# Patient Record
Sex: MALE | Race: WHITE | NOT HISPANIC OR LATINO | Employment: OTHER | URBAN - METROPOLITAN AREA
[De-identification: names, ages, dates, MRNs, and addresses within clinical notes are randomized per-mention and may not be internally consistent; named-entity substitution may affect disease eponyms.]

---

## 2017-01-11 ENCOUNTER — HOSPITAL ENCOUNTER (OUTPATIENT)
Dept: PHYSICAL THERAPY | Facility: HOSPITAL | Age: 72
Setting detail: RECURRING SERIES
Discharge: HOME | End: 2017-01-23
Attending: FAMILY MEDICINE | Admitting: FAMILY MEDICINE

## 2017-01-18 ENCOUNTER — HOSPITAL ENCOUNTER (OUTPATIENT)
Dept: MRI IMAGING | Facility: HOSPITAL | Age: 72
Discharge: HOME | End: 2017-01-18
Attending: FAMILY MEDICINE | Admitting: FAMILY MEDICINE

## 2017-04-19 ENCOUNTER — HOSPITAL ENCOUNTER (OUTPATIENT)
Dept: OTHER | Facility: HOSPITAL | Age: 72
Discharge: HOME | End: 2017-04-19
Attending: FAMILY MEDICINE | Admitting: FAMILY MEDICINE

## 2017-08-07 ENCOUNTER — HOSPITAL ENCOUNTER (OUTPATIENT)
Dept: NUCLEAR MEDICINE | Facility: HOSPITAL | Age: 72
Discharge: HOME | End: 2017-08-07
Attending: INTERNAL MEDICINE | Admitting: INTERNAL MEDICINE

## 2017-09-01 ENCOUNTER — HOSPITAL ENCOUNTER (OUTPATIENT)
Dept: FAMILY MEDICINE CLINIC | Facility: CLINIC | Age: 72
Setting detail: SPECIMEN
Discharge: HOME | End: 2017-09-01
Attending: FAMILY MEDICINE | Admitting: FAMILY MEDICINE

## 2017-09-01 LAB
PSA SERPL-MCNC: 2.75 NG/ML (ref 0–4)
TESTOST SERPL-MCNC: 3.07 NG/ML (ref 1.7–7.8)

## 2017-09-06 ENCOUNTER — HOSPITAL ENCOUNTER (OUTPATIENT)
Dept: ULTRASOUND IMAGING | Facility: HOSPITAL | Age: 72
Discharge: HOME | End: 2017-09-06

## 2017-09-27 ENCOUNTER — HOSPITAL ENCOUNTER (OUTPATIENT)
Dept: MRI IMAGING | Facility: HOSPITAL | Age: 72
Discharge: HOME | End: 2017-09-27
Attending: FAMILY MEDICINE | Admitting: FAMILY MEDICINE

## 2017-09-27 LAB — CREAT BLDA-MCNC: 1.2 MG/DL (ref 0.6–1.3)

## 2017-10-20 ENCOUNTER — HOSPITAL ENCOUNTER (OUTPATIENT)
Dept: CT IMAGING | Facility: HOSPITAL | Age: 72
Discharge: HOME | End: 2017-10-20

## 2017-11-08 ENCOUNTER — HOSPITAL ENCOUNTER (OUTPATIENT)
Dept: GENERAL RADIOLOGY | Facility: HOSPITAL | Age: 72
Discharge: HOME | End: 2017-11-08

## 2017-11-27 ENCOUNTER — HOSPITAL ENCOUNTER (OUTPATIENT)
Dept: LAB | Facility: HOSPITAL | Age: 72
Discharge: HOME | End: 2017-11-27
Attending: OPHTHALMOLOGY | Admitting: OPHTHALMOLOGY

## 2017-11-27 LAB
BASOPHILS # BLD AUTO: 0 10*3/UL (ref 0–0.2)
BASOPHILS NFR BLD AUTO: 1 % (ref 0–2)
BILIRUB UR QL STRIP: NEGATIVE MG/DL
BUN SERPL-MCNC: 32 MG/DL (ref 8–20)
CASTS URNS QL MICRO: ABNORMAL /[LPF]
COLOR UR: YELLOW
CONV BACTERIA IN URINE MICRO: NEGATIVE
CONV CLARITY OF URINE: CLEAR
CONV HYALINE CASTS IN URINE MICRO: 3 /[LPF] (ref 0–5)
CONV PROTEIN IN URINE BY AUTOMATED TEST STRIP: NEGATIVE MG/DL
CONV SMALL ROUND CELLS: ABNORMAL /[HPF]
CONV UROBILINOGEN IN URINE BY AUTOMATED TEST STRIP: 0.2 MG/DL
CREAT UR-MCNC: 1.3 MG/DL (ref 0.7–1.2)
CRP SERPL-MCNC: 0.12 MG/DL (ref 0–0.7)
CULTURE INDICATED?: ABNORMAL
DIFFERENTIAL METHOD BLD: (no result)
EOSINOPHIL # BLD AUTO: 0.1 10*3/UL (ref 0–0.3)
EOSINOPHIL # BLD AUTO: 2 % (ref 0–3)
ERYTHROCYTE [DISTWIDTH] IN BLOOD BY AUTOMATED COUNT: 15.1 % (ref 11.5–14.5)
ERYTHROCYTE [SEDIMENTATION RATE] IN BLOOD BY WESTERGREN METHOD: 17 MM/HR (ref 0–20)
GLUCOSE UR QL: NEGATIVE MG/DL
HCT VFR BLD AUTO: 36.2 % (ref 40–54)
HGB BLD-MCNC: 11.4 G/DL (ref 14–18)
HGB UR QL STRIP: NEGATIVE
KETONES UR QL STRIP: ABNORMAL MG/DL
LEUKOCYTE ESTERASE UR QL STRIP: NEGATIVE
LYMPHOCYTES # BLD AUTO: 2.2 10*3/UL (ref 0.8–4.8)
LYMPHOCYTES NFR BLD AUTO: 32 % (ref 18–42)
MCH RBC QN AUTO: 25.6 PG (ref 26–32)
MCHC RBC AUTO-ENTMCNC: 31.6 G/DL (ref 32–36)
MCV RBC AUTO: 80.8 FL (ref 80–94)
MONOCYTES # BLD AUTO: 0.7 10*3/UL (ref 0.1–1.3)
MONOCYTES NFR BLD AUTO: 10 % (ref 2–11)
NEUTROPHILS # BLD AUTO: 3.8 10*3/UL (ref 2.3–8.6)
NEUTROPHILS NFR BLD AUTO: 55 % (ref 50–75)
NITRITE UR QL STRIP: NEGATIVE
NRBC BLD AUTO-RTO: 0 /100{WBCS}
NRBC/RBC NFR BLD MANUAL: 0 10*3/UL
PH UR STRIP.AUTO: 5 [PH] (ref 4.5–8)
PLATELET # BLD AUTO: 245 10*3/UL (ref 150–450)
PMV BLD AUTO: 7.9 FL (ref 7.4–10.4)
RBC # BLD AUTO: 4.48 10*6/UL (ref 4.6–6)
RBC #/AREA URNS HPF: 1 /[HPF] (ref 0–3)
SP GR UR: 1.03 (ref 1–1.03)
SPERM URNS QL MICRO: ABNORMAL /[HPF]
SQUAMOUS SPT QL MICRO: 1 /[HPF] (ref 0–5)
UNIDENT CRYS URNS QL MICRO: ABNORMAL /[HPF]
WBC # BLD AUTO: 6.8 10*3/UL (ref 4.5–11.5)
WBC #/AREA URNS HPF: 2 /[HPF] (ref 0–5)
YEAST SPEC QL WET PREP: ABNORMAL /[HPF]

## 2017-11-29 LAB
ANA SER QL IA: NORMAL
CHROMATIN AB SERPL-ACNC: <20 [IU]/ML (ref 0–20)
HLA-B27 QL NAA+PROBE: NEGATIVE
INTERPRETATION: NORMAL

## 2017-11-30 LAB — LABORATORY COMMENT REPORT: NORMAL

## 2018-10-24 ENCOUNTER — HOSPITAL ENCOUNTER (OUTPATIENT)
Dept: FAMILY MEDICINE CLINIC | Facility: CLINIC | Age: 73
Setting detail: SPECIMEN
Discharge: HOME | End: 2018-10-24
Attending: FAMILY MEDICINE | Admitting: FAMILY MEDICINE

## 2018-10-24 LAB
25(OH)D3 SERPL-MCNC: 54 NG/ML (ref 30–100)
ALBUMIN SERPL-MCNC: 3.6 G/DL (ref 3.5–4.8)
ALBUMIN/GLOB SERPL: 1.2 {RATIO} (ref 1–1.7)
ALP SERPL-CCNC: 92 IU/L (ref 32–91)
ALT SERPL-CCNC: 25 IU/L (ref 17–63)
ANION GAP SERPL CALC-SCNC: 13.8 MMOL/L (ref 10–20)
AST SERPL-CCNC: 28 IU/L (ref 15–41)
BASOPHILS # BLD AUTO: 0 10*3/UL (ref 0–0.2)
BASOPHILS NFR BLD AUTO: 1 % (ref 0–2)
BILIRUB SERPL-MCNC: 0.7 MG/DL (ref 0.3–1.2)
BILIRUB UR QL STRIP: NEGATIVE MG/DL
BUN SERPL-MCNC: 40 MG/DL (ref 8–20)
BUN/CREAT SERPL: 33.3 (ref 6.2–20.3)
CALCIUM SERPL-MCNC: 9.2 MG/DL (ref 8.9–10.3)
CASTS URNS QL MICRO: NORMAL /[LPF]
CHLORIDE SERPL-SCNC: 105 MMOL/L (ref 101–111)
CHOLEST SERPL-MCNC: 137 MG/DL
CHOLEST/HDLC SERPL: 2.1 {RATIO}
COLOR UR: YELLOW
CONV BACTERIA IN URINE MICRO: NEGATIVE
CONV CLARITY OF URINE: CLEAR
CONV CO2: 25 MMOL/L (ref 22–32)
CONV HYALINE CASTS IN URINE MICRO: 0 /[LPF] (ref 0–5)
CONV LDL CHOLESTEROL DIRECT: 64 MG/DL (ref 0–100)
CONV PROTEIN IN URINE BY AUTOMATED TEST STRIP: NEGATIVE MG/DL
CONV SMALL ROUND CELLS: NORMAL /[HPF]
CONV TOTAL PROTEIN: 6.5 G/DL (ref 6.1–7.9)
CONV UROBILINOGEN IN URINE BY AUTOMATED TEST STRIP: 0.2 MG/DL
CREAT UR-MCNC: 1.2 MG/DL (ref 0.7–1.2)
CULTURE INDICATED?: NORMAL
DIFFERENTIAL METHOD BLD: (no result)
EOSINOPHIL # BLD AUTO: 0.1 10*3/UL (ref 0–0.3)
EOSINOPHIL # BLD AUTO: 2 % (ref 0–3)
ERYTHROCYTE [DISTWIDTH] IN BLOOD BY AUTOMATED COUNT: 13.9 % (ref 11.5–14.5)
ERYTHROCYTE [SEDIMENTATION RATE] IN BLOOD BY WESTERGREN METHOD: 21 MM/HR (ref 0–20)
GLOBULIN UR ELPH-MCNC: 2.9 G/DL (ref 2.5–3.8)
GLUCOSE SERPL-MCNC: 105 MG/DL (ref 65–99)
GLUCOSE UR QL: NEGATIVE MG/DL
HBA1C MFR BLD: 7 % (ref 0–5.6)
HCT VFR BLD AUTO: 40.8 % (ref 40–54)
HDLC SERPL-MCNC: 66 MG/DL
HGB BLD-MCNC: 13.5 G/DL (ref 14–18)
HGB UR QL STRIP: NEGATIVE
KETONES UR QL STRIP: NEGATIVE MG/DL
LDLC/HDLC SERPL: 1 {RATIO}
LEUKOCYTE ESTERASE UR QL STRIP: NEGATIVE
LIPID INTERPRETATION: NORMAL
LYMPHOCYTES # BLD AUTO: 2.4 10*3/UL (ref 0.8–4.8)
LYMPHOCYTES NFR BLD AUTO: 28 % (ref 18–42)
MCH RBC QN AUTO: 30.2 PG (ref 26–32)
MCHC RBC AUTO-ENTMCNC: 33.2 G/DL (ref 32–36)
MCV RBC AUTO: 90.9 FL (ref 80–94)
MONOCYTES # BLD AUTO: 0.8 10*3/UL (ref 0.1–1.3)
MONOCYTES NFR BLD AUTO: 9 % (ref 2–11)
NEUTROPHILS # BLD AUTO: 5.3 10*3/UL (ref 2.3–8.6)
NEUTROPHILS NFR BLD AUTO: 60 % (ref 50–75)
NITRITE UR QL STRIP: NEGATIVE
NRBC BLD AUTO-RTO: 0 /100{WBCS}
NRBC/RBC NFR BLD MANUAL: 0 10*3/UL
PH UR STRIP.AUTO: 5.5 [PH] (ref 4.5–8)
PLATELET # BLD AUTO: 215 10*3/UL (ref 150–450)
PMV BLD AUTO: 8.2 FL (ref 7.4–10.4)
POTASSIUM SERPL-SCNC: 4.8 MMOL/L (ref 3.6–5.1)
PSA SERPL-MCNC: 1.22 NG/ML (ref 0–4)
RBC # BLD AUTO: 4.49 10*6/UL (ref 4.6–6)
RBC #/AREA URNS HPF: 0 /[HPF] (ref 0–3)
SODIUM SERPL-SCNC: 139 MMOL/L (ref 136–144)
SP GR UR: 1.02 (ref 1–1.03)
SPERM URNS QL MICRO: NORMAL /[HPF]
SQUAMOUS SPT QL MICRO: 0 /[HPF] (ref 0–5)
T4 FREE SERPL-MCNC: 0.9 NG/DL (ref 0.58–1.64)
TRIGL SERPL-MCNC: 60 MG/DL
TSH SERPL-ACNC: 0.64 UIU/ML (ref 0.34–5.6)
UNIDENT CRYS URNS QL MICRO: NORMAL /[HPF]
VIT B12 SERPL-MCNC: 883 PG/ML (ref 180–914)
VLDLC SERPL CALC-MCNC: 6.8 MG/DL
WBC # BLD AUTO: 8.7 10*3/UL (ref 4.5–11.5)
WBC #/AREA URNS HPF: 1 /[HPF] (ref 0–5)
YEAST SPEC QL WET PREP: NORMAL /[HPF]

## 2019-06-28 RX ORDER — HYDROCODONE BITARTRATE AND ACETAMINOPHEN 10; 325 MG/1; MG/1
TABLET ORAL
Qty: 120 TABLET | Refills: 0 | Status: SHIPPED | OUTPATIENT
Start: 2019-06-28 | End: 2019-07-29 | Stop reason: SDUPTHER

## 2019-06-28 RX ORDER — HYDROCODONE BITARTRATE AND ACETAMINOPHEN 10; 325 MG/1; MG/1
TABLET ORAL
COMMUNITY
Start: 2019-04-29 | End: 2019-06-28 | Stop reason: SDUPTHER

## 2019-06-28 RX ORDER — DEXTROAMPHETAMINE SACCHARATE, AMPHETAMINE ASPARTATE MONOHYDRATE, DEXTROAMPHETAMINE SULFATE AND AMPHETAMINE SULFATE 5; 5; 5; 5 MG/1; MG/1; MG/1; MG/1
20 CAPSULE, EXTENDED RELEASE ORAL EVERY MORNING
Qty: 30 CAPSULE | Refills: 0 | Status: SHIPPED | OUTPATIENT
Start: 2019-06-28 | End: 2019-07-29 | Stop reason: SDUPTHER

## 2019-06-28 RX ORDER — DEXTROAMPHETAMINE SACCHARATE, AMPHETAMINE ASPARTATE MONOHYDRATE, DEXTROAMPHETAMINE SULFATE AND AMPHETAMINE SULFATE 5; 5; 5; 5 MG/1; MG/1; MG/1; MG/1
CAPSULE, EXTENDED RELEASE ORAL
COMMUNITY
Start: 2017-12-04 | End: 2019-06-28 | Stop reason: SDUPTHER

## 2019-07-04 RX ORDER — PANTOPRAZOLE SODIUM 40 MG/1
TABLET, DELAYED RELEASE ORAL
Qty: 90 TABLET | Refills: 3 | Status: SHIPPED | OUTPATIENT
Start: 2019-07-04 | End: 2019-09-27

## 2019-07-29 ENCOUNTER — TELEPHONE (OUTPATIENT)
Dept: FAMILY MEDICINE CLINIC | Facility: CLINIC | Age: 74
End: 2019-07-29

## 2019-07-29 NOTE — TELEPHONE ENCOUNTER
Needs rx's sent in for Hydrocodone 10/325mg one every 4 hours as needed #120 and Adderall XR 20mg once daily #30.

## 2019-07-30 RX ORDER — HYDROCODONE BITARTRATE AND ACETAMINOPHEN 10; 325 MG/1; MG/1
TABLET ORAL
Qty: 120 TABLET | Refills: 0 | Status: SHIPPED | OUTPATIENT
Start: 2019-07-30 | End: 2019-08-29 | Stop reason: SDUPTHER

## 2019-07-30 RX ORDER — DEXTROAMPHETAMINE SACCHARATE, AMPHETAMINE ASPARTATE MONOHYDRATE, DEXTROAMPHETAMINE SULFATE AND AMPHETAMINE SULFATE 5; 5; 5; 5 MG/1; MG/1; MG/1; MG/1
20 CAPSULE, EXTENDED RELEASE ORAL EVERY MORNING
Qty: 30 CAPSULE | Refills: 0 | Status: SHIPPED | OUTPATIENT
Start: 2019-07-30 | End: 2019-09-03 | Stop reason: SDUPTHER

## 2019-08-29 ENCOUNTER — TELEPHONE (OUTPATIENT)
Dept: FAMILY MEDICINE CLINIC | Facility: CLINIC | Age: 74
End: 2019-08-29

## 2019-08-29 RX ORDER — HYDROCODONE BITARTRATE AND ACETAMINOPHEN 10; 325 MG/1; MG/1
TABLET ORAL
Qty: 120 TABLET | Refills: 0 | Status: SHIPPED | OUTPATIENT
Start: 2019-08-29 | End: 2019-09-27 | Stop reason: SDUPTHER

## 2019-09-03 NOTE — TELEPHONE ENCOUNTER
Wife called requesting refill for patients    amphetamine-dextroamphetamine XR (ADDERALL XR) 20 MG

## 2019-09-04 RX ORDER — DEXTROAMPHETAMINE SACCHARATE, AMPHETAMINE ASPARTATE MONOHYDRATE, DEXTROAMPHETAMINE SULFATE AND AMPHETAMINE SULFATE 5; 5; 5; 5 MG/1; MG/1; MG/1; MG/1
20 CAPSULE, EXTENDED RELEASE ORAL EVERY MORNING
Qty: 30 CAPSULE | Refills: 0 | Status: SHIPPED | OUTPATIENT
Start: 2019-09-04 | End: 2019-09-27 | Stop reason: SDUPTHER

## 2019-09-09 RX ORDER — ATORVASTATIN CALCIUM 20 MG/1
TABLET, FILM COATED ORAL
Qty: 90 TABLET | Refills: 4 | Status: SHIPPED | OUTPATIENT
Start: 2019-09-09 | End: 2020-12-05

## 2019-09-27 ENCOUNTER — TELEPHONE (OUTPATIENT)
Dept: FAMILY MEDICINE CLINIC | Facility: CLINIC | Age: 74
End: 2019-09-27

## 2019-09-27 RX ORDER — INFLUENZA A VIRUS A/SINGAPORE/GP1908/2015 IVR-180A (H1N1) ANTIGEN (PROPIOLACTONE INACTIVATED), INFLUENZA A VIRUS A/SINGAPORE/INFIMH-16-0019/2016 IVR-186 (H3N2) ANTIGEN (PROPIOLACTONE INACTIVATED), INFLUENZA B VIRUS B/MARYLAND/15/2016 ANTIGEN (PROPIOLACTONE INACTIVATED), AND INFLUENZA B VIRUS B/PHUKET/3073/2013 BVR-1B ANTIGEN (PROPIOLACTONE INACTIVATED) 15; 15; 15; 15 UG/.5ML; UG/.5ML; UG/.5ML; UG/.5ML
INJECTION, SUSPENSION INTRAMUSCULAR
Refills: 0 | COMMUNITY
Start: 2019-09-24 | End: 2020-12-02

## 2019-09-27 RX ORDER — ESOMEPRAZOLE MAGNESIUM 40 MG/1
CAPSULE, DELAYED RELEASE ORAL
COMMUNITY
Start: 2016-01-12 | End: 2020-12-02

## 2019-09-27 RX ORDER — HYDROCODONE BITARTRATE AND ACETAMINOPHEN 10; 325 MG/1; MG/1
TABLET ORAL
Qty: 120 TABLET | Refills: 0 | Status: SHIPPED | OUTPATIENT
Start: 2019-09-27 | End: 2019-10-25 | Stop reason: SDUPTHER

## 2019-09-27 RX ORDER — CELECOXIB 200 MG/1
1 CAPSULE ORAL EVERY 24 HOURS
COMMUNITY
Start: 2019-02-18 | End: 2020-02-13

## 2019-09-27 RX ORDER — HYDROCODONE BITARTRATE AND ACETAMINOPHEN 10; 325 MG/1; MG/1
TABLET ORAL
Qty: 120 TABLET | Refills: 0 | Status: SHIPPED | OUTPATIENT
Start: 2019-09-27 | End: 2019-09-27 | Stop reason: SDUPTHER

## 2019-09-27 RX ORDER — DEXTROAMPHETAMINE SACCHARATE, AMPHETAMINE ASPARTATE MONOHYDRATE, DEXTROAMPHETAMINE SULFATE AND AMPHETAMINE SULFATE 5; 5; 5; 5 MG/1; MG/1; MG/1; MG/1
20 CAPSULE, EXTENDED RELEASE ORAL EVERY MORNING
Qty: 30 CAPSULE | Refills: 0 | Status: SHIPPED | OUTPATIENT
Start: 2019-09-27 | End: 2019-09-27 | Stop reason: SDUPTHER

## 2019-09-27 RX ORDER — GLIMEPIRIDE 2 MG/1
TABLET ORAL EVERY 24 HOURS
COMMUNITY
Start: 2017-08-14 | End: 2020-02-03

## 2019-09-27 RX ORDER — DEXTROAMPHETAMINE SACCHARATE, AMPHETAMINE ASPARTATE MONOHYDRATE, DEXTROAMPHETAMINE SULFATE AND AMPHETAMINE SULFATE 5; 5; 5; 5 MG/1; MG/1; MG/1; MG/1
20 CAPSULE, EXTENDED RELEASE ORAL EVERY MORNING
Qty: 30 CAPSULE | Refills: 0 | Status: SHIPPED | OUTPATIENT
Start: 2019-09-27 | End: 2019-10-25 | Stop reason: SDUPTHER

## 2019-09-27 RX ORDER — TRANDOLAPRIL AND VERAPAMIL HYDROCHLORIDE 2; 240 MG/1; MG/1
TABLET, FILM COATED, EXTENDED RELEASE ORAL EVERY 12 HOURS
COMMUNITY
Start: 2017-07-19 | End: 2020-01-06

## 2019-09-27 RX ORDER — HEPATITIS A VACCINE 1440 [IU]/ML
INJECTION, SUSPENSION INTRAMUSCULAR
Refills: 0 | COMMUNITY
Start: 2019-09-24 | End: 2020-12-02

## 2019-09-27 RX ORDER — DULOXETIN HYDROCHLORIDE 30 MG/1
3 CAPSULE, DELAYED RELEASE ORAL EVERY 24 HOURS
COMMUNITY
Start: 2017-08-20 | End: 2020-04-01 | Stop reason: SDUPTHER

## 2019-09-27 NOTE — TELEPHONE ENCOUNTER
Spouse called requesting refill for patients     HYDROcodone-acetaminophen (NORCO)  MG    amphetamine-dextroamphetamine XR (ADDERALL XR) 20 MG    Also, spouse states that she was told by pharmacy that patient and spouse should get the new shingles vaccine Shingrix. Spouse states that she read that if you have arthritis you should not get Shingrix. Spouse is asking if they should get old vaccine or is it ok to get the new shingrix vaccine.

## 2019-10-01 ENCOUNTER — TELEPHONE (OUTPATIENT)
Dept: FAMILY MEDICINE CLINIC | Facility: CLINIC | Age: 74
End: 2019-10-01

## 2019-10-25 ENCOUNTER — TELEPHONE (OUTPATIENT)
Dept: FAMILY MEDICINE CLINIC | Facility: CLINIC | Age: 74
End: 2019-10-25

## 2019-10-25 RX ORDER — HYDROCODONE BITARTRATE AND ACETAMINOPHEN 10; 325 MG/1; MG/1
TABLET ORAL
Qty: 120 TABLET | Refills: 0 | Status: SHIPPED | OUTPATIENT
Start: 2019-10-25 | End: 2019-11-25 | Stop reason: SDUPTHER

## 2019-10-25 RX ORDER — DEXTROAMPHETAMINE SACCHARATE, AMPHETAMINE ASPARTATE MONOHYDRATE, DEXTROAMPHETAMINE SULFATE AND AMPHETAMINE SULFATE 5; 5; 5; 5 MG/1; MG/1; MG/1; MG/1
20 CAPSULE, EXTENDED RELEASE ORAL EVERY MORNING
Qty: 30 CAPSULE | Refills: 0 | Status: SHIPPED | OUTPATIENT
Start: 2019-10-25 | End: 2019-11-25 | Stop reason: SDUPTHER

## 2019-10-25 NOTE — TELEPHONE ENCOUNTER
Wife called and requested patient's Hydrocodone and Adderall RX's to be sent to pharmacy. Thank you.

## 2019-11-25 ENCOUNTER — TELEPHONE (OUTPATIENT)
Dept: FAMILY MEDICINE CLINIC | Facility: CLINIC | Age: 74
End: 2019-11-25

## 2019-11-25 RX ORDER — DEXTROAMPHETAMINE SACCHARATE, AMPHETAMINE ASPARTATE MONOHYDRATE, DEXTROAMPHETAMINE SULFATE AND AMPHETAMINE SULFATE 5; 5; 5; 5 MG/1; MG/1; MG/1; MG/1
20 CAPSULE, EXTENDED RELEASE ORAL EVERY MORNING
Qty: 30 CAPSULE | Refills: 0 | Status: SHIPPED | OUTPATIENT
Start: 2019-11-25 | End: 2019-12-23 | Stop reason: SDUPTHER

## 2019-11-25 RX ORDER — HYDROCODONE BITARTRATE AND ACETAMINOPHEN 10; 325 MG/1; MG/1
TABLET ORAL
Qty: 120 TABLET | Refills: 0 | Status: SHIPPED | OUTPATIENT
Start: 2019-11-25 | End: 2019-12-26 | Stop reason: SDUPTHER

## 2019-11-25 NOTE — TELEPHONE ENCOUNTER
Needs 2 rx's sent in for Hydrocodone 10/325mg one every 4 hours as needed #120 and Adderall XR 20mg once daily #30.

## 2019-11-27 ENCOUNTER — OFFICE VISIT (OUTPATIENT)
Dept: FAMILY MEDICINE CLINIC | Facility: CLINIC | Age: 74
End: 2019-11-27

## 2019-11-27 VITALS
OXYGEN SATURATION: 92 % | TEMPERATURE: 98.4 F | BODY MASS INDEX: 22.71 KG/M2 | RESPIRATION RATE: 14 BRPM | DIASTOLIC BLOOD PRESSURE: 62 MMHG | WEIGHT: 162.2 LBS | HEART RATE: 83 BPM | SYSTOLIC BLOOD PRESSURE: 110 MMHG | HEIGHT: 71 IN

## 2019-11-27 DIAGNOSIS — E55.9 VITAMIN D DEFICIENCY, UNSPECIFIED: ICD-10-CM

## 2019-11-27 DIAGNOSIS — G89.29 CHRONIC BILATERAL LOW BACK PAIN WITH RIGHT-SIDED SCIATICA: ICD-10-CM

## 2019-11-27 DIAGNOSIS — H81.90 VERTIGINOUS SYNDROME: ICD-10-CM

## 2019-11-27 DIAGNOSIS — F98.8 ATTENTION DEFICIT DISORDER (ADD) WITHOUT HYPERACTIVITY: ICD-10-CM

## 2019-11-27 DIAGNOSIS — M54.41 CHRONIC BILATERAL LOW BACK PAIN WITH RIGHT-SIDED SCIATICA: ICD-10-CM

## 2019-11-27 DIAGNOSIS — F33.41 RECURRENT MAJOR DEPRESSIVE DISORDER, IN PARTIAL REMISSION (HCC): ICD-10-CM

## 2019-11-27 DIAGNOSIS — R35.0 BENIGN PROSTATIC HYPERPLASIA WITH URINARY FREQUENCY: ICD-10-CM

## 2019-11-27 DIAGNOSIS — K80.20 CALCULUS OF GALLBLADDER WITHOUT CHOLECYSTITIS WITHOUT OBSTRUCTION: ICD-10-CM

## 2019-11-27 DIAGNOSIS — I10 ESSENTIAL HYPERTENSION: ICD-10-CM

## 2019-11-27 DIAGNOSIS — Z00.00 MEDICARE ANNUAL WELLNESS VISIT, SUBSEQUENT: Primary | ICD-10-CM

## 2019-11-27 DIAGNOSIS — Z79.4 TYPE 2 DIABETES MELLITUS WITHOUT COMPLICATION, WITH LONG-TERM CURRENT USE OF INSULIN (HCC): ICD-10-CM

## 2019-11-27 DIAGNOSIS — D50.0 IRON DEFICIENCY ANEMIA DUE TO CHRONIC BLOOD LOSS: ICD-10-CM

## 2019-11-27 DIAGNOSIS — M15.9 PRIMARY OSTEOARTHRITIS INVOLVING MULTIPLE JOINTS: ICD-10-CM

## 2019-11-27 DIAGNOSIS — N40.1 BENIGN PROSTATIC HYPERPLASIA WITH URINARY FREQUENCY: ICD-10-CM

## 2019-11-27 DIAGNOSIS — E11.9 TYPE 2 DIABETES MELLITUS WITHOUT COMPLICATION, WITH LONG-TERM CURRENT USE OF INSULIN (HCC): ICD-10-CM

## 2019-11-27 DIAGNOSIS — E29.1 DEFICIENCY OF TESTOSTERONE BIOSYNTHESIS: ICD-10-CM

## 2019-11-27 DIAGNOSIS — Z12.5 ENCOUNTER FOR SCREENING FOR MALIGNANT NEOPLASM OF PROSTATE: ICD-10-CM

## 2019-11-27 DIAGNOSIS — K58.9 IRRITABLE BOWEL SYNDROME WITHOUT DIARRHEA: ICD-10-CM

## 2019-11-27 DIAGNOSIS — M54.12 CERVICAL RADICULOPATHY: ICD-10-CM

## 2019-11-27 PROBLEM — F32.A DEPRESSION: Status: ACTIVE | Noted: 2019-11-27

## 2019-11-27 PROBLEM — M19.90 OSTEOARTHRITIS: Status: ACTIVE | Noted: 2019-11-27

## 2019-11-27 LAB
25(OH)D3 SERPL-MCNC: 55.1 NG/ML (ref 30–100)
ALBUMIN SERPL-MCNC: 4.1 G/DL (ref 3.5–5.2)
ALBUMIN/GLOB SERPL: 1.5 G/DL
ALP SERPL-CCNC: 80 U/L (ref 39–117)
ALT SERPL W P-5'-P-CCNC: 15 U/L (ref 1–41)
ANION GAP SERPL CALCULATED.3IONS-SCNC: 10.6 MMOL/L (ref 5–15)
AST SERPL-CCNC: 21 U/L (ref 1–40)
BASOPHILS # BLD AUTO: 0.03 10*3/MM3 (ref 0–0.2)
BASOPHILS NFR BLD AUTO: 0.3 % (ref 0–1.5)
BILIRUB SERPL-MCNC: 0.2 MG/DL (ref 0.2–1.2)
BILIRUB UR QL STRIP: NEGATIVE
BUN BLD-MCNC: 29 MG/DL (ref 8–23)
BUN/CREAT SERPL: 22.8 (ref 7–25)
CALCIUM SPEC-SCNC: 9.4 MG/DL (ref 8.6–10.5)
CHLORIDE SERPL-SCNC: 100 MMOL/L (ref 98–107)
CHOLEST SERPL-MCNC: 128 MG/DL (ref 0–200)
CLARITY UR: CLEAR
CO2 SERPL-SCNC: 27.4 MMOL/L (ref 22–29)
COLOR UR: ABNORMAL
CREAT BLD-MCNC: 1.27 MG/DL (ref 0.76–1.27)
DEPRECATED RDW RBC AUTO: 42.1 FL (ref 37–54)
EOSINOPHIL # BLD AUTO: 0.19 10*3/MM3 (ref 0–0.4)
EOSINOPHIL NFR BLD AUTO: 2.1 % (ref 0.3–6.2)
ERYTHROCYTE [DISTWIDTH] IN BLOOD BY AUTOMATED COUNT: 13.1 % (ref 12.3–15.4)
GFR SERPL CREATININE-BSD FRML MDRD: 55 ML/MIN/1.73
GLOBULIN UR ELPH-MCNC: 2.8 GM/DL
GLUCOSE BLD-MCNC: 100 MG/DL (ref 65–99)
GLUCOSE UR STRIP-MCNC: NEGATIVE MG/DL
HBA1C MFR BLD: 6.7 % (ref 3.5–5.6)
HCT VFR BLD AUTO: 35.3 % (ref 37.5–51)
HDLC SERPL-MCNC: 68 MG/DL (ref 40–60)
HGB BLD-MCNC: 12 G/DL (ref 13–17.7)
HGB UR QL STRIP.AUTO: NEGATIVE
IMM GRANULOCYTES # BLD AUTO: 0.06 10*3/MM3 (ref 0–0.05)
IMM GRANULOCYTES NFR BLD AUTO: 0.7 % (ref 0–0.5)
KETONES UR QL STRIP: ABNORMAL
LDLC SERPL CALC-MCNC: 51 MG/DL (ref 0–100)
LDLC/HDLC SERPL: 0.75 {RATIO}
LEUKOCYTE ESTERASE UR QL STRIP.AUTO: NEGATIVE
LYMPHOCYTES # BLD AUTO: 2.41 10*3/MM3 (ref 0.7–3.1)
LYMPHOCYTES NFR BLD AUTO: 26.7 % (ref 19.6–45.3)
MCH RBC QN AUTO: 30.2 PG (ref 26.6–33)
MCHC RBC AUTO-ENTMCNC: 34 G/DL (ref 31.5–35.7)
MCV RBC AUTO: 88.7 FL (ref 79–97)
MONOCYTES # BLD AUTO: 0.82 10*3/MM3 (ref 0.1–0.9)
MONOCYTES NFR BLD AUTO: 9.1 % (ref 5–12)
NEUTROPHILS # BLD AUTO: 5.53 10*3/MM3 (ref 1.7–7)
NEUTROPHILS NFR BLD AUTO: 61.1 % (ref 42.7–76)
NITRITE UR QL STRIP: NEGATIVE
NRBC BLD AUTO-RTO: 0 /100 WBC (ref 0–0.2)
PH UR STRIP.AUTO: <=5 [PH] (ref 5–8)
PLATELET # BLD AUTO: 242 10*3/MM3 (ref 140–450)
PMV BLD AUTO: 10.5 FL (ref 6–12)
POTASSIUM BLD-SCNC: 4.9 MMOL/L (ref 3.5–5.2)
PROT SERPL-MCNC: 6.9 G/DL (ref 6–8.5)
PROT UR QL STRIP: ABNORMAL
PSA SERPL-MCNC: 1.47 NG/ML (ref 0–4)
RBC # BLD AUTO: 3.98 10*6/MM3 (ref 4.14–5.8)
SODIUM BLD-SCNC: 138 MMOL/L (ref 136–145)
SP GR UR STRIP: >=1.03 (ref 1–1.03)
T4 FREE SERPL-MCNC: 1.3 NG/DL (ref 0.93–1.7)
TRIGL SERPL-MCNC: 46 MG/DL (ref 0–150)
TSH SERPL DL<=0.05 MIU/L-ACNC: 2.99 UIU/ML (ref 0.27–4.2)
UROBILINOGEN UR QL STRIP: ABNORMAL
VIT B12 BLD-MCNC: 1035 PG/ML (ref 211–946)
VLDLC SERPL-MCNC: 9.2 MG/DL (ref 5–40)
WBC NRBC COR # BLD: 9.04 10*3/MM3 (ref 3.4–10.8)

## 2019-11-27 PROCEDURE — 99214 OFFICE O/P EST MOD 30 MIN: CPT | Performed by: FAMILY MEDICINE

## 2019-11-27 PROCEDURE — 81003 URINALYSIS AUTO W/O SCOPE: CPT | Performed by: FAMILY MEDICINE

## 2019-11-27 PROCEDURE — 82607 VITAMIN B-12: CPT | Performed by: FAMILY MEDICINE

## 2019-11-27 PROCEDURE — 83036 HEMOGLOBIN GLYCOSYLATED A1C: CPT | Performed by: FAMILY MEDICINE

## 2019-11-27 PROCEDURE — G0439 PPPS, SUBSEQ VISIT: HCPCS | Performed by: FAMILY MEDICINE

## 2019-11-27 PROCEDURE — 80053 COMPREHEN METABOLIC PANEL: CPT | Performed by: FAMILY MEDICINE

## 2019-11-27 PROCEDURE — 80061 LIPID PANEL: CPT | Performed by: FAMILY MEDICINE

## 2019-11-27 PROCEDURE — 85025 COMPLETE CBC W/AUTO DIFF WBC: CPT | Performed by: FAMILY MEDICINE

## 2019-11-27 PROCEDURE — G0103 PSA SCREENING: HCPCS | Performed by: FAMILY MEDICINE

## 2019-11-27 PROCEDURE — 84439 ASSAY OF FREE THYROXINE: CPT | Performed by: FAMILY MEDICINE

## 2019-11-27 PROCEDURE — 84443 ASSAY THYROID STIM HORMONE: CPT | Performed by: FAMILY MEDICINE

## 2019-11-27 PROCEDURE — 82306 VITAMIN D 25 HYDROXY: CPT | Performed by: FAMILY MEDICINE

## 2019-11-27 RX ORDER — PANTOPRAZOLE SODIUM 40 MG/1
TABLET, DELAYED RELEASE ORAL
COMMUNITY
Start: 2019-10-03 | End: 2020-04-06 | Stop reason: SDUPTHER

## 2019-11-27 RX ORDER — DICYCLOMINE HYDROCHLORIDE 10 MG/1
CAPSULE ORAL
COMMUNITY
Start: 2019-11-23 | End: 2020-02-24

## 2019-11-27 RX ORDER — SUCRALFATE 1 G/10ML
SUSPENSION ORAL
COMMUNITY
Start: 2019-09-21 | End: 2020-03-23 | Stop reason: SDUPTHER

## 2019-11-27 RX ORDER — TAMSULOSIN HYDROCHLORIDE 0.4 MG/1
0.4 CAPSULE ORAL DAILY
Status: SHIPPED | OUTPATIENT
Start: 2019-11-27 | End: 2019-12-27

## 2019-11-27 NOTE — PROGRESS NOTES
The ABCs of the Annual Wellness Visit  Subsequent Medicare Wellness Visit    Chief Complaint   Patient presents with   • Medicare Wellness-subsequent       Subjective   History of Present Illness:  Lux Bray is a 74 y.o. male who presents for a Subsequent Medicare Wellness Visit.   Patient will have the health risk assessments today.  Colon is up to date.  PSA will be done.  Immunizatios up to date.  Overall he has had a good year.        Patient is also here today for a physical exam and review of his current medications.  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  We will address these medical conditions today and make adjustments in medicines if needed as dictated by lab results.                HEALTH RISK ASSESSMENT    Recent Hospitalizations:  No hospitalization(s) within the last year.    Current Medical Providers:  Patient Care Team:  Montana Morrow MD as PCP - General (Family Medicine)  Rohit Marsh MD as PCP - Claims Attributed    Smoking Status:  Social History     Tobacco Use   Smoking Status Former Smoker   Smokeless Tobacco Never Used       Alcohol Consumption:  Social History     Substance and Sexual Activity   Alcohol Use Yes       Depression Screen:   PHQ-2/PHQ-9 Depression Screening 11/27/2019   Little interest or pleasure in doing things 0   Feeling down, depressed, or hopeless 0   Total Score 0       Fall Risk Screen:  STEADI Fall Risk Assessment was completed, and patient is at LOW risk for falls.Assessment completed on:11/27/2019    Health Habits and Functional and Cognitive Screening:  Functional & Cognitive Status 11/27/2019   Do you have difficulty preparing food and eating?  No   Do you have difficulty bathing yourself, getting dressed or grooming yourself? No   Do you have difficulty using the toilet? No   Do you have difficulty moving around from place to place? No   Do you have trouble with steps or getting out of a bed or a chair? No   Current Diet Low Carb Diet   Dental Exam Up to date   Eye Exam Up to date   Current Exercise Activities Include Cardiovasular Workout on Exercise Equipment   Do you need help using the phone?  No   Are you deaf or do you have serious difficulty hearing?  Yes   Do you need help with transportation? No   Do you need help shopping? No   Do you need help preparing meals?  No   Do you need help with housework?  No   Do you need help with laundry? No   Do you need help taking your medications? Yes   Do you need help managing money? No   Do you ever drive or ride in a car without wearing a seat belt? No   Have you felt unusual stress, anger or loneliness in the last month? No   Who do you live with? Spouse   If you need help, do you have trouble finding someone available to you? No   Have you been bothered in the last four weeks by sexual problems? No   Do you have difficulty concentrating, remembering or making decisions? No         Does the patient have evidence of cognitive impairment? Yes    Asprin use counseling:Taking ASA appropriately as indicated    Age-appropriate Screening Schedule:  Refer to the list below for future screening recommendations based on patient's age, sex and/or medical conditions. Orders for these recommended tests are listed in the plan section. The patient has been provided with a written plan.    Health Maintenance   Topic Date Due   • URINE MICROALBUMIN  1945   • TDAP/TD VACCINES (1 - Tdap) 09/17/1962   • ZOSTER VACCINE (2 of 2) 12/22/2019   • DIABETIC EYE EXAM  04/29/2020   • HEMOGLOBIN A1C  05/27/2020   • INFLUENZA VACCINE  Completed   • PNEUMOCOCCAL VACCINES (65+ LOW/MEDIUM RISK)  Completed          The following  portions of the patient's history were reviewed and updated as appropriate: allergies, current medications, past family history, past medical history, past social history, past surgical history and problem list.    Outpatient Medications Prior to Visit   Medication Sig Dispense Refill   • amphetamine-dextroamphetamine XR (ADDERALL XR) 20 MG 24 hr capsule Take 1 capsule by mouth Every Morning 30 capsule 0   • atorvastatin (LIPITOR) 20 MG tablet TAKE 1 TABLET DAILY 90 tablet 4   • CARAFATE 1 GM/10ML suspension      • celecoxib (CELEBREX) 200 MG capsule 1 capsule Daily.     • dicyclomine (BENTYL) 10 MG capsule      • DULoxetine (CYMBALTA) 30 MG capsule 3 capsules Daily.     • esomeprazole (nexIUM) 40 MG capsule NEXIUM 40 MG CPDR     • glimepiride (AMARYL) 2 MG tablet Daily.     • HYDROcodone-acetaminophen (NORCO)  MG per tablet Take 1 tablet every 4 hours prn MAX 4 tablets /day 120 tablet 0   • metFORMIN (GLUCOPHAGE) 500 MG tablet TAKE 2 TABLETS TWICE A  tablet 4   • pantoprazole (PROTONIX) 40 MG EC tablet      • trandolapril-verapamil (TARKA) 2-240 MG per CR tablet Every 12 (Twelve) Hours.     • AFLURIA QUADRIVALENT 0.5 ML suspension prefilled syringe injection ADM 0.5ML IM UTD  0   • Chlorcyclizine-Pseudoephed (STAHIST AD) 25-60 MG tablet STAHIST AD 25-60 MG TABS     • HAVRIX 1440 EL U/ML vaccine ADM 1ML IM UTD  0     No facility-administered medications prior to visit.        Patient Active Problem List   Diagnosis   • Allergic state   • Attention deficit disorder (ADD) without hyperactivity   • Cervical radiculopathy   • Cholelithiasis   • Deficiency of testosterone biosynthesis   • Depression   • Enlarged prostate with lower urinary tract symptoms (LUTS)   • Hypertension   • Irritable bowel syndrome without diarrhea   • Lumbago with sciatica, right side   • Osteoarthritis   • Type 2 diabetes mellitus without complications (CMS/HCC)   • Vertiginous syndrome   • Preventative health care   • Vitamin D  deficiency, unspecified    • Encounter for screening for malignant neoplasm of prostate    • Iron deficiency anemia due to chronic blood loss       Advanced Care Planning:  Patient has an advance directive - a copy has not been provided. Have asked the patient to send this to us to add to record    Review of Systems   Constitutional: Negative.  Negative for appetite change, diaphoresis, fatigue and fever.   HENT: Positive for postnasal drip and rhinorrhea. Negative for congestion, dental problem, ear pain and sneezing.    Eyes: Negative.  Negative for visual disturbance.   Respiratory: Positive for shortness of breath. Negative for cough and chest tightness.    Cardiovascular: Negative.  Negative for chest pain and palpitations.   Gastrointestinal: Positive for abdominal distention and constipation.   Endocrine: Negative for cold intolerance and heat intolerance.   Genitourinary: Negative.  Negative for difficulty urinating and flank pain.   Musculoskeletal: Positive for arthralgias, back pain, neck pain and neck stiffness. Negative for joint swelling.        Patient has a lot of pain on a daily basis in his back but he is learned to kind of live with it.  He also has pain in almost all of his joints but in particular his hands knees and shoulders are bothersome.   Skin: Negative for color change and rash.   Allergic/Immunologic: Negative.    Neurological: Negative.  Negative for dizziness and headaches.   Hematological: Negative.  Negative for adenopathy.   Psychiatric/Behavioral: Positive for dysphoric mood. Negative for agitation and behavioral problems. The patient is nervous/anxious.         He occasionally will have episodes of depression but anxiety is usually always present.  Now that he is retired this is certainly improved.   All other systems reviewed and are negative.      Compared to one year ago, the patient feels his physical health is better.  Compared to one year ago, the patient feels his mental  "health is the same.    Reviewed chart for potential of high risk medication in the elderly: no  Reviewed chart for potential of harmful drug interactions in the elderly:yes    Objective         Vitals:    11/27/19 0907   BP: 110/62   BP Location: Left arm   Patient Position: Sitting   Cuff Size: Adult   Pulse: 83   Resp: 14   Temp: 98.4 °F (36.9 °C)   TempSrc: Oral   SpO2: 92%   Weight: 73.6 kg (162 lb 3.2 oz)   Height: 180.3 cm (71\")       Body mass index is 22.62 kg/m².  Discussed the patient's BMI with him. The BMI is in the acceptable range.    Physical Exam   Constitutional: He is oriented to person, place, and time. He appears well-developed and well-nourished.   HENT:   Head: Normocephalic.   Right Ear: External ear normal.   Left Ear: External ear normal.   Nose: Nose normal.   Mouth/Throat: Oropharynx is clear and moist. No oropharyngeal exudate.   Eyes: Conjunctivae and EOM are normal. Pupils are equal, round, and reactive to light.   Neck: Normal range of motion. Neck supple.   Cardiovascular: Normal rate, regular rhythm, normal heart sounds and intact distal pulses.   Pulmonary/Chest: Effort normal and breath sounds normal.   Abdominal: Soft. Bowel sounds are normal.   Musculoskeletal: Normal range of motion.   Patient has obvious osteoarthritic changes of his hands and wrists elbows hips knees ankles and feet.  He moves very slowly.   Neurological: He is alert and oriented to person, place, and time.   Skin: Skin is warm and dry.   Psychiatric: He has a normal mood and affect. His behavior is normal. Judgment and thought content normal.   Vitals reviewed.      Lab Results   Component Value Date    TRIG 46 11/27/2019    HDL 68 (H) 11/27/2019    LDL 51 11/27/2019    VLDL 9.2 11/27/2019    HGBA1C 6.7 (H) 11/27/2019        Assessment/Plan   Medicare Risks and Personalized Health Plan  CMS Preventative Services Quick Reference  Chronic Pain   Colon Cancer Screening  Depression/Dysphoria  Hearing " Problem    The above risks/problems have been discussed with the patient.  Pertinent information has been shared with the patient in the After Visit Summary.  Follow up plans and orders are seen below in the Assessment/Plan Section.    Diagnoses and all orders for this visit:    1. Medicare annual wellness visit, subsequent (Primary)  Assessment & Plan:  He has done well.  He is up to date with his preventative care.  Immunizations are up to date.      Orders:  -     Vitamin D 25 Hydroxy  -     Vitamin B12  -     Urinalysis With Culture If Indicated - Urine, Random Void  -     TSH  -     T4, Free  -     PSA Screen  -     Comprehensive Metabolic Panel  -     CBC & Differential  -     Lipid Panel  -     Hemoglobin A1c  -     tamsulosin (FLOMAX) 24 hr capsule 0.4 mg  -     CBC Auto Differential    2. Essential hypertension  Assessment & Plan:  Hypertension is improving with treatment.  Continue current treatment regimen.  Dietary sodium restriction.  Weight loss.  Regular aerobic exercise.  Continue current medications.  Blood pressure will be reassessed at the next regular appointment.    Patient's blood pressure is excellent today.  He needs to continue the Tarka 2/241 every day    Orders:  -     Vitamin D 25 Hydroxy  -     Vitamin B12  -     Urinalysis With Culture If Indicated - Urine, Random Void  -     TSH  -     T4, Free  -     PSA Screen  -     Comprehensive Metabolic Panel  -     CBC & Differential  -     Lipid Panel  -     Hemoglobin A1c  -     tamsulosin (FLOMAX) 24 hr capsule 0.4 mg  -     CBC Auto Differential    3. Type 2 diabetes mellitus without complication, with long-term current use of insulin (CMS/[facility])  Assessment & Plan:  Diabetes is improving with treatment.   Continue current treatment regimen.  Reminded to bring in blood sugar diary at next visit.  Dietary recommendations for ADA diet.  Regular aerobic exercise.  Diabetes will be reassessed in 6 months.    We have rechecked his A1c and it is  6.7%.  I think we can increase his Amaryl to 4 mg daily and hopefully that will help him regain control and bring his A1c down below 6%.  He will have to watch for hypoglycemia at this level.    Orders:  -     Vitamin D 25 Hydroxy  -     Vitamin B12  -     Urinalysis With Culture If Indicated - Urine, Random Void  -     TSH  -     T4, Free  -     PSA Screen  -     Comprehensive Metabolic Panel  -     CBC & Differential  -     Lipid Panel  -     Hemoglobin A1c  -     tamsulosin (FLOMAX) 24 hr capsule 0.4 mg  -     CBC Auto Differential    4. Chronic bilateral low back pain with right-sided sciatica  Assessment & Plan:  Patient has the same problem as his neck also in his lumbar spine.  He has daily pain and stiffness.  Limited range of motion.  On the low cysts and facet arthropathy.  Occasionally has sciatica but very rarely does it go below the knee.    For both the neck and the lower back if the pain gets to be too much he does have the option of using Norco 10/325 1 every 6 hours as needed.  He uses Celebrex 200 mg daily for all of his arthritic pain including the spine.    Orders:  -     Vitamin D 25 Hydroxy  -     Vitamin B12  -     Urinalysis With Culture If Indicated - Urine, Random Void  -     TSH  -     T4, Free  -     PSA Screen  -     Comprehensive Metabolic Panel  -     CBC & Differential  -     Lipid Panel  -     Hemoglobin A1c  -     tamsulosin (FLOMAX) 24 hr capsule 0.4 mg  -     CBC Auto Differential    5. Primary osteoarthritis involving multiple joints  Assessment & Plan:  Patient has osteoarthritis of the axial skeleton but also peripheral joints including his shoulders elbows wrists and hands knees hips and ankles.  As mentioned above he uses Celebrex 200 daily and then if the pain gets too bad he has hydrocodone but fortunately does not have to use that very often.    Orders:  -     Vitamin D 25 Hydroxy  -     Vitamin B12  -     Urinalysis With Culture If Indicated - Urine, Random Void  -      TSH  -     T4, Free  -     PSA Screen  -     Comprehensive Metabolic Panel  -     CBC & Differential  -     Lipid Panel  -     Hemoglobin A1c  -     tamsulosin (FLOMAX) 24 hr capsule 0.4 mg  -     CBC Auto Differential    6. Benign prostatic hyperplasia with urinary frequency  Assessment & Plan:  Patient has symptoms of BPH with slow stream and nocturia.  I have suggested that he try tamsulosin 0.4 daily to see if it would diminish the amount of nocturia that he is experiencing.  Right now is getting up 4-6 times at night.  If we could even just cut that in half that would be helpful    Orders:  -     Vitamin D 25 Hydroxy  -     Vitamin B12  -     Urinalysis With Culture If Indicated - Urine, Random Void  -     TSH  -     T4, Free  -     PSA Screen  -     Comprehensive Metabolic Panel  -     CBC & Differential  -     Lipid Panel  -     Hemoglobin A1c  -     tamsulosin (FLOMAX) 24 hr capsule 0.4 mg  -     CBC Auto Differential    7. Vitamin D deficiency, unspecified   Assessment & Plan:  Recheck vitamin D level and replace if needed    Orders:  -     Vitamin D 25 Hydroxy    8. Encounter for screening for malignant neoplasm of prostate   Assessment & Plan:  Patient is due for a PSA today    Orders:  -     PSA Screen    9. Calculus of gallbladder without cholecystitis without obstruction  Assessment & Plan:  Patient has a history of gallstones that are asymptomatic.  Currently just following      10. Irritable bowel syndrome without diarrhea  Assessment & Plan:  Patient has symptoms compatible with irritable bowel syndrome particularly when he is under stress.  He tends to get more constipated than diarrhea.  When this happens we try to get him to take Benefiber and MiraLAX and that seems to work most of the time.    When he is having cramping pain he can use Bentyl 10 mg up to 4 times a day.  Only rarely has he needed to go up to 20 mg 4 times a day      11. Deficiency of testosterone biosynthesis  Assessment &  Plan:  Patient has had low testosterone for many years.  He seems to tolerate this fairly well and we have not really had to replace him for several years.      12. Cervical radiculopathy  Assessment & Plan:  Patient has rather severe cervical spondylosis and degenerative joint disease of the facets.  He has chronic neck pain sometimes with radiculopathy down the arm.  He has limited range of motion and stiffness.  Basically we treat this symptomatically and he does the best he can with it      13. Attention deficit disorder (ADD) without hyperactivity  Assessment & Plan:  Psychological condition is improving with lifestyle modifications.  Regular aerobic exercise.  Psychological condition  will be reassessed at the next regular appointment.    Now that the patient is retired he really does not need to be on Adderall so it has been stopped.  He still has some ADD but since he is not working its not near as bothersome.      14. Recurrent major depressive disorder, in partial remission (CMS/HCC)  Assessment & Plan:  Psychological condition is improving with treatment.  Regular aerobic exercise.  Medication changes per orders.  Psychological condition  will be reassessed at the next regular appointment.    Patient has Cymbalta to take for the depression.  It seems to help quite a bit.  Since he is retired now his depression is much less bothersome than it is been during his employment years.      15. Vertiginous syndrome  Assessment & Plan:  Patient complains of intermittent vertigo.  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   No Rx unless its gets much more bothersome.  Right now it happens so quickly that by the time he would take a medication it would be gone and the medication side effects would be worse than the underlying disease.  We will just watch this for some time now and see if it increases in frequency or intensity.      16. Iron deficiency anemia due to chronic blood  loss  Assessment & Plan:  Patient's hemoglobin is down to 12 this year.  It was 13.5 last year.  I need him to come back into the office for a recheck count, iron and total iron-binding capacity, and ferritin level.  His B12 is supratherapeutic.  I will need to do a fit test of the stool to see if he has blood in it and we will review his last colonoscopy.  We will repeat his CBC and a retake count in about 3 months.  If it is trending down again he will probably need an EGD along with repeat colonoscopy.          Follow Up:  Return in about 6 months (around 5/27/2020), or if symptoms worsen or fail to improve, for Recheck.     An After Visit Summary and PPPS were given to the patient.

## 2019-11-30 DIAGNOSIS — D50.0 IRON DEFICIENCY ANEMIA DUE TO CHRONIC BLOOD LOSS: Primary | ICD-10-CM

## 2019-11-30 PROBLEM — E55.9 VITAMIN D DEFICIENCY, UNSPECIFIED: Status: ACTIVE | Noted: 2019-11-30

## 2019-11-30 PROBLEM — Z12.5 ENCOUNTER FOR SCREENING FOR MALIGNANT NEOPLASM OF PROSTATE: Status: ACTIVE | Noted: 2019-11-30

## 2019-11-30 NOTE — ASSESSMENT & PLAN NOTE
Patient's hemoglobin is down to 12 this year.  It was 13.5 last year.  I need him to come back into the office for a recheck count, iron and total iron-binding capacity, and ferritin level.  His B12 is supratherapeutic.  I will need to do a fit test of the stool to see if he has blood in it and we will review his last colonoscopy.  We will repeat his CBC and a retake count in about 3 months.  If it is trending down again he will probably need an EGD along with repeat colonoscopy.

## 2019-11-30 NOTE — ASSESSMENT & PLAN NOTE
Patient has symptoms compatible with irritable bowel syndrome particularly when he is under stress.  He tends to get more constipated than diarrhea.  When this happens we try to get him to take Benefiber and MiraLAX and that seems to work most of the time.    When he is having cramping pain he can use Bentyl 10 mg up to 4 times a day.  Only rarely has he needed to go up to 20 mg 4 times a day

## 2019-11-30 NOTE — ASSESSMENT & PLAN NOTE
Patient has rather severe cervical spondylosis and degenerative joint disease of the facets.  He has chronic neck pain sometimes with radiculopathy down the arm.  He has limited range of motion and stiffness.  Basically we treat this symptomatically and he does the best he can with it

## 2019-11-30 NOTE — ASSESSMENT & PLAN NOTE
Patient has the same problem as his neck also in his lumbar spine.  He has daily pain and stiffness.  Limited range of motion.  On the low cysts and facet arthropathy.  Occasionally has sciatica but very rarely does it go below the knee.    For both the neck and the lower back if the pain gets to be too much he does have the option of using Norco 10/325 1 every 6 hours as needed.  He uses Celebrex 200 mg daily for all of his arthritic pain including the spine.

## 2019-11-30 NOTE — ASSESSMENT & PLAN NOTE
Patient has symptoms of BPH with slow stream and nocturia.  I have suggested that he try tamsulosin 0.4 daily to see if it would diminish the amount of nocturia that he is experiencing.  Right now is getting up 4-6 times at night.  If we could even just cut that in half that would be helpful   Reviewed the causes of heartburn  Discussed importance of diet and lifestyle modifications to control GERD symptoms  Avoid things which worsen heartburn (ex:  caffeine, tomato based products, spicy foods, tobacco, alcohol, obesity, tight fitting clothing )  Discussed importance of eating small, frequent meals instead of large meals  Elevate head of the bed and do not lay down 2-3 hours following a meal   He has more than 5 year history of GERD  Still symptomatic despite of optimal treatment  He does qualify for EGD to rule out Naranjo's esophagitis  I will refer him to Gastroenterology    He might as well qualify for colonoscopy as he does not recall exactly when his last one was and I do not see any results in the system

## 2019-11-30 NOTE — ASSESSMENT & PLAN NOTE
Patient has had low testosterone for many years.  He seems to tolerate this fairly well and we have not really had to replace him for several years.

## 2019-11-30 NOTE — ASSESSMENT & PLAN NOTE
Patient has osteoarthritis of the axial skeleton but also peripheral joints including his shoulders elbows wrists and hands knees hips and ankles.  As mentioned above he uses Celebrex 200 daily and then if the pain gets too bad he has hydrocodone but fortunately does not have to use that very often.

## 2019-11-30 NOTE — ASSESSMENT & PLAN NOTE
Diabetes is improving with treatment.   Continue current treatment regimen.  Reminded to bring in blood sugar diary at next visit.  Dietary recommendations for ADA diet.  Regular aerobic exercise.  Diabetes will be reassessed in 6 months.    We have rechecked his A1c and it is 6.7%.  I think we can increase his Amaryl to 4 mg daily and hopefully that will help him regain control and bring his A1c down below 6%.  He will have to watch for hypoglycemia at this level.

## 2019-11-30 NOTE — ASSESSMENT & PLAN NOTE
Patient complains of intermittent vertigo.  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   No Rx unless its gets much more bothersome.  Right now it happens so quickly that by the time he would take a medication it would be gone and the medication side effects would be worse than the underlying disease.  We will just watch this for some time now and see if it increases in frequency or intensity.

## 2019-11-30 NOTE — PATIENT INSTRUCTIONS
Medicare Wellness  Personal Prevention Plan of Service     Date of Office Visit:  2019  Encounter Provider:  Montana Morrow MD  Place of Service:  [facility]  Patient Name: Lux Bray  :  1945    As part of the Medicare Wellness portion of your visit today, we are providing you with this personalized preventive plan of services (PPPS). This plan is based upon recommendations of the [place] Preventive Services Task Force (USPSTF) and the Advisory Committee on Immunization Practices (ACIP).    This lists the preventive care services that should be considered, and provides dates of when you are due. Items listed as completed are up-to-date and do not require any further intervention.    Health Maintenance   Topic Date Due   • URINE MICROALBUMIN  1945   • TDAP/TD VACCINES (1 - Tdap) 1962   • MEDICARE ANNUAL WELLNESS  10/31/2019   • ZOSTER VACCINE (2 of 2) 2019   • DIABETIC EYE EXAM  2020   • HEMOGLOBIN A1C  2020   • INFLUENZA VACCINE  Completed   • PNEUMOCOCCAL VACCINES (65+ LOW/MEDIUM RISK)  Completed       Orders Placed This Encounter   Procedures   • Vitamin D 25 Hydroxy   • Vitamin B12   • Urinalysis With Culture If Indicated - Urine, Random Void   • TSH   • T4, Free   • PSA Screen   • Comprehensive Metabolic Panel   • Lipid Panel   • Hemoglobin A1c   • CBC Auto Differential   • CBC & Differential     Order Specific Question:   Manual Differential     Answer:   No       No Follow-up on file.

## 2019-11-30 NOTE — ASSESSMENT & PLAN NOTE
Psychological condition is improving with lifestyle modifications.  Regular aerobic exercise.  Psychological condition  will be reassessed at the next regular appointment.    Now that the patient is retired he really does not need to be on Adderall so it has been stopped.  He still has some ADD but since he is not working its not near as bothersome.   57

## 2019-11-30 NOTE — ASSESSMENT & PLAN NOTE
Hypertension is improving with treatment.  Continue current treatment regimen.  Dietary sodium restriction.  Weight loss.  Regular aerobic exercise.  Continue current medications.  Blood pressure will be reassessed at the next regular appointment.    Patient's blood pressure is excellent today.  He needs to continue the Tarka 2/241 every day

## 2019-11-30 NOTE — ASSESSMENT & PLAN NOTE
Psychological condition is improving with treatment.  Regular aerobic exercise.  Medication changes per orders.  Psychological condition  will be reassessed at the next regular appointment.    Patient has Cymbalta to take for the depression.  It seems to help quite a bit.  Since he is retired now his depression is much less bothersome than it is been during his employment years.

## 2019-12-02 ENCOUNTER — TELEPHONE (OUTPATIENT)
Dept: FAMILY MEDICINE CLINIC | Facility: CLINIC | Age: 74
End: 2019-12-02

## 2019-12-02 NOTE — TELEPHONE ENCOUNTER
Wife noted on patient portal that patient's B12 level was elevated. He takes B12 supplement and she is asking if he needs to stop it. Thank you.

## 2019-12-02 NOTE — TELEPHONE ENCOUNTER
Scheduled patient lab appointment 12/5/19 8:50 am for the iron labs.    Scheduled patient lab appointment 1/15/20 11:00 am for CBC.    Wife wanted you to know that patient had a colonoscopy done 2017 for Dr. Bernardo Viveros, Phone [phone], Fax: [phone]. She is wanting our office to get these results for your review.    Wife also asked do you want patient to stop his ASA 81mg.    Thank you.

## 2019-12-02 NOTE — TELEPHONE ENCOUNTER
----- Message from Montana Morrow MD sent at 11/30/2019  9:46 AM EST -----  Eloisa Alen needs to come back in for iron/TIBC, retic count, ferritin,    We need to give him a FIT tube to collect a sample of stool to test.    Tell him to repeat the CBC in 6-8 weeks.  If going down and the FIT is positive for blood he will need a repeat scope of the colon (last one 2015) and maybe a EGD.

## 2019-12-03 NOTE — TELEPHONE ENCOUNTER
Gave wife message that patient should stop ASA.    Wife is still needing to know if patient should stop the B12 supplement. Thank you.

## 2019-12-05 ENCOUNTER — TELEPHONE (OUTPATIENT)
Dept: FAMILY MEDICINE CLINIC | Facility: CLINIC | Age: 74
End: 2019-12-05

## 2019-12-05 LAB
FERRITIN SERPL-MCNC: 19.3 NG/ML (ref 30–400)
IRON 24H UR-MRATE: 55 MCG/DL (ref 59–158)
IRON SATN MFR SERPL: 16 % (ref 20–50)
RETICS # AUTO: 0.07 10*6/MM3 (ref 0.02–0.13)
RETICS/RBC NFR AUTO: 1.74 % (ref 0.7–1.9)
TIBC SERPL-MCNC: 355 MCG/DL (ref 298–536)
TRANSFERRIN SERPL-MCNC: 238 MG/DL (ref 200–360)

## 2019-12-05 PROCEDURE — 83540 ASSAY OF IRON: CPT | Performed by: FAMILY MEDICINE

## 2019-12-05 PROCEDURE — 85045 AUTOMATED RETICULOCYTE COUNT: CPT | Performed by: FAMILY MEDICINE

## 2019-12-05 PROCEDURE — 84466 ASSAY OF TRANSFERRIN: CPT | Performed by: FAMILY MEDICINE

## 2019-12-05 PROCEDURE — 82728 ASSAY OF FERRITIN: CPT | Performed by: FAMILY MEDICINE

## 2019-12-05 RX ORDER — TAMSULOSIN HYDROCHLORIDE 0.4 MG/1
1 CAPSULE ORAL DAILY
Qty: 30 CAPSULE | Refills: 2 | Status: SHIPPED | OUTPATIENT
Start: 2019-12-05 | End: 2020-04-21

## 2019-12-06 DIAGNOSIS — D50.0 IRON DEFICIENCY ANEMIA DUE TO CHRONIC BLOOD LOSS: Primary | ICD-10-CM

## 2019-12-10 ENCOUNTER — TELEPHONE (OUTPATIENT)
Dept: FAMILY MEDICINE CLINIC | Facility: CLINIC | Age: 74
End: 2019-12-10

## 2019-12-10 NOTE — TELEPHONE ENCOUNTER
Dr. Morrow does not have that in his notes or anything.  Could you please call her back and ask where he is located at. That is not who dr morrow said to refer him to.      Thanks, Shirlene

## 2019-12-10 NOTE — TELEPHONE ENCOUNTER
Spouse was called. Spouse states that patient has seen Dr. Viveros in the past. Spouse states that Dr. Viveros is with [facility].

## 2019-12-10 NOTE — TELEPHONE ENCOUNTER
Let them know that they can call [employer] at [phone] to schedule appt.      Thanks, Shirlene

## 2019-12-10 NOTE — TELEPHONE ENCOUNTER
Spouse was called. Spouse states that patient sees gastroenterologist in [place] Dr. Gallito Viveros. Spouse would like referral sent to him.

## 2019-12-12 ENCOUNTER — TELEPHONE (OUTPATIENT)
Dept: FAMILY MEDICINE CLINIC | Facility: CLINIC | Age: 74
End: 2019-12-12

## 2019-12-13 DIAGNOSIS — Z12.11 SCREENING FOR COLON CANCER: ICD-10-CM

## 2019-12-13 DIAGNOSIS — D50.0 IRON DEFICIENCY ANEMIA DUE TO CHRONIC BLOOD LOSS: Primary | ICD-10-CM

## 2019-12-13 LAB
DEVELOPER EXPIRATION DATE: ABNORMAL
DEVELOPER LOT NUMBER: ABNORMAL
EXPIRATION DATE: ABNORMAL
FECAL OCCULT BLOOD SCREEN, POC: POSITIVE
Lab: ABNORMAL
NEGATIVE CONTROL: NEGATIVE
POSITIVE CONTROL: POSITIVE

## 2019-12-13 PROCEDURE — 82270 OCCULT BLOOD FECES: CPT | Performed by: FAMILY MEDICINE

## 2019-12-14 NOTE — PROGRESS NOTES
Tell Alen that his stool test was positive for blood.  We need to go ahead and see the gastroenterologist about a EGD look at your stomach and then also possibly doing a colonoscopy again since it was about 4 years ago since the last one.  We will start setting things up and see what the gastroenterologist thinks.  If they have a gastroenterologist in [place] that they go to that would be fine also.  Whoever they want to see is fine.  Let me know if they want me to send in

## 2019-12-23 ENCOUNTER — TELEPHONE (OUTPATIENT)
Dept: FAMILY MEDICINE CLINIC | Facility: CLINIC | Age: 74
End: 2019-12-23

## 2019-12-23 ENCOUNTER — TRANSCRIBE ORDERS (OUTPATIENT)
Dept: ADMINISTRATIVE | Facility: HOSPITAL | Age: 74
End: 2019-12-23

## 2019-12-23 DIAGNOSIS — R13.10 DYSPHAGIA, UNSPECIFIED TYPE: Primary | ICD-10-CM

## 2019-12-23 DIAGNOSIS — F98.8 ATTENTION DEFICIT DISORDER (ADD) WITHOUT HYPERACTIVITY: Primary | ICD-10-CM

## 2019-12-23 RX ORDER — DEXTROAMPHETAMINE SACCHARATE, AMPHETAMINE ASPARTATE MONOHYDRATE, DEXTROAMPHETAMINE SULFATE AND AMPHETAMINE SULFATE 5; 5; 5; 5 MG/1; MG/1; MG/1; MG/1
20 CAPSULE, EXTENDED RELEASE ORAL EVERY MORNING
Qty: 30 CAPSULE | Refills: 0 | Status: SHIPPED | OUTPATIENT
Start: 2019-12-23 | End: 2020-01-23 | Stop reason: SDUPTHER

## 2019-12-26 ENCOUNTER — HOSPITAL ENCOUNTER (OUTPATIENT)
Dept: GENERAL RADIOLOGY | Facility: HOSPITAL | Age: 74
Discharge: HOME | End: 2019-12-26
Admitting: INTERNAL MEDICINE

## 2019-12-26 ENCOUNTER — TELEPHONE (OUTPATIENT)
Dept: FAMILY MEDICINE CLINIC | Facility: CLINIC | Age: 74
End: 2019-12-26

## 2019-12-26 DIAGNOSIS — M15.9 PRIMARY OSTEOARTHRITIS INVOLVING MULTIPLE JOINTS: Primary | ICD-10-CM

## 2019-12-26 DIAGNOSIS — R13.10 DYSPHAGIA, UNSPECIFIED TYPE: ICD-10-CM

## 2019-12-26 PROCEDURE — 74220 X-RAY XM ESOPHAGUS 1CNTRST: CPT

## 2019-12-26 RX ORDER — HYDROCODONE BITARTRATE AND ACETAMINOPHEN 10; 325 MG/1; MG/1
TABLET ORAL
Qty: 120 TABLET | Refills: 0 | Status: SHIPPED | OUTPATIENT
Start: 2019-12-26 | End: 2020-01-23 | Stop reason: SDUPTHER

## 2019-12-30 ENCOUNTER — TRANSCRIBE ORDERS (OUTPATIENT)
Dept: ADMINISTRATIVE | Facility: HOSPITAL | Age: 74
End: 2019-12-30

## 2019-12-30 DIAGNOSIS — R05.9 COUGH: ICD-10-CM

## 2019-12-30 DIAGNOSIS — R10.9 ABDOMINAL PAIN, UNSPECIFIED ABDOMINAL LOCATION: Primary | ICD-10-CM

## 2019-12-30 DIAGNOSIS — R10.13 EPIGASTRIC ABDOMINAL PAIN: ICD-10-CM

## 2020-01-03 ENCOUNTER — HOSPITAL ENCOUNTER (OUTPATIENT)
Dept: GENERAL RADIOLOGY | Facility: HOSPITAL | Age: 75
Discharge: HOME | End: 2020-01-03
Admitting: NURSE PRACTITIONER

## 2020-01-03 ENCOUNTER — TELEPHONE (OUTPATIENT)
Dept: FAMILY MEDICINE CLINIC | Facility: CLINIC | Age: 75
End: 2020-01-03

## 2020-01-03 DIAGNOSIS — R10.13 EPIGASTRIC ABDOMINAL PAIN: ICD-10-CM

## 2020-01-03 DIAGNOSIS — R10.9 ABDOMINAL PAIN, UNSPECIFIED ABDOMINAL LOCATION: ICD-10-CM

## 2020-01-03 DIAGNOSIS — R05.9 COUGH: ICD-10-CM

## 2020-01-03 PROCEDURE — 92611 MOTION FLUOROSCOPY/SWALLOW: CPT

## 2020-01-03 PROCEDURE — 74230 X-RAY XM SWLNG FUNCJ C+: CPT

## 2020-01-03 PROCEDURE — 63710000001 BARIUM SULFATE 96 % RECONSTITUTED SUSPENSION: Performed by: NURSE PRACTITIONER

## 2020-01-03 PROCEDURE — A9270 NON-COVERED ITEM OR SERVICE: HCPCS | Performed by: NURSE PRACTITIONER

## 2020-01-03 RX ADMIN — BARIUM SULFATE 183 ML: 960 POWDER, FOR SUSPENSION ORAL at 10:30

## 2020-01-03 NOTE — MBS/VFSS/FEES
Outpatient Speech Language Pathology   Adult Video-Swallow Initial Evaluation    Chava     Patient Name: Lux Bray  : 1945  MRN: [id number]  Today's Date: 1/3/2020         Visit Date: 2020   Patient Active Problem List   Diagnosis   • Allergic state   • Attention deficit disorder (ADD) without hyperactivity   • Cervical radiculopathy   • Cholelithiasis   • Deficiency of testosterone biosynthesis   • Depression   • Enlarged prostate with lower urinary tract symptoms (LUTS)   • Hypertension   • Irritable bowel syndrome without diarrhea   • Lumbago with sciatica, right side   • Osteoarthritis   • Type 2 diabetes mellitus without complications (CMS/HCC)   • Vertiginous syndrome   • Preventative health care   • Vitamin D deficiency, unspecified    • Encounter for screening for malignant neoplasm of prostate    • Iron deficiency anemia due to chronic blood loss        Past Medical History:   Diagnosis Date   • Depression    • Hypertension         Past Surgical History:   Procedure Laterality Date   • HAND SURGERY  1980-1990- [facility]. Abstracted from [facility].   • HEMORROIDECTOMY      [facility]. Abstracted from [facility].         Visit Dx:     ICD-10-CM ICD-9-CM   1. Abdominal pain, unspecified abdominal location R10.9 789.00   2. Epigastric abdominal pain R10.13 789.06   3. Cough R05 786.2           SLP Adult Swallow Evaluation     Row Name 20 0910       Rehab Evaluation    Document Type  evaluation  -CP    Patient Observations  alert;cooperative  -CP    Patient/Family Observations  Pt alert and responsive. He acted as his own informant. he was able to follow all commands and participate in appropriate conversation. Pt was seen for VFSS seated upright at 90 degrees in the lateral position  -CP    Patient Effort  good  -CP       General Information    Patient Profile Reviewed  yes  -CP    Pertinent History Of Current Problem  Pt presents today for VFSS due to showing aspiration during  a recent upper esophagram. He reports no history of CVA,, no throat or neck surgeries. He does have a history of reflux. He presents today for further evaluation of swallow, risk for aspiratiopn and least restrictive diet.    -CP    Current Method of Nutrition  regular textures;thin liquids  -CP    Prior Level of Function-Communication  WFL  -CP    Prior Level of Function-Swallowing  regular textures;thin liquids  -CP    Plans/Goals Discussed with  patient  -CP    Barriers to Rehab  none identified  -CP       Pain Assessment    Additional Documentation  Pain Scale: Numbers Pre/Post-Treatment (Group)  -CP       Pain Scale: Numbers Pre/Post-Treatment    Pain Scale: Numbers, Pretreatment  0/10 - no pain  -CP    Pain Scale: Numbers, Post-Treatment  0/10 - no pain  -CP       Oral Motor and Function    Dentition Assessment  natural, present and adequate  -CP    Volitional Swallow  WFL  -CP    Volitional Cough  WFL  -CP       Oral Musculature and Cranial Nerve Assessment    Oral Motor General Assessment  WFL  -CP       General Eating/Swallowing Observations    Respiratory Support Currently in Use  room air  -CP    Eating/Swallowing Skills  self-fed  -CP    Positioning During Eating  upright 90 degree;upright in chair  -CP       MBS/VFSS    Utensils Used  spoon;cup  -CP       MBS/VFSS Interpretation    Oral Prep Phase  WFL  -CP    Oral Transit Phase  impaired  -CP    Oral Residue  WFL  -CP    VFSS Summary  Pt seen for video swallow study. Pt given trials of thin barium x2 by cup, barium coated applesauce x2, barium coated peaches x2 and a cracker with barium paste x2, followed by thin barium by cup with a chin tuck to complete the study. Pt exhibited functional mastication and timely oral transit. A-P transit was functional. Pt had no pocketing or oral residual. pt had premature spillage of thin liquids into the pharynx intermittantly before the swallow, causing trace, transient penetration before the swallow. This  completely cleared during the swallow and there was no aspiration at any time. Use of a chin tuck during the swallow, eliminated the penetration.  Laryngeal elevation and anterior hyoid excursion were good. Epiglottic inversion and laryngeal vestibular closure were complete. Pt had no other penetration or aspiration on any consistency assessed. There was mild vallecular residue of the applesauce, remaining in the pharyngeal area after the swallow. Pt spontaneously utilized a double swallow that cleared all residue. The esophagus was scanned and it revealed timely emptying with no retention.  It is rec that pt continue a regular diet with thin liquids as tolerated. He may utilize a chin tuck if indicated or increased coughing is noted. Use of a double swallow after dry or sticky consistencies, may also benefit this patient.  Full results and recommendations from this study were explained to the patient, including viewing the VFSS on the BRIDGET. He verbalized understanding of all results and recommendations. A DVD copy of this study was provided for the patient to take home. Thank you for referring this patient and if you have any questions, feel free to contact this department at [phone].   -CP       Oral Transit Phase    Impaired Oral Transit Phase  premature spillage of liquids into pharynx  -CP    Premature Spillage of Liquids into Pharynx  thin liquids;mechanical soft;discoordination of lingual movement  -CP       Initiation of Pharyngeal Swallow    Pharyngeal Phase  impaired pharyngeal phase of swallowing  -CP    Response to Penetration  shallow;transient;no response  -CP    Pharyngeal Residue  valleculae;other (see comments) mild  -CP    Response to Residue  cleared residue with spontaneous subsequent swallow  -CP    Attempted Compensatory Maneuvers  chin tuck  -CP    Response to Attempted Compensatory Maneuvers  prevented penetration  -CP       SLP Communication to Radiology    Severity Level of Dysphagia   mild dysphagia;pharyngeal dysfunction  -CP    Consistencies Aspirated/Penetrated  penetrated;thin liquids;other (see comments) trace and transient  -CP       Clinical Impression    SLP Swallowing Diagnosis  functional oral phase;mild;pharyngeal dysfunction  -CP    Functional Impact  risk of aspiration/pneumonia  -CP       Recommendations    SLP Diet Recommendation  regular textures;thin liquids  -CP    Recommended Precautions and Strategies  upright posture during/after eating;small bites of food and sips of liquid;multiple swallows per bite of food;multiple swallows per sip of liquid;alternate between small bites of food and sips of liquid  -CP    SLP Rec. for Method of Medication Administration  meds whole;with thin liquids;as tolerated  -CP    Monitor for Signs of Aspiration  yes;cough;gurgly voice;throat clearing  -CP      User Key  (r) = Recorded By, (t) = Taken By, (c) = Cosigned By    Initials Name Provider Type    Keisha Velez, SLP Speech and Language Pathologist                                              Time Calculation:                     DIANE Cerda  1/3/2020

## 2020-01-06 RX ORDER — TRANDOLAPRIL AND VERAPAMIL HYDROCHLORIDE 2; 240 MG/1; MG/1
TABLET, FILM COATED, EXTENDED RELEASE ORAL
Qty: 180 TABLET | Refills: 4 | Status: SHIPPED | OUTPATIENT
Start: 2020-01-06 | End: 2021-01-15 | Stop reason: SDUPTHER

## 2020-01-14 ENCOUNTER — TELEPHONE (OUTPATIENT)
Dept: GASTROENTEROLOGY | Facility: CLINIC | Age: 75
End: 2020-01-14

## 2020-01-14 NOTE — TELEPHONE ENCOUNTER
----- Message from JONATHAN Walter sent at 1/10/2020 10:06 AM EST -----  Please call to schedule EGD and CLS for anemia and trouble swallowing. SW

## 2020-01-17 ENCOUNTER — LAB (OUTPATIENT)
Dept: FAMILY MEDICINE CLINIC | Facility: CLINIC | Age: 75
End: 2020-01-17

## 2020-01-17 DIAGNOSIS — D50.0 IRON DEFICIENCY ANEMIA DUE TO CHRONIC BLOOD LOSS: Primary | ICD-10-CM

## 2020-01-17 LAB
BASOPHILS # BLD AUTO: 0.03 10*3/MM3 (ref 0–0.2)
BASOPHILS NFR BLD AUTO: 0.4 % (ref 0–1.5)
DEPRECATED RDW RBC AUTO: 42.4 FL (ref 37–54)
EOSINOPHIL # BLD AUTO: 0.11 10*3/MM3 (ref 0–0.4)
EOSINOPHIL NFR BLD AUTO: 1.3 % (ref 0.3–6.2)
ERYTHROCYTE [DISTWIDTH] IN BLOOD BY AUTOMATED COUNT: 12.9 % (ref 12.3–15.4)
HCT VFR BLD AUTO: 36.8 % (ref 37.5–51)
HGB BLD-MCNC: 12.4 G/DL (ref 13–17.7)
IMM GRANULOCYTES # BLD AUTO: 0.01 10*3/MM3 (ref 0–0.05)
IMM GRANULOCYTES NFR BLD AUTO: 0.1 % (ref 0–0.5)
LYMPHOCYTES # BLD AUTO: 2.56 10*3/MM3 (ref 0.7–3.1)
LYMPHOCYTES NFR BLD AUTO: 31.1 % (ref 19.6–45.3)
MCH RBC QN AUTO: 30.5 PG (ref 26.6–33)
MCHC RBC AUTO-ENTMCNC: 33.7 G/DL (ref 31.5–35.7)
MCV RBC AUTO: 90.4 FL (ref 79–97)
MONOCYTES # BLD AUTO: 0.67 10*3/MM3 (ref 0.1–0.9)
MONOCYTES NFR BLD AUTO: 8.2 % (ref 5–12)
NEUTROPHILS # BLD AUTO: 4.84 10*3/MM3 (ref 1.7–7)
NEUTROPHILS NFR BLD AUTO: 58.9 % (ref 42.7–76)
NRBC BLD AUTO-RTO: 0 /100 WBC (ref 0–0.2)
PLATELET # BLD AUTO: 227 10*3/MM3 (ref 140–450)
PMV BLD AUTO: 10.6 FL (ref 6–12)
RBC # BLD AUTO: 4.07 10*6/MM3 (ref 4.14–5.8)
WBC NRBC COR # BLD: 8.22 10*3/MM3 (ref 3.4–10.8)

## 2020-01-17 PROCEDURE — 36415 COLL VENOUS BLD VENIPUNCTURE: CPT

## 2020-01-17 PROCEDURE — 85025 COMPLETE CBC W/AUTO DIFF WBC: CPT | Performed by: FAMILY MEDICINE

## 2020-01-20 ENCOUNTER — TELEPHONE (OUTPATIENT)
Dept: FAMILY MEDICINE CLINIC | Facility: CLINIC | Age: 75
End: 2020-01-20

## 2020-01-21 ENCOUNTER — TELEPHONE (OUTPATIENT)
Dept: GASTROENTEROLOGY | Facility: CLINIC | Age: 75
End: 2020-01-21

## 2020-01-21 DIAGNOSIS — D50.0 IRON DEFICIENCY ANEMIA DUE TO CHRONIC BLOOD LOSS: Primary | ICD-10-CM

## 2020-01-21 NOTE — TELEPHONE ENCOUNTER
Noted, will copy to chart. Chris  ----- Message from Bay Hassan sent at 1/21/2020 11:01 AM EST -----  Regarding: RE: SCHE EGD/COLON LABS TO [facility] 1/16  Spoke to the patient and she is aware that Carole has seen the labs but that we still need to get the ok from  to go ahead with scheduling the egd/colonoscopy at this time. She states that she understands and I assured her that we would call her back later this week.   ----- Message -----  From: Carole Phillips APRN  Sent: 1/20/2020  10:13 AM EST  To: Bay Hassan  Subject: RE: SCHE EGD/COLON LABS TO [facility] 1/16          His hgb is stable and slightly improved (was 12.0 now 12.4).     We do not have an answer yet on scheduling him for procedures.   Please add this information with his wife and let her know that we will discuss with Dr. Viveros this week and get back with her.      It is reassuring that his hemoglobin has improved and his recent blood work has been reviewed by this office.  He does need to follow-up with the ordering provider for blood work results as well.    Chris  ----- Message -----  From: Sarah Ferguson RegSched Rep  Sent: 1/16/2020   4:14 PM EST  To: JONATHAN Walter  Subject: FW: SCHE EGD/COLON LABS TO [facility] 1/16              ----- Message -----  From: Sarah Ferguson RegSched Rep  Sent: 1/14/2020  10:47 AM EST  To: Bay Hassan  Subject: SCHE EGD/COLON LABS TO [facility] 1/16              REMIND CAROLE TO LOOK AT PTS LABS

## 2020-01-23 ENCOUNTER — PREP FOR SURGERY (OUTPATIENT)
Dept: GASTROENTEROLOGY | Facility: CLINIC | Age: 75
End: 2020-01-23

## 2020-01-23 ENCOUNTER — TELEPHONE (OUTPATIENT)
Dept: FAMILY MEDICINE CLINIC | Facility: CLINIC | Age: 75
End: 2020-01-23

## 2020-01-23 DIAGNOSIS — D50.0 IRON DEFICIENCY ANEMIA DUE TO CHRONIC BLOOD LOSS: Primary | ICD-10-CM

## 2020-01-23 DIAGNOSIS — M15.9 PRIMARY OSTEOARTHRITIS INVOLVING MULTIPLE JOINTS: ICD-10-CM

## 2020-01-23 DIAGNOSIS — F98.8 ATTENTION DEFICIT DISORDER (ADD) WITHOUT HYPERACTIVITY: ICD-10-CM

## 2020-01-23 RX ORDER — DEXTROAMPHETAMINE SACCHARATE, AMPHETAMINE ASPARTATE MONOHYDRATE, DEXTROAMPHETAMINE SULFATE AND AMPHETAMINE SULFATE 5; 5; 5; 5 MG/1; MG/1; MG/1; MG/1
20 CAPSULE, EXTENDED RELEASE ORAL EVERY MORNING
Qty: 30 CAPSULE | Refills: 0 | Status: SHIPPED | OUTPATIENT
Start: 2020-01-23 | End: 2020-02-21 | Stop reason: SDUPTHER

## 2020-01-23 RX ORDER — HYDROCODONE BITARTRATE AND ACETAMINOPHEN 10; 325 MG/1; MG/1
TABLET ORAL
Qty: 120 TABLET | Refills: 0 | Status: SHIPPED | OUTPATIENT
Start: 2020-01-23 | End: 2020-02-21 | Stop reason: SDUPTHER

## 2020-01-30 ENCOUNTER — OUTSIDE FACILITY SERVICE (OUTPATIENT)
Dept: GASTROENTEROLOGY | Facility: CLINIC | Age: 75
End: 2020-01-30

## 2020-01-30 ENCOUNTER — TELEPHONE (OUTPATIENT)
Dept: FAMILY MEDICINE CLINIC | Facility: CLINIC | Age: 75
End: 2020-01-30

## 2020-01-30 ENCOUNTER — LAB REQUISITION (OUTPATIENT)
Dept: LAB | Facility: HOSPITAL | Age: 75
End: 2020-01-30

## 2020-01-30 DIAGNOSIS — Z00.00 ENCOUNTER FOR GENERAL ADULT MEDICAL EXAMINATION WITHOUT ABNORMAL FINDINGS: ICD-10-CM

## 2020-01-30 PROCEDURE — 43450 DILATE ESOPHAGUS 1/MULT PASS: CPT | Performed by: INTERNAL MEDICINE

## 2020-01-30 PROCEDURE — 45380 COLONOSCOPY AND BIOPSY: CPT | Performed by: INTERNAL MEDICINE

## 2020-01-30 PROCEDURE — 88305 TISSUE EXAM BY PATHOLOGIST: CPT | Performed by: INTERNAL MEDICINE

## 2020-01-30 PROCEDURE — 43239 EGD BIOPSY SINGLE/MULTIPLE: CPT | Performed by: INTERNAL MEDICINE

## 2020-01-31 LAB
CYTO UR: NORMAL
LAB AP CASE REPORT: NORMAL
LAB AP CLINICAL INFORMATION: NORMAL
PATH REPORT.FINAL DX SPEC: NORMAL
PATH REPORT.GROSS SPEC: NORMAL

## 2020-02-03 RX ORDER — GLIMEPIRIDE 2 MG/1
TABLET ORAL
Qty: 180 TABLET | Refills: 4 | Status: SHIPPED | OUTPATIENT
Start: 2020-02-03 | End: 2020-07-20 | Stop reason: SDUPTHER

## 2020-02-13 RX ORDER — CELECOXIB 200 MG/1
CAPSULE ORAL
Qty: 90 CAPSULE | Refills: 4 | Status: SHIPPED | OUTPATIENT
Start: 2020-02-13 | End: 2021-05-10

## 2020-02-21 ENCOUNTER — TELEPHONE (OUTPATIENT)
Dept: FAMILY MEDICINE CLINIC | Facility: CLINIC | Age: 75
End: 2020-02-21

## 2020-02-21 DIAGNOSIS — M15.9 PRIMARY OSTEOARTHRITIS INVOLVING MULTIPLE JOINTS: ICD-10-CM

## 2020-02-21 DIAGNOSIS — F98.8 ATTENTION DEFICIT DISORDER (ADD) WITHOUT HYPERACTIVITY: ICD-10-CM

## 2020-02-21 RX ORDER — HYDROCODONE BITARTRATE AND ACETAMINOPHEN 10; 325 MG/1; MG/1
TABLET ORAL
Qty: 120 TABLET | Refills: 0 | Status: SHIPPED | OUTPATIENT
Start: 2020-02-21 | End: 2020-03-20 | Stop reason: SDUPTHER

## 2020-02-21 RX ORDER — DEXTROAMPHETAMINE SACCHARATE, AMPHETAMINE ASPARTATE MONOHYDRATE, DEXTROAMPHETAMINE SULFATE AND AMPHETAMINE SULFATE 5; 5; 5; 5 MG/1; MG/1; MG/1; MG/1
20 CAPSULE, EXTENDED RELEASE ORAL EVERY MORNING
Qty: 30 CAPSULE | Refills: 0 | Status: SHIPPED | OUTPATIENT
Start: 2020-02-21 | End: 2020-03-23 | Stop reason: SDUPTHER

## 2020-02-24 RX ORDER — DICYCLOMINE HYDROCHLORIDE 10 MG/1
CAPSULE ORAL
Qty: 540 CAPSULE | Refills: 4 | Status: SHIPPED | OUTPATIENT
Start: 2020-02-24 | End: 2021-01-15 | Stop reason: SDUPTHER

## 2020-03-16 ENCOUNTER — TELEPHONE (OUTPATIENT)
Dept: FAMILY MEDICINE CLINIC | Facility: CLINIC | Age: 75
End: 2020-03-16

## 2020-03-16 NOTE — TELEPHONE ENCOUNTER
Patient is going to continue taking his iron supplements, but is not coming in this week to have his CBC checked.  Wanted you to know.

## 2020-03-16 NOTE — TELEPHONE ENCOUNTER
That is excellent.  Tell Alen is a smart man to stay away from here.  Just keep taking the iron.  His last hemoglobin was over 12 so he is doing great

## 2020-03-17 ENCOUNTER — PATIENT MESSAGE (OUTPATIENT)
Dept: FAMILY MEDICINE CLINIC | Facility: CLINIC | Age: 75
End: 2020-03-17

## 2020-03-18 ENCOUNTER — TELEPHONE (OUTPATIENT)
Dept: FAMILY MEDICINE CLINIC | Facility: CLINIC | Age: 75
End: 2020-03-18

## 2020-03-18 RX ORDER — FLUTICASONE PROPIONATE 50 MCG
2 SPRAY, SUSPENSION (ML) NASAL DAILY
Qty: 1 BOTTLE | Refills: 11 | Status: SHIPPED | OUTPATIENT
Start: 2020-03-18 | End: 2020-12-02

## 2020-03-18 NOTE — TELEPHONE ENCOUNTER
Ese, patients wife, called stating that the patients allergist is shut down because of the virus and isn't able to get his allergy shots for a while.    Patient's wife would like to know if Flonase can be prescribed to him since his allergy shots are delayed.     Currently also taking allegra, would the patient need to stop taking this while he is taking the flonase? Please advise.     [facility] confirmed.     Patient would like a call back at: [phone].

## 2020-03-18 NOTE — TELEPHONE ENCOUNTER
Flonase prescription sent to [facility] for him.  He needs to stay on the Allegra at the same time that he is taking his Flonase

## 2020-03-20 DIAGNOSIS — M15.9 PRIMARY OSTEOARTHRITIS INVOLVING MULTIPLE JOINTS: ICD-10-CM

## 2020-03-20 NOTE — TELEPHONE ENCOUNTER
PT REQUESTED REFILLS FOR   HYDROcodone-acetaminophen (NORCO)  MG per tablet  amphetamine-dextroamphetamine XR (ADDERALL XR) 20 MG 24 hr capsule      PT CALLBACK [phone]    PHARMACY CONFIRMED

## 2020-03-21 RX ORDER — HYDROCODONE BITARTRATE AND ACETAMINOPHEN 10; 325 MG/1; MG/1
TABLET ORAL
Qty: 120 TABLET | Refills: 0 | Status: SHIPPED | OUTPATIENT
Start: 2020-03-21 | End: 2020-04-21 | Stop reason: SDUPTHER

## 2020-03-23 DIAGNOSIS — F98.8 ATTENTION DEFICIT DISORDER (ADD) WITHOUT HYPERACTIVITY: ICD-10-CM

## 2020-03-23 RX ORDER — DEXTROAMPHETAMINE SACCHARATE, AMPHETAMINE ASPARTATE MONOHYDRATE, DEXTROAMPHETAMINE SULFATE AND AMPHETAMINE SULFATE 5; 5; 5; 5 MG/1; MG/1; MG/1; MG/1
20 CAPSULE, EXTENDED RELEASE ORAL EVERY MORNING
Qty: 30 CAPSULE | Refills: 0 | Status: SHIPPED | OUTPATIENT
Start: 2020-03-23 | End: 2020-04-21 | Stop reason: SDUPTHER

## 2020-03-23 RX ORDER — SUCRALFATE 1 G/10ML
SUSPENSION ORAL
Qty: 14 ML | Refills: 1 | Status: SHIPPED | OUTPATIENT
Start: 2020-03-23 | End: 2020-09-18

## 2020-03-23 NOTE — TELEPHONE ENCOUNTER
Refill rx     Carafate michael 1gm /10mg   Take 10 ml 3 times daily   Qty 14     Last ov 2/26/2019

## 2020-03-24 ENCOUNTER — TELEPHONE (OUTPATIENT)
Dept: FAMILY MEDICINE CLINIC | Facility: CLINIC | Age: 75
End: 2020-03-24

## 2020-03-24 NOTE — TELEPHONE ENCOUNTER
----- Message from Lux Andrews sent at 3/24/2020 12:27 AM EDT -----  Regarding: Prescription Question  Contact: [phone]  Hi Eloisa,  It's Noemy Andrews checking on Ryan's adderall prescription. We called it in on Friday but [facility] still hasn't got the Rx (they say). I SEE ON RYAN'S PORTAL THAT THE ADDERALL HAS BEEN APPROVED BY DR. Leon. DO YOU [facility] THANKS.  NOEMY ANDREWS

## 2020-04-01 ENCOUNTER — TELEPHONE (OUTPATIENT)
Dept: FAMILY MEDICINE CLINIC | Facility: CLINIC | Age: 75
End: 2020-04-01

## 2020-04-01 RX ORDER — DULOXETIN HYDROCHLORIDE 30 MG/1
90 CAPSULE, DELAYED RELEASE ORAL EVERY 24 HOURS
Qty: 270 CAPSULE | Refills: 2 | Status: SHIPPED | OUTPATIENT
Start: 2020-04-01 | End: 2021-01-15 | Stop reason: SDUPTHER

## 2020-04-01 RX ORDER — DULOXETIN HYDROCHLORIDE 30 MG/1
CAPSULE, DELAYED RELEASE ORAL
Qty: 270 CAPSULE | Refills: 3 | Status: SHIPPED | OUTPATIENT
Start: 2020-04-01 | End: 2020-12-02

## 2020-04-01 NOTE — TELEPHONE ENCOUNTER
PATIENT'S WIFE CALLED TO REQUEST A REFILL ON DULoxetine (CYMBALTA) 30 MG capsule    PLEASE SEND RX TO [id number] - [place], [place] - [address] - [phone]  - [phone] FX    PATIENT'S WIFE STATES THAT THE PHARMACY SENT OVER A FORM FOR  90 SUPPLY OF THAT MEDICINE AND SHE WOULD LIKE A CALL BACK TO SEE IF THE FORM WAS SENT OVER

## 2020-04-06 RX ORDER — PANTOPRAZOLE SODIUM 40 MG/1
40 TABLET, DELAYED RELEASE ORAL DAILY
Qty: 90 TABLET | Refills: 1 | Status: SHIPPED | OUTPATIENT
Start: 2020-04-06 | End: 2020-12-02

## 2020-04-06 NOTE — TELEPHONE ENCOUNTER
PTS WIFE CALLING FOR REFILL FOR     pantoprazole (PROTONIX) 40 MG EC tablet SENT TO [facility] IN CHART

## 2020-04-21 DIAGNOSIS — M15.9 PRIMARY OSTEOARTHRITIS INVOLVING MULTIPLE JOINTS: ICD-10-CM

## 2020-04-21 DIAGNOSIS — F98.8 ATTENTION DEFICIT DISORDER (ADD) WITHOUT HYPERACTIVITY: ICD-10-CM

## 2020-04-21 RX ORDER — DEXTROAMPHETAMINE SACCHARATE, AMPHETAMINE ASPARTATE MONOHYDRATE, DEXTROAMPHETAMINE SULFATE AND AMPHETAMINE SULFATE 5; 5; 5; 5 MG/1; MG/1; MG/1; MG/1
20 CAPSULE, EXTENDED RELEASE ORAL EVERY MORNING
Qty: 30 CAPSULE | Refills: 0 | Status: SHIPPED | OUTPATIENT
Start: 2020-04-21 | End: 2020-04-21 | Stop reason: SDUPTHER

## 2020-04-21 RX ORDER — HYDROCODONE BITARTRATE AND ACETAMINOPHEN 10; 325 MG/1; MG/1
TABLET ORAL
Qty: 120 TABLET | Refills: 0 | Status: SHIPPED | OUTPATIENT
Start: 2020-04-21 | End: 2020-05-20 | Stop reason: SDUPTHER

## 2020-04-21 RX ORDER — HYDROCODONE BITARTRATE AND ACETAMINOPHEN 10; 325 MG/1; MG/1
TABLET ORAL
Qty: 120 TABLET | Refills: 0 | Status: SHIPPED | OUTPATIENT
Start: 2020-04-21 | End: 2020-04-21 | Stop reason: SDUPTHER

## 2020-04-21 RX ORDER — DEXTROAMPHETAMINE SACCHARATE, AMPHETAMINE ASPARTATE MONOHYDRATE, DEXTROAMPHETAMINE SULFATE AND AMPHETAMINE SULFATE 5; 5; 5; 5 MG/1; MG/1; MG/1; MG/1
20 CAPSULE, EXTENDED RELEASE ORAL EVERY MORNING
Qty: 30 CAPSULE | Refills: 0 | Status: SHIPPED | OUTPATIENT
Start: 2020-04-21 | End: 2020-05-20 | Stop reason: SDUPTHER

## 2020-05-20 DIAGNOSIS — M15.9 PRIMARY OSTEOARTHRITIS INVOLVING MULTIPLE JOINTS: ICD-10-CM

## 2020-05-20 DIAGNOSIS — F98.8 ATTENTION DEFICIT DISORDER (ADD) WITHOUT HYPERACTIVITY: ICD-10-CM

## 2020-05-20 NOTE — TELEPHONE ENCOUNTER
PATIENT WIFE CALLED STATING THAT PATIENT IS REQUESTING A REFILL ON HIS amphetamine-dextroamphetamine XR (Adderall XR) 20 MG 24 hr capsule AND HYDROcodone-acetaminophen (NORCO)  MG per tablet. I CONFIRMED THE [facility] [address]

## 2020-05-21 RX ORDER — HYDROCODONE BITARTRATE AND ACETAMINOPHEN 10; 325 MG/1; MG/1
TABLET ORAL
Qty: 120 TABLET | Refills: 0 | Status: SHIPPED | OUTPATIENT
Start: 2020-05-21 | End: 2020-06-19 | Stop reason: SDUPTHER

## 2020-05-21 RX ORDER — DEXTROAMPHETAMINE SACCHARATE, AMPHETAMINE ASPARTATE MONOHYDRATE, DEXTROAMPHETAMINE SULFATE AND AMPHETAMINE SULFATE 5; 5; 5; 5 MG/1; MG/1; MG/1; MG/1
20 CAPSULE, EXTENDED RELEASE ORAL EVERY MORNING
Qty: 30 CAPSULE | Refills: 0 | Status: SHIPPED | OUTPATIENT
Start: 2020-05-21 | End: 2020-06-19 | Stop reason: SDUPTHER

## 2020-06-19 DIAGNOSIS — M15.9 PRIMARY OSTEOARTHRITIS INVOLVING MULTIPLE JOINTS: ICD-10-CM

## 2020-06-19 DIAGNOSIS — F98.8 ATTENTION DEFICIT DISORDER (ADD) WITHOUT HYPERACTIVITY: ICD-10-CM

## 2020-06-20 RX ORDER — HYDROCODONE BITARTRATE AND ACETAMINOPHEN 10; 325 MG/1; MG/1
TABLET ORAL
Qty: 120 TABLET | Refills: 0 | Status: SHIPPED | OUTPATIENT
Start: 2020-06-20 | End: 2020-07-20 | Stop reason: SDUPTHER

## 2020-06-20 RX ORDER — DEXTROAMPHETAMINE SACCHARATE, AMPHETAMINE ASPARTATE MONOHYDRATE, DEXTROAMPHETAMINE SULFATE AND AMPHETAMINE SULFATE 5; 5; 5; 5 MG/1; MG/1; MG/1; MG/1
20 CAPSULE, EXTENDED RELEASE ORAL EVERY MORNING
Qty: 30 CAPSULE | Refills: 0 | Status: SHIPPED | OUTPATIENT
Start: 2020-06-20 | End: 2020-07-20 | Stop reason: SDUPTHER

## 2020-07-20 DIAGNOSIS — M15.9 PRIMARY OSTEOARTHRITIS INVOLVING MULTIPLE JOINTS: ICD-10-CM

## 2020-07-20 DIAGNOSIS — F98.8 ATTENTION DEFICIT DISORDER (ADD) WITHOUT HYPERACTIVITY: ICD-10-CM

## 2020-07-22 ENCOUNTER — TELEPHONE (OUTPATIENT)
Dept: FAMILY MEDICINE CLINIC | Facility: CLINIC | Age: 75
End: 2020-07-22

## 2020-07-22 NOTE — TELEPHONE ENCOUNTER
I have already spoken with patient's wife.  I assured her that [facility] will send these in when he has time to do so.

## 2020-07-23 ENCOUNTER — TELEPHONE (OUTPATIENT)
Dept: FAMILY MEDICINE CLINIC | Facility: CLINIC | Age: 75
End: 2020-07-23

## 2020-07-23 RX ORDER — DEXTROAMPHETAMINE SACCHARATE, AMPHETAMINE ASPARTATE MONOHYDRATE, DEXTROAMPHETAMINE SULFATE AND AMPHETAMINE SULFATE 5; 5; 5; 5 MG/1; MG/1; MG/1; MG/1
20 CAPSULE, EXTENDED RELEASE ORAL EVERY MORNING
Qty: 30 CAPSULE | Refills: 0 | Status: SHIPPED | OUTPATIENT
Start: 2020-07-23 | End: 2020-08-20 | Stop reason: SDUPTHER

## 2020-07-23 RX ORDER — GLIMEPIRIDE 2 MG/1
2 TABLET ORAL
Qty: 180 TABLET | Refills: 4 | Status: SHIPPED | OUTPATIENT
Start: 2020-07-23 | End: 2021-01-15 | Stop reason: SDUPTHER

## 2020-07-23 RX ORDER — HYDROCODONE BITARTRATE AND ACETAMINOPHEN 10; 325 MG/1; MG/1
TABLET ORAL
Qty: 120 TABLET | Refills: 0 | Status: SHIPPED | OUTPATIENT
Start: 2020-07-23 | End: 2020-08-20 | Stop reason: SDUPTHER

## 2020-07-23 NOTE — TELEPHONE ENCOUNTER
PT'S WIFE, RANGEL, CALLED ASKING TO TALK TO ÁNGEL REGARDING PRESCRIPTION REFILLS THAT WERE SUPPOSED TO BE SENT TO [facility] FOR PT. SHE STATES THEY CHECKED WITH [facility] AND THEY DON'T HAVE THEM.    PLEASE ADVISE.    CALLBACK NUMBER: [phone]

## 2020-08-20 DIAGNOSIS — M15.9 PRIMARY OSTEOARTHRITIS INVOLVING MULTIPLE JOINTS: ICD-10-CM

## 2020-08-20 DIAGNOSIS — F98.8 ATTENTION DEFICIT DISORDER (ADD) WITHOUT HYPERACTIVITY: ICD-10-CM

## 2020-08-20 RX ORDER — HYDROCODONE BITARTRATE AND ACETAMINOPHEN 10; 325 MG/1; MG/1
TABLET ORAL
Qty: 120 TABLET | Refills: 0 | Status: SHIPPED | OUTPATIENT
Start: 2020-08-20 | End: 2020-09-18 | Stop reason: SDUPTHER

## 2020-08-20 RX ORDER — DEXTROAMPHETAMINE SACCHARATE, AMPHETAMINE ASPARTATE MONOHYDRATE, DEXTROAMPHETAMINE SULFATE AND AMPHETAMINE SULFATE 5; 5; 5; 5 MG/1; MG/1; MG/1; MG/1
20 CAPSULE, EXTENDED RELEASE ORAL EVERY MORNING
Qty: 30 CAPSULE | Refills: 0 | Status: SHIPPED | OUTPATIENT
Start: 2020-08-20 | End: 2020-09-18 | Stop reason: SDUPTHER

## 2020-08-20 NOTE — TELEPHONE ENCOUNTER
Caller: YESSICAENRIQUE ROBY    Relationship: Emergency Contact    Best call back number:[phone]    Medication needed:   Requested Prescriptions     Pending Prescriptions Disp Refills   • HYDROcodone-acetaminophen (NORCO)  MG per tablet 120 tablet 0     Sig: Take 1 tablet every 4 hours prn MAX 4 tablets /day   • amphetamine-dextroamphetamine XR (Adderall XR) 20 MG 24 hr capsule 30 capsule 0     Sig: Take 1 capsule by mouth Every Morning for 30 days       Does the patient have less than a 3 day supply:  [x] Yes  [] No    What is the patient's preferred pharmacy: [facility] #[id number] - [place], [place] - [address] - [phone] [facility] [phone] FX

## 2020-09-18 DIAGNOSIS — M15.9 PRIMARY OSTEOARTHRITIS INVOLVING MULTIPLE JOINTS: ICD-10-CM

## 2020-09-18 DIAGNOSIS — F98.8 ATTENTION DEFICIT DISORDER (ADD) WITHOUT HYPERACTIVITY: ICD-10-CM

## 2020-09-18 RX ORDER — SUCRALFATE 1 G/10ML
SUSPENSION ORAL
Qty: 2700 ML | Refills: 3 | Status: SHIPPED | OUTPATIENT
Start: 2020-09-18 | End: 2021-01-15 | Stop reason: SDUPTHER

## 2020-09-18 NOTE — TELEPHONE ENCOUNTER
Caller: ROBY ANDREWS    Relationship: Emergency Contact    Best call back number: [phone]      Medication needed:   Requested Prescriptions     Pending Prescriptions Disp Refills   • HYDROcodone-acetaminophen (NORCO)  MG per tablet 120 tablet 0     Sig: Take 1 tablet every 4 hours prn MAX 4 tablets /day   • amphetamine-dextroamphetamine XR (Adderall XR) 20 MG 24 hr capsule 30 capsule 0     Sig: Take 1 capsule by mouth Every Morning for 30 days         Does the patient have less than a 3 day supply:  [] Yes  [x] No    What is the patient's preferred pharmacy:      [facility] #[id number] - [place], [place] - [address] - [phone]  - [phone] FX  [phone]

## 2020-09-20 RX ORDER — DEXTROAMPHETAMINE SACCHARATE, AMPHETAMINE ASPARTATE MONOHYDRATE, DEXTROAMPHETAMINE SULFATE AND AMPHETAMINE SULFATE 5; 5; 5; 5 MG/1; MG/1; MG/1; MG/1
20 CAPSULE, EXTENDED RELEASE ORAL EVERY MORNING
Qty: 30 CAPSULE | Refills: 0 | Status: SHIPPED | OUTPATIENT
Start: 2020-09-20 | End: 2020-10-19 | Stop reason: SDUPTHER

## 2020-09-20 RX ORDER — HYDROCODONE BITARTRATE AND ACETAMINOPHEN 10; 325 MG/1; MG/1
TABLET ORAL
Qty: 120 TABLET | Refills: 0 | Status: SHIPPED | OUTPATIENT
Start: 2020-09-20 | End: 2020-10-19 | Stop reason: SDUPTHER

## 2020-10-19 DIAGNOSIS — F98.8 ATTENTION DEFICIT DISORDER (ADD) WITHOUT HYPERACTIVITY: ICD-10-CM

## 2020-10-19 DIAGNOSIS — M15.9 PRIMARY OSTEOARTHRITIS INVOLVING MULTIPLE JOINTS: ICD-10-CM

## 2020-10-19 RX ORDER — HYDROCODONE BITARTRATE AND ACETAMINOPHEN 10; 325 MG/1; MG/1
TABLET ORAL
Qty: 120 TABLET | Refills: 0 | Status: SHIPPED | OUTPATIENT
Start: 2020-10-19 | End: 2020-11-18 | Stop reason: SDUPTHER

## 2020-10-19 RX ORDER — DEXTROAMPHETAMINE SACCHARATE, AMPHETAMINE ASPARTATE MONOHYDRATE, DEXTROAMPHETAMINE SULFATE AND AMPHETAMINE SULFATE 5; 5; 5; 5 MG/1; MG/1; MG/1; MG/1
20 CAPSULE, EXTENDED RELEASE ORAL EVERY MORNING
Qty: 30 CAPSULE | Refills: 0 | Status: SHIPPED | OUTPATIENT
Start: 2020-10-19 | End: 2020-11-20 | Stop reason: SDUPTHER

## 2020-10-19 NOTE — TELEPHONE ENCOUNTER
Caller: ROBY ANDREWS    Relationship: Emergency Contact    Best call back number: [phone]      Medication needed:   Requested Prescriptions     Pending Prescriptions Disp Refills   • HYDROcodone-acetaminophen (NORCO)  MG per tablet 120 tablet 0     Sig: Take 1 tablet every 4 hours prn MAX 4 tablets /day   • amphetamine-dextroamphetamine XR (Adderall XR) 20 MG 24 hr capsule 30 capsule 0     Sig: Take 1 capsule by mouth Every Morning for 30 days       When do you need the refill by: 10/20/20    What details did the patient provide when requesting the medication: HAS ONE DOSE LEFT OF EACH    Does the patient have less than a 3 day supply:  [x] Yes  [] No    What is the patient's preferred pharmacy: [facility] #[id number] - [place], [place] - [address] - [phone]  - [phone] FX

## 2020-11-12 ENCOUNTER — TELEPHONE (OUTPATIENT)
Dept: FAMILY MEDICINE CLINIC | Facility: CLINIC | Age: 75
End: 2020-11-12

## 2020-11-12 DIAGNOSIS — M54.42 ACUTE RIGHT-SIDED LOW BACK PAIN WITH BILATERAL SCIATICA: Primary | ICD-10-CM

## 2020-11-12 DIAGNOSIS — M54.41 ACUTE RIGHT-SIDED LOW BACK PAIN WITH BILATERAL SCIATICA: Primary | ICD-10-CM

## 2020-11-12 NOTE — TELEPHONE ENCOUNTER
----- Message from Lux Bray sent at 11/9/2020 10:59 AM EST -----  Regarding: Referral Request  Contact: [phone]  Ramon Morrow.  I hope all is well with you and your family. I am having a recurrence of the sciatica   had a couple years ago and I would like to request a referral to a  [place] doctor (Recommended by Michael) that Noemy has had good luck with in terns of the steroid injections. Also I recently had an injection in my hand for severe pain  that my hand surgeon did.  and it helped a lot.   Here is the referral info for my Sciatica:  Dr. Mo Strong MD Board Certified Anesthesiologist  [address]  Please fax referral to: [phone]    Thanks,   Alen MCNULTY. I look forward to seeing you in February when Noemy and i have our physicals scheduled..

## 2020-11-13 NOTE — TELEPHONE ENCOUNTER
Shirlene set Stephie Herrera up to be seen by this anesthesiologist for a epidural block in [place]

## 2020-11-18 DIAGNOSIS — M15.9 PRIMARY OSTEOARTHRITIS INVOLVING MULTIPLE JOINTS: ICD-10-CM

## 2020-11-18 RX ORDER — HYDROCODONE BITARTRATE AND ACETAMINOPHEN 10; 325 MG/1; MG/1
TABLET ORAL
Qty: 120 TABLET | Refills: 0 | Status: SHIPPED | OUTPATIENT
Start: 2020-11-18 | End: 2020-12-17 | Stop reason: SDUPTHER

## 2020-11-20 ENCOUNTER — TELEPHONE (OUTPATIENT)
Dept: FAMILY MEDICINE CLINIC | Facility: CLINIC | Age: 75
End: 2020-11-20

## 2020-11-20 DIAGNOSIS — F98.8 ATTENTION DEFICIT DISORDER (ADD) WITHOUT HYPERACTIVITY: ICD-10-CM

## 2020-11-20 RX ORDER — DEXTROAMPHETAMINE SACCHARATE, AMPHETAMINE ASPARTATE MONOHYDRATE, DEXTROAMPHETAMINE SULFATE AND AMPHETAMINE SULFATE 5; 5; 5; 5 MG/1; MG/1; MG/1; MG/1
20 CAPSULE, EXTENDED RELEASE ORAL EVERY MORNING
Qty: 30 CAPSULE | Refills: 0 | Status: SHIPPED | OUTPATIENT
Start: 2020-11-20 | End: 2020-12-17 | Stop reason: SDUPTHER

## 2020-11-20 NOTE — TELEPHONE ENCOUNTER
The patients spouse called in stating the pharmacy did not receive an Rx for the (Adderall XR).    Please call back to advise    Best call back # [phone]

## 2020-11-30 ENCOUNTER — TELEPHONE (OUTPATIENT)
Dept: FAMILY MEDICINE CLINIC | Facility: CLINIC | Age: 75
End: 2020-11-30

## 2020-12-01 ENCOUNTER — TELEPHONE (OUTPATIENT)
Dept: FAMILY MEDICINE CLINIC | Facility: CLINIC | Age: 75
End: 2020-12-01

## 2020-12-01 DIAGNOSIS — N41.0 ACUTE PROSTATITIS: Primary | ICD-10-CM

## 2020-12-01 DIAGNOSIS — R97.20 ELEVATED PROSTATE SPECIFIC ANTIGEN (PSA): ICD-10-CM

## 2020-12-01 NOTE — TELEPHONE ENCOUNTER
----- Message from Lux Bray sent at 11/30/2020  4:43 PM EST -----  Regarding: Visit Follow-Up Question  Contact: [phone]  Help, Dr. Morrow!   We spoke a couple months ago, but Alen is still experiencing worsening burning pain in the lower abdomen, anus area and penis area, all at the same time. Our consultation with the Gastreontologist head  nurse (she is handling all the doctor's calls) resulted in advising him to continue with his ant-inflammatory meds.     Spoke to Michael who thinks it might be Prostatitis. Would you consider starting Alen with an RX antibiotic until we can get in with you for bloodwork or an appointment, at least in the short term? He is quite miserable!  Warm Regards,  Noemy Bray

## 2020-12-01 NOTE — TELEPHONE ENCOUNTER
Eloisa tell Alen this is not something I can do over the phone.  He is going to have to make an appointment to be seen for this.  Bring him in on Thursday or anytime we have an appointment available.  He needs to have a prostate exam and he needs to have a urine done.

## 2020-12-02 ENCOUNTER — OFFICE VISIT (OUTPATIENT)
Dept: FAMILY MEDICINE CLINIC | Facility: CLINIC | Age: 75
End: 2020-12-02

## 2020-12-02 ENCOUNTER — LAB (OUTPATIENT)
Dept: FAMILY MEDICINE CLINIC | Facility: CLINIC | Age: 75
End: 2020-12-02

## 2020-12-02 VITALS
TEMPERATURE: 97.1 F | BODY MASS INDEX: 21.42 KG/M2 | SYSTOLIC BLOOD PRESSURE: 120 MMHG | HEIGHT: 71 IN | RESPIRATION RATE: 16 BRPM | WEIGHT: 153 LBS | DIASTOLIC BLOOD PRESSURE: 60 MMHG | OXYGEN SATURATION: 94 % | HEART RATE: 82 BPM

## 2020-12-02 DIAGNOSIS — K58.9 IRRITABLE BOWEL SYNDROME WITHOUT DIARRHEA: Primary | ICD-10-CM

## 2020-12-02 DIAGNOSIS — F33.41 RECURRENT MAJOR DEPRESSIVE DISORDER, IN PARTIAL REMISSION (HCC): ICD-10-CM

## 2020-12-02 DIAGNOSIS — N40.1 BENIGN PROSTATIC HYPERPLASIA WITH URINARY FREQUENCY: ICD-10-CM

## 2020-12-02 DIAGNOSIS — R35.0 BENIGN PROSTATIC HYPERPLASIA WITH URINARY FREQUENCY: ICD-10-CM

## 2020-12-02 PROCEDURE — 85025 COMPLETE CBC W/AUTO DIFF WBC: CPT | Performed by: FAMILY MEDICINE

## 2020-12-02 PROCEDURE — 87086 URINE CULTURE/COLONY COUNT: CPT | Performed by: FAMILY MEDICINE

## 2020-12-02 PROCEDURE — 99214 OFFICE O/P EST MOD 30 MIN: CPT | Performed by: FAMILY MEDICINE

## 2020-12-02 PROCEDURE — 80053 COMPREHEN METABOLIC PANEL: CPT | Performed by: FAMILY MEDICINE

## 2020-12-02 PROCEDURE — 81001 URINALYSIS AUTO W/SCOPE: CPT | Performed by: FAMILY MEDICINE

## 2020-12-02 PROCEDURE — 84153 ASSAY OF PSA TOTAL: CPT | Performed by: FAMILY MEDICINE

## 2020-12-02 PROCEDURE — 85652 RBC SED RATE AUTOMATED: CPT | Performed by: FAMILY MEDICINE

## 2020-12-02 RX ORDER — PANTOPRAZOLE SODIUM 40 MG/1
40 TABLET, DELAYED RELEASE ORAL 2 TIMES DAILY
Qty: 180 TABLET | Refills: 2 | Status: SHIPPED | OUTPATIENT
Start: 2020-12-02 | End: 2021-01-15 | Stop reason: SDUPTHER

## 2020-12-02 NOTE — TELEPHONE ENCOUNTER
Eloisa have Alen come in late tomorrow afternoon -- I will see him as the last patient of the  day.  Have him come in before our  leaves though to get a urine sample sent off and to get some blood work.   I will check his prostate and start him on a antibiotic if that's the culprit

## 2020-12-03 ENCOUNTER — TRANSCRIBE ORDERS (OUTPATIENT)
Dept: ADMINISTRATIVE | Facility: HOSPITAL | Age: 75
End: 2020-12-03

## 2020-12-03 DIAGNOSIS — M54.16 LUMBAR RADICULAR SYNDROME: Primary | ICD-10-CM

## 2020-12-03 LAB
ALBUMIN SERPL-MCNC: 4.4 G/DL (ref 3.5–5.2)
ALBUMIN/GLOB SERPL: 1.7 G/DL
ALP SERPL-CCNC: 68 U/L (ref 39–117)
ALT SERPL W P-5'-P-CCNC: 22 U/L (ref 1–41)
ANION GAP SERPL CALCULATED.3IONS-SCNC: 9.4 MMOL/L (ref 5–15)
AST SERPL-CCNC: 24 U/L (ref 1–40)
BACTERIA UR QL AUTO: ABNORMAL /HPF
BASOPHILS # BLD AUTO: 0.04 10*3/MM3 (ref 0–0.2)
BASOPHILS NFR BLD AUTO: 0.4 % (ref 0–1.5)
BILIRUB SERPL-MCNC: <0.2 MG/DL (ref 0–1.2)
BILIRUB UR QL STRIP: NEGATIVE
BUN SERPL-MCNC: 42 MG/DL (ref 8–23)
BUN/CREAT SERPL: 32.8 (ref 7–25)
CALCIUM SPEC-SCNC: 9.7 MG/DL (ref 8.6–10.5)
CHLORIDE SERPL-SCNC: 100 MMOL/L (ref 98–107)
CLARITY UR: CLEAR
CO2 SERPL-SCNC: 26.6 MMOL/L (ref 22–29)
COLOR UR: ABNORMAL
CREAT SERPL-MCNC: 1.28 MG/DL (ref 0.76–1.27)
DEPRECATED RDW RBC AUTO: 41.6 FL (ref 37–54)
EOSINOPHIL # BLD AUTO: 0.05 10*3/MM3 (ref 0–0.4)
EOSINOPHIL NFR BLD AUTO: 0.5 % (ref 0.3–6.2)
ERYTHROCYTE [DISTWIDTH] IN BLOOD BY AUTOMATED COUNT: 12.4 % (ref 12.3–15.4)
ERYTHROCYTE [SEDIMENTATION RATE] IN BLOOD: 11 MM/HR (ref 0–20)
GFR SERPL CREATININE-BSD FRML MDRD: 54 ML/MIN/1.73
GLOBULIN UR ELPH-MCNC: 2.6 GM/DL
GLUCOSE SERPL-MCNC: 128 MG/DL (ref 65–99)
GLUCOSE UR STRIP-MCNC: NEGATIVE MG/DL
HCT VFR BLD AUTO: 36.5 % (ref 37.5–51)
HGB BLD-MCNC: 12.1 G/DL (ref 13–17.7)
HGB UR QL STRIP.AUTO: NEGATIVE
HYALINE CASTS UR QL AUTO: ABNORMAL /LPF
IMM GRANULOCYTES # BLD AUTO: 0.05 10*3/MM3 (ref 0–0.05)
IMM GRANULOCYTES NFR BLD AUTO: 0.5 % (ref 0–0.5)
KETONES UR QL STRIP: ABNORMAL
LEUKOCYTE ESTERASE UR QL STRIP.AUTO: ABNORMAL
LYMPHOCYTES # BLD AUTO: 2.69 10*3/MM3 (ref 0.7–3.1)
LYMPHOCYTES NFR BLD AUTO: 27.8 % (ref 19.6–45.3)
MCH RBC QN AUTO: 30.7 PG (ref 26.6–33)
MCHC RBC AUTO-ENTMCNC: 33.2 G/DL (ref 31.5–35.7)
MCV RBC AUTO: 92.6 FL (ref 79–97)
MONOCYTES # BLD AUTO: 0.76 10*3/MM3 (ref 0.1–0.9)
MONOCYTES NFR BLD AUTO: 7.9 % (ref 5–12)
NEUTROPHILS NFR BLD AUTO: 6.07 10*3/MM3 (ref 1.7–7)
NEUTROPHILS NFR BLD AUTO: 62.9 % (ref 42.7–76)
NITRITE UR QL STRIP: NEGATIVE
NRBC BLD AUTO-RTO: 0 /100 WBC (ref 0–0.2)
PH UR STRIP.AUTO: <=5 [PH] (ref 5–8)
PLATELET # BLD AUTO: 238 10*3/MM3 (ref 140–450)
PMV BLD AUTO: 10.2 FL (ref 6–12)
POTASSIUM SERPL-SCNC: 4.9 MMOL/L (ref 3.5–5.2)
PROT SERPL-MCNC: 7 G/DL (ref 6–8.5)
PROT UR QL STRIP: ABNORMAL
PSA SERPL-MCNC: 1.36 NG/ML (ref 0–4)
RBC # BLD AUTO: 3.94 10*6/MM3 (ref 4.14–5.8)
RBC # UR: ABNORMAL /HPF
REF LAB TEST METHOD: ABNORMAL
SODIUM SERPL-SCNC: 136 MMOL/L (ref 136–145)
SP GR UR STRIP: 1.03 (ref 1–1.03)
SQUAMOUS #/AREA URNS HPF: ABNORMAL /HPF
UROBILINOGEN UR QL STRIP: ABNORMAL
WBC # BLD AUTO: 9.66 10*3/MM3 (ref 3.4–10.8)
WBC UR QL AUTO: ABNORMAL /HPF

## 2020-12-04 ENCOUNTER — TELEPHONE (OUTPATIENT)
Dept: FAMILY MEDICINE CLINIC | Facility: CLINIC | Age: 75
End: 2020-12-04

## 2020-12-04 LAB — BACTERIA SPEC AEROBE CULT: NORMAL

## 2020-12-05 RX ORDER — ATORVASTATIN CALCIUM 20 MG/1
TABLET, FILM COATED ORAL
Qty: 90 TABLET | Refills: 3 | Status: SHIPPED | OUTPATIENT
Start: 2020-12-05 | End: 2021-01-15 | Stop reason: SDUPTHER

## 2020-12-06 RX ORDER — TRIAMCINOLONE ACETONIDE 1 MG/G
CREAM TOPICAL 3 TIMES DAILY
Qty: 45 G | Refills: 2 | Status: SHIPPED | OUTPATIENT
Start: 2020-12-06 | End: 2021-01-15 | Stop reason: SDUPTHER

## 2020-12-06 RX ORDER — ARIPIPRAZOLE 2 MG/1
2 TABLET ORAL DAILY
Qty: 30 TABLET | Refills: 0 | Status: SHIPPED | OUTPATIENT
Start: 2020-12-06 | End: 2020-12-21

## 2020-12-07 NOTE — PROGRESS NOTES
I spoke with Alen.  He is going to push fluids and hydrate himself.  We are going to wait 4 months and then recheck urine and renal function again.  In the meantime were going to be more aggressive in treating his depression.  He is going to stay on his Cymbalta but we are going to add in Abilify 2 mg a day.  His wife will call in 2 weeks and let me know if she sees any improvement.  I think most of his pain that we have been concerned about in his abdomen is probably IBS constipation predominant.

## 2020-12-10 ENCOUNTER — HOSPITAL ENCOUNTER (OUTPATIENT)
Dept: MRI IMAGING | Facility: HOSPITAL | Age: 75
Discharge: HOME | End: 2020-12-10
Admitting: PAIN MEDICINE

## 2020-12-10 DIAGNOSIS — M54.16 LUMBAR RADICULAR SYNDROME: ICD-10-CM

## 2020-12-10 PROCEDURE — 72148 MRI LUMBAR SPINE W/O DYE: CPT

## 2020-12-17 DIAGNOSIS — F98.8 ATTENTION DEFICIT DISORDER (ADD) WITHOUT HYPERACTIVITY: ICD-10-CM

## 2020-12-17 DIAGNOSIS — M15.9 PRIMARY OSTEOARTHRITIS INVOLVING MULTIPLE JOINTS: ICD-10-CM

## 2020-12-17 NOTE — TELEPHONE ENCOUNTER
Caller: YESSICAENRIQUE ROBY    Relationship: Emergency Contact    Best call back number: [phone]    Medication needed:   Requested Prescriptions     Pending Prescriptions Disp Refills   • amphetamine-dextroamphetamine XR (Adderall XR) 20 MG 24 hr capsule 30 capsule 0     Sig: Take 1 capsule by mouth Every Morning for 30 days   • HYDROcodone-acetaminophen (NORCO)  MG per tablet 120 tablet 0     Sig: Take 1 tablet every 4 hours prn MAX 4 tablets /day       When do you need the refill by: ASAP    What details did the patient provide when requesting the medication: PATIENT IS OUT OF MEDICATION    Does the patient have less than a 3 day supply:  [x] Yes  [] No    What is the patient's preferred pharmacy: [facility] #[id number] - [place], [place] - [address] - [phone]  - [phone] FX

## 2020-12-18 RX ORDER — HYDROCODONE BITARTRATE AND ACETAMINOPHEN 10; 325 MG/1; MG/1
TABLET ORAL
Qty: 120 TABLET | Refills: 0 | Status: SHIPPED | OUTPATIENT
Start: 2020-12-18 | End: 2021-01-15 | Stop reason: SDUPTHER

## 2020-12-18 RX ORDER — DEXTROAMPHETAMINE SACCHARATE, AMPHETAMINE ASPARTATE MONOHYDRATE, DEXTROAMPHETAMINE SULFATE AND AMPHETAMINE SULFATE 5; 5; 5; 5 MG/1; MG/1; MG/1; MG/1
20 CAPSULE, EXTENDED RELEASE ORAL EVERY MORNING
Qty: 30 CAPSULE | Refills: 0 | Status: SHIPPED | OUTPATIENT
Start: 2020-12-18 | End: 2021-01-15 | Stop reason: SDUPTHER

## 2020-12-21 RX ORDER — ARIPIPRAZOLE 2 MG/1
TABLET ORAL
Qty: 30 TABLET | Refills: 0 | Status: SHIPPED | OUTPATIENT
Start: 2020-12-21 | End: 2021-01-15 | Stop reason: SDUPTHER

## 2021-01-02 NOTE — PATIENT INSTRUCTIONS
Major Depressive Disorder, Adult  Major depressive disorder (MDD) is a mental health condition. MDD often makes you feel sad, hopeless, or helpless. MDD can also cause symptoms in your body. MDD can affect your:  · Work.  · School.  · Relationships.  · Other normal activities.  MDD can range from mild to very bad. It may occur once (single episode MDD). It can also occur many times (recurrent MDD).  The main symptoms of MDD often include:  · Feeling sad, depressed, or irritable most of the time.  · Loss of interest.  MDD symptoms also include:  · Sleeping too much or too little.  · Eating too much or too little.  · A change in your weight.  · Feeling tired (fatigue) or having low energy.  · Feeling worthless.  · Feeling guilty.  · Trouble making decisions.  · Trouble thinking clearly.  · Thoughts of suicide or harming others.  · Feeling weak.  · Feeling agitated.  · Keeping yourself from being around other people (isolation).  Follow these instructions at home:  Activity  · Do these things as told by your doctor:  ? Go back to your normal activities.  ? Exercise regularly.  ? Spend time outdoors.  Alcohol  · Talk with your doctor about how alcohol can affect your antidepressant medicines.  · Do not drink alcohol. Or, limit how much alcohol you drink.  ? This means no more than 1 drink a day for nonpregnant women and 2 drinks a day for men. One drink equals one of these:  § 12 oz of beer.  § 5 oz of wine.  § 1½ oz of hard liquor.  General instructions  · Take over-the-counter and prescription medicines only as told by your doctor.  · Eat a healthy diet.  · Get plenty of sleep.  · Find activities that you enjoy. Make time to do them.  · Think about joining a support group. Your doctor may be able to suggest a group for you.  · Keep all follow-up visits as told by your doctor. This is important.  Where to find more information:  · National [place] on Mental Illness:  ? www.damaris.org  · [place] National [place] of Mental  Health:  ? www.[facility].gov  · National Suicide Prevention Lifeline:  ? 1-287.945.9978. This is free, 24-hour help.  Contact a doctor if:  · Your symptoms get worse.  · You have new symptoms.  Get help right away if:  · You self-harm.  · You see, hear, taste, smell, or feel things that are not present (hallucinate).  If you ever feel like you may hurt yourself or others, or have thoughts about taking your own life, get help right away. You can go to your nearest emergency department or call:  · Your local emergency services (911 in the [place]  · A suicide crisis helpline, such as the National Suicide Prevention Lifeline:  ? 1-878.645.5930. This is open 24 hours a day.  This information is not intended to replace advice given to you by your health care provider. Make sure you discuss any questions you have with your health care provider.  Document Revised: 11/30/2018 Document Reviewed: 09/03/2017  [email or web address] Patient Education © 2020 [employer]  Benign Prostatic Hyperplasia    Benign prostatic hyperplasia (BPH) is an enlarged prostate gland that is caused by the normal aging process and not by cancer. The prostate is a walnut-sized gland that is involved in the production of semen. It is located in front of the rectum and below the bladder. The bladder stores urine and the urethra is the tube that carries the urine out of the body. The prostate may get bigger as a man gets older.  An enlarged prostate can press on the urethra. This can make it harder to pass urine. The build-up of urine in the bladder can cause infection. Back pressure and infection may progress to bladder damage and kidney (renal) failure.  What are the causes?  This condition is part of a normal aging process. However, not all men develop problems from this condition. If the prostate enlarges away from the urethra, urine flow will not be blocked. If it enlarges toward the urethra and compresses it, there will be problems passing urine.  What  increases the risk?  This condition is more likely to develop in men over the age of 50 years.  What are the signs or symptoms?  Symptoms of this condition include:  · Getting up often during the night to urinate.  · Needing to urinate frequently during the day.  · Difficulty starting urine flow.  · Decrease in size and strength of your urine stream.  · Leaking (dribbling) after urinating.  · Inability to pass urine. This needs immediate treatment.  · Inability to completely empty your bladder.  · Pain when you pass urine. This is more common if there is also an infection.  · Urinary tract infection (UTI).  How is this diagnosed?  This condition is diagnosed based on your medical history, a physical exam, and your symptoms. Tests will also be done, such as:  · A post-void bladder scan. This measures any amount of urine that may remain in your bladder after you finish urinating.  · A digital rectal exam. In a rectal exam, your health care provider checks your prostate by putting a lubricated, gloved finger into your rectum to feel the back of your prostate gland. This exam detects the size of your gland and any abnormal lumps or growths.  · An exam of your urine (urinalysis).  · A prostate specific antigen (PSA) screening. This is a blood test used to screen for prostate cancer.  · An ultrasound. This test uses sound waves to electronically produce a picture of your prostate gland.  Your health care provider may refer you to a specialist in kidney and prostate diseases (urologist).  How is this treated?  Once symptoms begin, your health care provider will monitor your condition (active surveillance or watchful waiting). Treatment for this condition will depend on the severity of your condition. Treatment may include:  · Observation and yearly exams. This may be the only treatment needed if your condition and symptoms are mild.  · Medicines to relieve your symptoms, including:  ? Medicines to shrink the  prostate.  ? Medicines to relax the muscle of the prostate.  · Surgery in severe cases. Surgery may include:  ? Prostatectomy. In this procedure, the prostate tissue is removed completely through an open incision or with a laparoscope or robotics.  ? Transurethral resection of the prostate (TURP). In this procedure, a tool is inserted through the opening at the tip of the penis (urethra). It is used to cut away tissue of the inner core of the prostate. The pieces are removed through the same opening of the penis. This removes the blockage.  ? Transurethral incision (TUIP). In this procedure, small cuts are made in the prostate. This lessens the prostate's pressure on the urethra.  ? Transurethral microwave thermotherapy (TUMT). This procedure uses microwaves to create heat. The heat destroys and removes a small amount of prostate tissue.  ? Transurethral needle ablation (TUNA). This procedure uses radio frequencies to destroy and remove a small amount of prostate tissue.  ? Interstitial laser coagulation (ILC). This procedure uses a laser to destroy and remove a small amount of prostate tissue.  ? Transurethral electrovaporization (TUVP). This procedure uses electrodes to destroy and remove a small amount of prostate tissue.  ? Prostatic urethral lift. This procedure inserts an implant to push the lobes of the prostate away from the urethra.  Follow these instructions at home:  · Take over-the-counter and prescription medicines only as told by your health care provider.  · Monitor your symptoms for any changes. Contact your health care provider with any changes.  · Avoid drinking large amounts of liquid before going to bed or out in public.  · Avoid or reduce how much caffeine or alcohol you drink.  · Give yourself time when you urinate.  · Keep all follow-up visits as told by your health care provider. This is important.  Contact a health care provider if:  · You have unexplained back pain.  · Your symptoms do not  get better with treatment.  · You develop side effects from the medicine you are taking.  · Your urine becomes very dark or has a bad smell.  · Your lower abdomen becomes distended and you have trouble passing your urine.  Get help right away if:  · You have a fever or chills.  · You suddenly cannot urinate.  · You feel lightheaded, or very dizzy, or you faint.  · There are large amounts of blood or clots in the urine.  · Your urinary problems become hard to manage.  · You develop moderate to severe low back or flank pain. The flank is the side of your body between the ribs and the hip.  These symptoms may represent a serious problem that is an emergency. Do not wait to see if the symptoms will go away. Get medical help right away. Call your local emergency services (911 in the [place] Do not drive yourself to the hospital.  Summary  · Benign prostatic hyperplasia (BPH) is an enlarged prostate that is caused by the normal aging process and not by cancer.  · An enlarged prostate can press on the urethra. This can make it hard to pass urine.  · This condition is part of a normal aging process and is more likely to develop in men over the age of 50 years.  · Get help right away if you suddenly cannot urinate.  This information is not intended to replace advice given to you by your health care provider. Make sure you discuss any questions you have with your health care provider.  Document Revised: 11/12/2019 Document Reviewed: 01/22/2018  [employer] Patient Education © 2020 [employer]  Irritable Bowel Syndrome, Adult    Irritable bowel syndrome (IBS) is a group of symptoms that affects the organs responsible for digestion (gastrointestinal or GI tract). IBS is not one specific disease.  To regulate how the GI tract works, the body sends signals back and forth between the intestines and the brain. If you have IBS, there may be a problem with these signals. As a result, the GI tract does not function normally. The  intestines may become more sensitive and overreact to certain things. This may be especially true when you eat certain foods or when you are under stress.  There are four types of IBS. These may be determined based on the consistency of your stool (feces):  · IBS with diarrhea.  · IBS with constipation.  · Mixed IBS.  · Unsubtyped IBS.  It is important to know which type of IBS you have. Certain treatments are more likely to be helpful for certain types of IBS.  What are the causes?  The exact cause of IBS is not known.  What increases the risk?  You may have a higher risk for IBS if you:  · Are female.  · Are younger than 40.  · Have a family history of IBS.  · Have a mental health condition, such as depression, anxiety, or post-traumatic stress disorder.  · Have had a bacterial infection of your GI tract.  What are the signs or symptoms?  Symptoms of IBS vary from person to person. The main symptom is abdominal pain or discomfort. Other symptoms usually include one or more of the following:  · Diarrhea, constipation, or both.  · Abdominal swelling or bloating.  · Feeling full after eating a small or regular-sized meal.  · Frequent gas.  · Mucus in the stool.  · A feeling of having more stool left after a bowel movement.  Symptoms tend to come and go. They may be triggered by stress, mental health conditions, or certain foods.  How is this diagnosed?  This condition may be diagnosed based on a physical exam, your medical history, and your symptoms. You may have tests, such as:  · Blood tests.  · Stool test.  · X-rays.  · CT scan.  · Colonoscopy. This is a procedure in which your GI tract is viewed with a long, thin, flexible tube.  How is this treated?  There is no cure for IBS, but treatment can help relieve symptoms. Treatment depends on the type of IBS you have, and may include:  · Changes to your diet, such as:  ? Avoiding foods that cause symptoms.  ? Drinking more water.  ? Following a low-FODMAP (fermentable  oligosaccharides, disaccharides, monosaccharides, and polyols) diet for up to 6 weeks, or as told by your health care provider. FODMAPs are sugars that are hard for some people to digest.  ? Eating more fiber.  ? Eating medium-sized meals at the same times every day.  · Medicines. These may include:  ? Fiber supplements, if you have constipation.  ? Medicine to control diarrhea (antidiarrheal medicines).  ? Medicine to help control muscle tightening (spasms) in your GI tract (antispasmodic medicines).  ? Medicines to help with mental health conditions, such as antidepressants or tranquilizers.  · Talk therapy or counseling.  · Working with a diet and nutrition specialist (dietitian) to help create a food plan that is right for you.  · Managing your stress.  Follow these instructions at home:  Eating and drinking  · Eat a healthy diet.  · Eat medium-sized meals at about the same time every day. Do not eat large meals.  · Gradually eat more fiber-rich foods. These include whole grains, fruits, and vegetables. This may be especially helpful if you have IBS with constipation.  · Eat a diet low in FODMAPs.  · Drink enough fluid to keep your urine pale yellow.  · Keep a journal of foods that seem to trigger symptoms.  · Avoid foods and drinks that:  ? Contain added sugar.  ? Make your symptoms worse. Dairy products, caffeinated drinks, and carbonated drinks can make symptoms worse for some people.  General instructions  · Take over-the-counter and prescription medicines and supplements only as told by your health care provider.  · Get enough exercise. Do at least 150 minutes of moderate-intensity exercise each week.  · Manage your stress. Getting enough sleep and exercise can help you manage stress.  · Keep all follow-up visits as told by your health care provider and therapist. This is important.  Alcohol Use  · Do not drink alcohol if:  ? Your health care provider tells you not to drink.  ? You are pregnant, may be  pregnant, or are planning to become pregnant.  · If you drink alcohol, limit how much you have:  ? 0-1 drink a day for women.  ? 0-2 drinks a day for men.  · Be aware of how much alcohol is in your drink. In the [place] one drink equals one typical bottle of beer (12 oz), one-half glass of wine (5 oz), or one shot of hard liquor (1½ oz).  Contact a health care provider if you have:  · Constant pain.  · Weight loss.  · Difficulty or pain when swallowing.  · Diarrhea that gets worse.  Get help right away if you have:  · Severe abdominal pain.  · Fever.  · Diarrhea with symptoms of dehydration, such as dizziness or dry mouth.  · Bright red blood in your stool.  · Stool that is black and tarry.  · Abdominal swelling.  · Vomiting that does not stop.  · Blood in your vomit.  Summary  · Irritable bowel syndrome (IBS) is not one specific disease. It is a group of symptoms that affects digestion.  · Your intestines may become more sensitive and overreact to certain things. This may be especially true when you eat certain foods or when you are under stress.  · There is no cure for IBS, but treatment can help relieve symptoms.  This information is not intended to replace advice given to you by your health care provider. Make sure you discuss any questions you have with your health care provider.  Document Revised: 12/11/2018 Document Reviewed: 12/11/2018  [employer] Patient Education © 2020 [employer]

## 2021-01-02 NOTE — ASSESSMENT & PLAN NOTE
Psychological condition is improving with treatment.  Regular aerobic exercise.  Medication changes per orders.  Psychological condition  will be reassessed in 3 months.    The patient had originally responded to the antidepressants that we had started him on.  Now suddenly his not so were going to add in Abilify and see if that helps him.

## 2021-01-02 NOTE — ASSESSMENT & PLAN NOTE
Patient has definite BPH.  He has nocturia suddenly he is going 4-5 times at night.  He needs to limit his fluid intake after 7 or 8 at night.  He needs to take Flomax twice a day now.  After the Covid vaccinations have slow the rate of Covid disease in the [place] we will get him to a urologist and consider more aggressive therapy on his prostate including a possible TURP or UroLift or steam therapy or some sort of removal of the prostate tissue from the back of the urethra.

## 2021-01-02 NOTE — ASSESSMENT & PLAN NOTE
Patient has crampy abdominal pain and bloating.  He gets relief with bowel movements.  Unfortunately his bowel movements are infrequent and when he has them they are often hard.  He has many days when he needs to have a bowel movement but just cannot get any function to occur.  Extra fluids and laxatives have been used in the past and occasionally an enema.  We are going to ask him to routinely take Benefiber or Metamucil.  He needs to get in the habit of taking 2 doses a day every day whether he thinks he needs it or not.  He is to add to this MiraLAX 17 g once or twice a day and then when he has a sensation that he needs to have a bowel movement but cannot he needs to take Dulcolax tablets to every 6-8 hours until effective.  He also needs to get a Fleet enema oil enema and to use those if needed for constipation.  He can also buy a bottle of mag citrate and if he has not had a bowel movement for 3 to 4 days he can take a bottle of mag citrate to help flush out the colon.  Once he has some function and he feels comfortable he needs to stay on the Benefiber or the MiraLAX or both every day

## 2021-01-15 DIAGNOSIS — M15.9 PRIMARY OSTEOARTHRITIS INVOLVING MULTIPLE JOINTS: ICD-10-CM

## 2021-01-15 DIAGNOSIS — F98.8 ATTENTION DEFICIT DISORDER (ADD) WITHOUT HYPERACTIVITY: ICD-10-CM

## 2021-01-18 ENCOUNTER — TELEPHONE (OUTPATIENT)
Dept: FAMILY MEDICINE CLINIC | Facility: CLINIC | Age: 76
End: 2021-01-18

## 2021-01-18 DIAGNOSIS — F98.8 ATTENTION DEFICIT DISORDER (ADD) WITHOUT HYPERACTIVITY: ICD-10-CM

## 2021-01-18 DIAGNOSIS — M15.9 PRIMARY OSTEOARTHRITIS INVOLVING MULTIPLE JOINTS: ICD-10-CM

## 2021-01-18 RX ORDER — DICYCLOMINE HYDROCHLORIDE 10 MG/1
10-20 CAPSULE ORAL EVERY 6 HOURS PRN
Qty: 540 CAPSULE | Refills: 2 | Status: SHIPPED | OUTPATIENT
Start: 2021-01-18 | End: 2021-07-05 | Stop reason: SDUPTHER

## 2021-01-18 RX ORDER — ATORVASTATIN CALCIUM 20 MG/1
20 TABLET, FILM COATED ORAL DAILY
Qty: 90 TABLET | Refills: 3 | Status: SHIPPED | OUTPATIENT
Start: 2021-01-18 | End: 2022-04-01

## 2021-01-18 RX ORDER — DEXTROAMPHETAMINE SACCHARATE, AMPHETAMINE ASPARTATE MONOHYDRATE, DEXTROAMPHETAMINE SULFATE AND AMPHETAMINE SULFATE 5; 5; 5; 5 MG/1; MG/1; MG/1; MG/1
20 CAPSULE, EXTENDED RELEASE ORAL EVERY MORNING
Qty: 30 CAPSULE | Refills: 0 | Status: CANCELLED | OUTPATIENT
Start: 2021-01-18 | End: 2021-02-17

## 2021-01-18 RX ORDER — TRANDOLAPRIL AND VERAPAMIL HYDROCHLORIDE 2; 240 MG/1; MG/1
1 TABLET, FILM COATED, EXTENDED RELEASE ORAL 2 TIMES DAILY
Qty: 180 TABLET | Refills: 4 | Status: SHIPPED | OUTPATIENT
Start: 2021-01-18 | End: 2021-02-18

## 2021-01-18 RX ORDER — SUCRALFATE 1 G/10ML
SUSPENSION ORAL
Qty: 2700 ML | Refills: 3 | Status: SHIPPED | OUTPATIENT
Start: 2021-01-18 | End: 2023-02-22

## 2021-01-18 RX ORDER — ARIPIPRAZOLE 2 MG/1
2 TABLET ORAL DAILY
Qty: 90 TABLET | Refills: 0 | Status: SHIPPED | OUTPATIENT
Start: 2021-01-18 | End: 2021-03-31

## 2021-01-18 RX ORDER — PANTOPRAZOLE SODIUM 40 MG/1
40 TABLET, DELAYED RELEASE ORAL 2 TIMES DAILY
Qty: 180 TABLET | Refills: 2 | Status: SHIPPED | OUTPATIENT
Start: 2021-01-18 | End: 2021-04-18

## 2021-01-18 RX ORDER — DEXTROAMPHETAMINE SACCHARATE, AMPHETAMINE ASPARTATE MONOHYDRATE, DEXTROAMPHETAMINE SULFATE AND AMPHETAMINE SULFATE 5; 5; 5; 5 MG/1; MG/1; MG/1; MG/1
20 CAPSULE, EXTENDED RELEASE ORAL EVERY MORNING
Qty: 30 CAPSULE | Refills: 0 | Status: SHIPPED | OUTPATIENT
Start: 2021-01-18 | End: 2021-01-18 | Stop reason: SDUPTHER

## 2021-01-18 RX ORDER — GLIMEPIRIDE 2 MG/1
2 TABLET ORAL
Qty: 180 TABLET | Refills: 4 | Status: SHIPPED | OUTPATIENT
Start: 2021-01-18 | End: 2021-02-01 | Stop reason: SDUPTHER

## 2021-01-18 RX ORDER — HYDROCODONE BITARTRATE AND ACETAMINOPHEN 10; 325 MG/1; MG/1
TABLET ORAL
Qty: 120 TABLET | Refills: 0 | Status: SHIPPED | OUTPATIENT
Start: 2021-01-18 | End: 2021-01-18 | Stop reason: SDUPTHER

## 2021-01-18 RX ORDER — TRIAMCINOLONE ACETONIDE 1 MG/G
CREAM TOPICAL 3 TIMES DAILY
Qty: 45 G | Refills: 2 | Status: SHIPPED | OUTPATIENT
Start: 2021-01-18 | End: 2021-01-28

## 2021-01-18 RX ORDER — DULOXETIN HYDROCHLORIDE 30 MG/1
90 CAPSULE, DELAYED RELEASE ORAL EVERY 24 HOURS
Qty: 270 CAPSULE | Refills: 3 | Status: SHIPPED | OUTPATIENT
Start: 2021-01-18 | End: 2022-04-01

## 2021-01-18 NOTE — TELEPHONE ENCOUNTER
Wife called back and said this must be sent in asap or this pharmacy will run out of medication.  I told her it was up to PMJ when he sends it in.

## 2021-01-18 NOTE — TELEPHONE ENCOUNTER
THE PATIENT NEEDS amphetamine-dextroamphetamine XR (Adderall XR) 20 MG 24 hr capsule SENT TO A DIFFERENT PHARMACY. THE OTHER PHARMACY DOESN'T HAVE IT. IT NEEDS TO BE SENT TO THE [facility] ON [address], [place].    PLEASE ADVISE.  CALLBACK NUMBER: [phone]

## 2021-01-20 RX ORDER — HYDROCODONE BITARTRATE AND ACETAMINOPHEN 10; 325 MG/1; MG/1
TABLET ORAL
Qty: 120 TABLET | Refills: 0 | Status: SHIPPED | OUTPATIENT
Start: 2021-01-20 | End: 2021-04-16 | Stop reason: SDUPTHER

## 2021-01-20 RX ORDER — DEXTROAMPHETAMINE SACCHARATE, AMPHETAMINE ASPARTATE MONOHYDRATE, DEXTROAMPHETAMINE SULFATE AND AMPHETAMINE SULFATE 5; 5; 5; 5 MG/1; MG/1; MG/1; MG/1
20 CAPSULE, EXTENDED RELEASE ORAL EVERY MORNING
Qty: 30 CAPSULE | Refills: 0 | Status: SHIPPED | OUTPATIENT
Start: 2021-01-20 | End: 2021-02-08 | Stop reason: SDUPTHER

## 2021-01-20 RX ORDER — DEXTROAMPHETAMINE SACCHARATE, AMPHETAMINE ASPARTATE MONOHYDRATE, DEXTROAMPHETAMINE SULFATE AND AMPHETAMINE SULFATE 5; 5; 5; 5 MG/1; MG/1; MG/1; MG/1
20 CAPSULE, EXTENDED RELEASE ORAL EVERY MORNING
Qty: 30 CAPSULE | Refills: 0 | Status: SHIPPED | OUTPATIENT
Start: 2021-01-20 | End: 2021-01-20

## 2021-01-20 RX ORDER — HYDROCODONE BITARTRATE AND ACETAMINOPHEN 10; 325 MG/1; MG/1
TABLET ORAL
Qty: 120 TABLET | Refills: 0 | Status: SHIPPED | OUTPATIENT
Start: 2021-01-20 | End: 2021-02-08 | Stop reason: SDUPTHER

## 2021-02-01 RX ORDER — GLIMEPIRIDE 2 MG/1
2 TABLET ORAL
Qty: 180 TABLET | Refills: 4 | Status: SHIPPED | OUTPATIENT
Start: 2021-02-01 | End: 2021-06-13

## 2021-02-08 DIAGNOSIS — M15.9 PRIMARY OSTEOARTHRITIS INVOLVING MULTIPLE JOINTS: ICD-10-CM

## 2021-02-08 DIAGNOSIS — F98.8 ATTENTION DEFICIT DISORDER (ADD) WITHOUT HYPERACTIVITY: ICD-10-CM

## 2021-02-08 RX ORDER — HYDROCODONE BITARTRATE AND ACETAMINOPHEN 10; 325 MG/1; MG/1
TABLET ORAL
Qty: 360 TABLET | Refills: 0 | Status: SHIPPED | OUTPATIENT
Start: 2021-02-08 | End: 2021-02-10 | Stop reason: SDUPTHER

## 2021-02-08 RX ORDER — DEXTROAMPHETAMINE SACCHARATE, AMPHETAMINE ASPARTATE MONOHYDRATE, DEXTROAMPHETAMINE SULFATE AND AMPHETAMINE SULFATE 5; 5; 5; 5 MG/1; MG/1; MG/1; MG/1
20 CAPSULE, EXTENDED RELEASE ORAL EVERY MORNING
Qty: 90 CAPSULE | Refills: 0 | Status: SHIPPED | OUTPATIENT
Start: 2021-02-08 | End: 2021-04-16 | Stop reason: SDUPTHER

## 2021-02-10 ENCOUNTER — OFFICE VISIT (OUTPATIENT)
Dept: FAMILY MEDICINE CLINIC | Facility: CLINIC | Age: 76
End: 2021-02-10

## 2021-02-10 ENCOUNTER — LAB (OUTPATIENT)
Dept: FAMILY MEDICINE CLINIC | Facility: CLINIC | Age: 76
End: 2021-02-10

## 2021-02-10 VITALS
SYSTOLIC BLOOD PRESSURE: 123 MMHG | DIASTOLIC BLOOD PRESSURE: 71 MMHG | OXYGEN SATURATION: 98 % | RESPIRATION RATE: 20 BRPM | HEART RATE: 87 BPM | HEIGHT: 71 IN | WEIGHT: 150.4 LBS | TEMPERATURE: 96.9 F | BODY MASS INDEX: 21.06 KG/M2

## 2021-02-10 DIAGNOSIS — M15.9 PRIMARY OSTEOARTHRITIS INVOLVING MULTIPLE JOINTS: ICD-10-CM

## 2021-02-10 DIAGNOSIS — Z79.4 TYPE 2 DIABETES MELLITUS WITHOUT COMPLICATION, WITH LONG-TERM CURRENT USE OF INSULIN (HCC): ICD-10-CM

## 2021-02-10 DIAGNOSIS — Z12.5 ENCOUNTER FOR SCREENING FOR MALIGNANT NEOPLASM OF PROSTATE: ICD-10-CM

## 2021-02-10 DIAGNOSIS — I10 ESSENTIAL HYPERTENSION: ICD-10-CM

## 2021-02-10 DIAGNOSIS — F98.8 ATTENTION DEFICIT DISORDER (ADD) WITHOUT HYPERACTIVITY: ICD-10-CM

## 2021-02-10 DIAGNOSIS — E55.9 VITAMIN D DEFICIENCY, UNSPECIFIED: ICD-10-CM

## 2021-02-10 DIAGNOSIS — N40.1 BENIGN PROSTATIC HYPERPLASIA WITH URINARY FREQUENCY: ICD-10-CM

## 2021-02-10 DIAGNOSIS — R35.0 BENIGN PROSTATIC HYPERPLASIA WITH URINARY FREQUENCY: ICD-10-CM

## 2021-02-10 DIAGNOSIS — E11.9 TYPE 2 DIABETES MELLITUS WITHOUT COMPLICATION, WITH LONG-TERM CURRENT USE OF INSULIN (HCC): ICD-10-CM

## 2021-02-10 DIAGNOSIS — Z00.00 MEDICARE ANNUAL WELLNESS VISIT, SUBSEQUENT: Primary | ICD-10-CM

## 2021-02-10 LAB
25(OH)D3 SERPL-MCNC: 62.2 NG/ML (ref 30–100)
ALBUMIN SERPL-MCNC: 4.4 G/DL (ref 3.5–5.2)
ALBUMIN/GLOB SERPL: 2 G/DL
ALP SERPL-CCNC: 75 U/L (ref 39–117)
ALT SERPL W P-5'-P-CCNC: 15 U/L (ref 1–41)
ANION GAP SERPL CALCULATED.3IONS-SCNC: 12.1 MMOL/L (ref 5–15)
AST SERPL-CCNC: 22 U/L (ref 1–40)
BASOPHILS # BLD AUTO: 0 10*3/MM3 (ref 0–0.2)
BASOPHILS NFR BLD AUTO: 0.3 % (ref 0–1.5)
BILIRUB SERPL-MCNC: 0.2 MG/DL (ref 0–1.2)
BILIRUB UR QL STRIP: NEGATIVE
BUN SERPL-MCNC: 34 MG/DL (ref 8–23)
BUN/CREAT SERPL: 30.6 (ref 7–25)
CALCIUM SPEC-SCNC: 10.5 MG/DL (ref 8.6–10.5)
CHLORIDE SERPL-SCNC: 100 MMOL/L (ref 98–107)
CHOLEST SERPL-MCNC: 146 MG/DL (ref 0–200)
CLARITY UR: CLEAR
CO2 SERPL-SCNC: 26.9 MMOL/L (ref 22–29)
COLOR UR: YELLOW
CREAT SERPL-MCNC: 1.11 MG/DL (ref 0.76–1.27)
DEPRECATED RDW RBC AUTO: 44.6 FL (ref 37–54)
EOSINOPHIL # BLD AUTO: 0.1 10*3/MM3 (ref 0–0.4)
EOSINOPHIL NFR BLD AUTO: 1.2 % (ref 0.3–6.2)
ERYTHROCYTE [DISTWIDTH] IN BLOOD BY AUTOMATED COUNT: 13.6 % (ref 12.3–15.4)
GFR SERPL CREATININE-BSD FRML MDRD: 64 ML/MIN/1.73
GLOBULIN UR ELPH-MCNC: 2.2 GM/DL
GLUCOSE SERPL-MCNC: 124 MG/DL (ref 65–99)
GLUCOSE UR STRIP-MCNC: NEGATIVE MG/DL
HBA1C MFR BLD: 6.6 % (ref 3.5–5.6)
HCT VFR BLD AUTO: 37 % (ref 37.5–51)
HDLC SERPL-MCNC: 77 MG/DL (ref 40–60)
HGB BLD-MCNC: 12.4 G/DL (ref 13–17.7)
HGB UR QL STRIP.AUTO: NEGATIVE
KETONES UR QL STRIP: ABNORMAL
LDLC SERPL CALC-MCNC: 56 MG/DL (ref 0–100)
LDLC/HDLC SERPL: 0.74 {RATIO}
LEUKOCYTE ESTERASE UR QL STRIP.AUTO: NEGATIVE
LYMPHOCYTES # BLD AUTO: 2.6 10*3/MM3 (ref 0.7–3.1)
LYMPHOCYTES NFR BLD AUTO: 34.7 % (ref 19.6–45.3)
MCH RBC QN AUTO: 31.2 PG (ref 26.6–33)
MCHC RBC AUTO-ENTMCNC: 33.5 G/DL (ref 31.5–35.7)
MCV RBC AUTO: 93.1 FL (ref 79–97)
MONOCYTES # BLD AUTO: 0.6 10*3/MM3 (ref 0.1–0.9)
MONOCYTES NFR BLD AUTO: 7.8 % (ref 5–12)
NEUTROPHILS NFR BLD AUTO: 4.3 10*3/MM3 (ref 1.7–7)
NEUTROPHILS NFR BLD AUTO: 56 % (ref 42.7–76)
NITRITE UR QL STRIP: NEGATIVE
NRBC BLD AUTO-RTO: 0 /100 WBC (ref 0–0.2)
PH UR STRIP.AUTO: <=5 [PH] (ref 5–8)
PLATELET # BLD AUTO: 236 10*3/MM3 (ref 140–450)
PMV BLD AUTO: 8.2 FL (ref 6–12)
POTASSIUM SERPL-SCNC: 4.7 MMOL/L (ref 3.5–5.2)
PROT SERPL-MCNC: 6.6 G/DL (ref 6–8.5)
PROT UR QL STRIP: NEGATIVE
PSA SERPL-MCNC: 1.07 NG/ML (ref 0–4)
RBC # BLD AUTO: 3.97 10*6/MM3 (ref 4.14–5.8)
SODIUM SERPL-SCNC: 139 MMOL/L (ref 136–145)
SP GR UR STRIP: 1.03 (ref 1–1.03)
T4 FREE SERPL-MCNC: 1.27 NG/DL (ref 0.93–1.7)
TRIGL SERPL-MCNC: 61 MG/DL (ref 0–150)
TSH SERPL DL<=0.05 MIU/L-ACNC: 2.4 UIU/ML (ref 0.27–4.2)
UROBILINOGEN UR QL STRIP: ABNORMAL
VIT B12 BLD-MCNC: 793 PG/ML (ref 211–946)
VLDLC SERPL-MCNC: 13 MG/DL (ref 5–40)
WBC # BLD AUTO: 7.6 10*3/MM3 (ref 3.4–10.8)

## 2021-02-10 PROCEDURE — 80061 LIPID PANEL: CPT | Performed by: FAMILY MEDICINE

## 2021-02-10 PROCEDURE — 80053 COMPREHEN METABOLIC PANEL: CPT | Performed by: FAMILY MEDICINE

## 2021-02-10 PROCEDURE — 84443 ASSAY THYROID STIM HORMONE: CPT | Performed by: FAMILY MEDICINE

## 2021-02-10 PROCEDURE — G0439 PPPS, SUBSEQ VISIT: HCPCS | Performed by: FAMILY MEDICINE

## 2021-02-10 PROCEDURE — G0103 PSA SCREENING: HCPCS | Performed by: FAMILY MEDICINE

## 2021-02-10 PROCEDURE — 82607 VITAMIN B-12: CPT | Performed by: FAMILY MEDICINE

## 2021-02-10 PROCEDURE — 85025 COMPLETE CBC W/AUTO DIFF WBC: CPT | Performed by: FAMILY MEDICINE

## 2021-02-10 PROCEDURE — 83036 HEMOGLOBIN GLYCOSYLATED A1C: CPT | Performed by: FAMILY MEDICINE

## 2021-02-10 PROCEDURE — 84439 ASSAY OF FREE THYROXINE: CPT | Performed by: FAMILY MEDICINE

## 2021-02-10 PROCEDURE — 36415 COLL VENOUS BLD VENIPUNCTURE: CPT

## 2021-02-10 PROCEDURE — 81003 URINALYSIS AUTO W/O SCOPE: CPT | Performed by: FAMILY MEDICINE

## 2021-02-10 PROCEDURE — 82306 VITAMIN D 25 HYDROXY: CPT | Performed by: FAMILY MEDICINE

## 2021-02-10 PROCEDURE — 99214 OFFICE O/P EST MOD 30 MIN: CPT | Performed by: FAMILY MEDICINE

## 2021-02-10 NOTE — ASSESSMENT & PLAN NOTE
Psychological condition is improving with treatment.  Continue current treatment regimen.  Regular aerobic exercise.  Referral to psychological counseling.  Referral to psychiatry.  Psychological condition  will be reassessed in 3 months.

## 2021-02-10 NOTE — ASSESSMENT & PLAN NOTE
Hypertension is improving with treatment.  Continue current treatment regimen.  Dietary sodium restriction.  Weight loss.  Regular aerobic exercise.  Continue current medications.  Blood pressure will be reassessed at the next regular appointment.    He is taking Tarka 2/241 every day for blood pressure and is readings are excellent

## 2021-02-10 NOTE — ASSESSMENT & PLAN NOTE
Diabetes is improving with treatment.   Continue current treatment regimen.  Reminded to bring in blood sugar diary at next visit.  Dietary recommendations for ADA diet.  Regular aerobic exercise.  Diabetes will be reassessed in 6 months.    Patient is taking glimepiride 1 mg in the morning and 2 mg at night.  And Metformin 500 twice daily

## 2021-02-10 NOTE — PATIENT INSTRUCTIONS
Medicare Wellness  Personal Prevention Plan of Service     Date of Office Visit:  02/10/2021  Encounter Provider:  Montana Morrow MD  Place of Service:  [facility]  Patient Name: Lux Bray  :  1945    As part of the Medicare Wellness portion of your visit today, we are providing you with this personalized preventive plan of services (PPPS). This plan is based upon recommendations of the [place] Preventive Services Task Force (USPSTF) and the Advisory Committee on Immunization Practices (ACIP).    This lists the preventive care services that should be considered, and provides dates of when you are due. Items listed as completed are up-to-date and do not require any further intervention.    Health Maintenance   Topic Date Due   • URINE MICROALBUMIN  1945   • TDAP/TD VACCINES (1 - Tdap) 1962   • HEPATITIS C SCREENING  2019   • HEMOGLOBIN A1C  2020   • DIABETIC EYE EXAM  2021   • ANNUAL WELLNESS VISIT  02/10/2022   • COLONOSCOPY  2030   • INFLUENZA VACCINE  Completed   • Pneumococcal Vaccine 65+  Completed   • ZOSTER VACCINE  Completed   • MENINGOCOCCAL VACCINE  Aged Out       No orders of the defined types were placed in this encounter.      No follow-ups on file.

## 2021-02-10 NOTE — ASSESSMENT & PLAN NOTE
Lux Bray is a 75 y.o. male who presents for a Subsequent Medicare Wellness Visit.  Upon arrival to the room Alen underwent the Medicare health risk assessment.  Neither the questions themselves or the answers that he had to give to those questions prompted any concern on his part or in the per the nursing staff.  As far as his preventative care examinations and his preventative care immunizations they are as listed below.  Screening tests recommended:    Colonoscopy --up to date  Diabetic eye exam--up to date  Diabetic foot exam--up to date  PSA--Recheck level.  Needs surgery      Immunization:  Influenza--up-to-date really good at this  Prevnar-up-to-date  Pneumovax-up-to-date  Tetanus-headache  Shingles vaccine-up-to-date  Hepatitis    -up to date

## 2021-02-10 NOTE — PROGRESS NOTES
The ABCs of the Annual Wellness Visit  Subsequent Medicare Wellness Visit    Chief Complaint   Patient presents with   • Medicare Wellness-subsequent     yearly medicare wellness   • Irritable Bowel Syndrome   • Hyperlipidemia   • Diabetes       Subjective   History of Present Illness:  Lux Bray is a 75 y.o. male who presents for a Subsequent Medicare Wellness Visit.  Upon arrival to the room Alen underwent the Medicare health risk assessment.  Neither the questions themselves or the answers that he had to give to those questions prompted any concern on his part or in the per the nursing staff.  As far as his preventative care examinations and his preventative care immunizations they are as listed below.  Screening tests recommended:    Colonoscopy --up to date  Diabetic eye exam--up to date  Diabetic foot exam--up to date  PSA--Recheck level.  Needs surgery      Immunization:  Influenza--up-to-date really good at this  Prevnar-up-to-date  Pneumovax-up-to-date  Tetanus-headache  Shingles vaccine-up-to-date  Hepatitis    -up to date                         Patient is also here today for a physical and to have laboratory testing done.  He is followed in the office for hypertension, type 2 diabetes, vitamin D deficiency, BPH with urinary tract obstruction symptoms,Hypertension, ADD, and osteoarthritis         HEALTH RISK ASSESSMENT    Recent Hospitalizations:  No hospitalization(s) within the last year.    Current Medical Providers:  Patient Care Team:  Montana Morrow MD as PCP - General (Family Medicine)  George Viveros MD as Consulting Physician (Gastroenterology)    Smoking Status:  Social History     Tobacco Use   Smoking Status Former Smoker   • Packs/day: 0.25   • Years: 5.00   • Pack years: 1.25   • Types: Cigarettes   • Quit date: 2/10/1971   • Years since quittin.0   Smokeless Tobacco Never Used       Alcohol Consumption:  Social History     Substance and Sexual Activity   Alcohol Use Yes     Comment: social       Depression Screen:   PHQ-2/PHQ-9 Depression Screening 2/10/2021   Little interest or pleasure in doing things 0   Feeling down, depressed, or hopeless 0   Trouble falling or staying asleep, or sleeping too much 0   Feeling tired or having little energy 0   Poor appetite or overeating 0   Feeling bad about yourself - or that you are a failure or have let yourself or your family down 0   Trouble concentrating on things, such as reading the newspaper or watching television 0   Moving or speaking so slowly that other people could have noticed. Or the opposite - being so fidgety or restless that you have been moving around a lot more than usual 0   Thoughts that you would be better off dead, or of hurting yourself in some way 0   Total Score 0       Fall Risk Screen:  GYPSY Fall Risk Assessment was completed, and patient is at LOW risk for falls.Assessment completed on:2/10/2021    Health Habits and Functional and Cognitive Screening:  Functional & Cognitive Status 2/10/2021   Do you have difficulty preparing food and eating? No   Do you have difficulty bathing yourself, getting dressed or grooming yourself? No   Do you have difficulty using the toilet? No   Do you have difficulty moving around from place to place? No   Do you have trouble with steps or getting out of a bed or a chair? No   Current Diet Well Balanced Diet   Dental Exam Up to date   Eye Exam Not up to date   Exercise (times per week) 5 times per week   Current Exercises Include Cardiovascular Workout   Current Exercise Activities Include -   Do you need help using the phone?  No   Are you deaf or do you have serious difficulty hearing?  No   Do you need help with transportation? No   Do you need help shopping? No   Do you need help preparing meals?  No   Do you need help with housework?  No   Do you need help with laundry? No   Do you need help taking your medications? No   Do you need help managing money? No   Do you ever drive  or ride in a car without wearing a seat belt? No   Have you felt unusual stress, anger or loneliness in the last month? No   Who do you live with? Spouse   If you need help, do you have trouble finding someone available to you? No   Have you been bothered in the last four weeks by sexual problems? No   Do you have difficulty concentrating, remembering or making decisions? No         Does the patient have evidence of cognitive impairment? No    Asprin use counseling:Does not need ASA (and currently is not on it)    Age-appropriate Screening Schedule:  Refer to the list below for future screening recommendations based on patient's age, sex and/or medical conditions. Orders for these recommended tests are listed in the plan section. The patient has been provided with a written plan.    Health Maintenance   Topic Date Due   • URINE MICROALBUMIN  1945   • TDAP/TD VACCINES (1 - Tdap) 09/17/1962   • HEMOGLOBIN A1C  05/27/2020   • DIABETIC EYE EXAM  03/01/2021   • COLONOSCOPY  01/30/2030   • INFLUENZA VACCINE  Completed   • ZOSTER VACCINE  Completed          The following portions of the patient's history were reviewed and updated as appropriate: allergies, current medications, past family history, past medical history, past social history, past surgical history and problem list.    Outpatient Medications Prior to Visit   Medication Sig Dispense Refill   • amphetamine-dextroamphetamine XR (Adderall XR) 20 MG 24 hr capsule Take 1 capsule by mouth Every Morning for 30 days 90 capsule 0   • ARIPiprazole (ABILIFY) 2 MG tablet Take 1 tablet by mouth Daily. 90 tablet 0   • atorvastatin (LIPITOR) 20 MG tablet Take 1 tablet by mouth Daily. 90 tablet 3   • Carafate 1 GM/10ML suspension TAKE 10 ML THREE TIMES A DAY AS NEEDED FOR HEARTBURN/ABDOMINAL PAIN 2700 mL 3   • celecoxib (CeleBREX) 200 MG capsule TAKE 1 CAPSULE DAILY 90 capsule 4   • dicyclomine (BENTYL) 10 MG capsule Take 1-2 capsules by mouth Every 6 (Six) Hours As  Needed (Cramping). for abdominal pain 540 capsule 2   • DULoxetine (Cymbalta) 30 MG capsule Take 3 capsules by mouth Daily. 270 capsule 3   • glimepiride (AMARYL) 2 MG tablet Take 1 tablet by mouth Every Morning Before Breakfast. 180 tablet 4   • HYDROcodone-acetaminophen (NORCO)  MG per tablet Take 1 tablet every 4 hours prn MAX 4 tablets /day 120 tablet 0   • metFORMIN (GLUCOPHAGE) 500 MG tablet Take 2 tablets by mouth 2 (Two) Times a Day. 360 tablet 3   • pantoprazole (Protonix) 40 MG EC tablet Take 1 tablet by mouth 2 (two) times a day for 90 days. 180 tablet 2   • trandolapril-verapamil (TARKA) 2-240 MG per CR tablet Take 1 tablet by mouth 2 (Two) Times a Day. 180 tablet 4   • HYDROcodone-acetaminophen (NORCO)  MG per tablet Take 1 tablet every 4 hours prn MAX 4 tablets /day 360 tablet 0     No facility-administered medications prior to visit.        Patient Active Problem List   Diagnosis   • Allergic state   • Attention deficit disorder (ADD) without hyperactivity   • Cervical radiculopathy   • Cholelithiasis   • Deficiency of testosterone biosynthesis   • Depression   • Enlarged prostate with lower urinary tract symptoms (LUTS)   • Hypertension   • Irritable bowel syndrome without diarrhea   • Lumbago with sciatica, right side   • Osteoarthritis   • Type 2 diabetes mellitus without complications (CMS/HCC)   • Vertiginous syndrome   • Preventative health care   • Vitamin D deficiency, unspecified    • Encounter for screening for malignant neoplasm of prostate    • Iron deficiency anemia due to chronic blood loss   • Medicare annual wellness visit, subsequent       Advanced Care Planning:  ACP discussion was held with the patient during this visit. Patient has an advance directive in EMR which is still valid.     Review of Systems   Constitutional: Negative.  Negative for diaphoresis, fatigue and fever.   HENT: Negative.  Negative for congestion, ear pain, hearing loss, postnasal drip, sinus  "pressure, sore throat, trouble swallowing and voice change.    Eyes: Negative.  Negative for pain, redness and visual disturbance.   Respiratory: Negative.  Negative for cough, chest tightness and shortness of breath.    Cardiovascular: Negative.  Negative for chest pain, palpitations and leg swelling.   Gastrointestinal: Negative.  Negative for abdominal pain, blood in stool, constipation and diarrhea.   Endocrine: Negative.  Negative for cold intolerance and heat intolerance.   Genitourinary: Negative.  Negative for difficulty urinating, enuresis, flank pain, frequency and urgency.   Musculoskeletal: Negative.  Negative for arthralgias, back pain, myalgias, neck pain and neck stiffness.   Skin: Negative.  Negative for color change and rash.   Allergic/Immunologic: Negative.  Negative for environmental allergies.   Neurological: Negative.  Negative for syncope, weakness and headaches.   Hematological: Negative.  Does not bruise/bleed easily.   Psychiatric/Behavioral: Negative.  Negative for behavioral problems, decreased concentration, dysphoric mood and suicidal ideas. The patient is not nervous/anxious.        Compared to one year ago, the patient feels his physical health is better.  Compared to one year ago, the patient feels his mental health is the same.    Reviewed chart for potential of high risk medication in the elderly: yes  Reviewed chart for potential of harmful drug interactions in the elderly:yes    Objective         Vitals:    02/10/21 1042   BP: 123/71   Pulse: 87   Resp: 20   Temp: 96.9 °F (36.1 °C)   TempSrc: Temporal   SpO2: 98%   Weight: 68.2 kg (150 lb 6.4 oz)   Height: 180.3 cm (71\")   PainSc: 0-No pain       Body mass index is 20.98 kg/m².  Discussed the patient's BMI with him. The BMI is in the acceptable range.    Physical Exam  Vitals signs reviewed.   Constitutional:       Appearance: Normal appearance. He is well-developed and normal weight.   HENT:      Head: Normocephalic.      Right " Ear: Tympanic membrane, ear canal and external ear normal.      Left Ear: Tympanic membrane, ear canal and external ear normal.      Nose: Nose normal.      Mouth/Throat:      Mouth: Mucous membranes are moist.      Pharynx: No oropharyngeal exudate.   Eyes:      Conjunctiva/sclera: Conjunctivae normal.      Pupils: Pupils are equal, round, and reactive to light.   Neck:      Musculoskeletal: Normal range of motion and neck supple.   Cardiovascular:      Rate and Rhythm: Normal rate and regular rhythm.      Heart sounds: Normal heart sounds.   Pulmonary:      Effort: Pulmonary effort is normal.      Breath sounds: Normal breath sounds.   Abdominal:      General: Bowel sounds are normal.      Palpations: Abdomen is soft.   Musculoskeletal: Normal range of motion.   Skin:     General: Skin is warm and dry.   Neurological:      General: No focal deficit present.      Mental Status: He is alert and oriented to person, place, and time. Mental status is at baseline.   Psychiatric:         Mood and Affect: Mood normal.         Behavior: Behavior normal.         Thought Content: Thought content normal.         Judgment: Judgment normal.               Assessment/Plan   Medicare Risks and Personalized Health Plan  CMS Preventative Services Quick Reference  Abdominal Aortic Aneurysm Screening  Advance Directive Discussion  Cardiovascular risk  Chronic Pain   Colon Cancer Screening  Depression/Dysphoria  Immunizations Discussed/Encouraged (specific immunizations; up to date and is  up to date for all )  Prostate Cancer Screening     The above risks/problems have been discussed with the patient.  Pertinent information has been shared with the patient in the After Visit Summary.  Follow up plans and orders are seen below in the Assessment/Plan Section.    Diagnoses and all orders for this visit:    1. Medicare annual wellness visit, subsequent (Primary)  Assessment & Plan:  Lux Bray is a 75 y.o. male who presents for a  Subsequent Medicare Wellness Visit.  Upon arrival to the room Alen underwent the Medicare health risk assessment.  Neither the questions themselves or the answers that he had to give to those questions prompted any concern on his part or in the per the nursing staff.  As far as his preventative care examinations and his preventative care immunizations they are as listed below.  Screening tests recommended:    Colonoscopy --up to date  Diabetic eye exam--up to date  Diabetic foot exam--up to date  PSA--Recheck level.  Needs surgery      Immunization:  Influenza--up-to-date really good at this  Prevnar-up-to-date  Pneumovax-up-to-date  Tetanus-headache  Shingles vaccine-up-to-date  Hepatitis    -up to date                       2. Essential hypertension  Assessment & Plan:  Hypertension is improving with treatment.  Continue current treatment regimen.  Dietary sodium restriction.  Weight loss.  Regular aerobic exercise.  Continue current medications.  Blood pressure will be reassessed at the next regular appointment.    He is taking Tarka 2/241 every day for blood pressure and is readings are excellent      3. Type 2 diabetes mellitus without complication, with long-term current use of insulin (CMS/[facility])  Assessment & Plan:  Diabetes is improving with treatment.   Continue current treatment regimen.  Reminded to bring in blood sugar diary at next visit.  Dietary recommendations for ADA diet.  Regular aerobic exercise.  Diabetes will be reassessed in 6 months.    Patient is taking glimepiride 1 mg in the morning and 2 mg at night.  And Metformin 500 twice daily      4. Vitamin D deficiency, unspecified   Assessment & Plan:  We will check his vitamin D level today.  He continues placement      5. Benign prostatic hyperplasia with urinary frequency  Assessment & Plan:  Patient has nocturia x6.      6. Attention deficit disorder (ADD) without hyperactivity  Assessment & Plan:  Psychological condition is improving with  treatment.  Continue current treatment regimen.  Regular aerobic exercise.  Referral to psychological counseling.  Referral to psychiatry.  Psychological condition  will be reassessed in 3 months.      7. Primary osteoarthritis involving multiple joints    Follow Up:  Return in about 6 months (around 8/10/2021).     An After Visit Summary and PPPS were given to the patient.

## 2021-02-18 RX ORDER — TRANDOLAPRIL AND VERAPAMIL HYDROCHLORIDE 2; 240 MG/1; MG/1
TABLET, FILM COATED, EXTENDED RELEASE ORAL
Qty: 180 TABLET | Refills: 3 | Status: SHIPPED | OUTPATIENT
Start: 2021-02-18 | End: 2021-02-24 | Stop reason: SDUPTHER

## 2021-02-24 RX ORDER — TRANDOLAPRIL AND VERAPAMIL HYDROCHLORIDE 2; 240 MG/1; MG/1
1 TABLET, FILM COATED, EXTENDED RELEASE ORAL 2 TIMES DAILY
Qty: 180 TABLET | Refills: 3 | Status: SHIPPED | OUTPATIENT
Start: 2021-02-24 | End: 2021-03-10 | Stop reason: SDUPTHER
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
## 2021-03-01 DIAGNOSIS — G89.29 CHRONIC BILATERAL LOW BACK PAIN WITH RIGHT-SIDED SCIATICA: ICD-10-CM

## 2021-03-01 DIAGNOSIS — M54.41 CHRONIC BILATERAL LOW BACK PAIN WITH RIGHT-SIDED SCIATICA: ICD-10-CM

## 2021-03-03 ENCOUNTER — HOSPITAL ENCOUNTER (OUTPATIENT)
Dept: GENERAL RADIOLOGY | Facility: HOSPITAL | Age: 76
Discharge: HOME | End: 2021-03-03
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
## 2021-03-03 DIAGNOSIS — G89.29 CHRONIC BILATERAL LOW BACK PAIN WITH RIGHT-SIDED SCIATICA: ICD-10-CM

## 2021-03-03 DIAGNOSIS — M54.41 CHRONIC BILATERAL LOW BACK PAIN WITH RIGHT-SIDED SCIATICA: ICD-10-CM

## 2021-03-03 PROCEDURE — 72114 X-RAY EXAM L-S SPINE BENDING: CPT

## 2021-03-03 PROCEDURE — 73502 X-RAY EXAM HIP UNI 2-3 VIEWS: CPT

## 2021-03-08 ENCOUNTER — PATIENT MESSAGE (OUTPATIENT)
Dept: FAMILY MEDICINE CLINIC | Facility: CLINIC | Age: 76
End: 2021-03-08

## 2021-03-09 ENCOUNTER — TELEPHONE (OUTPATIENT)
Dept: FAMILY MEDICINE CLINIC | Facility: CLINIC | Age: 76
End: 2021-03-09

## 2021-03-09 NOTE — TELEPHONE ENCOUNTER
Eloisa tell Alen that he has mild arthritis in his hips but rather severe arthritis in his low back.  If he is having a lot of pain in his back we can try some pain management injections.  He just has to let me know how aggressive he wants to be in treatment and we will set him up.

## 2021-03-09 NOTE — TELEPHONE ENCOUNTER
----- Message from Lux Bray sent at 3/8/2021  4:57 PM EST -----  Regarding: Test Results Question  Contact: [phone]  Dear Dr. Morrow,  Just wondering if you got the chance to review my hip and lumbar spine  x-rays?  Thanks,  Alen Bray

## 2021-03-11 NOTE — TELEPHONE ENCOUNTER
Pt wrote thru my chart on the prescription that Pharmacy has been out of [facility] for months now and is asking if you can switch his medication    Please look at prescription pending

## 2021-03-15 RX ORDER — TRANDOLAPRIL AND VERAPAMIL HYDROCHLORIDE 2; 240 MG/1; MG/1
1 TABLET, FILM COATED, EXTENDED RELEASE ORAL 2 TIMES DAILY
Qty: 180 TABLET | Refills: 3 | Status: SHIPPED | OUTPATIENT
Start: 2021-03-15 | End: 2021-04-08 | Stop reason: SDUPTHER

## 2021-03-20 RX ORDER — BLOOD SUGAR DIAGNOSTIC
4-20 STRIP MISCELLANEOUS EVERY 4 HOURS PRN
Qty: 30 EACH | Refills: 6 | Status: SHIPPED | OUTPATIENT
Start: 2021-03-20 | End: 2021-03-25

## 2021-03-20 RX ORDER — INSULIN LISPRO 100 [IU]/ML
4-20 INJECTION, SOLUTION INTRAVENOUS; SUBCUTANEOUS EVERY 4 HOURS PRN
Qty: 1 PEN | Refills: 1 | Status: SHIPPED | OUTPATIENT
Start: 2021-03-20 | End: 2021-03-22

## 2021-03-22 RX ORDER — INSULIN LISPRO 100 [IU]/ML
INJECTION, SOLUTION INTRAVENOUS; SUBCUTANEOUS
Qty: 3 ML | Refills: 3 | Status: SHIPPED | OUTPATIENT
Start: 2021-03-22 | End: 2021-06-11

## 2021-03-31 RX ORDER — ARIPIPRAZOLE 2 MG/1
TABLET ORAL
Qty: 90 TABLET | Refills: 3 | Status: SHIPPED | OUTPATIENT
Start: 2021-03-31 | End: 2022-03-28

## 2021-04-08 RX ORDER — TRANDOLAPRIL AND VERAPAMIL HYDROCHLORIDE 2; 240 MG/1; MG/1
1 TABLET, FILM COATED, EXTENDED RELEASE ORAL 2 TIMES DAILY
Qty: 180 TABLET | Refills: 3 | Status: SHIPPED | OUTPATIENT
Start: 2021-04-08 | End: 2021-07-04 | Stop reason: SDUPTHER

## 2021-04-16 DIAGNOSIS — F98.8 ATTENTION DEFICIT DISORDER (ADD) WITHOUT HYPERACTIVITY: ICD-10-CM

## 2021-04-16 DIAGNOSIS — M15.9 PRIMARY OSTEOARTHRITIS INVOLVING MULTIPLE JOINTS: ICD-10-CM

## 2021-04-19 RX ORDER — DEXTROAMPHETAMINE SACCHARATE, AMPHETAMINE ASPARTATE MONOHYDRATE, DEXTROAMPHETAMINE SULFATE AND AMPHETAMINE SULFATE 5; 5; 5; 5 MG/1; MG/1; MG/1; MG/1
20 CAPSULE, EXTENDED RELEASE ORAL EVERY MORNING
Qty: 90 CAPSULE | Refills: 0 | Status: SHIPPED | OUTPATIENT
Start: 2021-04-19 | End: 2021-05-06 | Stop reason: SDUPTHER

## 2021-04-19 RX ORDER — HYDROCODONE BITARTRATE AND ACETAMINOPHEN 10; 325 MG/1; MG/1
TABLET ORAL
Qty: 120 TABLET | Refills: 0 | Status: SHIPPED | OUTPATIENT
Start: 2021-04-19 | End: 2021-05-06 | Stop reason: SDUPTHER

## 2021-05-06 DIAGNOSIS — M15.9 PRIMARY OSTEOARTHRITIS INVOLVING MULTIPLE JOINTS: ICD-10-CM

## 2021-05-06 DIAGNOSIS — F98.8 ATTENTION DEFICIT DISORDER (ADD) WITHOUT HYPERACTIVITY: ICD-10-CM

## 2021-05-06 RX ORDER — DEXTROAMPHETAMINE SACCHARATE, AMPHETAMINE ASPARTATE MONOHYDRATE, DEXTROAMPHETAMINE SULFATE AND AMPHETAMINE SULFATE 5; 5; 5; 5 MG/1; MG/1; MG/1; MG/1
20 CAPSULE, EXTENDED RELEASE ORAL EVERY MORNING
Qty: 90 CAPSULE | Refills: 0 | Status: SHIPPED | OUTPATIENT
Start: 2021-05-06 | End: 2021-07-04 | Stop reason: SDUPTHER

## 2021-05-06 RX ORDER — HYDROCODONE BITARTRATE AND ACETAMINOPHEN 10; 325 MG/1; MG/1
TABLET ORAL
Qty: 120 TABLET | Refills: 0 | Status: SHIPPED | OUTPATIENT
Start: 2021-05-06 | End: 2021-06-23 | Stop reason: SDUPTHER

## 2021-05-06 NOTE — TELEPHONE ENCOUNTER
Caller: HerreraLux howard    Relationship: Self    Best call back number: [phone] ( MS. ANDREWS WOULD LIKE TO SPEAK TO SOMEONE IN CLINICAL STAFF REGARDING REQUEST)    Medication needed:   Requested Prescriptions     Pending Prescriptions Disp Refills   • HYDROcodone-acetaminophen (NORCO)  MG per tablet 120 tablet 0     Sig: Take 1 tablet every 4 hours prn MAX 4 tablets /day   • amphetamine-dextroamphetamine XR (Adderall XR) 20 MG 24 hr capsule 90 capsule 0     Sig: Take 1 capsule by mouth Every Morning for 30 days       When do you need the refill by: TODAY     What additional details did the patient provide when requesting the medication: MS. ANDREWS WOULD LIKE A 90 DAY SUPPLY, [facility] IS REQUESTING NEW PRESCRIPTION    Does the patient have less than a 3 day supply:  [] Yes  [x] No    What is the patient's preferred pharmacy: [facility] - [address] [phone] [facility] [phone] FX

## 2021-05-10 RX ORDER — CELECOXIB 200 MG/1
CAPSULE ORAL
Qty: 90 CAPSULE | Refills: 3 | Status: SHIPPED | OUTPATIENT
Start: 2021-05-10 | End: 2022-05-05

## 2021-06-11 ENCOUNTER — LAB (OUTPATIENT)
Dept: FAMILY MEDICINE CLINIC | Facility: CLINIC | Age: 76
End: 2021-06-11

## 2021-06-11 ENCOUNTER — OFFICE VISIT (OUTPATIENT)
Dept: FAMILY MEDICINE CLINIC | Facility: CLINIC | Age: 76
End: 2021-06-11

## 2021-06-11 VITALS
HEART RATE: 90 BPM | SYSTOLIC BLOOD PRESSURE: 122 MMHG | BODY MASS INDEX: 21.28 KG/M2 | OXYGEN SATURATION: 96 % | WEIGHT: 152 LBS | DIASTOLIC BLOOD PRESSURE: 70 MMHG | RESPIRATION RATE: 16 BRPM | HEIGHT: 71 IN

## 2021-06-11 DIAGNOSIS — K21.9 GASTROESOPHAGEAL REFLUX DISEASE WITHOUT ESOPHAGITIS: ICD-10-CM

## 2021-06-11 DIAGNOSIS — R30.0 DYSURIA: Primary | ICD-10-CM

## 2021-06-11 LAB
ALBUMIN SERPL-MCNC: 3.9 G/DL (ref 3.5–5.2)
ALBUMIN/GLOB SERPL: 1.6 G/DL
ALP SERPL-CCNC: 77 U/L (ref 39–117)
ALT SERPL W P-5'-P-CCNC: 20 U/L (ref 1–41)
ANION GAP SERPL CALCULATED.3IONS-SCNC: 7.2 MMOL/L (ref 5–15)
AST SERPL-CCNC: 19 U/L (ref 1–40)
BASOPHILS # BLD AUTO: 0.02 10*3/MM3 (ref 0–0.2)
BASOPHILS NFR BLD AUTO: 0.3 % (ref 0–1.5)
BILIRUB SERPL-MCNC: 0.2 MG/DL (ref 0–1.2)
BILIRUB UR QL STRIP: NEGATIVE
BUN SERPL-MCNC: 34 MG/DL (ref 8–23)
BUN/CREAT SERPL: 28.1 (ref 7–25)
CALCIUM SPEC-SCNC: 9.8 MG/DL (ref 8.6–10.5)
CHLORIDE SERPL-SCNC: 98 MMOL/L (ref 98–107)
CLARITY UR: CLEAR
CO2 SERPL-SCNC: 29.8 MMOL/L (ref 22–29)
COLOR UR: YELLOW
CREAT SERPL-MCNC: 1.21 MG/DL (ref 0.76–1.27)
DEPRECATED RDW RBC AUTO: 41.4 FL (ref 37–54)
EOSINOPHIL # BLD AUTO: 0.06 10*3/MM3 (ref 0–0.4)
EOSINOPHIL NFR BLD AUTO: 0.9 % (ref 0.3–6.2)
ERYTHROCYTE [DISTWIDTH] IN BLOOD BY AUTOMATED COUNT: 12.1 % (ref 12.3–15.4)
GFR SERPL CREATININE-BSD FRML MDRD: 58 ML/MIN/1.73
GLOBULIN UR ELPH-MCNC: 2.5 GM/DL
GLUCOSE SERPL-MCNC: 326 MG/DL (ref 65–99)
GLUCOSE UR STRIP-MCNC: ABNORMAL MG/DL
HCT VFR BLD AUTO: 37.5 % (ref 37.5–51)
HGB BLD-MCNC: 12.6 G/DL (ref 13–17.7)
HGB UR QL STRIP.AUTO: NEGATIVE
IMM GRANULOCYTES # BLD AUTO: 0.02 10*3/MM3 (ref 0–0.05)
IMM GRANULOCYTES NFR BLD AUTO: 0.3 % (ref 0–0.5)
KETONES UR QL STRIP: NEGATIVE
LEUKOCYTE ESTERASE UR QL STRIP.AUTO: NEGATIVE
LYMPHOCYTES # BLD AUTO: 2.52 10*3/MM3 (ref 0.7–3.1)
LYMPHOCYTES NFR BLD AUTO: 36.4 % (ref 19.6–45.3)
MCH RBC QN AUTO: 31.1 PG (ref 26.6–33)
MCHC RBC AUTO-ENTMCNC: 33.6 G/DL (ref 31.5–35.7)
MCV RBC AUTO: 92.6 FL (ref 79–97)
MONOCYTES # BLD AUTO: 0.71 10*3/MM3 (ref 0.1–0.9)
MONOCYTES NFR BLD AUTO: 10.3 % (ref 5–12)
NEUTROPHILS NFR BLD AUTO: 3.59 10*3/MM3 (ref 1.7–7)
NEUTROPHILS NFR BLD AUTO: 51.8 % (ref 42.7–76)
NITRITE UR QL STRIP: NEGATIVE
NRBC BLD AUTO-RTO: 0 /100 WBC (ref 0–0.2)
PH UR STRIP.AUTO: 5.5 [PH] (ref 5–8)
PLATELET # BLD AUTO: 218 10*3/MM3 (ref 140–450)
PMV BLD AUTO: 10.3 FL (ref 6–12)
POTASSIUM SERPL-SCNC: 5.1 MMOL/L (ref 3.5–5.2)
PROT SERPL-MCNC: 6.4 G/DL (ref 6–8.5)
PROT UR QL STRIP: NEGATIVE
RBC # BLD AUTO: 4.05 10*6/MM3 (ref 4.14–5.8)
SODIUM SERPL-SCNC: 135 MMOL/L (ref 136–145)
SP GR UR STRIP: >=1.03 (ref 1–1.03)
UROBILINOGEN UR QL STRIP: ABNORMAL
WBC # BLD AUTO: 6.92 10*3/MM3 (ref 3.4–10.8)

## 2021-06-11 PROCEDURE — 80053 COMPREHEN METABOLIC PANEL: CPT | Performed by: FAMILY MEDICINE

## 2021-06-11 PROCEDURE — 81003 URINALYSIS AUTO W/O SCOPE: CPT | Performed by: FAMILY MEDICINE

## 2021-06-11 PROCEDURE — 99213 OFFICE O/P EST LOW 20 MIN: CPT | Performed by: FAMILY MEDICINE

## 2021-06-11 PROCEDURE — 85025 COMPLETE CBC W/AUTO DIFF WBC: CPT | Performed by: FAMILY MEDICINE

## 2021-06-11 PROCEDURE — 36415 COLL VENOUS BLD VENIPUNCTURE: CPT

## 2021-06-11 RX ORDER — PANTOPRAZOLE SODIUM 40 MG/1
TABLET, DELAYED RELEASE ORAL
COMMUNITY
Start: 2021-04-18 | End: 2022-02-21 | Stop reason: SDUPTHER

## 2021-06-11 NOTE — PATIENT INSTRUCTIONS
Dysuria  Dysuria is pain or discomfort while urinating. The pain or discomfort may be felt in the part of your body that drains urine from the bladder (urethra) or in the surrounding tissue of the genitals. The pain may also be felt in the groin area, lower abdomen, or lower back. You may have to urinate frequently or have the sudden feeling that you have to urinate (urgency). Dysuria can affect both men and women, but it is more common in women.  Dysuria can be caused by many different things, including:  · Urinary tract infection.  · Kidney stones or bladder stones.  · Certain sexually transmitted infections (STIs), such as chlamydia.  · Dehydration.  · Inflammation of the tissues of the vagina.  · Use of certain medicines.  · Use of certain soaps or scented products that cause irritation.  Follow these instructions at home:  General instructions  · Watch your condition for any changes.  · Urinate often. Avoid holding urine for long periods of time.  · After a bowel movement or urination, women should cleanse from front to back, using each tissue only once.  · Urinate after sexual intercourse.  · Keep all follow-up visits as told by your health care provider. This is important.  · If you had any tests done to find the cause of dysuria, it is up to you to get your test results. Ask your health care provider, or the department that is doing the test, when your results will be ready.  Eating and drinking    · Drink enough fluid to keep your urine pale yellow.  · Avoid caffeine, tea, and alcohol. They can irritate the bladder and make dysuria worse. In men, alcohol may irritate the prostate.  Medicines  · Take over-the-counter and prescription medicines only as told by your health care provider.  · If you were prescribed an antibiotic medicine, take it as told by your health care provider. Do not stop taking the antibiotic even if you start to feel better.  Contact a health care provider if:  · You have a  fever.  · You develop pain in your back or sides.  · You have nausea or vomiting.  · You have blood in your urine.  · You are not urinating as often as you usually do.  Get help right away if:  · Your pain is severe and not relieved with medicines.  · You cannot eat or drink without vomiting.  · You are confused.  · You have a rapid heartbeat while at rest.  · You have shaking or chills.  · You feel extremely weak.  Summary  · Dysuria is pain or discomfort while urinating. Many different conditions can lead to dysuria.  · If you have dysuria, you may have to urinate frequently or have the sudden feeling that you have to urinate (urgency).  · Watch your condition for any changes. Keep all follow-up visits as told by your health care provider.  · Make sure that you urinate often and drink enough fluid to keep your urine pale yellow.  This information is not intended to replace advice given to you by your health care provider. Make sure you discuss any questions you have with your health care provider.  Document Revised: 11/30/2018 Document Reviewed: 10/04/2018  [employer] Patient Education © 2021 [employer]

## 2021-06-11 NOTE — PROGRESS NOTES
"Chief Complaint  Abdominal Pain    Subjective          Lux Bray presents to [facility]  Alen is a 75-year-old white male who is here with several concerns.  For several years he had intermittent abdominal discomfort that involves what sounds like reflux symptoms up in the back of his esophagus.  He had an EGD not too long ago that was basically normal although he did get dilated for some's narrowing apparently.  He has had the symptoms off and on for the past couple years but the last 2 months they have gotten worse.  He does take a PI and he does take Carafate when he take them routinely they do seem to help.  He has noted a definite correlation with eating late and then lay back after a meal that his symptoms are a lot worse.  He is also here today about some burning discomfort when he urinates and also sometimes when he passes stool to the rectum.  The burning feels like it is through the urethra but mainly at the tip and he is not seeing any blood.  Frequency is a problem urgency is not necessarily a problem his stream is felt to be reasonably normal and his most recent PSA was normal.  He has had good early normal bowel movements and his most recent colonoscopy was normal.  With abdominal pain.        Objective   Vital Signs:   /70 (BP Location: Left arm)   Pulse 90   Resp 16   Ht 180.3 cm (71\")   Wt 68.9 kg (152 lb)   SpO2 96%   BMI 21.20 kg/m²     Physical Exam  Constitutional:       Appearance: Normal appearance. He is well-developed and normal weight.   HENT:      Head: Normocephalic and atraumatic.      Right Ear: Tympanic membrane, ear canal and external ear normal.      Left Ear: Tympanic membrane, ear canal and external ear normal.      Nose: Nose normal.      Mouth/Throat:      Mouth: Mucous membranes are moist.      Pharynx: Oropharynx is clear. No oropharyngeal exudate.   Eyes:      Extraocular Movements: Extraocular movements intact.      " Conjunctiva/sclera: Conjunctivae normal.      Pupils: Pupils are equal, round, and reactive to light.   Cardiovascular:      Rate and Rhythm: Normal rate and regular rhythm.      Pulses: Normal pulses.      Heart sounds: Normal heart sounds.   Pulmonary:      Effort: Pulmonary effort is normal.      Breath sounds: Normal breath sounds.   Abdominal:      General: Bowel sounds are normal.      Palpations: Abdomen is soft.   Musculoskeletal:         General: Normal range of motion.      Cervical back: Normal range of motion and neck supple.   Skin:     General: Skin is warm and dry.   Neurological:      General: No focal deficit present.      Mental Status: He is alert and oriented to person, place, and time. Mental status is at baseline.   Psychiatric:         Mood and Affect: Mood normal.         Behavior: Behavior normal.         Thought Content: Thought content normal.         Judgment: Judgment normal.        Result Review :                 Assessment and Plan    Diagnoses and all orders for this visit:    1. Dysuria (Primary)  Assessment & Plan:  Patient is having dysuria with donation.  He also has a sense of some burning in the rectum at times.  His last urine was reasonably clear although he had ketones.  The last 5 urines of all showed specific gravity is above 1.025.  I think we need to drink more water and I think some of this burning is probably just concentrated urine and not in sign of any significant infection.  We will repeat his urine today along with kidney and liver function test to make were not missing anything.  And then time he is going to intensify his water intake.    Orders:  -     Urinalysis With Culture If Indicated - Urine, Clean Catch  -     Comprehensive Metabolic Panel  -     CBC & Differential    2. Gastroesophageal reflux disease without esophagitis  Assessment & Plan:  She has gastroesophageal reflux.  He is going to continue his PPI but he is good increase the dose to twice daily  and to continue his Carafate liquid 4-5 times a day.  I think this should help along with not eating late at night and certainly not laying down after meals.  If he does eat a light meal for what ever reason he is to probably set sleep sitting up.        Follow Up   Return in about 6 months (around 12/11/2021) for Recheck.  Patient was given instructions and counseling regarding his condition or for health maintenance advice. Please see specific information pulled into the AVS if appropriate.

## 2021-06-11 NOTE — ASSESSMENT & PLAN NOTE
She has gastroesophageal reflux.  He is going to continue his PPI but he is good increase the dose to twice daily and to continue his Carafate liquid 4-5 times a day.  I think this should help along with not eating late at night and certainly not laying down after meals.  If he does eat a light meal for what ever reason he is to probably set sleep sitting up.

## 2021-06-11 NOTE — ASSESSMENT & PLAN NOTE
Patient is having dysuria with donation.  He also has a sense of some burning in the rectum at times.  His last urine was reasonably clear although he had ketones.  The last 5 urines of all showed specific gravity is above 1.025.  I think we need to drink more water and I think some of this burning is probably just concentrated urine and not in sign of any significant infection.  We will repeat his urine today along with kidney and liver function test to make were not missing anything.  And then time he is going to intensify his water intake.

## 2021-06-13 RX ORDER — GLIMEPIRIDE 2 MG/1
6 TABLET ORAL
Qty: 90 TABLET | Refills: 4 | Status: SHIPPED | OUTPATIENT
Start: 2021-06-13 | End: 2021-06-14

## 2021-06-14 ENCOUNTER — PATIENT MESSAGE (OUTPATIENT)
Dept: FAMILY MEDICINE CLINIC | Facility: CLINIC | Age: 76
End: 2021-06-14

## 2021-06-14 RX ORDER — GLIMEPIRIDE 2 MG/1
TABLET ORAL
Qty: 270 TABLET | Refills: 2 | Status: SHIPPED | OUTPATIENT
Start: 2021-06-14 | End: 2021-07-09 | Stop reason: SDUPTHER

## 2021-06-14 NOTE — PROGRESS NOTES
Tell Alen that his blood sugar is quite high at 326.  He needs to increase his glimepiride from 2 mg every day to 6 mg every day.  Once his sugar starts coming down again we will back off of the medication.  Tell him to check his blood sugars first thing in the morning right before his evening meal and right before going to bed.  3 times a day I want him checking his blood sugars for the next 2 weeks.  Send me the readings after 10 days.

## 2021-06-18 ENCOUNTER — TELEPHONE (OUTPATIENT)
Dept: FAMILY MEDICINE CLINIC | Facility: CLINIC | Age: 76
End: 2021-06-18

## 2021-06-18 NOTE — TELEPHONE ENCOUNTER
----- Message from Lux Bray sent at 6/18/2021 10:52 AM EDT -----  Regarding: RE:lab results  Contact: [phone]  Eloisa,  Alen's BS  was testing fine   prior to his seeing Dr. MCCANN. , he didn't fast, and didn't take his meds until he got home. Also, the day before the reading, he had a late supper, and fell asleep without taking his meds. We believe this was the reason for the  high reading. So. we thought we'd try testing for a few days before increasing his Glimepiride dose.   Below are his readings for past 3 days, at the times Dr. MCCANN indicated they should be taken, with the regular daily 2mg dose of Glimepiride:                                                  6/13 68 102   64  6/14 114  82  109  6/15 82  67   113  Will you please ask the Dr if he will approve Alen completing & submitting the two weeks of readings without any increase in his Glimepiride dosage?

## 2021-06-19 NOTE — TELEPHONE ENCOUNTER
Yes tell Alen to hold his glimepiride to 2 mg a day but next week I want him to come by the office for hemoglobin A1c.   yes

## 2021-06-23 DIAGNOSIS — M15.9 PRIMARY OSTEOARTHRITIS INVOLVING MULTIPLE JOINTS: ICD-10-CM

## 2021-06-24 RX ORDER — HYDROCODONE BITARTRATE AND ACETAMINOPHEN 10; 325 MG/1; MG/1
TABLET ORAL
Qty: 360 TABLET | Refills: 0 | Status: SHIPPED | OUTPATIENT
Start: 2021-06-24 | End: 2021-08-25 | Stop reason: SDUPTHER

## 2021-06-25 ENCOUNTER — TELEPHONE (OUTPATIENT)
Dept: FAMILY MEDICINE CLINIC | Facility: CLINIC | Age: 76
End: 2021-06-25

## 2021-06-25 ENCOUNTER — LAB (OUTPATIENT)
Dept: FAMILY MEDICINE CLINIC | Facility: CLINIC | Age: 76
End: 2021-06-25

## 2021-06-25 DIAGNOSIS — E11.9 TYPE 2 DIABETES MELLITUS WITHOUT COMPLICATION, WITH LONG-TERM CURRENT USE OF INSULIN (HCC): Primary | ICD-10-CM

## 2021-06-25 DIAGNOSIS — Z79.4 TYPE 2 DIABETES MELLITUS WITHOUT COMPLICATION, WITH LONG-TERM CURRENT USE OF INSULIN (HCC): Primary | ICD-10-CM

## 2021-06-25 DIAGNOSIS — Z79.4 TYPE 2 DIABETES MELLITUS WITHOUT COMPLICATION, WITH LONG-TERM CURRENT USE OF INSULIN (HCC): ICD-10-CM

## 2021-06-25 DIAGNOSIS — E11.9 TYPE 2 DIABETES MELLITUS WITHOUT COMPLICATION, WITH LONG-TERM CURRENT USE OF INSULIN (HCC): ICD-10-CM

## 2021-06-25 PROCEDURE — 83036 HEMOGLOBIN GLYCOSYLATED A1C: CPT | Performed by: FAMILY MEDICINE

## 2021-06-25 PROCEDURE — 36415 COLL VENOUS BLD VENIPUNCTURE: CPT

## 2021-06-25 PROCEDURE — 82043 UR ALBUMIN QUANTITATIVE: CPT | Performed by: FAMILY MEDICINE

## 2021-06-25 NOTE — TELEPHONE ENCOUNTER
Patient is coming in today under MISC at 2:30pm for an A1C.  Wife said PMJ told him to.  Please put lab orders in.

## 2021-06-26 LAB — ALBUMIN UR-MCNC: <1.2 MG/DL

## 2021-06-28 ENCOUNTER — TELEPHONE (OUTPATIENT)
Dept: FAMILY MEDICINE CLINIC | Facility: CLINIC | Age: 76
End: 2021-06-28

## 2021-06-28 LAB — HBA1C MFR BLD: 7 % (ref 3.5–5.6)

## 2021-06-28 NOTE — TELEPHONE ENCOUNTER
Tell Alen his A1c is 7%.  That is reasonably good control.  I do not want to change medicines I just want him to be careful with his diet and I want him to keep his activity level the same and exercise the same.  I think he will do fine on current medication doses.  Lets recheck in 4 months.

## 2021-06-28 NOTE — TELEPHONE ENCOUNTER
Caller: ROBY ANDREWS    Relationship: Emergency Contact    Best call back number: [phone]    What test was performed: LABS    When was the test performed: 6/25      Additional notes: DID NOT SEE A1C ON RESULTS ON MYCHART, ONLY GLUCOSE.  RANGEL ALSO HAS A MESSAGE REGARDING SAME ISSUE.

## 2021-06-30 RX ORDER — DICYCLOMINE HYDROCHLORIDE 10 MG/1
CAPSULE ORAL
Qty: 540 CAPSULE | Refills: 4 | OUTPATIENT
Start: 2021-06-30

## 2021-07-04 DIAGNOSIS — F98.8 ATTENTION DEFICIT DISORDER (ADD) WITHOUT HYPERACTIVITY: ICD-10-CM

## 2021-07-05 RX ORDER — DICYCLOMINE HYDROCHLORIDE 10 MG/1
10-20 CAPSULE ORAL EVERY 6 HOURS PRN
Qty: 540 CAPSULE | Refills: 2 | Status: SHIPPED | OUTPATIENT
Start: 2021-07-05 | End: 2021-07-09 | Stop reason: SDUPTHER

## 2021-07-05 RX ORDER — TRANDOLAPRIL AND VERAPAMIL HYDROCHLORIDE 2; 240 MG/1; MG/1
1 TABLET, FILM COATED, EXTENDED RELEASE ORAL 2 TIMES DAILY
Qty: 180 TABLET | Refills: 3 | Status: SHIPPED | OUTPATIENT
Start: 2021-07-05 | End: 2021-12-05 | Stop reason: SDUPTHER

## 2021-07-05 RX ORDER — DEXTROAMPHETAMINE SACCHARATE, AMPHETAMINE ASPARTATE MONOHYDRATE, DEXTROAMPHETAMINE SULFATE AND AMPHETAMINE SULFATE 5; 5; 5; 5 MG/1; MG/1; MG/1; MG/1
20 CAPSULE, EXTENDED RELEASE ORAL EVERY MORNING
Qty: 90 CAPSULE | Refills: 0 | Status: SHIPPED | OUTPATIENT
Start: 2021-07-05 | End: 2021-11-02 | Stop reason: SDUPTHER

## 2021-07-10 RX ORDER — GLIMEPIRIDE 2 MG/1
TABLET ORAL
Qty: 270 TABLET | Refills: 3 | Status: SHIPPED | OUTPATIENT
Start: 2021-07-10 | End: 2022-08-29

## 2021-07-10 RX ORDER — DICYCLOMINE HYDROCHLORIDE 10 MG/1
10-20 CAPSULE ORAL EVERY 6 HOURS PRN
Qty: 540 CAPSULE | Refills: 2 | Status: SHIPPED | OUTPATIENT
Start: 2021-07-10 | End: 2022-04-01

## 2021-08-12 DIAGNOSIS — M47.816 FACET ARTHROPATHY, LUMBAR: Primary | ICD-10-CM

## 2021-08-25 DIAGNOSIS — M15.9 PRIMARY OSTEOARTHRITIS INVOLVING MULTIPLE JOINTS: ICD-10-CM

## 2021-08-28 RX ORDER — HYDROCODONE BITARTRATE AND ACETAMINOPHEN 10; 325 MG/1; MG/1
TABLET ORAL
Qty: 360 TABLET | Refills: 0 | Status: SHIPPED | OUTPATIENT
Start: 2021-08-28 | End: 2022-01-14 | Stop reason: SDUPTHER

## 2021-11-02 DIAGNOSIS — F98.8 ATTENTION DEFICIT DISORDER (ADD) WITHOUT HYPERACTIVITY: ICD-10-CM

## 2021-11-02 RX ORDER — DEXTROAMPHETAMINE SACCHARATE, AMPHETAMINE ASPARTATE MONOHYDRATE, DEXTROAMPHETAMINE SULFATE AND AMPHETAMINE SULFATE 5; 5; 5; 5 MG/1; MG/1; MG/1; MG/1
20 CAPSULE, EXTENDED RELEASE ORAL EVERY MORNING
Qty: 90 CAPSULE | Refills: 0 | Status: SHIPPED | OUTPATIENT
Start: 2021-11-02 | End: 2022-01-24 | Stop reason: SDUPTHER

## 2021-11-09 ENCOUNTER — TELEPHONE (OUTPATIENT)
Dept: FAMILY MEDICINE CLINIC | Facility: CLINIC | Age: 76
End: 2021-11-09

## 2021-11-09 NOTE — TELEPHONE ENCOUNTER
Caller: ROBY ANDREWS    Relationship: Emergency Contact    Best call back number:[phone]    What orders are you requesting (i.e. lab or imaging): TDAP VACCINATION    In what timeframe would the patient need to come in: 11/10/2021 @ 4PM    Where will you receive your lab/imaging services: [facility]

## 2021-11-09 NOTE — TELEPHONE ENCOUNTER
We have Tdap in stock    Medicare does not cover this so he will get a bill about $70 for the cavvine and then an administration fee.     Pharmacy is cheaper     Health department is free.

## 2021-12-06 RX ORDER — TRANDOLAPRIL AND VERAPAMIL HYDROCHLORIDE 2; 240 MG/1; MG/1
1 TABLET, FILM COATED, EXTENDED RELEASE ORAL 2 TIMES DAILY
Qty: 180 TABLET | Refills: 3 | Status: SHIPPED | OUTPATIENT
Start: 2021-12-06 | End: 2022-03-10 | Stop reason: SDUPTHER

## 2022-01-14 DIAGNOSIS — M15.9 PRIMARY OSTEOARTHRITIS INVOLVING MULTIPLE JOINTS: ICD-10-CM

## 2022-01-14 RX ORDER — HYDROCODONE BITARTRATE AND ACETAMINOPHEN 10; 325 MG/1; MG/1
TABLET ORAL
Qty: 360 TABLET | Refills: 0 | Status: SHIPPED | OUTPATIENT
Start: 2022-01-14 | End: 2022-04-01 | Stop reason: SDUPTHER

## 2022-01-24 DIAGNOSIS — F98.8 ATTENTION DEFICIT DISORDER (ADD) WITHOUT HYPERACTIVITY: ICD-10-CM

## 2022-01-26 RX ORDER — DEXTROAMPHETAMINE SACCHARATE, AMPHETAMINE ASPARTATE MONOHYDRATE, DEXTROAMPHETAMINE SULFATE AND AMPHETAMINE SULFATE 5; 5; 5; 5 MG/1; MG/1; MG/1; MG/1
20 CAPSULE, EXTENDED RELEASE ORAL EVERY MORNING
Qty: 90 CAPSULE | Refills: 0 | Status: SHIPPED | OUTPATIENT
Start: 2022-01-26 | End: 2022-04-15 | Stop reason: SDUPTHER

## 2022-02-21 RX ORDER — PANTOPRAZOLE SODIUM 40 MG/1
40 TABLET, DELAYED RELEASE ORAL DAILY
Qty: 90 TABLET | Refills: 1 | Status: SHIPPED | OUTPATIENT
Start: 2022-02-21 | End: 2022-08-29

## 2022-02-21 NOTE — TELEPHONE ENCOUNTER
Caller: ROBY ANDREWS    Relationship: Emergency Contact    Best call back number:[phone]     Requested Prescriptions:   Requested Prescriptions     Pending Prescriptions Disp Refills   • pantoprazole (PROTONIX) 40 MG EC tablet          Pharmacy where request should be sent: [facility] - [address] [phone] [facility] [phone] FX     Additional details provided by patient: 90 DAYS SUPPLY . 5 PILLS REMAINING     Does the patient have less than a 3 day supply:  [] Yes  [x] No    Sun Townsend   02/21/22 08:40 EST

## 2022-02-23 ENCOUNTER — TELEPHONE (OUTPATIENT)
Dept: FAMILY MEDICINE CLINIC | Facility: CLINIC | Age: 77
End: 2022-02-23

## 2022-02-23 RX ORDER — BROMPHENIRAMINE MALEATE, PSEUDOEPHEDRINE HYDROCHLORIDE, AND DEXTROMETHORPHAN HYDROBROMIDE 2; 30; 10 MG/5ML; MG/5ML; MG/5ML
5-10 SYRUP ORAL 4 TIMES DAILY PRN
Qty: 240 ML | Refills: 1 | Status: SHIPPED | OUTPATIENT
Start: 2022-02-23 | End: 2022-03-02

## 2022-02-23 RX ORDER — AZITHROMYCIN 500 MG/1
500 TABLET, FILM COATED ORAL DAILY
Qty: 5 TABLET | Refills: 1 | Status: SHIPPED | OUTPATIENT
Start: 2022-02-23 | End: 2022-02-28

## 2022-02-23 NOTE — TELEPHONE ENCOUNTER
Caller: ROBY ANDREWS    Relationship: Emergency Contact    Best call back number: [phone]    What medication are you requesting:COUGH SYRUP, AZITHROMYCIN     What are your current symptoms: COUGH, CHEST CONGESTION. WIFE RANGEL JUAN WAS IN OFFICE ON 2/21 FOR SAME SYMPTOMS    How long have you been experiencing symptoms: 1 DAY    Have you had these symptoms before:    [x] Yes  [] No    Have you been treated for these symptoms before:   [x] Yes  [] No    If a prescription is needed, what is your preferred pharmacy and phone number: [id number] [place] [facility] [address] - [phone]  - [phone] FX     Additional notes:

## 2022-03-03 ENCOUNTER — TELEPHONE (OUTPATIENT)
Dept: FAMILY MEDICINE CLINIC | Facility: CLINIC | Age: 77
End: 2022-03-03

## 2022-03-03 NOTE — TELEPHONE ENCOUNTER
Caller: ROBY ANDREWS    Relationship to patient: Emergency Contact    Best call back number: [phone]    Type of visit: MEDICARE WELLNESS    Requested date: ANY DAY OTHER THAN Tuesday OR Thursday . AUGUST WAS FIRST AVAILABLE. CAN WE WORK  AND WIFE TOGETHER? WIFE MRN:   [id number]    If rescheduling, when is the original appointment: 3/10/22

## 2022-03-03 NOTE — TELEPHONE ENCOUNTER
Left detailed message on patient's voice mail at 1:24pm that PMJ has nothing available between now and the fall for a Medicare Wellness Exam.  Next available, at this moment, is 9/2/2022 and if interested to call us back.  If having a problem, can be seen sooner for that problem.

## 2022-03-03 NOTE — TELEPHONE ENCOUNTER
From Eloisa:    They can be back to back but if that is how far out a wellness exam is then that's when it is they can be seen sooner if there is a problem

## 2022-03-10 ENCOUNTER — LAB (OUTPATIENT)
Dept: FAMILY MEDICINE CLINIC | Facility: CLINIC | Age: 77
End: 2022-03-10

## 2022-03-10 ENCOUNTER — OFFICE VISIT (OUTPATIENT)
Dept: FAMILY MEDICINE CLINIC | Facility: CLINIC | Age: 77
End: 2022-03-10

## 2022-03-10 VITALS
DIASTOLIC BLOOD PRESSURE: 64 MMHG | TEMPERATURE: 96.8 F | WEIGHT: 161 LBS | BODY MASS INDEX: 22.54 KG/M2 | OXYGEN SATURATION: 98 % | HEART RATE: 71 BPM | SYSTOLIC BLOOD PRESSURE: 108 MMHG | HEIGHT: 71 IN | RESPIRATION RATE: 16 BRPM

## 2022-03-10 DIAGNOSIS — L84 PRE-ULCERATIVE CORN OR CALLOUS: ICD-10-CM

## 2022-03-10 DIAGNOSIS — M15.9 PRIMARY OSTEOARTHRITIS INVOLVING MULTIPLE JOINTS: ICD-10-CM

## 2022-03-10 DIAGNOSIS — Z79.4 TYPE 2 DIABETES MELLITUS WITHOUT COMPLICATION, WITH LONG-TERM CURRENT USE OF INSULIN: ICD-10-CM

## 2022-03-10 DIAGNOSIS — Z00.00 MEDICARE ANNUAL WELLNESS VISIT, SUBSEQUENT: Primary | ICD-10-CM

## 2022-03-10 DIAGNOSIS — M79.674 TOE PAIN, BILATERAL: ICD-10-CM

## 2022-03-10 DIAGNOSIS — E11.9 TYPE 2 DIABETES MELLITUS WITHOUT COMPLICATION, WITH LONG-TERM CURRENT USE OF INSULIN: ICD-10-CM

## 2022-03-10 DIAGNOSIS — M79.675 TOE PAIN, BILATERAL: ICD-10-CM

## 2022-03-10 DIAGNOSIS — B35.1 TINEA UNGUIUM: ICD-10-CM

## 2022-03-10 DIAGNOSIS — Z11.59 NEED FOR HEPATITIS C SCREENING TEST: ICD-10-CM

## 2022-03-10 DIAGNOSIS — E55.9 VITAMIN D DEFICIENCY, UNSPECIFIED: ICD-10-CM

## 2022-03-10 DIAGNOSIS — Z13.29 SCREENING FOR HYPOTHYROIDISM: ICD-10-CM

## 2022-03-10 DIAGNOSIS — Z12.5 SCREENING FOR PROSTATE CANCER: ICD-10-CM

## 2022-03-10 DIAGNOSIS — I10 ESSENTIAL HYPERTENSION: ICD-10-CM

## 2022-03-10 DIAGNOSIS — E78.2 MIXED HYPERLIPIDEMIA: ICD-10-CM

## 2022-03-10 PROBLEM — H25.9 AGE-RELATED CATARACT OF BOTH EYES: Status: ACTIVE | Noted: 2019-04-29

## 2022-03-10 LAB
25(OH)D3 SERPL-MCNC: 62.8 NG/ML (ref 30–100)
ALBUMIN SERPL-MCNC: 4.3 G/DL (ref 3.5–5.2)
ALBUMIN UR-MCNC: 2.1 MG/DL
ALBUMIN/GLOB SERPL: 1.4 G/DL
ALP SERPL-CCNC: 102 U/L (ref 39–117)
ALT SERPL W P-5'-P-CCNC: 18 U/L (ref 1–41)
ANION GAP SERPL CALCULATED.3IONS-SCNC: 15.6 MMOL/L (ref 5–15)
AST SERPL-CCNC: 22 U/L (ref 1–40)
BACTERIA UR QL AUTO: ABNORMAL /HPF
BASOPHILS # BLD AUTO: 0.03 10*3/MM3 (ref 0–0.2)
BASOPHILS NFR BLD AUTO: 0.3 % (ref 0–1.5)
BILIRUB SERPL-MCNC: 0.3 MG/DL (ref 0–1.2)
BILIRUB UR QL STRIP: NEGATIVE
BUN SERPL-MCNC: 31 MG/DL (ref 8–23)
BUN/CREAT SERPL: 16.7 (ref 7–25)
CALCIUM SPEC-SCNC: 9.9 MG/DL (ref 8.6–10.5)
CHLORIDE SERPL-SCNC: 100 MMOL/L (ref 98–107)
CHOLEST SERPL-MCNC: 143 MG/DL (ref 0–200)
CLARITY UR: CLEAR
CO2 SERPL-SCNC: 26.4 MMOL/L (ref 22–29)
COLOR UR: ABNORMAL
CREAT SERPL-MCNC: 1.86 MG/DL (ref 0.76–1.27)
DEPRECATED RDW RBC AUTO: 40.7 FL (ref 37–54)
EGFRCR SERPLBLD CKD-EPI 2021: 36.6 ML/MIN/1.73
EOSINOPHIL # BLD AUTO: 0.17 10*3/MM3 (ref 0–0.4)
EOSINOPHIL NFR BLD AUTO: 1.7 % (ref 0.3–6.2)
ERYTHROCYTE [DISTWIDTH] IN BLOOD BY AUTOMATED COUNT: 12.2 % (ref 12.3–15.4)
GLOBULIN UR ELPH-MCNC: 3.1 GM/DL
GLUCOSE SERPL-MCNC: 93 MG/DL (ref 65–99)
GLUCOSE UR STRIP-MCNC: NEGATIVE MG/DL
HBA1C MFR BLD: 7.5 % (ref 3.5–5.6)
HCT VFR BLD AUTO: 38.3 % (ref 37.5–51)
HCV AB SER DONR QL: NORMAL
HDLC SERPL-MCNC: 56 MG/DL (ref 40–60)
HGB BLD-MCNC: 12.5 G/DL (ref 13–17.7)
HGB UR QL STRIP.AUTO: NEGATIVE
HYALINE CASTS UR QL AUTO: ABNORMAL /LPF
IMM GRANULOCYTES # BLD AUTO: 0.04 10*3/MM3 (ref 0–0.05)
IMM GRANULOCYTES NFR BLD AUTO: 0.4 % (ref 0–0.5)
KETONES UR QL STRIP: ABNORMAL
LDLC SERPL CALC-MCNC: 74 MG/DL (ref 0–100)
LDLC/HDLC SERPL: 1.32 {RATIO}
LEUKOCYTE ESTERASE UR QL STRIP.AUTO: ABNORMAL
LYMPHOCYTES # BLD AUTO: 3.99 10*3/MM3 (ref 0.7–3.1)
LYMPHOCYTES NFR BLD AUTO: 40.6 % (ref 19.6–45.3)
MCH RBC QN AUTO: 30.3 PG (ref 26.6–33)
MCHC RBC AUTO-ENTMCNC: 32.6 G/DL (ref 31.5–35.7)
MCV RBC AUTO: 92.7 FL (ref 79–97)
MONOCYTES # BLD AUTO: 0.9 10*3/MM3 (ref 0.1–0.9)
MONOCYTES NFR BLD AUTO: 9.2 % (ref 5–12)
NEUTROPHILS NFR BLD AUTO: 4.69 10*3/MM3 (ref 1.7–7)
NEUTROPHILS NFR BLD AUTO: 47.8 % (ref 42.7–76)
NITRITE UR QL STRIP: NEGATIVE
NRBC BLD AUTO-RTO: 0 /100 WBC (ref 0–0.2)
PH UR STRIP.AUTO: <=5 [PH] (ref 5–8)
PLATELET # BLD AUTO: 265 10*3/MM3 (ref 140–450)
PMV BLD AUTO: 10.4 FL (ref 6–12)
POTASSIUM SERPL-SCNC: 4.9 MMOL/L (ref 3.5–5.2)
PROT SERPL-MCNC: 7.4 G/DL (ref 6–8.5)
PROT UR QL STRIP: ABNORMAL
PSA SERPL-MCNC: 1.34 NG/ML (ref 0–4)
RBC # BLD AUTO: 4.13 10*6/MM3 (ref 4.14–5.8)
RBC # UR STRIP: ABNORMAL /HPF
REF LAB TEST METHOD: ABNORMAL
SODIUM SERPL-SCNC: 142 MMOL/L (ref 136–145)
SP GR UR STRIP: 1.03 (ref 1–1.03)
SQUAMOUS #/AREA URNS HPF: ABNORMAL /HPF
TRIGL SERPL-MCNC: 66 MG/DL (ref 0–150)
TSH SERPL DL<=0.05 MIU/L-ACNC: 2.58 UIU/ML (ref 0.27–4.2)
UROBILINOGEN UR QL STRIP: ABNORMAL
VLDLC SERPL-MCNC: 13 MG/DL (ref 5–40)
WBC # UR STRIP: ABNORMAL /HPF
WBC NRBC COR # BLD: 9.82 10*3/MM3 (ref 3.4–10.8)

## 2022-03-10 PROCEDURE — 1160F RVW MEDS BY RX/DR IN RCRD: CPT | Performed by: FAMILY MEDICINE

## 2022-03-10 PROCEDURE — 85025 COMPLETE CBC W/AUTO DIFF WBC: CPT | Performed by: FAMILY MEDICINE

## 2022-03-10 PROCEDURE — 83036 HEMOGLOBIN GLYCOSYLATED A1C: CPT | Performed by: FAMILY MEDICINE

## 2022-03-10 PROCEDURE — 84443 ASSAY THYROID STIM HORMONE: CPT | Performed by: FAMILY MEDICINE

## 2022-03-10 PROCEDURE — 81001 URINALYSIS AUTO W/SCOPE: CPT | Performed by: FAMILY MEDICINE

## 2022-03-10 PROCEDURE — 36415 COLL VENOUS BLD VENIPUNCTURE: CPT

## 2022-03-10 PROCEDURE — 1170F FXNL STATUS ASSESSED: CPT | Performed by: FAMILY MEDICINE

## 2022-03-10 PROCEDURE — 82043 UR ALBUMIN QUANTITATIVE: CPT | Performed by: FAMILY MEDICINE

## 2022-03-10 PROCEDURE — 80061 LIPID PANEL: CPT | Performed by: FAMILY MEDICINE

## 2022-03-10 PROCEDURE — G0103 PSA SCREENING: HCPCS | Performed by: FAMILY MEDICINE

## 2022-03-10 PROCEDURE — 99213 OFFICE O/P EST LOW 20 MIN: CPT | Performed by: FAMILY MEDICINE

## 2022-03-10 PROCEDURE — G0439 PPPS, SUBSEQ VISIT: HCPCS | Performed by: FAMILY MEDICINE

## 2022-03-10 PROCEDURE — 80053 COMPREHEN METABOLIC PANEL: CPT | Performed by: FAMILY MEDICINE

## 2022-03-10 PROCEDURE — 86803 HEPATITIS C AB TEST: CPT | Performed by: FAMILY MEDICINE

## 2022-03-10 PROCEDURE — 82306 VITAMIN D 25 HYDROXY: CPT | Performed by: FAMILY MEDICINE

## 2022-03-10 RX ORDER — TRANDOLAPRIL AND VERAPAMIL HYDROCHLORIDE 2; 240 MG/1; MG/1
1 TABLET, FILM COATED, EXTENDED RELEASE ORAL 2 TIMES DAILY
Qty: 180 TABLET | Refills: 3 | Status: SHIPPED | OUTPATIENT
Start: 2022-03-10 | End: 2022-06-03 | Stop reason: SDUPTHER

## 2022-03-10 NOTE — PROGRESS NOTES
The ABCs of the Annual Wellness Visit  Subsequent Medicare Wellness Visit    Chief Complaint   Patient presents with   • Medicare Wellness-subsequent      Subjective    History of Present Illness:  Lux Bray is a 76 y.o. male who presents for a Subsequent Medicare Wellness Visit.  Upon arrival to the room the patient underwent the Medicare health risk assessment.  Neither the questions themselves or the answers that were given prompted any major concern on the part of the patient or by the medical staff that gave the assessment.  As far as the preventative care examinations and the preventative care immunizations that this patient requires they are as listed below.     He is accompanied by an adult female.    The patient states that he has never had an abnormal colonoscopy. He notes that his last colonoscopy showed a little inflammation. He denies any family history of colon cancer. The patient states that he is diabetic. He notes that he gets his eyes examined every year. He states that he sees a podiatrist. He notes that he sees his dentist every 3 to 4 months. The patient states that he drinks an occasional drink. He denies smoking. He states that he does not think his memory is as good as it was. He notes that he is still doing his taxes. He states that he is not exercising as vigorously as he did because he notices that his body is reacting to it adversely if he lifts too much. He states that he is trying to do more exercise.    The patient states that he has sciatica problem. He notes that it is very aggravating. He states that he keeps stretching and trying to exercise. He states that he is back up to the gym regularly. He states that the pain is mainly in the right leg, although it does affect the left leg occasionally. He states that the pain generally stays above the knee. He states that he has tingling in his feet sometimes. He states that he uses a cold pack. He states that he had an MRI  approximately 1 year ago. He states that he takes Norco in the morning and it helps a lot. He states that he has tried facet blocks in the past, but it did not work for him.    The patient states that he has not had COVID-19.     Screening tests recommended:    Colonoscopy UTD  Diabetic eye exam  Diabetic foot exam  PSA  Ordered today  Dentist routinely      Immunization:  Influenza  UTD  Prevnar  UTD  Pneumovax  UTD  Tetanus Tdap for daughters pregnancy  Shingles vaccine UTD  Hepatitis UTD  Covid     UTD                 The following portions of the patient's history were reviewed and   updated as appropriate: allergies, current medications, past family history, past medical history, past social history, past surgical history and problem list.    Compared to one year ago, the patient feels his physical   health is the same.    Compared to one year ago, the patient feels his mental   health is the same.    Recent Hospitalizations:  He was not admitted to the hospital during the last year.       Current Medical Providers:  Patient Care Team:  Montana Morrow MD as PCP - General (Family Medicine)  George Viveros MD as Consulting Physician (Gastroenterology)    Outpatient Medications Prior to Visit   Medication Sig Dispense Refill   • Carafate 1 GM/10ML suspension TAKE 10 ML THREE TIMES A DAY AS NEEDED FOR HEARTBURN/ABDOMINAL PAIN 2700 mL 3   • celecoxib (CeleBREX) 200 MG capsule TAKE 1 CAPSULE DAILY 90 capsule 3   • glimepiride (AMARYL) 2 MG tablet Take 3 tablet every morning before breakfast 270 tablet 3   • pantoprazole (PROTONIX) 40 MG EC tablet Take 1 tablet by mouth Daily. 90 tablet 1   • amphetamine-dextroamphetamine XR (Adderall XR) 20 MG 24 hr capsule Take 1 capsule by mouth Every Morning for 30 days 90 capsule 0   • ARIPiprazole (ABILIFY) 2 MG tablet TAKE 1 TABLET DAILY 90 tablet 3   • atorvastatin (LIPITOR) 20 MG tablet Take 1 tablet by mouth Daily. 90 tablet 3   • dicyclomine (BENTYL) 10 MG capsule  Take 1-2 capsules by mouth Every 6 (Six) Hours As Needed (Cramping). for abdominal pain 540 capsule 2   • DULoxetine (Cymbalta) 30 MG capsule Take 3 capsules by mouth Daily. 270 capsule 3   • HYDROcodone-acetaminophen (NORCO)  MG per tablet Take 1 tablet every 4 hours prn MAX 4 tablets /day 360 tablet 0   • metFORMIN (GLUCOPHAGE) 500 MG tablet Take 2 tablets by mouth 2 (Two) Times a Day. 360 tablet 3   • trandolapril-verapamil (TARKA) 2-240 MG per CR tablet Take 1 tablet by mouth 2 (Two) Times a Day for 90 days. 180 tablet 3     No facility-administered medications prior to visit.       Opioid medication/s are on active medication list.  and I have evaluated his active treatment plan and pain score trends (see table).  There were no vitals filed for this visit.  I have reviewed the chart for potential of high risk medication and harmful drug interactions in the elderly.            Aspirin is not on active medication list.  Aspirin use is not indicated based on review of current medical condition/s. Risk of harm outweighs potential benefits.  .    Patient Active Problem List   Diagnosis   • Allergic state   • Attention deficit disorder (ADD) without hyperactivity   • Cervical radiculopathy   • Cholelithiasis   • Deficiency of testosterone biosynthesis   • Depression   • Enlarged prostate with lower urinary tract symptoms (LUTS)   • Hypertension   • Irritable bowel syndrome without diarrhea   • Lumbago with sciatica, right side   • Osteoarthritis   • Type 2 diabetes mellitus without complications (HCC)   • Vertiginous syndrome   • Preventative health care   • Vitamin D deficiency, unspecified    • Encounter for screening for malignant neoplasm of prostate    • Iron deficiency anemia due to chronic blood loss   • Medicare annual wellness visit, subsequent   • Colon polyp   • Gastroesophageal reflux disease without esophagitis   • Dysuria   • Hyperlipidemia   • HL (hearing loss)   • Cortical senile cataract   •  "Age-related cataract of both eyes   • Essential hypertension   • Screening for prostate cancer   • Screening for hypothyroidism   • Need for hepatitis C screening test   • Toe pain, bilateral   • Tinea unguium   • Pre-ulcerative corn or callous     Advance Care Planning  Advance Directive is on file.  ACP discussion was held with the patient during this visit. Patient has an advance directive in EMR which is still valid.     Review of Systems   Constitutional: Negative.  Negative for fatigue and fever.   HENT: Negative.  Negative for congestion, sinus pressure, sore throat, tinnitus and trouble swallowing.    Eyes: Negative.  Negative for redness and visual disturbance.   Respiratory: Negative for cough, chest tightness, shortness of breath and wheezing.    Cardiovascular: Negative.  Negative for chest pain and leg swelling.   Gastrointestinal: Negative.  Negative for abdominal distention, abdominal pain, diarrhea and nausea.   Endocrine: Negative.  Negative for cold intolerance and heat intolerance.   Genitourinary: Negative.  Negative for difficulty urinating, dysuria and urgency.   Musculoskeletal: Negative.  Negative for arthralgias, back pain, gait problem and joint swelling.   Skin: Negative.  Negative for rash.   Allergic/Immunologic: Negative.  Negative for environmental allergies and food allergies.   Neurological: Negative.  Negative for dizziness, weakness, light-headedness and confusion.   Hematological: Negative.  Negative for adenopathy.   Psychiatric/Behavioral: Negative.  Negative for agitation and dysphoric mood. The patient is not nervous/anxious.         Objective    Vitals:    03/10/22 0905   BP: 108/64   BP Location: Right arm   Pulse: 71   Resp: 16   Temp: 96.8 °F (36 °C)   TempSrc: Infrared   SpO2: 98%   Weight: 73 kg (161 lb)   Height: 180.3 cm (71\")     BMI Readings from Last 1 Encounters:   03/10/22 22.45 kg/m²   BMI is within normal parameters. No follow-up required.  Body mass index is " 22.45 kg/m².  BMI has not been calculated during today's encounter.     Does the patient have evidence of cognitive impairment? No    Physical Exam  Constitutional:       Appearance: Normal appearance. He is well-developed and normal weight.   HENT:      Head: Normocephalic and atraumatic.      Right Ear: Tympanic membrane, ear canal and external ear normal.      Left Ear: Tympanic membrane, ear canal and external ear normal.      Nose: Nose normal.      Mouth/Throat:      Mouth: Mucous membranes are moist.      Pharynx: Oropharynx is clear. No oropharyngeal exudate.   Eyes:      Extraocular Movements: Extraocular movements intact.      Conjunctiva/sclera: Conjunctivae normal.      Pupils: Pupils are equal, round, and reactive to light.   Cardiovascular:      Rate and Rhythm: Normal rate and regular rhythm.      Pulses: Normal pulses.      Heart sounds: Normal heart sounds.   Pulmonary:      Effort: Pulmonary effort is normal.      Breath sounds: Normal breath sounds.   Abdominal:      General: Bowel sounds are normal.      Palpations: Abdomen is soft.   Musculoskeletal:         General: Normal range of motion.      Cervical back: Normal range of motion and neck supple.      Comments: Osteoarthritic changes particularly in his hands and spine knees and hips.   Skin:     General: Skin is warm and dry.      Comments: Thin pale skin   Neurological:      General: No focal deficit present.      Mental Status: He is alert and oriented to person, place, and time. Mental status is at baseline.      Gait: Gait abnormal.      Comments: Abnormal gait mainly because of back and lower extremity arthritis   Psychiatric:         Mood and Affect: Mood normal.         Behavior: Behavior normal.         Thought Content: Thought content normal.         Judgment: Judgment normal.       Lab Results   Component Value Date    TRIG 66 03/10/2022    HDL 56 03/10/2022    LDL 74 03/10/2022    VLDL 13 03/10/2022    HGBA1C 7.5 (H) 03/10/2022             HEALTH RISK ASSESSMENT    Smoking Status:  Social History     Tobacco Use   Smoking Status Former Smoker   • Packs/day: 0.25   • Years: 5.00   • Pack years: 1.25   • Types: Cigarettes   • Quit date: 2/10/1971   • Years since quittin.2   Smokeless Tobacco Never Used     Alcohol Consumption:  Social History     Substance and Sexual Activity   Alcohol Use Yes   • Alcohol/week: 2.0 standard drinks   • Types: 1 Glasses of wine, 1 Cans of beer per week    Comment: social     Fall Risk Screen:    GYPSY Fall Risk Assessment was completed, and patient is at HIGH risk for falls. Assessment completed on:3/10/2022    Depression Screening:  PHQ-2/PHQ-9 Depression Screening 3/10/2022   Retired Total Score -   Little Interest or Pleasure in Doing Things 0-->not at all   Feeling Down, Depressed or Hopeless 1-->several days   PHQ-9: Brief Depression Severity Measure Score 1       Health Habits and Functional and Cognitive Screening:  Functional & Cognitive Status 3/10/2022   Do you have difficulty preparing food and eating? No   Do you have difficulty bathing yourself, getting dressed or grooming yourself? No   Do you have difficulty using the toilet? No   Do you have difficulty moving around from place to place? No   Do you have trouble with steps or getting out of a bed or a chair? No   Current Diet Well Balanced Diet   Dental Exam Up to date   Eye Exam Up to date   Exercise (times per week) 3 times per week   Current Exercises Include Swimming;Light Weights   Current Exercise Activities Include -   Do you need help using the phone?  -   Are you deaf or do you have serious difficulty hearing?  Yes   Do you need help with transportation? No   Do you need help shopping? No   Do you need help preparing meals?  No   Do you need help with housework?  No   Do you need help with laundry? No   Do you need help taking your medications? No   Do you need help managing money? No   Do you ever drive or ride in a car without  wearing a seat belt? No   Have you felt unusual stress, anger or loneliness in the last month? No   Who do you live with? Spouse   If you need help, do you have trouble finding someone available to you? No   Have you been bothered in the last four weeks by sexual problems? -   Do you have difficulty concentrating, remembering or making decisions? No       Age-appropriate Screening Schedule:  Refer to the list below for future screening recommendations based on patient's age, sex and/or medical conditions. Orders for these recommended tests are listed in the plan section. The patient has been provided with a written plan.    Health Maintenance   Topic Date Due   • TDAP/TD VACCINES (1 - Tdap) Never done   • DIABETIC EYE EXAM  03/01/2022   • INFLUENZA VACCINE  08/01/2022   • HEMOGLOBIN A1C  09/10/2022   • LIPID PANEL  03/10/2023   • URINE MICROALBUMIN  03/10/2023   • ZOSTER VACCINE  Completed              Assessment/Plan   CMS Preventative Services Quick Reference  Risk Factors Identified During Encounter  no high risk habits or concerns  The above risks/problems have been discussed with the patient.  Follow up actions/plans if indicated are seen below in the Assessment/Plan Section.  Pertinent information has been shared with the patient in the After Visit Summary.    Diagnoses and all orders for this visit:    1. Medicare annual wellness visit, subsequent (Primary)        -Upon arrival to the room the patient underwent the Medicare health risk assessment.  Neither the questions themselves or the answers that were given prompted any major concern on the part of the patient or by the medical staff that gave the assessment.  As far as the preventative care examinations and the preventative care immunizations that this patient requires they are as listed below.     Screening tests recommended:    Colonoscopy UTD  Diabetic eye exam  Diabetic foot exam  PSA  Ordered today  Dentist routinely      Immunization:  Influenza   UTD  Prevnar  UTD  Pneumovax  UTD  Tetanus Tdap for daughters pregnancy  Shingles vaccine UTD  Hepatitis UTD  Covid     UTD        2. Previous vitamin D deficiency        - We will recheck his vitamin D level today and replace if needed.    3. Type 2 diabetes        - We will check his A1c today and make adjustments in his medicines if needed.    4. Mixed hyperlipidemia        - We will check his lipid panel today and make adjustments in his medicines if needed.    5. Essential hypertension        - Blood pressure today was very acceptable.         - We will continue current medications and he will continue to monitor outside the office.    6. Screening for prostate cancer        - We will have a PSA done today and we will follow up on that only if needed.    7. Screening for hypothyroidism        - We will check TSH and T4 for screening purposes and if needs replacement, we will start that.    8. Hepatitis C screening        - This will be done today.    9.  Pre-ulcerative corn / callous on feet.  Bilat        -  Dr. Piero Chan to see soon.    10. OA feet.  Toe deformities         -  Dr. Piero Chan to see soon.    11. Toe pain ,bilateral.          -  Dr. Piero Chan to see soon.        Follow Up:   Return in about 6 months (around 9/10/2022), or if symptoms worsen or fail to improve, for Recheck.     An After Visit Summary and PPPS were made available to the patient.    Transcribed from ambient dictation for Montana Morrow MD by Luís Parada.  03/10/22   14:33 EST    Patient verbalized consent to the visit recording.  I have personally performed the services described in this document as transcribed by the above individual, and it is both accurate and complete.  Montana Morrow MD  4/20/2022  11:54 EDT

## 2022-03-14 ENCOUNTER — TELEPHONE (OUTPATIENT)
Dept: FAMILY MEDICINE CLINIC | Facility: CLINIC | Age: 77
End: 2022-03-14

## 2022-03-14 NOTE — TELEPHONE ENCOUNTER
You recently put patient on Jardiance for diabetes.  Does he take it in addition to or in place of Metformin?  Leave a message with the answer.

## 2022-03-14 NOTE — TELEPHONE ENCOUNTER
----- Message from Montana Morrow MD sent at 3/12/2022  1:32 PM EST -----  Eloisa tell Alen he needs to tighten up a little bit on his blood sugar and get it to come down some.  To help do that I have called in a medicine called Jardiance he takes once a day and he stays on his glimepiride.  He also needs to be careful with using any pain medicine other than Tylenol.  His kidneys are struggling a little bit to work so use Tylenol if he has aches and pains but not ibuprofen or Naprosyn.

## 2022-03-28 RX ORDER — ARIPIPRAZOLE 2 MG/1
TABLET ORAL
Qty: 90 TABLET | Refills: 3 | Status: SHIPPED | OUTPATIENT
Start: 2022-03-28 | End: 2022-08-29

## 2022-04-01 DIAGNOSIS — M15.9 PRIMARY OSTEOARTHRITIS INVOLVING MULTIPLE JOINTS: ICD-10-CM

## 2022-04-01 RX ORDER — ATORVASTATIN CALCIUM 20 MG/1
TABLET, FILM COATED ORAL
Qty: 90 TABLET | Refills: 3 | Status: SHIPPED | OUTPATIENT
Start: 2022-04-01

## 2022-04-01 RX ORDER — HYDROCODONE BITARTRATE AND ACETAMINOPHEN 10; 325 MG/1; MG/1
TABLET ORAL
Qty: 360 TABLET | Refills: 0 | Status: SHIPPED | OUTPATIENT
Start: 2022-04-01 | End: 2022-06-20 | Stop reason: SDUPTHER

## 2022-04-01 RX ORDER — DICYCLOMINE HYDROCHLORIDE 10 MG/1
CAPSULE ORAL
Qty: 540 CAPSULE | Refills: 4 | Status: SHIPPED | OUTPATIENT
Start: 2022-04-01

## 2022-04-01 RX ORDER — DULOXETIN HYDROCHLORIDE 30 MG/1
CAPSULE, DELAYED RELEASE ORAL
Qty: 270 CAPSULE | Refills: 3 | Status: SHIPPED | OUTPATIENT
Start: 2022-04-01

## 2022-04-15 DIAGNOSIS — F98.8 ATTENTION DEFICIT DISORDER (ADD) WITHOUT HYPERACTIVITY: ICD-10-CM

## 2022-04-19 RX ORDER — DEXTROAMPHETAMINE SACCHARATE, AMPHETAMINE ASPARTATE MONOHYDRATE, DEXTROAMPHETAMINE SULFATE AND AMPHETAMINE SULFATE 5; 5; 5; 5 MG/1; MG/1; MG/1; MG/1
20 CAPSULE, EXTENDED RELEASE ORAL EVERY MORNING
Qty: 90 CAPSULE | Refills: 0 | Status: SHIPPED | OUTPATIENT
Start: 2022-04-19 | End: 2022-06-20 | Stop reason: SDUPTHER

## 2022-04-20 PROBLEM — L84 PRE-ULCERATIVE CORN OR CALLOUS: Status: ACTIVE | Noted: 2022-04-20

## 2022-04-20 PROBLEM — M79.674 TOE PAIN, BILATERAL: Status: ACTIVE | Noted: 2022-04-20

## 2022-04-20 PROBLEM — M79.675 TOE PAIN, BILATERAL: Status: ACTIVE | Noted: 2022-04-20

## 2022-04-20 PROBLEM — B35.1 TINEA UNGUIUM: Status: ACTIVE | Noted: 2022-04-20

## 2022-05-05 RX ORDER — CELECOXIB 200 MG/1
CAPSULE ORAL
Qty: 90 CAPSULE | Refills: 1 | Status: SHIPPED | OUTPATIENT
Start: 2022-05-05 | End: 2022-08-29

## 2022-05-09 ENCOUNTER — TELEPHONE (OUTPATIENT)
Dept: FAMILY MEDICINE CLINIC | Facility: CLINIC | Age: 77
End: 2022-05-09

## 2022-05-09 NOTE — TELEPHONE ENCOUNTER
Caller: ROBY ANDREWS    Relationship: Emergency Contact    Best call back number: [phone]    What medications are you currently taking:   Current Outpatient Medications on File Prior to Visit   Medication Sig Dispense Refill   • amphetamine-dextroamphetamine XR (Adderall XR) 20 MG 24 hr capsule Take 1 capsule by mouth Every Morning for 30 days 90 capsule 0   • ARIPiprazole (ABILIFY) 2 MG tablet TAKE 1 TABLET DAILY 90 tablet 3   • atorvastatin (LIPITOR) 20 MG tablet TAKE 1 TABLET DAILY 90 tablet 3   • Carafate 1 GM/10ML suspension TAKE 10 ML THREE TIMES A DAY AS NEEDED FOR HEARTBURN/ABDOMINAL PAIN 2700 mL 3   • celecoxib (CeleBREX) 200 MG capsule TAKE 1 CAPSULE DAILY 90 capsule 1   • dicyclomine (BENTYL) 10 MG capsule TAKE 1 TO 2 CAPSULES EVERY 6 HOURS AS NEEDED FOR ABDOMINAL PAIN AND CRAMPING 540 capsule 4   • DULoxetine (CYMBALTA) 30 MG capsule TAKE 3 CAPSULES DAILY 270 capsule 3   • glimepiride (AMARYL) 2 MG tablet Take 3 tablet every morning before breakfast 270 tablet 3   • HYDROcodone-acetaminophen (NORCO)  MG per tablet Take 1 tablet every 4 hours prn MAX 4 tablets /day 360 tablet 0   • metFORMIN (GLUCOPHAGE) 500 MG tablet TAKE 2 TABLETS TWICE A  tablet 3   • pantoprazole (PROTONIX) 40 MG EC tablet Take 1 tablet by mouth Daily. 90 tablet 1   • trandolapril-verapamil (TARKA) 2-240 MG per CR tablet Take 1 tablet by mouth 2 (Two) Times a Day for 90 days. 180 tablet 3     No current facility-administered medications on file prior to visit.          When did you start taking these medications:    Which medication are you concerned about: JARDIANCE AND GLIMEPERIDE    Who prescribed you this medication: DR MCCRARY    What are your concerns: STATED THAT HE IS STILL TAKING THE GLIMEPERIDE WITH THE JARDIANCE AS WELL AS METFORMIN , IS HE SUPPOSED TO TAKE THESE MEDICATION TOGETHER? DOES THIS MEDICATION CAUSE EXTREME FATIGUE,( JARDIANCE) ?      How long have you had these concerns: A MONTH

## 2022-05-09 NOTE — TELEPHONE ENCOUNTER
Patients usually stay on all three but its dependant on what his sugars are reading.   He needs to get some fasting sugars and sugars before his evening meal and write these down for us.   We can see if he is getting too low and maybe that's why he is tired??

## 2022-05-12 ENCOUNTER — TELEPHONE (OUTPATIENT)
Dept: FAMILY MEDICINE CLINIC | Facility: CLINIC | Age: 77
End: 2022-05-12

## 2022-06-03 RX ORDER — TRANDOLAPRIL AND VERAPAMIL HYDROCHLORIDE 2; 240 MG/1; MG/1
1 TABLET, FILM COATED, EXTENDED RELEASE ORAL 2 TIMES DAILY
Qty: 180 TABLET | Refills: 3 | Status: SHIPPED | OUTPATIENT
Start: 2022-06-03 | End: 2022-08-08 | Stop reason: SDUPTHER

## 2022-06-20 DIAGNOSIS — F98.8 ATTENTION DEFICIT DISORDER (ADD) WITHOUT HYPERACTIVITY: ICD-10-CM

## 2022-06-20 DIAGNOSIS — M15.9 PRIMARY OSTEOARTHRITIS INVOLVING MULTIPLE JOINTS: ICD-10-CM

## 2022-06-21 RX ORDER — HYDROCODONE BITARTRATE AND ACETAMINOPHEN 10; 325 MG/1; MG/1
TABLET ORAL
Qty: 360 TABLET | Refills: 0 | Status: SHIPPED | OUTPATIENT
Start: 2022-06-21 | End: 2022-09-14 | Stop reason: SDUPTHER

## 2022-06-21 RX ORDER — DEXTROAMPHETAMINE SACCHARATE, AMPHETAMINE ASPARTATE MONOHYDRATE, DEXTROAMPHETAMINE SULFATE AND AMPHETAMINE SULFATE 5; 5; 5; 5 MG/1; MG/1; MG/1; MG/1
20 CAPSULE, EXTENDED RELEASE ORAL EVERY MORNING
Qty: 90 CAPSULE | Refills: 0 | Status: SHIPPED | OUTPATIENT
Start: 2022-06-21 | End: 2022-10-10 | Stop reason: SDUPTHER

## 2022-08-01 ENCOUNTER — TELEPHONE (OUTPATIENT)
Dept: FAMILY MEDICINE CLINIC | Facility: CLINIC | Age: 77
End: 2022-08-01

## 2022-08-01 ENCOUNTER — LAB (OUTPATIENT)
Dept: FAMILY MEDICINE CLINIC | Facility: CLINIC | Age: 77
End: 2022-08-01

## 2022-08-01 DIAGNOSIS — Z79.4 TYPE 2 DIABETES MELLITUS WITHOUT COMPLICATION, WITH LONG-TERM CURRENT USE OF INSULIN: Primary | ICD-10-CM

## 2022-08-01 DIAGNOSIS — Z79.4 TYPE 2 DIABETES MELLITUS WITHOUT COMPLICATION, WITH LONG-TERM CURRENT USE OF INSULIN: ICD-10-CM

## 2022-08-01 DIAGNOSIS — E11.9 TYPE 2 DIABETES MELLITUS WITHOUT COMPLICATION, WITH LONG-TERM CURRENT USE OF INSULIN: ICD-10-CM

## 2022-08-01 DIAGNOSIS — E11.9 TYPE 2 DIABETES MELLITUS WITHOUT COMPLICATION, WITH LONG-TERM CURRENT USE OF INSULIN: Primary | ICD-10-CM

## 2022-08-01 LAB — HBA1C MFR BLD: 7.2 % (ref 3.5–5.6)

## 2022-08-01 PROCEDURE — 36415 COLL VENOUS BLD VENIPUNCTURE: CPT

## 2022-08-01 PROCEDURE — 84443 ASSAY THYROID STIM HORMONE: CPT | Performed by: FAMILY MEDICINE

## 2022-08-01 PROCEDURE — 85025 COMPLETE CBC W/AUTO DIFF WBC: CPT | Performed by: FAMILY MEDICINE

## 2022-08-01 PROCEDURE — 83036 HEMOGLOBIN GLYCOSYLATED A1C: CPT | Performed by: FAMILY MEDICINE

## 2022-08-01 PROCEDURE — 81003 URINALYSIS AUTO W/O SCOPE: CPT | Performed by: FAMILY MEDICINE

## 2022-08-01 PROCEDURE — 80053 COMPREHEN METABOLIC PANEL: CPT | Performed by: FAMILY MEDICINE

## 2022-08-01 NOTE — TELEPHONE ENCOUNTER
Spoke with patient's wife and scheduled a [facility] lab appt for today at 3:10pm.

## 2022-08-01 NOTE — TELEPHONE ENCOUNTER
Caller: ROBY ANDREWS    Relationship: Emergency Contact    Best call back number: [phone]    What orders are you requesting (i.e. lab or imaging): PATIENT'S DAUGHTER SPOKE TO DR. MCCRARY YESTERDAY AND RECOMMENDED THE PATIENT COME IN FOR LAB WORK DUE TO HIM NOT FEELING WELL AND HAVING KIDNEY FAILURE     In what timeframe would the patient need to come in: ASAP    Where will you receive your lab/imaging services: OFFICE     Additional notes: REQUESTED CALLBACK TO SCHEDULE LAB APPOINTMENT

## 2022-08-02 LAB
ALBUMIN SERPL-MCNC: 4 G/DL (ref 3.5–5.2)
ALBUMIN/GLOB SERPL: 1.6 G/DL
ALP SERPL-CCNC: 71 U/L (ref 39–117)
ALT SERPL W P-5'-P-CCNC: 17 U/L (ref 1–41)
ANION GAP SERPL CALCULATED.3IONS-SCNC: 11.6 MMOL/L (ref 5–15)
AST SERPL-CCNC: 16 U/L (ref 1–40)
BASOPHILS # BLD AUTO: 0.03 10*3/MM3 (ref 0–0.2)
BASOPHILS NFR BLD AUTO: 0.3 % (ref 0–1.5)
BILIRUB SERPL-MCNC: <0.2 MG/DL (ref 0–1.2)
BILIRUB UR QL STRIP: NEGATIVE
BUN SERPL-MCNC: 37 MG/DL (ref 8–23)
BUN/CREAT SERPL: 23.7 (ref 7–25)
CALCIUM SPEC-SCNC: 9.2 MG/DL (ref 8.6–10.5)
CHLORIDE SERPL-SCNC: 99 MMOL/L (ref 98–107)
CLARITY UR: CLEAR
CO2 SERPL-SCNC: 27.4 MMOL/L (ref 22–29)
COLOR UR: YELLOW
CREAT SERPL-MCNC: 1.56 MG/DL (ref 0.76–1.27)
DEPRECATED RDW RBC AUTO: 42.9 FL (ref 37–54)
EGFRCR SERPLBLD CKD-EPI 2021: 45.2 ML/MIN/1.73
EOSINOPHIL # BLD AUTO: 0.15 10*3/MM3 (ref 0–0.4)
EOSINOPHIL NFR BLD AUTO: 1.5 % (ref 0.3–6.2)
ERYTHROCYTE [DISTWIDTH] IN BLOOD BY AUTOMATED COUNT: 12.4 % (ref 12.3–15.4)
GLOBULIN UR ELPH-MCNC: 2.5 GM/DL
GLUCOSE SERPL-MCNC: 144 MG/DL (ref 65–99)
GLUCOSE UR STRIP-MCNC: ABNORMAL MG/DL
HCT VFR BLD AUTO: 37 % (ref 37.5–51)
HGB BLD-MCNC: 12 G/DL (ref 13–17.7)
HGB UR QL STRIP.AUTO: NEGATIVE
IMM GRANULOCYTES # BLD AUTO: 0.05 10*3/MM3 (ref 0–0.05)
IMM GRANULOCYTES NFR BLD AUTO: 0.5 % (ref 0–0.5)
KETONES UR QL STRIP: ABNORMAL
LEUKOCYTE ESTERASE UR QL STRIP.AUTO: NEGATIVE
LYMPHOCYTES # BLD AUTO: 3.16 10*3/MM3 (ref 0.7–3.1)
LYMPHOCYTES NFR BLD AUTO: 32.6 % (ref 19.6–45.3)
MCH RBC QN AUTO: 30.5 PG (ref 26.6–33)
MCHC RBC AUTO-ENTMCNC: 32.4 G/DL (ref 31.5–35.7)
MCV RBC AUTO: 93.9 FL (ref 79–97)
MONOCYTES # BLD AUTO: 0.77 10*3/MM3 (ref 0.1–0.9)
MONOCYTES NFR BLD AUTO: 7.9 % (ref 5–12)
NEUTROPHILS NFR BLD AUTO: 5.54 10*3/MM3 (ref 1.7–7)
NEUTROPHILS NFR BLD AUTO: 57.2 % (ref 42.7–76)
NITRITE UR QL STRIP: NEGATIVE
NRBC BLD AUTO-RTO: 0.1 /100 WBC (ref 0–0.2)
PH UR STRIP.AUTO: 5.5 [PH] (ref 5–8)
PLATELET # BLD AUTO: 239 10*3/MM3 (ref 140–450)
PMV BLD AUTO: 10.2 FL (ref 6–12)
POTASSIUM SERPL-SCNC: 5 MMOL/L (ref 3.5–5.2)
PROT SERPL-MCNC: 6.5 G/DL (ref 6–8.5)
PROT UR QL STRIP: NEGATIVE
RBC # BLD AUTO: 3.94 10*6/MM3 (ref 4.14–5.8)
SODIUM SERPL-SCNC: 138 MMOL/L (ref 136–145)
SP GR UR STRIP: >=1.03 (ref 1–1.03)
TSH SERPL DL<=0.05 MIU/L-ACNC: 1.75 UIU/ML (ref 0.27–4.2)
UROBILINOGEN UR QL STRIP: ABNORMAL
WBC NRBC COR # BLD: 9.7 10*3/MM3 (ref 3.4–10.8)

## 2022-08-08 ENCOUNTER — TELEPHONE (OUTPATIENT)
Dept: FAMILY MEDICINE CLINIC | Facility: CLINIC | Age: 77
End: 2022-08-08

## 2022-08-08 RX ORDER — TRANDOLAPRIL AND VERAPAMIL HYDROCHLORIDE 2; 240 MG/1; MG/1
1 TABLET, FILM COATED, EXTENDED RELEASE ORAL 2 TIMES DAILY
Qty: 180 TABLET | Refills: 3 | Status: SHIPPED | OUTPATIENT
Start: 2022-08-08 | End: 2023-01-19 | Stop reason: SDUPTHER

## 2022-08-08 NOTE — TELEPHONE ENCOUNTER
Eloisa GARZA sent the following message on patient's recent lab results -     Alen should be about due to come in and see us.  We will go over labs then.    When can he be seen?

## 2022-08-29 ENCOUNTER — OFFICE VISIT (OUTPATIENT)
Dept: FAMILY MEDICINE CLINIC | Facility: CLINIC | Age: 77
End: 2022-08-29

## 2022-08-29 VITALS
SYSTOLIC BLOOD PRESSURE: 110 MMHG | HEIGHT: 71 IN | WEIGHT: 150 LBS | OXYGEN SATURATION: 97 % | HEART RATE: 89 BPM | TEMPERATURE: 97.1 F | RESPIRATION RATE: 16 BRPM | BODY MASS INDEX: 21 KG/M2 | DIASTOLIC BLOOD PRESSURE: 68 MMHG

## 2022-08-29 DIAGNOSIS — E11.9 TYPE 2 DIABETES MELLITUS WITHOUT COMPLICATION, WITH LONG-TERM CURRENT USE OF INSULIN: ICD-10-CM

## 2022-08-29 DIAGNOSIS — K58.9 IRRITABLE BOWEL SYNDROME WITHOUT DIARRHEA: ICD-10-CM

## 2022-08-29 DIAGNOSIS — Z79.4 TYPE 2 DIABETES MELLITUS WITHOUT COMPLICATION, WITH LONG-TERM CURRENT USE OF INSULIN: ICD-10-CM

## 2022-08-29 DIAGNOSIS — M54.12 CERVICAL RADICULOPATHY: ICD-10-CM

## 2022-08-29 DIAGNOSIS — R35.0 BENIGN PROSTATIC HYPERPLASIA WITH URINARY FREQUENCY: ICD-10-CM

## 2022-08-29 DIAGNOSIS — N40.1 BENIGN PROSTATIC HYPERPLASIA WITH URINARY FREQUENCY: ICD-10-CM

## 2022-08-29 DIAGNOSIS — M54.41 CHRONIC BILATERAL LOW BACK PAIN WITH RIGHT-SIDED SCIATICA: ICD-10-CM

## 2022-08-29 DIAGNOSIS — G89.29 CHRONIC BILATERAL LOW BACK PAIN WITH RIGHT-SIDED SCIATICA: ICD-10-CM

## 2022-08-29 DIAGNOSIS — I10 PRIMARY HYPERTENSION: Primary | ICD-10-CM

## 2022-08-29 DIAGNOSIS — E78.2 MIXED HYPERLIPIDEMIA: ICD-10-CM

## 2022-08-29 PROCEDURE — 99214 OFFICE O/P EST MOD 30 MIN: CPT | Performed by: FAMILY MEDICINE

## 2022-08-29 RX ORDER — GLIMEPIRIDE 2 MG/1
TABLET ORAL
Qty: 270 TABLET | Refills: 3 | Status: SHIPPED | OUTPATIENT
Start: 2022-08-29

## 2022-08-29 RX ORDER — PANTOPRAZOLE SODIUM 40 MG/1
TABLET, DELAYED RELEASE ORAL
Qty: 90 TABLET | Refills: 3 | Status: SHIPPED | OUTPATIENT
Start: 2022-08-29

## 2022-08-29 RX ORDER — ARIPIPRAZOLE 5 MG/1
5 TABLET ORAL DAILY
Qty: 90 TABLET | Refills: 3 | Status: SHIPPED | OUTPATIENT
Start: 2022-08-29 | End: 2022-11-03

## 2022-09-04 PROBLEM — H20.019 PRIMARY IRIDOCYCLITIS: Status: ACTIVE | Noted: 2018-01-24

## 2022-09-14 DIAGNOSIS — M15.9 PRIMARY OSTEOARTHRITIS INVOLVING MULTIPLE JOINTS: ICD-10-CM

## 2022-09-14 RX ORDER — HYDROCODONE BITARTRATE AND ACETAMINOPHEN 10; 325 MG/1; MG/1
TABLET ORAL
Qty: 360 TABLET | Refills: 0 | Status: SHIPPED | OUTPATIENT
Start: 2022-09-14 | End: 2022-12-08 | Stop reason: SDUPTHER

## 2022-09-26 ENCOUNTER — TELEPHONE (OUTPATIENT)
Dept: FAMILY MEDICINE CLINIC | Facility: CLINIC | Age: 77
End: 2022-09-26

## 2022-09-26 NOTE — TELEPHONE ENCOUNTER
Caller: ROBY ANDREWS    Relationship: Emergency Contact    Best call back number:[phone]       What is the best time to reach you: ANYTIME     Who are you requesting to speak with (clinical staff, provider,  specific staff member): CLINICAL STAFF       What was the call regarding: PATIENT IS WANTING TO KNOW IF IT TIME FOR HIM TO GET THE 4TH COVID BOOSTER OR DOES HE NEED TO WAIT AND GET THE VARIANT COVID BOOSTER? PATIENT WOULD ALSO LIKE TO KNOW IF IT TIME TO GET HIS FLU VACCINE AS WELL.       Do you require a callback: YES

## 2022-09-26 NOTE — TELEPHONE ENCOUNTER
Left detailed message on patient's voice mail at 1:57pm.    HUB TO SHARE    Yes get flu shot.  Wait on covid shot.  If they want the flu shot here, then call back to schedule for our flu shot clinic on evening of 10/5.

## 2022-10-03 DIAGNOSIS — R53.81 PHYSICAL DECONDITIONING: Primary | ICD-10-CM

## 2022-10-07 ENCOUNTER — TELEPHONE (OUTPATIENT)
Dept: FAMILY MEDICINE CLINIC | Facility: CLINIC | Age: 77
End: 2022-10-07

## 2022-10-07 NOTE — TELEPHONE ENCOUNTER
Caller: ROBY ANDREWS    Relationship to patient: Emergency Contact    Best call back number: [phone]    Chief complaint: CHECKUP FOR KIDNEY FUNCTION    Type of visit: OFFICE    Requested date: 11/3 AROUND 4 PM     Additional notes: PATIENT WOULD LIKE TO COME IN AT THE SAME TIME AS HIS WIFE ON 11/3. CURRENTLY NO OPENINGS, PLEASE ADVISE IF HE CAN BE SEEN AS WELL.

## 2022-10-10 DIAGNOSIS — F98.8 ATTENTION DEFICIT DISORDER (ADD) WITHOUT HYPERACTIVITY: ICD-10-CM

## 2022-10-11 RX ORDER — DEXTROAMPHETAMINE SACCHARATE, AMPHETAMINE ASPARTATE MONOHYDRATE, DEXTROAMPHETAMINE SULFATE AND AMPHETAMINE SULFATE 5; 5; 5; 5 MG/1; MG/1; MG/1; MG/1
20 CAPSULE, EXTENDED RELEASE ORAL EVERY MORNING
Qty: 90 CAPSULE | Refills: 0 | Status: SHIPPED | OUTPATIENT
Start: 2022-10-11 | End: 2023-01-09 | Stop reason: SDUPTHER

## 2022-10-27 ENCOUNTER — LAB (OUTPATIENT)
Dept: FAMILY MEDICINE CLINIC | Facility: CLINIC | Age: 77
End: 2022-10-27

## 2022-10-27 DIAGNOSIS — E11.9 TYPE 2 DIABETES MELLITUS WITHOUT COMPLICATION, WITH LONG-TERM CURRENT USE OF INSULIN: ICD-10-CM

## 2022-10-27 DIAGNOSIS — E11.9 TYPE 2 DIABETES MELLITUS WITHOUT COMPLICATION, WITH LONG-TERM CURRENT USE OF INSULIN: Primary | ICD-10-CM

## 2022-10-27 DIAGNOSIS — Z79.4 TYPE 2 DIABETES MELLITUS WITHOUT COMPLICATION, WITH LONG-TERM CURRENT USE OF INSULIN: ICD-10-CM

## 2022-10-27 DIAGNOSIS — Z79.4 TYPE 2 DIABETES MELLITUS WITHOUT COMPLICATION, WITH LONG-TERM CURRENT USE OF INSULIN: Primary | ICD-10-CM

## 2022-10-27 LAB
ALBUMIN SERPL-MCNC: 4 G/DL (ref 3.5–5.2)
ALBUMIN/GLOB SERPL: 1.4 G/DL
ALP SERPL-CCNC: 95 U/L (ref 39–117)
ALT SERPL W P-5'-P-CCNC: 32 U/L (ref 1–41)
ANION GAP SERPL CALCULATED.3IONS-SCNC: 9.4 MMOL/L (ref 5–15)
AST SERPL-CCNC: 25 U/L (ref 1–40)
BILIRUB SERPL-MCNC: 0.2 MG/DL (ref 0–1.2)
BUN SERPL-MCNC: 38 MG/DL (ref 8–23)
BUN/CREAT SERPL: 28.8 (ref 7–25)
CALCIUM SPEC-SCNC: 9.1 MG/DL (ref 8.6–10.5)
CHLORIDE SERPL-SCNC: 99 MMOL/L (ref 98–107)
CO2 SERPL-SCNC: 27.6 MMOL/L (ref 22–29)
CREAT SERPL-MCNC: 1.32 MG/DL (ref 0.76–1.27)
EGFRCR SERPLBLD CKD-EPI 2021: 54.9 ML/MIN/1.73
GLOBULIN UR ELPH-MCNC: 2.9 GM/DL
GLUCOSE SERPL-MCNC: 109 MG/DL (ref 65–99)
POTASSIUM SERPL-SCNC: 4.4 MMOL/L (ref 3.5–5.2)
PROT SERPL-MCNC: 6.9 G/DL (ref 6–8.5)
SODIUM SERPL-SCNC: 136 MMOL/L (ref 136–145)

## 2022-10-27 PROCEDURE — 80053 COMPREHEN METABOLIC PANEL: CPT | Performed by: FAMILY MEDICINE

## 2022-10-27 PROCEDURE — 36415 COLL VENOUS BLD VENIPUNCTURE: CPT

## 2022-11-03 ENCOUNTER — OFFICE VISIT (OUTPATIENT)
Dept: FAMILY MEDICINE CLINIC | Facility: CLINIC | Age: 77
End: 2022-11-03

## 2022-11-03 VITALS — DIASTOLIC BLOOD PRESSURE: 62 MMHG | HEART RATE: 85 BPM | OXYGEN SATURATION: 99 % | SYSTOLIC BLOOD PRESSURE: 106 MMHG

## 2022-11-03 DIAGNOSIS — E78.2 MIXED HYPERLIPIDEMIA: ICD-10-CM

## 2022-11-03 DIAGNOSIS — Z79.4 TYPE 2 DIABETES MELLITUS WITHOUT COMPLICATION, WITH LONG-TERM CURRENT USE OF INSULIN: Primary | ICD-10-CM

## 2022-11-03 DIAGNOSIS — E11.9 TYPE 2 DIABETES MELLITUS WITHOUT COMPLICATION, WITH LONG-TERM CURRENT USE OF INSULIN: Primary | ICD-10-CM

## 2022-11-03 DIAGNOSIS — R63.4 WEIGHT LOSS, NON-INTENTIONAL: ICD-10-CM

## 2022-11-03 DIAGNOSIS — M15.9 PRIMARY OSTEOARTHRITIS INVOLVING MULTIPLE JOINTS: ICD-10-CM

## 2022-11-03 DIAGNOSIS — I10 PRIMARY HYPERTENSION: ICD-10-CM

## 2022-11-03 PROCEDURE — 99214 OFFICE O/P EST MOD 30 MIN: CPT | Performed by: FAMILY MEDICINE

## 2022-11-03 RX ORDER — ARIPIPRAZOLE 10 MG/1
10 TABLET ORAL DAILY
Qty: 30 TABLET | Refills: 11 | Status: SHIPPED | OUTPATIENT
Start: 2022-11-03 | End: 2022-11-22 | Stop reason: SDUPTHER

## 2022-11-03 NOTE — PROGRESS NOTES
"Chief Complaint  Chronic Kidney Disease and Diabetes    Subjective      Lux Bray presents to [facility]  History of Present Illness  For follow up on CKD.  Chronic Kidney Disease    Diabetes    The patient presents today for follow-up. He is accompanied by his wife.     The patient reports that he places concern about his blood glucose levels. The patient states that his appetite is well. He adds that he eats pretty healthy. The patient states he does not exercise like he use to. He adds that he does not need his cane. The patient reports that he is still experiencing issues with his shoulder. He states he experiences pain in the morning hours and uses a cold compress to relieve the pain. He adds that he does not have the energy that he once had. He agrees he is taking Cymbalta and Abilify.  The patient states he has lost weight over the last 6 months. He notes that his last colonoscopy was approximately 2 years ago.     Objective   Vital Signs:  /62 (BP Location: Right arm, Cuff Size: Adult)   Pulse 85   SpO2 99%   Estimated body mass index is 20.93 kg/m² as calculated from the following:    Height as of 8/29/22: 180.3 cm (70.98\").    Weight as of 8/29/22: 68 kg (150 lb).    BMI is within normal parameters. No other follow-up for BMI required.      Physical Exam  Constitutional:       Appearance: Normal appearance. He is well-developed and normal weight.   HENT:      Head: Normocephalic and atraumatic.      Right Ear: Tympanic membrane, ear canal and external ear normal.      Left Ear: Tympanic membrane, ear canal and external ear normal.      Nose: Nose normal.      Mouth/Throat:      Mouth: Mucous membranes are moist.      Pharynx: Oropharynx is clear. No oropharyngeal exudate.   Eyes:      Extraocular Movements: Extraocular movements intact.      Conjunctiva/sclera: Conjunctivae normal.      Pupils: Pupils are equal, round, and reactive to light.   Cardiovascular: "      Rate and Rhythm: Normal rate and regular rhythm.      Pulses: Normal pulses.      Heart sounds: Normal heart sounds.   Pulmonary:      Effort: Pulmonary effort is normal.      Breath sounds: Normal breath sounds.   Abdominal:      General: Bowel sounds are normal.      Palpations: Abdomen is soft.   Musculoskeletal:         General: Normal range of motion.      Cervical back: Normal range of motion and neck supple.   Skin:     General: Skin is warm and dry.   Neurological:      General: No focal deficit present.      Mental Status: He is alert and oriented to person, place, and time. Mental status is at baseline.   Psychiatric:         Mood and Affect: Mood normal.         Behavior: Behavior normal.         Thought Content: Thought content normal.         Judgment: Judgment normal.          Assessment and Plan     #1--Type 2 diabetes mellitus without complications (HCC) - Primary   Alen came in today for a checkup and review of his ongoing medical concerns.  Alen has type 2 diabetes and keeps his sugars under reasonable control with glimepiride metformin and Jardiance.  Alen is thin so weight is not an issue for him.  About 3 months ago his A1c was 7.2%.  That would indicate reasonably good control of his sugars overall.  He is 77 and I do not want him to think he has to get perfectly normal numbers.  He has been losing weight which is bothersome somewhat him to eat well but just try to keep his carbs to a minimum.  I want him to be a little bit more active with walking.  I am afraid he is becoming a little too inactive both because of pain in his back and just mood changes.  We will recheck his numbers in a few months.    Relevant Orders   Hemoglobin A1c   Comprehensive Metabolic Panel              #2--Hypertension    Alen's blood pressures been excellent.  Even as an office readings are perfect.  I want him to stay on the same medicines and continue staying as active as he possibly can.  Relevant Orders     Hemoglobin A1c    Comprehensive Metabolic Panel    #3--Hyperlipidemia    Alen's lipids have been high in the past but over the past few years he has had excellent control.  He is on atorvastatin and follows a low-carb diet.  His weight is ideal.  His last total cholesterol was 143 with an HDL of 56 and an LDL of 74.  Triglycerides were 66.  He is doing a great job controlling these numbers.    Relevant Orders    Hemoglobin A1c    Comprehensive Metabolic Panel           #4--Weight loss, non-intentional  She has had a worrisome weight loss over the past year.  He is lost somewhere around 15 to 20 pounds unintentionally.  Were going to keep track of it and see how he does over the next 6 months but he may need to have a more extensive work-up.  He has had colonoscopies and EGDs done before.  His labs have never pointed to a another more ominous reason for weight loss.  We will watch carefully and encouraged him to try to increase his calorie intake.  Alen is been under a lot of stress with family and may be this is a stress-induced of weight loss.           #5--Osteoarthritis  Alen has chronic pain for which she takes pain medicine at times.  Most of it in his back but he also feels in his hips and knees.  X-rays have shown degenerative disc disease degenerative joint disease and he just gets stiff and painful most mornings.  I do not think he is dealing with inflammatory arthritis but just wear-and-tear osteoarthritis.  Staying active using heat and taking his medications seems to be the key to comfort.            I spent 31 minutes caring for Lux on this date of service. This time includes time spent by me in the following activities:preparing for the visit, reviewing tests, obtaining and/or reviewing a separately obtained history, performing a medically appropriate examination and/or evaluation , counseling and educating the patient/family/caregiver, ordering medications, tests, or procedures, referring and  communicating with other health care professionals , documenting information in the medical record, independently interpreting results and communicating that information with the patient/family/caregiver and care coordination       Follow Up Return in about 6 months (around 5/3/2023), or if symptoms worsen or fail to improve, for Recheck.  Patient was given instructions and counseling regarding his condition or for health maintenance advice. Please see specific information pulled into the AVS if appropriate.     .Transcribed from ambient dictation for Montana Morrow MD by Erma Jackson.  11/03/22   18:28 EDT    Patient or patient representative verbalized consent to the visit recording.  I have personally performed the services described in this document as transcribed by the above individual, and it is both accurate and complete.  Montana Morrow MD  11/6/2022  12:23 CHERYL Jackson

## 2022-11-07 ENCOUNTER — TELEPHONE (OUTPATIENT)
Dept: FAMILY MEDICINE CLINIC | Facility: CLINIC | Age: 77
End: 2022-11-07

## 2022-11-07 NOTE — PROGRESS NOTES
"Chief Complaint  Chronic Kidney Disease and Diabetes    Subjective      Lux Brya presents to [facility]  History of Present Illness  For follow up on CKD.  Chronic Kidney Disease    Diabetes    The patient presents today for follow-up. He is accompanied by his wife.     The patient reports that he places concern about his blood glucose levels. The patient states that his appetite is well. He adds that he eats pretty healthy. The patient states he does not exercise like he use to. He adds that he does not need his cane. The patient reports that he is still experiencing issues with his shoulder. He states he experiences pain in the morning hours and uses a cold compress to relieve the pain. He adds that he does not have the energy that he once had. He agrees he is taking Cymbalta and Abilify.  The patient states he has lost weight over the last 6 months. He notes that his last colonoscopy was approximately 2 years ago.     Objective   Vital Signs:  /62 (BP Location: Right arm, Cuff Size: Adult)   Pulse 85   SpO2 99%   Estimated body mass index is 20.93 kg/m² as calculated from the following:    Height as of 8/29/22: 180.3 cm (70.98\").    Weight as of 8/29/22: 68 kg (150 lb).    BMI is within normal parameters. No other follow-up for BMI required.      Physical Exam  Constitutional:       Appearance: Normal appearance. He is well-developed and normal weight.   HENT:      Head: Normocephalic and atraumatic.      Right Ear: Tympanic membrane, ear canal and external ear normal.      Left Ear: Tympanic membrane, ear canal and external ear normal.      Nose: Nose normal.      Mouth/Throat:      Mouth: Mucous membranes are moist.      Pharynx: Oropharynx is clear. No oropharyngeal exudate.   Eyes:      Extraocular Movements: Extraocular movements intact.      Conjunctiva/sclera: Conjunctivae normal.      Pupils: Pupils are equal, round, and reactive to light.   Cardiovascular: "      Rate and Rhythm: Normal rate and regular rhythm.      Pulses: Normal pulses.      Heart sounds: Normal heart sounds.   Pulmonary:      Effort: Pulmonary effort is normal.      Breath sounds: Normal breath sounds.   Abdominal:      General: Bowel sounds are normal.      Palpations: Abdomen is soft.   Musculoskeletal:         General: Normal range of motion.      Cervical back: Normal range of motion and neck supple.   Skin:     General: Skin is warm and dry.   Neurological:      General: No focal deficit present.      Mental Status: He is alert and oriented to person, place, and time. Mental status is at baseline.   Psychiatric:         Mood and Affect: Mood normal.         Behavior: Behavior normal.         Thought Content: Thought content normal.         Judgment: Judgment normal.          Assessment and Plan     #1--Type 2 diabetes mellitus without complications (HCC) - Primary   Alen came in today for a checkup and review of his ongoing medical concerns.  Alen has type 2 diabetes and keeps his sugars under reasonable control with glimepiride metformin and Jardiance.  Alen is thin so weight is not an issue for him.  About 3 months ago his A1c was 7.2%.  That would indicate reasonably good control of his sugars overall.  He is 77 and I do not want him to think he has to get perfectly normal numbers.  He has been losing weight which is bothersome somewhat him to eat well but just try to keep his carbs to a minimum.  I want him to be a little bit more active with walking.  I am afraid he is becoming a little too inactive both because of pain in his back and just mood changes.  We will recheck his numbers in a few months.    Relevant Orders   Hemoglobin A1c   Comprehensive Metabolic Panel              #2--Hypertension    Alen's blood pressures been excellent.  Even as an office readings are perfect.  I want him to stay on the same medicines and continue staying as active as he possibly can.  Relevant Orders     Hemoglobin A1c    Comprehensive Metabolic Panel    #3--Hyperlipidemia    Alen's lipids have been high in the past but over the past few years he has had excellent control.  He is on atorvastatin and follows a low-carb diet.  His weight is ideal.  His last total cholesterol was 143 with an HDL of 56 and an LDL of 74.  Triglycerides were 66.  He is doing a great job controlling these numbers.    Relevant Orders    Hemoglobin A1c    Comprehensive Metabolic Panel           #4--Weight loss, non-intentional  She has had a worrisome weight loss over the past year.  He is lost somewhere around 15 to 20 pounds unintentionally.  Were going to keep track of it and see how he does over the next 6 months but he may need to have a more extensive work-up.  He has had colonoscopies and EGDs done before.  His labs have never pointed to a another more ominous reason for weight loss.  We will watch carefully and encouraged him to try to increase his calorie intake.  Alen is been under a lot of stress with family and may be this is a stress-induced of weight loss.           #5--Osteoarthritis  Alen has chronic pain for which she takes pain medicine at times.  Most of it in his back but he also feels in his hips and knees.  X-rays have shown degenerative disc disease degenerative joint disease and he just gets stiff and painful most mornings.  I do not think he is dealing with inflammatory arthritis but just wear-and-tear osteoarthritis.  Staying active using heat and taking his medications seems to be the key to comfort.     1. Primary hypertension  - Alen's blood pressure today in the office was 106/62 mmHg. He is on Tarka 2/240 mg and takes one twice a day and that has controlled his blood pressure quite well. He will continue this medication and will continue monitoring.    2. Mixed hyperlipidemia  - The patient's lipids are controlled with Lipitor 20 mg and he does a low carbohydrate diet. He seems to do very well with this, and his  last lipid panel was done in 03/2022 and it was normal with total cholesterol of 143 mg/dL, triglycerides 66 mmol/L, HDL 56 mmol/L, and LDL 74 mmol/L. He will continue the atorvastatin.    3. Type 2 diabetes without major complications  - The patient's last A1c was 7.2 percent. He is going to do another one tomorrow morning and will see if it is not improving. Our goal is to get his A1c down to 6.5 percent. I think at that level, he would have reasonably good control.    4. Weight loss, non-intentional  - The patient has lost somewhere around 15 to 20 pounds of weight over the past 6 months. This was non-intended and he hopes he is leveling off now. If he does not, we are going to need to do a more thorough evaluation, although I have no really good reason right now for him to lose weight. His last colonoscopy was several years ago, but because of his age, they are not interested in doing another one and he did not have anything wrong at that time. He is not coughing. He is not showing signs of any particular organ dysfunction, so finding a reason for his weight loss would be a long and rather complicated evaluation. We will wait 4 months and see if the weight loss continues. If it levels off, we are not going to worry about it, but if he continues to lose weight, we will have to keep looking for a treatable reason.       I spent 31 minutes caring for Lux on this date of service. This time includes time spent by me in the following activities:preparing for the visit, reviewing tests, obtaining and/or reviewing a separately obtained history, performing a medically appropriate examination and/or evaluation , counseling and educating the patient/family/caregiver, ordering medications, tests, or procedures, referring and communicating with other health care professionals , documenting information in the medical record, independently interpreting results and communicating that information with the  patient/family/caregiver and care coordination       Follow Up Return in about 6 months (around 5/3/2023), or if symptoms worsen or fail to improve, for Recheck.  Patient was given instructions and counseling regarding his condition or for health maintenance advice. Please see specific information pulled into the AVS if appropriate.     .Transcribed from ambient dictation for Montana Morrow MD by Pavithra Caicedo.  11/03/22   18:28 EDT    Patient or patient representative verbalized consent to the visit recording.  I have personally performed the services described in this document as transcribed by the above individual, and it is both accurate and complete.  Montana Morrow MD  11/7/2022  12:23 CHERYL Jackson

## 2022-11-07 NOTE — TELEPHONE ENCOUNTER
Caller: ROBY ANDREWS    Relationship: Emergency Contact    Best call back number: [phone]     What was the call regarding: PATIENTS SPOUSE CALLING STATING THAT HIS PRESCRIPTION FOR empagliflozin (JARDIANCE) 10 MG tablet tablet  WAS CALLED INTO [facility] WHEN IT SHOULD HAVE BEEN CALLED INTO [facility] SHE STATED THEY ARE TRYING TO CHARGE $519 FOR THE MEDICATION SHE WOULD LIKE TO MAKE SURE THIS MEDICATION IS COVERED BY INSURANCE AS WELL.

## 2022-11-08 ENCOUNTER — LAB (OUTPATIENT)
Dept: LAB | Facility: HOSPITAL | Age: 77
End: 2022-11-08

## 2022-11-08 LAB
ALBUMIN SERPL-MCNC: 4 G/DL (ref 3.5–5.2)
ALBUMIN/GLOB SERPL: 1.3 G/DL
ALP SERPL-CCNC: 96 U/L (ref 39–117)
ALT SERPL W P-5'-P-CCNC: 30 U/L (ref 1–41)
ANION GAP SERPL CALCULATED.3IONS-SCNC: 7.9 MMOL/L (ref 5–15)
AST SERPL-CCNC: 23 U/L (ref 1–40)
BILIRUB SERPL-MCNC: 0.3 MG/DL (ref 0–1.2)
BUN SERPL-MCNC: 31 MG/DL (ref 8–23)
BUN/CREAT SERPL: 25.4 (ref 7–25)
CALCIUM SPEC-SCNC: 9.8 MG/DL (ref 8.6–10.5)
CHLORIDE SERPL-SCNC: 104 MMOL/L (ref 98–107)
CO2 SERPL-SCNC: 28.1 MMOL/L (ref 22–29)
CREAT SERPL-MCNC: 1.22 MG/DL (ref 0.76–1.27)
EGFRCR SERPLBLD CKD-EPI 2021: 60.3 ML/MIN/1.73
GLOBULIN UR ELPH-MCNC: 3.1 GM/DL
GLUCOSE SERPL-MCNC: 85 MG/DL (ref 65–99)
HBA1C MFR BLD: 6.8 % (ref 3.5–5.6)
POTASSIUM SERPL-SCNC: 5.1 MMOL/L (ref 3.5–5.2)
PROT SERPL-MCNC: 7.1 G/DL (ref 6–8.5)
SODIUM SERPL-SCNC: 140 MMOL/L (ref 136–145)

## 2022-11-08 PROCEDURE — 80053 COMPREHEN METABOLIC PANEL: CPT | Performed by: FAMILY MEDICINE

## 2022-11-08 PROCEDURE — 36415 COLL VENOUS BLD VENIPUNCTURE: CPT | Performed by: FAMILY MEDICINE

## 2022-11-08 PROCEDURE — 83036 HEMOGLOBIN GLYCOSYLATED A1C: CPT | Performed by: FAMILY MEDICINE

## 2022-11-09 NOTE — TELEPHONE ENCOUNTER
Caller: JULIANA ROBY    Relationship: Emergency Contact    Best call back number: [phone]    Requested Prescriptions:   Requested Prescriptions     Pending Prescriptions Disp Refills   • empagliflozin (JARDIANCE) 10 MG tablet tablet 90 tablet 1     Sig: Take 1 tablet by mouth Daily.     Signed Prescriptions Disp Refills   • empagliflozin (JARDIANCE) 10 MG tablet tablet 90 tablet 1     Sig: Take 1 tablet by mouth Daily.     Authorizing Provider: NOELLE MCCRARY     Ordering User: CORIN BARRETT        Pharmacy where request should be sent: [facility] [address] [phone] [facility] [phone]      Additional details provided by patient: PATIENT HAS AT LEAST A SEVEN DAY SUPPLY TO GET MORE IN    Does the patient have less than a 3 day supply:  [] Yes  [x] No    [place] Keen Rep   11/09/22 11:39 EST

## 2022-11-22 RX ORDER — ARIPIPRAZOLE 10 MG/1
10 TABLET ORAL DAILY
Qty: 90 TABLET | Refills: 1 | Status: SHIPPED | OUTPATIENT
Start: 2022-11-22 | End: 2022-11-23

## 2022-11-23 ENCOUNTER — TELEPHONE (OUTPATIENT)
Dept: FAMILY MEDICINE CLINIC | Facility: CLINIC | Age: 77
End: 2022-11-23

## 2022-11-23 RX ORDER — ARIPIPRAZOLE 5 MG/1
5 TABLET ORAL DAILY
Qty: 90 TABLET | Refills: 1 | Status: SHIPPED | OUTPATIENT
Start: 2022-11-23 | End: 2022-12-23

## 2022-11-23 NOTE — TELEPHONE ENCOUNTER
Patient's wife called to let you know that patient has been taking Abilify 2mg until yesterday.  As of yesterday 11/22/2022, patient started taking 5mg.  They do not want the 10mg that was sent in.

## 2022-12-08 DIAGNOSIS — M15.9 PRIMARY OSTEOARTHRITIS INVOLVING MULTIPLE JOINTS: ICD-10-CM

## 2022-12-08 RX ORDER — HYDROCODONE BITARTRATE AND ACETAMINOPHEN 10; 325 MG/1; MG/1
TABLET ORAL
Qty: 360 TABLET | Refills: 0 | Status: SHIPPED | OUTPATIENT
Start: 2022-12-08 | End: 2023-03-02 | Stop reason: SDUPTHER

## 2023-01-09 DIAGNOSIS — F98.8 ATTENTION DEFICIT DISORDER (ADD) WITHOUT HYPERACTIVITY: ICD-10-CM

## 2023-01-09 RX ORDER — DEXTROAMPHETAMINE SACCHARATE, AMPHETAMINE ASPARTATE MONOHYDRATE, DEXTROAMPHETAMINE SULFATE AND AMPHETAMINE SULFATE 5; 5; 5; 5 MG/1; MG/1; MG/1; MG/1
20 CAPSULE, EXTENDED RELEASE ORAL EVERY MORNING
Qty: 90 CAPSULE | Refills: 0 | Status: CANCELLED | OUTPATIENT
Start: 2023-01-09 | End: 2023-02-08

## 2023-01-10 RX ORDER — DEXTROAMPHETAMINE SACCHARATE, AMPHETAMINE ASPARTATE MONOHYDRATE, DEXTROAMPHETAMINE SULFATE AND AMPHETAMINE SULFATE 5; 5; 5; 5 MG/1; MG/1; MG/1; MG/1
20 CAPSULE, EXTENDED RELEASE ORAL EVERY MORNING
Qty: 90 CAPSULE | Refills: 0 | Status: SHIPPED | OUTPATIENT
Start: 2023-01-10 | End: 2023-03-31 | Stop reason: SDUPTHER

## 2023-01-19 RX ORDER — TRANDOLAPRIL AND VERAPAMIL HYDROCHLORIDE 2; 240 MG/1; MG/1
1 TABLET, FILM COATED, EXTENDED RELEASE ORAL 2 TIMES DAILY
Qty: 180 TABLET | Refills: 3 | Status: SHIPPED | OUTPATIENT
Start: 2023-01-19 | End: 2023-02-28 | Stop reason: SDUPTHER

## 2023-02-11 RX ORDER — SULFAMETHOXAZOLE AND TRIMETHOPRIM 800; 160 MG/1; MG/1
1 TABLET ORAL 2 TIMES DAILY
Qty: 14 TABLET | Refills: 1 | Status: SHIPPED | OUTPATIENT
Start: 2023-02-11 | End: 2023-02-18

## 2023-02-11 RX ORDER — PHENAZOPYRIDINE HYDROCHLORIDE 100 MG/1
100 TABLET, FILM COATED ORAL 3 TIMES DAILY PRN
Qty: 6 TABLET | Refills: 2 | Status: SHIPPED | OUTPATIENT
Start: 2023-02-11 | End: 2023-02-13

## 2023-02-22 ENCOUNTER — LAB (OUTPATIENT)
Dept: FAMILY MEDICINE CLINIC | Facility: CLINIC | Age: 78
End: 2023-02-22
Payer: MEDICARE

## 2023-02-22 ENCOUNTER — OFFICE VISIT (OUTPATIENT)
Dept: FAMILY MEDICINE CLINIC | Facility: CLINIC | Age: 78
End: 2023-02-22
Payer: MEDICARE

## 2023-02-22 VITALS
HEART RATE: 88 BPM | SYSTOLIC BLOOD PRESSURE: 103 MMHG | WEIGHT: 146.6 LBS | OXYGEN SATURATION: 100 % | BODY MASS INDEX: 20.52 KG/M2 | TEMPERATURE: 96.6 F | DIASTOLIC BLOOD PRESSURE: 64 MMHG | RESPIRATION RATE: 20 BRPM | HEIGHT: 71 IN

## 2023-02-22 DIAGNOSIS — Z12.5 ENCOUNTER FOR SCREENING FOR MALIGNANT NEOPLASM OF PROSTATE: ICD-10-CM

## 2023-02-22 DIAGNOSIS — Z79.4 TYPE 2 DIABETES MELLITUS WITHOUT COMPLICATION, WITH LONG-TERM CURRENT USE OF INSULIN: ICD-10-CM

## 2023-02-22 DIAGNOSIS — E11.9 TYPE 2 DIABETES MELLITUS WITHOUT COMPLICATION, WITH LONG-TERM CURRENT USE OF INSULIN: ICD-10-CM

## 2023-02-22 DIAGNOSIS — F98.8 ATTENTION DEFICIT DISORDER (ADD) WITHOUT HYPERACTIVITY: ICD-10-CM

## 2023-02-22 DIAGNOSIS — G25.3 FOCAL MYOCLONUS: Primary | ICD-10-CM

## 2023-02-22 DIAGNOSIS — I10 PRIMARY HYPERTENSION: ICD-10-CM

## 2023-02-22 DIAGNOSIS — E78.2 MIXED HYPERLIPIDEMIA: ICD-10-CM

## 2023-02-22 LAB
ALBUMIN SERPL-MCNC: 4.2 G/DL (ref 3.5–5.2)
ALBUMIN/GLOB SERPL: 1.6 G/DL
ALP SERPL-CCNC: 80 U/L (ref 39–117)
ALT SERPL W P-5'-P-CCNC: 15 U/L (ref 1–41)
ANION GAP SERPL CALCULATED.3IONS-SCNC: 8.2 MMOL/L (ref 5–15)
AST SERPL-CCNC: 18 U/L (ref 1–40)
BASOPHILS # BLD AUTO: 0.02 10*3/MM3 (ref 0–0.2)
BASOPHILS NFR BLD AUTO: 0.2 % (ref 0–1.5)
BILIRUB SERPL-MCNC: <0.2 MG/DL (ref 0–1.2)
BILIRUB UR QL STRIP: NEGATIVE
BUN SERPL-MCNC: 34 MG/DL (ref 8–23)
BUN/CREAT SERPL: 24.5 (ref 7–25)
CALCIUM SPEC-SCNC: 9.7 MG/DL (ref 8.6–10.5)
CHLORIDE SERPL-SCNC: 102 MMOL/L (ref 98–107)
CHOLEST SERPL-MCNC: 129 MG/DL (ref 0–200)
CLARITY UR: CLEAR
CO2 SERPL-SCNC: 28.8 MMOL/L (ref 22–29)
COLOR UR: YELLOW
CREAT SERPL-MCNC: 1.39 MG/DL (ref 0.76–1.27)
DEPRECATED RDW RBC AUTO: 40.1 FL (ref 37–54)
EGFRCR SERPLBLD CKD-EPI 2021: 51.6 ML/MIN/1.73
EOSINOPHIL # BLD AUTO: 0.15 10*3/MM3 (ref 0–0.4)
EOSINOPHIL NFR BLD AUTO: 1.3 % (ref 0.3–6.2)
ERYTHROCYTE [DISTWIDTH] IN BLOOD BY AUTOMATED COUNT: 12.2 % (ref 12.3–15.4)
GLOBULIN UR ELPH-MCNC: 2.6 GM/DL
GLUCOSE SERPL-MCNC: 99 MG/DL (ref 65–99)
GLUCOSE UR STRIP-MCNC: ABNORMAL MG/DL
HBA1C MFR BLD: 7.3 % (ref 3.5–5.6)
HCT VFR BLD AUTO: 38.8 % (ref 37.5–51)
HDLC SERPL-MCNC: 75 MG/DL (ref 40–60)
HGB BLD-MCNC: 12.8 G/DL (ref 13–17.7)
HGB UR QL STRIP.AUTO: NEGATIVE
IMM GRANULOCYTES # BLD AUTO: 0.03 10*3/MM3 (ref 0–0.05)
IMM GRANULOCYTES NFR BLD AUTO: 0.3 % (ref 0–0.5)
KETONES UR QL STRIP: NEGATIVE
LDLC SERPL CALC-MCNC: 36 MG/DL (ref 0–100)
LDLC/HDLC SERPL: 0.45 {RATIO}
LEUKOCYTE ESTERASE UR QL STRIP.AUTO: NEGATIVE
LYMPHOCYTES # BLD AUTO: 2.52 10*3/MM3 (ref 0.7–3.1)
LYMPHOCYTES NFR BLD AUTO: 22.3 % (ref 19.6–45.3)
MCH RBC QN AUTO: 29.8 PG (ref 26.6–33)
MCHC RBC AUTO-ENTMCNC: 33 G/DL (ref 31.5–35.7)
MCV RBC AUTO: 90.4 FL (ref 79–97)
MONOCYTES # BLD AUTO: 0.7 10*3/MM3 (ref 0.1–0.9)
MONOCYTES NFR BLD AUTO: 6.2 % (ref 5–12)
NEUTROPHILS NFR BLD AUTO: 69.7 % (ref 42.7–76)
NEUTROPHILS NFR BLD AUTO: 7.9 10*3/MM3 (ref 1.7–7)
NITRITE UR QL STRIP: NEGATIVE
NRBC BLD AUTO-RTO: 0 /100 WBC (ref 0–0.2)
PH UR STRIP.AUTO: 5.5 [PH] (ref 5–8)
PLATELET # BLD AUTO: 224 10*3/MM3 (ref 140–450)
PMV BLD AUTO: 10.1 FL (ref 6–12)
POTASSIUM SERPL-SCNC: 4.9 MMOL/L (ref 3.5–5.2)
PROT SERPL-MCNC: 6.8 G/DL (ref 6–8.5)
PROT UR QL STRIP: ABNORMAL
PSA SERPL-MCNC: 1.19 NG/ML (ref 0–4)
RBC # BLD AUTO: 4.29 10*6/MM3 (ref 4.14–5.8)
SODIUM SERPL-SCNC: 139 MMOL/L (ref 136–145)
SP GR UR STRIP: 1.03 (ref 1–1.03)
T4 FREE SERPL-MCNC: 1.19 NG/DL (ref 0.93–1.7)
TRIGL SERPL-MCNC: 102 MG/DL (ref 0–150)
TSH SERPL DL<=0.05 MIU/L-ACNC: 2.05 UIU/ML (ref 0.27–4.2)
UROBILINOGEN UR QL STRIP: ABNORMAL
VIT B12 BLD-MCNC: 401 PG/ML (ref 211–946)
VLDLC SERPL-MCNC: 18 MG/DL (ref 5–40)
WBC NRBC COR # BLD: 11.32 10*3/MM3 (ref 3.4–10.8)

## 2023-02-22 PROCEDURE — G0103 PSA SCREENING: HCPCS | Performed by: FAMILY MEDICINE

## 2023-02-22 PROCEDURE — 84439 ASSAY OF FREE THYROXINE: CPT | Performed by: FAMILY MEDICINE

## 2023-02-22 PROCEDURE — 82607 VITAMIN B-12: CPT | Performed by: FAMILY MEDICINE

## 2023-02-22 PROCEDURE — 80053 COMPREHEN METABOLIC PANEL: CPT | Performed by: FAMILY MEDICINE

## 2023-02-22 PROCEDURE — 85025 COMPLETE CBC W/AUTO DIFF WBC: CPT | Performed by: FAMILY MEDICINE

## 2023-02-22 PROCEDURE — 84443 ASSAY THYROID STIM HORMONE: CPT | Performed by: FAMILY MEDICINE

## 2023-02-22 PROCEDURE — 80061 LIPID PANEL: CPT | Performed by: FAMILY MEDICINE

## 2023-02-22 PROCEDURE — 81003 URINALYSIS AUTO W/O SCOPE: CPT | Performed by: FAMILY MEDICINE

## 2023-02-22 PROCEDURE — 99213 OFFICE O/P EST LOW 20 MIN: CPT | Performed by: FAMILY MEDICINE

## 2023-02-22 PROCEDURE — 36415 COLL VENOUS BLD VENIPUNCTURE: CPT | Performed by: FAMILY MEDICINE

## 2023-02-22 PROCEDURE — 83036 HEMOGLOBIN GLYCOSYLATED A1C: CPT | Performed by: FAMILY MEDICINE

## 2023-02-22 NOTE — PROGRESS NOTES
"Chief Complaint  Tremors (For about a year patient has been having trouble with tremors.Tremors affect the right side more than the left for about a month the tremors have gotten worse)    Subjective      Lux Bray presents to [facility]  History of Present Illness    The patient presents today for evaluation of tremors in his bilateral lower extremities.    The patient states that he has been experiencing worsening involuntary tremors in his bilateral lower extremities for approximately 1 year, worse in his right foot. He notes his symptoms are exacerbated when he tries to move, like getting into a car and lifting his right foot, the tremors occur quite frequently. The patient states that sometimes at night or early in the morning, his symptoms will occur and usually in a pattern series. He states his tremor will resolve after about 3 minutes and it usually helps if he tries to calm down. He notes that he has had tremors for several years, but the involuntary jerking movements in his right foot began occurring in the past year. The patient also notes an intermittent \"electric current\" sensation in his right foot that is independent of the tremor. He was concerned he may have a serious condition. He denies having any symptoms in his bilateral upper extremities.    Objective   Vital Signs:  /64   Pulse 88   Temp 96.6 °F (35.9 °C) (Infrared)   Resp 20   Ht 180.3 cm (70.98\")   Wt 66.5 kg (146 lb 9.6 oz)   SpO2 100%   BMI 20.46 kg/m²   Estimated body mass index is 20.46 kg/m² as calculated from the following:    Height as of this encounter: 180.3 cm (70.98\").    Weight as of this encounter: 66.5 kg (146 lb 9.6 oz).       BMI is within normal parameters. No other follow-up for BMI required.      Physical Exam  Constitutional:       Appearance: Normal appearance. He is well-developed and normal weight.   HENT:      Head: Normocephalic and atraumatic.      Right Ear: " Tympanic membrane, ear canal and external ear normal.      Left Ear: Tympanic membrane, ear canal and external ear normal.      Nose: Nose normal.      Mouth/Throat:      Mouth: Mucous membranes are moist.      Pharynx: Oropharynx is clear. No oropharyngeal exudate.   Eyes:      Extraocular Movements: Extraocular movements intact.      Conjunctiva/sclera: Conjunctivae normal.      Pupils: Pupils are equal, round, and reactive to light.   Cardiovascular:      Rate and Rhythm: Normal rate and regular rhythm.      Pulses: Normal pulses.      Heart sounds: Normal heart sounds.   Pulmonary:      Effort: Pulmonary effort is normal.      Breath sounds: Normal breath sounds.   Abdominal:      General: Bowel sounds are normal.      Palpations: Abdomen is soft.   Musculoskeletal:         General: Normal range of motion.      Cervical back: Normal range of motion and neck supple.   Skin:     General: Skin is warm and dry.   Neurological:      General: No focal deficit present.      Mental Status: He is alert and oriented to person, place, and time. Mental status is at baseline.      Comments: Myoclonic jerking at times.  Right leg.   Psychiatric:         Mood and Affect: Mood normal.         Behavior: Behavior normal.         Thought Content: Thought content normal.         Judgment: Judgment normal.          Assessment and Plan   1. Focal myoclonus  - Lux came in today because of some concern he has had with both tremors in his legs and now more recently some muscle jerking that occurs particularly when he is using his right leg, such as lifting it up to get it into the car when he sits or getting out of the car when he is moving from a seat to a stand. He has had the tremors for several years in both legs, which we assumed was just an exaggerated essential tremor. He does not have any in his upper extremities and it is not progressing like any type of parkinsonian concern. His upper extremities are basically quiet. His  lower extremities, he has some tremoring at times. The myoclonic jerks have just been going on this past year and they seem to be a little worse. They are associated with some sensation of electricity in the foot, tingling sensation in the foot at times and it can go up his leg even above his knee. Strength seems to be okay. He is able to walk. He is able to get from a sit to a stand without any difficulty. He walks reasonably well. When he does turn to rotate, he does take small steps as he turns around, small shuffling steps to get himself turned around just so that he can maintain balance. On examination, he has good reflexes, motor and sensory are normal. I think this is a benign focal myoclonus involving mainly his right leg. I am going to do some lab tests because he is due for some checks on his blood sugars and his thyroid anyway, but for right now, I think we are going to observe how he does over the next 6 months to a year. If his symptoms progress, the other leg becomes involved or if for some reason the upper extremities start showing either tremor or myoclonus, then he is going to need to talk to a neurologist. I think it is unlikely for this to occur, but those will be the symptoms we will be monitoring him for.         Follow Up Return in about 3 months (around 5/22/2023) for Medicare Wellness.  Patient was given instructions and counseling regarding his condition or for health maintenance advice. Please see specific information pulled into the AVS if appropriate.       Transcribed from ambient dictation for Montana Morrow MD by Ness Haywood.  02/22/23   09:28 EST    Patient or patient representative verbalized consent to the visit recording.  I have personally performed the services described in this document as transcribed by the above individual, and it is both accurate and complete.  Montana Morrow MD  2/27/2023  07:14 EST

## 2023-02-23 NOTE — PROGRESS NOTES
Karyn tell Alen that his labs look reasonably normal.  Tell him to keep up a low-carb diet and to stay as active as he can and I think he will do fine.

## 2023-02-28 RX ORDER — TRANDOLAPRIL AND VERAPAMIL HYDROCHLORIDE 2; 240 MG/1; MG/1
1 TABLET, FILM COATED, EXTENDED RELEASE ORAL 2 TIMES DAILY
Qty: 180 TABLET | Refills: 3 | Status: SHIPPED | OUTPATIENT
Start: 2023-02-28 | End: 2023-03-08 | Stop reason: SDUPTHER

## 2023-02-28 NOTE — TELEPHONE ENCOUNTER
Caller: ROBY ANDREWS    Relationship: Emergency Contact    Best call back number: [phone]    Requested Prescriptions:   Requested Prescriptions     Pending Prescriptions Disp Refills   • trandolapril-verapamil (TARKA) 2-240 MG per CR tablet 180 tablet 3     Sig: Take 1 tablet by mouth 2 (Two) Times a Day for 90 days.        Pharmacy where request should be sent: [facility] - [address] [phone] [facility] [phone]      Additional details provided by patient: PATIENT HAS 2 WEEKS LEFT    Does the patient have less than a 3 day supply:  [] Yes  [x] No    Would you like a call back once the refill request has been completed: [x] Yes [] No OK TO LEAVE VOICEMAIL    If the office needs to give you a call back, can they leave a voicemail: [x] Yes [] No    Bay Sharp Rep   02/28/23 11:12 EST

## 2023-03-02 DIAGNOSIS — M15.9 PRIMARY OSTEOARTHRITIS INVOLVING MULTIPLE JOINTS: ICD-10-CM

## 2023-03-02 RX ORDER — HYDROCODONE BITARTRATE AND ACETAMINOPHEN 10; 325 MG/1; MG/1
TABLET ORAL
Qty: 360 TABLET | Refills: 0 | Status: SHIPPED | OUTPATIENT
Start: 2023-03-02 | End: 2023-03-06 | Stop reason: SDUPTHER

## 2023-03-06 ENCOUNTER — TELEPHONE (OUTPATIENT)
Dept: FAMILY MEDICINE CLINIC | Facility: CLINIC | Age: 78
End: 2023-03-06
Payer: MEDICARE

## 2023-03-06 DIAGNOSIS — M15.9 PRIMARY OSTEOARTHRITIS INVOLVING MULTIPLE JOINTS: ICD-10-CM

## 2023-03-06 NOTE — TELEPHONE ENCOUNTER
Caller: YESSICANERIQUE ROBY    Relationship: Emergency Contact    Best call back number: [phone]    Requested Prescriptions:   Requested Prescriptions     Pending Prescriptions Disp Refills   • HYDROcodone-acetaminophen (NORCO)  MG per tablet 360 tablet 0     Sig: Take 1 tablet every 4 hours prn MAX 4 tablets /day        Pharmacy where request should be sent: [facility][id number] - [place], [place] - [address] [facility] - [phone]  - [phone] FX     Additional details provided by patient: HAS 1 WEEK OF MEDS LEFT. [facility] IS OUT OF STOCK     Does the patient have less than a 3 day supply:  [] Yes  [] No    Would you like a call back once the refill request has been completed: [] Yes [] No    If the office needs to give you a call back, can they leave a voicemail: [] Yes [] No    Bay Leblanc Rep   03/06/23 12:19 EST

## 2023-03-08 ENCOUNTER — TELEPHONE (OUTPATIENT)
Dept: FAMILY MEDICINE CLINIC | Facility: CLINIC | Age: 78
End: 2023-03-08
Payer: MEDICARE

## 2023-03-08 RX ORDER — TRANDOLAPRIL AND VERAPAMIL HYDROCHLORIDE 2; 240 MG/1; MG/1
1 TABLET, FILM COATED, EXTENDED RELEASE ORAL 2 TIMES DAILY
Qty: 180 TABLET | Refills: 3 | Status: SHIPPED | OUTPATIENT
Start: 2023-03-08 | End: 2023-03-17

## 2023-03-08 NOTE — TELEPHONE ENCOUNTER
Caller: ROBY ANDREWS    Relationship: Emergency Contact    Best call back number: [phone]    What medications are you currently taking:   Current Outpatient Medications on File Prior to Visit   Medication Sig Dispense Refill   • amphetamine-dextroamphetamine XR (Adderall XR) 20 MG 24 hr capsule Take 1 capsule by mouth Every Morning for 30 days 90 capsule 0   • atorvastatin (LIPITOR) 20 MG tablet TAKE 1 TABLET DAILY 90 tablet 3   • dicyclomine (BENTYL) 10 MG capsule TAKE 1 TO 2 CAPSULES EVERY 6 HOURS AS NEEDED FOR ABDOMINAL PAIN AND CRAMPING 540 capsule 4   • DULoxetine (CYMBALTA) 30 MG capsule TAKE 3 CAPSULES DAILY 270 capsule 3   • empagliflozin (JARDIANCE) 10 MG tablet tablet Take 1 tablet by mouth Daily. 90 tablet 3   • glimepiride (AMARYL) 2 MG tablet TAKE 3 TABLETS EVERY MORNING BEFORE BREAKFAST 270 tablet 3   • HYDROcodone-acetaminophen (NORCO)  MG per tablet Take 1 tablet every 4 hours prn MAX 4 tablets /day 360 tablet 0   • metFORMIN (GLUCOPHAGE) 500 MG tablet TAKE 2 TABLETS TWICE A  tablet 3   • pantoprazole (PROTONIX) 40 MG EC tablet TAKE 1 TABLET DAILY 90 tablet 3   • trandolapril-verapamil (TARKA) 2-240 MG per CR tablet Take 1 tablet by mouth 2 (Two) Times a Day for 90 days. 180 tablet 3     No current facility-administered medications on file prior to visit.        What are your concerns:PATIENT'S WIFE CALLED AND STATED THAT ;[facility] NEED A PRIOR AUTHORIZATION ON TRANDOLAPRIL-VERAPAMIL. THIS NEEDS TO BE A 90 DAY SUPPLY WITH THREE REFILLS.

## 2023-03-08 NOTE — TELEPHONE ENCOUNTER
Caller: ROBY ANDREWS    Relationship to patient: Emergency Contact    Best call back number: [phone]    Patient is needing: PATIENT'S WIFE IS CALLING TO CHECK ON THE STATUS OF THIS MEDICATION REFILL.

## 2023-03-10 RX ORDER — HYDROCODONE BITARTRATE AND ACETAMINOPHEN 10; 325 MG/1; MG/1
TABLET ORAL
Qty: 360 TABLET | Refills: 0 | Status: SHIPPED | OUTPATIENT
Start: 2023-03-10 | End: 2023-03-18

## 2023-03-15 NOTE — TELEPHONE ENCOUNTER
Caller: ROBY ANDREWS    Relationship: Emergency Contact    Best call back number:   ROBY ANDREWS () [phone] (Home)     What was the call regarding:     PATIENT'S MAIL ORDER PHARMACY CANNOT GET THE HYDROCODONE MEDICATION AND IS NEEDING OFFICE TO CALL IN REFILLS TO [facility] PLEASE     Do you require a callback: YES PLEASE       [facility] #[id number] - [place], [place] - [address] - [phone]  - [phone]   [phone]

## 2023-03-16 DIAGNOSIS — M15.9 PRIMARY OSTEOARTHRITIS INVOLVING MULTIPLE JOINTS: ICD-10-CM

## 2023-03-16 RX ORDER — HYDROCODONE BITARTRATE AND ACETAMINOPHEN 10; 325 MG/1; MG/1
TABLET ORAL
Qty: 360 TABLET | Refills: 0 | Status: CANCELLED | OUTPATIENT
Start: 2023-03-16

## 2023-03-16 RX ORDER — HYDROCODONE BITARTRATE AND ACETAMINOPHEN 10; 325 MG/1; MG/1
TABLET ORAL
Qty: 120 TABLET | Refills: 0 | Status: CANCELLED | OUTPATIENT
Start: 2023-03-16

## 2023-03-16 NOTE — TELEPHONE ENCOUNTER
Caller: Lux Bray    Relationship: Self    Best call back number: [phone]     Requested Prescriptions:   Requested Prescriptions     Pending Prescriptions Disp Refills   • HYDROcodone-acetaminophen (NORCO)  MG per tablet 360 tablet 0     Sig: Take 1 tablet every 4 hours prn MAX 4 tablets /day        Pharmacy where request should be sent: [facility][id number] - [place], [place] - [address] [facility] - [phone]  - [phone] FX    Additional details provided by patient: INSURANCE/ RETAIL PHARMACY WILL NOT FILL IN A 90 DAY SUPPLY, IT  HAS TO BE 30 DAYS    PATIENT HAS 1 WEEK LEFT RIGHT NOW     Does the patient have less than a 3 day supply:  [] Yes  [x] No    Would you like a call back once the refill request has been completed: [x] Yes [] No    If the office needs to give you a call back, can they leave a voicemail: [x] Yes [] No    Danay Castro, BRENDA   03/16/23 15:59 EDT

## 2023-03-17 RX ORDER — EMPAGLIFLOZIN 10 MG/1
TABLET, FILM COATED ORAL
Qty: 30 TABLET | Refills: 0 | Status: SHIPPED | OUTPATIENT
Start: 2023-03-17

## 2023-03-17 RX ORDER — TRANDOLAPRIL AND VERAPAMIL HYDROCHLORIDE 2; 240 MG/1; MG/1
TABLET, FILM COATED, EXTENDED RELEASE ORAL
Qty: 180 TABLET | Refills: 0 | Status: SHIPPED | OUTPATIENT
Start: 2023-03-17 | End: 2023-03-18

## 2023-03-17 NOTE — TELEPHONE ENCOUNTER
PATIENT IS REQUESTING A CALL WHEN THIS PRESCRIPTION FOR 30 DAYS IS SENT TO THE PHARMACY.    [phone]

## 2023-03-18 DIAGNOSIS — M15.9 PRIMARY OSTEOARTHRITIS INVOLVING MULTIPLE JOINTS: ICD-10-CM

## 2023-03-18 RX ORDER — HYDROCODONE BITARTRATE AND ACETAMINOPHEN 10; 325 MG/1; MG/1
TABLET ORAL
Qty: 120 TABLET | Refills: 0
Start: 2023-03-18 | End: 2023-03-23 | Stop reason: SDUPTHER

## 2023-03-18 RX ORDER — TRANDOLAPRIL AND VERAPAMIL HYDROCHLORIDE 2; 240 MG/1; MG/1
1 TABLET, FILM COATED, EXTENDED RELEASE ORAL 2 TIMES DAILY
Qty: 60 TABLET | Refills: 11 | Status: SHIPPED | OUTPATIENT
Start: 2023-03-18 | End: 2023-04-17

## 2023-03-23 DIAGNOSIS — M15.9 PRIMARY OSTEOARTHRITIS INVOLVING MULTIPLE JOINTS: ICD-10-CM

## 2023-03-23 RX ORDER — HYDROCODONE BITARTRATE AND ACETAMINOPHEN 10; 325 MG/1; MG/1
TABLET ORAL
Qty: 120 TABLET | Refills: 0
Start: 2023-03-23 | End: 2023-03-23 | Stop reason: SDUPTHER

## 2023-03-23 RX ORDER — HYDROCODONE BITARTRATE AND ACETAMINOPHEN 10; 325 MG/1; MG/1
TABLET ORAL
Qty: 120 TABLET | Refills: 0 | Status: SHIPPED | OUTPATIENT
Start: 2023-03-23

## 2023-03-24 ENCOUNTER — TELEPHONE (OUTPATIENT)
Dept: FAMILY MEDICINE CLINIC | Facility: CLINIC | Age: 78
End: 2023-03-24
Payer: MEDICARE

## 2023-03-24 NOTE — TELEPHONE ENCOUNTER
Caller: ROBY ANDREWS    Relationship: Emergency Contact    Best call back number: [phone]    What is the best time to reach you: ANYTIME    Who are you requesting to speak with (clinical staff, provider,  specific staff member): CLINICAL STAFF    Do you know the name of the person who called: PATIENT     What was the call regarding: PATIENT'S WIFE CALLED TO CHECK ON STATUS OF THE HYDROCODONE PRESCRIPTION, SHE SAID THAT [facility] IS STILL WAITING ON THE APPROVAL FROM DR. MCCRARY TO REFILL THIS     SHE SAID SHE UNDERSTOOD THAT DR. MCCRARY APPROVED IT ON 03/18 BUT [facility] SAID THEY NEVER RECEIVED IT AND SHE SAID YESTERDAY [facility] CALLED THE OFFICE AND WAS TOLD IT IS STILL WAITING FOR APPROVAL FROM DR. MCCRARY AND SHE IS NOT SURE WHY IF IT WAS APPROVED ON 03/18 IT IS STILL WAITING ON DR. MCCRARY     Do you require a callback: YES

## 2023-03-24 NOTE — TELEPHONE ENCOUNTER
I called pharmacy and it was bagged and ready    I called bria  She hadnt checked with them since yesterday at 2 pm    I let her know that it was resent at 3:20  After  Valeria had to talk to pharmacist     All of the multiple calls cause delays

## 2023-03-31 DIAGNOSIS — F98.8 ATTENTION DEFICIT DISORDER (ADD) WITHOUT HYPERACTIVITY: ICD-10-CM

## 2023-03-31 RX ORDER — DEXTROAMPHETAMINE SACCHARATE, AMPHETAMINE ASPARTATE MONOHYDRATE, DEXTROAMPHETAMINE SULFATE AND AMPHETAMINE SULFATE 5; 5; 5; 5 MG/1; MG/1; MG/1; MG/1
20 CAPSULE, EXTENDED RELEASE ORAL EVERY MORNING
Qty: 90 CAPSULE | Refills: 0 | Status: SHIPPED | OUTPATIENT
Start: 2023-03-31 | End: 2023-04-30

## 2023-03-31 RX ORDER — SUCRALFATE 1 G/10ML
SUSPENSION ORAL
Qty: 2700 ML | Refills: 3 | Status: SHIPPED | OUTPATIENT
Start: 2023-03-31

## 2023-04-14 DIAGNOSIS — M15.9 PRIMARY OSTEOARTHRITIS INVOLVING MULTIPLE JOINTS: ICD-10-CM

## 2023-04-15 RX ORDER — HYDROCODONE BITARTRATE AND ACETAMINOPHEN 10; 325 MG/1; MG/1
TABLET ORAL
Qty: 120 TABLET | Refills: 0 | Status: SHIPPED | OUTPATIENT
Start: 2023-04-15

## 2023-04-18 ENCOUNTER — TELEPHONE (OUTPATIENT)
Dept: FAMILY MEDICINE CLINIC | Facility: CLINIC | Age: 78
End: 2023-04-18
Payer: MEDICARE

## 2023-04-18 RX ORDER — SUCRALFATE ORAL 1 G/10ML
SUSPENSION ORAL
Qty: 2700 ML | Refills: 3 | Status: SHIPPED | OUTPATIENT
Start: 2023-04-18

## 2023-04-18 RX ORDER — ARIPIPRAZOLE 5 MG/1
TABLET ORAL
COMMUNITY
Start: 2023-02-03

## 2023-04-18 NOTE — TELEPHONE ENCOUNTER
Caller: ROBY ANDREWS    Relationship: Emergency Contact    Best call back number: [phone]    What medication are you requesting: SUCRALFATE     What are your current symptoms: N/A    How long have you been experiencing symptoms: N/A    Have you had these symptoms before:    [x] Yes  [] No    Have you been treated for these symptoms before:   [x] Yes  [] No    If a prescription is needed, what is your preferred pharmacy and phone number: [facility] #[id number] - [place], [place] - [address] [facility] [address] [facility] [address] - [phone]  - [phone]      Additional notes:  PATIENT'S WIFE CALLED SAID INSURANCE WONT COVER CARAFATE BUT THEY WILL COVER SUCRALFATE

## 2023-05-04 RX ORDER — ARIPIPRAZOLE 5 MG/1
TABLET ORAL
Qty: 90 TABLET | Refills: 3 | Status: SHIPPED | OUTPATIENT
Start: 2023-05-04

## 2023-05-08 DIAGNOSIS — M15.9 PRIMARY OSTEOARTHRITIS INVOLVING MULTIPLE JOINTS: ICD-10-CM

## 2023-05-08 RX ORDER — DICYCLOMINE HYDROCHLORIDE 10 MG/1
CAPSULE ORAL
Qty: 540 CAPSULE | Refills: 3 | Status: SHIPPED | OUTPATIENT
Start: 2023-05-08

## 2023-05-08 RX ORDER — DULOXETIN HYDROCHLORIDE 30 MG/1
CAPSULE, DELAYED RELEASE ORAL
Qty: 270 CAPSULE | Refills: 3 | Status: SHIPPED | OUTPATIENT
Start: 2023-05-08

## 2023-05-08 RX ORDER — ATORVASTATIN CALCIUM 20 MG/1
TABLET, FILM COATED ORAL
Qty: 90 TABLET | Refills: 3 | Status: SHIPPED | OUTPATIENT
Start: 2023-05-08

## 2023-05-09 RX ORDER — HYDROCODONE BITARTRATE AND ACETAMINOPHEN 10; 325 MG/1; MG/1
TABLET ORAL
Qty: 120 TABLET | Refills: 0 | Status: SHIPPED | OUTPATIENT
Start: 2023-05-09

## 2023-05-10 NOTE — TELEPHONE ENCOUNTER
Gave message to patient's wife at 3:45pm.  She said she would try to find something out.  
Needs to contact insurance to see what medication the insurance that is in the same class of medication. Instead of us guessing what will be covered.  
Pharmacy Name: [facility] - [place], [place] - [address] [phone] [facility] [phone]      What medication are you calling in regards to: empagliflozin (JARDIANCE) 10 MG tablet tablet ()    What question does the pharmacy have: MEDICATION IS $500.     RANGEL IS REQUESTING A CALL TO DISCUSS ALTERNATE MEDICATION OR HOW TO GET IT CHEAPER      
[FreeTextEntry1] : Reviewed and reconciled medications, allergies, PMHx, PSHx, SocHx, FMHx.\par \par Pt presents with s/p bilateral tubes placed on 09/27/22 - left ear was infected in the operating room. Pt presents today with mother for 3 week follow up to check tubes. Mother notes pt keeps grabbing at her left ear still. Denies discharge but notes it looks like there is wax coming out of both ears. \par \par Physical Exam -\par Left ear: tube in place and wide open\par Right ear: tube in place and wide open\par \par audio:\par Tymps: Right: LECV 3.4, Left: Type C TPP -117\par left ear improved, but still negative pressure\par \par Plan: \par Audio - results interpreted by Dr. Garcia and reviewed with the patient. Continue to keep both ears dry. Left ear doesn't need any drops. FU 2 months.

## 2023-05-26 ENCOUNTER — LAB (OUTPATIENT)
Dept: FAMILY MEDICINE CLINIC | Facility: CLINIC | Age: 78
End: 2023-05-26

## 2023-05-26 ENCOUNTER — OFFICE VISIT (OUTPATIENT)
Dept: FAMILY MEDICINE CLINIC | Facility: CLINIC | Age: 78
End: 2023-05-26
Payer: MEDICARE

## 2023-05-26 VITALS
TEMPERATURE: 98 F | DIASTOLIC BLOOD PRESSURE: 64 MMHG | BODY MASS INDEX: 21.14 KG/M2 | HEART RATE: 85 BPM | RESPIRATION RATE: 15 BRPM | WEIGHT: 151 LBS | SYSTOLIC BLOOD PRESSURE: 93 MMHG | OXYGEN SATURATION: 96 % | HEIGHT: 71 IN

## 2023-05-26 DIAGNOSIS — K21.9 GASTROESOPHAGEAL REFLUX DISEASE WITHOUT ESOPHAGITIS: ICD-10-CM

## 2023-05-26 DIAGNOSIS — R35.0 BENIGN PROSTATIC HYPERPLASIA WITH URINARY FREQUENCY: ICD-10-CM

## 2023-05-26 DIAGNOSIS — N40.1 BENIGN PROSTATIC HYPERPLASIA WITH URINARY FREQUENCY: ICD-10-CM

## 2023-05-26 DIAGNOSIS — M15.9 PRIMARY OSTEOARTHRITIS INVOLVING MULTIPLE JOINTS: ICD-10-CM

## 2023-05-26 DIAGNOSIS — Z00.00 MEDICARE ANNUAL WELLNESS VISIT, SUBSEQUENT: Primary | ICD-10-CM

## 2023-05-26 DIAGNOSIS — G63 POLYNEUROPATHY ASSOCIATED WITH UNDERLYING DISEASE: ICD-10-CM

## 2023-05-26 DIAGNOSIS — Z79.4 TYPE 2 DIABETES MELLITUS WITHOUT COMPLICATION, WITH LONG-TERM CURRENT USE OF INSULIN: ICD-10-CM

## 2023-05-26 DIAGNOSIS — E78.2 MIXED HYPERLIPIDEMIA: ICD-10-CM

## 2023-05-26 DIAGNOSIS — E55.9 VITAMIN D DEFICIENCY, UNSPECIFIED: ICD-10-CM

## 2023-05-26 DIAGNOSIS — I10 PRIMARY HYPERTENSION: ICD-10-CM

## 2023-05-26 DIAGNOSIS — E11.9 TYPE 2 DIABETES MELLITUS WITHOUT COMPLICATION, WITH LONG-TERM CURRENT USE OF INSULIN: ICD-10-CM

## 2023-05-26 PROCEDURE — 82607 VITAMIN B-12: CPT | Performed by: FAMILY MEDICINE

## 2023-05-26 PROCEDURE — 80053 COMPREHEN METABOLIC PANEL: CPT | Performed by: FAMILY MEDICINE

## 2023-05-26 PROCEDURE — 85025 COMPLETE CBC W/AUTO DIFF WBC: CPT | Performed by: FAMILY MEDICINE

## 2023-05-26 PROCEDURE — 81003 URINALYSIS AUTO W/O SCOPE: CPT | Performed by: FAMILY MEDICINE

## 2023-05-26 PROCEDURE — 82306 VITAMIN D 25 HYDROXY: CPT | Performed by: FAMILY MEDICINE

## 2023-05-26 PROCEDURE — 84439 ASSAY OF FREE THYROXINE: CPT | Performed by: FAMILY MEDICINE

## 2023-05-26 PROCEDURE — 80061 LIPID PANEL: CPT | Performed by: FAMILY MEDICINE

## 2023-05-26 PROCEDURE — 84443 ASSAY THYROID STIM HORMONE: CPT | Performed by: FAMILY MEDICINE

## 2023-05-26 PROCEDURE — 36415 COLL VENOUS BLD VENIPUNCTURE: CPT | Performed by: FAMILY MEDICINE

## 2023-05-26 PROCEDURE — 83036 HEMOGLOBIN GLYCOSYLATED A1C: CPT | Performed by: FAMILY MEDICINE

## 2023-05-26 RX ORDER — TRANDOLAPRIL AND VERAPAMIL HYDROCHLORIDE 2; 240 MG/1; MG/1
1 TABLET, FILM COATED, EXTENDED RELEASE ORAL DAILY
Qty: 90 TABLET | Refills: 11
Start: 2023-05-26 | End: 2023-08-24

## 2023-05-26 RX ORDER — PREGABALIN 25 MG/1
CAPSULE ORAL
Qty: 60 CAPSULE | Refills: 2 | Status: SHIPPED | OUTPATIENT
Start: 2023-05-26

## 2023-05-26 NOTE — PROGRESS NOTES
The ABCs of the Annual Wellness Visit  Subsequent Medicare Wellness Visit    Subjective    Lux Bray is a 77 y.o. male who presents for a Subsequent Medicare Wellness Visit.    The following portions of the patient's history were reviewed and   updated as appropriate: allergies, current medications, past family history, past medical history, past social history, past surgical history and problem list.    Compared to one year ago, the patient feels his physical   health is worse.    Compared to one year ago, the patient feels his mental   health is the same.    Recent Hospitalizations:  He was not admitted to the hospital during the last year.       Current Medical Providers:  Patient Care Team:  Montana Morrow MD as PCP - General (Family Medicine)  George Viveros MD as Consulting Physician (Gastroenterology)    Outpatient Medications Prior to Visit   Medication Sig Dispense Refill    ARIPiprazole (ABILIFY) 5 MG tablet TAKE 1 TABLET DAILY 90 tablet 3    atorvastatin (LIPITOR) 20 MG tablet TAKE 1 TABLET DAILY 90 tablet 3    dicyclomine (BENTYL) 10 MG capsule TAKE 1 TO 2 CAPSULES EVERY 6 HOURS AS NEEDED FOR ABDOMINAL PAIN AND CRAMPING 540 capsule 3    DULoxetine (CYMBALTA) 30 MG capsule TAKE 3 CAPSULES DAILY 270 capsule 3    glimepiride (AMARYL) 2 MG tablet TAKE 3 TABLETS EVERY MORNING BEFORE BREAKFAST 270 tablet 3    Jardiance 10 MG tablet tablet TAKE 1 TABLET BY MOUTH EVERY MORNING BEFORE BREAKFAST 30 tablet 0    metFORMIN (GLUCOPHAGE) 500 MG tablet TAKE 2 TABLETS TWICE A  tablet 3    pantoprazole (PROTONIX) 40 MG EC tablet TAKE 1 TABLET DAILY 90 tablet 3    sucralfate (Carafate) 1 GM/10ML suspension TAKE 10 ML THREE TIMES A DAY AS NEEDED FOR HEARTBURN/ABDOMINAL PAIN 2700 mL 3    HYDROcodone-acetaminophen (NORCO)  MG per tablet Take 1 tablet every 4 hours prn MAX 4 tablets /day 120 tablet 0    amphetamine-dextroamphetamine XR (Adderall XR) 20 MG 24 hr capsule Take 1 capsule by mouth Every  Morning for 30 days 90 capsule 0    trandolapril-verapamil (TARKA) 2-240 MG per CR tablet Take 1 tablet by mouth 2 (Two) Times a Day for 30 days. 60 tablet 11     No facility-administered medications prior to visit.       Opioid medication/s are on active medication list.  and I have evaluated his active treatment plan and pain score trends (see table).  There were no vitals filed for this visit.  I have reviewed the chart for potential of high risk medication and harmful drug interactions in the elderly.          Aspirin is not on active medication list.  Aspirin use is not indicated based on review of current medical condition/s. Risk of harm outweighs potential benefits.  .    Patient Active Problem List   Diagnosis    Allergic state    Attention deficit disorder (ADD) without hyperactivity    Cervical radiculopathy    Cholelithiasis    Deficiency of testosterone biosynthesis    Depression    Enlarged prostate with lower urinary tract symptoms (LUTS)    Hypertension    Irritable bowel syndrome without diarrhea    Lumbago with sciatica, right side    Osteoarthritis    Type 2 diabetes mellitus without complications    Vertiginous syndrome    Preventative health care    Vitamin D deficiency, unspecified     Encounter for screening for malignant neoplasm of prostate     Iron deficiency anemia due to chronic blood loss    Medicare annual wellness visit, subsequent    Colon polyp    Gastroesophageal reflux disease without esophagitis    Dysuria    Hyperlipidemia    HL (hearing loss)    Cortical senile cataract    Age-related cataract of both eyes    Screening for prostate cancer    Screening for hypothyroidism    Need for hepatitis C screening test    Toe pain, bilateral    Tinea unguium    Pre-ulcerative corn or callous    Primary iridocyclitis    Weight loss, non-intentional    Focal myoclonus    Polyneuropathy associated with underlying disease     Advance Care Planning   Advance Care Planning     Advance Directive  "is on file.  ACP discussion was held with the patient during this visit. Patient has an advance directive in EMR which is still valid.      Objective    Vitals:    23 1313   BP: 93/64   Pulse: 85   Resp: 15   Temp: 98 °F (36.7 °C)   SpO2: 96%   Weight: 68.5 kg (151 lb)   Height: 180.3 cm (70.98\")     Estimated body mass index is 21.07 kg/m² as calculated from the following:    Height as of this encounter: 180.3 cm (70.98\").    Weight as of this encounter: 68.5 kg (151 lb).    BMI is within normal parameters. No other follow-up for BMI required.      Does the patient have evidence of cognitive impairment? No    Lab Results   Component Value Date    TRIG 71 2023    HDL 66 (H) 2023    LDL 49 2023    VLDL 14 2023    HGBA1C 6.80 (H) 2023        HEALTH RISK ASSESSMENT    Smoking Status:  Social History     Tobacco Use   Smoking Status Former    Packs/day: 0.25    Years: 5.00    Pack years: 1.25    Types: Cigarettes    Quit date: 2/10/1971    Years since quittin.3   Smokeless Tobacco Never     Alcohol Consumption:  Social History     Substance and Sexual Activity   Alcohol Use Yes    Alcohol/week: 2.0 standard drinks    Types: 1 Glasses of wine, 1 Cans of beer per week    Comment: social     Fall Risk Screen:    GYPSY Fall Risk Assessment was completed, and patient is at HIGH risk for falls. Assessment completed on:2023    Depression Screenin/26/2023     1:00 PM   PHQ-2/PHQ-9 Depression Screening   Little Interest or Pleasure in Doing Things 0-->not at all   Feeling Down, Depressed or Hopeless 0-->not at all   PHQ-9: Brief Depression Severity Measure Score 0       Health Habits and Functional and Cognitive Screenin/26/2023     1:00 PM   Functional & Cognitive Status   Do you have difficulty preparing food and eating? No   Do you have difficulty bathing yourself, getting dressed or grooming yourself? No   Do you have difficulty using the toilet? No   Do you " have difficulty moving around from place to place? No   Do you have trouble with steps or getting out of a bed or a chair? No   Current Diet Well Balanced Diet   Dental Exam Up to date   Eye Exam Up to date   Exercise (times per week) 3 times per week   Current Exercises Include Walking;Cardiovascular Workout   Do you need help using the phone?  No   Are you deaf or do you have serious difficulty hearing?  Yes   Do you need help with transportation? No   Do you need help shopping? No   Do you need help preparing meals?  No   Do you need help with housework?  No   Do you need help with laundry? No   Do you need help taking your medications? No   Do you need help managing money? No   Do you ever drive or ride in a car without wearing a seat belt? No   Have you felt unusual stress, anger or loneliness in the last month? No   Who do you live with? Spouse   If you need help, do you have trouble finding someone available to you? No   Have you been bothered in the last four weeks by sexual problems? No   Do you have difficulty concentrating, remembering or making decisions? No       Age-appropriate Screening Schedule:  Refer to the list below for future screening recommendations based on patient's age, sex and/or medical conditions. Orders for these recommended tests are listed in the plan section. The patient has been provided with a written plan.    Health Maintenance   Topic Date Due    TDAP/TD VACCINES (1 - Tdap) Never done    COVID-19 Vaccine (5 - Pfizer series) 01/28/2023    ANNUAL WELLNESS VISIT  03/10/2023    URINE MICROALBUMIN  03/10/2023    INFLUENZA VACCINE  08/01/2023    DIABETIC EYE EXAM  10/31/2023    HEMOGLOBIN A1C  11/26/2023    LIPID PANEL  05/26/2024    COLORECTAL CANCER SCREENING  01/30/2030    HEPATITIS C SCREENING  Completed    Pneumococcal Vaccine 65+  Completed    ZOSTER VACCINE  Completed                  CMS Preventative Services Quick Reference  Risk Factors Identified During Encounter  Fall  Risk-High or Moderate: Discussed Fall Prevention in the home, Information on Fall Prevention Shared in After Visit Summary, and Sit to Stand Exercise Information Shared in After Visit Summary  Inactivity/Sedentary: Patient was advised to exercise at least 150 minutes a week per CDC recommendations.  The above risks/problems have been discussed with the patient.  Pertinent information has been shared with the patient in the After Visit Summary.  An After Visit Summary and PPPS were made available to the patient.    Follow Up:   Next Medicare Wellness visit to be scheduled in 1 year.       Additional E&M Note during same encounter follows:  Patient has multiple medical problems which are significant and separately identifiable that require additional work above and beyond the Medicare Wellness Visit.      Chief Complaint  Medicare Wellness-subsequent    Subjective        HPI  Lux Bray is also being seen today for an annual wellness visit. He is accompanied by an adult female.    The patient complains of continued neuropathy symptoms that have been occurring for several years but gradually worsening. He states it feels like there is an electrical, pulsing, current running through his bilateral lower extremities diffusely and this occurs almost every day. If he does an unexpected movement, like his leg was caught behind something and he scratched it, it seems to exacerbate his symptoms. He denies taking any medications for this and in the office today, his symptoms are not significant. The patient mentions that sometimes his symptoms keep him up at night. He denies taking Lyrica in the past.    The patient notes he decreased his blood pressure medications because his levels were going down to the low 90s mmHg systolic. He was taking Tarka 2 tablets daily but now cuts a tablet in half and takes 0.5 tablet in the morning and 0.5 tablet at night.     The patient's last colonoscopy was approximately 3 to 4 years ago  "and should not need another due to his age. He recently had cataract surgery and has routine eye exams. The patient is seen by Dr. Knight, podiatry, every 3 months and sees his dentist every 4 months. He does not see a dermatologist or any other specialists routinely. The patient states that he gets the influenza vaccine every year. He states that he has had at least 4 doses of the COVID-19 vaccine and it has been approximately 1 year since he got the last one. He states that he has had both of his pneumonia vaccines, tetanus vaccine 2 years ago, and has had his Shingrix vaccine. The patient occasionally gets out in his yard. His appetite is good. He states that his weight has gone up a little bit, but not much.           Objective   Vital Signs:  BP 93/64   Pulse 85   Temp 98 °F (36.7 °C)   Resp 15   Ht 180.3 cm (70.98\")   Wt 68.5 kg (151 lb)   SpO2 96%   BMI 21.07 kg/m²     Physical Exam  Constitutional:       Appearance: Normal appearance. He is well-developed and normal weight.   HENT:      Head: Normocephalic and atraumatic.      Right Ear: Tympanic membrane, ear canal and external ear normal.      Left Ear: Tympanic membrane, ear canal and external ear normal.      Nose: Nose normal.      Mouth/Throat:      Mouth: Mucous membranes are moist.      Pharynx: Oropharynx is clear. No oropharyngeal exudate.   Eyes:      Extraocular Movements: Extraocular movements intact.      Conjunctiva/sclera: Conjunctivae normal.      Pupils: Pupils are equal, round, and reactive to light.   Cardiovascular:      Rate and Rhythm: Normal rate and regular rhythm.      Pulses: Normal pulses.      Heart sounds: Normal heart sounds.   Pulmonary:      Effort: Pulmonary effort is normal.      Breath sounds: Normal breath sounds.   Abdominal:      General: Bowel sounds are normal.      Palpations: Abdomen is soft.   Musculoskeletal:         General: Normal range of motion.      Cervical back: Normal range of motion and neck " supple.   Skin:     General: Skin is warm and dry.   Neurological:      General: No focal deficit present.      Mental Status: He is alert and oriented to person, place, and time. Mental status is at baseline.   Psychiatric:         Mood and Affect: Mood normal.         Behavior: Behavior normal.         Thought Content: Thought content normal.         Judgment: Judgment normal.        Assessment and Plan     1. Medicare annual wellness visit, subsequent year  - Upon arrival to the room the patient underwent the Medicare health risk assessment.  Neither the questions themselves or the answers that were given prompted any major concern on the part of the patient or by the medical staff that gave the assessment.  As far as the preventative care examinations and the preventative care immunizations that this patient requires they are as listed below.   Screening tests recommended:    Colonoscopy- utd  Diabetic eye exam- utd  Diabetic foot exam- utd -  Dr Chan  PSA- utd  Dentist- 3x / year  Derm- not needed  Immunization:  Influenza- utd  Prevnar-utd  Pneumovax-utd  Tetanus- at pharm-utd  Shingles vaccine- utd  Hepatitis -utd  Covid    -utd                2. Primary hypertension  - The patient has a history of hypertension treated with Tarka and his blood pressure is excellent, actually low at times, and he has cut back to 1 a day on the Tarka and that is controlling pressure very well. No changes need to be made there. Blood pressure today in the office was 93/64 mmHg.    3. Mixed hyperlipidemia  - The patient has a history of high lipids. He is on atorvastatin 20 mg daily. We will be getting some blood work today to check the level of cholesterol and triglycerides that he has, but he is doing well, at least he has been in the past.    4. Type 2 diabetes without complications  - The patient's A1c will be checked today. He is taking metformin and glimepiride along with Jardiance and we will make adjustments if needed in  these medicines after we get the A1c back.    5. Gastroesophageal reflux  - The patient has a history of reflux. He takes Carafate suspension and pantoprazole, both of which seem to control his symptoms reasonably well. As long as his symptoms are controlled, we are not going to make any changes in his medications.    6. BPH  - The patient's urinary flow will be monitored, but no changes right now need to be made in medicines. We checked his PSA about 3 to 4 months ago and it was normal.    7. Primary osteoarthritis involving multiple joints  - Most of the patient's pain is in his lower back. He has pain at various times in all his joints, but the low back is certainly the most bothersome on a day-to-day basis. He has duloxetine and he has hydrocodone for the pain and this will be continued. We will continue with monitoring of this problem year to year.    8. Polyneuropathy  - Probably due to the diabetes. This is becoming increasingly annoying and starting to bother him at night. I think we are going to try Lyrica to begin with and he can titrate up slowly to 50 mg twice daily eventually over the next few weeks. We will decide, depending on his response to that treatment, whether to go higher or to maintain that level of dosing.    9. Vitamin D deficiency in the past  - We will recheck his vitamin D level this year. He has replaced it in the past and likely has a full replacement, but until we get the labs back, we will not know for sure.         Follow Up   Return in about 6 months (around 11/26/2023), or if symptoms worsen or fail to improve, for Recheck-in 6mos.       1 year- [facility] [place], Medicare Wellness.  Patient was given instructions and counseling regarding his condition or for health maintenance advice. Please see specific information pulled into the AVS if appropriate.       Transcribed from ambient dictation for Montana Morrow MD by Ness Haywood.  05/26/23   15:53 EDT    Patient or patient  representative verbalized consent to the visit recording.  I have personally performed the services described in this document as transcribed by the above individual, and it is both accurate and complete.  Montana Morrow MD  6/11/2023  11:10 EDT

## 2023-05-27 LAB
25(OH)D3 SERPL-MCNC: 57.3 NG/ML (ref 30–100)
ALBUMIN SERPL-MCNC: 4.4 G/DL (ref 3.5–5.2)
ALBUMIN/GLOB SERPL: 1.6 G/DL
ALP SERPL-CCNC: 87 U/L (ref 39–117)
ALT SERPL W P-5'-P-CCNC: 14 U/L (ref 1–41)
ANION GAP SERPL CALCULATED.3IONS-SCNC: 13.1 MMOL/L (ref 5–15)
AST SERPL-CCNC: 19 U/L (ref 1–40)
BASOPHILS # BLD AUTO: 0.04 10*3/MM3 (ref 0–0.2)
BASOPHILS NFR BLD AUTO: 0.4 % (ref 0–1.5)
BILIRUB SERPL-MCNC: <0.2 MG/DL (ref 0–1.2)
BILIRUB UR QL STRIP: NEGATIVE
BUN SERPL-MCNC: 27 MG/DL (ref 8–23)
BUN/CREAT SERPL: 21.8 (ref 7–25)
CALCIUM SPEC-SCNC: 10.2 MG/DL (ref 8.6–10.5)
CHLORIDE SERPL-SCNC: 101 MMOL/L (ref 98–107)
CHOLEST SERPL-MCNC: 129 MG/DL (ref 0–200)
CLARITY UR: CLEAR
CO2 SERPL-SCNC: 26.9 MMOL/L (ref 22–29)
COLOR UR: YELLOW
CREAT SERPL-MCNC: 1.24 MG/DL (ref 0.76–1.27)
DEPRECATED RDW RBC AUTO: 40.3 FL (ref 37–54)
EGFRCR SERPLBLD CKD-EPI 2021: 59.1 ML/MIN/1.73
EOSINOPHIL # BLD AUTO: 0.19 10*3/MM3 (ref 0–0.4)
EOSINOPHIL NFR BLD AUTO: 2 % (ref 0.3–6.2)
ERYTHROCYTE [DISTWIDTH] IN BLOOD BY AUTOMATED COUNT: 12 % (ref 12.3–15.4)
GLOBULIN UR ELPH-MCNC: 2.8 GM/DL
GLUCOSE SERPL-MCNC: 158 MG/DL (ref 65–99)
GLUCOSE UR STRIP-MCNC: ABNORMAL MG/DL
HBA1C MFR BLD: 6.8 % (ref 4.8–5.6)
HCT VFR BLD AUTO: 40.3 % (ref 37.5–51)
HDLC SERPL-MCNC: 66 MG/DL (ref 40–60)
HGB BLD-MCNC: 13.4 G/DL (ref 13–17.7)
HGB UR QL STRIP.AUTO: NEGATIVE
IMM GRANULOCYTES # BLD AUTO: 0.04 10*3/MM3 (ref 0–0.05)
IMM GRANULOCYTES NFR BLD AUTO: 0.4 % (ref 0–0.5)
KETONES UR QL STRIP: NEGATIVE
LDLC SERPL CALC-MCNC: 49 MG/DL (ref 0–100)
LDLC/HDLC SERPL: 0.74 {RATIO}
LEUKOCYTE ESTERASE UR QL STRIP.AUTO: NEGATIVE
LYMPHOCYTES # BLD AUTO: 3.24 10*3/MM3 (ref 0.7–3.1)
LYMPHOCYTES NFR BLD AUTO: 34.8 % (ref 19.6–45.3)
MCH RBC QN AUTO: 30.3 PG (ref 26.6–33)
MCHC RBC AUTO-ENTMCNC: 33.3 G/DL (ref 31.5–35.7)
MCV RBC AUTO: 91.2 FL (ref 79–97)
MONOCYTES # BLD AUTO: 0.92 10*3/MM3 (ref 0.1–0.9)
MONOCYTES NFR BLD AUTO: 9.9 % (ref 5–12)
NEUTROPHILS NFR BLD AUTO: 4.89 10*3/MM3 (ref 1.7–7)
NEUTROPHILS NFR BLD AUTO: 52.5 % (ref 42.7–76)
NITRITE UR QL STRIP: NEGATIVE
NRBC BLD AUTO-RTO: 0 /100 WBC (ref 0–0.2)
PH UR STRIP.AUTO: 5.5 [PH] (ref 5–8)
PLATELET # BLD AUTO: 218 10*3/MM3 (ref 140–450)
PMV BLD AUTO: 10.4 FL (ref 6–12)
POTASSIUM SERPL-SCNC: 5 MMOL/L (ref 3.5–5.2)
PROT SERPL-MCNC: 7.2 G/DL (ref 6–8.5)
PROT UR QL STRIP: NEGATIVE
RBC # BLD AUTO: 4.42 10*6/MM3 (ref 4.14–5.8)
SODIUM SERPL-SCNC: 141 MMOL/L (ref 136–145)
SP GR UR STRIP: >=1.03 (ref 1–1.03)
T4 FREE SERPL-MCNC: 1.09 NG/DL (ref 0.93–1.7)
TRIGL SERPL-MCNC: 71 MG/DL (ref 0–150)
TSH SERPL DL<=0.05 MIU/L-ACNC: 3.07 UIU/ML (ref 0.27–4.2)
UROBILINOGEN UR QL STRIP: ABNORMAL
VIT B12 BLD-MCNC: 252 PG/ML (ref 211–946)
VLDLC SERPL-MCNC: 14 MG/DL (ref 5–40)
WBC NRBC COR # BLD: 9.32 10*3/MM3 (ref 3.4–10.8)

## 2023-06-08 DIAGNOSIS — M15.9 PRIMARY OSTEOARTHRITIS INVOLVING MULTIPLE JOINTS: ICD-10-CM

## 2023-06-10 RX ORDER — HYDROCODONE BITARTRATE AND ACETAMINOPHEN 10; 325 MG/1; MG/1
TABLET ORAL
Qty: 120 TABLET | Refills: 0 | Status: SHIPPED | OUTPATIENT
Start: 2023-06-10

## 2023-06-30 PROBLEM — R20.2 TINGLING OF BOTH FEET: Status: ACTIVE | Noted: 2023-06-30

## 2023-07-13 ENCOUNTER — HOSPITAL ENCOUNTER (INPATIENT)
Facility: HOSPITAL | Age: 78
LOS: 3 days | Discharge: HOME | DRG: 682 | End: 2023-07-17
Attending: EMERGENCY MEDICINE
Payer: MEDICARE

## 2023-07-13 DIAGNOSIS — R53.1 WEAKNESS: Primary | ICD-10-CM

## 2023-07-13 DIAGNOSIS — R50.9 FEVER, UNSPECIFIED FEVER CAUSE: ICD-10-CM

## 2023-07-13 PROCEDURE — 99285 EMERGENCY DEPT VISIT HI MDM: CPT

## 2023-07-13 PROCEDURE — 93005 ELECTROCARDIOGRAM TRACING: CPT

## 2023-07-13 PROCEDURE — 93005 ELECTROCARDIOGRAM TRACING: CPT | Performed by: EMERGENCY MEDICINE

## 2023-07-14 ENCOUNTER — APPOINTMENT (OUTPATIENT)
Dept: GENERAL RADIOLOGY | Facility: HOSPITAL | Age: 78
DRG: 682 | End: 2023-07-14
Payer: MEDICARE

## 2023-07-14 ENCOUNTER — APPOINTMENT (OUTPATIENT)
Dept: CT IMAGING | Facility: HOSPITAL | Age: 78
DRG: 682 | End: 2023-07-14
Payer: MEDICARE

## 2023-07-14 PROBLEM — D72.829 LEUKOCYTOSIS: Status: ACTIVE | Noted: 2023-07-14

## 2023-07-14 PROBLEM — R53.1 GENERALIZED WEAKNESS: Status: ACTIVE | Noted: 2023-07-14

## 2023-07-14 PROBLEM — G93.41 ENCEPHALOPATHY, METABOLIC: Status: ACTIVE | Noted: 2023-07-14

## 2023-07-14 PROBLEM — A41.9 SEPSIS: Status: ACTIVE | Noted: 2023-07-14

## 2023-07-14 LAB
ALBUMIN SERPL-MCNC: 3.6 G/DL (ref 3.5–5.2)
ALBUMIN/GLOB SERPL: 0.9 G/DL
ALP SERPL-CCNC: 110 U/L (ref 39–117)
ALT SERPL W P-5'-P-CCNC: 18 U/L (ref 1–41)
ANION GAP SERPL CALCULATED.3IONS-SCNC: 10 MMOL/L (ref 5–15)
ANION GAP SERPL CALCULATED.3IONS-SCNC: 14 MMOL/L (ref 5–15)
ARTERIAL PATENCY WRIST A: POSITIVE
AST SERPL-CCNC: 19 U/L (ref 1–40)
ATMOSPHERIC PRESS: ABNORMAL MM[HG]
B PARAPERT DNA SPEC QL NAA+PROBE: NOT DETECTED
B PERT DNA SPEC QL NAA+PROBE: NOT DETECTED
BACTERIA UR QL AUTO: ABNORMAL /HPF
BASE EXCESS BLDA CALC-SCNC: 4.2 MMOL/L (ref 0–3)
BASOPHILS # BLD AUTO: 0 10*3/MM3 (ref 0–0.2)
BASOPHILS # BLD AUTO: 0 10*3/MM3 (ref 0–0.2)
BASOPHILS NFR BLD AUTO: 0.2 % (ref 0–1.5)
BASOPHILS NFR BLD AUTO: 0.3 % (ref 0–1.5)
BDY SITE: ABNORMAL
BILIRUB SERPL-MCNC: 0.4 MG/DL (ref 0–1.2)
BILIRUB UR QL STRIP: NEGATIVE
BUN SERPL-MCNC: 26 MG/DL (ref 8–23)
BUN SERPL-MCNC: 32 MG/DL (ref 8–23)
BUN/CREAT SERPL: 22 (ref 7–25)
BUN/CREAT SERPL: 22.9 (ref 7–25)
C PNEUM DNA NPH QL NAA+NON-PROBE: NOT DETECTED
CALCIUM SPEC-SCNC: 9 MG/DL (ref 8.6–10.5)
CALCIUM SPEC-SCNC: 9.6 MG/DL (ref 8.6–10.5)
CHLORIDE SERPL-SCNC: 103 MMOL/L (ref 98–107)
CHLORIDE SERPL-SCNC: 98 MMOL/L (ref 98–107)
CK SERPL-CCNC: 32 U/L (ref 20–200)
CLARITY UR: CLEAR
CO2 BLDA-SCNC: 31.9 MMOL/L (ref 22–29)
CO2 SERPL-SCNC: 26 MMOL/L (ref 22–29)
CO2 SERPL-SCNC: 27 MMOL/L (ref 22–29)
COLOR UR: ABNORMAL
CREAT SERPL-MCNC: 1.18 MG/DL (ref 0.76–1.27)
CREAT SERPL-MCNC: 1.4 MG/DL (ref 0.76–1.27)
D-LACTATE SERPL-SCNC: 1.3 MMOL/L (ref 0.3–2)
DEPRECATED RDW RBC AUTO: 40.3 FL (ref 37–54)
DEPRECATED RDW RBC AUTO: 42 FL (ref 37–54)
EGFRCR SERPLBLD CKD-EPI 2021: 51.1 ML/MIN/1.73
EGFRCR SERPLBLD CKD-EPI 2021: 62.8 ML/MIN/1.73
EOSINOPHIL # BLD AUTO: 0 10*3/MM3 (ref 0–0.4)
EOSINOPHIL # BLD AUTO: 0.1 10*3/MM3 (ref 0–0.4)
EOSINOPHIL NFR BLD AUTO: 0.4 % (ref 0.3–6.2)
EOSINOPHIL NFR BLD AUTO: 0.7 % (ref 0.3–6.2)
ERYTHROCYTE [DISTWIDTH] IN BLOOD BY AUTOMATED COUNT: 12.6 % (ref 12.3–15.4)
ERYTHROCYTE [DISTWIDTH] IN BLOOD BY AUTOMATED COUNT: 12.6 % (ref 12.3–15.4)
FLUAV SUBTYP SPEC NAA+PROBE: NOT DETECTED
FLUBV RNA ISLT QL NAA+PROBE: NOT DETECTED
GEN 5 2HR TROPONIN T REFLEX: 16 NG/L
GLOBULIN UR ELPH-MCNC: 4 GM/DL
GLUCOSE SERPL-MCNC: 125 MG/DL (ref 65–99)
GLUCOSE SERPL-MCNC: 172 MG/DL (ref 65–99)
GLUCOSE UR STRIP-MCNC: ABNORMAL MG/DL
HADV DNA SPEC NAA+PROBE: NOT DETECTED
HCO3 BLDA-SCNC: 30.3 MMOL/L (ref 21–28)
HCOV 229E RNA SPEC QL NAA+PROBE: NOT DETECTED
HCOV HKU1 RNA SPEC QL NAA+PROBE: NOT DETECTED
HCOV NL63 RNA SPEC QL NAA+PROBE: NOT DETECTED
HCOV OC43 RNA SPEC QL NAA+PROBE: NOT DETECTED
HCT VFR BLD AUTO: 36.4 % (ref 37.5–51)
HCT VFR BLD AUTO: 38.8 % (ref 37.5–51)
HEMODILUTION: NO
HGB BLD-MCNC: 11.8 G/DL (ref 13–17.7)
HGB BLD-MCNC: 13 G/DL (ref 13–17.7)
HGB UR QL STRIP.AUTO: ABNORMAL
HMPV RNA NPH QL NAA+NON-PROBE: NOT DETECTED
HOLD SPECIMEN: NORMAL
HOLD SPECIMEN: NORMAL
HPIV1 RNA ISLT QL NAA+PROBE: NOT DETECTED
HPIV2 RNA SPEC QL NAA+PROBE: NOT DETECTED
HPIV3 RNA NPH QL NAA+PROBE: NOT DETECTED
HPIV4 P GENE NPH QL NAA+PROBE: NOT DETECTED
HYALINE CASTS UR QL AUTO: ABNORMAL /LPF
INHALED O2 CONCENTRATION: 28 %
KETONES UR QL STRIP: ABNORMAL
LEUKOCYTE ESTERASE UR QL STRIP.AUTO: ABNORMAL
LYMPHOCYTES # BLD AUTO: 0.5 10*3/MM3 (ref 0.7–3.1)
LYMPHOCYTES # BLD AUTO: 0.7 10*3/MM3 (ref 0.7–3.1)
LYMPHOCYTES NFR BLD AUTO: 4.4 % (ref 19.6–45.3)
LYMPHOCYTES NFR BLD AUTO: 7.8 % (ref 19.6–45.3)
M PNEUMO IGG SER IA-ACNC: NOT DETECTED
MCH RBC QN AUTO: 29.2 PG (ref 26.6–33)
MCH RBC QN AUTO: 30.7 PG (ref 26.6–33)
MCHC RBC AUTO-ENTMCNC: 32.4 G/DL (ref 31.5–35.7)
MCHC RBC AUTO-ENTMCNC: 33.6 G/DL (ref 31.5–35.7)
MCV RBC AUTO: 90 FL (ref 79–97)
MCV RBC AUTO: 91.6 FL (ref 79–97)
MODALITY: ABNORMAL
MONOCYTES # BLD AUTO: 0.8 10*3/MM3 (ref 0.1–0.9)
MONOCYTES # BLD AUTO: 1 10*3/MM3 (ref 0.1–0.9)
MONOCYTES NFR BLD AUTO: 8.7 % (ref 5–12)
MONOCYTES NFR BLD AUTO: 8.9 % (ref 5–12)
NEUTROPHILS NFR BLD AUTO: 7.6 10*3/MM3 (ref 1.7–7)
NEUTROPHILS NFR BLD AUTO: 82.8 % (ref 42.7–76)
NEUTROPHILS NFR BLD AUTO: 85.8 % (ref 42.7–76)
NEUTROPHILS NFR BLD AUTO: 9.4 10*3/MM3 (ref 1.7–7)
NITRITE UR QL STRIP: NEGATIVE
NRBC BLD AUTO-RTO: 0 /100 WBC (ref 0–0.2)
NRBC BLD AUTO-RTO: 0.1 /100 WBC (ref 0–0.2)
PCO2 BLDA: 50.8 MM HG (ref 35–48)
PH BLDA: 7.38 PH UNITS (ref 7.35–7.45)
PH UR STRIP.AUTO: <=5 [PH] (ref 5–8)
PLATELET # BLD AUTO: 272 10*3/MM3 (ref 140–450)
PLATELET # BLD AUTO: 303 10*3/MM3 (ref 140–450)
PMV BLD AUTO: 7.1 FL (ref 6–12)
PMV BLD AUTO: 7.4 FL (ref 6–12)
PO2 BLDA: 78.9 MM HG (ref 83–108)
POTASSIUM SERPL-SCNC: 5 MMOL/L (ref 3.5–5.2)
POTASSIUM SERPL-SCNC: 5.1 MMOL/L (ref 3.5–5.2)
PROT SERPL-MCNC: 7.6 G/DL (ref 6–8.5)
PROT UR QL STRIP: ABNORMAL
QT INTERVAL: 360 MS
RBC # BLD AUTO: 4.04 10*6/MM3 (ref 4.14–5.8)
RBC # BLD AUTO: 4.24 10*6/MM3 (ref 4.14–5.8)
RBC # UR STRIP: ABNORMAL /HPF
REF LAB TEST METHOD: ABNORMAL
RHINOVIRUS RNA SPEC NAA+PROBE: NOT DETECTED
RSV RNA NPH QL NAA+NON-PROBE: NOT DETECTED
SAO2 % BLDCOA: 95.1 % (ref 94–98)
SARS-COV-2 RNA NPH QL NAA+NON-PROBE: NOT DETECTED
SODIUM SERPL-SCNC: 138 MMOL/L (ref 136–145)
SODIUM SERPL-SCNC: 140 MMOL/L (ref 136–145)
SP GR UR STRIP: 1.03 (ref 1–1.03)
SQUAMOUS #/AREA URNS HPF: ABNORMAL /HPF
TROPONIN T DELTA: -1 NG/L
TROPONIN T SERPL HS-MCNC: 17 NG/L
UROBILINOGEN UR QL STRIP: ABNORMAL
WBC # UR STRIP: ABNORMAL /HPF
WBC NRBC COR # BLD: 11 10*3/MM3 (ref 3.4–10.8)
WBC NRBC COR # BLD: 9.1 10*3/MM3 (ref 3.4–10.8)
WHOLE BLOOD HOLD COAG: NORMAL
WHOLE BLOOD HOLD SPECIMEN: NORMAL

## 2023-07-14 PROCEDURE — 0 CEFEPIME PER 500 MG: Performed by: PHYSICIAN ASSISTANT

## 2023-07-14 PROCEDURE — 36600 WITHDRAWAL OF ARTERIAL BLOOD: CPT

## 2023-07-14 PROCEDURE — 25010000002 HEPARIN (PORCINE) PER 1000 UNITS: Performed by: NURSE PRACTITIONER

## 2023-07-14 PROCEDURE — 83605 ASSAY OF LACTIC ACID: CPT

## 2023-07-14 PROCEDURE — 0202U NFCT DS 22 TRGT SARS-COV-2: CPT | Performed by: PHYSICIAN ASSISTANT

## 2023-07-14 PROCEDURE — 82550 ASSAY OF CK (CPK): CPT | Performed by: PHYSICIAN ASSISTANT

## 2023-07-14 PROCEDURE — 80053 COMPREHEN METABOLIC PANEL: CPT | Performed by: PHYSICIAN ASSISTANT

## 2023-07-14 PROCEDURE — 36415 COLL VENOUS BLD VENIPUNCTURE: CPT | Performed by: NURSE PRACTITIONER

## 2023-07-14 PROCEDURE — 82803 BLOOD GASES ANY COMBINATION: CPT

## 2023-07-14 PROCEDURE — 25010000002 VANCOMYCIN HCL 1.25 G RECONSTITUTED SOLUTION 1 EACH VIAL: Performed by: PHYSICIAN ASSISTANT

## 2023-07-14 PROCEDURE — 87040 BLOOD CULTURE FOR BACTERIA: CPT | Performed by: PHYSICIAN ASSISTANT

## 2023-07-14 PROCEDURE — 84484 ASSAY OF TROPONIN QUANT: CPT | Performed by: PHYSICIAN ASSISTANT

## 2023-07-14 PROCEDURE — 81001 URINALYSIS AUTO W/SCOPE: CPT | Performed by: PHYSICIAN ASSISTANT

## 2023-07-14 PROCEDURE — 71045 X-RAY EXAM CHEST 1 VIEW: CPT

## 2023-07-14 PROCEDURE — 85025 COMPLETE CBC W/AUTO DIFF WBC: CPT | Performed by: NURSE PRACTITIONER

## 2023-07-14 PROCEDURE — 85025 COMPLETE CBC W/AUTO DIFF WBC: CPT | Performed by: PHYSICIAN ASSISTANT

## 2023-07-14 PROCEDURE — 0 CEFEPIME PER 500 MG: Performed by: NURSE PRACTITIONER

## 2023-07-14 PROCEDURE — 70450 CT HEAD/BRAIN W/O DYE: CPT

## 2023-07-14 PROCEDURE — 87086 URINE CULTURE/COLONY COUNT: CPT | Performed by: PHYSICIAN ASSISTANT

## 2023-07-14 RX ORDER — POLYETHYLENE GLYCOL 3350 17 G/17G
17 POWDER, FOR SOLUTION ORAL DAILY PRN
Status: DISCONTINUED | OUTPATIENT
Start: 2023-07-14 | End: 2023-07-17 | Stop reason: HOSPADM

## 2023-07-14 RX ORDER — SODIUM CHLORIDE 9 MG/ML
100 INJECTION, SOLUTION INTRAVENOUS CONTINUOUS
Status: DISCONTINUED | OUTPATIENT
Start: 2023-07-14 | End: 2023-07-17 | Stop reason: HOSPADM

## 2023-07-14 RX ORDER — SODIUM CHLORIDE 9 MG/ML
40 INJECTION, SOLUTION INTRAVENOUS AS NEEDED
Status: DISCONTINUED | OUTPATIENT
Start: 2023-07-14 | End: 2023-07-17 | Stop reason: HOSPADM

## 2023-07-14 RX ORDER — ONDANSETRON 2 MG/ML
4 INJECTION INTRAMUSCULAR; INTRAVENOUS EVERY 6 HOURS PRN
Status: DISCONTINUED | OUTPATIENT
Start: 2023-07-14 | End: 2023-07-17 | Stop reason: HOSPADM

## 2023-07-14 RX ORDER — IBUPROFEN 600 MG/1
1 TABLET ORAL
Status: DISCONTINUED | OUTPATIENT
Start: 2023-07-14 | End: 2023-07-17 | Stop reason: HOSPADM

## 2023-07-14 RX ORDER — ACETAMINOPHEN 160 MG/5ML
650 SOLUTION ORAL EVERY 4 HOURS PRN
Status: DISCONTINUED | OUTPATIENT
Start: 2023-07-14 | End: 2023-07-17 | Stop reason: HOSPADM

## 2023-07-14 RX ORDER — SODIUM CHLORIDE 0.9 % (FLUSH) 0.9 %
10 SYRINGE (ML) INJECTION AS NEEDED
Status: DISCONTINUED | OUTPATIENT
Start: 2023-07-14 | End: 2023-07-17 | Stop reason: HOSPADM

## 2023-07-14 RX ORDER — HEPARIN SODIUM 5000 [USP'U]/ML
5000 INJECTION, SOLUTION INTRAVENOUS; SUBCUTANEOUS EVERY 8 HOURS SCHEDULED
Status: DISCONTINUED | OUTPATIENT
Start: 2023-07-14 | End: 2023-07-17 | Stop reason: HOSPADM

## 2023-07-14 RX ORDER — CHOLECALCIFEROL (VITAMIN D3) 125 MCG
5 CAPSULE ORAL NIGHTLY PRN
Status: DISCONTINUED | OUTPATIENT
Start: 2023-07-14 | End: 2023-07-17 | Stop reason: HOSPADM

## 2023-07-14 RX ORDER — ACETAMINOPHEN 500 MG
1000 TABLET ORAL ONCE
Status: COMPLETED | OUTPATIENT
Start: 2023-07-14 | End: 2023-07-14

## 2023-07-14 RX ORDER — BISACODYL 10 MG
10 SUPPOSITORY, RECTAL RECTAL DAILY PRN
Status: DISCONTINUED | OUTPATIENT
Start: 2023-07-14 | End: 2023-07-17 | Stop reason: HOSPADM

## 2023-07-14 RX ORDER — DEXTROSE MONOHYDRATE 25 G/50ML
25 INJECTION, SOLUTION INTRAVENOUS
Status: DISCONTINUED | OUTPATIENT
Start: 2023-07-14 | End: 2023-07-17 | Stop reason: HOSPADM

## 2023-07-14 RX ORDER — BISACODYL 5 MG/1
5 TABLET, DELAYED RELEASE ORAL DAILY PRN
Status: DISCONTINUED | OUTPATIENT
Start: 2023-07-14 | End: 2023-07-17 | Stop reason: HOSPADM

## 2023-07-14 RX ORDER — NICOTINE POLACRILEX 4 MG
15 LOZENGE BUCCAL
Status: DISCONTINUED | OUTPATIENT
Start: 2023-07-14 | End: 2023-07-17 | Stop reason: HOSPADM

## 2023-07-14 RX ORDER — INSULIN LISPRO 100 [IU]/ML
2-7 INJECTION, SOLUTION INTRAVENOUS; SUBCUTANEOUS
Status: DISCONTINUED | OUTPATIENT
Start: 2023-07-14 | End: 2023-07-17 | Stop reason: HOSPADM

## 2023-07-14 RX ORDER — AMOXICILLIN 250 MG
2 CAPSULE ORAL 2 TIMES DAILY
Status: DISCONTINUED | OUTPATIENT
Start: 2023-07-14 | End: 2023-07-17 | Stop reason: HOSPADM

## 2023-07-14 RX ORDER — SODIUM CHLORIDE 0.9 % (FLUSH) 0.9 %
10 SYRINGE (ML) INJECTION EVERY 12 HOURS SCHEDULED
Status: DISCONTINUED | OUTPATIENT
Start: 2023-07-14 | End: 2023-07-17 | Stop reason: HOSPADM

## 2023-07-14 RX ADMIN — CEFEPIME 2000 MG: 2 INJECTION, POWDER, FOR SOLUTION INTRAVENOUS at 21:08

## 2023-07-14 RX ADMIN — HEPARIN SODIUM 5000 UNITS: 5000 INJECTION INTRAVENOUS; SUBCUTANEOUS at 14:11

## 2023-07-14 RX ADMIN — SODIUM CHLORIDE 1000 ML: 9 INJECTION, SOLUTION INTRAVENOUS at 00:44

## 2023-07-14 RX ADMIN — Medication 5 MG: at 21:13

## 2023-07-14 RX ADMIN — VANCOMYCIN HYDROCHLORIDE 1250 MG: 1.25 INJECTION, POWDER, LYOPHILIZED, FOR SOLUTION INTRAVENOUS at 00:47

## 2023-07-14 RX ADMIN — SODIUM CHLORIDE 100 ML/HR: 9 INJECTION, SOLUTION INTRAVENOUS at 05:26

## 2023-07-14 RX ADMIN — CEFEPIME 2000 MG: 2 INJECTION, POWDER, FOR SOLUTION INTRAVENOUS at 11:21

## 2023-07-14 RX ADMIN — ACETAMINOPHEN 1000 MG: 500 TABLET, FILM COATED ORAL at 00:44

## 2023-07-14 RX ADMIN — HEPARIN SODIUM 5000 UNITS: 5000 INJECTION INTRAVENOUS; SUBCUTANEOUS at 21:08

## 2023-07-14 RX ADMIN — CEFEPIME 2000 MG: 2 INJECTION, POWDER, FOR SOLUTION INTRAVENOUS at 00:40

## 2023-07-14 RX ADMIN — Medication 10 ML: at 08:35

## 2023-07-14 RX ADMIN — ACETAMINOPHEN 650 MG: 650 SOLUTION ORAL at 21:13

## 2023-07-14 NOTE — PROGRESS NOTES
[facility]     Progress Note    Patient Name: Lux Bray  : 1945  MRN: [id number]  Primary Care Physician:  Montana Morrow MD  Date of admission: 2023  Service date and time: 23 16:52 EDT  Subjective   Subjective     Chief Complaint: generalized weakness    HPI:  Patient is still complaining of weakness ,with resting tremors      Objective   Objective     Vitals:   Temp:  [97.8 °F (36.6 °C)-100.8 °F (38.2 °C)] 98.4 °F (36.9 °C)  Heart Rate:  [] 80  Resp:  [12-16] 12  BP: ()/(53-78) 108/55  Flow (L/min):  [2] 2  Physical Exam    Constitutional: Awake, alert in no distress at time of the examination   Eyes: PERRLA, sclerae anicteric, no conjunctival injection   HENT: NCAT, mucous membranes moist   Neck: Supple, no thyromegaly, no lymphadenopathy, trachea midline   Respiratory: Clear to auscultation bilaterally, nonlabored respirations    Cardiovascular: RRR, no murmurs, rubs, or gallops, palpable pedal pulses bilaterally   Gastrointestinal: Positive bowel sounds, soft, nontender, nondistended   Musculoskeletal: No bilateral ankle edema, no clubbing or cyanosis to extremities   Psychiatric: Appropriate affect, cooperative   Neurologic: alert,awake,resting tremors   Skin: No rashes     Result Review    Result Review:  I have personally reviewed the results from the time of this admission to 2023 16:52 EDT and agree with these findings:  [x]  Laboratory list / accordion  [x]  Microbiology  [x]  Radiology  []  EKG/Telemetry   [x]  Cardiology/Vascular   []  Pathology  []  Old records  []  Other:        Assessment & Plan   Assessment / Plan       Active Hospital Problems:  Active Hospital Problems    Diagnosis     **Generalized weakness     Encephalopathy, metabolic     Leukocytosis     Sepsis     Type 2 diabetes mellitus      Plan:    1.  Generalized weakness  #Debility likely related to depression and abrupt stopping of psych meds  #Decreased appetite, encourage 3 meals a  day  #PT OT evaluation  2.  Type 2 diabetes  #We will start on sliding scale insulin, Accu-Chek with meals  #Last A1c was 6.8  3.  Encephalopathy  #Now resolved.    4.  Sepsis  #With elevated white blood cells and rapid heart rate and suspicion for infection patient met criteria for sepsis.  Patient was given vancomycin and cefepime in the ED.  However no source of infection has been found so far.  Will await for urine cultures and blood cultures.  We will maintain patient on cefepime.  5.  Acute Cystitis without hematuria  #Urinalysis not convincing for urinary tract infection.  Cultures pending.  Started on Cefepime  #We will obtain bladder scans, postvoid bladder scans.  #Patient did complain of burning on urination.  6.  Leukocytosis  #White blood cells elevated likely from an infection.  #Will monitor with daily labs.  Vitals every 4.  #We will maintain patient on IV fluid.  7.  Acute on chronic kidney injury  #Creatinine at 1.40.  Creatinine was 1.3 last February  #We will hydrate.  Bladder scan to rule out urinary retention       DVT prophylaxis:  Medical DVT prophylaxis orders are present.    CODE STATUS:   Level Of Support Discussed With: Patient  Code Status (Patient has no pulse and is not breathing): CPR (Attempt to Resuscitate)  Medical Interventions (Patient has pulse or is breathing): Full Support    Disposition:  I expect patient to be discharged  2 to 3 days.    Luciano Espinosa MD

## 2023-07-14 NOTE — CASE MANAGEMENT/SOCIAL WORK
Discharge Planning Assessment  [facility]     Patient Name: Lux Bray  MRN: [id number]  Today's Date: 7/14/2023    Admit Date: 7/13/2023    Plan: DC Plan: Anticipate Routine Home with Family. Watch for home oxygen needs.   Discharge Needs Assessment       Row Name 07/14/23 1233       Living Environment    People in Home spouse    Name(s) of People in Home Ese Bray    Current Living Arrangements home    Potentially Unsafe Housing Conditions none    Primary Care Provided by self    Provides Primary Care For no one, unable/limited ability to care for self    Family Caregiver if Needed spouse    Family Caregiver Names Ese Bray    Quality of Family Relationships helpful;involved;supportive    Able to Return to Prior Arrangements yes       Resource/Environmental Concerns    Resource/Environmental Concerns none    Transportation Concerns none       Food Insecurity    Within the past 12 months, you worried that your food would run out before you got the money to buy more. Never true    Within the past 12 months, the food you bought just didn't last and you didn't have money to get more. Never true       Transition Planning    Patient/Family Anticipates Transition to home with family    Patient/Family Anticipated Services at Transition none    Transportation Anticipated family or friend will provide       Discharge Needs Assessment    Readmission Within the Last 30 Days no previous admission in last 30 days    Equipment Currently Used at Home cane, straight;glucometer;bp cuff    Concerns to be Addressed discharge planning    Anticipated Changes Related to Illness none    Equipment Needed After Discharge other (see comments)  CM will monitor for additional needs    Provided Post Acute Provider List? N/A    Provided Post Acute Provider Quality & Resource List? N/A    Current Discharge Risk physical impairment                   Discharge Plan       Row Name 07/14/23 1235       Plan    Plan DC Plan: Anticipate  Routine Home with Family. Watch for home oxygen needs.    Provided Post Acute Provider List? N/A    Provided Post Acute Provider Quality & Resource List? N/A    Plan Comments CM spoke with patient at bedside to discuss admission assessment and discharge planning. Patient confirms PCP and pharmacy.Patient confirms he is agreeable to enrolling in meds to bed program. CM enrolled patient and updated pharmacy in [place]. Patient denies any difficulty affording medications at this time. Patient denies any additional needs for services or DME at this time. Patient states his spouse will drive him home when ready for DC.CM spoke with patient's nurse and also reviewed chart documentation to obtain clinical updates. Nursing anticipates discharge home possible tomorrow depending on culture results. CM will continue to follow for any further needs and adjust discharge plan accordingly. DC Barriers: O2@2L nc, IVF's ,IV abx, + urinalysis, pending blood and urine cultures.               Expected Discharge Date and Time       Expected Discharge Date Expected Discharge Time    Jul 15, 2023            Demographic Summary       Row Name 07/14/23 1232       General Information    Admission Type inpatient    Arrived From emergency department;home    Required Notices Provided Important Message from Medicare    Referral Source admission list    Reason for Consult discharge planning    Preferred Language English       Contact Information    Permission Granted to Share Info With                    Functional Status       Row Name 07/14/23 1233       Functional Status    Usual Activity Tolerance moderate    Current Activity Tolerance moderate       Physical Activity    On average, how many days per week do you engage in moderate to strenuous exercise (like a brisk walk)? 0 days    On average, how many minutes do you engage in exercise at this level? 0 min    Number of minutes of exercise per week 0       Functional Status, IADL     Medications assistive person    Meal Preparation assistive person    Housekeeping assistive person    Laundry assistive person    Shopping assistive person       Mental Status    General Appearance WDL WDL       Mental Status Summary    Recent Changes in Mental Status/Cognitive Functioning no changes       Employment/    Employment Status retired;, previous service    Current or Previous Occupation not applicable           Current or Previous  Service active duty, past     [employer]               Colette Gonzalez RN     Office Phone: [phone]  Office Cell:     [phone]

## 2023-07-14 NOTE — ED PROVIDER NOTES
Subjective   History of Present Illness  Patient is a 77-year-old male brought in by EMS and accompanied by his wife from home with complaints of generalized weakness over the past several days causing multiple falls.  Upon arrival to the ED patient was found to be febrile.  Patient's wife states he has not been febrile at home.  He has been around his grandchild that was recently sick.  Patient denies any head injury with his multiple falls or pain from these falls.  He also denies any urinary symptoms, nausea, vomiting, abdominal pain, chest pain, shortness of breath, cough, or congestion.  Patient states he recently stopped Abilify and Lyrica due to tremors.  He has had a history of this tremor is currently being followed by neurology.  Of note patient has had a gradual intermittent headache over the past 10 days does report history of migraines has not had 1 since he was in his 50s.  He denies any visual disturbances, one-sided numbness weakness, slurred speech, facial droop.    History provided by:  Patient    Review of Systems   Constitutional:  Positive for fatigue and fever. Negative for activity change, appetite change, chills, diaphoresis and unexpected weight change.   Respiratory: Negative.     Cardiovascular: Negative.    Gastrointestinal:  Negative for abdominal distention, abdominal pain, constipation, diarrhea, nausea and vomiting.   Genitourinary:  Negative for decreased urine volume, difficulty urinating, dysuria, flank pain, frequency, hematuria and urgency.   Musculoskeletal:  Negative for back pain, neck pain and neck stiffness.   Skin: Negative.    Neurological:  Positive for tremors, weakness and headaches. Negative for dizziness, seizures, syncope, facial asymmetry, speech difficulty, light-headedness and numbness.   Hematological: Negative.      Past Medical History:   Diagnosis Date    ADHD (attention deficit hyperactivity disorder)     Allergic     Arthritis     Colon polyp     Depression      Diabetes mellitus type 2    controlled by oral meds    HL (hearing loss)     Hyperlipidemia     Hypertension        No Known Allergies    Past Surgical History:   Procedure Laterality Date    COLONOSCOPY      EYE SURGERY      Cataracts removed: both eyes    HAND SURGERY  1980- Jainism. Abstracted from [employer].    HEMORROIDECTOMY      Jainism. Abstracted from [employer].    TONSILLECTOMY  0's    VASECTOMY         Family History   Problem Relation Age of Onset    Diabetes Mother         type2    Stroke Mother     Diabetes Father         type2       Social History     Socioeconomic History    Marital status:    Tobacco Use    Smoking status: Former     Packs/day: 0.25     Years: 5.00     Pack years: 1.25     Types: Cigarettes     Quit date: 2/10/1971     Years since quittin.4    Smokeless tobacco: Never   Substance and Sexual Activity    Alcohol use: Yes     Alcohol/week: 2.0 standard drinks     Types: 1 Glasses of wine, 1 Cans of beer per week     Comment: social    Drug use: No    Sexual activity: Yes     Partners: Female     Birth control/protection: None           Objective   Physical Exam  Vitals and nursing note reviewed.   Constitutional:       General: He is not in acute distress.     Appearance: Normal appearance. He is well-developed. He is not ill-appearing, toxic-appearing or diaphoretic.   HENT:      Head: Normocephalic and atraumatic.      Right Ear: Tympanic membrane, ear canal and external ear normal.      Left Ear: Tympanic membrane, ear canal and external ear normal.      Mouth/Throat:      Mouth: Mucous membranes are moist.      Pharynx: Oropharynx is clear.   Eyes:      General: No scleral icterus.     Extraocular Movements: Extraocular movements intact.      Pupils: Pupils are equal, round, and reactive to light.   Neck:      Comments: Negative Brudzinski/ Kernigs sign  Cardiovascular:      Rate and Rhythm: Normal rate and regular rhythm.      Pulses: Normal  "pulses.      Heart sounds: No murmur heard.    No friction rub. No gallop.   Pulmonary:      Effort: Pulmonary effort is normal. No tachypnea, accessory muscle usage or respiratory distress.      Breath sounds: Normal breath sounds. No stridor. No decreased breath sounds, wheezing, rhonchi or rales.   Chest:      Chest wall: No mass, deformity, tenderness or crepitus.   Abdominal:      General: Bowel sounds are normal.      Palpations: Abdomen is soft. There is no splenomegaly.      Tenderness: There is no abdominal tenderness. There is no right CVA tenderness, left CVA tenderness, guarding or rebound.   Musculoskeletal:      Cervical back: No rigidity.   Skin:     General: Skin is warm.      Capillary Refill: Capillary refill takes less than 2 seconds.      Findings: No rash.   Neurological:      General: No focal deficit present.      Mental Status: He is alert and oriented to person, place, and time.      GCS: GCS eye subscore is 4. GCS verbal subscore is 5. GCS motor subscore is 6.      Motor: Tremor present.      Comments: Normal and equal sensation strength throughout moving all extremities freely.   Psychiatric:         Mood and Affect: Mood normal.         Behavior: Behavior normal.       Procedures           ED Course    BP 99/67   Pulse 109   Temp 98.1 °F (36.7 °C) (Oral)   Resp 16   Ht 180.3 cm (71\")   Wt 68.5 kg (151 lb)   SpO2 95%   BMI 21.06 kg/m²   Medications   sodium chloride 0.9 % flush 10 mL (has no administration in time range)   acetaminophen (TYLENOL) tablet 1,000 mg (1,000 mg Oral Given 7/14/23 0044)   cefepime 2 gm IVPB in 100 ml NS (MBP) (0 mg Intravenous Stopped 7/14/23 0216)   Vancomycin HCl 1,250 mg in sodium chloride 0.9 % 250 mL IVPB (0 mg Intravenous Stopped 7/14/23 0216)   sodium chloride 0.9 % bolus 1,000 mL (0 mL Intravenous Stopped 7/14/23 0217)     Labs Reviewed   COMPREHENSIVE METABOLIC PANEL - Abnormal; Notable for the following components:       Result Value    Glucose " 172 (*)     BUN 32 (*)     Creatinine 1.40 (*)     eGFR 51.1 (*)     All other components within normal limits    Narrative:     GFR Normal >60  Chronic Kidney Disease <60  Kidney Failure <15    The GFR formula is only valid for adults with stable renal function between ages 18 and 70.   URINALYSIS W/ CULTURE IF INDICATED - Abnormal; Notable for the following components:    Color, UA Dark Yellow (*)     Specific Gravity, UA 1.033 (*)     Glucose, UA >=1000 mg/dL (3+) (*)     Ketones, UA 15 mg/dL (1+) (*)     Blood, UA Moderate (2+) (*)     Protein, UA 30 mg/dL (1+) (*)     Leuk Esterase, UA Trace (*)     All other components within normal limits    Narrative:     In absence of clinical symptoms, the presence of pyuria, bacteria, and/or nitrites on the urinalysis result does not correlate with infection.   TROPONIN - Abnormal; Notable for the following components:    HS Troponin T 17 (*)     All other components within normal limits    Narrative:     High Sensitive Troponin T Reference Range:  <10.0 ng/L- Negative Female for AMI  <15.0 ng/L- Negative Male for AMI  >=10 - Abnormal Female indicating possible myocardial injury.  >=15 - Abnormal Male indicating possible myocardial injury.   Clinicians would have to utilize clinical acumen, EKG, Troponin, and serial changes to determine if it is an Acute Myocardial Infarction or myocardial injury due to an underlying chronic condition.        CBC WITH AUTO DIFFERENTIAL - Abnormal; Notable for the following components:    WBC 11.00 (*)     Neutrophil % 85.8 (*)     Lymphocyte % 4.4 (*)     Neutrophils, Absolute 9.40 (*)     Lymphocytes, Absolute 0.50 (*)     Monocytes, Absolute 1.00 (*)     All other components within normal limits   HIGH SENSITIVITIY TROPONIN T 2HR - Abnormal; Notable for the following components:    HS Troponin T 16 (*)     All other components within normal limits    Narrative:     High Sensitive Troponin T Reference Range:  <10.0 ng/L- Negative Female  for AMI  <15.0 ng/L- Negative Male for AMI  >=10 - Abnormal Female indicating possible myocardial injury.  >=15 - Abnormal Male indicating possible myocardial injury.   Clinicians would have to utilize clinical acumen, EKG, Troponin, and serial changes to determine if it is an Acute Myocardial Infarction or myocardial injury due to an underlying chronic condition.        URINALYSIS, MICROSCOPIC ONLY - Abnormal; Notable for the following components:    RBC, UA 21-30 (*)     WBC, UA 3-5 (*)     All other components within normal limits   BLOOD GAS, ARTERIAL - Abnormal; Notable for the following components:    pCO2, Arterial 50.8 (*)     pO2, Arterial 78.9 (*)     HCO3, Arterial 30.3 (*)     Base Excess, Arterial 4.2 (*)     CO2 Content 31.9 (*)     All other components within normal limits   RESPIRATORY PANEL PCR W/ COVID-19 (SARS-COV-2) DODIE/JOVITA/THEO/PAD/COR/MAD/RAJAT IN-HOUSE, NP SWAB IN [facility], 3-4 HR TAT - Normal    Narrative:     In the setting of a positive respiratory panel with a viral infection PLUS a negative procalcitonin without other underlying concern for bacterial infection, consider observing off antibiotics or discontinuation of antibiotics and continue supportive care. If the respiratory panel is positive for atypical bacterial infection (Bordetella pertussis, Chlamydophila pneumoniae, or Mycoplasma pneumoniae), consider antibiotic de-escalation to target atypical bacterial infection.   CK - Normal   POC LACTATE - Normal   BLOOD CULTURE   BLOOD CULTURE   RAINBOW DRAW    Narrative:     The following orders were created for panel order [place] Draw.  Procedure                               Abnormality         Status                     ---------                               -----------         ------                     Green Top (Gel)[[id number]]                                  Final result               Lavender Top[[id number]]                                     Final result               Gold Top -  SST[[id number]]                                   Final result               Light Blue Top[[id number]]                                   Final result                 Please view results for these tests on the individual orders.   BLOOD GAS, ARTERIAL   POC LACTATE   GREEN TOP   LAVENDER TOP   GOLD TOP - SST   LIGHT BLUE TOP   CBC AND DIFFERENTIAL    Narrative:     The following orders were created for panel order CBC & Differential.  Procedure                               Abnormality         Status                     ---------                               -----------         ------                     CBC Auto Differential[[id number]]        Abnormal            Final result                 Please view results for these tests on the individual orders.     CT Head Without Contrast   Final Result         1. No acute intracranial abnormality.      2. Mild/moderate volume loss and chronic microvascular ischemic changes. Arteriosclerosis.                Electronically signed by:  German Lewis M.D.     7/14/2023 1:08 AM Mountain Time      XR Chest 1 View   ED Interpretation   Interpreted by myself and Dr. Silveira shows no acute infiltrate or pneumothorax                                               Medical Decision Making  Chart Review: Neurology progress note reviewed from 6/3/2023 follow-up for peripheral neuropathy. taken from note:  Of note he has some oral-lingual dyskinesias and some parkinsonism which maybe related to history of antipsychotic use, he tells me he thinks symptoms have improved some since stopping the Abilify. He has not been evaluated by movement disorder specialist. He is scheduled to have a brain and cervical spine MRI on July 25    Comorbidity: As per past medical history  Differentials: Sepsis, pneumonia, UTI, viral illness, meningitis, stroke, brain tumor, lecture abnormality, dehydration     ;this list is not all inclusive and does not constitute the entirety of considered causes  EKG:  Interpreted by myself and her Raoul shows junctional tachycardia at 125 low voltage in precordial leads prolonged QT interval when compared to previous EKG from 4/3/2015 sinus rhythm and first-degree AV block was noted  Labs: As above  Radiology: My interpretation chest x-ray shows no acute infiltrate or pneumothorax, CT head shows no acute brain bleed.  CT head correlated with radiologist interpretation as below  CT Head Without Contrast   Final Result            1. No acute intracranial abnormality.        2. Mild/moderate volume loss and chronic microvascular ischemic changes. Arteriosclerosis.                     Electronically signed by:  German Lewis M.D.      7/14/2023 1:08 AM Mountain Time     XR Chest 1 View   ED Interpretation    Interpreted by myself and Dr. Silveira shows no acute infiltrate or pneumothorax       Disposition/Treatment:  Appropriate PPE was worn during exam and throughout all encounters with the patient.  When the ED IV was placed and labs were obtained patient was placed on proper monitors he was febrile upon arrival to the ED sepsis protocol was initiated patient had a normal lactic and blood cultures are pending.  Patient was given cefepime and bank for unknown source of possible infection.    POC lactic 1.3.  Lab results otherwise showed Respiratory panel negative.  CBC showed a white count of 11 no signs of anemia.  Initial troponin 17 pending repeat troponin.  CK normal at 32.  Metabolic panel showed glucose 172 BUN 32 creatinine 1.4 sodium 130 potassium 5.1 on chart review patient's creatinine has fluctuated between all 1.56 and 1.22 over the past 11 months.  Urinalysis showed 35 WBCs no bacteria but did have trace leukocyte esterase.    Chest x-ray showed no acute pneumonia, CT head showed no acute intracranial abnormality as above.    Attending also saw the patient at bedside.  Meningitis was considered because of his symptoms including fever but thought less likely cause of  his symptoms he had no meningeal signs noted on exam.    Findings were discussed with the patient at bedside will be admitted for further work-up of his weakness, multiple falls, and fever.  Patient was and agree with plan.  Patient be admitted with hospitalist group spoke to Elsyshiva Anjana NP     Problems Addressed:  Fever, unspecified fever cause: acute illness or injury  Weakness: acute illness or injury    Amount and/or Complexity of Data Reviewed  External Data Reviewed: notes.  Labs: ordered. Decision-making details documented in ED Course.  Radiology: ordered and independent interpretation performed. Decision-making details documented in ED Course.  ECG/medicine tests: ordered.  Discussion of management or test interpretation with external provider(s):  As above     Risk  OTC drugs.  Prescription drug management.  Decision regarding hospitalization.        Final diagnoses:   Weakness   Fever, unspecified fever cause       ED Disposition  ED Disposition       ED Disposition   Decision to Admit    Condition   --    Comment   Level of Care: Med/Surg [1]   Admitting Physician: VLADISLAV ANEDRSON [[id number]]   Attending Physician: VLADISLAV ANDERSON [[id number]]                 No follow-up provider specified.       Medication List      No changes were made to your prescriptions during this visit.            Eliseo Rashid PA  07/14/23 0345

## 2023-07-14 NOTE — ED NOTES
PT arrived via [facility] c/o multiple falls and generalized weakness since 0900. Pt also alert and orientated but forgetful and not baseline for pt. Pt also has involuntary facial movement from Abilify.

## 2023-07-14 NOTE — H&P
[facility] Medicine Services  History & Physical    Patient Name: Lux Bray  : 1945  MRN: [id number]  Primary Care Physician:  Montana Morrow MD  Date of admission: 2023  Date and Time of Service: 2023 at 0430    Subjective      Chief Complaint: generalized weakness    History of Present Illness: Lux Bray is a 77 y.o. male who presented to [facility] on 2023 complaining of generalized weakness and confusion.  Patient has past medical history of diabetes type 2, Parkinson, hypertension, depression and hyperlipidemia.  Per wife, patient has been increasingly weak needing more help and has been sleeping more.  Patient did not have an elevated temperature at home but showed increased confusion.  ED work-up revealed leukocytosis, respiratory panel is negative, UA showed trace leuk esterase, chest x-ray is negative for any cardiopulmonary process, EKG showed tachycardia, CT head is negative for acute intracranial abnormality.  Creatinine is 1.40 and potassium are borderline at 5.1.  Patient was given cefepime and vancomycin in the emergency room.  At the time of assessment patient is back to himself, able to answer most questions correctly and acknowledged that he was confused earlier.  Patient denies chest pain and shortness of breath, chills and fever, headache and cough.  Care plan discussed with patient who is agreeable, all questions answered.      Review of Systems   Musculoskeletal:  Positive for muscle weakness.   Genitourinary:  Positive for urgency.   Neurological:  Positive for weakness.   Psychiatric/Behavioral:  Positive for depression.       Personal History     Past Medical History:   Diagnosis Date    ADHD (attention deficit hyperactivity disorder)     Allergic     Arthritis     Colon polyp     Depression     Diabetes mellitus type 2    controlled by oral meds    HL (hearing loss)     Hyperlipidemia     Hypertension        Past Surgical  History:   Procedure Laterality Date    COLONOSCOPY      EYE SURGERY  2023    Cataracts removed: both eyes    HAND SURGERY  1980 1980-1990- [employer]. Abstracted from [employer].    HEMORROIDECTOMY  1984    [employer]. Abstracted from [employer].    TONSILLECTOMY  1940's    VASECTOMY         Family History: family history includes Diabetes in his father and mother; Stroke in his mother. Otherwise pertinent FHx was reviewed and not pertinent to current issue.    Social History:  reports that he quit smoking about 52 years ago. His smoking use included cigarettes. He has a 1.25 pack-year smoking history. He has never used smokeless tobacco. He reports current alcohol use of about 2.0 standard drinks per week. He reports that he does not use drugs.    Home Medications:  Prior to Admission Medications       Prescriptions Last Dose Informant Patient Reported? Taking?    amphetamine-dextroamphetamine XR (Adderall XR) 20 MG 24 hr capsule   No No    Take 1 capsule by mouth Every Morning for 30 days    atorvastatin (LIPITOR) 20 MG tablet   No No    TAKE 1 TABLET DAILY    dicyclomine (BENTYL) 10 MG capsule   No No    TAKE 1 TO 2 CAPSULES EVERY 6 HOURS AS NEEDED FOR ABDOMINAL PAIN AND CRAMPING    DULoxetine (CYMBALTA) 30 MG capsule   No No    TAKE 3 CAPSULES DAILY    glimepiride (AMARYL) 2 MG tablet   No No    TAKE 3 TABLETS EVERY MORNING BEFORE BREAKFAST    HYDROcodone-acetaminophen (NORCO)  MG per tablet   No No    Take 1 tablet every 4 hours prn MAX 4 tablets /day    Ibuprofen 3 %, Gabapentin 10 %, Baclofen 2 %, lidocaine 4 %, Ketamine HCl 4 %   No No    Apply 1-2 g topically to the appropriate area as directed 3 (Three) to 4 (Four) times daily.    Jardiance 10 MG tablet tablet   No No    TAKE 1 TABLET BY MOUTH EVERY MORNING BEFORE BREAKFAST    metFORMIN (GLUCOPHAGE) 500 MG tablet   No No    TAKE 2 TABLETS TWICE A DAY    pantoprazole (PROTONIX) 40 MG EC tablet   No No    TAKE 1 TABLET DAILY    sucralfate (Carafate) 1  GM/10ML suspension   No No    TAKE 10 ML THREE TIMES A DAY AS NEEDED FOR HEARTBURN/ABDOMINAL PAIN    trandolapril-verapamil (TARKA) 2-240 MG per CR tablet   No No    Take 1 tablet by mouth Daily for 90 days.              Allergies:  No Known Allergies    Objective      Vitals:   Temp:  [98.1 °F (36.7 °C)-100.8 °F (38.2 °C)] 98.1 °F (36.7 °C)  Heart Rate:  [103-122] 103  Resp:  [16] 16  BP: ()/(58-78) 115/65  Flow (L/min):  [2] 2    Physical Exam  HENT:      Head: Normocephalic.   Cardiovascular:      Rate and Rhythm: Tachycardia present.   Pulmonary:      Effort: Pulmonary effort is normal.   Abdominal:      General: Abdomen is flat. Bowel sounds are normal.      Palpations: Abdomen is soft.   Musculoskeletal:      Cervical back: Normal range of motion.   Neurological:      Mental Status: He is alert. Mental status is at baseline.   Psychiatric:         Mood and Affect: Mood normal.        Result Review    Result Review:  I have personally reviewed the results from the time of this admission to 7/14/2023 04:39 EDT and agree with these findings:  [x]  Laboratory  [x]  Microbiology  [x]  Radiology  [x]  EKG/Telemetry   [x]  Cardiology/Vascular   []  Pathology  []  Old records  []  Other:  Most notable findings include:       Assessment & Plan        Active Hospital Problems:  Active Hospital Problems    Diagnosis     **Generalized weakness     Type 2 diabetes mellitus     Encephalopathy, metabolic     Leukocytosis      Plan:   1.  Generalized weakness  #Debility likely related to depression and abrupt stopping of psych meds  #Decreased appetite, encourage 3 meals a day  #PT OT to see patient  2.  Type 2 diabetes  #We will start on sliding scale insulin, Accu-Chek with meals  #Last A1c was 6.8  3.  Encephalopathy  #Now resolved.    4.  Sepsis  #With elevated white blood cells and rapid heart rate and suspicion for infection patient met criteria for sepsis.  Patient was given vancomycin and cefepime in the ED.   However no source of infection has been found so far.  Will await for urine cultures and blood cultures.  We will maintain patient on cefepime.  5.  UTI  #Urinalysis not convincing for urinary tract infection.  Cultures pending.  #We will obtain bladder scans, postvoid bladder scans.  #Patient did complain of burning on urination.  6.  Leukocytosis  #White blood cells elevated likely from an infection.  #Will monitor with daily labs.  Vitals every 4.  #We will maintain patient on IV fluid.  7.  Acute on chronic kidney injury  #Creatinine at 1.40.  Creatinine was 1.3 last February  #We will hydrate.  Bladder scan to rule out urinary retention    DVT prophylaxis:  Heparin subQ    CODE STATUS:    Level Of Support Discussed With: Patient  Code Status (Patient has no pulse and is not breathing): CPR (Attempt to Resuscitate)  Medical Interventions (Patient has pulse or is breathing): Full Support    Admission Status:  I believe this patient meets inpatient status.          Signature: Electronically signed by JONATHAN Bowers, 07/14/23, 04:39 EDT.  [facility] Hospitalist Team

## 2023-07-14 NOTE — ED NOTES
Nursing report ED to floor  Lux Bray  77 y.o.  male    HPI:   Chief Complaint   Patient presents with    Weakness - Generalized    Fall       Admitting doctor:   Javed DIAZ MD    Admitting diagnosis:   The primary encounter diagnosis was Weakness. A diagnosis of Fever, unspecified fever cause was also pertinent to this visit.    Code status:   Current Code Status       Date Active Code Status Order ID Comments User Context       7/14/2023 0421 CPR (Attempt to Resuscitate) [id number]  Buck Casarez, APRN ED        Question Answer    Code Status (Patient has no pulse and is not breathing) CPR (Attempt to Resuscitate)    Medical Interventions (Patient has pulse or is breathing) Full Support    Level Of Support Discussed With Patient                    Allergies:   Patient has no known allergies.    Isolation:  No active isolations     Fall Risk:  Fall Risk Assessment was completed, and patient is at moderate risk for falls.   Predictive Model Details         12 (Low) Factor Value    Calculated 7/14/2023 02:06 Age 77    Risk of Fall Model Musculoskeletal Assessment WDL     Active Peripheral IV Present     Imaging order in this encounter Present     Skin Assessment WDL     Magnesium not on file     Number of Distinct Medication Classes administered 4     Financial Class Medicare     Diastolic BP 64     Drug Use No     Tobacco Use Quit     Braxton Scale not on file     Albumin 3.6 g/dL     Chloride 98 mmol/L     Peripheral Vascular Assessment WDL     Creatinine 1.4 mg/dL     Clinically Relevant Sex Not Female     Gastrointestinal Assessment WDL     Cardiac Assessment WDL     Respiratory Rate 16     Potassium 5.1 mmol/L     Total Bilirubin 0.4 mg/dL     Days after Admission 0.096     Calcium 9.6 mg/dL     ALT 18 U/L         Weight:       07/13/23  1226   Weight: 68.5 kg (151 lb)       Intake and Output  No intake or output data in the 24 hours ending 07/14/23 1914    Diet:   Dietary Orders (From admission, onward)        Start     Ordered    07/14/23 0419  Diet: Diabetic Diets; Consistent Carbohydrate; Texture: Regular Texture (IDDSI 7); Fluid Consistency: Thin (IDDSI 0)  Diet Effective Now        References:    Diet Order [place]   Question Answer Comment   Diets: Diabetic Diets    Diabetic Diet: Consistent Carbohydrate    Texture: Regular Texture (IDDSI 7)    Fluid Consistency: Thin (IDDSI 0)        07/14/23 0421                     Most recent vitals:   Vitals:    07/14/23 0346 07/14/23 0401 07/14/23 0415 07/14/23 0428   BP: 110/60 95/62 101/62 115/65   Pulse: 106 106 106 103   Resp:       Temp:       TempSrc:       SpO2: 96% 95% 96% 96%   Weight:       Height:           Active LDAs/IV Access:   Lines, Drains & Airways       Active LDAs       Name Placement date Placement time Site Days    Peripheral IV 07/14/23 0007 Right Antecubital 07/14/23 0007  Antecubital  less than 1    Peripheral IV 07/14/23 0047 Anterior;Distal;Left;Upper Arm 07/14/23 0047  Arm  less than 1                    Skin Condition:   Skin Assessments (last day)       None             Labs (abnormal labs have a star):   Labs Reviewed   COMPREHENSIVE METABOLIC PANEL - Abnormal; Notable for the following components:       Result Value    Glucose 172 (*)     BUN 32 (*)     Creatinine 1.40 (*)     eGFR 51.1 (*)     All other components within normal limits    Narrative:     GFR Normal >60  Chronic Kidney Disease <60  Kidney Failure <15    The GFR formula is only valid for adults with stable renal function between ages 18 and 70.   URINALYSIS W/ CULTURE IF INDICATED - Abnormal; Notable for the following components:    Color, UA Dark Yellow (*)     Specific Gravity, UA 1.033 (*)     Glucose, UA >=1000 mg/dL (3+) (*)     Ketones, UA 15 mg/dL (1+) (*)     Blood, UA Moderate (2+) (*)     Protein, UA 30 mg/dL (1+) (*)     Leuk Esterase, UA Trace (*)     All other components within normal limits    Narrative:     In absence of clinical symptoms, the presence of  pyuria, bacteria, and/or nitrites on the urinalysis result does not correlate with infection.   TROPONIN - Abnormal; Notable for the following components:    HS Troponin T 17 (*)     All other components within normal limits    Narrative:     High Sensitive Troponin T Reference Range:  <10.0 ng/L- Negative Female for AMI  <15.0 ng/L- Negative Male for AMI  >=10 - Abnormal Female indicating possible myocardial injury.  >=15 - Abnormal Male indicating possible myocardial injury.   Clinicians would have to utilize clinical acumen, EKG, Troponin, and serial changes to determine if it is an Acute Myocardial Infarction or myocardial injury due to an underlying chronic condition.        CBC WITH AUTO DIFFERENTIAL - Abnormal; Notable for the following components:    WBC 11.00 (*)     Neutrophil % 85.8 (*)     Lymphocyte % 4.4 (*)     Neutrophils, Absolute 9.40 (*)     Lymphocytes, Absolute 0.50 (*)     Monocytes, Absolute 1.00 (*)     All other components within normal limits   HIGH SENSITIVITIY TROPONIN T 2HR - Abnormal; Notable for the following components:    HS Troponin T 16 (*)     All other components within normal limits    Narrative:     High Sensitive Troponin T Reference Range:  <10.0 ng/L- Negative Female for AMI  <15.0 ng/L- Negative Male for AMI  >=10 - Abnormal Female indicating possible myocardial injury.  >=15 - Abnormal Male indicating possible myocardial injury.   Clinicians would have to utilize clinical acumen, EKG, Troponin, and serial changes to determine if it is an Acute Myocardial Infarction or myocardial injury due to an underlying chronic condition.        URINALYSIS, MICROSCOPIC ONLY - Abnormal; Notable for the following components:    RBC, UA 21-30 (*)     WBC, UA 3-5 (*)     All other components within normal limits   BLOOD GAS, ARTERIAL - Abnormal; Notable for the following components:    pCO2, Arterial 50.8 (*)     pO2, Arterial 78.9 (*)     HCO3, Arterial 30.3 (*)     Base Excess, Arterial  4.2 (*)     CO2 Content 31.9 (*)     All other components within normal limits   RESPIRATORY PANEL PCR W/ COVID-19 (SARS-COV-2) DODIE/JOVITA/THEO/PAD/COR/MAD/RAJAT IN-HOUSE, NP SWAB IN [facility], 3-4 HR TAT - Normal    Narrative:     In the setting of a positive respiratory panel with a viral infection PLUS a negative procalcitonin without other underlying concern for bacterial infection, consider observing off antibiotics or discontinuation of antibiotics and continue supportive care. If the respiratory panel is positive for atypical bacterial infection (Bordetella pertussis, Chlamydophila pneumoniae, or Mycoplasma pneumoniae), consider antibiotic de-escalation to target atypical bacterial infection.   CK - Normal   POC LACTATE - Normal   BLOOD CULTURE   BLOOD CULTURE   URINE CULTURE   RAINBOW DRAW    Narrative:     The following orders were created for panel order [place] Draw.  Procedure                               Abnormality         Status                     ---------                               -----------         ------                     Green Top (Gel)[[id number]]                                  Final result               Lavender Top[[id number]]                                     Final result               Gold Top - SST[[id number]]                                   Final result               Light Blue Top[[id number]]                                   Final result                 Please view results for these tests on the individual orders.   BLOOD GAS, ARTERIAL   BASIC METABOLIC PANEL   CBC WITH AUTO DIFFERENTIAL   POC LACTATE   GREEN TOP   LAVENDER TOP   GOLD TOP - SST   LIGHT BLUE TOP   CBC AND DIFFERENTIAL    Narrative:     The following orders were created for panel order CBC & Differential.  Procedure                               Abnormality         Status                     ---------                               -----------         ------                     CBC Auto Differential[[id number]]         Abnormal            Final result                 Please view results for these tests on the individual orders.       LOC: Person, Place, Time, and Situation    Telemetry:  Med/Surg    Cardiac Monitoring Ordered: yes    EKG:   ECG 12 Lead Other; Generalized Weakness   Preliminary Result   HEART RATE= 125  bpm   RR Interval= 480  ms   MA Interval=   ms   P Horizontal Axis=   deg   P Front Axis=   deg   QRSD Interval= 96  ms   QT Interval= 360  ms   QRS Axis= 55  deg   T Wave Axis= 66  deg   - ABNORMAL ECG -   Junctional tachycardia   Low voltage, precordial leads   Prolonged QT interval   Electronically Signed By:    Date and Time of Study: 2023-07-13 23:59:10          Medications Given in the ED:   Medications   sodium chloride 0.9 % flush 10 mL (has no administration in time range)   sodium chloride 0.9 % flush 10 mL (has no administration in time range)   sodium chloride 0.9 % flush 10 mL (has no administration in time range)   sodium chloride 0.9 % infusion 40 mL (has no administration in time range)   acetaminophen (TYLENOL) 160 MG/5ML solution 650 mg (has no administration in time range)   sennosides-docusate (PERICOLACE) 8.6-50 MG per tablet 2 tablet (has no administration in time range)     And   polyethylene glycol (MIRALAX) packet 17 g (has no administration in time range)     And   bisacodyl (DULCOLAX) EC tablet 5 mg (has no administration in time range)     And   bisacodyl (DULCOLAX) suppository 10 mg (has no administration in time range)   ondansetron (ZOFRAN) injection 4 mg (has no administration in time range)   melatonin tablet 5 mg (has no administration in time range)   heparin (porcine) 5000 UNIT/ML injection 5,000 Units (has no administration in time range)   sodium chloride 0.9 % infusion (has no administration in time range)   Potassium Replacement - Follow Nurse / BPA Driven Protocol (has no administration in time range)   Magnesium Standard Dose Replacement - Follow Nurse / BPA Driven Protocol  (has no administration in time range)   Phosphorus Replacement - Follow Nurse / BPA Driven Protocol (has no administration in time range)   Calcium Replacement - Follow Nurse / BPA Driven Protocol (has no administration in time range)   acetaminophen (TYLENOL) tablet 1,000 mg (1,000 mg Oral Given 23 0044)   cefepime 2 gm IVPB in 100 ml NS (MBP) (0 mg Intravenous Stopped 23)   Vancomycin HCl 1,250 mg in sodium chloride 0.9 % 250 mL IVPB (0 mg Intravenous Stopped 23)   sodium chloride 0.9 % bolus 1,000 mL (0 mL Intravenous Stopped 23)       Imaging results:  CT Head Without Contrast    Result Date: 2023  1. No acute intracranial abnormality. 2. Mild/moderate volume loss and chronic microvascular ischemic changes. Arteriosclerosis.  Electronically signed by:  German Lewis M.D.  2023 1:08 AM Mountain Time     Social issues:   Social History     Socioeconomic History    Marital status:    Tobacco Use    Smoking status: Former     Packs/day: 0.25     Years: 5.00     Pack years: 1.25     Types: Cigarettes     Quit date: 2/10/1971     Years since quittin.4    Smokeless tobacco: Never   Substance and Sexual Activity    Alcohol use: Yes     Alcohol/week: 2.0 standard drinks     Types: 1 Glasses of wine, 1 Cans of beer per week     Comment: social    Drug use: No    Sexual activity: Yes     Partners: Female     Birth control/protection: None       NIH Stroke Scale:  Interval: (not recorded)  1a. Level of Consciousness: (not recorded)  1b. LOC Questions: (not recorded)  1c. LOC Commands: (not recorded)  2. Best Gaze: (not recorded)  3. Visual: (not recorded)  4. Facial Palsy: (not recorded)  5a. Motor Arm, Left: (not recorded)  5b. Motor Arm, Right: (not recorded)  6a. Motor Leg, Left: (not recorded)  6b. Motor Leg, Right: (not recorded)  7. Limb Ataxia: (not recorded)  8. Sensory: (not recorded)  9. Best Language: (not recorded)  10. Dysarthria: (not recorded)  11.  Extinction and Inattention (formerly Neglect): (not recorded)    Total (NIH Stroke Scale): (not recorded)     Additional notable assessment information:NA     Nursing report ED to floor:  JUAN RAMON Jordan RN   07/14/23 04:34 EDT

## 2023-07-15 LAB
ANION GAP SERPL CALCULATED.3IONS-SCNC: 12 MMOL/L (ref 5–15)
ANION GAP SERPL CALCULATED.3IONS-SCNC: 13 MMOL/L (ref 5–15)
BACTERIA SPEC AEROBE CULT: NO GROWTH
BASOPHILS # BLD AUTO: 0 10*3/MM3 (ref 0–0.2)
BASOPHILS # BLD AUTO: 0 10*3/MM3 (ref 0–0.2)
BASOPHILS NFR BLD AUTO: 0.4 % (ref 0–1.5)
BASOPHILS NFR BLD AUTO: 0.5 % (ref 0–1.5)
BUN SERPL-MCNC: 22 MG/DL (ref 8–23)
BUN SERPL-MCNC: 23 MG/DL (ref 8–23)
BUN/CREAT SERPL: 22 (ref 7–25)
BUN/CREAT SERPL: 23.2 (ref 7–25)
CALCIUM SPEC-SCNC: 9 MG/DL (ref 8.6–10.5)
CALCIUM SPEC-SCNC: 9.1 MG/DL (ref 8.6–10.5)
CHLORIDE SERPL-SCNC: 100 MMOL/L (ref 98–107)
CHLORIDE SERPL-SCNC: 101 MMOL/L (ref 98–107)
CO2 SERPL-SCNC: 25 MMOL/L (ref 22–29)
CO2 SERPL-SCNC: 25 MMOL/L (ref 22–29)
CREAT SERPL-MCNC: 0.99 MG/DL (ref 0.76–1.27)
CREAT SERPL-MCNC: 1 MG/DL (ref 0.76–1.27)
DEPRECATED RDW RBC AUTO: 39.8 FL (ref 37–54)
DEPRECATED RDW RBC AUTO: 40.7 FL (ref 37–54)
EGFRCR SERPLBLD CKD-EPI 2021: 76.6 ML/MIN/1.73
EGFRCR SERPLBLD CKD-EPI 2021: 77.5 ML/MIN/1.73
EOSINOPHIL # BLD AUTO: 0.1 10*3/MM3 (ref 0–0.4)
EOSINOPHIL # BLD AUTO: 0.1 10*3/MM3 (ref 0–0.4)
EOSINOPHIL NFR BLD AUTO: 0.8 % (ref 0.3–6.2)
EOSINOPHIL NFR BLD AUTO: 1 % (ref 0.3–6.2)
ERYTHROCYTE [DISTWIDTH] IN BLOOD BY AUTOMATED COUNT: 12.5 % (ref 12.3–15.4)
ERYTHROCYTE [DISTWIDTH] IN BLOOD BY AUTOMATED COUNT: 12.7 % (ref 12.3–15.4)
GLUCOSE BLDC GLUCOMTR-MCNC: 110 MG/DL (ref 70–105)
GLUCOSE BLDC GLUCOMTR-MCNC: 148 MG/DL (ref 70–105)
GLUCOSE BLDC GLUCOMTR-MCNC: 64 MG/DL (ref 70–105)
GLUCOSE BLDC GLUCOMTR-MCNC: 87 MG/DL (ref 70–105)
GLUCOSE SERPL-MCNC: 49 MG/DL (ref 65–99)
GLUCOSE SERPL-MCNC: 80 MG/DL (ref 65–99)
HCT VFR BLD AUTO: 36.1 % (ref 37.5–51)
HCT VFR BLD AUTO: 36.9 % (ref 37.5–51)
HGB BLD-MCNC: 11.9 G/DL (ref 13–17.7)
HGB BLD-MCNC: 11.9 G/DL (ref 13–17.7)
LYMPHOCYTES # BLD AUTO: 1.4 10*3/MM3 (ref 0.7–3.1)
LYMPHOCYTES # BLD AUTO: 1.7 10*3/MM3 (ref 0.7–3.1)
LYMPHOCYTES NFR BLD AUTO: 17.8 % (ref 19.6–45.3)
LYMPHOCYTES NFR BLD AUTO: 21.6 % (ref 19.6–45.3)
MCH RBC QN AUTO: 29.5 PG (ref 26.6–33)
MCH RBC QN AUTO: 30.2 PG (ref 26.6–33)
MCHC RBC AUTO-ENTMCNC: 32.2 G/DL (ref 31.5–35.7)
MCHC RBC AUTO-ENTMCNC: 32.9 G/DL (ref 31.5–35.7)
MCV RBC AUTO: 91.5 FL (ref 79–97)
MCV RBC AUTO: 91.7 FL (ref 79–97)
MONOCYTES # BLD AUTO: 0.9 10*3/MM3 (ref 0.1–0.9)
MONOCYTES # BLD AUTO: 1.1 10*3/MM3 (ref 0.1–0.9)
MONOCYTES NFR BLD AUTO: 11.6 % (ref 5–12)
MONOCYTES NFR BLD AUTO: 13.7 % (ref 5–12)
NEUTROPHILS NFR BLD AUTO: 5.2 10*3/MM3 (ref 1.7–7)
NEUTROPHILS NFR BLD AUTO: 5.4 10*3/MM3 (ref 1.7–7)
NEUTROPHILS NFR BLD AUTO: 65.5 % (ref 42.7–76)
NEUTROPHILS NFR BLD AUTO: 67.1 % (ref 42.7–76)
NRBC BLD AUTO-RTO: 0 /100 WBC (ref 0–0.2)
NRBC BLD AUTO-RTO: 0.2 /100 WBC (ref 0–0.2)
PLATELET # BLD AUTO: 282 10*3/MM3 (ref 140–450)
PLATELET # BLD AUTO: 285 10*3/MM3 (ref 140–450)
PMV BLD AUTO: 7.2 FL (ref 6–12)
PMV BLD AUTO: 7.6 FL (ref 6–12)
POTASSIUM SERPL-SCNC: 4.1 MMOL/L (ref 3.5–5.2)
POTASSIUM SERPL-SCNC: 4.3 MMOL/L (ref 3.5–5.2)
RBC # BLD AUTO: 3.94 10*6/MM3 (ref 4.14–5.8)
RBC # BLD AUTO: 4.04 10*6/MM3 (ref 4.14–5.8)
SODIUM SERPL-SCNC: 137 MMOL/L (ref 136–145)
SODIUM SERPL-SCNC: 139 MMOL/L (ref 136–145)
WBC NRBC COR # BLD: 8 10*3/MM3 (ref 3.4–10.8)
WBC NRBC COR # BLD: 8 10*3/MM3 (ref 3.4–10.8)

## 2023-07-15 PROCEDURE — 0 CEFEPIME PER 500 MG: Performed by: NURSE PRACTITIONER

## 2023-07-15 PROCEDURE — 85025 COMPLETE CBC W/AUTO DIFF WBC: CPT | Performed by: NURSE PRACTITIONER

## 2023-07-15 PROCEDURE — 80048 BASIC METABOLIC PNL TOTAL CA: CPT | Performed by: NURSE PRACTITIONER

## 2023-07-15 PROCEDURE — 85025 COMPLETE CBC W/AUTO DIFF WBC: CPT | Performed by: INTERNAL MEDICINE

## 2023-07-15 PROCEDURE — 36415 COLL VENOUS BLD VENIPUNCTURE: CPT | Performed by: NURSE PRACTITIONER

## 2023-07-15 PROCEDURE — 82948 REAGENT STRIP/BLOOD GLUCOSE: CPT

## 2023-07-15 PROCEDURE — 94660 CPAP INITIATION&MGMT: CPT

## 2023-07-15 PROCEDURE — 94799 UNLISTED PULMONARY SVC/PX: CPT

## 2023-07-15 PROCEDURE — 25010000002 HEPARIN (PORCINE) PER 1000 UNITS: Performed by: NURSE PRACTITIONER

## 2023-07-15 PROCEDURE — 80048 BASIC METABOLIC PNL TOTAL CA: CPT | Performed by: INTERNAL MEDICINE

## 2023-07-15 RX ORDER — DULOXETIN HYDROCHLORIDE 30 MG/1
90 CAPSULE, DELAYED RELEASE ORAL DAILY
Status: DISCONTINUED | OUTPATIENT
Start: 2023-07-15 | End: 2023-07-17 | Stop reason: HOSPADM

## 2023-07-15 RX ORDER — PANTOPRAZOLE SODIUM 40 MG/1
40 TABLET, DELAYED RELEASE ORAL DAILY
Status: DISCONTINUED | OUTPATIENT
Start: 2023-07-15 | End: 2023-07-17 | Stop reason: HOSPADM

## 2023-07-15 RX ORDER — ATORVASTATIN CALCIUM 20 MG/1
20 TABLET, FILM COATED ORAL DAILY
Status: DISCONTINUED | OUTPATIENT
Start: 2023-07-15 | End: 2023-07-17 | Stop reason: HOSPADM

## 2023-07-15 RX ORDER — VERAPAMIL HYDROCHLORIDE 240 MG/1
240 TABLET, FILM COATED, EXTENDED RELEASE ORAL
Status: DISCONTINUED | OUTPATIENT
Start: 2023-07-15 | End: 2023-07-17 | Stop reason: HOSPADM

## 2023-07-15 RX ORDER — LISINOPRIL 20 MG/1
20 TABLET ORAL
Status: DISCONTINUED | OUTPATIENT
Start: 2023-07-15 | End: 2023-07-17 | Stop reason: HOSPADM

## 2023-07-15 RX ORDER — SUCRALFATE 1 G/1
1 TABLET ORAL
Status: DISCONTINUED | OUTPATIENT
Start: 2023-07-15 | End: 2023-07-17 | Stop reason: HOSPADM

## 2023-07-15 RX ORDER — DICYCLOMINE HYDROCHLORIDE 10 MG/1
10 CAPSULE ORAL 4 TIMES DAILY
Status: DISCONTINUED | OUTPATIENT
Start: 2023-07-15 | End: 2023-07-17 | Stop reason: HOSPADM

## 2023-07-15 RX ADMIN — DICYCLOMINE HYDROCHLORIDE 10 MG: 10 CAPSULE ORAL at 17:19

## 2023-07-15 RX ADMIN — CEFEPIME 2000 MG: 2 INJECTION, POWDER, FOR SOLUTION INTRAVENOUS at 09:49

## 2023-07-15 RX ADMIN — ATORVASTATIN CALCIUM 20 MG: 20 TABLET, FILM COATED ORAL at 16:39

## 2023-07-15 RX ADMIN — HEPARIN SODIUM 5000 UNITS: 5000 INJECTION INTRAVENOUS; SUBCUTANEOUS at 05:45

## 2023-07-15 RX ADMIN — SUCRALFATE 1 G: 1 TABLET ORAL at 20:29

## 2023-07-15 RX ADMIN — HEPARIN SODIUM 5000 UNITS: 5000 INJECTION INTRAVENOUS; SUBCUTANEOUS at 13:12

## 2023-07-15 RX ADMIN — SUCRALFATE 1 G: 1 TABLET ORAL at 16:39

## 2023-07-15 RX ADMIN — SODIUM CHLORIDE 100 ML/HR: 9 INJECTION, SOLUTION INTRAVENOUS at 10:06

## 2023-07-15 RX ADMIN — HEPARIN SODIUM 5000 UNITS: 5000 INJECTION INTRAVENOUS; SUBCUTANEOUS at 21:03

## 2023-07-15 RX ADMIN — Medication 10 ML: at 08:35

## 2023-07-15 RX ADMIN — PANTOPRAZOLE SODIUM 40 MG: 40 TABLET, DELAYED RELEASE ORAL at 16:39

## 2023-07-15 RX ADMIN — Medication 10 ML: at 20:29

## 2023-07-15 RX ADMIN — DICYCLOMINE HYDROCHLORIDE 10 MG: 10 CAPSULE ORAL at 20:29

## 2023-07-15 RX ADMIN — DEXTROSE MONOHYDRATE 25 G: 25 INJECTION, SOLUTION INTRAVENOUS at 05:45

## 2023-07-15 RX ADMIN — DOCUSATE SODIUM 50 MG AND SENNOSIDES 8.6 MG 2 TABLET: 8.6; 5 TABLET, FILM COATED ORAL at 08:35

## 2023-07-15 RX ADMIN — VERAPAMIL HYDROCHLORIDE 240 MG: 240 TABLET, FILM COATED, EXTENDED RELEASE ORAL at 17:19

## 2023-07-15 RX ADMIN — LISINOPRIL 20 MG: 20 TABLET ORAL at 16:39

## 2023-07-15 RX ADMIN — DULOXETINE HYDROCHLORIDE 90 MG: 30 CAPSULE, DELAYED RELEASE ORAL at 16:39

## 2023-07-15 NOTE — PLAN OF CARE
Goal Outcome Evaluation:        Patient becomes apneic when sleeping. Dr. Espinosa was informed and instructed me to call RT to place patient on CPAP. Patient refused CPAP and patient was made aware of risks involved with refusal. Vital signs are currently stable and patient is currently resting in bed with no complaints. Will continue to monitor and follow plan of care.

## 2023-07-15 NOTE — PROGRESS NOTES
[facility]     Progress Note    Patient Name: Lux Bray  : 1945  MRN: [id number]  Primary Care Physician:  Montana Morrow MD  Date of admission: 2023  Service date and time: 07/15/23 14:51 EDT  Subjective   Subjective     Chief Complaint:  generalized weakness     HPI:  Patient Reports patient is lethargic this morning       Objective   Objective     Vitals:   Temp:  [97.3 °F (36.3 °C)-98.6 °F (37 °C)] 98.6 °F (37 °C)  Heart Rate:  [] 81  Resp:  [12-21] 21  BP: ()/(48-81) 135/75  Flow (L/min):  [2] 2  Physical Exam    Constitutional: lethargic   Eyes: PERRLA, sclerae anicteric, no conjunctival injection   HENT: NCAT, mucous membranes moist   Neck: Supple, no thyromegaly, no lymphadenopathy, trachea midline   Respiratory: Clear to auscultation bilaterally, nonlabored respirations    Cardiovascular: RRR, no murmurs, rubs, or gallops, palpable pedal pulses bilaterally   Gastrointestinal: Positive bowel sounds, soft, nontender, nondistended   Musculoskeletal: No bilateral ankle edema, no clubbing or cyanosis to extremities   Psychiatric: Appropriate affect, cooperative   Neurologic: Oriented x 3, strength symmetric in all extremities, Cranial Nerves grossly intact to confrontation, speech clear   Skin: No rashes     Result Review    Result Review:  I have personally reviewed the results from the time of this admission to 7/15/2023 14:51 EDT and agree with these findings:  []  Laboratory list / accordion  []  Microbiology  []  Radiology  []  EKG/Telemetry   []  Cardiology/Vascular   []  Pathology  []  Old records  []  Other:  Most notable findings include:       Assessment & Plan   Assessment / Plan       Active Hospital Problems:  Active Hospital Problems    Diagnosis     **Generalized weakness     Encephalopathy, metabolic     Leukocytosis     Sepsis     Type 2 diabetes mellitus      Plan:    1.  Generalized weakness  #Debility likely related to depression and abrupt stopping of  psych meds  #Decreased appetite, encourage 3 meals a day  #PT OT evaluation  2.  Type 2 diabetes  #We will start on sliding scale insulin, Accu-Chek with meals  #Last A1c was 6.8  3.  Encephalopathy  #Now resolved.    4.  Sepsis: present on admission  #With elevated white blood cells and rapid heart rate and suspicion for infection patient met criteria for sepsis.  Patient was given vancomycin and cefepime in the ED.  However no source of infection has been found so far.  Will await for urine cultures and blood cultures.  We will maintain patient on cefepime.  5.  Acute Cystitis without hematuria  #Urinalysis not convincing for urinary tract infection.  Cultures pending.  Started on Cefepime  #We will obtain bladder scans, postvoid bladder scans.  #Patient did complain of burning on urination.  6.  Leukocytosis: resolved  #White blood cells elevated likely from an infection.  #Will monitor with daily labs.  Vitals every 4.  #We will maintain patient on IV fluid.  7.  Acute on chronic kidney injury:resolved   #Creatinine at 0.99  Creatinine was 1.3 last February  #We will hydrate.  Bladder scan to rule out urinary retention    DVT prophylaxis:  Medical DVT prophylaxis orders are present.    CODE STATUS:   Level Of Support Discussed With: Patient  Code Status (Patient has no pulse and is not breathing): CPR (Attempt to Resuscitate)  Medical Interventions (Patient has pulse or is breathing): Full Support    Disposition:  I expect patient to be discharged in 2 to 3 days .    Luciano Espinosa MD

## 2023-07-15 NOTE — NURSING NOTE
Patient has been stable overnight. Morning glucose was 49 amp of D50 given recheck was 147. Will continue to monitor.

## 2023-07-16 LAB
ANION GAP SERPL CALCULATED.3IONS-SCNC: 11 MMOL/L (ref 5–15)
BASOPHILS # BLD AUTO: 0 10*3/MM3 (ref 0–0.2)
BASOPHILS NFR BLD AUTO: 0.3 % (ref 0–1.5)
BUN SERPL-MCNC: 13 MG/DL (ref 8–23)
BUN/CREAT SERPL: 15.7 (ref 7–25)
CALCIUM SPEC-SCNC: 9 MG/DL (ref 8.6–10.5)
CHLORIDE SERPL-SCNC: 101 MMOL/L (ref 98–107)
CO2 SERPL-SCNC: 26 MMOL/L (ref 22–29)
CREAT SERPL-MCNC: 0.83 MG/DL (ref 0.76–1.27)
DEPRECATED RDW RBC AUTO: 39.4 FL (ref 37–54)
EGFRCR SERPLBLD CKD-EPI 2021: 89 ML/MIN/1.73
EOSINOPHIL # BLD AUTO: 0.1 10*3/MM3 (ref 0–0.4)
EOSINOPHIL NFR BLD AUTO: 1.1 % (ref 0.3–6.2)
ERYTHROCYTE [DISTWIDTH] IN BLOOD BY AUTOMATED COUNT: 12.4 % (ref 12.3–15.4)
GLUCOSE BLDC GLUCOMTR-MCNC: 113 MG/DL (ref 70–105)
GLUCOSE SERPL-MCNC: 106 MG/DL (ref 65–99)
HCT VFR BLD AUTO: 36.8 % (ref 37.5–51)
HGB BLD-MCNC: 12.2 G/DL (ref 13–17.7)
LYMPHOCYTES # BLD AUTO: 1.8 10*3/MM3 (ref 0.7–3.1)
LYMPHOCYTES NFR BLD AUTO: 23.6 % (ref 19.6–45.3)
MCH RBC QN AUTO: 30.1 PG (ref 26.6–33)
MCHC RBC AUTO-ENTMCNC: 33 G/DL (ref 31.5–35.7)
MCV RBC AUTO: 91 FL (ref 79–97)
MONOCYTES # BLD AUTO: 0.9 10*3/MM3 (ref 0.1–0.9)
MONOCYTES NFR BLD AUTO: 12.5 % (ref 5–12)
NEUTROPHILS NFR BLD AUTO: 4.7 10*3/MM3 (ref 1.7–7)
NEUTROPHILS NFR BLD AUTO: 62.5 % (ref 42.7–76)
NRBC BLD AUTO-RTO: 0 /100 WBC (ref 0–0.2)
PLATELET # BLD AUTO: 297 10*3/MM3 (ref 140–450)
PMV BLD AUTO: 7.7 FL (ref 6–12)
POTASSIUM SERPL-SCNC: 4 MMOL/L (ref 3.5–5.2)
RBC # BLD AUTO: 4.04 10*6/MM3 (ref 4.14–5.8)
SODIUM SERPL-SCNC: 138 MMOL/L (ref 136–145)
WBC NRBC COR # BLD: 7.6 10*3/MM3 (ref 3.4–10.8)

## 2023-07-16 PROCEDURE — 94799 UNLISTED PULMONARY SVC/PX: CPT

## 2023-07-16 PROCEDURE — 97166 OT EVAL MOD COMPLEX 45 MIN: CPT

## 2023-07-16 PROCEDURE — 82948 REAGENT STRIP/BLOOD GLUCOSE: CPT

## 2023-07-16 PROCEDURE — 97162 PT EVAL MOD COMPLEX 30 MIN: CPT

## 2023-07-16 PROCEDURE — 25010000002 HEPARIN (PORCINE) PER 1000 UNITS: Performed by: NURSE PRACTITIONER

## 2023-07-16 PROCEDURE — 80048 BASIC METABOLIC PNL TOTAL CA: CPT | Performed by: INTERNAL MEDICINE

## 2023-07-16 PROCEDURE — 97535 SELF CARE MNGMENT TRAINING: CPT

## 2023-07-16 PROCEDURE — 85025 COMPLETE CBC W/AUTO DIFF WBC: CPT | Performed by: INTERNAL MEDICINE

## 2023-07-16 PROCEDURE — 94761 N-INVAS EAR/PLS OXIMETRY MLT: CPT

## 2023-07-16 RX ORDER — HYDROCODONE BITARTRATE AND ACETAMINOPHEN 10; 325 MG/1; MG/1
1 TABLET ORAL EVERY 6 HOURS PRN
Status: DISCONTINUED | OUTPATIENT
Start: 2023-07-16 | End: 2023-07-17 | Stop reason: HOSPADM

## 2023-07-16 RX ADMIN — ACETAMINOPHEN 650 MG: 650 SOLUTION ORAL at 20:36

## 2023-07-16 RX ADMIN — SUCRALFATE 1 G: 1 TABLET ORAL at 08:14

## 2023-07-16 RX ADMIN — DICYCLOMINE HYDROCHLORIDE 10 MG: 10 CAPSULE ORAL at 20:35

## 2023-07-16 RX ADMIN — ACETAMINOPHEN 650 MG: 650 SOLUTION ORAL at 12:04

## 2023-07-16 RX ADMIN — DOCUSATE SODIUM 50 MG AND SENNOSIDES 8.6 MG 2 TABLET: 8.6; 5 TABLET, FILM COATED ORAL at 08:14

## 2023-07-16 RX ADMIN — SUCRALFATE 1 G: 1 TABLET ORAL at 17:05

## 2023-07-16 RX ADMIN — DULOXETINE HYDROCHLORIDE 90 MG: 30 CAPSULE, DELAYED RELEASE ORAL at 08:14

## 2023-07-16 RX ADMIN — ATORVASTATIN CALCIUM 20 MG: 20 TABLET, FILM COATED ORAL at 08:14

## 2023-07-16 RX ADMIN — HEPARIN SODIUM 5000 UNITS: 5000 INJECTION INTRAVENOUS; SUBCUTANEOUS at 05:42

## 2023-07-16 RX ADMIN — SUCRALFATE 1 G: 1 TABLET ORAL at 20:35

## 2023-07-16 RX ADMIN — HEPARIN SODIUM 5000 UNITS: 5000 INJECTION INTRAVENOUS; SUBCUTANEOUS at 21:14

## 2023-07-16 RX ADMIN — EMPAGLIFLOZIN 10 MG: 10 TABLET, FILM COATED ORAL at 08:14

## 2023-07-16 RX ADMIN — HEPARIN SODIUM 5000 UNITS: 5000 INJECTION INTRAVENOUS; SUBCUTANEOUS at 14:06

## 2023-07-16 RX ADMIN — SODIUM CHLORIDE 100 ML/HR: 9 INJECTION, SOLUTION INTRAVENOUS at 00:23

## 2023-07-16 RX ADMIN — DICYCLOMINE HYDROCHLORIDE 10 MG: 10 CAPSULE ORAL at 11:24

## 2023-07-16 RX ADMIN — SUCRALFATE 1 G: 1 TABLET ORAL at 11:24

## 2023-07-16 RX ADMIN — VERAPAMIL HYDROCHLORIDE 240 MG: 240 TABLET, FILM COATED, EXTENDED RELEASE ORAL at 08:15

## 2023-07-16 RX ADMIN — HYDROCODONE BITARTRATE AND ACETAMINOPHEN 1 TABLET: 10; 325 TABLET ORAL at 14:07

## 2023-07-16 RX ADMIN — Medication 10 ML: at 20:36

## 2023-07-16 RX ADMIN — LISINOPRIL 20 MG: 20 TABLET ORAL at 08:15

## 2023-07-16 RX ADMIN — DICYCLOMINE HYDROCHLORIDE 10 MG: 10 CAPSULE ORAL at 08:14

## 2023-07-16 RX ADMIN — Medication 10 ML: at 08:15

## 2023-07-16 RX ADMIN — HYDROCODONE BITARTRATE AND ACETAMINOPHEN 1 TABLET: 10; 325 TABLET ORAL at 20:40

## 2023-07-16 RX ADMIN — PANTOPRAZOLE SODIUM 40 MG: 40 TABLET, DELAYED RELEASE ORAL at 08:15

## 2023-07-16 RX ADMIN — DICYCLOMINE HYDROCHLORIDE 10 MG: 10 CAPSULE ORAL at 17:05

## 2023-07-16 NOTE — PROGRESS NOTES
[facility]     Progress Note    Patient Name: Lux Bray  : 1945  MRN: [id number]  Primary Care Physician:  Montana Morrow MD  Date of admission: 2023  Service date and time: 23 16:09 EDT  Subjective   Subjective     Chief Complaint: generalized weakness      HPI:  Patient Reports patient was ambulating with physical therapist earlier, feels better in no distress      Objective   Objective     Vitals:   Temp:  [97.9 °F (36.6 °C)-99.2 °F (37.3 °C)] 98.4 °F (36.9 °C)  Heart Rate:  [66-96] 79  Resp:  [14-19] 14  BP: ()/(48-98) 114/58  Flow (L/min):  [2] 2  Physical Exam    Constitutional: Awake, alert in no distress    Eyes: PERRLA, sclerae anicteric, no conjunctival injection   HENT: NCAT, mucous membranes moist   Neck: Supple, no thyromegaly, no lymphadenopathy, trachea midline   Respiratory: Clear to auscultation bilaterally, nonlabored respirations    Cardiovascular: RRR, no murmurs, rubs, or gallops, palpable pedal pulses bilaterally   Gastrointestinal: Positive bowel sounds, soft, nontender, nondistended   Musculoskeletal: No bilateral ankle edema, no clubbing or cyanosis to extremities   Psychiatric: Appropriate affect, cooperative   Neurologic: Oriented x 3, strength symmetric in all extremities, Cranial Nerves grossly intact to confrontation, speech clear   Skin: No rashes     Result Review    Result Review:  I have personally reviewed the results from the time of this admission to 2023 16:09 EDT and agree with these findings:  []  Laboratory list / accordion  []  Microbiology  []  Radiology  []  EKG/Telemetry   []  Cardiology/Vascular   []  Pathology  []  Old records  []  Other:        Assessment & Plan   Assessment / Plan       Active Hospital Problems:  Active Hospital Problems    Diagnosis     **Generalized weakness     Encephalopathy, metabolic     Leukocytosis     Sepsis     Type 2 diabetes mellitus      Plan:     1.  Generalized weakness: improving    #Debility likely related to depression and abrupt stopping of psych meds  #Decreased appetite, encourage 3 meals a day,patient declines appetite stimulant   #PT OT evaluation  2.  Type 2 diabetes  #We will start on sliding scale insulin, Accu-Chek with meals  #Last A1c was 6.8  3.  Encephalopathy  #Now resolved.    4.  Sepsis: present on admission  #With elevated white blood cells and rapid heart rate and suspicion for infection patient met criteria for sepsis.  Patient was given vancomycin and cefepime in the ED.  However no source of infection has been found so far.  Will await for urine cultures and blood cultures.  We will maintain patient on cefepime, so far cultures are negative  5.  Acute Cystitis without hematuria  #Urinalysis not convincing for urinary tract infection.  Cultures pending.  Started on Cefepime  #We will obtain bladder scans, postvoid bladder scans.  #Patient did complain of burning on urination.  6.  Leukocytosis: resolved  #White blood cells elevated likely from an infection.  #Will monitor with daily labs.  Vitals every 4.  #We will maintain patient on IV fluid.  7.  Acute on chronic kidney injury:resolved   #Creatinine at 0.99  Creatinine was 1.3 last February  #We will hydrate.  Bladder scan to rule out urinary retention    Discussed with wife, restart norco as needed for pain     DVT prophylaxis:  Medical DVT prophylaxis orders are present.    CODE STATUS:   Level Of Support Discussed With: Patient  Code Status (Patient has no pulse and is not breathing): CPR (Attempt to Resuscitate)  Medical Interventions (Patient has pulse or is breathing): Full Support    Disposition:  I expect patient to be discharged in 1 day.    Luciano Espinosa MD

## 2023-07-16 NOTE — THERAPY EVALUATION
Patient Name: Lux Bray  : 1945    MRN: [id number]                              Today's Date: 2023       Admit Date: 2023    Visit Dx:     ICD-10-CM ICD-9-CM   1. Weakness  R53.1 780.79   2. Fever, unspecified fever cause  R50.9 780.60     Patient Active Problem List   Diagnosis    Allergic state    Attention deficit disorder (ADD) without hyperactivity    Cervical radiculopathy    Cholelithiasis    Deficiency of testosterone biosynthesis    Depression    Enlarged prostate with lower urinary tract symptoms (LUTS)    Hypertension    Irritable bowel syndrome without diarrhea    Lumbago with sciatica, right side    Osteoarthritis    Type 2 diabetes mellitus    Vertiginous syndrome    Preventative health care    Vitamin D deficiency, unspecified     Encounter for screening for malignant neoplasm of prostate     Iron deficiency anemia due to chronic blood loss    Medicare annual wellness visit, subsequent    Colon polyp    Gastroesophageal reflux disease without esophagitis    Dysuria    Hyperlipidemia    HL (hearing loss)    Cortical senile cataract    Age-related cataract of both eyes    Screening for prostate cancer    Screening for hypothyroidism    Need for hepatitis C screening test    Toe pain, bilateral    Tinea unguium    Pre-ulcerative corn or callous    Primary iridocyclitis    Weight loss, non-intentional    Focal myoclonus    Polyneuropathy associated with underlying disease    Tingling of both feet    Generalized weakness    Encephalopathy, metabolic    Leukocytosis    Sepsis     Past Medical History:   Diagnosis Date    ADHD (attention deficit hyperactivity disorder)     Allergic     Arthritis     Colon polyp     Depression     Diabetes mellitus type 2    controlled by oral meds    HL (hearing loss)     Hyperlipidemia     Hypertension      Past Surgical History:   Procedure Laterality Date    COLONOSCOPY      EYE SURGERY      Cataracts removed: both eyes    HAND SURGERY       [id number]- [facility]. Abstracted from [facility].    HEMORROIDECTOMY  1984    [facility]. Abstracted from [facility].    TONSILLECTOMY  1940's    VASECTOMY        General Information       [facility] Name 07/16/23 1754          Physical Therapy Time and Intention    Document Type evaluation  -EL     Mode of Treatment physical therapy  -[facility] Name 07/16/23 1754          General Information    Patient Profile Reviewed yes  -EL     Prior Level of Function independent:;all household mobility;ADL's  -EL     Existing Precautions/Restrictions fall  reports falls are due to tremor/neuropathy  -[facility] Name 07/16/23 1754          Living Environment    People in Home spouse  -[facility] Name 07/16/23 1754          Home Main Entrance    Number of Stairs, Main Entrance one  -[facility] Name 07/16/23 1754          Stairs Within Home, Primary    Number of Stairs, Within Home, Primary twelve;other (see comments)  to finished basement  -[facility] Name 07/16/23 1754          Cognition    Orientation Status (Cognition) oriented x 4  -EL       Row Name 07/16/23 1754          Safety Issues, Functional Mobility    Impairments Affecting Function (Mobility) balance;endurance/activity tolerance;pain;cognition  -EL     Cognitive Impairments, Mobility Safety/Performance awareness, need for assistance;insight into deficits/self-awareness;judgment  -               User Key  (r) = Recorded By, (t) = Taken By, (c) = Cosigned By      Initials Name Provider Type    EL Lonnie Michael, PT Physical Therapist                   Mobility       Row Name 07/16/23 1755          Bed Mobility    Bed Mobility supine-sit  -EL     Supine-Sit [place] (Bed Mobility) standby assist  -EL       Row Name 07/16/23 1755          Sit-Stand Transfer    Sit-Stand [place] (Transfers) contact guard  -     Assistive Device (Sit-Stand Transfers) walker, front-wheeled  -[facility] Name 07/16/23 1755          Gait/Stairs (Locomotion)    [place] Level  (Gait) contact guard  -EL     Assistive Device (Gait) walker, front-wheeled  -EL     Distance in Feet (Gait) 100  -EL     Deviations/Abnormal Patterns (Gait) gait speed decreased;base of support, narrow  -EL     Bilateral Gait Deviations forward flexed posture  -EL     Comment, (Gait/Stairs) Pt with one minor LOB able to self correct with RWx, attempted ambluation initially without RWx, but pt with poor dynamic balance.  -EL               User Key  (r) = Recorded By, (t) = Taken By, (c) = Cosigned By      Initials Name Provider Type    Lonnie Michaud PT Physical Therapist                   Obj/Interventions       Row Name 07/16/23 1801          Range of Motion Comprehensive    General Range of Motion lower extremity range of motion deficits identified  -EL       Row Name 07/16/23 1801          Strength Comprehensive (MMT)    General Manual Muscle Testing (MMT) Assessment lower extremity strength deficits identified  -EL     Comment, General Manual Muscle Testing (MMT) Assessment BLE 4-/5 gross  -EL       Row Name 07/16/23 1801          Balance    Balance Assessment sitting static balance;standing dynamic balance;standing static balance  -EL     Static Sitting Balance independent  -EL     Static Standing Balance contact guard  -EL     Dynamic Standing Balance contact guard  -EL     Position/Device Used, Standing Balance walker, front-wheeled  -EL       Row Name 07/16/23 1801          Sensory Assessment (Somatosensory)    Sensory Assessment (Somatosensory) sensation intact  -EL               User Key  (r) = Recorded By, (t) = Taken By, (c) = Cosigned By      Initials Name Provider Type    Lonnie Michaud PT Physical Therapist                   Goals/Plan       Row Name 07/16/23 1807          Bed Mobility Goal 1 (PT)    Activity/Assistive Device (Bed Mobility Goal 1, PT) bed mobility activities, all  -EL     [place] Level/Cues Needed (Bed Mobility Goal 1, PT) independent  -EL     Time Frame (Bed Mobility Goal 1,  PT) long term goal (LTG);2 weeks  -EL       Row Name 07/16/23 1807          Transfer Goal 1 (PT)    Activity/Assistive Device (Transfer Goal 1, PT) transfers, all;walker, rolling  -EL     [place] Level/Cues Needed (Transfer Goal 1, PT) modified independence  -EL     Time Frame (Transfer Goal 1, PT) long term goal (LTG);2 weeks  -EL       Row Name 07/16/23 1807          Gait Training Goal 1 (PT)    Activity/Assistive Device (Gait Training Goal 1, PT) gait (walking locomotion);walker, rolling  -EL     [place] Level (Gait Training Goal 1, PT) modified independence  -EL     Distance (Gait Training Goal 1, PT) 250  -EL     Time Frame (Gait Training Goal 1, PT) long term goal (LTG);2 weeks  -EL       Row Name 07/16/23 1807          Stairs Goal 1 (PT)    Activity/Assistive Device (Stairs Goal 1, PT) stairs, all skills  -EL     [place] Level/Cues Needed (Stairs Goal 1, PT) standby assist  -EL     Number of Stairs (Stairs Goal 1, PT) 12  -EL     Time Frame (Stairs Goal 1, PT) long term goal (LTG);2 weeks  -EL               User Key  (r) = Recorded By, (t) = Taken By, (c) = Cosigned By      Initials Name Provider Type    Lonnie Michaud, PT Physical Therapist                   Clinical Impression       Row Name 07/16/23 1802          Pain    Pretreatment Pain Rating 7/10  -EL     Posttreatment Pain Rating 7/10  -EL     Pain Location - back  -EL       Row Name 07/16/23 1802          Plan of Care Review    Plan of Care Reviewed With patient  -EL     Outcome Evaluation Pt is a 76 YO M admitted with AMS, generalized weakness, CT (-) for acute abnormality. Pt AOx4 this date, but appears to have mild confusion with situation and poor safety awareness. Pt reports living home with spouse, typically is independent with all ADLs, and ambulates without AD but has had multiple falls recently. Pt this date stood and ambluated with CGA using RWx, with one LOB, but required no physical assistance to recover. Pt will benefit  from continued RWx usage. Pt appears to be below independent baseline, however he is ambulating wel with AD and likely safe to return home with family assist and 24/7 supervision. Pt will require RWx at d/c.  -EL       Row Name 07/16/23 1802          Therapy Assessment/Plan (PT)    Rehab Potential (PT) good, to achieve stated therapy goals  -EL     Criteria for Skilled Interventions Met (PT) yes  -EL     Therapy Frequency (PT) 5 times/wk  -EL     Predicted Duration of Therapy Intervention (PT) until d/c  -EL       Row Name 07/16/23 1802          Vital Signs    O2 Delivery Pre Treatment room air  -EL     O2 Delivery Intra Treatment room air  -EL     O2 Delivery Post Treatment room air  -EL     Pre Patient Position Sitting  -EL     Intra Patient Position Standing  -EL     Post Patient Position Supine  -EL       Row Name 07/16/23 1802          Positioning and Restraints    Pre-Treatment Position sitting in chair/recliner  -EL     Post Treatment Position bed  -EL     In Bed notified nsg;supine;call light within reach;exit alarm on;encouraged to call for assist  -EL               User Key  (r) = Recorded By, (t) = Taken By, (c) = Cosigned By      Initials Name Provider Type    Lonnie Michaud, PT Physical Therapist                   Outcome Measures       Row Name 07/16/23 1807          How much help from another person do you currently need...    Turning from your back to your side while in flat bed without using bedrails? 4  -EL     Moving from lying on back to sitting on the side of a flat bed without bedrails? 3  -EL     Moving to and from a bed to a chair (including a wheelchair)? 3  -EL     Standing up from a chair using your arms (e.g., wheelchair, bedside chair)? 3  -EL     Climbing 3-5 steps with a railing? 3  -EL     To walk in hospital room? 3  -EL     AM-PAC 6 Clicks Score (PT) 19  -EL     Highest level of mobility 6 --> Walked 10 steps or more  -EL       Row Name 07/16/23 1807          Modified [place]ovany Scale     Pre-Stroke Modified [place] Scale 6 - Unable to determine (UTD) from the medical record documentation  -     Modified [place] Scale 4 - Moderately severe disability.  Unable to walk without assistance, and unable to attend to own bodily needs without assistance.  -       Row Name 07/16/23 1807 07/16/23 1214       Functional Assessment    Outcome Measure Options AM-PAC 6 Clicks Basic Mobility (PT);Modified [place]  -EL AM-PAC 6 Clicks Daily Activity (OT)  -MS              User Key  (r) = Recorded By, (t) = Taken By, (c) = Cosigned By      Initials Name Provider Type    Lonnie Michaud, PT Physical Therapist    MS Clinton Gem, OT Occupational Therapist                                 Physical Therapy Education       Title: PT OT SLP Therapies (Done)       Topic: Physical Therapy (Done)       Point: Mobility training (Done)       Learning Progress Summary             Patient Acceptance, E,TB, VU by  at 7/16/2023 1808                         Point: Precautions (Done)       Learning Progress Summary             Patient Acceptance, E,TB, VU by  at 7/16/2023 1808                                         User Key       Initials Effective Dates Name Provider Type Discipline     06/23/20 -  Lonnie Michael, PT Physical Therapist PT                  PT Recommendation and Plan     Plan of Care Reviewed With: patient  Outcome Evaluation: Pt is a 78 YO M admitted with AMS, generalized weakness, CT (-) for acute abnormality. Pt AOx4 this date, but appears to have mild confusion with situation and poor safety awareness. Pt reports living home with spouse, typically is independent with all ADLs, and ambulates without AD but has had multiple falls recently. Pt this date stood and ambluated with CGA using RWx, with one LOB, but required no physical assistance to recover. Pt will benefit from continued RWx usage. Pt appears to be below independent baseline, however he is ambulating wel with AD and likely safe to return home with  family assist and 24/7 supervision. Pt will require RWx at d/c.     Time Calculation:    PT Charges       Row Name 07/16/23 1808             Time Calculation    Start Time 0955  -EL      Stop Time 1030  -EL      Time Calculation (min) 35 min  -EL      PT Received On 07/16/23  -EL      PT - Next Appointment 07/17/23  -EL      PT Goal Re-Cert Due Date 07/30/23  -EL                User Key  (r) = Recorded By, (t) = Taken By, (c) = Cosigned By      Initials Name Provider Type    Lonnie Michaud PT Physical Therapist                  Therapy Charges for Today       Code Description Service Date Service Provider Modifiers Qty    64495443913 HC PT EVAL MOD COMPLEXITY 4 7/16/2023 Lonnie Michael, PT GP 1            PT G-Codes  Outcome Measure Options: AM-PAC 6 Clicks Basic Mobility (PT), Modified [place]  AM-PAC 6 Clicks Score (PT): 19  AM-PAC 6 Clicks Score (OT): 21  Modified [place] Scale: 4 - Moderately severe disability.  Unable to walk without assistance, and unable to attend to own bodily needs without assistance.  PT Discharge Summary  Anticipated Discharge Disposition (PT): home with 24/7 care    Lonnie Michael PT  7/16/2023

## 2023-07-16 NOTE — NURSING NOTE
Pt wife at bedside, extremely concerned about discharge time tomorrow. Has requested to be contacted as early as possible in the morning regarding possible discharge time. She has to request a ride from a friend/family and is very concerned about getting that scheduled.

## 2023-07-16 NOTE — PLAN OF CARE
Goal Outcome Evaluation:  Plan of Care Reviewed With: patient        Progress: no change  Outcome Evaluation: Pt is a 76 y/o M admitted to [facility] 7/14/23 with c/o generalized weakness. CT head (-) acute. PMHx significant for DMII, metabolic brain disorder, ADD, depression, OA, hearing loss, and polyneuropathy, involuntary muscle jerk. At baseline pt resides with spouse in [facility] with 1 PRIYANK and basement. Pt typically independent with ADLs, has RW and cane, however does not typically utilize AD for mobility. Pt reports x5 falls within last 6 mos. This date, pt A&Ox4, sitting up in chair upon arrival. Pt comes to stand with CGA, initially completes functional mobility without AD, however appears to be unsteady and requires assist for dynamic balance. Pt utilized RW and required CGA with funcitonal mobility with AD. Pt became incontinent with functional mobility, requiring max A for lacey hygiene in standing. Pt completes bed mobility with SBA and verbal cues. Pt demo decreased safety awareness and is at increased risk for falls. Pt likely to be safe to d/c home with 24/7 supervision and assist from family. OT will follow while pt admitted to return to PLOF to max potential.      Anticipated Discharge Disposition (OT): home with assist, home with 24/7 care

## 2023-07-16 NOTE — PLAN OF CARE
Pt orientedx4 on room air. Up to chair multiple times today and walked unit with PT.  Possible d/c home tomorrow.   Pt to downgrade to [facility].

## 2023-07-16 NOTE — PLAN OF CARE
Goal Outcome Evaluation:  Plan of Care Reviewed With: patient           Outcome Evaluation: Pt is a 78 YO M admitted with AMS, generalized weakness, CT (-) for acute abnormality. Pt AOx4 this date, but appears to have mild confusion with situation and poor safety awareness. Pt reports living home with spouse, typically is independent with all ADLs, and ambulates without AD but has had multiple falls recently. Pt this date stood and ambluated with CGA using RWx, with one LOB, but required no physical assistance to recover. Pt will benefit from continued RWx usage. Pt appears to be below independent baseline, however he is ambulating wel with AD and likely safe to return home with family assist and 24/7 supervision. Pt will require RWx at d/c.      Anticipated Discharge Disposition (PT): home with 24/7 care

## 2023-07-16 NOTE — THERAPY EVALUATION
Patient Name: Lux Bray  : 1945    MRN: [id number]                              Today's Date: 2023       Admit Date: 2023    Visit Dx:     ICD-10-CM ICD-9-CM   1. Weakness  R53.1 780.79   2. Fever, unspecified fever cause  R50.9 780.60     Patient Active Problem List   Diagnosis    Allergic state    Attention deficit disorder (ADD) without hyperactivity    Cervical radiculopathy    Cholelithiasis    Deficiency of testosterone biosynthesis    Depression    Enlarged prostate with lower urinary tract symptoms (LUTS)    Hypertension    Irritable bowel syndrome without diarrhea    Lumbago with sciatica, right side    Osteoarthritis    Type 2 diabetes mellitus    Vertiginous syndrome    Preventative health care    Vitamin D deficiency, unspecified     Encounter for screening for malignant neoplasm of prostate     Iron deficiency anemia due to chronic blood loss    Medicare annual wellness visit, subsequent    Colon polyp    Gastroesophageal reflux disease without esophagitis    Dysuria    Hyperlipidemia    HL (hearing loss)    Cortical senile cataract    Age-related cataract of both eyes    Screening for prostate cancer    Screening for hypothyroidism    Need for hepatitis C screening test    Toe pain, bilateral    Tinea unguium    Pre-ulcerative corn or callous    Primary iridocyclitis    Weight loss, non-intentional    Focal myoclonus    Polyneuropathy associated with underlying disease    Tingling of both feet    Generalized weakness    Encephalopathy, metabolic    Leukocytosis    Sepsis     Past Medical History:   Diagnosis Date    ADHD (attention deficit hyperactivity disorder)     Allergic     Arthritis     Colon polyp     Depression     Diabetes mellitus type 2    controlled by oral meds    HL (hearing loss)     Hyperlipidemia     Hypertension      Past Surgical History:   Procedure Laterality Date    COLONOSCOPY      EYE SURGERY      Cataracts removed: both eyes    HAND SURGERY       [id number]- [facility]. [facility].    [facility]  [facility]    TONSILLECTOMY  [facility]'s    VASECTOMY        General Information       Row Name 07/16/23 1205          OT Time and Intention    Document Type evaluation  -MS     Mode of Treatment occupational therapy  -MS       Row Name 07/16/23 1205          General Information    Patient Profile Reviewed yes  -MS     Prior Level of Function independent:;ADL's  -MS     Existing Precautions/Restrictions fall  -MS     Barriers to Rehab hearing deficit;medically complex  -MS       Row Name 07/16/23 1205          Occupational Profile    Reason for Services/Referral (Occupational Profile) Pt is a 76 y/o M admitted to [facility] 7/14/23 with c/o generalized weakness. CT head (-) acute. PMHx significant for DMII, metabolic brain disorder, ADD, depression, OA, hearing loss, and polyneuropathy, involuntary muscle jerk. At baseline pt resides with spouse in [facility] with 1 PRIYANK and basement. Pt typically independent with ADLs, has RW and cane, however does not typically utilize AD for mobility. Pt reports x5 falls within last 6 mos.  -MS     Successful Occupations (Occupational Profile) retired  -MS     Environmental Supports and Barriers (Occupational Profile) supportive family  -MS       Row Name 07/16/23 1205          Living Environment    People in Home spouse  -MS     Name(s) of People in Home Ese  -MS       Row Name 07/16/23 1205          Home Main Entrance    Number of Stairs, Main Entrance one  -MS       Row Name 07/16/23 1205          Stairs Within Home, Primary    Stairs, Within Home, Primary basement  -MS     Number of Stairs, Within Home, Primary twelve  -MS       Row Name 07/16/23 1205          Cognition    Orientation Status (Cognition) oriented x 4  -MS       Row Name 07/16/23 1205          Safety Issues, Functional Mobility    Safety Issues Affecting Function (Mobility) insight into  deficits/self-awareness;impulsivity;positioning of assistive device  -MS     Impairments Affecting Function (Mobility) balance;endurance/activity tolerance;pain  -MS     Comment, Safety Issues/Impairments (Mobility) involuntary tremors, pt reports causes his knees to buckle, often reason for falls  -MS               User Key  (r) = Recorded By, (t) = Taken By, (c) = Cosigned By      Initials Name Provider Type    MS Alonzo JEANINE Rabago Occupational Therapist                     Mobility/ADL's       Row Name 07/16/23 1207          Bed Mobility    Bed Mobility bed mobility (all) activities  -MS     All Activities, [place] (Bed Mobility) standby assist  -MS     Assistive Device (Bed Mobility) head of bed elevated  -MS       Row Name 07/16/23 1207          Transfers    Transfers sit-stand transfer  -MS       Row Name 07/16/23 1207          Sit-Stand Transfer    Sit-Stand [place] (Transfers) contact guard  -MS       Row Name 07/16/23 1207          Activities of Daily Living    BADL Assessment/Intervention toileting  -MS       Row Name 07/16/23 1207          Toileting Assessment/Training    [place] Level (Toileting) maximum assist (25% patient effort);perform perineal hygiene  -MS     Assistive Devices (Toileting) commode, bedside without drop arms  -MS     Position (Toileting) supported standing;supported sitting  -MS     Comment, (Toileting) limited functional reach in sitting, required assist with lacey hygiene in standing  -MS               User Key  (r) = Recorded By, (t) = Taken By, (c) = Cosigned By      Initials Name Provider Type    MS Alonzo GemJEANINE Occupational Therapist                   Obj/Interventions       Row Name 07/16/23 1208          Sensory Assessment (Somatosensory)    Sensory Assessment c/o neuropathy in camila feet  -MS       Row Name 07/16/23 1208          Vision Assessment/Intervention    Visual Impairment/Limitations WFL  -MS       Row Name 07/16/23 1208          Range of Motion  Comprehensive    Comment, General Range of Motion BUE WFL  -MS       Row Name 07/16/23 1208          Strength Comprehensive (MMT)    Comment, General Manual Muscle Testing (MMT) Assessment BUE functionally 4-/5  -MS       Row Name 07/16/23 1208          Balance    Balance Assessment sitting static balance;sitting dynamic balance;standing static balance;standing dynamic balance  -MS     Static Sitting Balance modified independence  -MS     Dynamic Sitting Balance modified independence  -MS     Position, Sitting Balance supported;sitting in chair  -MS     Static Standing Balance contact guard;standby assist  -MS     Dynamic Standing Balance contact guard  -MS     Position/Device Used, Standing Balance supported;walker, front-wheeled  -MS     Comment, Balance initially began without RW, pt utilized RW with functional mobility to increase balance, requiring less assistance  -MS               User Key  (r) = Recorded By, (t) = Taken By, (c) = Cosigned By      Initials Name Provider Type    Gem Friedman, OT Occupational Therapist                   Goals/Plan       Row Name 07/16/23 1213          Bed Mobility Goal 1 (OT)    Activity/Assistive Device (Bed Mobility Goal 1, OT) bed mobility activities, all  -MS     Hand Level/Cues Needed (Bed Mobility Goal 1, OT) modified independence  -MS     Time Frame (Bed Mobility Goal 1, OT) long term goal (LTG);2 weeks  -MS     Progress/Outcomes (Bed Mobility Goal 1, OT) new goal  -MS       Row Name 07/16/23 1213          Transfer Goal 1 (OT)    Activity/Assistive Device (Transfer Goal 1, OT) transfers, all  -MS     Hand Level/Cues Needed (Transfer Goal 1, OT) modified independence  -MS     Time Frame (Transfer Goal 1, OT) long term goal (LTG);2 weeks  -MS     Progress/Outcome (Transfer Goal 1, OT) new goal  -MS       Row Name 07/16/23 1213          Dressing Goal 1 (OT)    Activity/Device (Dressing Goal 1, OT) dressing skills, all  -MS     Hand/Cues Needed  (Dressing Goal 1, OT) modified independence  -MS     Time Frame (Dressing Goal 1, OT) long term goal (LTG);2 weeks  -MS     Progress/Outcome (Dressing Goal 1, OT) new goal  -MS       Row Name 07/16/23 1213          Toileting Goal 1 (OT)    Activity/Device (Toileting Goal 1, OT) toileting skills, all  -MS     [place] Level/Cues Needed (Toileting Goal 1, OT) modified independence  -MS     Time Frame (Toileting Goal 1, OT) long term goal (LTG);2 weeks  -MS     Progress/Outcome (Toileting Goal 1, OT) new goal  -MS       Row Name 07/16/23 1213          Grooming Goal 1 (OT)    Activity/Device (Grooming Goal 1, OT) grooming skills, all  -MS     [place] (Grooming Goal 1, OT) modified independence  in standing  -MS     Time Frame (Grooming Goal 1, OT) long term goal (LTG);2 weeks  -MS     Progress/Outcome (Grooming Goal 1, OT) new goal  -MS       Row Name 07/16/23 1213          Problem Specific Goal 1 (OT)    Problem Specific Goal 1 (OT) increase activity tolerance needed for ADL routine >5 minutes without LOB  -MS     Time Frame (Problem Specific Goal 1, OT) long term goal (LTG);2 weeks  -MS     Progress/Outcome (Problem Specific Goal 1, OT) new goal  -MS       Row Name 07/16/23 1213          Therapy Assessment/Plan (OT)    Planned Therapy Interventions (OT) adaptive equipment training;BADL retraining;activity tolerance training;functional balance retraining;IADL retraining;occupation/activity based interventions;patient/caregiver education/training;transfer/mobility retraining;strengthening exercise  -MS               User Key  (r) = Recorded By, (t) = Taken By, (c) = Cosigned By      Initials Name Provider Type    Gem Friedman, OT Occupational Therapist                   Clinical Impression       Row Name 07/16/23 1202          Pain Assessment    Pretreatment Pain Rating 7/10  -MS     Posttreatment Pain Rating 7/10  -MS     Pain Location lower  -MS     Pain Location - back  -MS     Pre/Posttreatment Pain  Comment chronic back pain  -MS     Pain Intervention(s) Repositioned;Emotional support  -MS       Row Name 07/16/23 1209          Plan of Care Review    Plan of Care Reviewed With patient  -MS     Progress no change  -MS     Outcome Evaluation Pt is a 78 y/o M admitted [facility] 7/14/23 with c/o generalized weakness. CT head (-) acute. PMHx significant for DMII, metabolic brain disorder, ADD, depression, OA, hearing loss, and polyneuropathy, involuntary muscle jerk. At baseline pt resides with spouse in [facility] with 1 [address] and basement. Pt typically independent with ADLs, has RW and cane, however does not typically utilize AD for mobility. Pt reports x5 falls within last 6 mos. This date, pt A&Ox4, sitting up in chair upon arrival. Pt comes to stand with CGA, initially completes functional mobility without AD, however appears to be unsteady and requires assist for dynamic balance. Pt utilized RW and required CGA with funcitonal mobility with AD. Pt became incontinent with functional mobility, requiring max A for lacey hygiene in standing. Pt completes bed mobility with SBA and verbal cues. Pt demo decreased safety awareness and is at increased risk for falls. Pt likely to be safe to d/c home with 24/7 supervision and assist from family. OT will follow while pt admitted to return to [facility] to max potential.  -MS       Row Name 07/16/23 1203          Therapy Assessment/Plan (OT)    Rehab Potential (OT) good, to achieve stated therapy goals  -MS     Criteria for Skilled Therapeutic Interventions Met (OT) yes;meets criteria;skilled treatment is necessary  -MS     Therapy Frequency (OT) 3 times/wk  -MS     Predicted Duration of Therapy Intervention (OT) until d/c  -MS       Row Name 07/16/23 1200          Therapy Plan Review/Discharge Plan (OT)    Anticipated Discharge Disposition (OT) home with assist;home with 24/7 care  -MS       Row Name 07/16/23 1208          Vital Signs    Pretreatment Heart Rate (beats/min) 83  -MS     Pre  SpO2 (%) 97  -MS     O2 Delivery Pre Treatment room air  -MS     O2 Delivery Intra Treatment room air  -MS     O2 Delivery Post Treatment room air  -MS     Pre Patient Position Sitting  -MS     Intra Patient Position Standing  -MS     Post Patient Position Supine  -MS     Recovery Time VSS  -MS       Row Name 07/16/23 1209          Positioning and Restraints    Pre-Treatment Position sitting in chair/recliner  -MS     Post Treatment Position bed  -MS     In Bed notified nsg;supine;call light within reach;encouraged to call for assist;exit alarm on  -MS               User Key  (r) = Recorded By, (t) = Taken By, (c) = Cosigned By      Initials Name Provider Type    Gem Friedman OT Occupational Therapist                   Outcome Measures       Row Name 07/16/23 1214          How much help from another is currently needed...    Putting on and taking off regular lower body clothing? 3  -MS     Bathing (including washing, rinsing, and drying) 3  -MS     Toileting (which includes using toilet bed pan or urinal) 3  -MS     Putting on and taking off regular upper body clothing 4  -MS     Taking care of personal grooming (such as brushing teeth) 4  -MS     Eating meals 4  -MS     AM-PAC 6 Clicks Score (OT) 21  -MS       Row Name 07/16/23 1214          Functional Assessment    Outcome Measure Options AM-PAC 6 Clicks Daily Activity (OT)  -MS               User Key  (r) = Recorded By, (t) = Taken By, (c) = Cosigned By      Initials Name Provider Type    Gem Friedman OT Occupational Therapist                    Occupational Therapy Education       Title: PT OT SLP Therapies (Done)       Topic: Occupational Therapy (Done)       Point: ADL training (Done)       Description:   Instruct learner(s) on proper safety adaptation and remediation techniques during self care or transfers.   Instruct in proper use of assistive devices.                  Learning Progress Summary             Patient Acceptance, E,TB, VU by MS at  7/16/2023 1215                         Point: Body mechanics (Done)       Description:   Instruct learner(s) on proper positioning and spine alignment during self-care, functional mobility activities and/or exercises.                  Learning Progress Summary             Patient Acceptance, E,TB, VU by MS at 7/16/2023 1215                                         User Key       Initials Effective Dates Name Provider Type Discipline    MS 07/13/22 -  Gem Alonzo, JEANINE Occupational Therapist OT                  OT Recommendation and Plan  Planned Therapy Interventions (OT): adaptive equipment training, BADL retraining, activity tolerance training, functional balance retraining, IADL retraining, occupation/activity based interventions, patient/caregiver education/training, transfer/mobility retraining, strengthening exercise  Therapy Frequency (OT): 3 times/wk  Plan of Care Review  Plan of Care Reviewed With: patient  Progress: no change  Outcome Evaluation: Pt is a 76 y/o M admitted to [facility] 7/14/23 with c/o generalized weakness. CT head (-) acute. PMHx significant for DMII, metabolic brain disorder, ADD, depression, OA, hearing loss, and polyneuropathy, involuntary muscle jerk. At baseline pt resides with spouse in [facility] with 1 PRIY[address] and basement. Pt typically independent with ADLs, has RW and cane, however does not typically utilize AD for mobility. Pt reports x5 falls within last 6 mos. This date, pt A&Ox4, sitting up in chair upon arrival. Pt comes to stand with CGA, initially completes functional mobility without AD, however appears to be unsteady and requires assist for dynamic balance. Pt utilized RW and required CGA with funcitonal mobility with AD. Pt became incontinent with functional mobility, requiring max A for lacey hygiene in standing. Pt completes bed mobility with SBA and verbal cues. Pt demo decreased safety awareness and is at increased risk for falls. Pt likely to be safe to d/c home with 24/7  supervision and assist from family. OT will follow while pt admitted to return to [facility] to max potential.     Time Calculation:              Gem Alonzo OT  7/16/2023

## 2023-07-17 VITALS
RESPIRATION RATE: 14 BRPM | HEART RATE: 87 BPM | SYSTOLIC BLOOD PRESSURE: 110 MMHG | TEMPERATURE: 97.5 F | DIASTOLIC BLOOD PRESSURE: 63 MMHG | OXYGEN SATURATION: 100 % | HEIGHT: 71 IN | WEIGHT: 145.28 LBS | BODY MASS INDEX: 20.34 KG/M2

## 2023-07-17 LAB
ANION GAP SERPL CALCULATED.3IONS-SCNC: 14 MMOL/L (ref 5–15)
BASOPHILS # BLD AUTO: 0 10*3/MM3 (ref 0–0.2)
BASOPHILS NFR BLD AUTO: 0.3 % (ref 0–1.5)
BUN SERPL-MCNC: 16 MG/DL (ref 8–23)
BUN/CREAT SERPL: 16.8 (ref 7–25)
CALCIUM SPEC-SCNC: 8.8 MG/DL (ref 8.6–10.5)
CHLORIDE SERPL-SCNC: 104 MMOL/L (ref 98–107)
CO2 SERPL-SCNC: 23 MMOL/L (ref 22–29)
CREAT SERPL-MCNC: 0.95 MG/DL (ref 0.76–1.27)
DEPRECATED RDW RBC AUTO: 38.9 FL (ref 37–54)
EGFRCR SERPLBLD CKD-EPI 2021: 81.4 ML/MIN/1.73
EOSINOPHIL # BLD AUTO: 0.1 10*3/MM3 (ref 0–0.4)
EOSINOPHIL NFR BLD AUTO: 1.1 % (ref 0.3–6.2)
ERYTHROCYTE [DISTWIDTH] IN BLOOD BY AUTOMATED COUNT: 12.4 % (ref 12.3–15.4)
GLUCOSE BLDC GLUCOMTR-MCNC: 103 MG/DL (ref 70–105)
GLUCOSE SERPL-MCNC: 115 MG/DL (ref 65–99)
HCT VFR BLD AUTO: 34.6 % (ref 37.5–51)
HGB BLD-MCNC: 11.4 G/DL (ref 13–17.7)
LYMPHOCYTES # BLD AUTO: 2.4 10*3/MM3 (ref 0.7–3.1)
LYMPHOCYTES NFR BLD AUTO: 24.4 % (ref 19.6–45.3)
MCH RBC QN AUTO: 29.9 PG (ref 26.6–33)
MCHC RBC AUTO-ENTMCNC: 33 G/DL (ref 31.5–35.7)
MCV RBC AUTO: 90.7 FL (ref 79–97)
MONOCYTES # BLD AUTO: 1 10*3/MM3 (ref 0.1–0.9)
MONOCYTES NFR BLD AUTO: 10.1 % (ref 5–12)
NEUTROPHILS NFR BLD AUTO: 6.2 10*3/MM3 (ref 1.7–7)
NEUTROPHILS NFR BLD AUTO: 64.1 % (ref 42.7–76)
NRBC BLD AUTO-RTO: 0 /100 WBC (ref 0–0.2)
PLATELET # BLD AUTO: 296 10*3/MM3 (ref 140–450)
PMV BLD AUTO: 7 FL (ref 6–12)
POTASSIUM SERPL-SCNC: 4.3 MMOL/L (ref 3.5–5.2)
RBC # BLD AUTO: 3.82 10*6/MM3 (ref 4.14–5.8)
SODIUM SERPL-SCNC: 141 MMOL/L (ref 136–145)
WBC NRBC COR # BLD: 9.6 10*3/MM3 (ref 3.4–10.8)

## 2023-07-17 PROCEDURE — 25010000002 HEPARIN (PORCINE) PER 1000 UNITS: Performed by: NURSE PRACTITIONER

## 2023-07-17 PROCEDURE — 80048 BASIC METABOLIC PNL TOTAL CA: CPT | Performed by: INTERNAL MEDICINE

## 2023-07-17 PROCEDURE — 82948 REAGENT STRIP/BLOOD GLUCOSE: CPT

## 2023-07-17 PROCEDURE — 63710000001 INSULIN LISPRO (HUMAN) PER 5 UNITS: Performed by: NURSE PRACTITIONER

## 2023-07-17 PROCEDURE — 85025 COMPLETE CBC W/AUTO DIFF WBC: CPT | Performed by: INTERNAL MEDICINE

## 2023-07-17 RX ADMIN — Medication 10 ML: at 08:02

## 2023-07-17 RX ADMIN — EMPAGLIFLOZIN 10 MG: 10 TABLET, FILM COATED ORAL at 08:02

## 2023-07-17 RX ADMIN — HEPARIN SODIUM 5000 UNITS: 5000 INJECTION INTRAVENOUS; SUBCUTANEOUS at 05:57

## 2023-07-17 RX ADMIN — DICYCLOMINE HYDROCHLORIDE 10 MG: 10 CAPSULE ORAL at 08:02

## 2023-07-17 RX ADMIN — INSULIN LISPRO 2 UNITS: 100 INJECTION, SOLUTION INTRAVENOUS; SUBCUTANEOUS at 08:01

## 2023-07-17 RX ADMIN — DULOXETINE HYDROCHLORIDE 90 MG: 30 CAPSULE, DELAYED RELEASE ORAL at 08:02

## 2023-07-17 RX ADMIN — VERAPAMIL HYDROCHLORIDE 240 MG: 240 TABLET, FILM COATED, EXTENDED RELEASE ORAL at 08:01

## 2023-07-17 RX ADMIN — SUCRALFATE 1 G: 1 TABLET ORAL at 08:02

## 2023-07-17 RX ADMIN — DICYCLOMINE HYDROCHLORIDE 10 MG: 10 CAPSULE ORAL at 11:43

## 2023-07-17 RX ADMIN — HYDROCODONE BITARTRATE AND ACETAMINOPHEN 1 TABLET: 10; 325 TABLET ORAL at 11:44

## 2023-07-17 RX ADMIN — SUCRALFATE 1 G: 1 TABLET ORAL at 11:43

## 2023-07-17 RX ADMIN — ATORVASTATIN CALCIUM 20 MG: 20 TABLET, FILM COATED ORAL at 08:01

## 2023-07-17 RX ADMIN — PANTOPRAZOLE SODIUM 40 MG: 40 TABLET, DELAYED RELEASE ORAL at 08:02

## 2023-07-17 RX ADMIN — LISINOPRIL 20 MG: 20 TABLET ORAL at 08:02

## 2023-07-17 NOTE — NURSING NOTE
Discharge teaching done, transport paged, wife at lobby waiting to take patient home, no concerns noted

## 2023-07-17 NOTE — CASE MANAGEMENT/SOCIAL WORK
Case Management Discharge Note      Final Note: Patient on room air at [place]. Patient family to provide transport. No barriers.      Transportation Services  Private: Car (with family)    Final Discharge Disposition Code: 01 - home or self-care

## 2023-07-17 NOTE — DISCHARGE SUMMARY
[facility] Medicine Services  DISCHARGE SUMMARY    Patient Name: Lux Bray  : 1945  MRN: [id number]    Date of Admission: 2023  Discharge Diagnosis: Generalized weakness:  Date of Discharge:  23  Primary Care Physician: Montana Morrow MD      Presenting Problem:   Weakness [R53.1]  Fever, unspecified fever cause [R50.9]  Sepsis [A41.9]    Active and Resolved Hospital Problems:  Active Hospital Problems    Diagnosis POA    **Generalized weakness [R53.1] Yes    Encephalopathy, metabolic [G93.41] Yes    Leukocytosis [D72.829] Yes    Sepsis [A41.9] Yes    Type 2 diabetes mellitus [E11.9] Yes      Resolved Hospital Problems   No resolved problems to display.      1.  Generalized weakness: improving   #Debility likely related to depression and abrupt stopping of psych meds  #Decreased appetite, encourage 3 meals a day,patient declines appetite stimulant   #PT OT evaluation    2.  Type 2 diabetes   sliding scale insulin,   Accu-Chek with meals  #Last A1c was 6.8    3.  Encephalopathy  #Now resolved.      4.  Sepsis: present on admission-?source  #With elevated white blood cells and rapid heart rate and suspicion for infection patient met criteria for sepsis.    Leukocytosis: resolved  dc IV fluid.  -RVP NEG  - given vancomycin and cefepime in the ED.  However no source of infection has been found so far.    -urine cultures and blood cultures. neg  - patient treated with cefepime,  - so far cultures are negative    5.  Acute Cystitis without hematuria  #Urinalysis not convincing for urinary tract infection.  Cultures pending.  Started on Cefepime  #We will obtain bladder scans, postvoid bladder scans.  #Patient did complain of burning on urination.    6.  Acute on chronic kidney injury:resolved   #Creatinine at 0.99  Creatinine was 1.3 last February  #Bladder scan to rule out urinary retention    Hospital Course     Hospital Course:  Lux Bray is a 77 y.o.  male  who presented to [facility] on 7/13/2023 complaining of generalized weakness and confusion.  Patient has past medical history of diabetes type 2, Parkinson, hypertension, depression and hyperlipidemia.  Per wife, patient has been increasingly weak needing more help and has been sleeping more.  Patient did not have an elevated temperature at home but showed increased confusion.  ED work-up revealed leukocytosis, respiratory panel is negative, UA showed trace leuk esterase, chest x-ray is negative for any cardiopulmonary process, EKG showed tachycardia, CT head is negative for acute intracranial abnormality.  Creatinine is 1.40 and potassium are borderline at 5.1.  Patient was given cefepime and vancomycin in the emergency room.  At the time of assessment patient is back to himself, able to answer most questions correctly and acknowledged that he was confused earlier.  Patient denies chest pain and shortness of breath, chills and fever, headache and cough.  Care plan discussed with patient who is agreeable, all questions answered.     7/16/23 Patient Reports patient was ambulating with physical therapist earlier, feels better in no distress   7/11/23 Doing better today, will dc home, condition on dc stable, received 3 days of antibiotics.    DISCHARGE Follow Up Recommendations for labs and diagnostics: follow with pcp in one week    Reasons For Change In Medications and Indications for New Medications:  Hold amaryl     Day of Discharge     Vital Signs:  Temp:  [98.1 °F (36.7 °C)-98.7 °F (37.1 °C)] 98.7 °F (37.1 °C)  Heart Rate:  [62-89] 87  Resp:  [14-15] 15  BP: (110-142)/(58-83) 142/83      Physical Exam HENT:      Head: Normocephalic.   Cardiovascular:      Rate and Rhythm: Tachycardia present.   Pulmonary:      Effort: Pulmonary effort is normal.   Abdominal:      General: Abdomen is flat. Bowel sounds are normal.      Palpations: Abdomen is soft.   Musculoskeletal:      Cervical back: Normal range  of motion.   Neurological:      Mental Status: He is alert. Mental status is at baseline.   Psychiatric:         Mood and Affect: Mood normal.          Pertinent  and/or Most Recent Results     LAB RESULTS:      Lab 07/17/23  0815 07/16/23  0509 07/15/23  0952 07/15/23  0503 07/14/23  0500 07/14/23  0024   WBC 9.60 7.60 8.00 8.00 9.10  --    HEMOGLOBIN 11.4* 12.2* 11.9* 11.9* 11.8*  --    HEMATOCRIT 34.6* 36.8* 36.9* 36.1* 36.4*  --    PLATELETS 296 297 285 282 272  --    NEUTROS ABS 6.20 4.70 5.40 5.20 7.60*  --    LYMPHS ABS 2.40 1.80 1.40 1.70 0.70  --    MONOS ABS 1.00* 0.90 1.10* 0.90 0.80  --    EOS ABS 0.10 0.10 0.10 0.10 0.00  --    MCV 90.7 91.0 91.5 91.7 90.0  --    LACTATE  --   --   --   --   --  1.3         Lab 07/17/23  0615 07/16/23  0509 07/15/23  0953 07/15/23  0503 07/14/23  0500   SODIUM 141 138 137 139 140   POTASSIUM 4.3 4.0 4.3 4.1 5.0   CHLORIDE 104 101 100 101 103   CO2 23.0 26.0 25.0 25.0 27.0   ANION GAP 14.0 11.0 12.0 13.0 10.0   BUN 16 13 22 23 26*   CREATININE 0.95 0.83 1.00 0.99 1.18   EGFR 81.4 89.0 76.6 77.5 62.8   GLUCOSE 115* 106* 80 49* 125*   CALCIUM 8.8 9.0 9.0 9.1 9.0         Lab 07/14/23  0004   TOTAL PROTEIN 7.6   ALBUMIN 3.6   GLOBULIN 4.0   ALT (SGPT) 18   AST (SGOT) 19   BILIRUBIN 0.4   ALK PHOS 110         Lab 07/14/23  0230 07/14/23  0004   HSTROP T 16* 17*                 Lab 07/14/23  0055   PH, ARTERIAL 7.384   PCO2, ARTERIAL 50.8*   PO2 ART 78.9*   O2 SATURATION ART 95.1   FIO2 28   HCO3 ART 30.3*   BASE EXCESS ART 4.2*     Brief Urine Lab Results  (Last result in the past 365 days)        Color   Clarity   Blood   Leuk Est   Nitrite   Protein   CREAT   Urine HCG        07/14/23 0035 Dark Yellow   Clear   Moderate (2+)   Trace   Negative   30 mg/dL (1+)                 Microbiology Results (last 10 days)       Procedure Component Value - Date/Time    Respiratory Panel PCR w/COVID-19(SARS-CoV-2) DODIE/JOVITA/THEO/PAD/COR/MAD/RAJAT In-House, NP Swab in UTM/VTM, 3-4 HR TAT -  Swab, Nasopharynx [[id number]]  (Normal) Collected: 07/14/23 0035    Lab Status: Final result Specimen: Swab from Nasopharynx Updated: 07/14/23 0137     ADENOVIRUS, PCR Not Detected     Coronavirus 229E Not Detected     Coronavirus HKU1 Not Detected     Coronavirus NL63 Not Detected     Coronavirus OC43 Not Detected     COVID19 Not Detected     Human Metapneumovirus Not Detected     Human Rhinovirus/Enterovirus Not Detected     Influenza A PCR Not Detected     Influenza B PCR Not Detected     Parainfluenza Virus 1 Not Detected     Parainfluenza Virus 2 Not Detected     Parainfluenza Virus 3 Not Detected     Parainfluenza Virus 4 Not Detected     RSV, PCR Not Detected     Bordetella pertussis pcr Not Detected     Bordetella parapertussis PCR Not Detected     Chlamydophila pneumoniae PCR Not Detected     Mycoplasma pneumo by PCR Not Detected    Narrative:      In the setting of a positive respiratory panel with a viral infection PLUS a negative procalcitonin without other underlying concern for bacterial infection, consider observing off antibiotics or discontinuation of antibiotics and continue supportive care. If the respiratory panel is positive for atypical bacterial infection (Bordetella pertussis, Chlamydophila pneumoniae, or Mycoplasma pneumoniae), consider antibiotic de-escalation to target atypical bacterial infection.    Blood Culture - Blood, Arm, Right [[id number]]  (Normal) Collected: 07/14/23 0035    Lab Status: Preliminary result Specimen: Blood from Arm, Right Updated: 07/17/23 0046     Blood Culture No growth at 3 days    Blood Culture - Blood, Arm, Left [[id number]]  (Normal) Collected: 07/14/23 0035    Lab Status: Preliminary result Specimen: Blood from Arm, Left Updated: 07/17/23 0046     Blood Culture No growth at 3 days    Urine Culture - Urine, Urine, Clean Catch [[id number]]  (Normal) Collected: 07/14/23 0035    Lab Status: Final result Specimen: Urine, Clean Catch Updated: 07/15/23 0343     Urine  Culture No growth            CT Head Without Contrast    Result Date: 7/14/2023  Impression: 1. No acute intracranial abnormality. 2. Mild/moderate volume loss and chronic microvascular ischemic changes. Arteriosclerosis.  Electronically signed by:  German Lewis M.D.  7/14/2023 1:08 AM Mountain Time    XR Chest 1 View    Result Date: 7/14/2023  Impression: Impression: Poor inspiration with mild atelectasis lung bases. Electronically Signed: Heidy Calvin MD  7/14/2023 7:12 AM EDT  Workstation ID: [id number]                 Labs Pending at Discharge:  Pending Labs       Order Current Status    Blood Culture - Blood, Arm, Left Preliminary result    Blood Culture - Blood, Arm, Right Preliminary result            Procedures Performed           Consults:   Consults       Date and Time Order Name Status Description    7/14/2023  3:26 AM Hospitalist (on-call MD unless specified)                Discharge Details        Discharge Medications        Continue These Medications        Instructions Start Date   amphetamine-dextroamphetamine XR 20 MG 24 hr capsule  Commonly known as: Adderall XR   20 mg, Oral, Every Morning      atorvastatin 20 MG tablet  Commonly known as: LIPITOR   TAKE 1 TABLET DAILY      dicyclomine 10 MG capsule  Commonly known as: BENTYL   TAKE 1 TO 2 CAPSULES EVERY 6 HOURS AS NEEDED FOR ABDOMINAL PAIN AND CRAMPING      DULoxetine 30 MG capsule  Commonly known as: CYMBALTA   TAKE 3 CAPSULES DAILY      HYDROcodone-acetaminophen  MG per tablet  Commonly known as: NORCO   Take 1 tablet every 4 hours prn MAX 4 tablets /day      Ibuprofen 3 %, Gabapentin 10 %, Baclofen 2 %, lidocaine 4 %, Ketamine HCl 4 %   1-2 g, Topical, 3 to 4 Times Daily      Jardiance 10 MG tablet tablet  Generic drug: empagliflozin   TAKE 1 TABLET BY MOUTH EVERY MORNING BEFORE BREAKFAST      metFORMIN 500 MG tablet  Commonly known as: GLUCOPHAGE   TAKE 2 TABLETS TWICE A DAY      pantoprazole 40 MG EC tablet  Commonly known as:  PROTONIX   TAKE 1 TABLET DAILY      sucralfate 1 GM/10ML suspension  Commonly known as: Carafate   TAKE 10 ML THREE TIMES A DAY AS NEEDED FOR HEARTBURN/ABDOMINAL PAIN      trandolapril-verapamil 2-240 MG per CR tablet  Commonly known as: TARKA   1 tablet, Oral, Daily             Stop These Medications      glimepiride 2 MG tablet  Commonly known as: AMARYL              No Known Allergies      Discharge Disposition:   Home or Self Care    Diet:  Hospital:  Diet Order   Procedures    Diet: Diabetic Diets; Consistent Carbohydrate; Texture: Regular Texture (IDDSI 7); Fluid Consistency: Thin (IDDSI 0)         Discharge Activity:   Activity Instructions       Gradually Increase Activity Until at Pre-Hospitalization Level                CODE STATUS:  Code Status and Medical Interventions:   Ordered at: 07/14/23 0421     Level Of Support Discussed With:    Patient     Code Status (Patient has no pulse and is not breathing):    CPR (Attempt to Resuscitate)     Medical Interventions (Patient has pulse or is breathing):    Full Support     I have utilized all available, immediate resources to obtain, update, or review the patient's current medications including all prescriptions, over-the-counter products, herbals, cannabis/cannabidiol products, and vitamin.mineral/dietary (nutritional) supplements.      Level Of Support Discussed With: Patient  Code Status (Patient has no pulse and is not breathing): CPR (Attempt to Resuscitate)  Medical Interventions (Patient has pulse or is breathing): Full Support    Next of kin of Power of :       Admission Status:  I believe this patient meets admit status.          Future Appointments   Date Time Provider Department Center   7/25/2023 12:00 PM [facility] 1  [facility] MRI [facility]   7/25/2023  1:00 PM [facility] MRI 1  [facility]   10/20/2023  8:30 AM [facility] MACHINE 1 [facility] TALYA None   5/31/2024  2:30 PM Montana Morrow MD [facility]       Additional Instructions for the Follow-ups  that You Need to Schedule       Discharge Follow-up with PCP   As directed       Currently Documented PCP:    Montana Morrow MD    PCP Phone Number:    [phone]     Follow Up Details: 1 week                 Time spent on Discharge including face to face service:  34 minutes    This patient has been examined wearing appropriate Personal Protective Equipment and discussed with  rn . 07/17/23      Signature: Electronically signed by Kendall Haynes MD, 07/17/23, 12:26 PM EDT.

## 2023-07-17 NOTE — PLAN OF CARE
Goal Outcome Evaluation:      Pt updated on plan of care and discharge orders, pt wife made aware of discharge orders

## 2023-07-18 NOTE — PROGRESS NOTES
"Enter Query Response Below      Query Response: Sepsis and acute cystitis were ruled out, SIRS was present.   Electronically signed by Kendall Haynes MD, 23, 2:57 PM EDT.              If applicable, please update the problem list.   Patient: Lux Bray        : 1945  Account: [id number]           Admit Date:         How to Respond to this query:       a. Click New Note     b. Answer query within the yellow box.                c. Update the Problem List, if applicable.      If you have any questions about this query contact me at: [email or web address]       ,     The 23 H&P through the  Discharge Summary notes \"Sepsis: present on admission-?source #With elevated white blood cells and rapid heart rate and suspicion for infection patient met criteria for sepsis.\"  The Discharge Summary states \"no source of infection has been found so far.   -urine cultures and blood cultures. neg - patient treated with cefepime,- so far cultures are negative\" and \"Urinalysis not convincing for urinary tract infection.\"   Treatment included a Normal Saline 1 Liter bolus , IV Vancomycin , and Cefepime -7/15.  The patient was not discharged on antibiotics.     23 Urine Culture Negative; Blood Culture no growth at 3 days.     After study, please clarify:    -Sepsis and acute cystitis were ruled out, SIRS was present.  -Sepsis and acute cystitis ruled in (add any supporting clinical indicators/treatment)_________.  -Sepsis due to _________ ruled in ((add any supporting clinical indicators/treatment)__________.  -Other __________________.  -Unable to further clarify.     By submitting this query, we are merely seeking further clarification of documentation to accurately reflect all conditions that you are monitoring, evaluating, treating or that extend the hospitalization or utilize additional resources of care. Please utilize your independent clinical judgment when addressing " the question(s) above.     This query and your response, once completed, will be entered into the legal medical record.    Sincerely,  Lora BA RN, [facility]  Clinical Documentation Improvement Program  [email or web address]

## 2023-07-19 LAB
BACTERIA SPEC AEROBE CULT: NORMAL
BACTERIA SPEC AEROBE CULT: NORMAL

## 2023-07-21 ENCOUNTER — OFFICE VISIT (OUTPATIENT)
Dept: FAMILY MEDICINE CLINIC | Facility: CLINIC | Age: 78
End: 2023-07-21
Payer: MEDICARE

## 2023-07-21 VITALS
HEART RATE: 110 BPM | BODY MASS INDEX: 20.38 KG/M2 | WEIGHT: 145.6 LBS | DIASTOLIC BLOOD PRESSURE: 70 MMHG | HEIGHT: 71 IN | SYSTOLIC BLOOD PRESSURE: 98 MMHG | RESPIRATION RATE: 16 BRPM | OXYGEN SATURATION: 93 %

## 2023-07-21 DIAGNOSIS — R26.9 NEUROLOGIC GAIT DYSFUNCTION: Primary | ICD-10-CM

## 2023-07-21 DIAGNOSIS — E11.65 TYPE 2 DIABETES MELLITUS WITH HYPERGLYCEMIA, WITHOUT LONG-TERM CURRENT USE OF INSULIN: ICD-10-CM

## 2023-07-21 RX ORDER — GLIMEPIRIDE 2 MG/1
TABLET ORAL
Qty: 270 TABLET | Refills: 3 | Status: SHIPPED | OUTPATIENT
Start: 2023-07-21 | End: 2023-07-25 | Stop reason: SDUPTHER

## 2023-07-21 NOTE — PROGRESS NOTES
Transitional Care Follow Up Visit  Subjective     Lux Bray is a 77 y.o. male who presents for a transitional care management visit.    The patient presents to the clinic for a hospital follow-up. He is accompanied by an adult female.    The patient was recently admitted to the hospital for a urinary tract infection, fever, and dehydration. The patient was falling and having headaches every day for at least 4 to 5 days before he went to the hospital. The patient is having a hard time walking and getting around well. She is always afraid he will fall because he was falling quite a bit. He has to try 3 times before he can get up. He is very weak now. He has lost a lot of muscle mass. He has been in physical therapy at the hospital. He was told to get off of glimepiride first. He does not want to do that because it would throw his blood sugar count way higher. They were giving him insulin in the hospital, but she is not sure why they took them to stop glimepiride. He started taking glimepiride 2 mg twice a day. His sugars are okay. His blood sugar was 190 when he got there, but they brought it down with insulin on that. He has been home for 2 to 3 days. He does not take Abilify anymore.      Within 48 business hours after discharge our office contacted him via telephone to coordinate his care and needs.      I reviewed and discussed the details of that call along with the discharge summary, hospital problems, inpatient lab results, inpatient diagnostic studies, and consultation reports with Lux.     Current outpatient and discharge medications have been reconciled for the patient.  Reviewed by: Mirian Mcneill MA          7/17/2023     6:53 PM   Date of TCM Phone Call   [facility]   Date of Admission 7/14/2023   Date of Discharge 7/17/2023   Discharge Disposition Home or Self Care     Risk for Readmission (LACE) Score: 8 (7/17/2023  6:01 AM)      History of Present Illness  Pt here for  hospital f/u. He was at [facility] for 3 nights. Complications from a UTI. Also states bad headaches for several weeks   Course During Hospital Stay:  3 nights at [facility]  Hospital Course:  Lux Bray is a 77 y.o. male  who presented to [facility] on 7/13/2023 complaining of generalized weakness and confusion.  Patient has past medical history of diabetes type 2, Parkinson, hypertension, depression and hyperlipidemia.  Per wife, patient has been increasingly weak needing more help and has been sleeping more.  Patient did not have an elevated temperature at home but showed increased confusion.  ED work-up revealed leukocytosis, respiratory panel is negative, UA showed trace leuk esterase, chest x-ray is negative for any cardiopulmonary process, EKG showed tachycardia, CT head is negative for acute intracranial abnormality.  Creatinine is 1.40 and potassium are borderline at 5.1.  Patient was given cefepime and vancomycin in the emergency room.  At the time of assessment patient is back to himself, able to answer most questions correctly and acknowledged that he was confused earlier.  Patient denies chest pain and shortness of breath, chills and fever, headache and cough.  Care plan discussed with patient who is agreeable, all questions answered.     7/16/23 Patient Reports patient was ambulating with physical therapist earlier, feels better in no distress   7/11/23 Doing better today, will dc home, condition on dc stable, received 3 days of antibiotics.       The following portions of the patient's history were reviewed and updated as appropriate: allergies, current medications, past family history, past medical history, past social history, past surgical history, and problem list.      Review of Systems    Objective   Physical Exam  Constitutional:       Appearance: Normal appearance. He is well-developed and normal weight.   HENT:      Head: Normocephalic and atraumatic.      Right Ear: Tympanic membrane, ear  canal and external ear normal.      Left Ear: Tympanic membrane, ear canal and external ear normal.      Nose: Nose normal.      Mouth/Throat:      Mouth: Mucous membranes are moist.      Pharynx: Oropharynx is clear. No oropharyngeal exudate.   Eyes:      Extraocular Movements: Extraocular movements intact.      Conjunctiva/sclera: Conjunctivae normal.      Pupils: Pupils are equal, round, and reactive to light.   Cardiovascular:      Rate and Rhythm: Normal rate and regular rhythm.      Pulses: Normal pulses.      Heart sounds: Normal heart sounds.   Pulmonary:      Effort: Pulmonary effort is normal.      Breath sounds: Normal breath sounds.   Abdominal:      General: Bowel sounds are normal.      Palpations: Abdomen is soft.   Musculoskeletal:         General: Deformity present. Normal range of motion.      Cervical back: Normal range of motion and neck supple.      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  Generalized stiffness in these joints but he does move them and he does not really have any fluid on any of the joints that I examined.   Skin:     General: Skin is warm and dry.   Neurological:      General: No focal deficit present.      Mental Status: He is alert and oriented to person, place, and time. Mental status is at baseline.      Coordination: Coordination abnormal.      Gait: Gait abnormal.      Comments: Alen is able to get up and walk.  He does not walk like a patient with far advanced Parkinson's.  He moves his arms and swings them well and his legs are picked up carried forward.  He does not have a shuffling gait yet.  He has a normal back-and-forth sway with his walk.  He just generally seems a little weak and a little off balance.  He had to grab for the wall a few times going down the hallway.  When he turned he turned with 2-3 steps in a fairly coordinated manner.  It was not a stiff Parkinson's turn.  When I see mainly needs  strengthening.   Psychiatric:         Mood and Affect: Mood normal.         Behavior: Behavior normal.         Thought Content: Thought content normal.         Judgment: Judgment normal.       Assessment & Plan     1. Neurologic gait dysfunction  - The patient is a 77-year-old white male who was just recently discharged from the hospital after being admitted for urinary tract infection with some confusion and weakness. He was in the hospital a couple of days and did improve and is now back home. One of the problems that we saw before his hospital stay and we are still seeing it is tendency with his gait to be off balance at times and to have a risk for falling. His general muscle weakness has occurred over the past year. He has lost some muscle mass and strength and because of that, I think his balance is off and he certainly is at risk for falling. He is using a cane, which helps, but I think he can be helped even more if we could strengthen his muscles and improve his balance. For that reason, I am going to send him to [facility] where they like to go because they have a relationship there with a therapist that they worked with before Irasema Smith. I am hoping Lux Bray can get there and get some therapy over the next few weeks and improve his strength and balance and gait. I will see him back in 8 weeks to make sure that is happening.    2. Type 2 diabetes without long term use of insulin in the hospital  - The patient's doctors in the hospital stopped his glimepiride and instead put him on insulin. The sugars came under control, but upon discharge, they did not give him insulin to take and apparently did not want him to take the glimepiride. He does not feel comfortable without the glimepiride being taken, so he started it back up upon arrival home. He is going to keep track of his blood sugars for me and then when I see him back in 8 weeks to check his muscle strength and balance,  we are going to check his A1c again and make sure that the medication is working for him.           Transcribed from ambient dictation for Montana Morrow MD by Kiki Hargrove.  07/21/23   10:46 EDT    Patient or patient representative verbalized consent to the visit recording.  I have personally performed the services described in this document as transcribed by the above individual, and it is both accurate and complete.  Montana Morrow MD  7/27/2023  19:25 EDT

## 2023-07-25 ENCOUNTER — HOSPITAL ENCOUNTER (OUTPATIENT)
Dept: MRI IMAGING | Facility: HOSPITAL | Age: 78
Discharge: HOME | End: 2023-07-25
Payer: MEDICARE

## 2023-07-25 ENCOUNTER — HOSPITAL ENCOUNTER (OUTPATIENT)
Dept: MRI IMAGING | Facility: HOSPITAL | Age: 78
Discharge: HOME | End: 2023-07-25
Admitting: FAMILY MEDICINE
Payer: MEDICARE

## 2023-07-25 DIAGNOSIS — M54.2 NECK PAIN: ICD-10-CM

## 2023-07-25 DIAGNOSIS — G21.9 SECONDARY PARKINSONISM, UNSPECIFIED SECONDARY PARKINSONISM TYPE: ICD-10-CM

## 2023-07-25 DIAGNOSIS — G95.9 MYELOPATHY: ICD-10-CM

## 2023-07-25 DIAGNOSIS — R41.82 ALTERED MENTAL STATUS, UNSPECIFIED ALTERED MENTAL STATUS TYPE: ICD-10-CM

## 2023-07-25 PROCEDURE — 70553 MRI BRAIN STEM W/O & W/DYE: CPT

## 2023-07-25 PROCEDURE — 72141 MRI NECK SPINE W/O DYE: CPT

## 2023-07-25 PROCEDURE — 25010000002 GADOTERIDOL PER 1 ML: Performed by: FAMILY MEDICINE

## 2023-07-25 PROCEDURE — A9579 GAD-BASE MR CONTRAST NOS,1ML: HCPCS | Performed by: FAMILY MEDICINE

## 2023-07-25 RX ORDER — GLIMEPIRIDE 2 MG/1
TABLET ORAL
Qty: 270 TABLET | Refills: 3 | Status: SHIPPED | OUTPATIENT
Start: 2023-07-25

## 2023-07-25 RX ORDER — TRANDOLAPRIL AND VERAPAMIL HYDROCHLORIDE 2; 240 MG/1; MG/1
1 TABLET, FILM COATED, EXTENDED RELEASE ORAL DAILY
Qty: 90 TABLET | Refills: 1
Start: 2023-07-25 | End: 2024-01-21

## 2023-07-25 RX ORDER — SUCRALFATE ORAL 1 G/10ML
SUSPENSION ORAL
Qty: 2700 ML | Refills: 3 | Status: SHIPPED | OUTPATIENT
Start: 2023-07-25

## 2023-07-25 RX ADMIN — GADOTERIDOL 14 ML: 279.3 INJECTION, SOLUTION INTRAVENOUS at 13:30

## 2023-07-26 DIAGNOSIS — M54.2 NECK PAIN: Primary | ICD-10-CM

## 2023-07-27 ENCOUNTER — READMISSION MANAGEMENT (OUTPATIENT)
Dept: CALL CENTER | Facility: HOSPITAL | Age: 78
End: 2023-07-27
Payer: MEDICARE

## 2023-07-27 ENCOUNTER — TELEPHONE (OUTPATIENT)
Dept: FAMILY MEDICINE CLINIC | Facility: CLINIC | Age: 78
End: 2023-07-27
Payer: MEDICARE

## 2023-07-27 NOTE — TELEPHONE ENCOUNTER
Dr. Morrow,  This is what scheduling sent me.  REFERRING MD LAST OVN 07/21/2023 LOCATED IN CHART.  OVN IS STILL UNSIGNED.     PLEASE ROUTE BACK FOR SCHEDULING ONCE OVN IS SIGNED.        Thanks, Shirlene

## 2023-07-27 NOTE — OUTREACH NOTE
Medical Week 2 Survey      Flowsheet Row Responses   [facility] patient discharged from? [facility]   Does the patient have one of the following disease processes/diagnoses(primary or secondary)? Other   Week 2 attempt successful? Yes   Call start time 1219   Discharge diagnosis Generalized weakness   Call end time 1220   Person spoke with today (if not patient) and relationship spouse   Meds reviewed with patient/caregiver? Yes   Is the patient taking all medications as directed (includes completed medication regime)? Yes   Does the patient have a primary care provider?  Yes   Does the patient have an appointment with their PCP within 7 days of discharge? Yes   Comments regarding PCP Cristhian, PCP   Has the patient kept scheduled appointments due by today? Yes   Comments Pt will start PT next week.   Psychosocial issues? No   Did the patient receive a copy of their discharge instructions? Yes   Nursing interventions Reviewed instructions with patient   What is the patient's perception of their health status since discharge? Improving   Is the patient/caregiver able to teach back the hierarchy of who to call/visit for symptoms/problems? PCP, Specialist, Home health nurse, Urgent Care, ED, 911 Yes   If the patient is a current smoker, are they able to teach back resources for cessation? Not a smoker   Week 2 Call Completed? Yes   Graduated Yes   Did the patient feel the follow up calls were helpful during their recovery period? Yes   Wrap up additional comments spouse reports patient is doing well.  She denies needs at this time.   Call end time 1220            RUDDY MALDONADO - Registered Nurse

## 2023-07-28 ENCOUNTER — TELEPHONE (OUTPATIENT)
Dept: FAMILY MEDICINE CLINIC | Facility: CLINIC | Age: 78
End: 2023-07-28
Payer: MEDICARE

## 2023-07-28 NOTE — TELEPHONE ENCOUNTER
"Sherry go ahead and call the neurosurgery office and tell them to call the Luci's back.  I spoke with him and I think they understand better about what is going on.  Tell the neurosurgery office that I am interested in the neurosurgeons opinion regarding the cervical MRI done recently.  It shows spinal stenosis and significant neuroforaminal stenosis at 2 levels.  I am most interested in the spinal stenosis and whether or not it could be affecting his leg strength and balance.  He is seeing a neurologist and we know he has a peripheral neuropathy which is making the whole matter worse and difficult to diagnose but I need to know his opinion as to how much the spinal cord \"squeeze\"  is contributing to his difficulty with ambulation, balance, strength and endurance.  "

## 2023-07-28 NOTE — TELEPHONE ENCOUNTER
Caller: ROBY ANDREWS    Relationship: Emergency Contact    Best call back number: [phone]     What is the best time to reach you: ANY    Who are you requesting to speak with (clinical staff, provider,  specific staff member): PCP    Do you know the name of the person who called:     What was the call regarding: PATIENT WOULD LIKE TO GO OVER LAB RESULTS WITH PCP.    Is it okay if the provider responds through MyChart:

## 2023-07-28 NOTE — TELEPHONE ENCOUNTER
,  I have sent the referral to Neurosurgery and when that office called to get patient scheduled they are so confused as to what is going on they are confusing the Neurologist and Neurosurgery.  Could you please contact patient and see if you get some type of clarification from them please and the Neurosurgery office did not get them to schedule appt.         Thanks, Shirlene

## 2023-08-02 ENCOUNTER — TELEPHONE (OUTPATIENT)
Dept: FAMILY MEDICINE CLINIC | Facility: CLINIC | Age: 78
End: 2023-08-02

## 2023-08-02 NOTE — TELEPHONE ENCOUNTER
Spoke with patient's wife.  States patient just started coughing so she thinks patient is getting what she has.  What do you recommend patient doing?  There are no available appts with anyone this week.

## 2023-08-02 NOTE — TELEPHONE ENCOUNTER
Hub staff attempted to follow warm transfer process and was unsuccessful     Caller: ROBY ANDREWS    Relationship to patient: Emergency Contact    Best call back number: [phone]     Patient is needing:     IS SICK AND NEEDING AN APPOINTMENT.  PLEASE ADVISE.

## 2023-08-03 NOTE — TELEPHONE ENCOUNTER
I spoke with Noemy Wednesday night.  I think it would be best for Alen to wait a day or 2 before starting any antibiotics.  This is probably been a virus and there is no sense wasting a antibiotic on him right now

## 2023-08-06 DIAGNOSIS — M15.9 PRIMARY OSTEOARTHRITIS INVOLVING MULTIPLE JOINTS: ICD-10-CM

## 2023-08-07 RX ORDER — HYDROCODONE BITARTRATE AND ACETAMINOPHEN 10; 325 MG/1; MG/1
TABLET ORAL
Qty: 120 TABLET | Refills: 0 | Status: SHIPPED | OUTPATIENT
Start: 2023-08-07

## 2023-08-09 RX ORDER — PANTOPRAZOLE SODIUM 40 MG/1
TABLET, DELAYED RELEASE ORAL
Qty: 90 TABLET | Refills: 3 | Status: SHIPPED | OUTPATIENT
Start: 2023-08-09

## 2023-09-05 ENCOUNTER — TELEPHONE (OUTPATIENT)
Dept: FAMILY MEDICINE CLINIC | Facility: CLINIC | Age: 78
End: 2023-09-05
Payer: MEDICARE

## 2023-09-05 DIAGNOSIS — G63 POLYNEUROPATHY ASSOCIATED WITH UNDERLYING DISEASE: Primary | ICD-10-CM

## 2023-09-05 RX ORDER — L-METHYLFOLATE-ALGAE-VIT B12-B6 CAP 3-90.314-2-35 MG 3-90.314-2-35 MG
1 CAP ORAL 2 TIMES DAILY
Qty: 180 CAPSULE | Refills: 2 | Status: SHIPPED | OUTPATIENT
Start: 2023-09-05 | End: 2023-09-11

## 2023-09-05 RX ORDER — L-METHYLFOLATE-ALGAE-VIT B12-B6 CAP 3-90.314-2-35 MG 3-90.314-2-35 MG
1 CAP ORAL 2 TIMES DAILY
Qty: 60 CAPSULE | Refills: 11 | Status: SHIPPED | OUTPATIENT
Start: 2023-09-05 | End: 2023-09-05 | Stop reason: SDUPTHER

## 2023-09-05 RX ORDER — PREGABALIN 25 MG/1
CAPSULE ORAL
Qty: 200 CAPSULE | Refills: 1 | Status: SHIPPED | OUTPATIENT
Start: 2023-09-05

## 2023-09-05 NOTE — TELEPHONE ENCOUNTER
The medication is metanx and she can ask pharmacy to order it in or buy it over [employer].  He needs to try Lyrica again and just start low like I have ordered it and slowly titrate up.   If he gets too shaky even on the low dose we can try gabapentin but it is less effective and likely to do the same thing.   Try it again.  Its the best for what he has.   Find the Metanx.

## 2023-09-05 NOTE — TELEPHONE ENCOUNTER
"  Caller: ROBY ANDREWS    Relationship: Emergency Contact    Best call back number:     ROBY ANDREWS () [phone] (Home)       What was the call regarding:     PATIENT WANTED A CALL BACK FROM THE OFFICE TO DISCUSS THE MOST RECENT [employer]. MESSAGE     SHE CANNOT FIND THE \"ME THANKS\" SUPPLEMENT THAT DR MCCRARY SPOKE ABOUT     PATIENT ALSO WANTED TO DISCUSS THE LYRICA THAT WAS PRESCRIBED RECENTLY     PATIENT HAD BEEN TAKEN OFF THAT MEDICATION DUE TO TREMORS A WHILE BACK AND WANTED TO TALK ABOUT ALTERNATIVES     Is it okay if the provider responds through [employer]:   CALL PLEASE   "

## 2023-09-05 NOTE — TELEPHONE ENCOUNTER
UNABLE TO WARM TRANSFER     Hub staff attempted to follow warm transfer process and was unsuccessful     Caller: ROBY ANDREWS    Relationship to patient: Emergency Contact    Best call back number: [phone]    PATIENTS WIFE IS CHECKING ON STATUS

## 2023-09-05 NOTE — TELEPHONE ENCOUNTER
Brie gave message to patient's wife Ese and I spoke with Ese to answer any other questions she had.

## 2023-09-05 NOTE — TELEPHONE ENCOUNTER
HUB TO SHARE    Tried calling wife at 2:03pm and got no answer and no voice mail.    The medication is metanx and she can ask pharmacy to order it in or buy it over [employer].  He needs to try Lyrica again and just start low like I have ordered it and slowly titrate up.   If he gets too shaky even on the low dose we can try gabapentin but it is less effective and likely to do the same thing.   Try it again.  Its the best for what he has.   Find the Metanx.

## 2023-09-05 NOTE — TELEPHONE ENCOUNTER
Dr. Morrow, I am not familiar with 'me thinks', do you have more info on this that I can give to Lux. On the Lyrica, that was discontinued recently due to tremors, is there a different medication that you can recommend?

## 2023-09-05 NOTE — TELEPHONE ENCOUNTER
Wife Ese called her pharmacy about Metanx and it requires an rx from the doctor.    Please send an rx for Metanx to [facility] for patient asap.

## 2023-09-09 DIAGNOSIS — G63 POLYNEUROPATHY ASSOCIATED WITH UNDERLYING DISEASE: ICD-10-CM

## 2023-09-11 RX ORDER — PREGABALIN 25 MG/1
CAPSULE ORAL
Qty: 60 CAPSULE | Refills: 2 | OUTPATIENT
Start: 2023-09-11

## 2023-09-11 RX ORDER — LEVOMEFOLATE/B6/B12/ALGAL OIL 3-35-2 MG
1 CAPSULE ORAL 2 TIMES DAILY
Qty: 180 CAPSULE | Refills: 3 | Status: SHIPPED | OUTPATIENT
Start: 2023-09-11 | End: 2023-12-10

## 2023-09-14 RX ORDER — TRANDOLAPRIL AND VERAPAMIL HYDROCHLORIDE 2; 240 MG/1; MG/1
1 TABLET, FILM COATED, EXTENDED RELEASE ORAL DAILY
Qty: 90 TABLET | Refills: 1
Start: 2023-09-14 | End: 2024-03-12

## 2023-09-15 DIAGNOSIS — M15.9 PRIMARY OSTEOARTHRITIS INVOLVING MULTIPLE JOINTS: ICD-10-CM

## 2023-09-18 RX ORDER — HYDROCODONE BITARTRATE AND ACETAMINOPHEN 10; 325 MG/1; MG/1
TABLET ORAL
Qty: 120 TABLET | Refills: 0 | Status: SHIPPED | OUTPATIENT
Start: 2023-09-18 | End: 2023-09-25 | Stop reason: SDUPTHER

## 2023-09-25 ENCOUNTER — TELEPHONE (OUTPATIENT)
Dept: FAMILY MEDICINE CLINIC | Facility: CLINIC | Age: 78
End: 2023-09-25
Payer: MEDICARE

## 2023-09-25 DIAGNOSIS — M15.9 PRIMARY OSTEOARTHRITIS INVOLVING MULTIPLE JOINTS: ICD-10-CM

## 2023-09-25 RX ORDER — HYDROCODONE BITARTRATE AND ACETAMINOPHEN 10; 325 MG/1; MG/1
TABLET ORAL
Qty: 120 TABLET | Refills: 0 | Status: SHIPPED | OUTPATIENT
Start: 2023-09-25 | End: 2023-09-27

## 2023-09-25 NOTE — TELEPHONE ENCOUNTER
Caller: YESSICAENRIQUE ROBY    Relationship: Emergency Contact    Best call back number:     [phone]       Requested Prescriptions:   Requested Prescriptions     Pending Prescriptions Disp Refills    HYDROcodone-acetaminophen (NORCO)  MG per tablet 120 tablet 0     Sig: Take 1 tablet every 4 hours prn MAX 4 tablets /day        Pharmacy where request should be sent: [id number] - [place], [place] - [address] AT [facility] [address] [facility] [phone] [facility] [phone]      Last office visit with prescribing clinician: 7/21/2023   Last telemedicine visit with prescribing clinician: Visit date not found   Next office visit with prescribing clinician: 5/31/2024     Additional details provided by patient: PHARMACY ONLY HAS THE 5 MG TABLETS IN STOCK    Does the patient have less than a 3 day supply:  [x] Yes  [] No    Would you like a call back once the refill request has been completed: [x] Yes [] No    If the office needs to give you a call back, can they leave a voicemail: [x] Yes [] No    Bay Vasquez Rep   09/25/23 09:42 EDT

## 2023-09-25 NOTE — TELEPHONE ENCOUNTER
Caller: ROBY ANDREWS    Relationship: Emergency Contact    Best call back number: [phone]     Requested Prescriptions:CORRECTION OF PAIN MEDICATION STRENGTH DUE TO SHORTAGE         Pharmacy where request should be sent:     [id number] - [place], [place] - [address] AT [facility] [address] - [phone]  - [phone]  [phone]     Last office visit with prescribing clinician: 7/21/2023   Last telemedicine visit with prescribing clinician: Visit date not found   Next office visit with prescribing clinician: 5/31/2024     Additional details provided by patient: PATIENT'S WIFE IS CALLING BACK TO STATE THE PRESCRIPTION WAS SENT IN INCORRECT .  SHE STATES THE PHARMACY IS COMPLETELY OUT OF 10 MG FOR THE MEDICATION BELOW.  SHE IS WANTING TO KNOW IF DR. MCCRARY WILL SEND IN A PRESCRIPTION FOR 5 MG, TO TAKE 2(5) MG TABLETS 4 TIMES A DAY, TOTAL QUANTITY  TABLETS FOR 30 DAYS.  SHE STATES PATIENT HAS BEEN OUT OF THE MEDICATION FOR A WEEK.      HYDROcodone-acetaminophen (NORCO)      Does the patient have less than a 3 day supply:  [x] Yes  [] No    Would you like a call back once the refill request has been completed: [x] Yes [] No    If the office needs to give you a call back, can they leave a voicemail: [x] Yes [] No    Bay Lombardo Rep   09/25/23 15:54 EDT     PLEASE ADVISE.

## 2023-09-26 ENCOUNTER — TELEPHONE (OUTPATIENT)
Dept: FAMILY MEDICINE CLINIC | Facility: CLINIC | Age: 78
End: 2023-09-26
Payer: MEDICARE

## 2023-09-26 NOTE — TELEPHONE ENCOUNTER
PATIENTS WIFE CALLED IN. THE MEDICATION HYDROCODONE ACETAMINOPHEN (NORCO)  MG TABLET IS ON BACK ORDER AT ALL PHARMACY'S.     PATIENT WANTS THE MEDICATION CHANGED TO HYDROCODONE ACETAMINOPHEN (NORCO) 5 MG BECAUSE THEY HAVE THAT AT THE PHARMACY.    PLEASE SWITCH THIS FOR THEM.     [id number] - [place], [place] - [address] [facility] - [phone] [facility] [phone] FX

## 2023-09-26 NOTE — TELEPHONE ENCOUNTER
Hub staff attempted to follow warm transfer process and was unsuccessful     Caller: ROBY ANDREWS    Relationship to patient: Emergency Contact    Best call back number: [phone]     Patient is needing: PATIENTS WIFE WAS CALLING TO SPEAK WITH PEREZ FROM THE FRONT OFFICE    PATIENTS WIFE WOULD LIKE FOR PEREZ TO CALL HER BACK WHEN SHE IS ABLE TO     NO OTHER INFORMATION WAS GIVEN

## 2023-09-26 NOTE — TELEPHONE ENCOUNTER
Gave message to patient's wife Noemy at 3:50pm.  Patient and wife both agree that patient has to have something for pain and the Hydrocodone 10 are on back order and no one has them.  Please send an rx in for Percocet to [facility] at .

## 2023-09-26 NOTE — TELEPHONE ENCOUNTER
Cannot due to the 5 mg Norco's because therapy twice Tylenol.  Could either switch him to Percocet for the time being or go and try to call other pharmacies to see if they have [place].

## 2023-09-27 DIAGNOSIS — M15.9 PRIMARY OSTEOARTHRITIS INVOLVING MULTIPLE JOINTS: Primary | ICD-10-CM

## 2023-09-27 RX ORDER — OXYCODONE AND ACETAMINOPHEN 10; 325 MG/1; MG/1
1 TABLET ORAL EVERY 4 HOURS PRN
Qty: 120 TABLET | Refills: 0 | Status: SHIPPED | OUTPATIENT
Start: 2023-09-27

## 2023-09-30 ENCOUNTER — HOSPITAL ENCOUNTER (EMERGENCY)
Facility: HOSPITAL | Age: 78
Discharge: HOME | End: 2023-10-01
Attending: EMERGENCY MEDICINE
Payer: MEDICARE

## 2023-09-30 DIAGNOSIS — Z86.59 MENTAL STATUS CHANGE RESOLVED: Primary | ICD-10-CM

## 2023-09-30 LAB
BASOPHILS # BLD AUTO: 0 10*3/MM3 (ref 0–0.2)
BASOPHILS NFR BLD AUTO: 0.4 % (ref 0–1.5)
DEPRECATED RDW RBC AUTO: 48.6 FL (ref 37–54)
EOSINOPHIL # BLD AUTO: 0.2 10*3/MM3 (ref 0–0.4)
EOSINOPHIL NFR BLD AUTO: 1.9 % (ref 0.3–6.2)
ERYTHROCYTE [DISTWIDTH] IN BLOOD BY AUTOMATED COUNT: 15.7 % (ref 12.3–15.4)
HCT VFR BLD AUTO: 31.4 % (ref 37.5–51)
HGB BLD-MCNC: 10 G/DL (ref 13–17.7)
LYMPHOCYTES # BLD AUTO: 3.5 10*3/MM3 (ref 0.7–3.1)
LYMPHOCYTES NFR BLD AUTO: 35 % (ref 19.6–45.3)
MCH RBC QN AUTO: 28.4 PG (ref 26.6–33)
MCHC RBC AUTO-ENTMCNC: 31.9 G/DL (ref 31.5–35.7)
MCV RBC AUTO: 89.1 FL (ref 79–97)
MONOCYTES # BLD AUTO: 1.2 10*3/MM3 (ref 0.1–0.9)
MONOCYTES NFR BLD AUTO: 12.2 % (ref 5–12)
NEUTROPHILS NFR BLD AUTO: 5 10*3/MM3 (ref 1.7–7)
NEUTROPHILS NFR BLD AUTO: 50.5 % (ref 42.7–76)
NRBC BLD AUTO-RTO: 0 /100 WBC (ref 0–0.2)
PLATELET # BLD AUTO: 292 10*3/MM3 (ref 140–450)
PMV BLD AUTO: 7.1 FL (ref 6–12)
RBC # BLD AUTO: 3.53 10*6/MM3 (ref 4.14–5.8)
WBC NRBC COR # BLD: 10 10*3/MM3 (ref 3.4–10.8)

## 2023-09-30 PROCEDURE — 80048 BASIC METABOLIC PNL TOTAL CA: CPT | Performed by: EMERGENCY MEDICINE

## 2023-09-30 PROCEDURE — 85025 COMPLETE CBC W/AUTO DIFF WBC: CPT | Performed by: EMERGENCY MEDICINE

## 2023-09-30 PROCEDURE — 93005 ELECTROCARDIOGRAM TRACING: CPT | Performed by: EMERGENCY MEDICINE

## 2023-09-30 PROCEDURE — 99283 EMERGENCY DEPT VISIT LOW MDM: CPT

## 2023-09-30 RX ORDER — SODIUM CHLORIDE 0.9 % (FLUSH) 0.9 %
10 SYRINGE (ML) INJECTION AS NEEDED
Status: DISCONTINUED | OUTPATIENT
Start: 2023-09-30 | End: 2023-10-01 | Stop reason: HOSPADM

## 2023-10-01 VITALS
RESPIRATION RATE: 22 BRPM | DIASTOLIC BLOOD PRESSURE: 58 MMHG | OXYGEN SATURATION: 94 % | WEIGHT: 150 LBS | HEART RATE: 68 BPM | BODY MASS INDEX: 20.92 KG/M2 | SYSTOLIC BLOOD PRESSURE: 113 MMHG | TEMPERATURE: 98.8 F

## 2023-10-01 LAB
ANION GAP SERPL CALCULATED.3IONS-SCNC: 9 MMOL/L (ref 5–15)
BUN SERPL-MCNC: 24 MG/DL (ref 8–23)
BUN/CREAT SERPL: 24.2 (ref 7–25)
CALCIUM SPEC-SCNC: 9.4 MG/DL (ref 8.6–10.5)
CHLORIDE SERPL-SCNC: 102 MMOL/L (ref 98–107)
CO2 SERPL-SCNC: 27 MMOL/L (ref 22–29)
CREAT SERPL-MCNC: 0.99 MG/DL (ref 0.76–1.27)
EGFRCR SERPLBLD CKD-EPI 2021: 77 ML/MIN/1.73
GLUCOSE SERPL-MCNC: 95 MG/DL (ref 65–99)
POTASSIUM SERPL-SCNC: 4.6 MMOL/L (ref 3.5–5.2)
QT INTERVAL: 392 MS
QTC INTERVAL: 419 MS
SODIUM SERPL-SCNC: 138 MMOL/L (ref 136–145)

## 2023-10-01 NOTE — ED PROVIDER NOTES
Subjective   History of Present Illness  Patient is an 78-year-old male who family states takes an oxycodone tablet 20 minutes before he had an episode where he seemed to be confused.  They state he normally takes hydrocodone but was switched to oxycodone due to pharmacies being out of the hydrocodone.  Upon EMS arrival patient was awake and alert.  There is no complaints at this time.  Patient states he feels at baseline.    Review of Systems    Past Medical History:   Diagnosis Date    ADHD (attention deficit hyperactivity disorder)     Allergic     Arthritis     Colon polyp     Depression     Diabetes mellitus type 2    controlled by oral meds    Fibromyalgia, primary     HL (hearing loss)     Hyperlipidemia     Hypertension        No Known Allergies    Past Surgical History:   Procedure Laterality Date    COLONOSCOPY      EYE SURGERY      Cataracts removed: both eyes    HAND SURGERY  1980-1990- [facility]. Abstracted from [facility].    HEMORROIDECTOMY      [facility]. Abstracted from [facility].    TONSILLECTOMY      VASECTOMY         Family History   Problem Relation Age of Onset    Diabetes Mother         type2    Stroke Mother     Diabetes Father         type2       Social History     Socioeconomic History    Marital status:    Tobacco Use    Smoking status: Former     Packs/day: 0.25     Years: 5.00     Pack years: 1.25     Types: Cigarettes     Start date: 2/10/1966     Quit date: 2/10/1971     Years since quittin.6    Smokeless tobacco: Never   Vaping Use    Vaping Use: Never used   Substance and Sexual Activity    Alcohol use: Not Currently     Alcohol/week: 2.0 standard drinks     Types: 1 Glasses of wine, 1 Cans of beer per week     Comment: social    Drug use: No    Sexual activity: Yes     Partners: Female     Birth control/protection: None           Objective   Physical Exam  Neurologic exam is nonfocal.  Patient is alert and oriented x3.  Neck is nontender.  Lungs clear.   Heart has regular rhythm without murmur.  Abdomen soft.  Extremities M unremarkable.  Procedures     My EKG interpretation shows normal sinus rhythm at a rate of 60 with no acute ST change      ED Course      Results for orders placed or performed during the hospital encounter of 09/30/23   Basic Metabolic Panel    Specimen: Blood   Result Value Ref Range    Glucose 95 65 - 99 mg/dL    BUN 24 (H) 8 - 23 mg/dL    Creatinine 0.99 0.76 - 1.27 mg/dL    Sodium 138 136 - 145 mmol/L    Potassium 4.6 3.5 - 5.2 mmol/L    Chloride 102 98 - 107 mmol/L    CO2 27.0 22.0 - 29.0 mmol/L    Calcium 9.4 8.6 - 10.5 mg/dL    BUN/Creatinine Ratio 24.2 7.0 - 25.0    Anion Gap 9.0 5.0 - 15.0 mmol/L    eGFR 77.0 >60.0 mL/min/1.73   CBC Auto Differential    Specimen: Blood   Result Value Ref Range    WBC 10.00 3.40 - 10.80 10*3/mm3    RBC 3.53 (L) 4.14 - 5.80 10*6/mm3    Hemoglobin 10.0 (L) 13.0 - 17.7 g/dL    Hematocrit 31.4 (L) 37.5 - 51.0 %    MCV 89.1 79.0 - 97.0 fL    MCH 28.4 26.6 - 33.0 pg    MCHC 31.9 31.5 - 35.7 g/dL    RDW 15.7 (H) 12.3 - 15.4 %    RDW-SD 48.6 37.0 - 54.0 fl    MPV 7.1 6.0 - 12.0 fL    Platelets 292 140 - 450 10*3/mm3    Neutrophil % 50.5 42.7 - 76.0 %    Lymphocyte % 35.0 19.6 - 45.3 %    Monocyte % 12.2 (H) 5.0 - 12.0 %    Eosinophil % 1.9 0.3 - 6.2 %    Basophil % 0.4 0.0 - 1.5 %    Neutrophils, Absolute 5.00 1.70 - 7.00 10*3/mm3    Lymphocytes, Absolute 3.50 (H) 0.70 - 3.10 10*3/mm3    Monocytes, Absolute 1.20 (H) 0.10 - 0.90 10*3/mm3    Eosinophils, Absolute 0.20 0.00 - 0.40 10*3/mm3    Basophils, Absolute 0.00 0.00 - 0.20 10*3/mm3    nRBC 0.0 0.0 - 0.2 /100 WBC   ECG 12 Lead Altered Mental Status   Result Value Ref Range    QT Interval 392 ms    QTC Interval 419 ms                                          Medical Decision Making  Patient has no evidence of infectious process with no leukocytosis.  There is no anemia.  Medebar panel at baseline without renal insufficiency or electrolyte abnormality.  There is  no ectopy on the EKG.  Patient was observed for period time and at baseline.  Will be discharged to follow with MD for further outpatient evaluation as needed.    Amount and/or Complexity of Data Reviewed  Labs: ordered. Decision-making details documented in ED Course.  ECG/medicine tests: ordered and independent interpretation performed.    Risk  Prescription drug management.        Final diagnoses:   Mental status change resolved       ED Disposition  ED Disposition       ED Disposition   Discharge    Condition   Stable    Comment   --               No follow-up provider specified.       Medication List      No changes were made to your prescriptions during this visit.            Ronny Lóepz MD  10/01/23 0028

## 2023-10-19 DIAGNOSIS — G63 POLYNEUROPATHY ASSOCIATED WITH UNDERLYING DISEASE: ICD-10-CM

## 2023-10-19 DIAGNOSIS — F98.8 ATTENTION DEFICIT DISORDER (ADD) WITHOUT HYPERACTIVITY: ICD-10-CM

## 2023-10-19 DIAGNOSIS — M15.9 PRIMARY OSTEOARTHRITIS INVOLVING MULTIPLE JOINTS: ICD-10-CM

## 2023-10-20 RX ORDER — PREGABALIN 25 MG/1
CAPSULE ORAL
Qty: 200 CAPSULE | Refills: 1 | Status: SHIPPED | OUTPATIENT
Start: 2023-10-20

## 2023-10-20 RX ORDER — OXYCODONE AND ACETAMINOPHEN 10; 325 MG/1; MG/1
1 TABLET ORAL EVERY 4 HOURS PRN
Qty: 120 TABLET | Refills: 0 | Status: SHIPPED | OUTPATIENT
Start: 2023-10-20

## 2023-10-20 RX ORDER — DEXTROAMPHETAMINE SACCHARATE, AMPHETAMINE ASPARTATE MONOHYDRATE, DEXTROAMPHETAMINE SULFATE AND AMPHETAMINE SULFATE 5; 5; 5; 5 MG/1; MG/1; MG/1; MG/1
20 CAPSULE, EXTENDED RELEASE ORAL EVERY MORNING
Qty: 90 CAPSULE | Refills: 0 | Status: SHIPPED | OUTPATIENT
Start: 2023-10-20 | End: 2023-11-19

## 2023-10-23 ENCOUNTER — TELEPHONE (OUTPATIENT)
Dept: NEUROLOGY | Facility: CLINIC | Age: 78
End: 2023-10-23
Payer: MEDICARE

## 2023-10-23 NOTE — TELEPHONE ENCOUNTER
PATIENT SPOUSE CALLING.  PATIENT DID NOT HAVE EMG A[facility] ON 10/20/23 AS SCHEDULED.    SPOUSE CALLING TO ADVISE PATIENT SYMPTOMS ARE WORSE.  SHE NEEDS TO KNOW WHAT TO DO NEXT.  RESCHEDULE EMG TEST OR HAVE PATIENT SEEN AS SHE FEELS SYMPTOMS ARE MUCH WORSE    PLEASE ADVISE ROBY    THANK YOU

## 2023-10-23 NOTE — TELEPHONE ENCOUNTER
It looks like patients EMG / NCV was rescheduled for 10/27/23 @ 9:00 am. I don't know if patient rescheduled this appointment or his wife.

## 2023-10-25 DIAGNOSIS — G63 POLYNEUROPATHY ASSOCIATED WITH UNDERLYING DISEASE: ICD-10-CM

## 2023-10-25 RX ORDER — PREGABALIN 25 MG/1
CAPSULE ORAL
Qty: 360 CAPSULE | Refills: 2 | Status: SHIPPED | OUTPATIENT
Start: 2023-10-25 | End: 2024-01-25

## 2023-10-25 NOTE — TELEPHONE ENCOUNTER
Caller: ROBY ANDREWS    Relationship: Emergency Contact    Best call back number: [phone]    Requested Prescriptions:   Requested Prescriptions     Pending Prescriptions Disp Refills    pregabalin (Lyrica) 25 MG capsule 200 capsule 1     Sig: Take one in AM and one at HS for 10 days then one in AM and two HS for 10 days then two AM and two HS for 10 days then 2 AM and 3 HS for 10 days then 3 AM and 3 PM.        Pharmacy where request should be sent: [id number] [place] [place] - [address] AT [address] [facility] [phone] [facility] [phone] FX     Last office visit with prescribing clinician: 7/21/2023   Last telemedicine visit with prescribing clinician: Visit date not found   Next office visit with prescribing clinician: 5/31/2024     Additional details provided by patient: PATIENTS SPOUSE STATES THE PHARMACY DID NOT RECEIVE THE INSTRUCTIONS FOR THE PRESCRIPTION AND THAT THE PATIENT IS ALSO TAKING 4 CAPSULES A DAY.     Does the patient have less than a 3 day supply:  [x] Yes  [] No    Would you like a call back once the refill request has been completed: [x] Yes [] No    If the office needs to give you a call back, can they leave a voicemail: [x] Yes [] No    BRENDA Goel   10/25/23 09:47 EDT

## 2023-10-27 ENCOUNTER — HOSPITAL ENCOUNTER (OUTPATIENT)
Dept: NEUROLOGY | Facility: HOSPITAL | Age: 78
Discharge: HOME | End: 2023-10-27
Payer: MEDICARE

## 2023-10-27 DIAGNOSIS — R20.2 TINGLING OF BOTH FEET: ICD-10-CM

## 2023-10-27 PROCEDURE — 95886 MUSC TEST DONE W/N TEST COMP: CPT

## 2023-10-27 PROCEDURE — 95908 NRV CNDJ TST 3-4 STUDIES: CPT

## 2023-10-30 ENCOUNTER — OFFICE VISIT (OUTPATIENT)
Dept: NEUROLOGY | Facility: CLINIC | Age: 78
End: 2023-10-30
Payer: MEDICARE

## 2023-10-30 VITALS
DIASTOLIC BLOOD PRESSURE: 70 MMHG | SYSTOLIC BLOOD PRESSURE: 120 MMHG | WEIGHT: 146 LBS | OXYGEN SATURATION: 95 % | BODY MASS INDEX: 20.44 KG/M2 | RESPIRATION RATE: 20 BRPM | HEART RATE: 97 BPM | HEIGHT: 71 IN

## 2023-10-30 DIAGNOSIS — G63 POLYNEUROPATHY ASSOCIATED WITH UNDERLYING DISEASE: Primary | ICD-10-CM

## 2023-10-30 PROCEDURE — 99213 OFFICE O/P EST LOW 20 MIN: CPT | Performed by: PSYCHIATRY & NEUROLOGY

## 2023-10-30 PROCEDURE — 1160F RVW MEDS BY RX/DR IN RCRD: CPT | Performed by: PSYCHIATRY & NEUROLOGY

## 2023-10-30 PROCEDURE — 3074F SYST BP LT 130 MM HG: CPT | Performed by: PSYCHIATRY & NEUROLOGY

## 2023-10-30 PROCEDURE — 3078F DIAST BP <80 MM HG: CPT | Performed by: PSYCHIATRY & NEUROLOGY

## 2023-10-30 PROCEDURE — 1159F MED LIST DOCD IN RCRD: CPT | Performed by: PSYCHIATRY & NEUROLOGY

## 2023-10-30 NOTE — PROGRESS NOTES
Chief Complaint  Peripheral Neuropathy    Subjective          Lux Bray presents to [facility] NEUROLOGY for   HISTORY OF PRESENT ILLNESS:    Lux Bray is a 78 year old right handed man who returns to neurology clinic for follow up evaluation and treatment of concerns for peripheral neuropathy.  He has had these symptoms for about 10 years or longer.  He has been diagnosed with diabetes in his 60's.  He has daily tingling and burning sensation in both feet and the sensation is worse when he goes to bed at night time.  He is currently on Duloxetine 90 mg daily.  Of note he has some oral-lingual dyskinesias and some parkinsonism which maybe related to history of antipsychotic use, he tells me he thinks symptoms have improved some since stopping the Abilify.  He has not been evaluated by movement disorder specialist.  He is scheduled to have a brain and cervical spine MRI on July 25 for further evaluation as well.  He is currently on combination of Lyrica and Duloxetine.  His HgbA1c most recently is 6.8% and has been as high as 7.5% on 3/2022.  He denies any recent alcohol use.  He denies any exposure to heavy metals or toxins.  He denies any known family history of neuropathy.  No known family history of parkinson's disease. He had an EMG/NCS which was consistent with peripheral neuropathy.  He is currently in physical therapy for trouble with balance and falls.  I also prescribed him the neuropathy cream for further assistance which they tell me they have not really used.    Past Medical History:   Diagnosis Date    ADHD (attention deficit hyperactivity disorder)     Allergic     Arthritis     Colon polyp     Depression     Diabetes mellitus type 2    controlled by oral meds    Fibromyalgia, primary     HL (hearing loss)     Hyperlipidemia     Hypertension         Family History   Problem Relation Age of Onset    Diabetes Mother         type2    Stroke Mother     Diabetes Father          "type2        Social History     Socioeconomic History    Marital status:    Tobacco Use    Smoking status: Former     Packs/day: 0.25     Years: 5.00     Additional pack years: 0.00     Total pack years: 1.25     Types: Cigarettes     Start date: 2/10/1966     Quit date: 2/10/1971     Years since quittin.7    Smokeless tobacco: Never   Vaping Use    Vaping Use: Never used   Substance and Sexual Activity    Alcohol use: Not Currently     Alcohol/week: 2.0 standard drinks of alcohol     Types: 1 Glasses of wine, 1 Cans of beer per week     Comment: social    Drug use: No    Sexual activity: Yes     Partners: Female     Birth control/protection: None        I have reviewed and confirmed the accuracy of the ROS as documented by the MA/LPN/RN Gonzalo Sandra MD   Review of Systems   Neurological:  Positive for weakness and numbness. Negative for dizziness, tremors, seizures, syncope, facial asymmetry, speech difficulty, light-headedness, headache, memory problem and confusion.   Psychiatric/Behavioral:  Negative for agitation, behavioral problems, decreased concentration, dysphoric mood, hallucinations, self-injury, sleep disturbance, suicidal ideas, negative for hyperactivity, depressed mood and stress. The patient is not nervous/anxious.         Objective   Vital Signs:   /70   Pulse 97   Resp 20   Ht 180.3 cm (71\")   Wt 66.2 kg (146 lb)   SpO2 95%   BMI 20.36 kg/m²       PHYSICAL EXAM:    General   Mental Status - Alert. General Appearance - Well developed, Well groomed, Oriented and Cooperative. Orientation - Oriented X3.       Head and Neck  Head - normocephalic, atraumatic with no lesions or palpable masses.  Neck    Global Assessment - supple.       Eye   Sclera/Conjunctiva - Bilateral - Normal.    ENMT  Mouth and Throat   Oral Cavity/Oropharynx: Oropharynx - the soft palate,uvula and tongue are normal in appearance.    Chest and Lung Exam   Chest - lung clear to auscultation " bilaterally.    Cardiovascular   Cardiovascular examination reveals  - normal heart sounds, regular rate and rhythm.    Neurologic   Mental Status: Speech - Normal. Cognitive function - appropriate fund of knowledge. No impairment of attention, Impairment of concentration, impairment of long term memory or impairment of short term memory.  Cranial Nerves:   II Optic: Visual acuity - Left - Normal. Right - Normal. Visual fields - Normal (to confrontation).  III Oculomotor: Pupillary constriction - Left - Normal. Right - Normal.  VII Facial: - Normal Bilaterally.   IX Glossopharyngeal / X Vagus - Normal.  XI Accessory: Trapezius - Bilateral - Normal. Sternocleidomastoid - Bilateral - Normal.  XII Hypoglossal - Bilateral - Normal.  Eye Movements: - Normal Bilaterally.  Sensory:   Light Touch: Intact - Globally.  Temperature: Reduced in bilateral lower extremities distally > proximally.   Vibration: Intact bilaterally.   Motor:   Bulk and Contour: - Normal.  Tone: - Normal.  Tremor: Not present. Oral Lingual dyskinesias.   Strength: 5/5 normal muscle strength - All Muscles.      General Assessment of Reflexes: - deep tendon reflexes are normal. Coordination - No Impairment of finger-to-nose or Impairment of rapid alternating movements. Gait - Broad based, slow and walks with cane.       Result Review :                 Assessment and Plan    Problem List Items Addressed This Visit          Neuro    Polyneuropathy associated with underlying disease - Primary    Current Assessment & Plan     78 year old right handed man with peripheral neuropathy.  He has had these symptoms for about 10 years or longer.  He has been diagnosed with diabetes in his 60's.  He has daily tingling and burning sensation in both feet and the sensation is worse when he goes to bed at night time.  He is currently on Duloxetine 90 mg daily.  Of note he has some oral-lingual dyskinesias and some parkinsonism which maybe related to history of  antipsychotic use, he tells me he thinks symptoms have improved some since stopping the Abilify.  He has not been evaluated by movement disorder specialist.  He is scheduled to have a brain and cervical spine MRI on July 25 for further evaluation as well.  He is currently on combination of Lyrica and Duloxetine.  His HgbA1c most recently is 6.8% and has been as high as 7.5% on 3/2022.  He denies any recent alcohol use.  He denies any exposure to heavy metals or toxins.  He denies any known family history of neuropathy.  No known family history of parkinson's disease. He had an EMG/NCS which was consistent with peripheral neuropathy.  He is currently in physical therapy for trouble with balance and falls.  I also prescribed him the neuropathy cream for further assistance which they tell me they have not really used.  I advised him to use the neuropathy cream for further assistance.               I spent 24 minutes caring for Lux on this date of service. This time includes time spent by me in the following activities:preparing for the visit, reviewing tests, obtaining and/or reviewing a separately obtained history, performing a medically appropriate examination and/or evaluation , counseling and educating the patient/family/caregiver, ordering medications, tests, or procedures, documenting information in the medical record, and care coordination    Follow Up   Return if symptoms worsen or fail to improve.  Patient was given instructions and counseling regarding his condition or for health maintenance advice. Please see specific information pulled into the AVS if appropriate.

## 2023-10-30 NOTE — ASSESSMENT & PLAN NOTE
78 year old right handed man with peripheral neuropathy.  He has had these symptoms for about 10 years or longer.  He has been diagnosed with diabetes in his 60's.  He has daily tingling and burning sensation in both feet and the sensation is worse when he goes to bed at night time.  He is currently on Duloxetine 90 mg daily.  Of note he has some oral-lingual dyskinesias and some parkinsonism which maybe related to history of antipsychotic use, he tells me he thinks symptoms have improved some since stopping the Abilify.  He has not been evaluated by movement disorder specialist.  He is scheduled to have a brain and cervical spine MRI on July 25 for further evaluation as well.  He is currently on combination of Lyrica and Duloxetine.  His HgbA1c most recently is 6.8% and has been as high as 7.5% on 3/2022.  He denies any recent alcohol use.  He denies any exposure to heavy metals or toxins.  He denies any known family history of neuropathy.  No known family history of parkinson's disease. He had an EMG/NCS which was consistent with peripheral neuropathy.  He is currently in physical therapy for trouble with balance and falls.  I also prescribed him the neuropathy cream for further assistance which they tell me they have not really used.  I advised him to use the neuropathy cream for further assistance.

## 2023-11-01 DIAGNOSIS — G63 POLYNEUROPATHY ASSOCIATED WITH UNDERLYING DISEASE: ICD-10-CM

## 2023-11-01 DIAGNOSIS — R20.2 TINGLING OF BOTH FEET: ICD-10-CM

## 2023-11-06 RX ORDER — EMPAGLIFLOZIN 10 MG/1
10 TABLET, FILM COATED ORAL DAILY
Qty: 90 TABLET | Refills: 3 | Status: SHIPPED | OUTPATIENT
Start: 2023-11-06

## 2023-11-09 ENCOUNTER — PREP FOR SURGERY (OUTPATIENT)
Dept: SURGERY | Facility: SURGERY CENTER | Age: 78
End: 2023-11-09
Payer: MEDICARE

## 2023-11-09 ENCOUNTER — TELEPHONE (OUTPATIENT)
Dept: GASTROENTEROLOGY | Facility: CLINIC | Age: 78
End: 2023-11-09

## 2023-11-09 ENCOUNTER — OFFICE VISIT (OUTPATIENT)
Dept: GASTROENTEROLOGY | Facility: CLINIC | Age: 78
End: 2023-11-09
Payer: MEDICARE

## 2023-11-09 VITALS
DIASTOLIC BLOOD PRESSURE: 60 MMHG | WEIGHT: 143 LBS | HEIGHT: 71 IN | HEART RATE: 111 BPM | TEMPERATURE: 96.6 F | SYSTOLIC BLOOD PRESSURE: 100 MMHG | BODY MASS INDEX: 20.02 KG/M2 | OXYGEN SATURATION: 96 %

## 2023-11-09 DIAGNOSIS — R10.13 EPIGASTRIC PRESSURE: ICD-10-CM

## 2023-11-09 DIAGNOSIS — R14.2 BELCHING: Primary | ICD-10-CM

## 2023-11-09 DIAGNOSIS — R14.2 BELCHING: ICD-10-CM

## 2023-11-09 DIAGNOSIS — K21.9 GASTROESOPHAGEAL REFLUX DISEASE, UNSPECIFIED WHETHER ESOPHAGITIS PRESENT: ICD-10-CM

## 2023-11-09 DIAGNOSIS — R68.81 EARLY SATIETY: ICD-10-CM

## 2023-11-09 DIAGNOSIS — R10.30 LOWER ABDOMINAL PAIN: Primary | ICD-10-CM

## 2023-11-09 PROCEDURE — 3074F SYST BP LT 130 MM HG: CPT | Performed by: NURSE PRACTITIONER

## 2023-11-09 PROCEDURE — 3078F DIAST BP <80 MM HG: CPT | Performed by: NURSE PRACTITIONER

## 2023-11-09 PROCEDURE — 1160F RVW MEDS BY RX/DR IN RCRD: CPT | Performed by: NURSE PRACTITIONER

## 2023-11-09 PROCEDURE — 1159F MED LIST DOCD IN RCRD: CPT | Performed by: NURSE PRACTITIONER

## 2023-11-09 PROCEDURE — 99204 OFFICE O/P NEW MOD 45 MIN: CPT | Performed by: NURSE PRACTITIONER

## 2023-11-09 RX ORDER — SODIUM CHLORIDE 0.9 % (FLUSH) 0.9 %
10 SYRINGE (ML) INJECTION AS NEEDED
OUTPATIENT
Start: 2023-11-09

## 2023-11-09 RX ORDER — SODIUM CHLORIDE 0.9 % (FLUSH) 0.9 %
3 SYRINGE (ML) INJECTION EVERY 12 HOURS SCHEDULED
OUTPATIENT
Start: 2023-11-09

## 2023-11-09 RX ORDER — SODIUM CHLORIDE, SODIUM LACTATE, POTASSIUM CHLORIDE, CALCIUM CHLORIDE 600; 310; 30; 20 MG/100ML; MG/100ML; MG/100ML; MG/100ML
30 INJECTION, SOLUTION INTRAVENOUS CONTINUOUS PRN
OUTPATIENT
Start: 2023-11-09

## 2023-11-09 NOTE — PROGRESS NOTES
"Chief Complaint   Patient presents with    Abdominal Pain           History of Present Illness  Patient is an 78-year-old male who presents today for Evaluation.  He is a previous patient of ours, last seen in 2020.  He has a history of GERD, gastritis, and IBS.    Patient reports he has been experiencing ongoing GI symptoms over the last several years that have worsened over the last few months.  He reports abdominal pain present to his lower abdomen.  Reports this is often worse after he eats that there are no particular dietary triggers.  He has also been experiencing pressure to his epigastric area.  Reports frequent belching, heartburn, and regurgitation.    Reports he was diagnosed with peripheral neuropathy since last evaluated, this was felt to be secondary to diabetes.    He reports early satiety.    Reports he has had slow weight loss over the last few years.    Reports bowels are moving regularly.  Denies any blood in the stool or melena.    He continues on pantoprazole for GERD and has been taking Carafate as needed for symptoms and dicyclomine as needed for symptoms.     Result Review :       CBC & Differential (09/30/2023 23:44)    Tissue Pathology Exam (01/30/2020 10:42)    SCANNED - COLONOSCOPY (01/30/2020)    ENDOSCOPY, INT (01/30/2020)    Vital Signs:   /60   Pulse 111   Temp 96.6 °F (35.9 °C)   Ht 180.3 cm (70.98\")   Wt 64.9 kg (143 lb)   SpO2 96%   BMI 19.95 kg/m²     Body mass index is 19.95 kg/m².     Physical Exam  Vitals reviewed.   Constitutional:       General: He is not in acute distress.     Appearance: He is well-developed.   HENT:      Head: Normocephalic and atraumatic.   Pulmonary:      Effort: Pulmonary effort is normal. No respiratory distress.   Abdominal:      General: Abdomen is flat. Bowel sounds are normal. There is no distension.      Palpations: Abdomen is soft.      Tenderness: There is abdominal tenderness in the right lower quadrant, suprapubic area and left " lower quadrant.   Skin:     General: Skin is dry.      Coloration: Skin is not pale.   Neurological:      Mental Status: He is alert and oriented to person, place, and time.   Psychiatric:         Thought Content: Thought content normal.           Assessment and Plan    Diagnoses and all orders for this visit:    1. Lower abdominal pain (Primary)  -     CT Abdomen Pelvis With Contrast; Future    2. Belching  -     CT Abdomen Pelvis With Contrast; Future    3. Gastroesophageal reflux disease, unspecified whether esophagitis present  -     CT Abdomen Pelvis With Contrast; Future    4. Early satiety  -     CT Abdomen Pelvis With Contrast; Future    5. Epigastric pressure  -     CT Abdomen Pelvis With Contrast; Future         Discussion  Patient presents today for evaluation with concerns about worsening abdominal pain, early satiety, and dyspeptic symptoms.  Recommended updated EGD to evaluate for any evidence of esophagitis, peptic ulcer disease, or gastritis that may be contributing to symptoms.  We will schedule CT scan to evaluate for any evidence of diverticulitis or cholelithiasis that could be contributing to symptoms.  If EGD and CT negative and symptoms persist, may consider updated gastric emptying study to evaluate for gastroparesis.          Follow Up   Return for Follow up to review results after testing complete.    Patient Instructions   Schedule EGD for further evaluation of symptoms.     Schedule CT scan for further evaluation of symptoms.

## 2023-11-09 NOTE — PATIENT INSTRUCTIONS
Schedule EGD for further evaluation of symptoms.     Schedule CT scan for further evaluation of symptoms.

## 2023-11-09 NOTE — TELEPHONE ENCOUNTER
Pt's wife called due to him have upper GI symptoms. Pt is reporting belching and abdominal pain, with reflux symptoms that are effecting his sleep. Pt has been scheduled for office follow-up this morning with provider. Pt was last seen in 2020 for EGD. EL

## 2023-11-15 ENCOUNTER — ANESTHESIA (OUTPATIENT)
Dept: SURGERY | Facility: SURGERY CENTER | Age: 78
End: 2023-11-15
Payer: MEDICARE

## 2023-11-15 ENCOUNTER — HOSPITAL ENCOUNTER (OUTPATIENT)
Facility: SURGERY CENTER | Age: 78
Setting detail: HOSPITAL OUTPATIENT SURGERY
Discharge: HOME | End: 2023-11-15
Attending: INTERNAL MEDICINE | Admitting: INTERNAL MEDICINE
Payer: MEDICARE

## 2023-11-15 ENCOUNTER — ANESTHESIA EVENT (OUTPATIENT)
Dept: SURGERY | Facility: SURGERY CENTER | Age: 78
End: 2023-11-15
Payer: MEDICARE

## 2023-11-15 VITALS
HEIGHT: 71 IN | OXYGEN SATURATION: 94 % | RESPIRATION RATE: 16 BRPM | SYSTOLIC BLOOD PRESSURE: 116 MMHG | HEART RATE: 77 BPM | DIASTOLIC BLOOD PRESSURE: 64 MMHG | WEIGHT: 142 LBS | TEMPERATURE: 98.6 F | BODY MASS INDEX: 19.88 KG/M2

## 2023-11-15 DIAGNOSIS — R68.81 EARLY SATIETY: ICD-10-CM

## 2023-11-15 DIAGNOSIS — K21.9 GASTROESOPHAGEAL REFLUX DISEASE, UNSPECIFIED WHETHER ESOPHAGITIS PRESENT: ICD-10-CM

## 2023-11-15 DIAGNOSIS — R14.2 BELCHING: ICD-10-CM

## 2023-11-15 DIAGNOSIS — R10.13 EPIGASTRIC PRESSURE: ICD-10-CM

## 2023-11-15 LAB — GLUCOSE BLDC GLUCOMTR-MCNC: 91 MG/DL (ref 70–130)

## 2023-11-15 PROCEDURE — 88104 CYTOPATH FL NONGYN SMEARS: CPT | Performed by: INTERNAL MEDICINE

## 2023-11-15 PROCEDURE — 25010000002 LIDOCAINE 1 % SOLUTION: Performed by: ANESTHESIOLOGY

## 2023-11-15 PROCEDURE — 25010000002 PROPOFOL 1000 MG/100ML EMULSION: Performed by: ANESTHESIOLOGY

## 2023-11-15 PROCEDURE — 88305 TISSUE EXAM BY PATHOLOGIST: CPT | Performed by: INTERNAL MEDICINE

## 2023-11-15 PROCEDURE — 25010000002 PROPOFOL 10 MG/ML EMULSION: Performed by: ANESTHESIOLOGY

## 2023-11-15 PROCEDURE — 25810000003 LACTATED RINGERS PER 1000 ML: Performed by: NURSE PRACTITIONER

## 2023-11-15 PROCEDURE — 43239 EGD BIOPSY SINGLE/MULTIPLE: CPT | Performed by: INTERNAL MEDICINE

## 2023-11-15 RX ORDER — ONDANSETRON 2 MG/ML
4 INJECTION INTRAMUSCULAR; INTRAVENOUS ONCE AS NEEDED
Status: DISCONTINUED | OUTPATIENT
Start: 2023-11-15 | End: 2023-11-15 | Stop reason: HOSPADM

## 2023-11-15 RX ORDER — SODIUM CHLORIDE 0.9 % (FLUSH) 0.9 %
3 SYRINGE (ML) INJECTION EVERY 12 HOURS SCHEDULED
Status: DISCONTINUED | OUTPATIENT
Start: 2023-11-15 | End: 2023-11-15 | Stop reason: HOSPADM

## 2023-11-15 RX ORDER — PROPOFOL 10 MG/ML
VIAL (ML) INTRAVENOUS AS NEEDED
Status: DISCONTINUED | OUTPATIENT
Start: 2023-11-15 | End: 2023-11-15 | Stop reason: SURG

## 2023-11-15 RX ORDER — SODIUM CHLORIDE 0.9 % (FLUSH) 0.9 %
10 SYRINGE (ML) INJECTION AS NEEDED
Status: DISCONTINUED | OUTPATIENT
Start: 2023-11-15 | End: 2023-11-15 | Stop reason: HOSPADM

## 2023-11-15 RX ORDER — PROPOFOL 10 MG/ML
INJECTION, EMULSION INTRAVENOUS CONTINUOUS PRN
Status: DISCONTINUED | OUTPATIENT
Start: 2023-11-15 | End: 2023-11-15 | Stop reason: SURG

## 2023-11-15 RX ORDER — LIDOCAINE HYDROCHLORIDE 10 MG/ML
INJECTION, SOLUTION INFILTRATION; PERINEURAL AS NEEDED
Status: DISCONTINUED | OUTPATIENT
Start: 2023-11-15 | End: 2023-11-15 | Stop reason: SURG

## 2023-11-15 RX ORDER — SODIUM CHLORIDE, SODIUM LACTATE, POTASSIUM CHLORIDE, CALCIUM CHLORIDE 600; 310; 30; 20 MG/100ML; MG/100ML; MG/100ML; MG/100ML
30 INJECTION, SOLUTION INTRAVENOUS CONTINUOUS PRN
Status: DISCONTINUED | OUTPATIENT
Start: 2023-11-15 | End: 2023-11-15 | Stop reason: HOSPADM

## 2023-11-15 RX ADMIN — PROPOFOL 20 MG: 10 INJECTION, EMULSION INTRAVENOUS at 09:38

## 2023-11-15 RX ADMIN — SODIUM CHLORIDE, POTASSIUM CHLORIDE, SODIUM LACTATE AND CALCIUM CHLORIDE 30 ML/HR: 600; 310; 30; 20 INJECTION, SOLUTION INTRAVENOUS at 09:05

## 2023-11-15 RX ADMIN — PROPOFOL 120 MCG/KG/MIN: 10 INJECTION, EMULSION INTRAVENOUS at 09:34

## 2023-11-15 RX ADMIN — PROPOFOL 50 MG: 10 INJECTION, EMULSION INTRAVENOUS at 09:34

## 2023-11-15 RX ADMIN — LIDOCAINE HYDROCHLORIDE 50 MG: 10 INJECTION, SOLUTION INFILTRATION; PERINEURAL at 09:34

## 2023-11-15 NOTE — H&P
No chief complaint on file.      HPI  Gerd  Epigastric pain  Early satiety         Problem List:    Patient Active Problem List   Diagnosis    Allergic state    Attention deficit disorder (ADD) without hyperactivity    Cervical radiculopathy    Cholelithiasis    Deficiency of testosterone biosynthesis    Depression    Enlarged prostate with lower urinary tract symptoms (LUTS)    Hypertension    Irritable bowel syndrome without diarrhea    Lumbago with sciatica, right side    Osteoarthritis    Type 2 diabetes mellitus    Vertiginous syndrome    Preventative health care    Vitamin D deficiency, unspecified     Encounter for screening for malignant neoplasm of prostate     Iron deficiency anemia due to chronic blood loss    Medicare annual wellness visit, subsequent    Colon polyp    Gastroesophageal reflux disease without esophagitis    Dysuria    Hyperlipidemia    HL (hearing loss)    Cortical senile cataract    Age-related cataract of both eyes    Screening for prostate cancer    Screening for hypothyroidism    Need for hepatitis C screening test    Toe pain, bilateral    Tinea unguium    Pre-ulcerative corn or callous    Primary iridocyclitis    Weight loss, non-intentional    Focal myoclonus    Polyneuropathy associated with underlying disease    Tingling of both feet    Generalized weakness    Encephalopathy, metabolic    Leukocytosis    Sepsis    Belching    Gastroesophageal reflux disease    Early satiety    Epigastric pressure       Medical History:    Past Medical History:   Diagnosis Date    ADHD (attention deficit hyperactivity disorder)     Allergic     Arthritis     Colon polyp     Depression     Diabetes mellitus type 2    controlled by oral meds    Fibromyalgia, primary     HL (hearing loss)     Hyperlipidemia     Hypertension         Social History:    Social History     Socioeconomic History    Marital status:    Tobacco Use    Smoking status: Former     Packs/day: 0.25     Years: 5.00      Additional pack years: 0.00     Total pack years: 1.25     Types: Cigarettes     Start date: 2/10/1966     Quit date: 2/10/1971     Years since quittin.7    Smokeless tobacco: Never   Vaping Use    Vaping Use: Never used   Substance and Sexual Activity    Alcohol use: Not Currently     Alcohol/week: 2.0 standard drinks of alcohol     Types: 1 Glasses of wine, 1 Cans of beer per week    Drug use: No    Sexual activity: Yes     Partners: Female     Birth control/protection: None       Family History:   Family History   Problem Relation Age of Onset    Diabetes Mother         type2    Stroke Mother     Diabetes Father         type2    Colon cancer Neg Hx     Colon polyps Neg Hx     Crohn's disease Neg Hx     Irritable bowel syndrome Neg Hx     Ulcerative colitis Neg Hx        Surgical History:   Past Surgical History:   Procedure Laterality Date    COLONOSCOPY      EYE SURGERY      Cataracts removed: both eyes    HAND SURGERY  1980- [facility]. Abstracted from [facility]    HEMORROIDECTOMY      [facility]. [facility]    TONSILLECTOMY  's    VASECTOMY         No current facility-administered medications for this encounter.    Current Outpatient Medications:     amphetamine-dextroamphetamine XR (Adderall XR) 20 MG 24 hr capsule, Take 1 capsule by mouth Every Morning for 30 days, Disp: 90 capsule, Rfl: 0    atorvastatin (LIPITOR) 20 MG tablet, TAKE 1 TABLET DAILY, Disp: 90 tablet, Rfl: 3    dicyclomine (BENTYL) 10 MG capsule, TAKE 1 TO 2 CAPSULES EVERY 6 HOURS AS NEEDED FOR ABDOMINAL PAIN AND CRAMPING, Disp: 540 capsule, Rfl: 3    DULoxetine (CYMBALTA) 30 MG capsule, TAKE 3 CAPSULES DAILY, Disp: 270 capsule, Rfl: 3    glimepiride (AMARYL) 2 MG tablet, TAKE 3 TABLETS EVERY MORNING BEFORE BREAKFAST, Disp: 270 tablet, Rfl: 3    Ibuprofen 3 %, Gabapentin 10 %, Baclofen 2 %, lidocaine 4 %, Ketamine HCl 4 %, Apply 1-2 g topically to the appropriate area as directed 3 (Three) to 4 (Four)  times daily., Disp: 90 g, Rfl: 2    Jardiance 10 MG tablet tablet, TAKE 1 TABLET DAILY, Disp: 90 tablet, Rfl: 3    L-Methylfolate-Algae-B12-B6 (Foltanx RF) 3-90.314-2-35 MG capsule, Take 1 capsule by mouth 2 (Two) Times a Day for 90 days., Disp: 180 capsule, Rfl: 3    metFORMIN (GLUCOPHAGE) 500 MG tablet, TAKE 2 TABLETS TWICE A DAY, Disp: 360 tablet, Rfl: 3    oxyCODONE-acetaminophen (Percocet)  MG per tablet, Take 1 tablet by mouth Every 4 (Four) Hours As Needed for Moderate Pain. Max 4 tablets a day, Disp: 120 tablet, Rfl: 0    pantoprazole (PROTONIX) 40 MG EC tablet, TAKE 1 TABLET DAILY, Disp: 90 tablet, Rfl: 3    pregabalin (Lyrica) 25 MG capsule, Take two in AM and two PM, Disp: 360 capsule, Rfl: 2    sucralfate (Carafate) 1 GM/10ML suspension, TAKE 10 ML THREE TIMES A DAY AS NEEDED FOR HEARTBURN/ABDOMINAL PAIN, Disp: 2700 mL, Rfl: 3    trandolapril-verapamil (TARKA) 2-240 MG per CR tablet, Take 1 tablet by mouth Daily for 180 days., Disp: 90 tablet, Rfl: 1    Allergies: No Known Allergies     The following portions of the patient's history were reviewed by me and updated as appropriate: review of systems, allergies, current medications, past family history, past medical history, past social history, past surgical history and problem list.    There were no vitals filed for this visit.    PHYSICAL EXAM:    CONSTITUTIONAL:  today's vital signs reviewed by me  GASTROINTESTINAL: abdomen is soft nontender nondistended with normal active bowel sounds, no masses are appreciated    Assessment/ Plan  Gerd  Epigastric pain  Early satiety    egd    Risks and benefits as well as alternatives to endoscopic evaluation were explained to the patient and they voiced understanding and wish to proceed.  These risks include but are not limited to the risk of bleeding, perforation, adverse reaction to sedation, and missed lesions.  The patient was given the opportunity to ask questions prior to the endoscopic  procedure.

## 2023-11-15 NOTE — ANESTHESIA POSTPROCEDURE EVALUATION
"Patient: Lux Bray    Procedure Summary       Date: 11/15/23 Room / Location: [facility]  / [facility]    Anesthesia Start: 0927 Anesthesia Stop: 0947    Procedure: ESOPHAGOGASTRODUODENOSCOPY Diagnosis:       Belching      Gastroesophageal reflux disease, unspecified whether esophagitis present      Early satiety      Epigastric pressure      (Belching [R14.2])      (Gastroesophageal reflux disease, unspecified whether esophagitis present [K21.9])      (Early satiety [R68.81])      (Epigastric pressure [R10.13])    Surgeons: George Viveros MD Provider: Shauna Modi MD    Anesthesia Type: MAC ASA Status: 3            Anesthesia Type: MAC    Vitals  Vitals Value Taken Time   /64 11/15/23 0958   Temp 37 °C (98.6 °F) 11/15/23 0946   Pulse 77 11/15/23 0958   Resp 16 11/15/23 0958   SpO2 94 % 11/15/23 0958           Post Anesthesia Care and Evaluation    Patient location during evaluation: PHASE II  Patient participation: complete - patient participated  Level of consciousness: awake  Pain management: adequate    Airway patency: patent  Anesthetic complications: No anesthetic complications    Cardiovascular status: acceptable  Respiratory status: acceptable  Hydration status: acceptable    Comments: /64 (BP Location: Left arm, Patient Position: Lying)   Pulse 77   Temp 37 °C (98.6 °F) (Infrared)   Resp 16   Ht 180.3 cm (71\")   Wt 64.4 kg (142 lb)   SpO2 94%   BMI 19.80 kg/m²     "

## 2023-11-15 NOTE — ANESTHESIA PREPROCEDURE EVALUATION
Anesthesia Evaluation     Patient summary reviewed and Nursing notes reviewed                Airway   Mallampati: III  Possible difficult intubation  Dental      Pulmonary    (-) not a smoker  Cardiovascular     ECG reviewed    (+) hypertension, hyperlipidemia      Neuro/Psych  (+) numbness, psychiatric history Depression  GI/Hepatic/Renal/Endo    (+) GERD, diabetes mellitus type 2    Musculoskeletal     (+) myalgias  Abdominal    Substance History      OB/GYN          Other   arthritis,     ROS/Med Hx Other: Mobility issues                Anesthesia Plan    ASA 3     MAC       Anesthetic plan, risks, benefits, and alternatives have been provided, discussed and informed consent has been obtained with: patient.    CODE STATUS:

## 2023-11-16 LAB
CYTO UR: NORMAL
LAB AP CASE REPORT: NORMAL
LAB AP CASE REPORT: NORMAL
LAB AP CLINICAL INFORMATION: NORMAL
LAB AP CLINICAL INFORMATION: NORMAL
PATH REPORT.FINAL DX SPEC: NORMAL
PATH REPORT.FINAL DX SPEC: NORMAL
PATH REPORT.GROSS SPEC: NORMAL
PATH REPORT.GROSS SPEC: NORMAL

## 2023-11-20 DIAGNOSIS — M15.9 PRIMARY OSTEOARTHRITIS INVOLVING MULTIPLE JOINTS: ICD-10-CM

## 2023-11-20 RX ORDER — OXYCODONE AND ACETAMINOPHEN 10; 325 MG/1; MG/1
1 TABLET ORAL EVERY 4 HOURS PRN
Qty: 120 TABLET | Refills: 0 | Status: SHIPPED | OUTPATIENT
Start: 2023-11-20

## 2023-11-21 ENCOUNTER — TELEPHONE (OUTPATIENT)
Dept: GASTROENTEROLOGY | Facility: CLINIC | Age: 78
End: 2023-11-21
Payer: MEDICARE

## 2023-11-21 NOTE — TELEPHONE ENCOUNTER
Patient's wife, Ese (HIPAA approved), called back to discuss the patient's use of FDgard, Nexium, and dicyclomine. Questions answered and support provided. lb

## 2023-12-11 ENCOUNTER — HOSPITAL ENCOUNTER (OUTPATIENT)
Dept: CT IMAGING | Facility: HOSPITAL | Age: 78
Discharge: HOME | End: 2023-12-11
Admitting: NURSE PRACTITIONER
Payer: MEDICARE

## 2023-12-11 DIAGNOSIS — R14.2 BELCHING: ICD-10-CM

## 2023-12-11 DIAGNOSIS — R10.30 LOWER ABDOMINAL PAIN: ICD-10-CM

## 2023-12-11 DIAGNOSIS — R68.81 EARLY SATIETY: ICD-10-CM

## 2023-12-11 DIAGNOSIS — K21.9 GASTROESOPHAGEAL REFLUX DISEASE, UNSPECIFIED WHETHER ESOPHAGITIS PRESENT: ICD-10-CM

## 2023-12-11 DIAGNOSIS — R10.13 EPIGASTRIC PRESSURE: ICD-10-CM

## 2023-12-11 LAB
CREAT BLDA-MCNC: 1.3 MG/DL (ref 0.6–1.3)
EGFRCR SERPLBLD CKD-EPI 2021: 55.5 ML/MIN/1.73

## 2023-12-11 PROCEDURE — 82565 ASSAY OF CREATININE: CPT

## 2023-12-11 PROCEDURE — 25510000001 IOPAMIDOL PER 1 ML: Performed by: NURSE PRACTITIONER

## 2023-12-11 PROCEDURE — 74177 CT ABD & PELVIS W/CONTRAST: CPT

## 2023-12-11 RX ADMIN — IOPAMIDOL 100 ML: 755 INJECTION, SOLUTION INTRAVENOUS at 14:33

## 2023-12-12 DIAGNOSIS — K86.2 PANCREATIC CYST: Primary | ICD-10-CM

## 2023-12-19 ENCOUNTER — TELEPHONE (OUTPATIENT)
Dept: GASTROENTEROLOGY | Facility: CLINIC | Age: 78
End: 2023-12-19
Payer: MEDICARE

## 2023-12-19 DIAGNOSIS — M15.9 PRIMARY OSTEOARTHRITIS INVOLVING MULTIPLE JOINTS: ICD-10-CM

## 2023-12-19 NOTE — TELEPHONE ENCOUNTER
I called pt, his wife Ese answered, I explained JONATHAN's instructions, they agreed. Ese informed us that pt has not started taking Miralax yet, they would like to know what time of the day pt needs to take it.

## 2023-12-19 NOTE — TELEPHONE ENCOUNTER
Pt's emergency contact left a VM stating that Irasema Johnny told her to start pt on Miralax once a day/8 oz H2O. They would like to know for how long pt needs to stay taking it.

## 2023-12-20 RX ORDER — OXYCODONE AND ACETAMINOPHEN 10; 325 MG/1; MG/1
1 TABLET ORAL EVERY 4 HOURS PRN
Qty: 120 TABLET | Refills: 0 | Status: SHIPPED | OUTPATIENT
Start: 2023-12-20

## 2023-12-20 NOTE — TELEPHONE ENCOUNTER
Informed patient's wife of CC's message.  
Patient's wife wants to know how to know for sure lesions in patient's pancreas are cysts?  
They are able to identify cysts based off the appearance on CT scan.  The MRI will provide further clarification and confirm.  
not applicable

## 2023-12-21 ENCOUNTER — TELEPHONE (OUTPATIENT)
Dept: GASTROENTEROLOGY | Facility: CLINIC | Age: 78
End: 2023-12-21

## 2023-12-21 NOTE — TELEPHONE ENCOUNTER
Dr. Viveros spoke with patient regarding CT results.  Will proceed with MRI as planned for further evaluation.  Patient was again encouraged to proceed to ER if needed for suicidal ideation.

## 2023-12-21 NOTE — TELEPHONE ENCOUNTER
Caller: JULIANAROBY    Relationship: Emergency Contact    Best call back number: [phone]    What is the best time to reach you: ANYTIME    Who are you requesting to speak with (clinical staff, provider,  specific staff member): CLINICAL STAFF /  PERSONALLY       What was the call regarding: PT HAD IMAGING RESULT THAT SHOWED A PANCREATIC MASS AND FAMILY IS EXTREMELY CONCERNED ON DELIVERING THIS NEWS TO PT. PT HAS MENTAL HEALTH ISSUES AND FAMILY IS CONCERNED ON HOW TO DELIVER THE NEWS SAFELY TO PT WHOM MAY BECOME SUCIDAL. FAMILY IS WANTING A CALL FROM  DISCUSSING EVERYTHING BEFORE TALKING TO PT AND COME UP WITH A GOOD PLAN TO TELL HIM WHETHER ITS ON THE PHONE OR IN THE OFFICE. PLEASE CALL AND ADVISE

## 2023-12-21 NOTE — TELEPHONE ENCOUNTER
RN called and first spoke with patient's wife, Ese regarding the telephone message sent to office. Pt's spouse reported to RN that patient was depressed and suicidal and was unsure of how to discuss his recent CT results with him and was afraid that it would send the patient into deeper depression. Pt's spouse then told RN she would call back. RN then contacted patient's daughter, Sayra (HIPAA approved) and discussed conversation and concerns. RN encouraged patient's family to not leave patient alone during this time and to seek immediate care at [facility] ER should patient exhibit any worsening symptoms or share any suicide ideations. RN explained that they have medical professionals that can help the patient and family through this. If the patient's family did not feel comfortable transporting the patient themselves, they can call EMS and explain the situation and EMS will help transport patient. Pt's daughter verbalized understanding and is agreeable. EL

## 2023-12-21 NOTE — TELEPHONE ENCOUNTER
Please see if they can come in for an appointment to discuss and please reassure her that what they saw on the CT scan appears to be a cyst.  There was no findings that suggested pancreatic cancer.  We will schedule the MRI to confirm this but again no overly worrisome findings on CT.

## 2024-01-01 ENCOUNTER — HOSPITAL ENCOUNTER (INPATIENT)
Facility: HOSPITAL | Age: 79
LOS: 1 days | DRG: 951 | End: 2024-08-18
Attending: INTERNAL MEDICINE | Admitting: INTERNAL MEDICINE
Payer: OTHER GOVERNMENT

## 2024-01-01 ENCOUNTER — TELEPHONE (OUTPATIENT)
Dept: NEUROLOGY | Facility: CLINIC | Age: 79
End: 2024-01-01

## 2024-01-01 ENCOUNTER — TELEPHONE (OUTPATIENT)
Dept: FAMILY MEDICINE CLINIC | Facility: CLINIC | Age: 79
End: 2024-01-01

## 2024-01-01 ENCOUNTER — HOSPITAL ENCOUNTER (INPATIENT)
Facility: HOSPITAL | Age: 79
LOS: 1 days | Discharge: HOSPICE | DRG: 193 | End: 2024-08-18
Attending: EMERGENCY MEDICINE | Admitting: INTERNAL MEDICINE
Payer: MEDICARE

## 2024-01-01 ENCOUNTER — APPOINTMENT (OUTPATIENT)
Dept: GENERAL RADIOLOGY | Facility: HOSPITAL | Age: 79
DRG: 193 | End: 2024-01-01
Payer: MEDICARE

## 2024-01-01 ENCOUNTER — APPOINTMENT (OUTPATIENT)
Dept: CT IMAGING | Facility: HOSPITAL | Age: 79
DRG: 193 | End: 2024-01-01
Payer: MEDICARE

## 2024-01-01 VITALS
OXYGEN SATURATION: 96 % | SYSTOLIC BLOOD PRESSURE: 121 MMHG | TEMPERATURE: 99.2 F | HEART RATE: 104 BPM | DIASTOLIC BLOOD PRESSURE: 56 MMHG | BODY MASS INDEX: 20.29 KG/M2 | RESPIRATION RATE: 15 BRPM | WEIGHT: 145.5 LBS

## 2024-01-01 DIAGNOSIS — J18.9 PNEUMONIA OF LEFT LOWER LOBE DUE TO INFECTIOUS ORGANISM: Primary | ICD-10-CM

## 2024-01-01 DIAGNOSIS — G63 POLYNEUROPATHY ASSOCIATED WITH UNDERLYING DISEASE: ICD-10-CM

## 2024-01-01 DIAGNOSIS — R20.2 TINGLING OF BOTH FEET: Primary | ICD-10-CM

## 2024-01-01 DIAGNOSIS — R09.02 HYPOXIA: ICD-10-CM

## 2024-01-01 LAB
ALBUMIN SERPL-MCNC: 3.4 G/DL (ref 3.5–5.2)
ALBUMIN SERPL-MCNC: 3.8 G/DL (ref 3.5–5.2)
ALBUMIN/GLOB SERPL: 1 G/DL
ALBUMIN/GLOB SERPL: 1 G/DL
ALP SERPL-CCNC: 168 U/L (ref 39–117)
ALP SERPL-CCNC: 177 U/L (ref 39–117)
ALT SERPL W P-5'-P-CCNC: 10 U/L (ref 1–41)
ALT SERPL W P-5'-P-CCNC: 9 U/L (ref 1–41)
ANION GAP SERPL CALCULATED.3IONS-SCNC: 16.9 MMOL/L (ref 5–15)
ANION GAP SERPL CALCULATED.3IONS-SCNC: 20.9 MMOL/L (ref 5–15)
ANISOCYTOSIS BLD QL: ABNORMAL
APTT PPP: 64.6 SECONDS (ref 61–76.5)
AST SERPL-CCNC: 27 U/L (ref 1–40)
AST SERPL-CCNC: 27 U/L (ref 1–40)
ATMOSPHERIC PRESS: ABNORMAL MM[HG]
B PARAPERT DNA SPEC QL NAA+PROBE: NOT DETECTED
B PERT DNA SPEC QL NAA+PROBE: NOT DETECTED
BASE EXCESS BLDV CALC-SCNC: 1 MMOL/L (ref -2–2)
BASOPHILS # BLD AUTO: 0.05 10*3/MM3 (ref 0–0.2)
BASOPHILS NFR BLD AUTO: 0.3 % (ref 0–1.5)
BDY SITE: ABNORMAL
BILIRUB SERPL-MCNC: 0.2 MG/DL (ref 0–1.2)
BILIRUB SERPL-MCNC: 0.3 MG/DL (ref 0–1.2)
BUN SERPL-MCNC: 33 MG/DL (ref 8–23)
BUN SERPL-MCNC: 37 MG/DL (ref 8–23)
BUN/CREAT SERPL: 25.2 (ref 7–25)
BUN/CREAT SERPL: 26.2 (ref 7–25)
C PNEUM DNA NPH QL NAA+NON-PROBE: NOT DETECTED
CALCIUM SPEC-SCNC: 9.3 MG/DL (ref 8.6–10.5)
CALCIUM SPEC-SCNC: 9.8 MG/DL (ref 8.6–10.5)
CHLORIDE SERPL-SCNC: 100 MMOL/L (ref 98–107)
CHLORIDE SERPL-SCNC: 103 MMOL/L (ref 98–107)
CO2 BLDA-SCNC: 29.4 MMOL/L (ref 22–29)
CO2 SERPL-SCNC: 22.1 MMOL/L (ref 22–29)
CO2 SERPL-SCNC: 25.1 MMOL/L (ref 22–29)
CREAT SERPL-MCNC: 1.31 MG/DL (ref 0.76–1.27)
CREAT SERPL-MCNC: 1.41 MG/DL (ref 0.76–1.27)
CRP SERPL-MCNC: 18.6 MG/DL (ref 0–0.5)
D DIMER PPP FEU-MCNC: 21.06 MG/L (FEU) (ref 0–0.8)
DEPRECATED RDW RBC AUTO: 55.8 FL (ref 37–54)
DEPRECATED RDW RBC AUTO: 56.6 FL (ref 37–54)
EGFRCR SERPLBLD CKD-EPI 2021: 50.4 ML/MIN/1.73
EGFRCR SERPLBLD CKD-EPI 2021: 55 ML/MIN/1.73
EOSINOPHIL # BLD AUTO: 0.06 10*3/MM3 (ref 0–0.4)
EOSINOPHIL NFR BLD AUTO: 0.4 % (ref 0.3–6.2)
ERYTHROCYTE [DISTWIDTH] IN BLOOD BY AUTOMATED COUNT: 15.9 % (ref 12.3–15.4)
ERYTHROCYTE [DISTWIDTH] IN BLOOD BY AUTOMATED COUNT: 16 % (ref 12.3–15.4)
ERYTHROCYTE [SEDIMENTATION RATE] IN BLOOD: 96 MM/HR (ref 0–20)
FLUAV SUBTYP SPEC NAA+PROBE: NOT DETECTED
FLUBV RNA ISLT QL NAA+PROBE: NOT DETECTED
GLOBULIN UR ELPH-MCNC: 3.3 GM/DL
GLOBULIN UR ELPH-MCNC: 3.7 GM/DL
GLUCOSE BLDC GLUCOMTR-MCNC: 164 MG/DL (ref 70–105)
GLUCOSE BLDC GLUCOMTR-MCNC: 172 MG/DL (ref 70–105)
GLUCOSE BLDC GLUCOMTR-MCNC: 182 MG/DL (ref 70–105)
GLUCOSE BLDC GLUCOMTR-MCNC: 190 MG/DL (ref 70–105)
GLUCOSE SERPL-MCNC: 148 MG/DL (ref 65–99)
GLUCOSE SERPL-MCNC: 154 MG/DL (ref 65–99)
HADV DNA SPEC NAA+PROBE: NOT DETECTED
HBA1C MFR BLD: 6.54 % (ref 4.8–5.6)
HCO3 BLDV-SCNC: 27.8 MMOL/L (ref 22–26)
HCOV 229E RNA SPEC QL NAA+PROBE: NOT DETECTED
HCOV HKU1 RNA SPEC QL NAA+PROBE: NOT DETECTED
HCOV NL63 RNA SPEC QL NAA+PROBE: NOT DETECTED
HCOV OC43 RNA SPEC QL NAA+PROBE: NOT DETECTED
HCT VFR BLD AUTO: 39.6 % (ref 37.5–51)
HCT VFR BLD AUTO: 41 % (ref 37.5–51)
HGB BLD-MCNC: 12.1 G/DL (ref 13–17.7)
HGB BLD-MCNC: 12.6 G/DL (ref 13–17.7)
HMPV RNA NPH QL NAA+NON-PROBE: NOT DETECTED
HOLD SPECIMEN: NORMAL
HPIV1 RNA ISLT QL NAA+PROBE: NOT DETECTED
HPIV2 RNA SPEC QL NAA+PROBE: NOT DETECTED
HPIV3 RNA NPH QL NAA+PROBE: NOT DETECTED
HPIV4 P GENE NPH QL NAA+PROBE: NOT DETECTED
IMM GRANULOCYTES # BLD AUTO: 0.09 10*3/MM3 (ref 0–0.05)
IMM GRANULOCYTES NFR BLD AUTO: 0.5 % (ref 0–0.5)
INHALED O2 CONCENTRATION: 100 %
INR PPP: 4.01 (ref 0.93–1.1)
INR PPP: 4.72 (ref 2–3)
LARGE PLATELETS: ABNORMAL
LYMPHOCYTES # BLD AUTO: 2 10*3/MM3 (ref 0.7–3.1)
LYMPHOCYTES # BLD MANUAL: 1.79 10*3/MM3 (ref 0.7–3.1)
LYMPHOCYTES NFR BLD AUTO: 11.7 % (ref 19.6–45.3)
LYMPHOCYTES NFR BLD MANUAL: 7 % (ref 5–12)
M PNEUMO IGG SER IA-ACNC: NOT DETECTED
MAGNESIUM SERPL-MCNC: 2.1 MG/DL (ref 1.6–2.4)
MCH RBC QN AUTO: 29 PG (ref 26.6–33)
MCH RBC QN AUTO: 29.4 PG (ref 26.6–33)
MCHC RBC AUTO-ENTMCNC: 30.6 G/DL (ref 31.5–35.7)
MCHC RBC AUTO-ENTMCNC: 30.7 G/DL (ref 31.5–35.7)
MCV RBC AUTO: 94.5 FL (ref 79–97)
MCV RBC AUTO: 96.1 FL (ref 79–97)
MODALITY: ABNORMAL
MONOCYTES # BLD AUTO: 1.2 10*3/MM3 (ref 0.1–0.9)
MONOCYTES # BLD: 1.39 10*3/MM3 (ref 0.1–0.9)
MONOCYTES NFR BLD AUTO: 7 % (ref 5–12)
MRSA DNA SPEC QL NAA+PROBE: NORMAL
NEUTROPHILS # BLD AUTO: 16.67 10*3/MM3 (ref 1.7–7)
NEUTROPHILS NFR BLD AUTO: 13.72 10*3/MM3 (ref 1.7–7)
NEUTROPHILS NFR BLD AUTO: 80.1 % (ref 42.7–76)
NEUTROPHILS NFR BLD MANUAL: 71 % (ref 42.7–76)
NEUTS BAND NFR BLD MANUAL: 13 % (ref 0–5)
NRBC BLD AUTO-RTO: 0 /100 WBC (ref 0–0.2)
NT-PROBNP SERPL-MCNC: 332 PG/ML (ref 0–1800)
NT-PROBNP SERPL-MCNC: 558 PG/ML (ref 0–1800)
PCO2 BLDV: 52 MM HG (ref 42–51)
PH BLDV: 7.34 PH UNITS (ref 7.32–7.43)
PLATELET # BLD AUTO: 283 10*3/MM3 (ref 140–450)
PLATELET # BLD AUTO: 304 10*3/MM3 (ref 140–450)
PMV BLD AUTO: 10.1 FL (ref 6–12)
PMV BLD AUTO: 9.5 FL (ref 6–12)
PO2 BLDV: 69.9 MM HG (ref 40–42)
POTASSIUM SERPL-SCNC: 4.5 MMOL/L (ref 3.5–5.2)
POTASSIUM SERPL-SCNC: 5.1 MMOL/L (ref 3.5–5.2)
PROCALCITONIN SERPL-MCNC: 0.27 NG/ML (ref 0–0.25)
PROT SERPL-MCNC: 6.7 G/DL (ref 6–8.5)
PROT SERPL-MCNC: 7.5 G/DL (ref 6–8.5)
PROTHROMBIN TIME: 39.4 SECONDS (ref 9.6–11.7)
PROTHROMBIN TIME: 45.9 SECONDS (ref 19.4–28.5)
RBC # BLD AUTO: 4.12 10*6/MM3 (ref 4.14–5.8)
RBC # BLD AUTO: 4.34 10*6/MM3 (ref 4.14–5.8)
RHINOVIRUS RNA SPEC NAA+PROBE: NOT DETECTED
RSV RNA NPH QL NAA+NON-PROBE: NOT DETECTED
SAO2 % BLDCOV: 92.3 % (ref 45–75)
SARS-COV-2 RNA NPH QL NAA+NON-PROBE: NOT DETECTED
SCAN SLIDE: NORMAL
SODIUM SERPL-SCNC: 142 MMOL/L (ref 136–145)
SODIUM SERPL-SCNC: 146 MMOL/L (ref 136–145)
TROPONIN T SERPL HS-MCNC: 39 NG/L
VANCOMYCIN SERPL-MCNC: 14.7 MCG/ML (ref 5–40)
VARIANT LYMPHS NFR BLD MANUAL: 3 % (ref 0–5)
VARIANT LYMPHS NFR BLD MANUAL: 6 % (ref 19.6–45.3)
WBC MORPH BLD: NORMAL
WBC NRBC COR # BLD AUTO: 17.12 10*3/MM3 (ref 3.4–10.8)
WBC NRBC COR # BLD AUTO: 19.85 10*3/MM3 (ref 3.4–10.8)

## 2024-01-01 PROCEDURE — 25810000003 SODIUM CHLORIDE 0.9 % SOLUTION: Performed by: INTERNAL MEDICINE

## 2024-01-01 PROCEDURE — 84484 ASSAY OF TROPONIN QUANT: CPT | Performed by: EMERGENCY MEDICINE

## 2024-01-01 PROCEDURE — 25010000002 GLYCOPYRROLATE 0.2 MG/ML SOLUTION

## 2024-01-01 PROCEDURE — 94799 UNLISTED PULMONARY SVC/PX: CPT

## 2024-01-01 PROCEDURE — 83036 HEMOGLOBIN GLYCOSYLATED A1C: CPT | Performed by: INTERNAL MEDICINE

## 2024-01-01 PROCEDURE — 82948 REAGENT STRIP/BLOOD GLUCOSE: CPT | Performed by: INTERNAL MEDICINE

## 2024-01-01 PROCEDURE — 80202 ASSAY OF VANCOMYCIN: CPT | Performed by: INTERNAL MEDICINE

## 2024-01-01 PROCEDURE — 80053 COMPREHEN METABOLIC PANEL: CPT | Performed by: INTERNAL MEDICINE

## 2024-01-01 PROCEDURE — 83735 ASSAY OF MAGNESIUM: CPT | Performed by: EMERGENCY MEDICINE

## 2024-01-01 PROCEDURE — 94761 N-INVAS EAR/PLS OXIMETRY MLT: CPT

## 2024-01-01 PROCEDURE — 93010 ELECTROCARDIOGRAM REPORT: CPT | Performed by: INTERNAL MEDICINE

## 2024-01-01 PROCEDURE — 25810000003 SODIUM CHLORIDE 0.9 % SOLUTION 250 ML FLEX CONT: Performed by: EMERGENCY MEDICINE

## 2024-01-01 PROCEDURE — 80053 COMPREHEN METABOLIC PANEL: CPT | Performed by: EMERGENCY MEDICINE

## 2024-01-01 PROCEDURE — 25010000002 LORAZEPAM PER 2 MG: Performed by: INTERNAL MEDICINE

## 2024-01-01 PROCEDURE — 85007 BL SMEAR W/DIFF WBC COUNT: CPT | Performed by: INTERNAL MEDICINE

## 2024-01-01 PROCEDURE — 25010000002 AMPICILLIN-SULBACTAM PER 1.5 G: Performed by: INTERNAL MEDICINE

## 2024-01-01 PROCEDURE — 85025 COMPLETE CBC W/AUTO DIFF WBC: CPT | Performed by: EMERGENCY MEDICINE

## 2024-01-01 PROCEDURE — 82803 BLOOD GASES ANY COMBINATION: CPT

## 2024-01-01 PROCEDURE — 25010000002 MORPHINE PER 10 MG: Performed by: INTERNAL MEDICINE

## 2024-01-01 PROCEDURE — 25010000002 HYDROMORPHONE 1 MG/ML SOLUTION: Performed by: EMERGENCY MEDICINE

## 2024-01-01 PROCEDURE — 86140 C-REACTIVE PROTEIN: CPT | Performed by: INTERNAL MEDICINE

## 2024-01-01 PROCEDURE — 71250 CT THORAX DX C-: CPT

## 2024-01-01 PROCEDURE — 25010000002 AZITHROMYCIN PER 500 MG: Performed by: EMERGENCY MEDICINE

## 2024-01-01 PROCEDURE — 36415 COLL VENOUS BLD VENIPUNCTURE: CPT

## 2024-01-01 PROCEDURE — 85730 THROMBOPLASTIN TIME PARTIAL: CPT | Performed by: EMERGENCY MEDICINE

## 2024-01-01 PROCEDURE — 85379 FIBRIN DEGRADATION QUANT: CPT | Performed by: INTERNAL MEDICINE

## 2024-01-01 PROCEDURE — 85610 PROTHROMBIN TIME: CPT | Performed by: INTERNAL MEDICINE

## 2024-01-01 PROCEDURE — 25010000002 CEFTRIAXONE PER 250 MG: Performed by: EMERGENCY MEDICINE

## 2024-01-01 PROCEDURE — 25010000002 VANCOMYCIN HCL 1.25 G RECONSTITUTED SOLUTION 1 EACH VIAL: Performed by: EMERGENCY MEDICINE

## 2024-01-01 PROCEDURE — 71045 X-RAY EXAM CHEST 1 VIEW: CPT

## 2024-01-01 PROCEDURE — 83880 ASSAY OF NATRIURETIC PEPTIDE: CPT | Performed by: INTERNAL MEDICINE

## 2024-01-01 PROCEDURE — 85025 COMPLETE CBC W/AUTO DIFF WBC: CPT | Performed by: INTERNAL MEDICINE

## 2024-01-01 PROCEDURE — 82948 REAGENT STRIP/BLOOD GLUCOSE: CPT

## 2024-01-01 PROCEDURE — 94640 AIRWAY INHALATION TREATMENT: CPT

## 2024-01-01 PROCEDURE — 87641 MR-STAPH DNA AMP PROBE: CPT | Performed by: INTERNAL MEDICINE

## 2024-01-01 PROCEDURE — 84145 PROCALCITONIN (PCT): CPT | Performed by: EMERGENCY MEDICINE

## 2024-01-01 PROCEDURE — 85610 PROTHROMBIN TIME: CPT | Performed by: EMERGENCY MEDICINE

## 2024-01-01 PROCEDURE — 93005 ELECTROCARDIOGRAM TRACING: CPT | Performed by: EMERGENCY MEDICINE

## 2024-01-01 PROCEDURE — 63710000001 INSULIN LISPRO (HUMAN) PER 5 UNITS: Performed by: INTERNAL MEDICINE

## 2024-01-01 PROCEDURE — 87481 CANDIDA DNA AMP PROBE: CPT | Performed by: INTERNAL MEDICINE

## 2024-01-01 PROCEDURE — 85652 RBC SED RATE AUTOMATED: CPT | Performed by: INTERNAL MEDICINE

## 2024-01-01 PROCEDURE — 87040 BLOOD CULTURE FOR BACTERIA: CPT | Performed by: EMERGENCY MEDICINE

## 2024-01-01 PROCEDURE — 0202U NFCT DS 22 TRGT SARS-COV-2: CPT | Performed by: EMERGENCY MEDICINE

## 2024-01-01 PROCEDURE — 99285 EMERGENCY DEPT VISIT HI MDM: CPT

## 2024-01-01 PROCEDURE — 25810000003 LACTATED RINGERS SOLUTION: Performed by: EMERGENCY MEDICINE

## 2024-01-01 RX ORDER — ALBUTEROL SULFATE 0.83 MG/ML
2.5 SOLUTION RESPIRATORY (INHALATION) ONCE
Status: COMPLETED | OUTPATIENT
Start: 2024-01-01 | End: 2024-01-01

## 2024-01-01 RX ORDER — LORAZEPAM 2 MG/ML
0.5 INJECTION INTRAMUSCULAR
Status: DISCONTINUED | OUTPATIENT
Start: 2024-01-01 | End: 2024-08-19 | Stop reason: HOSPADM

## 2024-01-01 RX ORDER — ACETAMINOPHEN 160 MG/5ML
650 SOLUTION ORAL EVERY 4 HOURS PRN
Status: CANCELLED | OUTPATIENT
Start: 2024-01-01

## 2024-01-01 RX ORDER — LACTULOSE 10 G/15ML
20 SOLUTION ORAL DAILY PRN
COMMUNITY

## 2024-01-01 RX ORDER — LORAZEPAM 2 MG/ML
1 CONCENTRATE ORAL
Status: DISCONTINUED | OUTPATIENT
Start: 2024-01-01 | End: 2024-08-19 | Stop reason: HOSPADM

## 2024-01-01 RX ORDER — LORAZEPAM 0.5 MG/1
0.5 TABLET ORAL
Status: CANCELLED | OUTPATIENT
Start: 2024-01-01 | End: 2024-08-23

## 2024-01-01 RX ORDER — PRIMIDONE 50 MG/1
150 TABLET ORAL 2 TIMES DAILY
Status: DISCONTINUED | OUTPATIENT
Start: 2024-01-01 | End: 2024-01-01 | Stop reason: HOSPADM

## 2024-01-01 RX ORDER — MORPHINE SULFATE 100 MG/5ML
5 SOLUTION ORAL EVERY 4 HOURS
Status: CANCELLED | OUTPATIENT
Start: 2024-01-01 | End: 2024-08-22

## 2024-01-01 RX ORDER — ONDANSETRON 2 MG/ML
4 INJECTION INTRAMUSCULAR; INTRAVENOUS EVERY 6 HOURS PRN
Status: DISCONTINUED | OUTPATIENT
Start: 2024-01-01 | End: 2024-01-01

## 2024-01-01 RX ORDER — POLYETHYLENE GLYCOL 3350 17 G/17G
17 POWDER, FOR SOLUTION ORAL DAILY
COMMUNITY

## 2024-01-01 RX ORDER — LORAZEPAM 2 MG/ML
0.5 INJECTION INTRAMUSCULAR
Status: CANCELLED | OUTPATIENT
Start: 2024-01-01 | End: 2024-08-23

## 2024-01-01 RX ORDER — ACETAMINOPHEN 650 MG/1
650 SUPPOSITORY RECTAL EVERY 4 HOURS PRN
Status: DISCONTINUED | OUTPATIENT
Start: 2024-01-01 | End: 2024-08-19 | Stop reason: HOSPADM

## 2024-01-01 RX ORDER — MORPHINE SULFATE 2 MG/ML
2 INJECTION, SOLUTION INTRAMUSCULAR; INTRAVENOUS
Status: DISCONTINUED | OUTPATIENT
Start: 2024-01-01 | End: 2024-01-01 | Stop reason: HOSPADM

## 2024-01-01 RX ORDER — LORAZEPAM 2 MG/ML
2 CONCENTRATE ORAL
Status: CANCELLED | OUTPATIENT
Start: 2024-01-01 | End: 2024-08-23

## 2024-01-01 RX ORDER — SODIUM CHLORIDE 9 MG/ML
75 INJECTION, SOLUTION INTRAVENOUS CONTINUOUS
Status: DISCONTINUED | OUTPATIENT
Start: 2024-01-01 | End: 2024-01-01 | Stop reason: HOSPADM

## 2024-01-01 RX ORDER — LORAZEPAM 2 MG/ML
1 INJECTION INTRAMUSCULAR
Status: CANCELLED | OUTPATIENT
Start: 2024-01-01 | End: 2024-08-23

## 2024-01-01 RX ORDER — AMIODARONE HYDROCHLORIDE 200 MG/1
200 TABLET ORAL
Status: DISCONTINUED | OUTPATIENT
Start: 2024-01-01 | End: 2024-01-01

## 2024-01-01 RX ORDER — DIPHENOXYLATE HCL/ATROPINE 2.5-.025MG
1 TABLET ORAL
Status: CANCELLED | OUTPATIENT
Start: 2024-01-01

## 2024-01-01 RX ORDER — ONDANSETRON 4 MG/1
4 TABLET, ORALLY DISINTEGRATING ORAL EVERY 6 HOURS PRN
Status: DISCONTINUED | OUTPATIENT
Start: 2024-01-01 | End: 2024-01-01 | Stop reason: HOSPADM

## 2024-01-01 RX ORDER — IBUPROFEN 600 MG/1
1 TABLET ORAL
Status: DISCONTINUED | OUTPATIENT
Start: 2024-01-01 | End: 2024-01-01 | Stop reason: HOSPADM

## 2024-01-01 RX ORDER — LORAZEPAM 2 MG/ML
1 INJECTION INTRAMUSCULAR
Status: DISCONTINUED | OUTPATIENT
Start: 2024-01-01 | End: 2024-08-19 | Stop reason: HOSPADM

## 2024-01-01 RX ORDER — ACETAMINOPHEN 325 MG/1
650 TABLET ORAL EVERY 4 HOURS PRN
Status: DISCONTINUED | OUTPATIENT
Start: 2024-01-01 | End: 2024-01-01 | Stop reason: HOSPADM

## 2024-01-01 RX ORDER — LORAZEPAM 2 MG/ML
0.5 CONCENTRATE ORAL
Status: DISCONTINUED | OUTPATIENT
Start: 2024-01-01 | End: 2024-08-19 | Stop reason: HOSPADM

## 2024-01-01 RX ORDER — ACETAMINOPHEN 160 MG/5ML
650 SOLUTION ORAL EVERY 4 HOURS PRN
Status: DISCONTINUED | OUTPATIENT
Start: 2024-01-01 | End: 2024-01-01 | Stop reason: HOSPADM

## 2024-01-01 RX ORDER — MORPHINE SULFATE 100 MG/5ML
5 SOLUTION ORAL EVERY 4 HOURS
Status: DISCONTINUED | OUTPATIENT
Start: 2024-01-01 | End: 2024-08-19 | Stop reason: HOSPADM

## 2024-01-01 RX ORDER — AMOXICILLIN 250 MG
2 CAPSULE ORAL 2 TIMES DAILY
COMMUNITY

## 2024-01-01 RX ORDER — LORAZEPAM 2 MG/ML
1 CONCENTRATE ORAL
Status: CANCELLED | OUTPATIENT
Start: 2024-01-01 | End: 2024-08-23

## 2024-01-01 RX ORDER — AMOXICILLIN 250 MG
2 CAPSULE ORAL 2 TIMES DAILY PRN
Status: DISCONTINUED | OUTPATIENT
Start: 2024-01-01 | End: 2024-01-01 | Stop reason: HOSPADM

## 2024-01-01 RX ORDER — LANSOPRAZOLE 30 MG/1
30 TABLET, ORALLY DISINTEGRATING, DELAYED RELEASE ORAL
Status: DISCONTINUED | OUTPATIENT
Start: 2024-01-01 | End: 2024-01-01 | Stop reason: HOSPADM

## 2024-01-01 RX ORDER — LORAZEPAM 2 MG/ML
2 CONCENTRATE ORAL
Status: DISCONTINUED | OUTPATIENT
Start: 2024-01-01 | End: 2024-08-19 | Stop reason: HOSPADM

## 2024-01-01 RX ORDER — MORPHINE SULFATE 100 MG/5ML
5 SOLUTION ORAL
Status: DISCONTINUED | OUTPATIENT
Start: 2024-01-01 | End: 2024-08-19 | Stop reason: HOSPADM

## 2024-01-01 RX ORDER — LORAZEPAM 1 MG/1
2 TABLET ORAL
Status: DISCONTINUED | OUTPATIENT
Start: 2024-01-01 | End: 2024-08-19 | Stop reason: HOSPADM

## 2024-01-01 RX ORDER — HYDROCODONE BITARTRATE AND ACETAMINOPHEN 10; 325 MG/1; MG/1
1 TABLET ORAL EVERY 8 HOURS PRN
Status: DISCONTINUED | OUTPATIENT
Start: 2024-01-01 | End: 2024-01-01 | Stop reason: HOSPADM

## 2024-01-01 RX ORDER — SODIUM CHLORIDE 0.9 % (FLUSH) 0.9 %
10 SYRINGE (ML) INJECTION AS NEEDED
Status: DISCONTINUED | OUTPATIENT
Start: 2024-01-01 | End: 2024-01-01 | Stop reason: HOSPADM

## 2024-01-01 RX ORDER — BISACODYL 5 MG/1
5 TABLET, DELAYED RELEASE ORAL DAILY PRN
Status: DISCONTINUED | OUTPATIENT
Start: 2024-01-01 | End: 2024-01-01 | Stop reason: HOSPADM

## 2024-01-01 RX ORDER — IPRATROPIUM BROMIDE AND ALBUTEROL SULFATE 2.5; .5 MG/3ML; MG/3ML
3 SOLUTION RESPIRATORY (INHALATION)
Status: DISCONTINUED | OUTPATIENT
Start: 2024-01-01 | End: 2024-01-01 | Stop reason: HOSPADM

## 2024-01-01 RX ORDER — LORAZEPAM 2 MG/ML
2 INJECTION INTRAMUSCULAR
Status: CANCELLED | OUTPATIENT
Start: 2024-01-01 | End: 2024-08-23

## 2024-01-01 RX ORDER — PRIMIDONE 50 MG/1
150 TABLET ORAL 2 TIMES DAILY
Qty: 540 TABLET | Refills: 1 | Status: SHIPPED | OUTPATIENT
Start: 2024-01-01

## 2024-01-01 RX ORDER — DEXTROSE MONOHYDRATE 25 G/50ML
25 INJECTION, SOLUTION INTRAVENOUS
Status: DISCONTINUED | OUTPATIENT
Start: 2024-01-01 | End: 2024-01-01 | Stop reason: HOSPADM

## 2024-01-01 RX ORDER — LORAZEPAM 2 MG/ML
0.5 CONCENTRATE ORAL EVERY 4 HOURS PRN
Status: DISCONTINUED | OUTPATIENT
Start: 2024-01-01 | End: 2024-08-19 | Stop reason: HOSPADM

## 2024-01-01 RX ORDER — ACETAMINOPHEN 650 MG/1
650 SUPPOSITORY RECTAL EVERY 4 HOURS PRN
Status: CANCELLED | OUTPATIENT
Start: 2024-01-01

## 2024-01-01 RX ORDER — MORPHINE SULFATE 2 MG/ML
1 INJECTION, SOLUTION INTRAMUSCULAR; INTRAVENOUS EVERY 4 HOURS PRN
Status: DISCONTINUED | OUTPATIENT
Start: 2024-01-01 | End: 2024-01-01 | Stop reason: DRUGHIGH

## 2024-01-01 RX ORDER — ACETAMINOPHEN 650 MG/1
650 SUPPOSITORY RECTAL EVERY 4 HOURS PRN
Status: DISCONTINUED | OUTPATIENT
Start: 2024-01-01 | End: 2024-01-01 | Stop reason: HOSPADM

## 2024-01-01 RX ORDER — LORAZEPAM 2 MG/ML
0.5 CONCENTRATE ORAL EVERY 4 HOURS PRN
Status: DISCONTINUED | OUTPATIENT
Start: 2024-01-01 | End: 2024-01-01 | Stop reason: HOSPADM

## 2024-01-01 RX ORDER — LORAZEPAM 2 MG/ML
2 INJECTION INTRAMUSCULAR
Status: DISCONTINUED | OUTPATIENT
Start: 2024-01-01 | End: 2024-08-19 | Stop reason: HOSPADM

## 2024-01-01 RX ORDER — METOPROLOL TARTRATE 25 MG/1
25 TABLET, FILM COATED ORAL EVERY 12 HOURS SCHEDULED
Status: DISCONTINUED | OUTPATIENT
Start: 2024-01-01 | End: 2024-01-01

## 2024-01-01 RX ORDER — ONDANSETRON 4 MG/1
4 TABLET, FILM COATED ORAL EVERY 4 HOURS PRN
COMMUNITY

## 2024-01-01 RX ORDER — BISACODYL 10 MG
10 SUPPOSITORY, RECTAL RECTAL DAILY PRN
Status: DISCONTINUED | OUTPATIENT
Start: 2024-01-01 | End: 2024-01-01 | Stop reason: HOSPADM

## 2024-01-01 RX ORDER — LORAZEPAM 1 MG/1
1 TABLET ORAL
Status: CANCELLED | OUTPATIENT
Start: 2024-01-01 | End: 2024-08-23

## 2024-01-01 RX ORDER — LORAZEPAM 1 MG/1
2 TABLET ORAL
Status: CANCELLED | OUTPATIENT
Start: 2024-01-01 | End: 2024-08-23

## 2024-01-01 RX ORDER — PAROXETINE 40 MG/1
40 TABLET, FILM COATED ORAL EVERY MORNING
COMMUNITY

## 2024-01-01 RX ORDER — BUSPIRONE HYDROCHLORIDE 10 MG/1
10 TABLET ORAL 2 TIMES DAILY
COMMUNITY

## 2024-01-01 RX ORDER — ACETAMINOPHEN 160 MG/5ML
650 SOLUTION ORAL EVERY 4 HOURS PRN
Status: DISCONTINUED | OUTPATIENT
Start: 2024-01-01 | End: 2024-08-19 | Stop reason: HOSPADM

## 2024-01-01 RX ORDER — MORPHINE SULFATE 15 MG/1
15 TABLET, FILM COATED, EXTENDED RELEASE ORAL 2 TIMES DAILY
COMMUNITY

## 2024-01-01 RX ORDER — SODIUM CHLORIDE 0.9 % (FLUSH) 0.9 %
10 SYRINGE (ML) INJECTION EVERY 12 HOURS SCHEDULED
Status: DISCONTINUED | OUTPATIENT
Start: 2024-01-01 | End: 2024-01-01 | Stop reason: HOSPADM

## 2024-01-01 RX ORDER — DIPHENOXYLATE HCL/ATROPINE 2.5-.025MG
1 TABLET ORAL
Status: DISCONTINUED | OUTPATIENT
Start: 2024-01-01 | End: 2024-08-19 | Stop reason: HOSPADM

## 2024-01-01 RX ORDER — ONDANSETRON 2 MG/ML
4 INJECTION INTRAMUSCULAR; INTRAVENOUS EVERY 6 HOURS PRN
Status: DISCONTINUED | OUTPATIENT
Start: 2024-01-01 | End: 2024-01-01 | Stop reason: HOSPADM

## 2024-01-01 RX ORDER — LORAZEPAM 2 MG/ML
0.5 CONCENTRATE ORAL
Status: CANCELLED | OUTPATIENT
Start: 2024-01-01 | End: 2024-08-23

## 2024-01-01 RX ORDER — GLYCOPYRROLATE 0.2 MG/ML
0.4 INJECTION INTRAMUSCULAR; INTRAVENOUS
Status: DISCONTINUED | OUTPATIENT
Start: 2024-01-01 | End: 2024-08-19 | Stop reason: HOSPADM

## 2024-01-01 RX ORDER — INSULIN LISPRO 100 [IU]/ML
2-9 INJECTION, SOLUTION INTRAVENOUS; SUBCUTANEOUS
Status: DISCONTINUED | OUTPATIENT
Start: 2024-01-01 | End: 2024-01-01 | Stop reason: HOSPADM

## 2024-01-01 RX ORDER — PREGABALIN 75 MG/1
75 CAPSULE ORAL 2 TIMES DAILY
Status: DISCONTINUED | OUTPATIENT
Start: 2024-01-01 | End: 2024-01-01 | Stop reason: HOSPADM

## 2024-01-01 RX ORDER — AMIODARONE HYDROCHLORIDE 200 MG/1
200 TABLET ORAL NIGHTLY
COMMUNITY

## 2024-01-01 RX ORDER — POLYETHYLENE GLYCOL 3350 17 G/17G
17 POWDER, FOR SOLUTION ORAL DAILY PRN
Status: DISCONTINUED | OUTPATIENT
Start: 2024-01-01 | End: 2024-01-01 | Stop reason: HOSPADM

## 2024-01-01 RX ORDER — MORPHINE SULFATE 100 MG/5ML
5 SOLUTION ORAL EVERY 4 HOURS
Status: DISCONTINUED | OUTPATIENT
Start: 2024-01-01 | End: 2024-01-01 | Stop reason: HOSPADM

## 2024-01-01 RX ORDER — ACETAMINOPHEN 325 MG/1
650 TABLET ORAL EVERY 4 HOURS PRN
Status: DISCONTINUED | OUTPATIENT
Start: 2024-01-01 | End: 2024-08-19 | Stop reason: HOSPADM

## 2024-01-01 RX ORDER — MORPHINE SULFATE 2 MG/ML
2 INJECTION, SOLUTION INTRAMUSCULAR; INTRAVENOUS
Status: CANCELLED | OUTPATIENT
Start: 2024-01-01

## 2024-01-01 RX ORDER — CARBIDOPA AND LEVODOPA 25; 100 MG/1; MG/1
1 TABLET ORAL 3 TIMES DAILY
COMMUNITY

## 2024-01-01 RX ORDER — MORPHINE SULFATE 100 MG/5ML
5 SOLUTION ORAL
Status: CANCELLED | OUTPATIENT
Start: 2024-01-01 | End: 2024-08-22

## 2024-01-01 RX ORDER — NALOXONE HCL 0.4 MG/ML
0.4 VIAL (ML) INJECTION
Status: DISCONTINUED | OUTPATIENT
Start: 2024-01-01 | End: 2024-01-01 | Stop reason: HOSPADM

## 2024-01-01 RX ORDER — MORPHINE SULFATE 100 MG/5ML
5 SOLUTION ORAL
Status: DISCONTINUED | OUTPATIENT
Start: 2024-01-01 | End: 2024-01-01 | Stop reason: HOSPADM

## 2024-01-01 RX ORDER — MORPHINE SULFATE 2 MG/ML
2 INJECTION, SOLUTION INTRAMUSCULAR; INTRAVENOUS
Status: DISCONTINUED | OUTPATIENT
Start: 2024-01-01 | End: 2024-08-19 | Stop reason: HOSPADM

## 2024-01-01 RX ORDER — LORAZEPAM 2 MG/ML
0.5 CONCENTRATE ORAL EVERY 4 HOURS PRN
Status: CANCELLED | OUTPATIENT
Start: 2024-01-01 | End: 2024-08-22

## 2024-01-01 RX ORDER — HYDROCODONE BITARTRATE AND ACETAMINOPHEN 10; 325 MG/1; MG/1
1 TABLET ORAL EVERY 8 HOURS
COMMUNITY

## 2024-01-01 RX ORDER — LORAZEPAM 1 MG/1
1 TABLET ORAL
Status: DISCONTINUED | OUTPATIENT
Start: 2024-01-01 | End: 2024-08-19 | Stop reason: HOSPADM

## 2024-01-01 RX ORDER — LORAZEPAM 2 MG/ML
2 INJECTION INTRAMUSCULAR
Status: DISCONTINUED | OUTPATIENT
Start: 2024-01-01 | End: 2024-01-01 | Stop reason: HOSPADM

## 2024-01-01 RX ORDER — NICOTINE POLACRILEX 4 MG
15 LOZENGE BUCCAL
Status: DISCONTINUED | OUTPATIENT
Start: 2024-01-01 | End: 2024-01-01 | Stop reason: HOSPADM

## 2024-01-01 RX ORDER — SODIUM CHLORIDE 9 MG/ML
40 INJECTION, SOLUTION INTRAVENOUS AS NEEDED
Status: DISCONTINUED | OUTPATIENT
Start: 2024-01-01 | End: 2024-01-01 | Stop reason: HOSPADM

## 2024-01-01 RX ORDER — MORPHINE SULFATE 2 MG/ML
2 INJECTION, SOLUTION INTRAMUSCULAR; INTRAVENOUS
Status: DISCONTINUED | OUTPATIENT
Start: 2024-01-01 | End: 2024-01-01

## 2024-01-01 RX ORDER — ACETAMINOPHEN 325 MG/1
650 TABLET ORAL EVERY 4 HOURS PRN
Status: CANCELLED | OUTPATIENT
Start: 2024-01-01

## 2024-01-01 RX ORDER — GLIMEPIRIDE 2 MG/1
3 TABLET ORAL
COMMUNITY

## 2024-01-01 RX ORDER — LORAZEPAM 0.5 MG/1
0.5 TABLET ORAL
Status: DISCONTINUED | OUTPATIENT
Start: 2024-01-01 | End: 2024-08-19 | Stop reason: HOSPADM

## 2024-01-01 RX ORDER — IPRATROPIUM BROMIDE AND ALBUTEROL SULFATE 2.5; .5 MG/3ML; MG/3ML
3 SOLUTION RESPIRATORY (INHALATION) EVERY 4 HOURS PRN
Status: DISCONTINUED | OUTPATIENT
Start: 2024-01-01 | End: 2024-01-01 | Stop reason: HOSPADM

## 2024-01-01 RX ADMIN — MORPHINE SULFATE 2 MG: 2 INJECTION, SOLUTION INTRAMUSCULAR; INTRAVENOUS at 18:26

## 2024-01-01 RX ADMIN — MORPHINE SULFATE 5 MG: 10 SOLUTION ORAL at 00:45

## 2024-01-01 RX ADMIN — INSULIN LISPRO 2 UNITS: 100 INJECTION, SOLUTION INTRAVENOUS; SUBCUTANEOUS at 16:45

## 2024-01-01 RX ADMIN — AZITHROMYCIN DIHYDRATE 500 MG: 500 INJECTION, POWDER, LYOPHILIZED, FOR SOLUTION INTRAVENOUS at 14:29

## 2024-01-01 RX ADMIN — SODIUM CHLORIDE 75 ML/HR: 9 INJECTION, SOLUTION INTRAVENOUS at 17:13

## 2024-01-01 RX ADMIN — ALBUTEROL SULFATE 2.5 MG: 2.5 SOLUTION RESPIRATORY (INHALATION) at 12:22

## 2024-01-01 RX ADMIN — SODIUM CHLORIDE 75 ML/HR: 9 INJECTION, SOLUTION INTRAVENOUS at 08:18

## 2024-01-01 RX ADMIN — LORAZEPAM 2 MG: 2 INJECTION INTRAMUSCULAR; INTRAVENOUS at 16:32

## 2024-01-01 RX ADMIN — SODIUM CHLORIDE, POTASSIUM CHLORIDE, SODIUM LACTATE AND CALCIUM CHLORIDE 1000 ML: 600; 310; 30; 20 INJECTION, SOLUTION INTRAVENOUS at 12:37

## 2024-01-01 RX ADMIN — AMPICILLIN SODIUM AND SULBACTAM SODIUM 3 G: 2; 1 INJECTION, POWDER, FOR SOLUTION INTRAMUSCULAR; INTRAVENOUS at 03:57

## 2024-01-01 RX ADMIN — MORPHINE SULFATE 5 MG: 10 SOLUTION ORAL at 03:57

## 2024-01-01 RX ADMIN — CEFTRIAXONE 2000 MG: 2 INJECTION, POWDER, FOR SOLUTION INTRAMUSCULAR; INTRAVENOUS at 14:06

## 2024-01-01 RX ADMIN — HYDROMORPHONE HYDROCHLORIDE 0.5 MG: 1 INJECTION, SOLUTION INTRAMUSCULAR; INTRAVENOUS; SUBCUTANEOUS at 14:37

## 2024-01-01 RX ADMIN — MORPHINE SULFATE 2 MG: 2 INJECTION, SOLUTION INTRAMUSCULAR; INTRAVENOUS at 16:32

## 2024-01-01 RX ADMIN — MORPHINE SULFATE 5 MG: 10 SOLUTION ORAL at 20:14

## 2024-01-01 RX ADMIN — GLYCOPYRROLATE 0.4 MG: 0.2 INJECTION INTRAMUSCULAR; INTRAVENOUS at 19:28

## 2024-01-01 RX ADMIN — MORPHINE SULFATE 1 MG: 2 INJECTION, SOLUTION INTRAMUSCULAR; INTRAVENOUS at 17:07

## 2024-01-01 RX ADMIN — MORPHINE SULFATE 2 MG: 2 INJECTION, SOLUTION INTRAMUSCULAR; INTRAVENOUS at 10:35

## 2024-01-01 RX ADMIN — MORPHINE SULFATE 5 MG: 10 SOLUTION ORAL at 06:50

## 2024-01-01 RX ADMIN — Medication 10 ML: at 09:12

## 2024-01-01 RX ADMIN — SODIUM CHLORIDE 1250 MG: 9 INJECTION, SOLUTION INTRAVENOUS at 14:34

## 2024-01-01 RX ADMIN — AMPICILLIN SODIUM AND SULBACTAM SODIUM 3 G: 2; 1 INJECTION, POWDER, FOR SOLUTION INTRAMUSCULAR; INTRAVENOUS at 09:11

## 2024-01-01 RX ADMIN — LORAZEPAM 2 MG: 2 INJECTION INTRAMUSCULAR; INTRAVENOUS at 18:26

## 2024-01-01 RX ADMIN — AMPICILLIN SODIUM AND SULBACTAM SODIUM 3 G: 2; 1 INJECTION, POWDER, FOR SOLUTION INTRAMUSCULAR; INTRAVENOUS at 22:18

## 2024-01-01 RX ADMIN — MORPHINE SULFATE 2 MG: 2 INJECTION, SOLUTION INTRAMUSCULAR; INTRAVENOUS at 12:37

## 2024-01-01 RX ADMIN — LORAZEPAM 2 MG: 2 INJECTION INTRAMUSCULAR; INTRAVENOUS at 12:37

## 2024-01-05 ENCOUNTER — TELEPHONE (OUTPATIENT)
Dept: FAMILY MEDICINE CLINIC | Facility: CLINIC | Age: 79
End: 2024-01-05

## 2024-01-05 DIAGNOSIS — F41.9 ANXIETY: Primary | ICD-10-CM

## 2024-01-05 RX ORDER — DIAZEPAM 10 MG/1
TABLET ORAL
Qty: 2 TABLET | Refills: 2 | Status: SHIPPED | OUTPATIENT
Start: 2024-01-05

## 2024-01-05 NOTE — TELEPHONE ENCOUNTER
Caller: ROBY ANDREWS    Relationship: Emergency Contact    Best call back number: [phone]     What medication are you requesting: DIAZEPAM 10 MG    What are your current symptoms: HAVING MRI ON 1/9/24    How long have you been experiencing symptoms:     Have you had these symptoms before:    [x] Yes  [] No    Have you been treated for these symptoms before:   [x] Yes  [] No    If a prescription is needed, what is your preferred pharmacy and phone number: [id number] - [place], [place] - [address] AT [facility] OF [address] - [phone]  - [phone] FX     Additional notes:

## 2024-01-08 ENCOUNTER — TELEPHONE (OUTPATIENT)
Dept: GASTROENTEROLOGY | Facility: CLINIC | Age: 79
End: 2024-01-08

## 2024-01-08 NOTE — TELEPHONE ENCOUNTER
RN called patient's spouse, Ese to find out further information. Pt's spouse reports they are unsure what the cause is. Pt is scheduled for MRI tomorrow and patient's spouse just wanted to make provider aware of the change in patient's status. EL

## 2024-01-08 NOTE — TELEPHONE ENCOUNTER
Hub staff attempted to follow warm transfer process and was unsuccessful     Caller: ROBY ANDREWS    Relationship to patient: Emergency Contact    Best call back number: [phone]    Patient is needing: ROBY CALLED IN AND WANTED A MESSAGE SENT OVER TO DR CARRASCO TO MAKE HIM AWARE JOSI UPPER BODY IS NOW EXPERIENCING THE TRIMMERS AS WELL. SHE JUST WANTED TO INFORM HIM SO HE WOULD KNOW. SHE DIDN'T REQUEST A CALL BACK

## 2024-01-09 ENCOUNTER — TELEPHONE (OUTPATIENT)
Dept: GASTROENTEROLOGY | Facility: CLINIC | Age: 79
End: 2024-01-09
Payer: MEDICARE

## 2024-01-09 NOTE — TELEPHONE ENCOUNTER
Hub staff attempted to follow warm transfer process and was unsuccessful     Caller: ROBY ANDREWS    Relationship to patient: Emergency Contact    Best call back number: [phone]    Patient is needing: RANGEL CALLED IN REQUESTING TO SPEAK WITH [facility]N CLEMENTS OR SOMEONE ON THE CLINICAL STAFF. PT WAS NOT ABLE TO COMPLETE THE IMAGING BECAUSE HE WAS SICK.       SHE STATED IT IS IMPORTANT TO GET A CALL BACK AS SOON AS CAN DAUGHTER WILL BE LEAVING AND WANT TO DISCUSS.  PLEASE CALL TO DISCUSS FURTHER.

## 2024-01-09 NOTE — TELEPHONE ENCOUNTER
Patient's contact Sayra Lui [phone] left a Voice Message on behalf of pt. She would like to have a call back from Shelly to explain/discuss necessity and reasons of MRI.

## 2024-01-10 ENCOUNTER — TELEPHONE (OUTPATIENT)
Dept: GASTROENTEROLOGY | Facility: CLINIC | Age: 79
End: 2024-01-10

## 2024-01-10 NOTE — TELEPHONE ENCOUNTER
Hub staff attempted to follow warm transfer process and was unsuccessful     Caller: ROBY ANDREWS    Relationship to patient: Emergency Contact    Best call back number: [phone]    Patient is needing: REQUESTING A CALL BACK. WIFE LEFT MESSAGE FOR Kriyari YESTERDAY. SHE ALSO FAXED AN ARTICLE FROM A MEDICAL JOURNAL FROM [facility] TO REVIEW AND WANTS TO MAKE SURE THAT WAS RECEIVED. I AM NOT SEEING ANYTHING IN THE CHART FOR THIS PAPERWORK; SHE STATES THAT SHE SPOKE WITH SOMEONE  EARLIER WHO TOLD HER THAT THE FIRST PART OF THE FAX WAS RECEIVED, BUT NO THE ENTIRE ARTICLE. PLEASE CALL BACK TO ADVISE.

## 2024-01-10 NOTE — TELEPHONE ENCOUNTER
RN received phone call from patient's spouse, Ese. Ese would like to discuss the necessity of the MRI? Spouse read article regarding age recommendations for imaging and would like to discuss. Pt's spouse also has concerns for non-GI related health issues and would like to discuss those as well. She is requesting to speak with provider. EL

## 2024-01-11 NOTE — TELEPHONE ENCOUNTER
Wife called after hours on clar:  Lux Silverman 9 17 43, and I needed to talk to someone in Jackeline Serrato's office about the imaging that had been scheduled yesterday. Alen wasn't able to make it. Thank you.    Wife has questions about the MRI being done due to patient's age.. there is an article regarding this.  Thank you

## 2024-01-12 ENCOUNTER — TELEPHONE (OUTPATIENT)
Dept: GASTROENTEROLOGY | Facility: CLINIC | Age: 79
End: 2024-01-12
Payer: MEDICARE

## 2024-01-12 NOTE — TELEPHONE ENCOUNTER
Caller: ROBY ANDREWS    Relationship: Emergency Contact    Best call back number: [phone]    What is the best time to reach you: ANYTIME    Who are you requesting to speak with (clinical staff, provider,  specific staff member): CLINICAL      What was the call regarding: PATIENT'S WIFE IS WANTING TO DISCUSS SOME MEDICAL IMAGING CONCERNS WITH [facility] CLEMENTS TODAY. HER SON-IN-LAW IS AN ABDOMINAL RADIOLOIGIST AND ALSO THE MEDICAL READ THAT SHE FAXED OVER ABOUT THE CYST IS WHAT SHE IS WANTING TO DISCUSS AND GET SOME QUESTIONS ANSWERED. SHE HAS BEEN TRYING TO GET A HOLD OF HER FOR THREE DAYS AND HASN'T HEARD ANYTHING. SHE IS ALSO WANTING TO LET CATHECTOR KNOW THAT HAD TO CANCEL HER 'S IMAGING APPT DUE TO THEIR DAUGHTER BEING SICK AND SHE WASN'T ABLE TO TAKE THEM TO THIS APPT. SHE IS WANTING TO DO WHAT IS BEST FOR HER  AND HAS SOME CONCERNS. UNABLE TO WT.

## 2024-01-12 NOTE — TELEPHONE ENCOUNTER
Called and spoke with patient's wife.  She has reviewed the management of incidental pancreatic cyst white paper recommendations and discussed patient MRI findings with her son-in-law who is a radiologist and is concerned that the MRI to follow-up on pancreatic atrophy/cyst seen on CT scan is not necessary.  Discussed with her today if they would prefer to defer MRI due to his age and overall health status, that is OK.  She request that I reach out to the radiologist to read the original CT scan to see if they feel the MRI is necessary at this time which I will do and contact her with further recommendations.

## 2024-01-12 NOTE — TELEPHONE ENCOUNTER
Returned call to patient's daughter Sayra Lui.  She reports over the last 1 to 2 months since he was seen in our office her father has had a significant decline in his health, is having significant difficulty ambulating.  They are questioning the risk/benefits of proceeding with the MRI and wonder if it needs to be performed.  Discussed plan to review images with radiologist to discuss their level of concern and determine if any imaging is needed.  Will await callback from radiology.  Discussed overall if health has declined since last office visit, would likely be okay to forego any additional imaging.  Also recommended ongoing follow-up with primary care provider and neurologist regarding decline in health and difficulty ambulating.

## 2024-01-15 NOTE — TELEPHONE ENCOUNTER
Patient  spouse calling to speak with Shelly Dillard about imaging concerns.  She also called 01/12

## 2024-01-16 DIAGNOSIS — M15.9 PRIMARY OSTEOARTHRITIS INVOLVING MULTIPLE JOINTS: ICD-10-CM

## 2024-01-16 DIAGNOSIS — G63 POLYNEUROPATHY ASSOCIATED WITH UNDERLYING DISEASE: ICD-10-CM

## 2024-01-16 DIAGNOSIS — F98.8 ATTENTION DEFICIT DISORDER (ADD) WITHOUT HYPERACTIVITY: ICD-10-CM

## 2024-01-16 NOTE — TELEPHONE ENCOUNTER
Hub staff attempted to follow warm transfer process and was unsuccessful     Caller: ROBY ANDREWS    Relationship to patient: Emergency Contact    Best call back number: [phone]     Patient is needing: PT'S WIFE IS CALLING TO SPEAK WITH LINA. SHE HAD CALLED ON FRIDAY AND IS WAITING TO HEAR BACK FROM SOMEONE AND HAS NOT RECEIVED A CALL BACK YET. PLEASE CALL PT'S WIFE BACK ASAP

## 2024-01-17 RX ORDER — DICYCLOMINE HYDROCHLORIDE 10 MG/1
10 CAPSULE ORAL
Qty: 540 CAPSULE | Refills: 1 | Status: SHIPPED | OUTPATIENT
Start: 2024-01-17

## 2024-01-17 RX ORDER — PANTOPRAZOLE SODIUM 40 MG/1
40 TABLET, DELAYED RELEASE ORAL DAILY
Qty: 90 TABLET | Refills: 1 | Status: SHIPPED | OUTPATIENT
Start: 2024-01-17

## 2024-01-17 RX ORDER — GLIMEPIRIDE 2 MG/1
TABLET ORAL
Qty: 270 TABLET | Refills: 1 | Status: SHIPPED | OUTPATIENT
Start: 2024-01-17

## 2024-01-17 RX ORDER — ATORVASTATIN CALCIUM 20 MG/1
20 TABLET, FILM COATED ORAL DAILY
Qty: 90 TABLET | Refills: 3 | Status: SHIPPED | OUTPATIENT
Start: 2024-01-17

## 2024-01-17 RX ORDER — DULOXETIN HYDROCHLORIDE 30 MG/1
90 CAPSULE, DELAYED RELEASE ORAL DAILY
Qty: 270 CAPSULE | Refills: 3 | Status: SHIPPED | OUTPATIENT
Start: 2024-01-17

## 2024-01-17 RX ORDER — TRANDOLAPRIL AND VERAPAMIL HYDROCHLORIDE 2; 240 MG/1; MG/1
1 TABLET, FILM COATED, EXTENDED RELEASE ORAL DAILY
Qty: 90 TABLET | Refills: 1 | Status: SHIPPED | OUTPATIENT
Start: 2024-01-17 | End: 2024-07-15

## 2024-01-17 NOTE — TELEPHONE ENCOUNTER
Hub staff attempted to follow warm transfer process and was unsuccessful     Caller: ROBY ANDREWS    Relationship to patient: Emergency Contact    Best call back number: [phone] (Home)    Patient is needing: PT SP IS CHECKING ON STATUS OF CALL BACK FROM [facility] CASTILLO OR NURSE GHADA. SHE HASN'T HEARD ANYTHING, AND WAS SUPPOSED TO HEAR FROM [facility] ON FRIDAY. PLEASE CALL PT BACK ASAP.

## 2024-01-17 NOTE — TELEPHONE ENCOUNTER
"Called patient's wife ROBY back.  As I was trying to answer her questions, she started yelling at me, saying that no one is calling her back, she has a new list of questions. I asked her \"not to yell at me, I' am trying my best to answer all your  questions\". As I was giving her answers that did not seem to be good enough for  her. I told her I was going to end the conversation and someone else will call her back because she was yelling at me and I could not answer her ans she was not going to listen to anything I was saying.   "

## 2024-01-18 ENCOUNTER — TELEPHONE (OUTPATIENT)
Dept: FAMILY MEDICINE CLINIC | Facility: CLINIC | Age: 79
End: 2024-01-18
Payer: MEDICARE

## 2024-01-18 DIAGNOSIS — G21.9 SECONDARY PARKINSONISM, UNSPECIFIED SECONDARY PARKINSONISM TYPE: ICD-10-CM

## 2024-01-18 DIAGNOSIS — R26.9 NEUROLOGIC GAIT DYSFUNCTION: Primary | ICD-10-CM

## 2024-01-18 RX ORDER — OXYCODONE AND ACETAMINOPHEN 10; 325 MG/1; MG/1
1 TABLET ORAL EVERY 4 HOURS PRN
Qty: 120 TABLET | Refills: 0 | Status: SHIPPED | OUTPATIENT
Start: 2024-01-18

## 2024-01-18 RX ORDER — PREGABALIN 25 MG/1
CAPSULE ORAL
Qty: 360 CAPSULE | Refills: 0 | Status: SHIPPED | OUTPATIENT
Start: 2024-01-18 | End: 2024-04-17

## 2024-01-18 RX ORDER — DEXTROAMPHETAMINE SACCHARATE, AMPHETAMINE ASPARTATE MONOHYDRATE, DEXTROAMPHETAMINE SULFATE AND AMPHETAMINE SULFATE 5; 5; 5; 5 MG/1; MG/1; MG/1; MG/1
20 CAPSULE, EXTENDED RELEASE ORAL EVERY MORNING
Qty: 90 CAPSULE | Refills: 0 | Status: SHIPPED | OUTPATIENT
Start: 2024-01-18 | End: 2024-04-17

## 2024-01-18 NOTE — TELEPHONE ENCOUNTER
Wife said patient is falling several times a day and he is having bad tremors.  Needs referred to a neurologist that specializes in movement disorders.  Wants referred to Dr. Tolu Tiwari with [facility] phone [phone], fax for referral [phone].  Wife wants to be notified after referral has been faxed.  Wife said patient cannot come into our office in the condition he is in.

## 2024-01-18 NOTE — TELEPHONE ENCOUNTER
Please call the wife and let her know that the order for a neurology referral has been sent.  The doctor in [place] should get it this afternoon so they should be able to call his office tomorrow and get an appointment hopefully reasonably soon.

## 2024-01-19 ENCOUNTER — TELEPHONE (OUTPATIENT)
Dept: FAMILY MEDICINE CLINIC | Facility: CLINIC | Age: 79
End: 2024-01-19
Payer: MEDICARE

## 2024-01-19 RX ORDER — PRIMIDONE 50 MG/1
TABLET ORAL
Qty: 194 TABLET | Refills: 0 | Status: SHIPPED | OUTPATIENT
Start: 2024-01-19 | End: 2024-01-19 | Stop reason: SDUPTHER

## 2024-01-19 RX ORDER — PRIMIDONE 50 MG/1
TABLET ORAL
Qty: 194 TABLET | Refills: 0 | Status: SHIPPED | OUTPATIENT
Start: 2024-01-19 | End: 2024-01-21

## 2024-01-19 NOTE — TELEPHONE ENCOUNTER
I know it was sent to [facility] on [address], but they DO NOT use [facility] on [address].  Wife wants it sent to [facility] at  today because that is who they use.

## 2024-01-19 NOTE — TELEPHONE ENCOUNTER
Caller: ROBY ANDREWS    Relationship: Emergency Contact    Best call back number: [phone]    Requested Prescriptions:   Requested Prescriptions     Pending Prescriptions Disp Refills    primidone (MYSOLINE) 50 MG tablet 194 tablet 0     Sig: Take 1 tablet by mouth Every Night for 2 days, THEN 1 tablet 2 (Two) Times a Day for 2 days, THEN 2 tablets 2 (Two) Times a Day for 2 days, THEN 3 tablets 2 (Two) Times a Day for 30 days.        Pharmacy where request should be sent: [id number] - [place], [place] - [address] AT [facility] [address] [facility] [address] - [phone]  - [phone] FX     Last office visit with prescribing clinician: 7/21/2023   Last telemedicine visit with prescribing clinician: Visit date not found   Next office visit with prescribing clinician: 5/31/2024     Additional details provided by patient: THIS WAS CALLED INTO [facility] IN [place] [facility] AND [address].   PATIENT WANTS TO USE [facility] FROM HERE ON OUT FOR LOCAL PICK UPS       Does the patient have less than a 3 day supply:  [x] Yes  [] No    Would you like a call back once the refill request has been completed: [x] Yes [] No    If the office needs to give you a call back, can they leave a voicemail: [x] Yes [] No    Danay Castro, PCT   01/19/24 10:01 EST

## 2024-01-19 NOTE — TELEPHONE ENCOUNTER
Caller: ROBY ANDREWS    Relationship: Emergency Contact    Best call back number:[phone] (Home)     Requested Prescriptions:   Requested Prescriptions     Pending Prescriptions Disp Refills    primidone (MYSOLINE) 50 MG tablet 194 tablet 0     Sig: Take 1 tablet by mouth Every Night for 2 days, THEN 1 tablet 2 (Two) Times a Day for 2 days, THEN 2 tablets 2 (Two) Times a Day for 2 days, THEN 3 tablets 2 (Two) Times a Day for 30 days.        Pharmacy where request should be sent: [facility] #[id number] - [place], [place] - [address] AT [facility] [address] [facility] [address] - [phone]  - [phone] FX     Last office visit with prescribing clinician: 7/21/2023   Last telemedicine visit with prescribing clinician: Visit date not found   Next office visit with prescribing clinician: 5/31/2024     Additional details provided by patient: PATIENT'S WIFE ROBY CALLED TO ASK THAT PATIENT'S PRESCRIPTION BE SENT OVER TO THE PHARMACY LISTED ABOVE.     Does the patient have less than a 3 day supply:  [] Yes  [] No    Would you like a call back once the refill request has been completed: [] Yes [] No    If the office needs to give you a call back, can they leave a voicemail: [] Yes [] No    Bay Butler Rep   01/19/24 10:12 EST         THANKS

## 2024-01-19 NOTE — TELEPHONE ENCOUNTER
Spoke with patient's wife Noemy and scheduled an appt with PMJ for patient on 1/31/2024 at 1pm, first opening.

## 2024-01-19 NOTE — TELEPHONE ENCOUNTER
E-Prescribing Status: Receipt confirmed by pharmacy (1/19/2024 10:08 AM EST)       THIS HAS ALREADY BEEN SENT TODAY    [facility]  SEE ABOVE

## 2024-01-19 NOTE — TELEPHONE ENCOUNTER
----- Message from Noemy Turner MA sent at 1/19/2024  8:39 AM EST -----  Can do 2/1  ----- Message -----  From: Montana Morrow MD  Sent: 1/19/2024   8:09 AM EST  To: RAMONITA Steen try to find Alen a slot for a sick visit in the next couple of weeks.  I think they live reasonably close to us.  That they could probably come in on a cancellation.  I do not need them to come out in the bad weather but when the weather breaks see if we can find a place for him.  If not then somebody else may be can work him in within the next 2 weeks.

## 2024-01-21 RX ORDER — PRIMIDONE 50 MG/1
TABLET ORAL
Qty: 485 TABLET | Refills: 1 | Status: SHIPPED | OUTPATIENT
Start: 2024-01-21

## 2024-01-22 ENCOUNTER — TELEPHONE (OUTPATIENT)
Dept: FAMILY MEDICINE CLINIC | Facility: CLINIC | Age: 79
End: 2024-01-22
Payer: MEDICARE

## 2024-01-22 NOTE — TELEPHONE ENCOUNTER
Called and spoke to pharmacy.     We are aware and this is a compliant, long term patient and we are monitoring him on regular basis.

## 2024-01-22 NOTE — TELEPHONE ENCOUNTER
Pharmacy Name: [facility] - [facility] [address] [phone] [facility] [phone]      Reference Number (if applicable): [id number]    Pharmacy representative name: ADRIEN    Pharmacy representative phone number: [phone]     What medication are you calling in regards to:     amphetamine-dextroamphetamine XR (Adderall XR) 20 MG 24 hr capsule     What question does the pharmacy have: PATIENT ALSO HAS PRESCRIPTION FOR AN OPIOID MEDICATION, AND NEED TO DISCUSS CONCERNS ABOUT INTERACTIONS. PLEASE CALL TO FOLLOW UP    Who is the provider that prescribed the medication: DR NOELLE MCCRARY

## 2024-01-31 ENCOUNTER — OFFICE VISIT (OUTPATIENT)
Dept: FAMILY MEDICINE CLINIC | Facility: CLINIC | Age: 79
End: 2024-01-31
Payer: MEDICARE

## 2024-01-31 VITALS
WEIGHT: 160.4 LBS | RESPIRATION RATE: 18 BRPM | OXYGEN SATURATION: 96 % | HEART RATE: 86 BPM | HEIGHT: 71 IN | BODY MASS INDEX: 22.46 KG/M2 | SYSTOLIC BLOOD PRESSURE: 114 MMHG | DIASTOLIC BLOOD PRESSURE: 68 MMHG

## 2024-01-31 DIAGNOSIS — E11.65 TYPE 2 DIABETES MELLITUS WITH HYPERGLYCEMIA, WITHOUT LONG-TERM CURRENT USE OF INSULIN: Primary | ICD-10-CM

## 2024-01-31 PROCEDURE — 1160F RVW MEDS BY RX/DR IN RCRD: CPT | Performed by: FAMILY MEDICINE

## 2024-01-31 PROCEDURE — 99213 OFFICE O/P EST LOW 20 MIN: CPT | Performed by: FAMILY MEDICINE

## 2024-01-31 PROCEDURE — 3078F DIAST BP <80 MM HG: CPT | Performed by: FAMILY MEDICINE

## 2024-01-31 PROCEDURE — 3074F SYST BP LT 130 MM HG: CPT | Performed by: FAMILY MEDICINE

## 2024-01-31 PROCEDURE — 1159F MED LIST DOCD IN RCRD: CPT | Performed by: FAMILY MEDICINE

## 2024-01-31 RX ORDER — QUETIAPINE FUMARATE 25 MG/1
25 TABLET, FILM COATED ORAL NIGHTLY
Qty: 60 TABLET | Refills: 2 | Status: SHIPPED | OUTPATIENT
Start: 2024-01-31 | End: 2024-01-31 | Stop reason: SDUPTHER

## 2024-01-31 RX ORDER — QUETIAPINE FUMARATE 25 MG/1
25 TABLET, FILM COATED ORAL NIGHTLY
Qty: 60 TABLET | Refills: 2 | Status: SHIPPED | OUTPATIENT
Start: 2024-01-31 | End: 2024-03-01

## 2024-01-31 NOTE — PROGRESS NOTES
Chief Complaint   Patient presents with    Neurologic Problem      Neurologic Problem  Associated symptoms include fatigue and weakness.     Subjective   The patient presents today for a follow-up. An adult female accompanies him.    The adult female states that the patient was having severe tremors. The patient states that the tremors only happened when he fell on the floor 4 to 5 weeks ago. His legs buckled and he fell on the floor with an inability to move. Every time he tried to move; his whole body would start shaking violently. The adult female adds that the tremors were worse in his trunk. His upper extremity did not flex. With the assistance of his neighbors, he was placed in a chair and after a couple of hours, the tremors settled down. He was able to get back to bed using his cane. Primidone has been found to be effective in improving patient's symptoms. Rest also improves tremors. Activity precipitates tremors and the severity depends upon intensity of activity. The patient is currently taking 3 tablets twice daily. The violent tremors in his trunk have stopped. He has severe weakness worse in the lower extremity. Standing is difficult and reports being afraid of falling. He must take baby steps to limit the risk of falling.     The patient reports sleep disturbance as he has to get up every 30 to 45 minutes to either urinate or have a bowel movement. He never gets any consistent sleep leading to fatigue.     He is depressed as he believes he is no longer of any worth to the family and feels like a burden. The adult female adds that the patient has limited his activity. He is unable to exercise and going downstairs is precarious. He has lost interest in everything including reading. He does watch YouTube channels. Watching current world crises aggravates his depression. His concentration is impaired. 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  He is taking 3 tablets of Cymbalta daily  and skipping its dose aggravates his symptoms of depression. He had a series of psychiatrists, but his current condition makes travel difficult. He used a medication in the past which was effective but had to discontinue due to side effects.    He has constant indigestion.     He has discontinued using analgesic cream.    Up to date with flu vaccine.      No Known Allergies    Current Outpatient Medications:     amphetamine-dextroamphetamine XR (Adderall XR) 20 MG 24 hr capsule, Take 1 capsule by mouth Every Morning for 90 days, Disp: 90 capsule, Rfl: 0    atorvastatin (LIPITOR) 20 MG tablet, Take 1 tablet by mouth Daily., Disp: 90 tablet, Rfl: 3    diazePAM (VALIUM) 10 MG tablet, Take 1 about 2 hours prior to procedure.  Take second 1 on arrival to the hospital if needed., Disp: 2 tablet, Rfl: 2    dicyclomine (BENTYL) 10 MG capsule, Take 1 capsule by mouth 4 (Four) Times a Day Before Meals & at Bedtime., Disp: 540 capsule, Rfl: 1    DULoxetine (CYMBALTA) 30 MG capsule, Take 3 capsules by mouth Daily., Disp: 270 capsule, Rfl: 3    empagliflozin (Jardiance) 10 MG tablet tablet, Take 1 tablet by mouth Daily., Disp: 90 tablet, Rfl: 3    glimepiride (AMARYL) 2 MG tablet, TAKE 3 TABLETS EVERY MORNING BEFORE BREAKFAST, Disp: 270 tablet, Rfl: 1    metFORMIN (GLUCOPHAGE) 500 MG tablet, Take 2 tablets by mouth 2 (Two) Times a Day., Disp: 360 tablet, Rfl: 3    oxyCODONE-acetaminophen (Percocet)  MG per tablet, Take 1 tablet by mouth Every 4 (Four) Hours As Needed for Moderate Pain. Max 4 tablets a day, Disp: 120 tablet, Rfl: 0    pantoprazole (PROTONIX) 40 MG EC tablet, Take 1 tablet by mouth Daily., Disp: 90 tablet, Rfl: 1    pregabalin (Lyrica) 25 MG capsule, Take two in AM and two PM, Disp: 360 capsule, Rfl: 0    primidone (MYSOLINE) 50 MG tablet, TAKE 1 TABLET BY MOUTH NIGHTLY FOR 2 DAYS THEN 1 TWICE DAILY X2 DAYS THEN 2 TWICE DAILY X 2 DAYS THEN 3 TWICE DAILY X 30 DAYS, Disp: 485 tablet, Rfl: 1    QUEtiapine  (SEROquel) 25 MG tablet, Take 1 tablet by mouth Every Night for 30 days., Disp: 60 tablet, Rfl: 2    sucralfate (Carafate) 1 GM/10ML suspension, TAKE 10 ML THREE TIMES A DAY AS NEEDED FOR HEARTBURN/ABDOMINAL PAIN, Disp: 2700 mL, Rfl: 3    trandolapril-verapamil (TARKA) 2-240 MG per CR tablet, Take 1 tablet by mouth Daily for 180 days., Disp: 90 tablet, Rfl: 1  Past Medical History:   Diagnosis Date    ADHD (attention deficit hyperactivity disorder)     Allergic     Arthritis     Colon polyp     Depression     Diabetes mellitus type 2    Fibromyalgia, primary     HL (hearing loss)     Hyperlipidemia     Hypertension      Past Surgical History:   Procedure Laterality Date    COLONOSCOPY      ENDOSCOPY N/A 11/15/2023    Procedure: ESOPHAGOGASTRODUODENOSCOPY;  Surgeon: George Viveros MD;  Location: [facility] MAIN OR;  Service: Gastroenterology;  Laterality: N/A;  popssible candida, irrgular z-line, gastritis    EYE SURGERY      Cataracts removed: both eyes    HAND SURGERY  1980- [facility]. Abstracted from [facility].    HEMOR[facility]ECTOMY      [facility]    TONSILLECTOMY      VASECTOMY       Social History     Socioeconomic History    Marital status:    Tobacco Use    Smoking status: Former     Packs/day: 0.25     Years: 5.00     Additional pack years: 0.00     Total pack years: 1.25     Types: Cigarettes     Start date: 2/10/1966     Quit date: 2/10/1971     Years since quittin.0    Smokeless tobacco: Never   Vaping Use    Vaping Use: Never used   Substance and Sexual Activity    Alcohol use: Not Currently     Alcohol/week: 2.0 standard drinks of alcohol     Types: 1 Glasses of wine, 1 Cans of beer per week    Drug use: No    Sexual activity: Yes     Partners: Female     Birth control/protection: None     Family History   Problem Relation Age of Onset    Diabetes Mother         type2    Stroke Mother     Diabetes Father         type2    Colon cancer Neg Hx      "Colon polyps Neg Hx     Crohn's disease Neg Hx     Irritable bowel syndrome Neg Hx     Ulcerative colitis Neg Hx        Family history, surgical history, past medical history, Allergies and med's reviewed with patient today and updated in [facility] EMR.     ROS:  Review of Systems   Constitutional:  Positive for fatigue.   Gastrointestinal:         Indigestion.   Neurological:  Positive for tremors and weakness.   Psychiatric/Behavioral:  Positive for decreased concentration and sleep disturbance.         Depression.       OBJECTIVE:  Vitals:    01/31/24 1325   BP: 114/68   Pulse: 86   Resp: 18   SpO2: 96%   Weight: 72.8 kg (160 lb 6.4 oz)   Height: 180 cm (70.87\")     Body mass index is 22.46 kg/m².  Physical Exam  Constitutional:       Appearance: Normal appearance. He is well-developed and normal weight.   HENT:      Head: Normocephalic and atraumatic.      Right Ear: Tympanic membrane, ear canal and external ear normal.      Left Ear: Tympanic membrane, ear canal and external ear normal.      Nose: Nose normal.      Mouth/Throat:      Mouth: Mucous membranes are moist.      Pharynx: Oropharynx is clear. No oropharyngeal exudate.   Eyes:      Extraocular Movements: Extraocular movements intact.      Conjunctiva/sclera: Conjunctivae normal.      Pupils: Pupils are equal, round, and reactive to light.   Cardiovascular:      Rate and Rhythm: Normal rate and regular rhythm.      Pulses: Normal pulses.      Heart sounds: Normal heart sounds.   Pulmonary:      Effort: Pulmonary effort is normal.      Breath sounds: Normal breath sounds.   Abdominal:      General: Bowel sounds are normal.      Palpations: Abdomen is soft.   Musculoskeletal:         General: Normal range of motion.      Cervical back: Normal range of motion and neck supple.   Skin:     General: Skin is warm and dry.   Neurological:      General: No focal deficit present.      Mental Status: He is alert and oriented to person, place, and time. Mental status is " at baseline.   Psychiatric:         Mood and Affect: Mood normal.         Behavior: Behavior normal.         Thought Content: Thought content normal.         Judgment: Judgment normal.         ASSESSMENT/ PLAN:        Orders Placed Today:        1.Depression.  -An adequate treatment of his depression currently.  Will continue him on his current medications but add in Seroquel and see if we can improve his quality of life.    - Start taking Seroquel 25 mg at bedtime.  - After 10 days we will increase the dose to twice daily.  - After 10 days we will increase the dose to 1 tablet in the morning and 2 at bedtime.  - Further management plan pending patient's response to Seroquel.  - Continue taking Cymbalta.    2. Tremors.  -I do not think he has tremors or seizures.  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  - Continue taking primidone.      New Medications Ordered This Visit   Medications    QUEtiapine (SEROquel) 25 MG tablet     Sig: Take 1 tablet by mouth Every Night for 30 days.     Dispense:  60 tablet     Refill:  2     Follow up in 6 months.      Management Plan:     An After Visit Summary was printed and given to the patient at discharge.    Follow-up: Return in about 6 months (around 7/31/2024), or if symptoms worsen or fail to improve, for Recheck- followup visit to be sure meds working.    Transcribed from ambient dictation for Montana Morrow MD by Giulia Coyle.  01/31/24   14:45 EST    Patient or patient representative verbalized consent to the visit recording.  I have personally performed the services described in this document as transcribed by the above individual, and it is both accurate and complete.  Montana Morrow MD  2/3/2024  21:44 EST

## 2024-02-05 ENCOUNTER — TELEPHONE (OUTPATIENT)
Dept: FAMILY MEDICINE CLINIC | Facility: CLINIC | Age: 79
End: 2024-02-05
Payer: MEDICARE

## 2024-02-05 RX ORDER — QUETIAPINE FUMARATE 25 MG/1
25 TABLET, FILM COATED ORAL NIGHTLY
Qty: 60 TABLET | Refills: 2 | Status: CANCELLED | OUTPATIENT
Start: 2024-02-05 | End: 2024-03-06

## 2024-02-05 NOTE — TELEPHONE ENCOUNTER
Caller: YESSICAENRIQUEROBY    Relationship: Emergency Contact    Best call back number: [phone] (Home)     Requested Prescriptions:   Requested Prescriptions     Pending Prescriptions Disp Refills    QUEtiapine (SEROquel) 25 MG tablet 60 tablet 2     Sig: Take 1 tablet by mouth Every Night for 30 days.        Pharmacy where request should be sent: [id number] - [place], [place] - [address] AT [facility] [address] [place] [address] - [phone]  - [phone] FX     Last office visit with prescribing clinician: 1/31/2024   Last telemedicine visit with prescribing clinician: Visit date not found   Next office visit with prescribing clinician: 5/31/2024     Additional details provided by patient: PATIENT'S WIFE IS ASKING FOR A 30 DAY SUPPLY TO BE SENT TO LOCAL [facility] BECAUSE THEY NEVER RECEIVED ANY MEDICATION FROM [facility] AND THEY WERE TOLD THAT NONE OF THIS MEDICATION WILL COME FROM THEM UNTIL MARCH AND PATIENT WOULD LIKE TO TRY MEDICATION BEFORE THEN    PLEASE ADVISE PAT[facility]'S [facility]    Does the patient have less than a 3 day supply:  [x] Yes  [] No    Would you like a call back once the refill request has been completed: [x] Yes [] No    If the office needs to give you a call back, can they leave a voicemail: [x] Yes [] No    [employer] Rep   02/05/24 09:02 EST

## 2024-02-05 NOTE — TELEPHONE ENCOUNTER
Caller: ROBY ANDREWS    Relationship: Emergency Contact    Best call back number: [phone]     What was the call regarding: ROBY STATES TO CANCEL THE PREVIOUS MESSAGE. THEY JUST RECEIVED THE MEDICATION IN THE MAIL    PLEASE ADVISE

## 2024-02-06 ENCOUNTER — TELEPHONE (OUTPATIENT)
Dept: FAMILY MEDICINE CLINIC | Facility: CLINIC | Age: 79
End: 2024-02-06
Payer: MEDICARE

## 2024-02-06 DIAGNOSIS — R29.6 FALLING: Primary | ICD-10-CM

## 2024-02-06 PROBLEM — M62.84 SARCOPENIA: Status: ACTIVE | Noted: 2024-02-06

## 2024-02-06 RX ORDER — PRIMIDONE 50 MG/1
TABLET ORAL
Qty: 485 TABLET | Refills: 1 | Status: SHIPPED | OUTPATIENT
Start: 2024-02-06

## 2024-02-06 NOTE — TELEPHONE ENCOUNTER
I know Sherry.  They sent it to me earlier in the day and that is why I asked about if this was an ongoing problem.  The obviously did not even look in the chart to see that I just saw her last week.  I think Noemy said we were going to use another service and see if we can get them out there.

## 2024-02-06 NOTE — TELEPHONE ENCOUNTER
Dr. Morrow,  This is what Home Health sent me back on this patient,       Thank you for the Home Health referral however the diagnosis on the referral is not a PDGM diagnosis. We need to know what is causing the falls.  Also the patients needs to have a Face to Face visit either in person or a Video visit.  The visit notes need to reflect the cause. We will defer this referral at this time. Please advise         Thanks, Shirlene

## 2024-02-13 DIAGNOSIS — M15.9 PRIMARY OSTEOARTHRITIS INVOLVING MULTIPLE JOINTS: ICD-10-CM

## 2024-02-14 ENCOUNTER — TELEPHONE (OUTPATIENT)
Dept: FAMILY MEDICINE CLINIC | Facility: CLINIC | Age: 79
End: 2024-02-14
Payer: MEDICARE

## 2024-02-15 RX ORDER — OXYCODONE AND ACETAMINOPHEN 10; 325 MG/1; MG/1
1 TABLET ORAL EVERY 4 HOURS PRN
Qty: 120 TABLET | Refills: 0 | Status: SHIPPED | OUTPATIENT
Start: 2024-02-15

## 2024-03-01 ENCOUNTER — OFFICE VISIT (OUTPATIENT)
Dept: FAMILY MEDICINE CLINIC | Facility: CLINIC | Age: 79
End: 2024-03-01
Payer: MEDICARE

## 2024-03-01 VITALS
SYSTOLIC BLOOD PRESSURE: 128 MMHG | HEIGHT: 71 IN | BODY MASS INDEX: 22.37 KG/M2 | HEART RATE: 75 BPM | RESPIRATION RATE: 18 BRPM | DIASTOLIC BLOOD PRESSURE: 56 MMHG | OXYGEN SATURATION: 96 %

## 2024-03-01 DIAGNOSIS — M62.84 SARCOPENIA: ICD-10-CM

## 2024-03-01 DIAGNOSIS — I95.2 HYPOTENSION DUE TO DRUGS: Primary | ICD-10-CM

## 2024-03-01 DIAGNOSIS — R53.1 GENERALIZED WEAKNESS: ICD-10-CM

## 2024-03-01 RX ORDER — L-METHYLFOLATE-ALGAE-VIT B12-B6 CAP 3-90.314-2-35 MG 3-90.314-2-35 MG
1 CAP ORAL EVERY 12 HOURS SCHEDULED
COMMUNITY
Start: 2024-02-01

## 2024-03-01 NOTE — PROGRESS NOTES
Chief Complaint  Hospital Follow Up Visit    Subjective        Lux Bray presents to [facility]  History of Present Illness    The patient is an 78-year-old male who presents to the clinic for an evaluation of multiple medical concerns. He is accompanied by an adult male.      On Monday 02/26/2024, the patient was at home with his wife earlier this week when he suddenly just put his head down on the table and was not speaking clearly to her for several seconds. She became concerned by the way he was acting and called an ambulance immediately. By the time the ambulance arrived, his head was still on the table, but he was able to answer their questions. He gave them the correct address to where he was living. He gave them his social security number and I gave them a reasonably accurate recall of what had happened. They took him to [facility] and he was watched for a night and a day and then discharged to home.     He does not remember anything for a couple of minutes. His oxygen was 84 percent when the ambulance came and his blood glucose was 124 mmol/L. He was taken to the hospital because they were worried of a stroke. His CT scan and MRI results were normal. His ambulating has been seriously impaired and he can not get up. He is a little bit worse because they had him on the [facility] for 3 days in the emergency room. He can get up from a chair, but he begins to shake right away. He has to be very careful in taking each step as he is always afraid of falling. They use a walker, but it is not convenient to use in a car. He uses the walker most of the time. He can walk a lot better with the walker. Physical therapy is working on his legs and arms. He exercises 2 times a day. He tries to ambulate with the walker back and forth every day. His blood pressure was 120/60 mmHg when he was down with his face down. His blood glucose became low by the time he got there. He took his  "evening pills right before this happened. They told him to discontinue the Seroquel. He takes a lot of pills that are really sedating him. Primidone was added for his tremors. His tremors were very bad without the medications. He experiences occasional trunk tremors, but it is mainly in his legs up to his hips. He just started taking 2 pregabalin, but he was doing great with 1. He never had a brain injury. He is taking dicyclomine for his gut, which does help. He does not have any serious problems with that anymore. He has acid reflux occasionally. He is also taking FDgard, which the gastroenterologist recommended, which helps. He is taking oxycodone for pain, which really helps. He is only taking half of a pill 2 times a day because his blood pressure was decreasing too much. His blood pressure has been well. He is taking Nexium. His ankles swell occasionally.     He has received his COVID-19 booster and RSV vaccine.    Objective   Vital Signs:  /56   Pulse 75   Resp 18   Ht 180.3 cm (71\")   SpO2 96%   BMI 22.37 kg/m²   Estimated body mass index is 22.37 kg/m² as calculated from the following:    Height as of this encounter: 180.3 cm (71\").    Weight as of 1/31/24: 72.8 kg (160 lb 6.4 oz).       BMI is within normal parameters. No other follow-up for BMI required.      Physical Exam  Constitutional:       Appearance: He is well-developed and normal weight. He is ill-appearing.   HENT:      Head: Normocephalic and atraumatic.      Right Ear: Tympanic membrane, ear canal and external ear normal.      Left Ear: Tympanic membrane, ear canal and external ear normal.      Nose: Nose normal.      Mouth/Throat:      Mouth: Mucous membranes are moist.      Pharynx: Oropharynx is clear. No oropharyngeal exudate.   Eyes:      Extraocular Movements: Extraocular movements intact.      Conjunctiva/sclera: Conjunctivae normal.      Pupils: Pupils are equal, round, and reactive to light.   Cardiovascular:      Rate and " Rhythm: Normal rate and regular rhythm.      Pulses: Normal pulses.      Heart sounds: Normal heart sounds.   Pulmonary:      Effort: Pulmonary effort is normal.      Breath sounds: Normal breath sounds.   Abdominal:      General: Bowel sounds are normal.      Palpations: Abdomen is soft.   Musculoskeletal:         General: Normal range of motion.      Cervical back: Normal range of motion and neck supple.      Comments: Legs are weak and some muscle wasting.  Has trouble standing from a sitting position but once he is up his legs are shaky unless he can hold onto a walker.  First couple steps are wobbly but once he gets going he can walk with a walker for 20 to 30 feet without too much trouble.  Any longer and he gets very weak and has to sit.   Skin:     General: Skin is warm and dry.   Neurological:      General: No focal deficit present.      Mental Status: He is alert and oriented to person, place, and time. Mental status is at baseline.      Motor: Weakness present.      Gait: Gait abnormal.   Psychiatric:         Mood and Affect: Mood normal.         Behavior: Behavior normal.         Thought Content: Thought content normal.         Judgment: Judgment normal.          Clear.  Trace edema.  Regular rhythm and rate.    Result Review :       Assessment and Plan     1. Hypotension, likely due to drugs.  - On Monday 02/26/2024, the patient was at home with his wife earlier this week when he suddenly just put his head down on the table and was not speaking clearly to her for several seconds. She became concerned by the way he was acting and called an ambulance immediately. By the time the ambulance arrived, his head was still on the table, but he was able to answer their questions. He gave them the correct address to where he was living. He gave them his social security number and I gave them a reasonably accurate recall of what had happened. They took him to [facility] and he was watched for a night and a  day and then discharged to home. They stopped his Seroquel that had been started and after reviewing his medicines today I'm going cut back on his pregabalin and any medications that they feel is sedating. He looks good today. His chest is clear. Cardiovascular is regular, rhythm and rate. He is generally weak. His muscle have wasted somewhat in his arms and legs, but he is better than when I saw him the last time. He is able to stand up out of a chair, but his legs get very, very shaky. He really needs a walker when he's up and moving    2. Sarcopenia.  - The patient's muscles have wasted in his legs and arms, but they are starting to come back a little bit now that he is walking and physical therapy sees them once a week, but they do exercises twice a day at the home. They are sitting and standing. I talked to him about carrying weights in both hands and just standing there letting gravity pull them down to strengthen his . He is going to continue working twice a day on the exercises that he has at home to do and hopefully we will see some improvement in his strength, ambulation, and balance.    3. Generalized weakness  -Patient very weak particularly in his legs.  He has physical therapy coming to the house once a week but he has been given home exercises that are very very important he does at least twice a day.  He seems understand them and his wife is making sure he is doing them.  He will be very important for him to do these twice a day in between his physical therapy sessions so that he eventually will recurring regain strength and endurance and balance      Follow-up  He will follow up in 2 to 4 months.         Follow Up     Return in about 2 months (around 5/1/2024), or if symptoms worsen or fail to improve, for Recheck.  Patient was given instructions and counseling regarding his condition or for health maintenance advice. Please see specific information pulled into the AVS if appropriate.          Transcribed from ambient dictation for Montana Morrow MD by Chana Ch.  03/01/24   17:59 EST    Patient or patient representative verbalized consent to the visit recording.  I have personally performed the services described in this document as transcribed by the above individual, and it is both accurate and complete.  Montana Morrow MD  3/4/2024  05:33 EST

## 2024-03-04 ENCOUNTER — TELEPHONE (OUTPATIENT)
Dept: FAMILY MEDICINE CLINIC | Facility: CLINIC | Age: 79
End: 2024-03-04

## 2024-03-04 NOTE — TELEPHONE ENCOUNTER
Caller: [facility]  Best call back number: [phone]    What orders are you requesting (i.e. lab or imaging):  JOSI WITH DISCHARGED FROM [facility] AND IS NOW HOME. [facility] WILL RESUME NURSING ONE TIME A WEEK FOR 6 WEEKS. FYI ONLY

## 2024-03-07 ENCOUNTER — TELEPHONE (OUTPATIENT)
Dept: FAMILY MEDICINE CLINIC | Facility: CLINIC | Age: 79
End: 2024-03-07
Payer: MEDICARE

## 2024-03-07 NOTE — TELEPHONE ENCOUNTER
RELAY    Left detailed message on [facility] voice mail at 1:50pm.  We give verbal orders for [facility] to see patient one time a week for six weeks.  Call with any questions.

## 2024-03-07 NOTE — TELEPHONE ENCOUNTER
Caller:  [facility]       Relationship:     Best call back number: [phone]     What orders are you requesting (i.e. lab or imaging): 1 TIME A WEEK FOR SIX WEEKS

## 2024-03-13 ENCOUNTER — TELEPHONE (OUTPATIENT)
Dept: FAMILY MEDICINE CLINIC | Facility: CLINIC | Age: 79
End: 2024-03-13
Payer: MEDICARE

## 2024-03-13 NOTE — TELEPHONE ENCOUNTER
Caller: LILIYA FONTENOT    Relationship: Home Health    Best call back number: [phone]     What is the best time to reach you: ANY    Who are you requesting to speak with (clinical staff, provider,  specific staff member): CLINICAL    Do you know the name of the person who called: RAQUEL    What was the call regarding: PATIENT IS BEING DISCHARGED FROM NURSING CARE PER PATIENT'S REQUEST BUT WILL BE CONTINUING PHYSICAL THERAPY.    Is it okay if the provider responds through MyChart:

## 2024-03-14 NOTE — TELEPHONE ENCOUNTER
It would be entirely up to the patient and his wife as far as allowing nursing to work with him.  It seems like it would be helpful but maybe they do not see it that way.  I am sure they will let us know if things go south.

## 2024-03-17 DIAGNOSIS — M15.9 PRIMARY OSTEOARTHRITIS INVOLVING MULTIPLE JOINTS: ICD-10-CM

## 2024-03-17 DIAGNOSIS — F98.8 ATTENTION DEFICIT DISORDER (ADD) WITHOUT HYPERACTIVITY: ICD-10-CM

## 2024-03-19 RX ORDER — DEXTROAMPHETAMINE SACCHARATE, AMPHETAMINE ASPARTATE MONOHYDRATE, DEXTROAMPHETAMINE SULFATE AND AMPHETAMINE SULFATE 5; 5; 5; 5 MG/1; MG/1; MG/1; MG/1
20 CAPSULE, EXTENDED RELEASE ORAL EVERY MORNING
Qty: 90 CAPSULE | Refills: 0 | Status: SHIPPED | OUTPATIENT
Start: 2024-03-19 | End: 2024-06-17

## 2024-03-19 RX ORDER — OXYCODONE AND ACETAMINOPHEN 10; 325 MG/1; MG/1
1 TABLET ORAL EVERY 4 HOURS PRN
Qty: 120 TABLET | Refills: 0 | Status: SHIPPED | OUTPATIENT
Start: 2024-03-19

## 2024-03-27 ENCOUNTER — TELEPHONE (OUTPATIENT)
Dept: FAMILY MEDICINE CLINIC | Facility: CLINIC | Age: 79
End: 2024-03-27
Payer: MEDICARE

## 2024-03-27 NOTE — TELEPHONE ENCOUNTER
RELAY    Left detailed message on the voice mail of [facility] with [facility] at 1:24pm    Please let [facility] know med list has been faxed.

## 2024-03-27 NOTE — TELEPHONE ENCOUNTER
Patient is going to be doing in-patient therapy at [facility].  Please fax his med list to the attention of [facility] at fax [phone].

## 2024-04-05 ENCOUNTER — APPOINTMENT (OUTPATIENT)
Dept: CT IMAGING | Facility: HOSPITAL | Age: 79
End: 2024-04-05
Payer: MEDICARE

## 2024-04-05 ENCOUNTER — APPOINTMENT (OUTPATIENT)
Dept: GENERAL RADIOLOGY | Facility: HOSPITAL | Age: 79
End: 2024-04-05
Payer: MEDICARE

## 2024-04-05 ENCOUNTER — HOSPITAL ENCOUNTER (INPATIENT)
Facility: HOSPITAL | Age: 79
LOS: 12 days | Discharge: NURSING, REHAB OR LONG-TERM CARE | End: 2024-04-17
Attending: EMERGENCY MEDICINE | Admitting: INTERNAL MEDICINE
Payer: MEDICARE

## 2024-04-05 DIAGNOSIS — R53.1 GENERALIZED WEAKNESS: ICD-10-CM

## 2024-04-05 DIAGNOSIS — A41.9 SEVERE SEPSIS: ICD-10-CM

## 2024-04-05 DIAGNOSIS — M19.90 ARTHRITIS: ICD-10-CM

## 2024-04-05 DIAGNOSIS — J18.9 PNEUMONIA DUE TO INFECTIOUS ORGANISM, UNSPECIFIED LATERALITY, UNSPECIFIED PART OF LUNG: Primary | ICD-10-CM

## 2024-04-05 DIAGNOSIS — R65.20 SEVERE SEPSIS: ICD-10-CM

## 2024-04-05 DIAGNOSIS — I48.92 ATRIAL FLUTTER, UNSPECIFIED TYPE: ICD-10-CM

## 2024-04-05 PROBLEM — R65.21 SEPTIC SHOCK: Status: ACTIVE | Noted: 2024-04-05

## 2024-04-05 PROBLEM — H91.90 HEARING LOSS: Status: ACTIVE | Noted: 2024-04-05

## 2024-04-05 PROBLEM — R10.9 ABDOMINAL PAIN: Status: ACTIVE | Noted: 2024-04-05

## 2024-04-05 PROBLEM — H26.9 CATARACTS, BILATERAL: Status: ACTIVE | Noted: 2019-04-29

## 2024-04-05 PROBLEM — E78.00 HIGH CHOLESTEROL: Status: ACTIVE | Noted: 2024-04-05

## 2024-04-05 LAB
ALBUMIN SERPL-MCNC: 3.4 G/DL (ref 3.5–5.2)
ALBUMIN/GLOB SERPL: 1.1 G/DL
ALP SERPL-CCNC: 116 U/L (ref 39–117)
ALT SERPL W P-5'-P-CCNC: 20 U/L (ref 1–41)
AMPHET+METHAMPHET UR QL: NEGATIVE
ANION GAP SERPL CALCULATED.3IONS-SCNC: 10 MMOL/L (ref 5–15)
AST SERPL-CCNC: 26 U/L (ref 1–40)
B PARAPERT DNA SPEC QL NAA+PROBE: NOT DETECTED
B PERT DNA SPEC QL NAA+PROBE: NOT DETECTED
BACTERIA UR QL AUTO: ABNORMAL /HPF
BARBITURATES UR QL SCN: POSITIVE
BASOPHILS # BLD AUTO: 0.02 10*3/MM3 (ref 0–0.2)
BASOPHILS NFR BLD AUTO: 0.1 % (ref 0–1.5)
BENZODIAZ UR QL SCN: NEGATIVE
BILIRUB SERPL-MCNC: 0.2 MG/DL (ref 0–1.2)
BILIRUB UR QL STRIP: NEGATIVE
BUN SERPL-MCNC: 34 MG/DL (ref 8–23)
BUN/CREAT SERPL: 22.7 (ref 7–25)
C PNEUM DNA NPH QL NAA+NON-PROBE: NOT DETECTED
CALCIUM SPEC-SCNC: 9.2 MG/DL (ref 8.6–10.5)
CANNABINOIDS SERPL QL: NEGATIVE
CHLORIDE SERPL-SCNC: 99 MMOL/L (ref 98–107)
CLARITY UR: ABNORMAL
CO2 SERPL-SCNC: 28 MMOL/L (ref 22–29)
COCAINE UR QL: NEGATIVE
COLOR UR: YELLOW
CREAT SERPL-MCNC: 1.5 MG/DL (ref 0.76–1.27)
D-LACTATE SERPL-SCNC: 1 MMOL/L (ref 0.3–2)
DEPRECATED RDW RBC AUTO: 52.6 FL (ref 37–54)
EGFRCR SERPLBLD CKD-EPI 2021: 46.8 ML/MIN/1.73
EOSINOPHIL # BLD AUTO: 0.02 10*3/MM3 (ref 0–0.4)
EOSINOPHIL NFR BLD AUTO: 0.1 % (ref 0.3–6.2)
ERYTHROCYTE [DISTWIDTH] IN BLOOD BY AUTOMATED COUNT: 15.1 % (ref 12.3–15.4)
FLUAV SUBTYP SPEC NAA+PROBE: NOT DETECTED
FLUBV RNA ISLT QL NAA+PROBE: NOT DETECTED
GEN 5 2HR TROPONIN T REFLEX: 40 NG/L
GLOBULIN UR ELPH-MCNC: 3.1 GM/DL
GLUCOSE BLDC GLUCOMTR-MCNC: 124 MG/DL (ref 70–105)
GLUCOSE BLDC GLUCOMTR-MCNC: 136 MG/DL (ref 70–105)
GLUCOSE SERPL-MCNC: 180 MG/DL (ref 65–99)
GLUCOSE UR STRIP-MCNC: ABNORMAL MG/DL
GRAN CASTS URNS QL MICRO: ABNORMAL /LPF
HADV DNA SPEC NAA+PROBE: NOT DETECTED
HBA1C MFR BLD: 6.7 % (ref 4.8–5.6)
HCOV 229E RNA SPEC QL NAA+PROBE: NOT DETECTED
HCOV HKU1 RNA SPEC QL NAA+PROBE: NOT DETECTED
HCOV NL63 RNA SPEC QL NAA+PROBE: NOT DETECTED
HCOV OC43 RNA SPEC QL NAA+PROBE: NOT DETECTED
HCT VFR BLD AUTO: 37.5 % (ref 37.5–51)
HGB BLD-MCNC: 11.9 G/DL (ref 13–17.7)
HGB UR QL STRIP.AUTO: NEGATIVE
HMPV RNA NPH QL NAA+NON-PROBE: NOT DETECTED
HOLD SPECIMEN: NORMAL
HPIV1 RNA ISLT QL NAA+PROBE: NOT DETECTED
HPIV2 RNA SPEC QL NAA+PROBE: NOT DETECTED
HPIV3 RNA NPH QL NAA+PROBE: NOT DETECTED
HPIV4 P GENE NPH QL NAA+PROBE: NOT DETECTED
HYALINE CASTS UR QL AUTO: ABNORMAL /LPF
IMM GRANULOCYTES # BLD AUTO: 0.07 10*3/MM3 (ref 0–0.05)
IMM GRANULOCYTES NFR BLD AUTO: 0.5 % (ref 0–0.5)
KETONES UR QL STRIP: NEGATIVE
LEUKOCYTE ESTERASE UR QL STRIP.AUTO: NEGATIVE
LYMPHOCYTES # BLD AUTO: 1.89 10*3/MM3 (ref 0.7–3.1)
LYMPHOCYTES NFR BLD AUTO: 13.8 % (ref 19.6–45.3)
M PNEUMO IGG SER IA-ACNC: NOT DETECTED
MCH RBC QN AUTO: 29.7 PG (ref 26.6–33)
MCHC RBC AUTO-ENTMCNC: 31.7 G/DL (ref 31.5–35.7)
MCV RBC AUTO: 93.5 FL (ref 79–97)
METHADONE UR QL SCN: NEGATIVE
MONOCYTES # BLD AUTO: 1.92 10*3/MM3 (ref 0.1–0.9)
MONOCYTES NFR BLD AUTO: 14 % (ref 5–12)
MRSA DNA SPEC QL NAA+PROBE: NORMAL
NEUTROPHILS NFR BLD AUTO: 71.5 % (ref 42.7–76)
NEUTROPHILS NFR BLD AUTO: 9.76 10*3/MM3 (ref 1.7–7)
NITRITE UR QL STRIP: NEGATIVE
NRBC BLD AUTO-RTO: 0 /100 WBC (ref 0–0.2)
NT-PROBNP SERPL-MCNC: 4297 PG/ML (ref 0–1800)
OPIATES UR QL: NEGATIVE
OXYCODONE UR QL SCN: POSITIVE
PH UR STRIP.AUTO: <=5 [PH] (ref 5–8)
PLATELET # BLD AUTO: 164 10*3/MM3 (ref 140–450)
PMV BLD AUTO: 10.4 FL (ref 6–12)
POTASSIUM SERPL-SCNC: 4.7 MMOL/L (ref 3.5–5.2)
PROCALCITONIN SERPL-MCNC: 0.36 NG/ML (ref 0–0.25)
PROT SERPL-MCNC: 6.5 G/DL (ref 6–8.5)
PROT UR QL STRIP: ABNORMAL
QTC INTERVAL: NORMAL MS
RBC # BLD AUTO: 4.01 10*6/MM3 (ref 4.14–5.8)
RBC # UR STRIP: ABNORMAL /HPF
REF LAB TEST METHOD: ABNORMAL
RHINOVIRUS RNA SPEC NAA+PROBE: NOT DETECTED
RSV RNA NPH QL NAA+NON-PROBE: NOT DETECTED
SARS-COV-2 RNA NPH QL NAA+NON-PROBE: NOT DETECTED
SODIUM SERPL-SCNC: 137 MMOL/L (ref 136–145)
SP GR UR STRIP: 1.02 (ref 1–1.03)
SQUAMOUS #/AREA URNS HPF: ABNORMAL /HPF
T4 FREE SERPL-MCNC: 1.18 NG/DL (ref 0.93–1.7)
TROPONIN T DELTA: -8 NG/L
TROPONIN T SERPL HS-MCNC: 48 NG/L
TSH SERPL DL<=0.05 MIU/L-ACNC: 1.18 UIU/ML (ref 0.27–4.2)
UROBILINOGEN UR QL STRIP: ABNORMAL
WBC # UR STRIP: ABNORMAL /HPF
WBC NRBC COR # BLD AUTO: 13.68 10*3/MM3 (ref 3.4–10.8)
WHOLE BLOOD HOLD COAG: NORMAL
WHOLE BLOOD HOLD SPECIMEN: NORMAL

## 2024-04-05 PROCEDURE — 83880 ASSAY OF NATRIURETIC PEPTIDE: CPT | Performed by: EMERGENCY MEDICINE

## 2024-04-05 PROCEDURE — 83036 HEMOGLOBIN GLYCOSYLATED A1C: CPT

## 2024-04-05 PROCEDURE — 25010000002 AZITHROMYCIN PER 500 MG

## 2024-04-05 PROCEDURE — 93005 ELECTROCARDIOGRAM TRACING: CPT | Performed by: EMERGENCY MEDICINE

## 2024-04-05 PROCEDURE — 82948 REAGENT STRIP/BLOOD GLUCOSE: CPT

## 2024-04-05 PROCEDURE — 0202U NFCT DS 22 TRGT SARS-COV-2: CPT

## 2024-04-05 PROCEDURE — 80307 DRUG TEST PRSMV CHEM ANLYZR: CPT

## 2024-04-05 PROCEDURE — 25810000003 LACTATED RINGERS PER 1000 ML: Performed by: INTERNAL MEDICINE

## 2024-04-05 PROCEDURE — 25010000002 PIPERACILLIN SOD-TAZOBACTAM PER 1 G: Performed by: INTERNAL MEDICINE

## 2024-04-05 PROCEDURE — 84145 PROCALCITONIN (PCT): CPT

## 2024-04-05 PROCEDURE — 25810000003 SODIUM CHLORIDE 0.9 % SOLUTION: Performed by: EMERGENCY MEDICINE

## 2024-04-05 PROCEDURE — 99285 EMERGENCY DEPT VISIT HI MDM: CPT

## 2024-04-05 PROCEDURE — 87641 MR-STAPH DNA AMP PROBE: CPT

## 2024-04-05 PROCEDURE — 70450 CT HEAD/BRAIN W/O DYE: CPT

## 2024-04-05 PROCEDURE — 36415 COLL VENOUS BLD VENIPUNCTURE: CPT | Performed by: EMERGENCY MEDICINE

## 2024-04-05 PROCEDURE — 71045 X-RAY EXAM CHEST 1 VIEW: CPT

## 2024-04-05 PROCEDURE — 84484 ASSAY OF TROPONIN QUANT: CPT | Performed by: EMERGENCY MEDICINE

## 2024-04-05 PROCEDURE — 25010000002 VANCOMYCIN HCL IN NACL 1.5-0.9 GM/500ML-% SOLUTION: Performed by: EMERGENCY MEDICINE

## 2024-04-05 PROCEDURE — 85025 COMPLETE CBC W/AUTO DIFF WBC: CPT | Performed by: EMERGENCY MEDICINE

## 2024-04-05 PROCEDURE — 83605 ASSAY OF LACTIC ACID: CPT

## 2024-04-05 PROCEDURE — 84439 ASSAY OF FREE THYROXINE: CPT

## 2024-04-05 PROCEDURE — 87040 BLOOD CULTURE FOR BACTERIA: CPT | Performed by: EMERGENCY MEDICINE

## 2024-04-05 PROCEDURE — 84443 ASSAY THYROID STIM HORMONE: CPT

## 2024-04-05 PROCEDURE — 81001 URINALYSIS AUTO W/SCOPE: CPT | Performed by: EMERGENCY MEDICINE

## 2024-04-05 PROCEDURE — 80053 COMPREHEN METABOLIC PANEL: CPT | Performed by: EMERGENCY MEDICINE

## 2024-04-05 PROCEDURE — 25810000003 SODIUM CHLORIDE 0.9 % SOLUTION 250 ML FLEX CONT

## 2024-04-05 PROCEDURE — 99291 CRITICAL CARE FIRST HOUR: CPT

## 2024-04-05 RX ORDER — BISACODYL 5 MG/1
5 TABLET, DELAYED RELEASE ORAL DAILY PRN
Status: DISCONTINUED | OUTPATIENT
Start: 2024-04-05 | End: 2024-04-06

## 2024-04-05 RX ORDER — SODIUM CHLORIDE, SODIUM LACTATE, POTASSIUM CHLORIDE, CALCIUM CHLORIDE 600; 310; 30; 20 MG/100ML; MG/100ML; MG/100ML; MG/100ML
75 INJECTION, SOLUTION INTRAVENOUS CONTINUOUS
Status: DISCONTINUED | OUTPATIENT
Start: 2024-04-05 | End: 2024-04-06

## 2024-04-05 RX ORDER — VANCOMYCIN/0.9 % SOD CHLORIDE 1.5G/250ML
20 PLASTIC BAG, INJECTION (ML) INTRAVENOUS ONCE
Status: COMPLETED | OUTPATIENT
Start: 2024-04-05 | End: 2024-04-05

## 2024-04-05 RX ORDER — NICOTINE POLACRILEX 4 MG
15 LOZENGE BUCCAL
Status: DISCONTINUED | OUTPATIENT
Start: 2024-04-05 | End: 2024-04-17 | Stop reason: HOSPADM

## 2024-04-05 RX ORDER — DEXTROSE, SODIUM CHLORIDE, SODIUM LACTATE, POTASSIUM CHLORIDE, AND CALCIUM CHLORIDE 5; .6; .31; .03; .02 G/100ML; G/100ML; G/100ML; G/100ML; G/100ML
75 INJECTION, SOLUTION INTRAVENOUS CONTINUOUS
Status: DISCONTINUED | OUTPATIENT
Start: 2024-04-05 | End: 2024-04-05

## 2024-04-05 RX ORDER — DEXTROSE MONOHYDRATE 25 G/50ML
25 INJECTION, SOLUTION INTRAVENOUS
Status: DISCONTINUED | OUTPATIENT
Start: 2024-04-05 | End: 2024-04-17 | Stop reason: HOSPADM

## 2024-04-05 RX ORDER — SODIUM CHLORIDE 0.9 % (FLUSH) 0.9 %
10 SYRINGE (ML) INJECTION AS NEEDED
Status: DISCONTINUED | OUTPATIENT
Start: 2024-04-05 | End: 2024-04-17 | Stop reason: HOSPADM

## 2024-04-05 RX ORDER — SODIUM CHLORIDE 9 MG/ML
125 INJECTION, SOLUTION INTRAVENOUS CONTINUOUS
Status: DISCONTINUED | OUTPATIENT
Start: 2024-04-05 | End: 2024-04-05

## 2024-04-05 RX ORDER — AMOXICILLIN 250 MG
2 CAPSULE ORAL 2 TIMES DAILY PRN
Status: DISCONTINUED | OUTPATIENT
Start: 2024-04-05 | End: 2024-04-06

## 2024-04-05 RX ORDER — NOREPINEPHRINE BITARTRATE 0.03 MG/ML
.02-.3 INJECTION, SOLUTION INTRAVENOUS
Status: DISCONTINUED | OUTPATIENT
Start: 2024-04-05 | End: 2024-04-06

## 2024-04-05 RX ORDER — BISACODYL 10 MG
10 SUPPOSITORY, RECTAL RECTAL DAILY PRN
Status: DISCONTINUED | OUTPATIENT
Start: 2024-04-05 | End: 2024-04-06

## 2024-04-05 RX ORDER — SODIUM CHLORIDE 9 MG/ML
40 INJECTION, SOLUTION INTRAVENOUS AS NEEDED
Status: DISCONTINUED | OUTPATIENT
Start: 2024-04-05 | End: 2024-04-17 | Stop reason: HOSPADM

## 2024-04-05 RX ORDER — ACETAMINOPHEN 650 MG/1
650 SUPPOSITORY RECTAL EVERY 4 HOURS PRN
Status: DISCONTINUED | OUTPATIENT
Start: 2024-04-05 | End: 2024-04-17 | Stop reason: HOSPADM

## 2024-04-05 RX ORDER — ONDANSETRON 2 MG/ML
4 INJECTION INTRAMUSCULAR; INTRAVENOUS EVERY 6 HOURS PRN
Status: DISCONTINUED | OUTPATIENT
Start: 2024-04-05 | End: 2024-04-17 | Stop reason: HOSPADM

## 2024-04-05 RX ORDER — ACETAMINOPHEN 325 MG/1
650 TABLET ORAL EVERY 4 HOURS PRN
Status: DISCONTINUED | OUTPATIENT
Start: 2024-04-05 | End: 2024-04-17 | Stop reason: HOSPADM

## 2024-04-05 RX ORDER — POLYETHYLENE GLYCOL 3350 17 G/17G
17 POWDER, FOR SOLUTION ORAL DAILY PRN
Status: DISCONTINUED | OUTPATIENT
Start: 2024-04-05 | End: 2024-04-06

## 2024-04-05 RX ORDER — INSULIN LISPRO 100 [IU]/ML
2-9 INJECTION, SOLUTION INTRAVENOUS; SUBCUTANEOUS
Status: DISCONTINUED | OUTPATIENT
Start: 2024-04-05 | End: 2024-04-17 | Stop reason: HOSPADM

## 2024-04-05 RX ORDER — IBUPROFEN 600 MG/1
1 TABLET ORAL
Status: DISCONTINUED | OUTPATIENT
Start: 2024-04-05 | End: 2024-04-17 | Stop reason: HOSPADM

## 2024-04-05 RX ORDER — NITROGLYCERIN 0.4 MG/1
0.4 TABLET SUBLINGUAL
Status: DISCONTINUED | OUTPATIENT
Start: 2024-04-05 | End: 2024-04-17 | Stop reason: HOSPADM

## 2024-04-05 RX ORDER — SODIUM CHLORIDE 0.9 % (FLUSH) 0.9 %
10 SYRINGE (ML) INJECTION EVERY 12 HOURS SCHEDULED
Status: DISCONTINUED | OUTPATIENT
Start: 2024-04-05 | End: 2024-04-17 | Stop reason: HOSPADM

## 2024-04-05 RX ORDER — ONDANSETRON 4 MG/1
4 TABLET, ORALLY DISINTEGRATING ORAL EVERY 6 HOURS PRN
Status: DISCONTINUED | OUTPATIENT
Start: 2024-04-05 | End: 2024-04-17 | Stop reason: HOSPADM

## 2024-04-05 RX ADMIN — NOREPINEPHRINE BITARTRATE 0.02 MCG/KG/MIN: 0.03 INJECTION, SOLUTION INTRAVENOUS at 13:37

## 2024-04-05 RX ADMIN — AZITHROMYCIN MONOHYDRATE 500 MG: 500 INJECTION, POWDER, LYOPHILIZED, FOR SOLUTION INTRAVENOUS at 15:35

## 2024-04-05 RX ADMIN — PIPERACILLIN AND TAZOBACTAM 3.38 G: 3; .375 INJECTION, POWDER, FOR SOLUTION INTRAVENOUS at 16:50

## 2024-04-05 RX ADMIN — Medication 1500 MG: at 12:51

## 2024-04-05 RX ADMIN — SODIUM CHLORIDE 2178 ML: 9 INJECTION, SOLUTION INTRAVENOUS at 12:18

## 2024-04-05 RX ADMIN — Medication 10 ML: at 21:18

## 2024-04-05 RX ADMIN — PIPERACILLIN AND TAZOBACTAM 3.38 G: 3; .375 INJECTION, POWDER, FOR SOLUTION INTRAVENOUS at 21:18

## 2024-04-05 RX ADMIN — SODIUM CHLORIDE 125 ML/HR: 9 INJECTION, SOLUTION INTRAVENOUS at 13:39

## 2024-04-05 RX ADMIN — SODIUM CHLORIDE, POTASSIUM CHLORIDE, SODIUM LACTATE AND CALCIUM CHLORIDE 75 ML/HR: 600; 310; 30; 20 INJECTION, SOLUTION INTRAVENOUS at 15:06

## 2024-04-05 NOTE — LETTER
EMS Transport Request  For use at [facility], [place], [place], [place], and [place] only   Patient Name: Lxu Bray : 1945   Weight:72.5 kg (159 lb 13.3 oz) Pick-up Location: 2128 ([facility]) BLS/ALS: BLS/ALS: BLS   Insurance: MEDICARE Auth End Date: N/A   Pre-Cert #: N/A D/C Summary complete: Yes   Destination: Other [place]   Contact Precautions: None   Equipment (O2, Fluids, etc.): O2, settings 3L   Arrive By Date/Time: 24 1230 Stretcher/WC: Stretcher   CM Requesting: Doris Ortiz RN     Office Phone: [phone]  Office Cell: [phone]   Ext: [phone]   Notes/Medical Necessity: Max assist x2, per therapy, 3L O2, cognitive impairments and unable to self-administer O2, difficulty initiating movements, hip/low back stiffness, and bradykinesia with bed mobility.      ______________________________________________________________________    *Only 2 patient bags OR 1 carry-on size bag are permitted.  Wheelchairs and walkers CANNOT transported with the patient. Acknowledge: Yes

## 2024-04-05 NOTE — Clinical Note
Level of Care: Critical Care [6]   Admitting Physician: ADA MCCOY [[id number]]   Attending Physician: ADA MCCOY [[id number]]

## 2024-04-05 NOTE — ED PROVIDER NOTES
Subjective   History of Present Illness  Chief complaint: Patient is an 78-year-old brought from facility.  He has been sick for a few days.  Has been on Rocephin at the facility.  He has a history of Parkinson's disease and is there for rehab.  He has been more lethargic today and less active.  He baseline is a and ANO x 2.  He had a temp of 101.4 today.  Patient is a poor historian.  He does note that he has been coughing.    Context:    Duration:    Timing:    Severity:    Associated Symptoms:        PCP:  LMP:      Review of Systems   Constitutional:  Positive for fever.   Respiratory:  Positive for cough and shortness of breath.    Cardiovascular:  Negative for chest pain.   Gastrointestinal:  Negative for abdominal pain.   Musculoskeletal: Negative.        Past Medical History:   Diagnosis Date    ADHD (attention deficit hyperactivity disorder)     Allergic     Arthritis     Colon polyp     Depression     Diabetes mellitus type 2    controlled by oral meds    Fibromyalgia, primary     HL (hearing loss)     Hyperlipidemia     Hypertension        No Known Allergies    Past Surgical History:   Procedure Laterality Date    COLONOSCOPY      ENDOSCOPY N/A 11/15/2023    Procedure: ESOPHAGOGASTRODUODENOSCOPY;  Surgeon: George Viveros MD;  Location: [facility];  Service: Gastroenterology;  Laterality: N/A;  popssible candida, irrgular z-line, gastritis    EYE SURGERY  2023    Cataracts removed: both eyes    HAND SURGERY  1980 1980-1990- [facility]. Abstracted from [facility].    HEMORROIDECTOMY  1984    [facility]    TONSILLECTOMY  1940's    VASECTOMY         Family History   Problem Relation Age of Onset    Diabetes Mother         type2    Stroke Mother     Diabetes Father         type2    Colon cancer Neg Hx     Colon polyps Neg Hx     Crohn's disease Neg Hx     Irritable bowel syndrome Neg Hx     Ulcerative colitis Neg Hx        Social History     Socioeconomic History    Marital  status:    Tobacco Use    Smoking status: Former     Current packs/day: 0.00     Average packs/day: 0.3 packs/day for 5.0 years (1.3 ttl pk-yrs)     Types: Cigarettes     Start date: 2/10/1966     Quit date: 2/10/1971     Years since quittin.1    Smokeless tobacco: Never   Vaping Use    Vaping status: Never Used   Substance and Sexual Activity    Alcohol use: Not Currently     Alcohol/week: 2.0 standard drinks of alcohol     Types: 1 Glasses of wine, 1 Cans of beer per week    Drug use: No    Sexual activity: Yes     Partners: Female     Birth control/protection: None           Objective   Physical Exam  Vitals and nursing note reviewed.   Constitutional:       Appearance: He is ill-appearing.   HENT:      Head: Normocephalic and atraumatic.   Eyes:      Extraocular Movements: Extraocular movements intact.      Pupils: Pupils are equal, round, and reactive to light.   Cardiovascular:      Rate and Rhythm: Normal rate.   Pulmonary:      Effort: Pulmonary effort is normal.      Comments: Diminished breath sounds bilaterally.  Abdominal:      Tenderness: There is no abdominal tenderness.   Musculoskeletal:         General: No swelling or tenderness.      Cervical back: Neck supple.   Skin:     General: Skin is warm and dry.   Neurological:      General: No focal deficit present.      Mental Status: He is alert.   Psychiatric:         Mood and Affect: Mood normal.         Procedures           ED Course                                 Results for orders placed or performed during the hospital encounter of 24   Comprehensive Metabolic Panel    Specimen: Blood   Result Value Ref Range    Glucose 180 (H) 65 - 99 mg/dL    BUN 34 (H) 8 - 23 mg/dL    Creatinine 1.50 (H) 0.76 - 1.27 mg/dL    Sodium 137 136 - 145 mmol/L    Potassium 4.7 3.5 - 5.2 mmol/L    Chloride 99 98 - 107 mmol/L    CO2 28.0 22.0 - 29.0 mmol/L    Calcium 9.2 8.6 - 10.5 mg/dL    Total Protein 6.5 6.0 - 8.5 g/dL    Albumin 3.4 (L) 3.5 - 5.2  g/dL    ALT (SGPT) 20 1 - 41 U/L    AST (SGOT) 26 1 - 40 U/L    Alkaline Phosphatase 116 39 - 117 U/L    Total Bilirubin 0.2 0.0 - 1.2 mg/dL    Globulin 3.1 gm/dL    A/G Ratio 1.1 g/dL    BUN/Creatinine Ratio 22.7 7.0 - 25.0    Anion Gap 10.0 5.0 - 15.0 mmol/L    eGFR 46.8 (L) >60.0 mL/min/1.73   CBC Auto Differential    Specimen: Blood   Result Value Ref Range    WBC 13.68 (H) 3.40 - 10.80 10*3/mm3    RBC 4.01 (L) 4.14 - 5.80 10*6/mm3    Hemoglobin 11.9 (L) 13.0 - 17.7 g/dL    Hematocrit 37.5 37.5 - 51.0 %    MCV 93.5 79.0 - 97.0 fL    MCH 29.7 26.6 - 33.0 pg    MCHC 31.7 31.5 - 35.7 g/dL    RDW 15.1 12.3 - 15.4 %    RDW-SD 52.6 37.0 - 54.0 fl    MPV 10.4 6.0 - 12.0 fL    Platelets 164 140 - 450 10*3/mm3    Neutrophil % 71.5 42.7 - 76.0 %    Lymphocyte % 13.8 (L) 19.6 - 45.3 %    Monocyte % 14.0 (H) 5.0 - 12.0 %    Eosinophil % 0.1 (L) 0.3 - 6.2 %    Basophil % 0.1 0.0 - 1.5 %    Immature Grans % 0.5 0.0 - 0.5 %    Neutrophils, Absolute 9.76 (H) 1.70 - 7.00 10*3/mm3    Lymphocytes, Absolute 1.89 0.70 - 3.10 10*3/mm3    Monocytes, Absolute 1.92 (H) 0.10 - 0.90 10*3/mm3    Eosinophils, Absolute 0.02 0.00 - 0.40 10*3/mm3    Basophils, Absolute 0.02 0.00 - 0.20 10*3/mm3    Immature Grans, Absolute 0.07 (H) 0.00 - 0.05 10*3/mm3    nRBC 0.0 0.0 - 0.2 /100 WBC   Urinalysis With Microscopic If Indicated (No Culture) - Urine, Clean Catch    Specimen: Urine, Clean Catch   Result Value Ref Range    Color, UA Yellow Yellow, Straw    Appearance, UA Cloudy (A) Clear    pH, UA <=5.0 5.0 - 8.0    Specific Gravity, UA 1.021 1.005 - 1.030    Glucose,  mg/dL (2+) (A) Negative    Ketones, UA Negative Negative    Bilirubin, UA Negative Negative    Blood, UA Negative Negative    Protein, UA 30 mg/dL (1+) (A) Negative    Leuk Esterase, UA Negative Negative    Nitrite, UA Negative Negative    Urobilinogen, UA 0.2 E.U./dL 0.2 - 1.0 E.U./dL   High Sensitivity Troponin T    Specimen: Blood   Result Value Ref Range    HS Troponin T 48  (H) <22 ng/L   BNP    Specimen: Blood   Result Value Ref Range    proBNP 4,297.0 (H) 0.0 - 1,800.0 pg/mL   Urinalysis, Microscopic Only - Urine, Clean Catch    Specimen: Urine, Clean Catch   Result Value Ref Range    RBC, UA 0-2 None Seen, 0-2 /HPF    WBC, UA 6-10 (A) None Seen, 0-2 /HPF    Bacteria, UA Trace (A) None Seen /HPF    Squamous Epithelial Cells, UA 0-2 None Seen, 0-2 /HPF    Hyaline Casts, UA 0-2 None Seen /LPF    Granular Casts, UA 3-6 None Seen /LPF    Methodology Manual Light Microscopy    POC Lactate    Specimen: Blood   Result Value Ref Range    Lactate 1.0 0.3 - 2.0 mmol/L   ECG 12 Lead Altered Mental Status   Result Value Ref Range    QTC Interval  ms   Lavender Top   Result Value Ref Range    Extra Tube hold for add-on    Gold Top - SST   Result Value Ref Range    Extra Tube Hold for add-ons.    Light Blue Top   Result Value Ref Range    Extra Tube Hold for add-ons.        XR Chest 1 View    Result Date: 4/5/2024  Impression: Bibasilar airspace disease, left greater than right, suspicious for pneumonia. Electronically Signed: Tacos Nichols MD  4/5/2024 12:42 PM EDT  Workstation ID: [id number]     SEPTIC SHOCK FOCUSED EXAM ATTESTATION    I attest that I have reassessed tissue perfusion after the fluid bolus given.    Jos Stroud DO  04/05/24  13:44 EDT           Medical Decision Making  Patient was seen and evaluated for the above problem    Differential diagnosis includes but is not limited to sepsis, pneumonia, CHF, meningitis     CHF, meningitis  Patient initially presented and had EKG obtained interpreted by myself as below.  New onset atrial flutter.  But rate did improve with IV fluid.  He was hypotensive and showing signs of sepsis.  He was chest x-ray shows bibasilar infiltrates.  He was provided the sepsis bolus.  He received vancomycin and cefepime.  He is from a facility.  Family states that he has had aggressive and neurologic decline over the last few months.  CT brain has been  added as well.  Spoke with Wilbert in the intensive care unit as the patient is now requiring Levophed.  She admits to their service.  He remained hypotensive after the full sepsis bolus.  I discussed findings with family.  They verbalized understanding.    Critical care time 57 minutes.    Problems Addressed:  Atrial flutter, unspecified type: complicated acute illness or injury  Pneumonia due to infectious organism, unspecified laterality, unspecified part of lung: complicated acute illness or injury  Severe sepsis: complicated acute illness or injury    Amount and/or Complexity of Data Reviewed  Labs: ordered. Decision-making details documented in ED Course.     Details: Labs reviewed by myself  Radiology: ordered and independent interpretation performed.     Details: Chest x-ray reviewed by myself as above  ECG/medicine tests: ordered and independent interpretation performed.     Details: EKG interpreted by myself atrial flutter with prolonged QT interval 521 ms rate of 121.    Risk  Prescription drug management.  Drug therapy requiring intensive monitoring for toxicity.  Decision regarding hospitalization.    Critical Care  Total time providing critical care: 57 minutes        Final diagnoses:   None   Pneumonia  Sepsis  Atrial flutter    ED Disposition  ED Disposition       None            No follow-up provider specified.       Medication List      No changes were made to your prescriptions during this visit.            Jos Stroud, DO  04/05/24 1345       Jos Stroud, DO  04/05/24 1346

## 2024-04-05 NOTE — PLAN OF CARE
Goal Outcome Evaluation:    Patient arrived from ED to ICU around 15:30. He is on a levo gtt. Wife and daughter were at bedside. He was alert and oriented which is different than how he presented in the ER. He's been sleeping for the past hour. He woke up disoriented. Orientation status is intermittent at this time. Lego gtt is at a low dose of 0.02. Will continue to monitor.

## 2024-04-05 NOTE — ED NOTES
PT placed in trendelenburg position and pressure bags applied to fluids hanging due to low blood pressure. Provider notified. Family requesting neurology consult Dr. Stroud made aware.

## 2024-04-05 NOTE — LETTER
EMS Transport Request  For use at [facility], [place], [place], [place], and [place] only   Patient Name: Lux Bray : 1945   Weight:77.7 kg (171 lb 4.8 oz) Pick-up Location: 2128 BLS/ALS: BLS/ALS: BLS   Insurance: MEDICARE Auth End Date:    Pre-Cert #: D/C Summary complete:    Destination: Other [place]   Contact Precautions: None   Equipment (O2, Fluids, etc.): O2, settings 2L   Arrive By Date/Time: 24 Stretcher/WC: Stretcher   CM Requesting: Renita Bloom RN Ext: unit [phone] cm [phone]   Notes/Medical Necessity:  confusion, emmanuel lift transfer, completely dependent for ADLs, 2L O2.           ______________________________________________________________________    *Only 2 patient bags OR 1 carry-on size bag are permitted.  Wheelchairs and walkers CANNOT transported with the patient. Acknowledge: Yes

## 2024-04-05 NOTE — H&P
Critical Care History and Physical     Lux Bray : 1945 MRN:[id number] LOS:0 ROOM:      Reason for admission: Septic shock     Assessment / Plan     Septic Shock: ( WBC>12 or <4 or >10% bands, HR>90, RR >20 or PCO2 <32, and MAP<65 or SBP<90 )  Likely due to aspiration pneumonia vs HCAP .   Blood cultures pending. UA negative for infection.   Lactic negative. Procal 0.36.   Obtain sputum if cough productive.  Respiratory panel negative.     Started on Zosyn. MRSA negative.    Persistent hypotension in spite of adequate fluid resuscitation.   Patient received total of 2,178 ml fluid bolus in ER  C/w additional fluids as needed. Avoid fluid overload.   Maintenance IVF at 75 ml/hr  Titrate vasopressors for a target MAP of 65.   Currently on low dose levophed    Diabetes Mellitus II -- Hgb A1c 24 6.70  Home medications --> glimepiride, metformin, Jardiance  Hold oral medications while in ICU  Insulin sliding scale ordered    Hypertension -- Not controlled with medication at home  Hyperlipidemia -- Patient on Lipitor at home. Last lipid profile 23 negative. Restart lipitor when not NPO.  Depression -- Patient on Cymbalta at home. Restart when patient not NPO.      Code Status (Patient has no pulse and is not breathing): CPR (Attempt to Resuscitate)  Medical Interventions (Patient has pulse or is breathing): Full Support       Nutrition:   NPO Diet NPO Type: Strict NPO     DVT prophylaxis:  Mechanical DVT prophylaxis orders are present.         History of Present illness     Lux Bray is a 78 y.o. male with PMH of HTN, DM II, Depression,  presented to the hospital after reportedly being sick for a few days at rehab, and was admitted with a principal diagnosis of Septic shock.     Per family the patient has had a neurologic decline over the last few months. CT head was negative for acute abnormality. Ammonia level was normal. Per chart review, the patient's PCP has decreased some of the  "sedating medications the patient has or was taking. Patient is currently drowsy but will wake up and answer questions appropriately. He is reporting that he is very tired.     In ER the patient was found to be hypotensive. He was given the full sepsis bolus and started on levophed. Chest x-ray showed bibasilar infiltrates. Patient's family reports that they have had concerns he has been aspirating. Patient will be transferred to ICU for close monitoring and vasopressor support.      ACP: CPR, Full Intervention. Patients wife is his decision maker in the even he is unable.     Patient was seen and examined on 04/05/24 at 13:54 EDT .    Subjective / Review of systems     Review of Systems     Patient drowsy but easily aroused. Knows what year it is. States that he hurts all over, and is nauseous. He also states that he is \"So, so, so, tired.\" Denies feeling short of breath, or having chest pain.     Past Medical/Surgical/Social/Family History & Allergies     Past Medical History:   Diagnosis Date    ADHD (attention deficit hyperactivity disorder)     Allergic     Arthritis     Colon polyp     Depression     Diabetes mellitus type 2    controlled by oral meds    Fibromyalgia, primary     HL (hearing loss)     Hyperlipidemia     Hypertension       Past Surgical History:   Procedure Laterality Date    COLONOSCOPY      ENDOSCOPY N/A 11/15/2023    Procedure: ESOPHAGOGASTRODUODENOSCOPY;  Surgeon: George Viveros MD;  Location: [facility] MAIN OR;  Service: Gastroenterology;  Laterality: N/A;  popssible candida, irrgular z-line, gastritis    EYE SURGERY  2023    Cataracts removed: both eyes    HAND SURGERY  1980 1980-1990- [facility]. Abstracted from [facility].    HEMORROIDECTOMY  1984    [facility]. Abstracted from [facility].    TONSILLECTOMY  1940's    VASECTOMY        Social History     Socioeconomic History    Marital status:    Tobacco Use    Smoking status: Former     Current packs/day: 0.00     Average packs/day: " 0.3 packs/day for 5.0 years (1.3 ttl pk-yrs)     Types: Cigarettes     Start date: 2/10/1966     Quit date: 2/10/1971     Years since quittin.1    Smokeless tobacco: Never   Vaping Use    Vaping status: Never Used   Substance and Sexual Activity    Alcohol use: Not Currently     Alcohol/week: 2.0 standard drinks of alcohol     Types: 1 Glasses of wine, 1 Cans of beer per week    Drug use: No    Sexual activity: Yes     Partners: Female     Birth control/protection: None      Family History   Problem Relation Age of Onset    Diabetes Mother         type2    Stroke Mother     Diabetes Father         type2    Colon cancer Neg Hx     Colon polyps Neg Hx     Crohn's disease Neg Hx     Irritable bowel syndrome Neg Hx     Ulcerative colitis Neg Hx       No Known Allergies   Social Determinants of Health     Tobacco Use: Medium Risk (2024)    Received from [facility]    Patient History     Smoking Tobacco Use: Former     Smokeless Tobacco Use: Never     Passive Exposure: Not on file   Alcohol Use: Not At Risk (2023)    AUDIT-C     Frequency of Alcohol Consumption: Never     Average Number of Drinks: Patient does not drink     Frequency of Binge Drinking: Never   Financial Resource Strain: Not on file   Food Insecurity: No Food Insecurity (2023)    Hunger Vital Sign     Worried About Running Out of Food in the Last Year: Never true     Ran Out of Food in the Last Year: Never true   Transportation Needs: Not on file   Physical Activity: Inactive (2023)    Exercise Vital Sign     Days of Exercise per Week: 0 days     Minutes of Exercise per Session: 0 min   Stress: Not on file   Social Connections: Unknown (10/10/2023)    Family and Community Support     Help with Day-to-Day Activities: Not on file     Lonely or Isolated: Not on file   Interpersonal Safety: Unknown (2024)    Abuse Screen     Unsafe at Home or Work/School: unable to answer (comment required)     Feels Threatened by Someone?:  unable to answer (comment required)     Does Anyone Keep You from Contacting Others or Doint Things Outside the Home?: unable to answer (comment required)     Physical Sign of Abuse Present: no   Depression: At risk (1/31/2024)    PHQ-2     PHQ-2 Score: 16   Housing Stability: Not At Risk (7/14/2023)    Housing Stability     Current Living Arrangements: home     Potentially Unsafe Housing Conditions: none   Utilities: Not on file   Health Literacy: Unknown (7/14/2023)    Education     Help with school or training?: Not on file     Preferred Language: English   Employment: Unknown (10/10/2023)    Employment     Do you want help finding or keeping work or a job?: Not on file   Disabilities: Not At Risk (7/14/2023)    Disabilities     Concentrating, Remembering, or Making Decisions Difficulty: no     Doing Errands Independently Difficulty: no        Home Medications     Prior to Admission medications    Medication Sig Start Date End Date Taking? Authorizing Provider   amphetamine-dextroamphetamine XR (Adderall XR) 20 MG 24 hr capsule Take 1 capsule by mouth Every Morning for 90 days 3/19/24 6/17/24  Montana oMrrow MD   atorvastatin (LIPITOR) 20 MG tablet Take 1 tablet by mouth Daily. 1/17/24   Montana Morrow MD   diazePAM (VALIUM) 10 MG tablet Take 1 about 2 hours prior to procedure.  Take second 1 on arrival to the hospital if needed.  Patient not taking: Reported on 3/1/2024 1/5/24   Montana Morrow MD   dicyclomine (BENTYL) 10 MG capsule Take 1 capsule by mouth 4 (Four) Times a Day Before Meals & at Bedtime. 1/17/24   Montana Morrow MD   DULoxetine (CYMBALTA) 30 MG capsule Take 3 capsules by mouth Daily. 1/17/24   Montana Morrow MD   empagliflozin (Jardiance) 10 MG tablet tablet Take 1 tablet by mouth Daily. 1/17/24   Montana Morrow MD   glimepiride (AMARYL) 2 MG tablet TAKE 3 TABLETS EVERY MORNING BEFORE BREAKFAST 1/17/24   Montana Morrow MD   l-methylfolate-algae-B6-B12 (METANX)  3-90.314-2-35 MG capsule capsule Take 1 capsule by mouth Every 12 (Twelve) Hours. 2/1/24   ProviderGerman MD   metFORMIN (GLUCOPHAGE) 500 MG tablet Take 2 tablets by mouth 2 (Two) Times a Day. 1/17/24   Montana Morrow MD   oxyCODONE-acetaminophen (Percocet)  MG per tablet Take 1 tablet by mouth Every 4 (Four) Hours As Needed for Moderate Pain. Max 4 tablets a day 3/19/24   Montana Morrow MD   pantoprazole (PROTONIX) 40 MG EC tablet Take 1 tablet by mouth Daily. 1/17/24   Montana Morrow MD   pregabalin (Lyrica) 25 MG capsule Take two in AM and two PM 1/18/24 4/17/24  Montana Morrow MD   primidone (MYSOLINE) 50 MG tablet 3 tablets twice daily 2/6/24   Montana Morrow MD   QUEtiapine (SEROquel) 25 MG tablet Take 1 tablet by mouth Every Night for 30 days.  Patient not taking: Reported on 3/1/2024 1/31/24 3/1/24  Montana Morrow MD   sucralfate (Carafate) 1 GM/10ML suspension TAKE 10 ML THREE TIMES A DAY AS NEEDED FOR HEARTBURN/ABDOMINAL PAIN  Patient not taking: Reported on 3/1/2024 7/25/23   Montana Morrow MD   trandolapril-verapamil (TARKA) 2-240 MG per CR tablet Take 1 tablet by mouth Daily for 180 days. 1/17/24 7/15/24  Montana Morrow MD        Objective / Physical Exam     Vital signs:  Temp: 100.1 °F (37.8 °C)  BP: 90/44  Heart Rate: 90  Resp: 22  SpO2: 97 %  Weight: 72.6 kg (160 lb)    Admission Weight: Weight: 72.6 kg (160 lb)    Physical Exam  Vitals and nursing note reviewed.   Constitutional:       General: He is not in acute distress.     Appearance: He is ill-appearing (chronic).   HENT:      Head: Normocephalic.      Ears:      Comments: Hard of hearing.      Mouth/Throat:      Mouth: Mucous membranes are moist.      Pharynx: Oropharynx is clear.   Eyes:      Extraocular Movements: Extraocular movements intact.      Conjunctiva/sclera: Conjunctivae normal.      Pupils: Pupils are equal, round, and reactive to light.   Cardiovascular:      Rate and Rhythm:  Normal rate and regular rhythm.      Pulses: Normal pulses.      Heart sounds: Normal heart sounds.   Pulmonary:      Effort: Pulmonary effort is normal.      Breath sounds: Normal breath sounds.   Abdominal:      General: Bowel sounds are normal.      Palpations: Abdomen is soft.   Musculoskeletal:      Right lower leg: No edema.      Left lower leg: No edema.   Skin:     General: Skin is warm and dry.      Findings: No rash.   Neurological:      General: No focal deficit present.      Mental Status: He is easily aroused. He is lethargic and disoriented.      Comments: Oriented to self and year. Did not know where he was at.    Psychiatric:         Mood and Affect: Mood normal.         Behavior: Behavior normal.          Labs     Results from last 7 days   Lab Units 04/05/24  1201 04/05/24  0529 04/04/24  1336 04/03/24  0515 04/02/24  0915   WBC 10*3/mm3 13.68* 12.51* 11.25* 8.62 10.48   HEMATOCRIT % 37.5 38.2 40.4 37.7 40.0   PLATELETS 10*3/mm3 164 183 198 188 191      Results from last 7 days   Lab Units 04/05/24  1201 04/05/24  0529 04/04/24  1336 04/03/24  0515 04/02/24  0915   SODIUM mmol/L 137 138 137 140 141   POTASSIUM mmol/L 4.7 4.9 4.5 4.8 5.0   CHLORIDE mmol/L 99 101 99 101 101   CO2 mmol/L 28.0 29.0 27.0 28.0 32.0*   BUN mg/dL 34* 33* 33* 46* 40*   CREATININE mg/dL 1.50* 1.45* 1.42* 1.50* 1.47*        Imaging     CT Head Without Contrast    Result Date: 4/5/2024  Impression: Chronic findings. No acute intracranial process. Findings suggestive of mild right mastoiditis. Electronically Signed: Heidy Calvin MD  4/5/2024 3:01 PM EDT  Workstation ID: [id number]    XR Chest 1 View    Result Date: 4/5/2024  Impression: Bibasilar airspace disease, left greater than right, suspicious for pneumonia. Electronically Signed: Tacos Nichols MD  4/5/2024 12:42 PM EDT  Workstation ID: [id number]       Current Medications     Scheduled Meds:  cefepime, 2,000 mg, Intravenous, Once  sodium chloride, 10 mL, Intravenous,  Q12H  vancomycin, 20 mg/kg, Intravenous, Once         Continuous Infusions:  norepinephrine, 0.02-0.3 mcg/kg/min, Last Rate: 0.02 mcg/kg/min (04/05/24 1337)  sodium chloride, 125 mL/hr, Last Rate: 125 mL/hr (04/05/24 1339)           JONATHAN Ramirez   Critical Care  04/05/24   13:54 EDT

## 2024-04-05 NOTE — LETTER
EMS Transport Request  For use at [facility], [place], [place], [place], and [place] only   Patient Name: Lux Bray : 1945   Weight:78.7 kg (173 lb 8 oz) Pick-up Location: 2128 BLS/ALS: BLS/ALS: BLS   Insurance: MEDICARE Auth End Date: n/a   Pre-Cert #: D/C Summary complete:    Destination: Other [facility] [place], [place]   Contact Precautions: None   Equipment (O2, Fluids, etc.): O2, settings 2L O2 NC   Arrive By Date/Time: 24 ready any time Stretcher/WC: Stretcher   CM Requesting: Xiomy Wilhelm Ext: [phone]   Notes/Medical Necessity: 173lbs, confusion, emmanuel lift for transfers, 2L o2.      ______________________________________________________________________    *Only 2 patient bags OR 1 carry-on size bag are permitted.  Wheelchairs and walkers CANNOT transported with the patient. Acknowledge: Yes     27-Nov-2019

## 2024-04-05 NOTE — PROGRESS NOTES
"Pharmacy Antimicrobial Dosing Service    Subjective:  Lux Bray is a 78 y.o.male admitted with lethargy. Patient [facility] from the SNF, s/p being sick for a few days. Marciano has been receiving ceftriaxone at the facility. Pharmacy has been consulted to dose Vancomycin for possible septic sock.        Assessment/Plan    1. Day #1 Vancomycin: Pulse dosing d/t renal dysfxn. Scr is 1.5 on presentation, patient is in septic shock. Will dose by levels until renal function stabilizes. Patient received vancomycin 1500 mg (~20 mg/kg ABW) in the ED. Obtain random level on 4/5 with am labs and plan to re-dose when random is expected to be <20 mcg/mL.     2. Day #1 Ceftriaxone: 2gm IV q24h.    3. Day #1 Azithromycin 500 mg IV q24h.     Will continue to monitor drug levels, renal function, culture and sensitivities, and patient clinical status.       Objective:  Relevant clinical data and objective history reviewed:  180.3 cm (71\")   72.6 kg (160 lb)   Ideal body weight: 75.3 kg (166 lb 0.1 oz)  Body mass index is 22.32 kg/m².        Results from last 7 days   Lab Units 04/05/24  1201 04/05/24  0529 04/04/24  1336   CREATININE mg/dL 1.50* 1.45* 1.42*     Estimated Creatinine Clearance: 40.3 mL/min (A) (by C-G formula based on SCr of 1.5 mg/dL (H)).  No intake/output data recorded.    Results from last 7 days   Lab Units 04/05/24  1201 04/05/24  0529 04/04/24  1336   WBC 10*3/mm3 13.68* 12.51* 11.25*     Temperature    04/05/24 1152   Temp: 100.1 °F (37.8 °C)     Baseline culture/source/susceptibility:  Microbiology Results (last 10 days)       ** No results found for the last 240 hours. **            Margarita Moody, PharmD  04/05/24 14:28 EDT    "

## 2024-04-06 ENCOUNTER — APPOINTMENT (OUTPATIENT)
Dept: CT IMAGING | Facility: HOSPITAL | Age: 79
End: 2024-04-06
Payer: MEDICARE

## 2024-04-06 LAB
ANION GAP SERPL CALCULATED.3IONS-SCNC: 13 MMOL/L (ref 5–15)
BASOPHILS # BLD AUTO: 0.04 10*3/MM3 (ref 0–0.2)
BASOPHILS NFR BLD AUTO: 0.2 % (ref 0–1.5)
BUN SERPL-MCNC: 26 MG/DL (ref 8–23)
BUN/CREAT SERPL: 22 (ref 7–25)
CALCIUM SPEC-SCNC: 9.3 MG/DL (ref 8.6–10.5)
CHLORIDE SERPL-SCNC: 104 MMOL/L (ref 98–107)
CO2 SERPL-SCNC: 24 MMOL/L (ref 22–29)
CREAT SERPL-MCNC: 1.18 MG/DL (ref 0.76–1.27)
D-LACTATE SERPL-SCNC: 1.1 MMOL/L (ref 0.5–2)
DEPRECATED RDW RBC AUTO: 53.7 FL (ref 37–54)
EGFRCR SERPLBLD CKD-EPI 2021: 62.4 ML/MIN/1.73
EOSINOPHIL # BLD AUTO: 0 10*3/MM3 (ref 0–0.4)
EOSINOPHIL NFR BLD AUTO: 0 % (ref 0.3–6.2)
ERYTHROCYTE [DISTWIDTH] IN BLOOD BY AUTOMATED COUNT: 15.1 % (ref 12.3–15.4)
GLUCOSE BLDC GLUCOMTR-MCNC: 112 MG/DL (ref 70–105)
GLUCOSE BLDC GLUCOMTR-MCNC: 113 MG/DL (ref 70–105)
GLUCOSE BLDC GLUCOMTR-MCNC: 161 MG/DL (ref 70–105)
GLUCOSE BLDC GLUCOMTR-MCNC: 75 MG/DL (ref 70–105)
GLUCOSE BLDC GLUCOMTR-MCNC: 83 MG/DL (ref 70–105)
GLUCOSE SERPL-MCNC: 81 MG/DL (ref 65–99)
HCT VFR BLD AUTO: 43.7 % (ref 37.5–51)
HGB BLD-MCNC: 13.2 G/DL (ref 13–17.7)
IMM GRANULOCYTES # BLD AUTO: 0.12 10*3/MM3 (ref 0–0.05)
IMM GRANULOCYTES NFR BLD AUTO: 0.7 % (ref 0–0.5)
LYMPHOCYTES # BLD AUTO: 1.74 10*3/MM3 (ref 0.7–3.1)
LYMPHOCYTES NFR BLD AUTO: 10.6 % (ref 19.6–45.3)
MAGNESIUM SERPL-MCNC: 1.8 MG/DL (ref 1.6–2.4)
MCH RBC QN AUTO: 28.9 PG (ref 26.6–33)
MCHC RBC AUTO-ENTMCNC: 30.2 G/DL (ref 31.5–35.7)
MCV RBC AUTO: 95.6 FL (ref 79–97)
MONOCYTES # BLD AUTO: 2.33 10*3/MM3 (ref 0.1–0.9)
MONOCYTES NFR BLD AUTO: 14.2 % (ref 5–12)
MRSA DNA SPEC QL NAA+PROBE: NORMAL
NEUTROPHILS NFR BLD AUTO: 12.22 10*3/MM3 (ref 1.7–7)
NEUTROPHILS NFR BLD AUTO: 74.3 % (ref 42.7–76)
NRBC BLD AUTO-RTO: 0 /100 WBC (ref 0–0.2)
PHOSPHATE SERPL-MCNC: 2.8 MG/DL (ref 2.5–4.5)
PLATELET # BLD AUTO: 176 10*3/MM3 (ref 140–450)
PMV BLD AUTO: 10.2 FL (ref 6–12)
POTASSIUM SERPL-SCNC: 4.5 MMOL/L (ref 3.5–5.2)
PROCALCITONIN SERPL-MCNC: 0.64 NG/ML (ref 0–0.25)
RBC # BLD AUTO: 4.57 10*6/MM3 (ref 4.14–5.8)
SODIUM SERPL-SCNC: 141 MMOL/L (ref 136–145)
TROPONIN T SERPL HS-MCNC: 44 NG/L
WBC NRBC COR # BLD AUTO: 16.45 10*3/MM3 (ref 3.4–10.8)

## 2024-04-06 PROCEDURE — 82948 REAGENT STRIP/BLOOD GLUCOSE: CPT

## 2024-04-06 PROCEDURE — 25810000003 DEXTROSE 5% IN LACTATED RINGERS PER 1000 ML: Performed by: INTERNAL MEDICINE

## 2024-04-06 PROCEDURE — 93005 ELECTROCARDIOGRAM TRACING: CPT | Performed by: FAMILY MEDICINE

## 2024-04-06 PROCEDURE — 71250 CT THORAX DX C-: CPT

## 2024-04-06 PROCEDURE — 93005 ELECTROCARDIOGRAM TRACING: CPT | Performed by: INTERNAL MEDICINE

## 2024-04-06 PROCEDURE — 93010 ELECTROCARDIOGRAM REPORT: CPT | Performed by: INTERNAL MEDICINE

## 2024-04-06 PROCEDURE — 25010000002 MORPHINE PER 10 MG: Performed by: INTERNAL MEDICINE

## 2024-04-06 PROCEDURE — 80048 BASIC METABOLIC PNL TOTAL CA: CPT

## 2024-04-06 PROCEDURE — 84145 PROCALCITONIN (PCT): CPT | Performed by: FAMILY MEDICINE

## 2024-04-06 PROCEDURE — 25010000002 ENOXAPARIN PER 10 MG: Performed by: INTERNAL MEDICINE

## 2024-04-06 PROCEDURE — 87641 MR-STAPH DNA AMP PROBE: CPT | Performed by: FAMILY MEDICINE

## 2024-04-06 PROCEDURE — 74150 CT ABDOMEN W/O CONTRAST: CPT

## 2024-04-06 PROCEDURE — 92610 EVALUATE SWALLOWING FUNCTION: CPT

## 2024-04-06 PROCEDURE — 25010000002 DIGOXIN PER 500 MCG: Performed by: INTERNAL MEDICINE

## 2024-04-06 PROCEDURE — 25010000002 MORPHINE PER 10 MG: Performed by: FAMILY MEDICINE

## 2024-04-06 PROCEDURE — 84484 ASSAY OF TROPONIN QUANT: CPT | Performed by: FAMILY MEDICINE

## 2024-04-06 PROCEDURE — 83605 ASSAY OF LACTIC ACID: CPT | Performed by: FAMILY MEDICINE

## 2024-04-06 PROCEDURE — 25010000002 PIPERACILLIN SOD-TAZOBACTAM PER 1 G: Performed by: INTERNAL MEDICINE

## 2024-04-06 PROCEDURE — 85025 COMPLETE CBC W/AUTO DIFF WBC: CPT

## 2024-04-06 PROCEDURE — 25010000002 VANCOMYCIN HCL IN NACL 1.5-0.9 GM/500ML-% SOLUTION: Performed by: FAMILY MEDICINE

## 2024-04-06 PROCEDURE — 83735 ASSAY OF MAGNESIUM: CPT | Performed by: NURSE PRACTITIONER

## 2024-04-06 PROCEDURE — 84100 ASSAY OF PHOSPHORUS: CPT | Performed by: NURSE PRACTITIONER

## 2024-04-06 RX ORDER — DULOXETIN HYDROCHLORIDE 30 MG/1
90 CAPSULE, DELAYED RELEASE ORAL DAILY
Status: DISCONTINUED | OUTPATIENT
Start: 2024-04-06 | End: 2024-04-17 | Stop reason: HOSPADM

## 2024-04-06 RX ORDER — LISINOPRIL 5 MG/1
10 TABLET ORAL
Status: DISCONTINUED | OUTPATIENT
Start: 2024-04-06 | End: 2024-04-06

## 2024-04-06 RX ORDER — DIPHENHYDRAMINE HCL 25 MG
12.5 CAPSULE ORAL 3 TIMES DAILY PRN
COMMUNITY
End: 2024-04-17 | Stop reason: HOSPADM

## 2024-04-06 RX ORDER — OXYCODONE HYDROCHLORIDE 5 MG/1
10 TABLET ORAL EVERY 6 HOURS PRN
Status: DISCONTINUED | OUTPATIENT
Start: 2024-04-06 | End: 2024-04-07

## 2024-04-06 RX ORDER — METOPROLOL TARTRATE 1 MG/ML
5 INJECTION, SOLUTION INTRAVENOUS EVERY 6 HOURS PRN
Status: DISCONTINUED | OUTPATIENT
Start: 2024-04-06 | End: 2024-04-07

## 2024-04-06 RX ORDER — PREGABALIN 25 MG/1
50 CAPSULE ORAL EVERY 12 HOURS SCHEDULED
Status: DISCONTINUED | OUTPATIENT
Start: 2024-04-06 | End: 2024-04-06

## 2024-04-06 RX ORDER — SUCRALFATE 1 G/1
1 TABLET ORAL 4 TIMES DAILY
COMMUNITY

## 2024-04-06 RX ORDER — IPRATROPIUM BROMIDE AND ALBUTEROL SULFATE 2.5; .5 MG/3ML; MG/3ML
3 SOLUTION RESPIRATORY (INHALATION) EVERY 4 HOURS PRN
COMMUNITY

## 2024-04-06 RX ORDER — ATORVASTATIN CALCIUM 20 MG/1
20 TABLET, FILM COATED ORAL DAILY
Status: DISCONTINUED | OUTPATIENT
Start: 2024-04-06 | End: 2024-04-13

## 2024-04-06 RX ORDER — BISACODYL 5 MG/1
5 TABLET, DELAYED RELEASE ORAL DAILY
COMMUNITY

## 2024-04-06 RX ORDER — TAMSULOSIN HYDROCHLORIDE 0.4 MG/1
1 CAPSULE ORAL DAILY
COMMUNITY

## 2024-04-06 RX ORDER — PRIMIDONE 50 MG/1
150 TABLET ORAL EVERY 12 HOURS SCHEDULED
Status: DISCONTINUED | OUTPATIENT
Start: 2024-04-06 | End: 2024-04-13

## 2024-04-06 RX ORDER — DEXTROSE, SODIUM CHLORIDE, SODIUM LACTATE, POTASSIUM CHLORIDE, AND CALCIUM CHLORIDE 5; .6; .31; .03; .02 G/100ML; G/100ML; G/100ML; G/100ML; G/100ML
75 INJECTION, SOLUTION INTRAVENOUS CONTINUOUS
Status: DISCONTINUED | OUTPATIENT
Start: 2024-04-06 | End: 2024-04-07

## 2024-04-06 RX ORDER — DIGOXIN 0.25 MG/ML
500 INJECTION INTRAMUSCULAR; INTRAVENOUS ONCE
Status: COMPLETED | OUTPATIENT
Start: 2024-04-06 | End: 2024-04-06

## 2024-04-06 RX ORDER — MORPHINE SULFATE 2 MG/ML
0.5 INJECTION, SOLUTION INTRAMUSCULAR; INTRAVENOUS EVERY 4 HOURS PRN
Status: DISCONTINUED | OUTPATIENT
Start: 2024-04-06 | End: 2024-04-06

## 2024-04-06 RX ORDER — VERAPAMIL HYDROCHLORIDE 120 MG/1
240 CAPSULE, EXTENDED RELEASE ORAL NIGHTLY
COMMUNITY
End: 2024-04-17 | Stop reason: HOSPADM

## 2024-04-06 RX ORDER — PREGABALIN 75 MG/1
75 CAPSULE ORAL 2 TIMES DAILY
COMMUNITY

## 2024-04-06 RX ORDER — ACETAMINOPHEN 325 MG/1
650 TABLET ORAL EVERY 6 HOURS PRN
COMMUNITY

## 2024-04-06 RX ORDER — VANCOMYCIN/0.9 % SOD CHLORIDE 1.5G/250ML
1500 PLASTIC BAG, INJECTION (ML) INTRAVENOUS ONCE
Status: COMPLETED | OUTPATIENT
Start: 2024-04-06 | End: 2024-04-07

## 2024-04-06 RX ORDER — PREGABALIN 75 MG/1
75 CAPSULE ORAL EVERY 12 HOURS SCHEDULED
Status: DISCONTINUED | OUTPATIENT
Start: 2024-04-06 | End: 2024-04-07 | Stop reason: SDUPTHER

## 2024-04-06 RX ORDER — DOCUSATE SODIUM 100 MG/1
100 CAPSULE, LIQUID FILLED ORAL 2 TIMES DAILY
COMMUNITY

## 2024-04-06 RX ORDER — ONDANSETRON 4 MG/1
4 TABLET, FILM COATED ORAL EVERY 8 HOURS PRN
COMMUNITY

## 2024-04-06 RX ORDER — VERAPAMIL HYDROCHLORIDE 240 MG/1
240 TABLET, FILM COATED, EXTENDED RELEASE ORAL
Status: DISCONTINUED | OUTPATIENT
Start: 2024-04-06 | End: 2024-04-07

## 2024-04-06 RX ORDER — OXYCODONE HYDROCHLORIDE 5 MG/1
10 TABLET ORAL EVERY MORNING
Status: ON HOLD | COMMUNITY
End: 2024-04-12

## 2024-04-06 RX ORDER — PANTOPRAZOLE SODIUM 40 MG/1
40 TABLET, DELAYED RELEASE ORAL DAILY
Status: DISCONTINUED | OUTPATIENT
Start: 2024-04-06 | End: 2024-04-17 | Stop reason: HOSPADM

## 2024-04-06 RX ORDER — BISACODYL 10 MG
10 SUPPOSITORY, RECTAL RECTAL DAILY PRN
COMMUNITY

## 2024-04-06 RX ORDER — ENOXAPARIN SODIUM 100 MG/ML
40 INJECTION SUBCUTANEOUS DAILY
Status: DISCONTINUED | OUTPATIENT
Start: 2024-04-06 | End: 2024-04-07

## 2024-04-06 RX ADMIN — ENOXAPARIN SODIUM 40 MG: 100 INJECTION SUBCUTANEOUS at 14:46

## 2024-04-06 RX ADMIN — SODIUM CHLORIDE, SODIUM LACTATE, POTASSIUM CHLORIDE, CALCIUM CHLORIDE AND DEXTROSE MONOHYDRATE 75 ML/HR: 5; 600; 310; 30; 20 INJECTION, SOLUTION INTRAVENOUS at 23:07

## 2024-04-06 RX ADMIN — Medication 10 ML: at 07:24

## 2024-04-06 RX ADMIN — SODIUM CHLORIDE, SODIUM LACTATE, POTASSIUM CHLORIDE, CALCIUM CHLORIDE AND DEXTROSE MONOHYDRATE 75 ML/HR: 5; 600; 310; 30; 20 INJECTION, SOLUTION INTRAVENOUS at 07:47

## 2024-04-06 RX ADMIN — ACETAMINOPHEN 650 MG: 650 SUPPOSITORY RECTAL at 00:09

## 2024-04-06 RX ADMIN — DIGOXIN 500 MCG: 0.25 INJECTION INTRAMUSCULAR; INTRAVENOUS at 10:02

## 2024-04-06 RX ADMIN — MORPHINE SULFATE 4 MG: 4 INJECTION, SOLUTION INTRAMUSCULAR; INTRAVENOUS at 22:02

## 2024-04-06 RX ADMIN — METOPROLOL TARTRATE 5 MG: 1 INJECTION, SOLUTION INTRAVENOUS at 19:10

## 2024-04-06 RX ADMIN — MORPHINE SULFATE 0.5 MG: 2 INJECTION, SOLUTION INTRAMUSCULAR; INTRAVENOUS at 10:02

## 2024-04-06 RX ADMIN — PIPERACILLIN AND TAZOBACTAM 3.38 G: 3; .375 INJECTION, POWDER, FOR SOLUTION INTRAVENOUS at 21:35

## 2024-04-06 RX ADMIN — Medication 1500 MG: at 23:07

## 2024-04-06 RX ADMIN — MORPHINE SULFATE 0.5 MG: 2 INJECTION, SOLUTION INTRAMUSCULAR; INTRAVENOUS at 19:09

## 2024-04-06 RX ADMIN — PIPERACILLIN AND TAZOBACTAM 3.38 G: 3; .375 INJECTION, POWDER, FOR SOLUTION INTRAVENOUS at 05:34

## 2024-04-06 RX ADMIN — PIPERACILLIN AND TAZOBACTAM 3.38 G: 3; .375 INJECTION, POWDER, FOR SOLUTION INTRAVENOUS at 14:45

## 2024-04-06 RX ADMIN — ACETAMINOPHEN 650 MG: 650 SUPPOSITORY RECTAL at 05:56

## 2024-04-06 RX ADMIN — ACETAMINOPHEN 650 MG: 650 SUPPOSITORY RECTAL at 19:09

## 2024-04-06 NOTE — CASE MANAGEMENT/SOCIAL WORK
Continued Stay Note  MARIO Larsen     Patient Name: Lux Bray  MRN: [id number]  Today's Date: 4/6/2024    Admit Date: 4/5/2024        Discharge Plan       Row Name 04/06/24 1328       Plan    Plan Comments DC barrier: NPO, IV fluids, IV abx.  Will need video swallow study                   Discharge Codes    No documentation.                 Expected Discharge Date and Time       Expected Discharge Date Expected Discharge Time    Apr 9, 2024               Maria D Trejo RN

## 2024-04-06 NOTE — THERAPY EVALUATION
Acute Care - Speech Language Pathology   Swallow Initial Evaluation  Chava     Patient Name: Lux Bray  : 1945  MRN: [id number]  Today's Date: 2024               Admit Date: 2024    Visit Dx:     ICD-10-CM ICD-9-CM   1. Pneumonia due to infectious organism, unspecified laterality, unspecified part of lung  J18.9 486   2. Severe sepsis  A41.9 038.9    R65.20 995.92   3. Atrial flutter, unspecified type  I48.92 427.32     Patient Active Problem List   Diagnosis    Predisposition to allergic reaction    Attention deficit disorder (ADD) without hyperactivity    Cervical radiculopathy    Cholelithiasis    Deficiency of testosterone biosynthesis    Depression    Enlarged prostate with lower urinary tract symptoms (LUTS)    HTN (hypertension)    Irritable bowel syndrome without diarrhea    Lumbago with sciatica, right side    Arthritis    Diabetes    Vertiginous syndrome    Preventative health care    Vitamin D deficiency, unspecified     Encounter for screening for malignant neoplasm of prostate     Iron deficiency anemia due to chronic blood loss    Medicare annual wellness visit, subsequent    Colon polyp    Gastroesophageal reflux disease without esophagitis    Dysuria    High cholesterol    Hearing loss    Cortical senile cataract    Cataracts, bilateral    Screening for prostate cancer    Screening for hypothyroidism    Need for hepatitis C screening test    Toe pain, bilateral    Tinea unguium    Pre-ulcerative corn or callous    Primary iridocyclitis    Weight loss, non-intentional    Focal myoclonus    Polyneuropathy associated with underlying disease    Tingling of both feet    Generalized weakness    Encephalopathy, metabolic    Leukocytosis    Sepsis    Belching    Acid reflux    Early satiety    Epigastric pressure    Sarcopenia    Hypotension due to drugs    Abdominal pain    Septic shock     Past Medical History:   Diagnosis Date    ADHD (attention deficit hyperactivity disorder)      Allergic     Arthritis     Colon polyp     Depression     Diabetes mellitus type 2    controlled by oral meds    Fibromyalgia, primary     HL (hearing loss)     Hyperlipidemia     Hypertension      Past Surgical History:   Procedure Laterality Date    COLONOSCOPY      ENDOSCOPY N/A 11/15/2023    Procedure: ESOPHAGOGASTRODUODENOSCOPY;  Surgeon: George Viveros MD;  [place] [facility] [place]  Service: Gastroenterology;  Laterality: N/A;  popssible candida, irrgular z-line, gastritis    EYE SURGERY  2023    Cataracts removed: both eyes    HAND SURGERY  1980 1980-1990- [facility]. Abstracted from [facility].    HEMORROIDECTOMY  1984    [facility]. [facility] from [facility].    TONSILLECTOMY  1940's    VASECTOMY         SLP Recommendation and Plan     SLP Diet Recommendation: NPO, other (see comments) (Anthony water protocol (ice chips and water) per adequate oral care) (04/06/24 0900)     SLP Rec. for Method of Medication Administration: meds via alternate route (04/06/24 0900)        Recommended Diagnostics: [place] ([facility] [place] reassess via clinical swallow evaluation (04/06/24 0900)     Anticipated Discharge Disposition (SLP): skilled nursing facility (04/06/24 0900)           Oral Care Recommendations: Oral Care BID/PRN, Before ice/water (04/06/24 0900)        SWALLOW EVALUATION (Last 72 Hours)       SLP Adult Swallow Evaluation       Row Name 04/06/24 0900       Rehab Evaluation    Document Type evaluation  -KL    Subjective Information pain  -KL    Patient Observations lethargic  -KL    Patient Effort adequate  -KL       General Information    Patient Profile Reviewed yes  -KL    Current Method of Nutrition NPO  -KL    Prior Level of Function-Swallowing no diet consistency restrictions  -KL       Oral Motor Structure and Function    Secretion Management WNL/WFL  -KL    Volitional Swallow WFL  -KL       Oral Musculature and Cranial Nerve Assessment    Oral Motor, Comment Oral motor assessment completed. Pt demonstrated  "ability to lingually protrude, lateralize, and elevate. Strength and function of all oral structures judged to be grossly functional as needed for speech and swallowing.  -       Clinical Swallow Eval    Clinical Swallow Evaluation Summary Lux Bray is a 78 y.o. male with PMH of HTN, DM II, Depression,  presented to the hospital after reportedly being sick for a few days at rehab, and was admitted with a principal diagnosis of Septic shock.  Documentation reviewed stated :\"Per family the patient has had a neurologic decline over the last few months\". CT head was negative for acute abnormality. Chest x-ray showed bibasilar infiltrates. SLP dept consulted for a swallow evaluation following pt failing the nrsg water screen. Pt was drowsy with eyes closed across evaluation. However, he partipated and verbally responded w/ one-word answers to questions and followed commands w/ intermittent repetitions. No family present. Pt required repositioning assist to achieve upright at close to 90 degree positioning in bed prior to PO intake. Pt is mostly edentulous and reported typically wearing upper and lower dentures at home for all meals. He did not have dentures present at time of evaluation. Pt w/ adequate pull, timely oral transit, and no overt s/s of aspiration on on 2/2 trials of thins by cup and 8/8 trials of thins via straw. Pt was noted to be impulsive w/ sip size across trials. Pt w/ functional pull, slight oral holding, but no overt s/s of aspiration across x2 trials of puree. Pt w/ slightly prolonged mastication and no overt s/s of aspiration across trials of fig hendrix bar. Pt required cued use of liquid wash to clear MIN oral cavity reside.     Due to admit dx, CXR results, and current mentation - recommending pt remain NPO until clinical swallow re-eval and/or VFSS can be completed to objectively determine pt's safest and least restrictive diet recommendation. Of note, pt had a VFSS here at [facility] as an OP " in 1/2020 w/ the recommendation of regular/thins. However, given chart review and current presentation, pt appears to have significant change in status since most recent documented VFSS.  Discussed above recommendations w/ RN, who verbalized understanding.      ST will continue to follow to complete re-evaluation and/or VFSS w/ further recommendations as indicated and appropriate.     The rationale to recommend water when a PO diet cannot appropriately/functionally sustain nutrition (or if thickened liquids are prescribed due to aspiration of thin liquids) is because water is low risk for aspiration pna when compared to aspiration of food or other liquids.       Benefits of a water protocol include but are not limited to:     Oral gratification  Engagement of oropharyngeal swallow musculature  Decrease likelihood of dehydration       Guidelines for proper implementation include:     Thorough oral care prior to consuming water  Upright at 90 degree hip flexion  Small sips at slow rate  Monitor for any changes in respiratory status and discontinue if distress noted     The Raj Free Water Protocol is a research based protocol established in 1984. -       Recommendations    SLP Diet Recommendation NPO;other (see comments)  Raj water protocol (ice chips and water) per adequate oral care  -    Recommended Diagnostics VFSS (MBS);reassess via clinical swallow evaluation  -    Oral Care Recommendations Oral Care BID/PRN;Before ice/water  -    SLP Rec. for Method of Medication Administration meds via alternate route  -    Anticipated Discharge Disposition (SLP) skilled nursing facility  -       Swallow Goals (SLP)    Swallow LTGs Swallow Long Term Goal (free text)  -    Swallow STGs diet tolerance goal selection (SLP)  -    Diet Tolerance Goal Selection (SLP) Swallow Short Term Goal 1  -KL       (LTG) Swallow    (LTG) Swallow Pt will return to baseline level of function, tolerating safest and least  restrictive diet recommendation, w/ no s/s of penetration/aspiration.  -KL    [place] (Swallow Long Term Goal) with moderate cues (50-74% accuracy)  -KL    Progress/Outcomes (Swallow Long Term Goal) new goal  -KL       (STG) Swallow 1    (STG) Swallow 1 Pt will participate in ongoing swallow evaluation, including VFSS if clinically indicated, in order to effectively determine pt's safest and least restrictive diet recommendation.  -KL    [place] (Swallow Short Term Goal 1) with moderate cues (50-74% accuracy)  -KL    Progress/Outcomes (Swallow Short Term Goal 1) new goal  -KL              User Key  (r) = Recorded By, (t) = Taken By, (c) = Cosigned By      Initials Name Effective Dates    Zayda Romero 06/16/21 -         EDUCATION  The patient has been educated in the following areas:   Oral Care/Hydration NPO rationale.        SLP GOALS       Row Name 04/06/24 0900       (LTG) Swallow    (LTG) Swallow Pt will return to baseline level of function, tolerating safest and least restrictive diet recommendation, w/ no s/s of penetration/aspiration.  -KL    [place] (Swallow Long Term Goal) with moderate cues (50-74% accuracy)  -KL    Progress/Outcomes (Swallow Long Term Goal) new goal  -KL       (STG) Swallow 1    (STG) Swallow 1 Pt will participate in ongoing swallow evaluation, including VFSS if clinically indicated, in order to effectively determine pt's safest and least restrictive diet recommendation.  -KL    [place] (Swallow Short Term Goal 1) with moderate cues (50-74% accuracy)  -KL    Progress/Outcomes (Swallow Short Term Goal 1) new goal  -              User Key  (r) = Recorded By, (t) = Taken By, (c) = Cosigned By      Initials Name Provider Type    Zayda Romero Speech and Language Pathologist             Time Calculation:       Therapy Charges for Today       Code Description Service Date Service Provider Modifiers Qty    [id number] HC ST EVAL ORAL PHARYNG SWALLOW 6 4/6/2024  Zayda Potter GN 1         Zayda Potter  4/6/2024

## 2024-04-06 NOTE — PLAN OF CARE
SLP dept consulted for a swallow evaluation following pt failing the nrsg water screen. Pt was drowsy with eyes closed across evaluation. However, he partipated and verbally responded w/ one-word answers to questions and followed commands w/ intermittent repetitions. No family present. Pt required repositioning assist to achieve upright at close to 90 degree positioning in bed prior to PO intake. Pt is mostly edentulous and reported typically wearing upper and lower dentures at home for all meals. He did not have dentures present at time of evaluation. Pt w/ adequate pull, timely oral transit, and no overt s/s of aspiration on on 2/2 trials of thins by cup and 8/8 trials of thins via straw. Pt was noted to be impulsive w/ sip size across trials. Pt w/ functional pull, slight oral holding, but no overt s/s of aspiration across x2 trials of puree. Pt w/ slightly prolonged mastication and no overt s/s of aspiration across trials of fig hendrix bar. Pt required cued use of liquid wash to clear MIN oral cavity reside.      Due to admit dx, CXR results, and current mentation - recommending pt remain NPO until clinical swallow re-eval and/or VFSS can be completed to objectively determine pt's safest and least restrictive diet recommendation. Of note, pt had a VFSS here at [facility] as an OP in 1/2020 w/ the recommendation of regular/thins. However, given chart review and current presentation, pt appears to have significant change in status since most recent documented VFSS.  Discussed above recommendations w/ RN, who verbalized understanding.       ST will continue to follow to complete re-evaluation and/or VFSS w/ further recommendations as indicated and appropriate.      The rationale to recommend water when a PO diet cannot appropriately/functionally sustain nutrition (or if thickened liquids are prescribed due to aspiration of thin liquids) is because water is low risk for aspiration pna when compared to aspiration of food  or other liquids.       Benefits of a water protocol include but are not limited to:     Oral gratification  Engagement of oropharyngeal swallow musculature  Decrease likelihood of dehydration       Guidelines for proper implementation include:     Thorough oral care prior to consuming water  Upright at 90 degree hip flexion  Small sips at slow rate  Monitor for any changes in respiratory status and discontinue if distress noted     The Raj Free Water Protocol is a research based protocol established in 1984. -

## 2024-04-06 NOTE — PROGRESS NOTES
[facility] MEDICINE SERVICE  DAILY PROGRESS NOTE    NAME: Lux Bray  : 1945  MRN: [id number]      LOS: 1 day     PROVIDER OF SERVICE: JONATHAN Cr    Chief Complaint: Septic shock    Subjective:     Interval History:  History taken from: patient Patient unable to give history due to patient confusion.  Patient Complaints: Patient unable to verbalize   Patient Denies:  Patient unable to verbalize     Review of Systems:   Review of Systems   Unable to perform ROS: Mental status change       Objective:     Vital Signs  Temp:  [97.7 °F (36.5 °C)-100.9 °F (38.3 °C)] 99.2 °F (37.3 °C)  Heart Rate:  [] 116  Resp:  [14-27] 14  BP: ()/(49-97) 151/83  Flow (L/min):  [2] 2   Body mass index is 23.06 kg/m².    Physical Exam  Physical Exam  Constitutional:       Appearance: Normal appearance.   HENT:      Head: Normocephalic and atraumatic.      Nose: Nose normal.      Mouth/Throat:      Mouth: Mucous membranes are moist.   Eyes:      Extraocular Movements: Extraocular movements intact.      Conjunctiva/sclera: Conjunctivae normal.      Pupils: Pupils are equal, round, and reactive to light.   Cardiovascular:      Rate and Rhythm: Regular rhythm. Tachycardia present.      Pulses: Normal pulses.      Heart sounds: Normal heart sounds.   Pulmonary:      Effort: Pulmonary effort is normal.      Breath sounds: Normal breath sounds.   Musculoskeletal:      Cervical back: Normal range of motion.   Skin:     General: Skin is warm and dry.   Neurological:      General: No focal deficit present.      Mental Status: He is alert. He is disoriented.         Scheduled Meds   [Held by provider] atorvastatin, 20 mg, Oral, Daily  [Held by provider] DULoxetine, 90 mg, Oral, Daily  [Held by provider] empagliflozin, 10 mg, Oral, Daily  enoxaparin, 40 mg, Subcutaneous, Daily  insulin lispro, 2-9 Units, Subcutaneous, 4x Daily AC & at Bedtime  [Held by provider] lisinopril, 10 mg, Oral, Q24H   And  [Held by  provider] verapamil SR, 240 mg, Oral, Q24H  [Held by provider] pantoprazole, 40 mg, Oral, Daily  piperacillin-tazobactam, 3.375 g, Intravenous, Q8H  [Held by provider] pregabalin, 50 mg, Oral, Q12H  [Held by provider] primidone, 150 mg, Oral, Q12H  sodium chloride, 10 mL, Intravenous, Q12H       PRN Meds     acetaminophen **OR** acetaminophen    dextrose    dextrose    glucagon (human recombinant)    Morphine    nitroglycerin    ondansetron ODT **OR** ondansetron    oxyCODONE    sodium chloride    sodium chloride    sodium chloride   Infusions  dextrose 5 % and lactated Ringer's, 75 mL/hr, Last Rate: 75 mL/hr (04/06/24 0747)          Diagnostic Data    Results from last 7 days   Lab Units 04/06/24  0448 04/05/24  1201   WBC 10*3/mm3 16.45* 13.68*   HEMOGLOBIN g/dL 13.2 11.9*   HEMATOCRIT % 43.7 37.5   PLATELETS 10*3/mm3 176 164   GLUCOSE mg/dL 81 180*   CREATININE mg/dL 1.18 1.50*   BUN mg/dL 26* 34*   SODIUM mmol/L 141 137   POTASSIUM mmol/L 4.5 4.7   AST (SGOT) U/L  --  26   ALT (SGPT) U/L  --  20   ALK PHOS U/L  --  116   BILIRUBIN mg/dL  --  0.2   ANION GAP mmol/L 13.0 10.0       CT Head Without Contrast    Result Date: 4/5/2024  Impression: Chronic findings. No acute intracranial process. Findings suggestive of mild right mastoiditis. Electronically Signed: Heidy Calvin MD  4/5/2024 3:01 PM EDT  Workstation ID: [id number]    XR Chest 1 View    Result Date: 4/5/2024  Impression: Bibasilar airspace disease, left greater than right, suspicious for pneumonia. Electronically Signed: Tacos Nichols MD  4/5/2024 12:42 PM EDT  Workstation ID: [id number]       I reviewed the patient's new clinical results.    Assessment/Plan:     Active and Resolved Problems  Active Hospital Problems    Diagnosis  POA    **Septic shock [A41.9, R65.21]  Yes      Resolved Hospital Problems   No resolved problems to display.     Patient downgraded from ICU to PCU level of care.     Septic Shock due to Pneumonia   -Respiratory panel negative    -MRSA swab negative  -NS at 75mL/hr   -Levophed discontinued   -Continue Zosyn   -SLP consult to rule out aspiration pna     Diabetes Mellitus   -Hgb A1c 4/5/24 6.7  -Continue to hold home medications   -Accuchecks ACHS   -SSI ordered     Hypertension   -Not controlled with medication at home  -Monitor BP per protocol     Hyperlipidemia --   -Patient on Lipitor at home  -Last lipid profile 5/26/23 negative  -Restart lipitor when not NPO    Depression  -Patient on Cymbalta at home  -Restart when patient not NPO    Altered Mental Status   -No family at bedside at this time  -Reports of neurologic decline over last few months   -Patient unable to make needs known     Dysphasia   -Failed nursing water screen  -SLP eval today, patient to stay NPO due to mentation   -Repeat tomorrow if patient able to follow commands     DVT prophylaxis:  Medical and mechanical DVT prophylaxis orders are present.         Code status is   Code Status and Medical Interventions:   Ordered at: 04/05/24 1340     Code Status (Patient has no pulse and is not breathing):    CPR (Attempt to Resuscitate)     Medical Interventions (Patient has pulse or is breathing):    Full Support       Plan for disposition:Pending clinical course.     Time: 30 minutes    Signature: Electronically signed by JONATHAN Cr, 04/06/24, 14:01 EDT.  [facility] Hospitalist Team

## 2024-04-06 NOTE — PLAN OF CARE
Patient admitted to the ICU for septic shock and only needed vasopressors very briefly.  Has been off vasopressors since last night.  Developed A-fib with RVR, will give a dose of digoxin.  If blood pressure permits, resume home verapamil.  Will likely need full dose anticoagulation, defer to primary team.  Transfer out of the ICU to hospitalist service.  Critical care signing off.    Electronically signed by Nick Carmichael MD, 04/06/24, 2:57 PM EDT.

## 2024-04-06 NOTE — PLAN OF CARE
Goal Outcome Evaluation:      Pt resting on and off throughout the night. Levo off since 1900. Tmax 100.7. Medicated x2 for fever, see MAR. Around 0500, pt went from sinus tach 110-120s into rapid AFIB 120s-150s. Pt sustaining HR <130s, BP WNL. JONATHAN Frias notified; check MG and P, see results. No new orders at this time. VSS otherwise.

## 2024-04-07 ENCOUNTER — APPOINTMENT (OUTPATIENT)
Dept: CARDIOLOGY | Facility: HOSPITAL | Age: 79
End: 2024-04-07
Payer: MEDICARE

## 2024-04-07 ENCOUNTER — APPOINTMENT (OUTPATIENT)
Dept: GENERAL RADIOLOGY | Facility: HOSPITAL | Age: 79
End: 2024-04-07
Payer: MEDICARE

## 2024-04-07 LAB
ANION GAP SERPL CALCULATED.3IONS-SCNC: 15 MMOL/L (ref 5–15)
BASOPHILS # BLD AUTO: 0.06 10*3/MM3 (ref 0–0.2)
BASOPHILS NFR BLD AUTO: 0.3 % (ref 0–1.5)
BH CV ECHO MEAS - ACS: 2 CM
BH CV ECHO MEAS - AO MAX PG: 6.2 MMHG
BH CV ECHO MEAS - AO MEAN PG: 3 MMHG
BH CV ECHO MEAS - AO ROOT DIAM: 3.2 CM
BH CV ECHO MEAS - AO V2 MAX: 124 CM/SEC
BH CV ECHO MEAS - AO V2 VTI: 19.9 CM
BH CV ECHO MEAS - AVA(I,D): 2.26 CM2
BH CV ECHO MEAS - EDV(CUBED): 85.2 ML
BH CV ECHO MEAS - EDV(MOD-SP2): 59.4 ML
BH CV ECHO MEAS - EDV(MOD-SP4): 77.1 ML
BH CV ECHO MEAS - EF(MOD-BP): 69.8 %
BH CV ECHO MEAS - EF(MOD-SP2): 70.7 %
BH CV ECHO MEAS - EF(MOD-SP4): 69.4 %
BH CV ECHO MEAS - ESV(CUBED): 29.8 ML
BH CV ECHO MEAS - ESV(MOD-SP2): 17.4 ML
BH CV ECHO MEAS - ESV(MOD-SP4): 23.6 ML
BH CV ECHO MEAS - FS: 29.5 %
BH CV ECHO MEAS - IVS/LVPW: 0.91 CM
BH CV ECHO MEAS - IVSD: 1 CM
BH CV ECHO MEAS - LA DIMENSION: 3.3 CM
BH CV ECHO MEAS - LAT PEAK E' VEL: 24.8 CM/SEC
BH CV ECHO MEAS - LV DIASTOLIC VOL/BSA (35-75): 42.3 CM2
BH CV ECHO MEAS - LV MASS(C)D: 158.2 GRAMS
BH CV ECHO MEAS - LV MAX PG: 3.9 MMHG
BH CV ECHO MEAS - LV MEAN PG: 2 MMHG
BH CV ECHO MEAS - LV SYSTOLIC VOL/BSA (12-30): 12.9 CM2
BH CV ECHO MEAS - LV V1 MAX: 99.1 CM/SEC
BH CV ECHO MEAS - LV V1 VTI: 14.3 CM
BH CV ECHO MEAS - LVIDD: 4.4 CM
BH CV ECHO MEAS - LVIDS: 3.1 CM
BH CV ECHO MEAS - LVOT AREA: 3.1 CM2
BH CV ECHO MEAS - LVOT DIAM: 2 CM
BH CV ECHO MEAS - LVPWD: 1.1 CM
BH CV ECHO MEAS - MED PEAK E' VEL: 21.8 CM/SEC
BH CV ECHO MEAS - MV A MAX VEL: 43 CM/SEC
BH CV ECHO MEAS - MV DEC SLOPE: 1216 CM/SEC2
BH CV ECHO MEAS - MV DEC TIME: 0.17 SEC
BH CV ECHO MEAS - MV E MAX VEL: 84.9 CM/SEC
BH CV ECHO MEAS - MV E/A: 1.97
BH CV ECHO MEAS - MV MAX PG: 10.5 MMHG
BH CV ECHO MEAS - MV MEAN PG: 4 MMHG
BH CV ECHO MEAS - MV P1/2T: 41.4 MSEC
BH CV ECHO MEAS - MV V2 VTI: 27.3 CM
BH CV ECHO MEAS - MVA(P1/2T): 5.3 CM2
BH CV ECHO MEAS - MVA(VTI): 1.65 CM2
BH CV ECHO MEAS - PA ACC TIME: 0.14 SEC
BH CV ECHO MEAS - PA V2 MAX: 115.5 CM/SEC
BH CV ECHO MEAS - PULM A REVS DUR: 0.11 SEC
BH CV ECHO MEAS - PULM A REVS VEL: 33.9 CM/SEC
BH CV ECHO MEAS - PULM DIAS VEL: 46 CM/SEC
BH CV ECHO MEAS - PULM S/D: 1.22
BH CV ECHO MEAS - PULM SYS VEL: 56.2 CM/SEC
BH CV ECHO MEAS - RAP SYSTOLE: 3 MMHG
BH CV ECHO MEAS - RV MAX PG: 4.7 MMHG
BH CV ECHO MEAS - RV V1 MAX: 108 CM/SEC
BH CV ECHO MEAS - RV V1 VTI: 15.8 CM
BH CV ECHO MEAS - RVDD: 2.7 CM
BH CV ECHO MEAS - RVSP: 33 MMHG
BH CV ECHO MEAS - SV(LVOT): 44.9 ML
BH CV ECHO MEAS - SV(MOD-SP2): 42 ML
BH CV ECHO MEAS - SV(MOD-SP4): 53.5 ML
BH CV ECHO MEAS - SVI(MOD-SP2): 23 ML/M2
BH CV ECHO MEAS - SVI(MOD-SP4): 29.3 ML/M2
BH CV ECHO MEAS - TAPSE (>1.6): 1.78 CM
BH CV ECHO MEAS - TR MAX PG: 29.8 MMHG
BH CV ECHO MEAS - TR MAX VEL: 273 CM/SEC
BH CV ECHO MEASUREMENTS AVERAGE E/E' RATIO: 3.64
BH CV XLRA - RV BASE: 3.3 CM
BH CV XLRA - RV LENGTH: 5.6 CM
BH CV XLRA - RV MID: 2.3 CM
BH CV XLRA - TDI S': 17.9 CM/SEC
BUN SERPL-MCNC: 23 MG/DL (ref 8–23)
BUN/CREAT SERPL: 20.7 (ref 7–25)
CALCIUM SPEC-SCNC: 9.1 MG/DL (ref 8.6–10.5)
CHLORIDE SERPL-SCNC: 100 MMOL/L (ref 98–107)
CO2 SERPL-SCNC: 25 MMOL/L (ref 22–29)
CREAT SERPL-MCNC: 1.11 MG/DL (ref 0.76–1.27)
DEPRECATED RDW RBC AUTO: 49.4 FL (ref 37–54)
EGFRCR SERPLBLD CKD-EPI 2021: 67.1 ML/MIN/1.73
EOSINOPHIL # BLD AUTO: 0.01 10*3/MM3 (ref 0–0.4)
EOSINOPHIL NFR BLD AUTO: 0 % (ref 0.3–6.2)
ERYTHROCYTE [DISTWIDTH] IN BLOOD BY AUTOMATED COUNT: 14.6 % (ref 12.3–15.4)
GEN 5 2HR TROPONIN T REFLEX: 41 NG/L
GLUCOSE BLDC GLUCOMTR-MCNC: 150 MG/DL (ref 70–105)
GLUCOSE BLDC GLUCOMTR-MCNC: 163 MG/DL (ref 70–105)
GLUCOSE BLDC GLUCOMTR-MCNC: 260 MG/DL (ref 70–105)
GLUCOSE BLDC GLUCOMTR-MCNC: 332 MG/DL (ref 70–105)
GLUCOSE SERPL-MCNC: 172 MG/DL (ref 65–99)
HCT VFR BLD AUTO: 40.9 % (ref 37.5–51)
HGB BLD-MCNC: 13 G/DL (ref 13–17.7)
IMM GRANULOCYTES # BLD AUTO: 0.16 10*3/MM3 (ref 0–0.05)
IMM GRANULOCYTES NFR BLD AUTO: 0.7 % (ref 0–0.5)
LYMPHOCYTES # BLD AUTO: 2 10*3/MM3 (ref 0.7–3.1)
LYMPHOCYTES NFR BLD AUTO: 9 % (ref 19.6–45.3)
MCH RBC QN AUTO: 29.1 PG (ref 26.6–33)
MCHC RBC AUTO-ENTMCNC: 31.8 G/DL (ref 31.5–35.7)
MCV RBC AUTO: 91.5 FL (ref 79–97)
MONOCYTES # BLD AUTO: 2.66 10*3/MM3 (ref 0.1–0.9)
MONOCYTES NFR BLD AUTO: 11.9 % (ref 5–12)
NEUTROPHILS NFR BLD AUTO: 17.44 10*3/MM3 (ref 1.7–7)
NEUTROPHILS NFR BLD AUTO: 78.1 % (ref 42.7–76)
NRBC BLD AUTO-RTO: 0 /100 WBC (ref 0–0.2)
PLATELET # BLD AUTO: 248 10*3/MM3 (ref 140–450)
PMV BLD AUTO: 10.8 FL (ref 6–12)
POTASSIUM SERPL-SCNC: 3.5 MMOL/L (ref 3.5–5.2)
QT INTERVAL: 321 MS
QT INTERVAL: 337 MS
QTC INTERVAL: 480 MS
QTC INTERVAL: 499 MS
RBC # BLD AUTO: 4.47 10*6/MM3 (ref 4.14–5.8)
SINUS: 3.1 CM
SODIUM SERPL-SCNC: 140 MMOL/L (ref 136–145)
STJ: 2.7 CM
TROPONIN T DELTA: -3 NG/L
WBC NRBC COR # BLD AUTO: 22.33 10*3/MM3 (ref 3.4–10.8)

## 2024-04-07 PROCEDURE — 80048 BASIC METABOLIC PNL TOTAL CA: CPT

## 2024-04-07 PROCEDURE — 25010000002 MORPHINE PER 10 MG: Performed by: FAMILY MEDICINE

## 2024-04-07 PROCEDURE — 85025 COMPLETE CBC W/AUTO DIFF WBC: CPT

## 2024-04-07 PROCEDURE — 82948 REAGENT STRIP/BLOOD GLUCOSE: CPT

## 2024-04-07 PROCEDURE — 74230 X-RAY XM SWLNG FUNCJ C+: CPT

## 2024-04-07 PROCEDURE — 25010000002 ENOXAPARIN PER 10 MG: Performed by: NURSE PRACTITIONER

## 2024-04-07 PROCEDURE — 92526 ORAL FUNCTION THERAPY: CPT

## 2024-04-07 PROCEDURE — 25010000002 PIPERACILLIN SOD-TAZOBACTAM PER 1 G: Performed by: INTERNAL MEDICINE

## 2024-04-07 PROCEDURE — 93306 TTE W/DOPPLER COMPLETE: CPT

## 2024-04-07 PROCEDURE — 93306 TTE W/DOPPLER COMPLETE: CPT | Performed by: INTERNAL MEDICINE

## 2024-04-07 PROCEDURE — 92611 MOTION FLUOROSCOPY/SWALLOW: CPT

## 2024-04-07 PROCEDURE — 84484 ASSAY OF TROPONIN QUANT: CPT | Performed by: FAMILY MEDICINE

## 2024-04-07 PROCEDURE — 25810000003 LACTATED RINGERS PER 1000 ML

## 2024-04-07 PROCEDURE — 99222 1ST HOSP IP/OBS MODERATE 55: CPT | Performed by: INTERNAL MEDICINE

## 2024-04-07 PROCEDURE — 63710000001 INSULIN LISPRO (HUMAN) PER 5 UNITS

## 2024-04-07 RX ORDER — TAMSULOSIN HYDROCHLORIDE 0.4 MG/1
0.4 CAPSULE ORAL DAILY
Status: DISCONTINUED | OUTPATIENT
Start: 2024-04-07 | End: 2024-04-17 | Stop reason: HOSPADM

## 2024-04-07 RX ORDER — DOCUSATE SODIUM 100 MG/1
100 CAPSULE, LIQUID FILLED ORAL 2 TIMES DAILY
Status: DISCONTINUED | OUTPATIENT
Start: 2024-04-07 | End: 2024-04-17 | Stop reason: HOSPADM

## 2024-04-07 RX ORDER — VERAPAMIL HYDROCHLORIDE 120 MG/1
240 CAPSULE, EXTENDED RELEASE ORAL NIGHTLY
Status: DISCONTINUED | OUTPATIENT
Start: 2024-04-07 | End: 2024-04-17 | Stop reason: HOSPADM

## 2024-04-07 RX ORDER — OXYCODONE HYDROCHLORIDE 5 MG/1
5 TABLET ORAL EVERY 4 HOURS PRN
Status: DISPENSED | OUTPATIENT
Start: 2024-04-07 | End: 2024-04-12

## 2024-04-07 RX ORDER — BISACODYL 5 MG/1
5 TABLET, DELAYED RELEASE ORAL DAILY
Status: DISCONTINUED | OUTPATIENT
Start: 2024-04-07 | End: 2024-04-17 | Stop reason: HOSPADM

## 2024-04-07 RX ORDER — SODIUM CHLORIDE, SODIUM LACTATE, POTASSIUM CHLORIDE, CALCIUM CHLORIDE 600; 310; 30; 20 MG/100ML; MG/100ML; MG/100ML; MG/100ML
75 INJECTION, SOLUTION INTRAVENOUS CONTINUOUS
Status: DISCONTINUED | OUTPATIENT
Start: 2024-04-07 | End: 2024-04-10

## 2024-04-07 RX ORDER — ACETAMINOPHEN 325 MG/1
650 TABLET ORAL EVERY 6 HOURS PRN
Status: DISCONTINUED | OUTPATIENT
Start: 2024-04-07 | End: 2024-04-07 | Stop reason: SDUPTHER

## 2024-04-07 RX ORDER — AMIODARONE HYDROCHLORIDE 200 MG/1
200 TABLET ORAL
Status: DISCONTINUED | OUTPATIENT
Start: 2024-04-07 | End: 2024-04-17 | Stop reason: HOSPADM

## 2024-04-07 RX ORDER — DICYCLOMINE HYDROCHLORIDE 10 MG/1
10 CAPSULE ORAL
Status: DISCONTINUED | OUTPATIENT
Start: 2024-04-07 | End: 2024-04-17 | Stop reason: HOSPADM

## 2024-04-07 RX ORDER — BISACODYL 10 MG
10 SUPPOSITORY, RECTAL RECTAL DAILY PRN
Status: DISCONTINUED | OUTPATIENT
Start: 2024-04-07 | End: 2024-04-17 | Stop reason: HOSPADM

## 2024-04-07 RX ORDER — IPRATROPIUM BROMIDE AND ALBUTEROL SULFATE 2.5; .5 MG/3ML; MG/3ML
3 SOLUTION RESPIRATORY (INHALATION) EVERY 4 HOURS PRN
Status: DISCONTINUED | OUTPATIENT
Start: 2024-04-07 | End: 2024-04-17 | Stop reason: HOSPADM

## 2024-04-07 RX ORDER — PREGABALIN 75 MG/1
75 CAPSULE ORAL 2 TIMES DAILY
Status: DISCONTINUED | OUTPATIENT
Start: 2024-04-07 | End: 2024-04-17 | Stop reason: HOSPADM

## 2024-04-07 RX ORDER — ENOXAPARIN SODIUM 100 MG/ML
1 INJECTION SUBCUTANEOUS EVERY 12 HOURS
Status: DISCONTINUED | OUTPATIENT
Start: 2024-04-07 | End: 2024-04-09

## 2024-04-07 RX ORDER — SUCRALFATE 1 G/1
1 TABLET ORAL
Status: DISCONTINUED | OUTPATIENT
Start: 2024-04-07 | End: 2024-04-17 | Stop reason: HOSPADM

## 2024-04-07 RX ORDER — OXYCODONE HYDROCHLORIDE 5 MG/1
10 TABLET ORAL DAILY
Status: DISCONTINUED | OUTPATIENT
Start: 2024-04-08 | End: 2024-04-17 | Stop reason: HOSPADM

## 2024-04-07 RX ADMIN — SODIUM CHLORIDE, POTASSIUM CHLORIDE, SODIUM LACTATE AND CALCIUM CHLORIDE 75 ML/HR: 600; 310; 30; 20 INJECTION, SOLUTION INTRAVENOUS at 22:34

## 2024-04-07 RX ADMIN — ENOXAPARIN SODIUM 60 MG: 100 INJECTION SUBCUTANEOUS at 22:34

## 2024-04-07 RX ADMIN — BARIUM SULFATE 50 ML: 400 SUSPENSION ORAL at 14:35

## 2024-04-07 RX ADMIN — SUCRALFATE 1 G: 1 TABLET ORAL at 17:39

## 2024-04-07 RX ADMIN — ENOXAPARIN SODIUM 60 MG: 100 INJECTION SUBCUTANEOUS at 12:01

## 2024-04-07 RX ADMIN — DULOXETINE HYDROCHLORIDE 90 MG: 30 CAPSULE, DELAYED RELEASE ORAL at 21:10

## 2024-04-07 RX ADMIN — MORPHINE SULFATE 4 MG: 4 INJECTION, SOLUTION INTRAMUSCULAR; INTRAVENOUS at 12:01

## 2024-04-07 RX ADMIN — PREGABALIN 75 MG: 75 CAPSULE ORAL at 21:05

## 2024-04-07 RX ADMIN — Medication 10 ML: at 09:25

## 2024-04-07 RX ADMIN — INSULIN LISPRO 6 UNITS: 100 INJECTION, SOLUTION INTRAVENOUS; SUBCUTANEOUS at 21:04

## 2024-04-07 RX ADMIN — MORPHINE SULFATE 4 MG: 4 INJECTION, SOLUTION INTRAMUSCULAR; INTRAVENOUS at 05:15

## 2024-04-07 RX ADMIN — OXYCODONE 5 MG: 5 TABLET ORAL at 18:46

## 2024-04-07 RX ADMIN — PIPERACILLIN AND TAZOBACTAM 3.38 G: 3; .375 INJECTION, POWDER, FOR SOLUTION INTRAVENOUS at 15:16

## 2024-04-07 RX ADMIN — INSULIN LISPRO 2 UNITS: 100 INJECTION, SOLUTION INTRAVENOUS; SUBCUTANEOUS at 12:01

## 2024-04-07 RX ADMIN — MORPHINE SULFATE 4 MG: 4 INJECTION, SOLUTION INTRAMUSCULAR; INTRAVENOUS at 21:05

## 2024-04-07 RX ADMIN — PIPERACILLIN AND TAZOBACTAM 3.38 G: 3; .375 INJECTION, POWDER, FOR SOLUTION INTRAVENOUS at 21:04

## 2024-04-07 RX ADMIN — VERAPAMIL HYDROCHLORIDE 240 MG: 120 CAPSULE, DELAYED RELEASE PELLETS ORAL at 21:05

## 2024-04-07 RX ADMIN — INSULIN LISPRO 7 UNITS: 100 INJECTION, SOLUTION INTRAVENOUS; SUBCUTANEOUS at 17:39

## 2024-04-07 RX ADMIN — DICYCLOMINE HYDROCHLORIDE 10 MG: 10 CAPSULE ORAL at 21:05

## 2024-04-07 RX ADMIN — OXYCODONE 5 MG: 5 TABLET ORAL at 15:14

## 2024-04-07 RX ADMIN — DICYCLOMINE HYDROCHLORIDE 10 MG: 10 CAPSULE ORAL at 17:39

## 2024-04-07 RX ADMIN — TAMSULOSIN HYDROCHLORIDE 0.4 MG: 0.4 CAPSULE ORAL at 15:13

## 2024-04-07 RX ADMIN — AMIODARONE HYDROCHLORIDE 200 MG: 200 TABLET ORAL at 17:39

## 2024-04-07 RX ADMIN — Medication 10 ML: at 21:06

## 2024-04-07 RX ADMIN — BARIUM SULFATE 1 TEASPOON(S): 0.6 CREAM ORAL at 14:59

## 2024-04-07 RX ADMIN — PIPERACILLIN AND TAZOBACTAM 3.38 G: 3; .375 INJECTION, POWDER, FOR SOLUTION INTRAVENOUS at 05:15

## 2024-04-07 RX ADMIN — BISACODYL 5 MG: 5 TABLET, COATED ORAL at 15:14

## 2024-04-07 RX ADMIN — METOPROLOL TARTRATE 25 MG: 25 TABLET, FILM COATED ORAL at 15:14

## 2024-04-07 RX ADMIN — METOPROLOL TARTRATE 25 MG: 25 TABLET, FILM COATED ORAL at 21:05

## 2024-04-07 NOTE — PLAN OF CARE
"Goal Outcome Evaluation:  Plan of Care Reviewed With: patient, spouse, daughter     Progress: improving  Outcome Evaluation: Patient alert, oriented to self. Intermittently oriented to \"hospital\". Vitals stable; HR increases with activity, pain. Sustained in 110s-120s while c/o pain, but does return to 80s-90s with rest/comfort. Bed rest; turn q2h and PRN. Good UOP noted with external catheter. Falls precautions in place. Passed video swallow. Tolerating water from spoon, NTL, mech soft diet. Wife called RN expressing concern that Morphine was not controlling pain for very long and patient was withdrawaling from Cymbalta. Patient was given PRN Oxycodone after passing video swallow study, along with other home PO meds, and tolerated well. Will continue to monitor pain level and mental orientation.      Problem: Adult Inpatient Plan of Care  Goal: Plan of Care Review  Outcome: Ongoing, Progressing  Flowsheets (Taken 4/7/2024 1520)  Progress: improving  Plan of Care Reviewed With:   patient   spouse   daughter  Outcome Evaluation:   Patient alert, oriented to self. Intermittently oriented to \"hospital\". Vitals stable; HR increases with activity, pain. Sustained in 110s-120s while c/o pain, but does return to 80s-90s with rest/comfort. Bed rest; turn q2h and PRN. Good UOP noted with external catheter. Falls precautions in place. Passed video swallow. Tolerating water from spoon, NTL, mech soft diet. Wife called RN expressing concern that Morphine was not controlling pain for very long and patient was withdrawaling from Cymbalta. Patient was given PRN Oxycodone after passing video swallow study, along with other home PO meds, and tolerated well. Will continue to monitor pain level and mental orientation.  Goal: Patient-Specific Goal (Individualized)  Outcome: Ongoing, Progressing  Goal: Absence of Hospital-Acquired Illness or Injury  Outcome: Ongoing, Progressing  Intervention: Identify and Manage Fall " Risk  Flowsheets  Taken 4/7/2024 1446  Safety Promotion/Fall Prevention: safety round/check completed  Taken 4/7/2024 1050  Safety Promotion/Fall Prevention: safety round/check completed  Taken 4/7/2024 0858  Safety Promotion/Fall Prevention:   safety round/check completed   room organization consistent   nonskid shoes/slippers when out of bed   fall prevention program maintained   assistive device/personal items within reach   activity supervised   clutter free environment maintained  Intervention: Prevent Skin Injury  Flowsheets  Taken 4/7/2024 1050 by Rosy Philip RN  Body Position: turned  Taken 4/7/2024 0858 by Rosy Philip RN  Body Position: turned  Taken 4/6/2024 2000 by Rosey Aparicio RN  Skin Protection: tubing/devices free from skin contact  Intervention: Prevent and Manage VTE (Venous Thromboembolism) Risk  Flowsheets  Taken 4/7/2024 1446 by Rosy Philip RN  Activity Management: bedrest  Taken 4/7/2024 1050 by Rosy Philip RN  Activity Management: bedrest  Taken 4/7/2024 0858 by Rosy Philip RN  Activity Management: bedrest  Taken 4/6/2024 0735 by Amisha Cortez, RN  VTE Prevention/Management:   bilateral   sequential compression devices on  Intervention: Prevent Infection  Flowsheets  Taken 4/7/2024 1446  Infection Prevention: personal protective equipment utilized  Taken 4/7/2024 1050  Infection Prevention: hand hygiene promoted  Taken 4/7/2024 0858  Infection Prevention:   hand hygiene promoted   personal protective equipment utilized  Goal: Optimal Comfort and Wellbeing  Outcome: Ongoing, Progressing  Intervention: Monitor Pain and Promote Comfort  Flowsheets  Taken 4/7/2024 1446  Pain Management Interventions: see MAR  Taken 4/7/2024 0858  Pain Management Interventions: (awaiting ST to see patient for PO pain med admin per provider)   position adjusted   pillow support provided   care clustered   quiet environment facilitated  Intervention: Provide Person-Centered Care  Flowsheets  (Taken 4/6/2024 2000 by Rosey Aparicio, RN)  Trust Relationship/Rapport: care explained  Goal: Readiness for Transition of Care  Outcome: Ongoing, Progressing     Problem: Diabetes Comorbidity  Goal: Blood Glucose Level Within Targeted Range  Outcome: Ongoing, Progressing  Intervention: Monitor and Manage Glycemia  Flowsheets (Taken 4/7/2024 0858)  Glycemic Management: blood glucose monitored     Problem: Hypertension Comorbidity  Goal: Blood Pressure in Desired Range  Outcome: Ongoing, Progressing  Intervention: Maintain Blood Pressure Management  Flowsheets (Taken 4/7/2024 0858)  Medication Review/Management: medications reviewed     Problem: Adjustment to Illness (Sepsis/Septic Shock)  Goal: Optimal Coping  Outcome: Ongoing, Progressing  Intervention: Optimize Psychosocial Adjustment to Illness  Flowsheets (Taken 4/6/2024 1440 by Inez Wills, RN)  Family/Support System Care: support provided     Problem: Bleeding (Sepsis/Septic Shock)  Goal: Absence of Bleeding  Outcome: Ongoing, Progressing     Problem: Glycemic Control Impaired (Sepsis/Septic Shock)  Goal: Blood Glucose Level Within Desired Range  Outcome: Ongoing, Progressing  Intervention: Optimize Glycemic Control  Flowsheets (Taken 4/7/2024 0858)  Glycemic Management: blood glucose monitored     Problem: Infection Progression (Sepsis/Septic Shock)  Goal: Absence of Infection Signs and Symptoms  Outcome: Ongoing, Progressing  Intervention: Initiate Sepsis Management  Flowsheets  Taken 4/7/2024 1446  Infection Prevention: personal protective equipment utilized  Taken 4/7/2024 1050  Infection Prevention: hand hygiene promoted  Taken 4/7/2024 0858  Infection Prevention:   hand hygiene promoted   personal protective equipment utilized  Intervention: Promote Recovery  Flowsheets  Taken 4/7/2024 1446  Activity Management: bedrest  Taken 4/7/2024 1050  Activity Management: bedrest  Taken 4/7/2024 0858  Activity Management: bedrest     Problem: Nutrition Impaired  (Sepsis/Septic Shock)  Goal: Optimal Nutrition Intake  Outcome: Ongoing, Progressing     Problem: Skin Injury Risk Increased  Goal: Skin Health and Integrity  Outcome: Ongoing, Progressing  Intervention: Optimize Skin Protection  Flowsheets  Taken 4/7/2024 0858 by Rosy Philip RN  Pressure Reduction Techniques:   frequent weight shift encouraged   positioned off wounds   pressure points protected   weight shift assistance provided  Pressure Reduction Devices:   pressure-redistributing mattress utilized   positioning supports utilized  Taken 4/6/2024 2000 by Rosey Aparicio RN  Head of Bed (HOB) Positioning: HOB at 30 degrees     Problem: Fall Injury Risk  Goal: Absence of Fall and Fall-Related Injury  Outcome: Ongoing, Progressing  Intervention: Identify and Manage Contributors  Flowsheets (Taken 4/7/2024 0858)  Medication Review/Management: medications reviewed  Intervention: Promote Injury-Free Environment  Flowsheets  Taken 4/7/2024 1446  Safety Promotion/Fall Prevention: safety round/check completed  Taken 4/7/2024 1050  Safety Promotion/Fall Prevention: safety round/check completed  Taken 4/7/2024 0858  Safety Promotion/Fall Prevention:   safety round/check completed   room organization consistent   nonskid shoes/slippers when out of bed   fall prevention program maintained   assistive device/personal items within reach   activity supervised   clutter free environment maintained

## 2024-04-07 NOTE — CONSULTS
Referring Provider: Kendall Haynes MD    Reason for Consultation:      Atrial flutter with RVR      Patient Care Team:  Montana Morrow MD as PCP - General (Family Medicine)  George Viveros MD as Consulting Physician (Gastroenterology)      SUBJECTIVE     Chief Complaint: Fever, weakness, mental status changes    History of present illness:  Lux Bray is a 78 y.o. male with a history of Parkinson who presented to [facility] with complaint of fever, weakness, mental status changes.     Patient was transferred to [facility] emergency room on April 5, 2024 from his long-term care facility.  He has a history of Parkinson's disease.  He has been in the facility for rehab.  He was reported to be more lethargic, confused and febrile with temperature 101.4.    Evaluation in the emergency room included findings of pneumonia.  The patient was hypotensive.  He received fluid resuscitation per sepsis protocol.  He was started on IV pressors patient was admitted to the ICU.    He has known to have Parkinson's disease, diabetes, hypertension, dyslipidemia.    Patient developed atrial flutter with RVR.  He was treated with verapamil at home which had been held due to his hypotension.  He was given IV digoxin.    Patient is currently n.p.o. pending speech evaluation.    Review of systems:    Unable to complete full review of systems due to patient's lethargy    Personal History:      Past Medical History:   Diagnosis Date    ADHD (attention deficit hyperactivity disorder)     Allergic     Arthritis     Colon polyp     Depression     Diabetes mellitus type 2    controlled by oral meds    Fibromyalgia, primary     HL (hearing loss)     Hyperlipidemia     Hypertension        Past Surgical History:   Procedure Laterality Date    COLONOSCOPY      ENDOSCOPY N/A 11/15/2023    Procedure: ESOPHAGOGASTRODUODENOSCOPY;  Surgeon: George Vivreos MD;  [place] [facility];  Service:  Gastroenterology;  Laterality: N/A;  popssible candida, irrgular z-line, gastritis    EYE SURGERY      Cataracts removed: both eyes    HAND SURGERY  1980- [facility]. [facility]    HEMORROIDECTOMY      [facility]. Abstracted from [facility].    TONSILLECTOMY  0's    VASECTOMY         Family History   Problem Relation Age of Onset    Diabetes Mother         type2    Stroke Mother     Diabetes Father         type2    Colon cancer Neg Hx     Colon polyps Neg Hx     Crohn's disease Neg Hx     Irritable bowel syndrome Neg Hx     Ulcerative colitis Neg Hx        Social History     Tobacco Use    Smoking status: Former     Current packs/day: 0.00     Average packs/day: 0.3 packs/day for 5.0 years (1.3 ttl pk-yrs)     Types: Cigarettes     Start date: 2/10/1966     Quit date: 2/10/1971     Years since quittin.1    Smokeless tobacco: Never   Vaping Use    Vaping status: Never Used   Substance Use Topics    Alcohol use: Not Currently     Alcohol/week: 2.0 standard drinks of alcohol     Types: 1 Glasses of wine, 1 Cans of beer per week    Drug use: No        Home meds:  Prior to Admission medications    Medication Sig Start Date End Date Taking? Authorizing Provider   atorvastatin (LIPITOR) 20 MG tablet Take 1 tablet by mouth Daily. 24  Yes Montana Morrow MD   bisacodyl (DULCOLAX) 5 MG EC tablet Take 1 tablet by mouth Daily.   Yes ProviderGerman MD   cefTAZidime (FORTAZ) 2000 mg/50 mL 0.9% NS IVPB Infuse 50 mL into a venous catheter 2 (Two) Times a Day. Infuse 2 gram over 30 intravenously Twice daily at 0600 and 1800 24 Yes German Valle MD   docusate sodium (COLACE) 100 MG capsule Take 1 capsule by mouth 2 (Two) Times a Day.   Yes German Valle MD   DULoxetine (CYMBALTA) 30 MG capsule Take 3 capsules by mouth Daily. 24  Yes Montana Morrow MD   glimepiride (AMARYL) 2 MG tablet TAKE 3 TABLETS EVERY MORNING BEFORE BREAKFAST 24  Yes  Montana Morrow MD   l-methylfolate-algae-B6-B12 (METANX) 3-90.314-2-35 MG capsule capsule Take 1 capsule by mouth Every 12 (Twelve) Hours. 2/1/24  Yes German Valle MD   metFORMIN (GLUCOPHAGE) 500 MG tablet Take 2 tablets by mouth 2 (Two) Times a Day. 1/17/24  Yes Montana Morrow MD   oxyCODONE (ROXICODONE) 5 MG immediate release tablet Take 2 tablets by mouth Every Morning.   Yes German Valle MD   pantoprazole (PROTONIX) 40 MG EC tablet Take 1 tablet by mouth Daily. 1/17/24  Yes Montana Morrow MD   pregabalin (LYRICA) 75 MG capsule Take 1 capsule by mouth 2 (Two) Times a Day.   Yes German Valle MD   primidone (MYSOLINE) 50 MG tablet 3 tablets twice daily 2/6/24  Yes Montana Morrow MD   sucralfate (CARAFATE) 1 g tablet Take 1 tablet by mouth 4 (Four) Times a Day.   Yes German Valle MD   tamsulosin (FLOMAX) 0.4 MG capsule 24 hr capsule Take 1 capsule by mouth Daily.   Yes German Valle MD   verapamil ER (VERELAN) 120 MG 24 hr capsule Take 2 capsules by mouth Every Night.   Yes German Valle MD   acetaminophen (TYLENOL) 325 MG tablet Take 2 tablets by mouth Every 6 (Six) Hours As Needed for Mild Pain.    German Valle MD   bisacodyl (DULCOLAX) 10 MG suppository Insert 1 suppository into the rectum Daily As Needed for Constipation.    German Valle MD   dicyclomine (BENTYL) 10 MG capsule Take 1 capsule by mouth 4 (Four) Times a Day Before Meals & at Bedtime. 1/17/24   Montana Morrow MD   diphenhydrAMINE (BENADRYL) 25 mg capsule Take 12.5 mg by mouth 3 (Three) Times a Day As Needed for Itching.    German Valle MD   ipratropium-albuterol (DUO-NEB) 0.5-2.5 mg/3 ml nebulizer Take 3 mL by nebulization Every 4 (Four) Hours As Needed for Wheezing.    German Valle MD   magnesium hydroxide (MILK OF MAGNESIA) 2400 MG/10ML suspension suspension Take 30 mL by mouth Daily As Needed. Administer if no BM x3 days and after  "prune juice is given and ineffective after 8 hours (if not a renal patient)  If no BM after 1st and 2nd step bowl regimen is given, notify MD/APRN for further instructions/guidance    ProviderGerman MD   NON FORMULARY Prune Juice QD PRN    ProviderGerman MD   ondansetron (ZOFRAN) 4 MG tablet Take 1 tablet by mouth Every 8 (Eight) Hours As Needed for Nausea or Vomiting.    Provider, MD German       Allergies:     Patient has no known allergies.    Scheduled Meds:[Held by provider] atorvastatin, 20 mg, Oral, Daily  [Held by provider] DULoxetine, 90 mg, Oral, Daily  enoxaparin, 1 mg/kg, Subcutaneous, Q12H  insulin lispro, 2-9 Units, Subcutaneous, 4x Daily AC & at Bedtime  metoprolol tartrate, 25 mg, Oral, Q12H  [Held by provider] pantoprazole, 40 mg, Oral, Daily  piperacillin-tazobactam, 3.375 g, Intravenous, Q8H  [Held by provider] pregabalin, 75 mg, Oral, Q12H  [Held by provider] primidone, 150 mg, Oral, Q12H  sodium chloride, 10 mL, Intravenous, Q12H      Continuous Infusions:dextrose 5 % and lactated Ringer's, 75 mL/hr, Last Rate: 75 mL/hr (04/06/24 2307)  Pharmacy to Dose enoxaparin (LOVENOX),       PRN Meds:  acetaminophen **OR** acetaminophen    dextrose    dextrose    glucagon (human recombinant)    metoprolol tartrate    Morphine    nitroglycerin    ondansetron ODT **OR** ondansetron    oxyCODONE    Pharmacy to Dose enoxaparin (LOVENOX)    sodium chloride    sodium chloride    sodium chloride      OBJECTIVE    Vital Signs  Vitals:    04/07/24 0500 04/07/24 0523 04/07/24 0951 04/07/24 1314   BP:  157/86 138/78 138/79   BP Location:  Left arm Left arm Left arm   Patient Position:  Lying Lying Lying   Pulse:  90     Resp:  16 16 25   Temp:  99.3 °F (37.4 °C) 97.5 °F (36.4 °C) 98 °F (36.7 °C)   TempSrc:  Axillary Oral Oral   SpO2:  92% 92%    Weight: 64.8 kg (142 lb 13.7 oz) 64.4 kg (142 lb)     Height:  180.3 cm (71\")         Flowsheet Rows      Flowsheet Row First Filed Value   Admission " "Height 180.3 cm (71\") Documented at 04/05/2024 1128   Admission Weight 72.6 kg (160 lb) Documented at 04/05/2024 1152              Intake/Output Summary (Last 24 hours) at 4/7/2024 1429  Last data filed at 4/7/2024 0523  Gross per 24 hour   Intake --   Output 1000 ml   Net -1000 ml        Telemetry: Atrial flutter currently heart rate 89 intermittent RVR    Physical Exam:  The patient is drowsy and confused  Vital signs as noted above.  Head and neck revealed no carotid bruits or jugular venous distention.   Lungs clear.  No wheezing.  Breath sounds are normal bilaterally.  Heart: Irregularly irregular rhythm with intermittent tachycardia normal first and second heart sounds. No murmur.  No precordial rub is present.  No gallop is present.  Abdomen: Soft and nontender.  No organomegaly is present.  Extremities with good peripheral pulses without any pedal edema.  Skin: Warm and dry.  Musculoskeletal system is grossly normal.  CNS grossly normal.       Results Review:  I have personally reviewed the results from the time of this admission to 4/7/2024 14:29 EDT and agree with these findings:  [x]  Laboratory  []  Microbiology  []  Radiology  [x]  EKG/Telemetry   [x]  Cardiology/Vascular   []  Pathology  []  Old records  []  Other:    Most notable findings include:     Lab Results (last 24 hours)       Procedure Component Value Units Date/Time    Blood Culture - Blood, Arm, Right [[id number]]  (Normal) Collected: 04/05/24 1201    Specimen: Blood from Arm, Right Updated: 04/07/24 1230     Blood Culture No growth at 2 days    Blood Culture - Blood, Arm, Left [[id number]]  (Normal) Collected: 04/05/24 1203    Specimen: Blood from Arm, Left Updated: 04/07/24 1230     Blood Culture No growth at 2 days    POC Glucose 4x Daily Before Meals & at Bedtime [[id number]]  (Abnormal) Collected: 04/07/24 1108    Specimen: Blood Updated: 04/07/24 1109     Glucose 163 mg/dL      Comment: Serial Number: [id number]:  [id number]       " POC Glucose 4x Daily Before Meals & at Bedtime [[id number]]  (Abnormal) Collected: 04/07/24 0706    Specimen: Blood Updated: 04/07/24 0707     Glucose 150 mg/dL      Comment: Serial Number: [id number]:  [id number]       CBC & Differential [[id number]]  (Abnormal) Collected: 04/07/24 0526    Specimen: Blood Updated: 04/07/24 0617    Narrative:      The following orders were created for panel order CBC & Differential.  Procedure                               Abnormality         Status                     ---------                               -----------         ------                     CBC Auto Differential[[id number]]        Abnormal            Final result                 Please view results for these tests on the individual orders.    CBC Auto Differential [[id number]]  (Abnormal) Collected: 04/07/24 0526    Specimen: Blood Updated: 04/07/24 0617     WBC 22.33 10*3/mm3      RBC 4.47 10*6/mm3      Hemoglobin 13.0 g/dL      Hematocrit 40.9 %      MCV 91.5 fL      MCH 29.1 pg      MCHC 31.8 g/dL      RDW 14.6 %      RDW-SD 49.4 fl      MPV 10.8 fL      Platelets 248 10*3/mm3      Neutrophil % 78.1 %      Lymphocyte % 9.0 %      Monocyte % 11.9 %      Eosinophil % 0.0 %      Basophil % 0.3 %      Immature Grans % 0.7 %      Neutrophils, Absolute 17.44 10*3/mm3      Lymphocytes, Absolute 2.00 10*3/mm3      Monocytes, Absolute 2.66 10*3/mm3      Eosinophils, Absolute 0.01 10*3/mm3      Basophils, Absolute 0.06 10*3/mm3      Immature Grans, Absolute 0.16 10*3/mm3      nRBC 0.0 /100 WBC     Basic Metabolic Panel [[id number]]  (Abnormal) Collected: 04/07/24 0043    Specimen: Blood Updated: 04/07/24 0108     Glucose 172 mg/dL      BUN 23 mg/dL      Creatinine 1.11 mg/dL      Sodium 140 mmol/L      Potassium 3.5 mmol/L      Chloride 100 mmol/L      CO2 25.0 mmol/L      Calcium 9.1 mg/dL      BUN/Creatinine Ratio 20.7     Anion Gap 15.0 mmol/L      eGFR 67.1 mL/min/1.73     Narrative:      GFR Normal >60  Chronic Kidney  "Disease <60  Kidney Failure <15    The GFR formula is only valid for adults with stable renal function between ages 18 and 70.    High Sensitivity Troponin T 2Hr [[id number]]  (Abnormal) Collected: 04/07/24 0043    Specimen: Blood Updated: 04/07/24 0108     HS Troponin T 41 ng/L      Troponin T Delta -3 ng/L     Narrative:      High Sensitive Troponin T Reference Range:  <14.0 ng/L- Negative Female for AMI  <22.0 ng/L- Negative Male for AMI  >=14 - Abnormal Female indicating possible myocardial injury.  >=22 - Abnormal Male indicating possible myocardial injury.   Clinicians would have to utilize clinical acumen, EKG, Troponin, and serial changes to determine if it is an Acute Myocardial Infarction or myocardial injury due to an underlying chronic condition.         MRSA Screen, PCR (Inpatient) - Swab, Nares [[id number]]  (Normal) Collected: 04/06/24 2226    Specimen: Swab from Nares Updated: 04/06/24 2349     MRSA PCR No MRSA Detected    Narrative:      The negative predictive value of this diagnostic test is high and should only be used to consider de-escalating anti-MRSA therapy. A positive result may indicate colonization with MRSA and must be correlated clinically.    Procalcitonin [[id number]]  (Abnormal) Collected: 04/06/24 2143    Specimen: Blood Updated: 04/06/24 2214     Procalcitonin 0.64 ng/mL     Narrative:      As a Marker for Sepsis (Non-Neonates):    1. <0.5 ng/mL represents a low risk of severe sepsis and/or septic shock.  2. >2 ng/mL represents a high risk of severe sepsis and/or septic shock.    As a Marker for Lower Respiratory Tract Infections that require antibiotic therapy:    PCT on Admission    Antibiotic Therapy       6-12 Hrs later    >0.5                Strongly Recommended  >0.25 - <0.5        Recommended   0.1 - 0.25          Discouraged              Remeasure/reassess PCT  <0.1                Strongly Discouraged     Remeasure/reassess PCT    As 28 day mortality risk marker: \"Change in " "Procalcitonin Result\" (>80% or <=80%) if Day 0 (or Day 1) and Day 4 values are available. Refer to http://[email or web address]-pct-calculator.com    Change in PCT <=80%  A decrease of PCT levels below or equal to 80% defines a positive change in PCT test result representing a higher risk for 28-day all-cause mortality of patients diagnosed with severe sepsis for septic shock.    Change in PCT >80%  A decrease of PCT levels of more than 80% defines a negative change in PCT result representing a lower risk for 28-day all-cause mortality of patients diagnosed with severe sepsis or septic shock.       High Sensitivity Troponin T [[id number]]  (Abnormal) Collected: 04/06/24 2143    Specimen: Blood Updated: 04/06/24 2205     HS Troponin T 44 ng/L     Narrative:      High Sensitive Troponin T Reference Range:  <14.0 ng/L- Negative Female for AMI  <22.0 ng/L- Negative Male for AMI  >=14 - Abnormal Female indicating possible myocardial injury.  >=22 - Abnormal Male indicating possible myocardial injury.   Clinicians would have to utilize clinical acumen, EKG, Troponin, and serial changes to determine if it is an Acute Myocardial Infarction or myocardial injury due to an underlying chronic condition.         Lactic Acid, Plasma [[id number]]  (Normal) Collected: 04/06/24 2143    Specimen: Blood Updated: 04/06/24 2204     Lactate 1.1 mmol/L     POC Glucose Once [[id number]]  (Abnormal) Collected: 04/06/24 2055    Specimen: Blood Updated: 04/06/24 2057     Glucose 161 mg/dL      Comment: Serial Number: [id number]:  [id number]       POC Glucose Once [[id number]]  (Abnormal) Collected: 04/06/24 1632    Specimen: Blood Updated: 04/06/24 1633     Glucose 113 mg/dL      Comment: Serial Number: [id number]:  [id number]               Imaging Results (Last 24 Hours)       Procedure Component Value Units Date/Time    CT Abdomen Without Contrast [[id number]] Collected: 04/07/24 0314     Updated: 04/07/24 0321    Narrative:      CT ABDOMEN WO " CONTRAST    Date of Exam: 4/7/2024 12:10 AM EDT    Indication: Abdominal mass, intra-abdominal neoplasm suspected.    Comparison: CT abdomen pelvis dated 12/11/2023    Technique: Axial CT images were obtained of the abdomen without contrast administration. Sagittal and coronal reconstructions were performed.  Automated exposure control and iterative reconstruction methods were used.      Findings:  There are small bilateral pleural effusions. There is right basilar atelectasis. There is left basilar airspace disease that may be secondary to pneumonia. There is a trace pericardial effusion.    There is distention of the gallbladder. The liver, bilateral adrenal glands, bilateral kidneys, pancreas and spleen are unremarkable. There is diverticulosis without evidence of diverticulitis. There is a moderate amount of stool throughout the colon the   appendix is seen and is normal. The visualized small bowel is normal. There is no free air or free fluid in the abdomen.     There are degenerative changes of the lower thoracic spine and the visualized portions of the lumbar spine. There is a stable compression fracture of T12..      Impression:      Impression:  1.Small bilateral pleural effusions with right basilar atelectasis.  2.Airspace disease in the left lower lobe could be secondary to pneumonia.  3.There is distention of the gallbladder.        Electronically Signed: Ronny Slaughter MD    4/7/2024 3:19 AM EDT    Workstation ID: [id number]    CT Chest Without Contrast Diagnostic [[id number]] Collected: 04/07/24 0308     Updated: 04/07/24 0313    Narrative:      CT CHEST WO CONTRAST DIAGNOSTIC    Date of Exam: 4/7/2024 12:10 AM EDT    Indication: Pneumonia, complication suspected, xray done.    Comparison: None available.    Technique: Axial CT images were obtained of the chest without contrast administration.  Sagittal and coronal reconstructions were performed.  Automated exposure control and iterative reconstruction  methods were used.      Findings:  There are small bilateral pleural effusions. There is bilateral basilar discoid airspace disease favored to be atelectasis. Consolidation in the left lower lobe could be combination of atelectasis and pneumonia. There is mild lingular atelectasis.    There is a trace pericardial effusion. There is mild coronary artery calcific atherosclerosis.    There is no evidence of pathologic adenopathy.    Limited imaging of the upper solid abdominal structures demonstrates mild gallbladder distention. The other upper abdominal structures are normal. There are degenerative changes of the thoracic spine.      Impression:      Impression:  Small bilateral pleural effusions. Bilateral basilar airspace disease. On the left there may be a combination of atelectasis and pneumonia.      Electronically Signed: Ronny Slaughter MD    4/7/2024 3:11 AM EDT    Workstation ID: [id number]            LAB RESULTS (LAST 7 DAYS)    CBC  Results from last 7 days   Lab Units 04/07/24  0526 04/06/24  0448 04/05/24  1201 04/05/24  0529 04/04/24  1336 04/03/24  0515 04/02/24  0915   WBC 10*3/mm3 22.33* 16.45* 13.68* 12.51* 11.25* 8.62 10.48   RBC 10*6/mm3 4.47 4.57 4.01* 4.06* 4.23 3.94* 4.16   HEMOGLOBIN g/dL 13.0 13.2 11.9* 12.0* 12.9* 11.4* 12.3*   HEMATOCRIT % 40.9 43.7 37.5 38.2 40.4 37.7 40.0   MCV fL 91.5 95.6 93.5 94.1 95.5 95.7 96.2   PLATELETS 10*3/mm3 248 176 164 183 198 188 191       BMP  Results from last 7 days   Lab Units 04/07/24  0043 04/06/24  0448 04/05/24  1201 04/05/24  0529 04/04/24  1336 04/03/24  0515 04/02/24  0915   SODIUM mmol/L 140 141 137 138 137 140 141   POTASSIUM mmol/L 3.5 4.5 4.7 4.9 4.5 4.8 5.0   CHLORIDE mmol/L 100 104 99 101 99 101 101   CO2 mmol/L 25.0 24.0 28.0 29.0 27.0 28.0 32.0*   BUN mg/dL 23 26* 34* 33* 33* 46* 40*   CREATININE mg/dL 1.11 1.18 1.50* 1.45* 1.42* 1.50* 1.47*   GLUCOSE mg/dL 172* 81 180* 166* 166* 123* 87   MAGNESIUM mg/dL  --  1.8  --   --   --   --   --     PHOSPHORUS mg/dL  --  2.8  --   --   --   --   --        CMP   Results from last 7 days   Lab Units 04/07/24  0043 04/06/24  0448 04/05/24  1201 04/05/24  0529 04/04/24  1336 04/03/24  0515 04/02/24  0915   SODIUM mmol/L 140 141 137 138 137 140 141   POTASSIUM mmol/L 3.5 4.5 4.7 4.9 4.5 4.8 5.0   CHLORIDE mmol/L 100 104 99 101 99 101 101   CO2 mmol/L 25.0 24.0 28.0 29.0 27.0 28.0 32.0*   BUN mg/dL 23 26* 34* 33* 33* 46* 40*   CREATININE mg/dL 1.11 1.18 1.50* 1.45* 1.42* 1.50* 1.47*   GLUCOSE mg/dL 172* 81 180* 166* 166* 123* 87   ALBUMIN g/dL  --   --  3.4* 3.2* 3.7  --  3.7   BILIRUBIN mg/dL  --   --  0.2 0.2 0.2  --  0.2   ALK PHOS U/L  --   --  116 112 126*  --  107   AST (SGOT) U/L  --   --  26 28 30  --  32   ALT (SGPT) U/L  --   --  20 18 17  --  17   AMMONIA umol/L  --   --   --   --  34  --   --        BNP        TROPONIN  Results from last 7 days   Lab Units 04/07/24  0043 04/05/24  1201 04/02/24  0915   CK TOTAL U/L  --   --  199   HSTROP T ng/L 41*   < >  --     < > = values in this interval not displayed.       CoAg        Creatinine Clearance  Estimated Creatinine Clearance: 48.3 mL/min (by C-G formula based on SCr of 1.11 mg/dL).    ABG  Results from last 7 days   Lab Units 04/01/24  1600   PH, ARTERIAL pH units 7.505*   PCO2, ARTERIAL mm Hg 39.2   PO2 ART mm Hg 99.1   BASE EXCESS ART mmol/L 7.3*         Radiology  CT Abdomen Without Contrast    Result Date: 4/7/2024  Impression: 1.Small bilateral pleural effusions with right basilar atelectasis. 2.Airspace disease in the left lower lobe could be secondary to pneumonia. 3.There is distention of the gallbladder. Electronically Signed: Ronny Slaughter MD  4/7/2024 3:19 AM EDT  Workstation ID: [id number]    CT Chest Without Contrast Diagnostic    Result Date: 4/7/2024  Impression: Small bilateral pleural effusions. Bilateral basilar airspace disease. On the left there may be a combination of atelectasis and pneumonia. Electronically Signed: Ronny Slaughter MD   4/7/2024 3:11 AM EDT  Workstation ID: [id number]    CT Head Without Contrast    Result Date: 4/5/2024  Impression: Chronic findings. No acute intracranial process. Findings suggestive of mild right mastoiditis. Electronically Signed: Heidy Calvin MD  4/5/2024 3:01 PM EDT  Workstation ID: [id number]       EKG  I personally viewed and interpreted the patient's EKG/Telemetry data:  ECG 12 Lead Rhythm Change   Preliminary Result   HEART RATE= 131  bpm   RR Interval= 457  ms   ME Interval=   ms   P Horizontal Axis=   deg   P Front Axis=   deg   QRSD Interval= 93  ms   QT Interval= 337  ms   QTcB= 499  ms   QRS Axis= 40  deg   T Wave Axis= -21  deg   - ABNORMAL ECG -   Atrial fibrillation   Inferior infarct, old   Electronically Signed By:    Date and Time of Study: 2024-04-06 19:04:20      ECG 12 Lead Tachycardia   Preliminary Result   HEART RATE= 134  bpm   RR Interval= 448  ms   ME Interval=   ms   P Horizontal Axis=   deg   P Front Axis=   deg   QRSD Interval= 95  ms   QT Interval= 321  ms   QTcB= 480  ms   QRS Axis= 41  deg   T Wave Axis= -5  deg   - ABNORMAL ECG -   Atrial fibrillation   Ventricular premature complex   Low voltage, precordial leads   ST depression, probably rate related   When compared with ECG of 05-Apr-2024 11:44:03,   Significant repolarization change   Significant axis, voltage or hypertrophy change   Electronically Signed By:    Date and Time of Study: 2024-04-06 07:54:52      ECG 12 Lead Altered Mental Status   Preliminary Result   HEART RATE= Invalid  bpm   RR Interval= 0  ms   ME Interval=   ms   P Horizontal Axis=   deg   P Front Axis=   deg   QRSD Interval=   ms   QT Interval=   ms   QTcB= Invalid  ms   QRS Axis=   deg   T Wave Axis=   deg   Electronically Signed By:    Date and Time of Study: 2024-04-05 11:29:29            Echocardiogram:          Stress Test:        Cardiac Catheterization:  No results found for this or any previous visit.    [id number] SCORE   [id number] Score: 4  (4/7/2024  9:35 AM)        Other:      ASSESSMENT & PLAN:    Principal Problem:    Septic shock      Atrial flutter with intermittent RVR  Rate is currently 89  Awaiting speech evaluation so p.o. medications can be implemented  Recurrent RVR will start IV Cardizem in interim  [id number] score is 4    Sepsis due to Pneumonia  Continue antibiotics as prescribed  Pressure is stabilized  Received IV fluid resuscitation and briefly on pressors    Hypertension  Blood pressures currently stable    Dyslipidemia  Lipitor already p.o.    Mental status changes  No family at present at bedside    Dysphagia  Currently awaiting swallow study      Echocardiogram will be obtained to assess LV function  Will start IV Cardizem if ventricular rates become uncontrolled  Resume home verapamil when taking p.o.  Change anticoagulation to treatment dose due to atrial flutter  Recommendations per Dr. Alvarenga    Patient is seen and examined and findings are verified.  All data is reviewed by me personally.  Assessment and plan formulated by APC was done after discussion with attending.  I spent more than 50% of time in taking care of the patient.    Patient is unable to give the history.  He is drowsy.    Patient heart rate is elevated blood pressure was stable    Normal S1 and S2.  No pericardial rub or murmur no leg edema noted abdomen was soft    MDM:    1.  Atrial fibrillation with rapid ventricular response:    I would start Lopressor 25 mg daily.  start IV Cardizem.  Patient is on Lovenox.  I would consider amiodarone    2.  Pneumonia:    Patient is on antibiotics.    3.  Hypertension:    Blood pressure is stable currently.    Electronically signed by Warren Alvarenga MD, 04/07/24, 2:29 PM EDT.        Warren Alvarenga MD  04/07/24  14:29 EDT

## 2024-04-07 NOTE — PLAN OF CARE
Goal Outcome Evaluation:   Pt seen seated at 90 degrees, in the lateral position, for video swallow study. Pt given trials of NT barium by spoon x2 and by cup x1 in rapid consecutive sips, barium coated applesauce x2, barium coated peaches x2,   and thin barium by cup in rapid consecutive sips x2 and by straw in a single sip x1 to complete the study. Pt was impulsive and when given cup to feed himself, took multiple consecutive large sips and did not resond to cues to decrease sip size. Pt exhibited slow but functional mastication of peaches. Oral transit was timely for puree and liquids. Bolus control mildly reduced with premature spillage of all trials to the valleculae prior to swallow. Swallow was delayed 1-2 seconds on all consistencies. Pt had adequate laryngeal elevation and anterior hyoid excursion. Epiglottic inversion was and laryngeal vestibular closure were reduced. Pt had penetration during the swallow of  thin liquids taken in rapid consecutive sips. This did not clear. Clinician was able to control sips size by feeding pt the trial by straw and there was no penetration/aspiration on controlled single sip, however pt is impulsive at this time. Pt had no other penetratopn/aspiration of any consistency. Pt had moderate residue remaining in the pharyngeal area after NT, puree and soft solids. Cued double swallow  did clear most of this residue.     It is recommended that pt initiate a mechanical soft diet with NT liquids as tolerated. If pt can be shown to reduce impulsivity and take small single sips, his liquids can be upgraded to thin consistency. Pt should be upright at 90 degrees for all PO and be encouraged to eat at a slow rate and take small bites and sips. He should utilize a double swallow to clear any pharyngeal residue. Pt may have thin water between meals via the [place] Water Protocol, see guidelines below. Full results and recommendations from this study were explained to pt and his wife.  Pt was shown images from the [facility]. Both verbalized understanding. ST will follow to assure tolerance of diet and make further recs as indicated.       The rationale to recommend water when a PO diet cannot appropriately/functionally sustain nutrition (or if thickened liquids are prescribed due to aspiration of thin liquids) is because water is low risk for aspiration pna when compared to aspiration of food or other liquids.    Benefits of a water protocol include but are not limited to:      Oral gratification   Engagement of oropharyngeal swallow musculature   Decrease likelihood of dehydration       Guidelines for proper implementation include:  Waiting a minimum of 30 minutes after consuming any other PO   Thorough oral care prior to consuming water  Upright at 90 degree hip flexion  Small sips at slow rate  Monitor for any changes in respiratory status and discontinue if distress noted     The Raj Free Water Protocol is a research based protocol established in 1984.                   Anticipated Discharge Disposition (SLP): inpatient rehabilitation facility          SLP Swallowing Diagnosis: mild, oral dysphagia, pharyngeal dysphagia (04/07/24 1600)

## 2024-04-07 NOTE — NURSING NOTE
JONATHAN Choudhary contacted and made known new onset neuro decline occurred 4/4/24 and has gotten significantly worse throughout the day today/tonight. Pt speech garbled, only communicates through following commands. Physical assessment somewhere near baseline due to pt rapid decline physically over the last 6mo but more intensely the last month. Pt can't receive cymbalta do to not passing swallow study and cymbalta is not able to be crushed and put through tube, pt family extremely worried about cymbalta withdrawal as this has happened in the past. IV lopressor given at beginning of my shift for afib w RVR in 150's, HR now controlled. Pt also expressed 10/10 leg pain through following physical commands, IV morphine also given at time IV lopressor was given.     Cardiology consult called to [facility], pt still aflutter/fib.

## 2024-04-07 NOTE — MBS/VFSS/FEES
Acute Care - Speech Language Pathology   Swallow Initial Evaluation  Chava     Patient Name: Lux Bray  : 1945  MRN: [id number]  Today's Date: 2024               Admit Date: 2024    Visit Dx:     ICD-10-CM ICD-9-CM   1. Pneumonia due to infectious organism, unspecified laterality, unspecified part of lung  J18.9 486   2. Severe sepsis  A41.9 038.9    R65.20 995.92   3. Atrial flutter, unspecified type  I48.92 427.32     Patient Active Problem List   Diagnosis    Predisposition to allergic reaction    Attention deficit disorder (ADD) without hyperactivity    Cervical radiculopathy    Cholelithiasis    Deficiency of testosterone biosynthesis    Depression    Enlarged prostate with lower urinary tract symptoms (LUTS)    HTN (hypertension)    Irritable bowel syndrome without diarrhea    Lumbago with sciatica, right side    Arthritis    Diabetes    Vertiginous syndrome    Preventative health care    Vitamin D deficiency, unspecified     Encounter for screening for malignant neoplasm of prostate     Iron deficiency anemia due to chronic blood loss    Medicare annual wellness visit, subsequent    Colon polyp    Gastroesophageal reflux disease without esophagitis    Dysuria    High cholesterol    Hearing loss    Cortical senile cataract    Cataracts, bilateral    Screening for prostate cancer    Screening for hypothyroidism    Need for hepatitis C screening test    Toe pain, bilateral    Tinea unguium    Pre-ulcerative corn or callous    Primary iridocyclitis    Weight loss, non-intentional    Focal myoclonus    Polyneuropathy associated with underlying disease    Tingling of both feet    Generalized weakness    Encephalopathy, metabolic    Leukocytosis    Sepsis    Belching    Acid reflux    Early satiety    Epigastric pressure    Sarcopenia    Hypotension due to drugs    Abdominal pain    Septic shock     Past Medical History:   Diagnosis Date    ADHD (attention deficit hyperactivity disorder)      Allergic     Arthritis     Colon polyp     Depression     Diabetes mellitus type 2    controlled by oral meds    Fibromyalgia, primary     HL (hearing loss)     Hyperlipidemia     Hypertension      Past Surgical History:   Procedure Laterality Date    COLONOSCOPY      ENDOSCOPY N/A 11/15/2023    Procedure: ESOPHAGOGASTRODUODENOSCOPY;  Surgeon: George Viveros MD;  [place] [facility] [place];  Service: Gastroenterology;  Laterality: N/A;  popssible candida, irrgular z-line, gastritis    EYE SURGERY  2023    Cataracts removed: both eyes    HAND SURGERY  1980 1980-1990- [facility]. Abstracted from [facility].    HEMORROIDECTOMY  1984    [facility].    TONSILLECTOMY  1940's    VASECTOMY         SLP Recommendation and Plan  SLP Swallowing Diagnosis: mild, oral dysphagia, pharyngeal dysphagia (04/07/24 1600)  SLP Diet Recommendation: mechanical ground textures, nectar thick liquids, water between meals after oral care, with supervision (04/07/24 1600)  Recommended Precautions and Strategies: upright posture during/after eating, small bites of food and sips of liquid, multiple swallows per bite of food, multiple swallows per sip of liquid, alternate between small bites of food and sips of liquid, general aspiration precautions, reflux precautions, 1:1 supervision (04/07/24 1600)  SLP Rec. for Method of Medication Administration: meds whole, meds crushed, as tolerated (04/07/24 1600)     Monitor for Signs of Aspiration: yes, notify SLP if any concerns, cough, gurgly voice, throat clearing (04/07/24 1600)  Recommended Diagnostics: reassess via clinical swallow evaluation (04/07/24 1600)  Swallow Criteria for Skilled Therapeutic Interventions Met: demonstrates skilled criteria (04/07/24 1600)  Anticipated Discharge Disposition (SLP): inpatient rehabilitation facility (04/07/24 1600)  Rehab Potential/Prognosis, Swallowing: good, to achieve stated therapy goals (04/07/24 1600)  Therapy Frequency  (Swallow): PRN (04/07/24 1600)  Predicted Duration Therapy Intervention (Days): until discharge (04/07/24 1600)  Oral Care Recommendations: Oral Care BID/PRN, Before ice/water (04/07/24 1600)                                               SWALLOW EVALUATION (Last 72 Hours)       SLP Adult Swallow Evaluation       Row Name 04/07/24 1600       Rehab Evaluation    Document Type evaluation  -CP    Subjective Information complains of;pain  -CP    Patient Observations alert;cooperative  -CP    Patient Effort good  -CP       General Information    Patient Profile Reviewed yes  -CP    Pertinent History Of Current Problem Lux Bray is a 78 y.o. male with PMH of HTN, DM II, Depression,  presented to the hospital after reportedly being sick for a few days at rehab, and was admitted with a principal diagnosis of Septic shock.      Per family the patient has had a neurologic decline over the last few months. CT head was negative for acute abnormality. Ammonia level was normal. Per chart review, the patient's PCP has decreased some of the sedating medications the patient has or was taking. Patient is currently drowsy but will wake up and answer questions appropriately. He is reporting that he is very tired.      In ER the patient was found to be hypotensive. He was given the full sepsis bolus and started on levophed. Chest x-ray showed bibasilar infiltrates. Patient's family reports that they have had concerns he has been aspirating. Patient will be transferred to ICU for close monitoring and vasopressor support.  After re-eval of swallow, VFSS was felt to be indicated due to MD concern for aspiration.  -CP    Current Method of Nutrition NPO  -CP    Prior Level of Function-Swallowing no diet consistency restrictions  -CP    Plans/Goals Discussed with patient;spouse/S.O.  -CP    Barriers to Rehab none identified  -CP       Pain    Additional Documentation Pain Scale: FACES Pre/Post-Treatment (Group)  -CP       Pain Scale:  FACES Pre/Post-Treatment    Pain: FACES Scale, Pretreatment 4-->hurts little more  -CP    Posttreatment Pain Rating 4-->hurts little more  -CP    Pain Location generalized  -CP    Pre/Posttreatment Pain Comment Pt's nurse aware and treating  -CP       Oral Motor Structure and Function    Dentition Assessment natural, present and adequate  -CP    Secretion Management WNL/WFL  -CP    Mucosal Quality dry  -CP    Volitional Swallow --       Oral Musculature and Cranial Nerve Assessment    Oral Motor General Assessment generalized oral motor weakness  -CP    Oral Motor, Comment --       General Eating/Swallowing Observations    Respiratory Support Currently in Use room air  -CP    Eating/Swallowing Skills fed by SLP  -CP    Positioning During Eating upright 90 degree;upright in chair  -CP       Clinical Swallow Eval    Clinical Swallow Evaluation Summary --       MBS/VFSS    Utensils Used spoon;cup;straw  -CP    Consistencies Trialed nectar/syrup-thick liquids;pureed;soft to chew textures;thin liquids  -CP       MBS/VFSS Interpretation    VFSS Summary Pt seen seated at 90 degrees, in the lateral position, for video swallow study. Pt given trials of NT barium by spoon x2 and by cup x1 in rapid consecutive sips, barium coated applesauce x2, barium coated peaches x2,   and thin barium by cup in rapid consecutive sips x2 and by straw in a single sip x1 to complete the study. Pt was impulsive and when given cup to feed himself, took multiple consecutive large sips and did not resond to cues to decrease sip size. Pt exhibited slow but functional mastication of peaches. Oral transit was timely for puree and liquids. Bolus control mildly reduced with premature spillage of all trials to the valleculae prior to swallow. Swallow was delayed 1-2 seconds on all consistencies. Pt had adequate laryngeal elevation and anterior hyoid excursion. Epiglottic inversion was and laryngeal vestibular closure were reduced. Pt had penetration  during the swallow of  thin liquids taken in rapid consecutive sips. This did not clear. Clinician was able to control sips size by feeding pt the trial by straw and there was no penetration/aspiration on controlled single sip, however pt is impulsive at this time. Pt had no other penetratopn/aspiration of any consistency. Pt had moderate residue remaining in the pharyngeal area after NT, puree and soft solids. Cued double swallow  did clear most of this residue. It is recommended that pt initiate a mechanical soft diet with NT liquids as tolerated. If pt can be shown to reduce impulsivity and take small single sips, his liquids can be upgraded to thin consistency. Pt should be upright at 90 degrees for all PO and be encouraged to eat at a slow rate and take small bites and sips. He should utilize a double swallow to clear any pharyngeal residue. Pt may have thin water between meals vis the Raj Water Protocol, see guidelines below. Full results and recommendations from this study were explained to pt and his wife. Pt was shown images from the [facility]. Both verbalized understanding. ST will follow to assure tolerance of diet and make further recs as indicated.      The rationale to recommend water when a PO diet cannot appropriately/functionally sustain nutrition (or if thickened liquids are prescribed due to aspiration of thin liquids) is because water is low risk for aspiration pna when compared to aspiration of food or other liquids.    Benefits of a water protocol include but are not limited to:     Oral gratification   Engagement of oropharyngeal swallow musculature   Decrease likelihood of dehydration      Guidelines for proper implementation include:  Waiting a minimum of 30 minutes after consuming any other PO   Thorough oral care prior to consuming water  Upright at 90 degree hip flexion  Small sips at slow rate  Monitor for any changes in respiratory status and discontinue if distress noted    The Raj  Free Water Protocol is a research based protocol established in 1984.  -CP       SLP Evaluation Clinical Impression    SLP Swallowing Diagnosis mild;oral dysphagia;pharyngeal dysphagia  -CP    Functional Impact risk of aspiration/pneumonia;risk of malnutrition  -CP    Rehab Potential/Prognosis, Swallowing good, to achieve stated therapy goals  -CP    Swallow Criteria for Skilled Therapeutic Interventions Met demonstrates skilled criteria  -CP       SLP Treatment Clinical Impressions    Care Plan Review evaluation/treatment results reviewed;care plan/treatment goals reviewed;risks/benefits reviewed;patient/other agree to care plan  -CP    Care Plan Review, Other Participant(s) spouse  -CP       Recommendations    Therapy Frequency (Swallow) PRN  -CP    Predicted Duration Therapy Intervention (Days) until discharge  -CP    SLP Diet Recommendation mechanical ground textures;nectar thick liquids;water between meals after oral care, with supervision  -CP    Recommended Diagnostics reassess via clinical swallow evaluation  -CP    Recommended Precautions and Strategies upright posture during/after eating;small bites of food and sips of liquid;multiple swallows per bite of food;multiple swallows per sip of liquid;alternate between small bites of food and sips of liquid;general aspiration precautions;reflux precautions;1:1 supervision  -CP    Oral Care Recommendations Oral Care BID/PRN;Before ice/water  -CP    SLP Rec. for Method of Medication Administration meds whole;meds crushed;as tolerated  -CP    Monitor for Signs of Aspiration yes;notify SLP if any concerns;cough;gurgly voice;throat clearing  -CP    Anticipated Discharge Disposition (SLP) inpatient rehabilitation facility  -CP       Swallow Goals (SLP)    Swallow LTGs Swallow Long Term Goal (free text)  -CP    Swallow STGs diet tolerance goal selection (SLP)  -CP    Diet Tolerance Goal Selection (SLP) Swallow Short Term Goal 1;Swallow Short Term Goal 2;Patient will  tolerate trials of  -CP       (LTG) Swallow    (LTG) Swallow Pt will return to baseline level of function, tolerating safest and least restrictive diet recommendation, w/ no s/s of penetration/aspiration.  -CP    Council (Swallow Long Term Goal) with moderate cues (50-74% accuracy)  -CP    Time Frame (Swallow Long Term Goal) by discharge  -CP    Progress/Outcomes (Swallow Long Term Goal) good progress toward goal  -CP    Comment (Swallow Long Term Goal) See above VFSS  -CP       (STG) Patient will tolerate trials of    Consistencies Trialed (Tolerate trials) thin liquids  -CP    Desired Outcome (Tolerate trials) without signs/symptoms of aspiration;with use of compensatory strategies (see comments)  -CP    Council (Tolerate trials) independently (over 90% accuracy)  -CP    Time Frame (Tolerate trials) 1 week  -CP    Progress/Outcomes (Tolerate trials) new goal  -CP       (STG) Swallow 1    (STG) Swallow 1 Pt will participate in ongoing swallow evaluation, including VFSS if clinically indicated, in order to effectively determine pt's safest and least restrictive diet recommendation.  -CP    Council (Swallow Short Term Goal 1) with moderate cues (50-74% accuracy)  -CP    Time Frame (Swallow Short Term Goal 1) 1 week  -CP    Progress/Outcomes (Swallow Short Term Goal 1) goal ongoing  -CP    Comment (Swallow Short Term Goal 1) See above VFSS  -CP       (STG) Swallow 2    (STG) Swallow 2 The patient will participate in a meal/follow-up assessment to determine safety and adequacy of recommended diet, independent use of safe swallow compensations, pt/family education and additional goals/recommendations to follow.  -CP    Time Frame (Swallow Short Term Goal 2) 1 week  -CP              User Key  (r) = Recorded By, (t) = Taken By, (c) = Cosigned By      Initials Name Effective Dates    Keisha Velez SLP 06/16/21 -     Zayda Romero 06/16/21 -                     EDUCATION  The patient has been  educated in the following areas:   Dysphagia (Swallowing Impairment) Oral Care/Hydration Modified Diet Instruction.        SLP GOALS       Row Name 04/07/24 1600 04/06/24 0900          (LTG) Swallow    (LTG) Swallow Pt will return to baseline level of function, tolerating safest and least restrictive diet recommendation, w/ no s/s of penetration/aspiration.  -CP Pt will return to baseline level of function, tolerating safest and least restrictive diet recommendation, w/ no s/s of penetration/aspiration.  -KL     [place] (Swallow Long Term Goal) with moderate cues (50-74% accuracy)  -CP with moderate cues (50-74% accuracy)  -KL     Time Frame (Swallow Long Term Goal) by discharge  -CP --     Progress/Outcomes (Swallow Long Term Goal) good progress toward goal  -CP new goal  -KL     Comment (Swallow Long Term Goal) See above VFSS  -CP --        (STG) Patient will tolerate trials of    Consistencies Trialed (Tolerate trials) thin liquids  -CP --     Desired Outcome (Tolerate trials) without signs/symptoms of aspiration;with use of compensatory strategies (see comments)  -CP --     [place] (Tolerate trials) independently (over 90% accuracy)  -CP --     Time Frame (Tolerate trials) 1 week  -CP --     Progress/Outcomes (Tolerate trials) new goal  -CP --        (STG) Swallow 1    (STG) Swallow 1 Pt will participate in ongoing swallow evaluation, including VFSS if clinically indicated, in order to effectively determine pt's safest and least restrictive diet recommendation.  -CP Pt will participate in ongoing swallow evaluation, including VFSS if clinically indicated, in order to effectively determine pt's safest and least restrictive diet recommendation.  -KL     [place] (Swallow Short Term Goal 1) with moderate cues (50-74% accuracy)  -CP with moderate cues (50-74% accuracy)  -KL     Time Frame (Swallow Short Term Goal 1) 1 week  -CP --     Progress/Outcomes (Swallow Short Term Goal 1) goal ongoing  -CP new  goal  -KL     Comment (Swallow Short Term Goal 1) See above VFSS  -CP --        (STG) Swallow 2    (STG) Swallow 2 The patient will participate in a meal/follow-up assessment to determine safety and adequacy of recommended diet, independent use of safe swallow compensations, pt/family education and additional goals/recommendations to follow.  -CP --     Time Frame (Swallow Short Term Goal 2) 1 week  -CP --               User Key  (r) = Recorded By, (t) = Taken By, (c) = Cosigned By      Initials Name Provider Type    CP Keisha Teixeira, SLP Speech and Language Pathologist    Zayda Romero Speech and Language Pathologist                       Time Calculation:                DIANE Cerda  4/7/2024

## 2024-04-07 NOTE — PROGRESS NOTES
"[facility] MEDICINE SERVICE  DAILY PROGRESS NOTE    NAME: Lux Bray  : 1945  MRN: [id number]      LOS: 2 days     PROVIDER OF SERVICE: Kendall Haynes MD    Chief Complaint: Septic shock    Subjective:     History of Present illness      Lux Bray is a 78 y.o. male with PMH of HTN, DM II, Depression,  presented to the hospital after reportedly being sick for a few days at rehab, and was admitted with a principal diagnosis of Septic shock.      Per family the patient has had a neurologic decline over the last few months. CT head was negative for acute abnormality. Ammonia level was normal. Per chart review, the patient's PCP has decreased some of the sedating medications the patient has or was taking. Patient is currently drowsy but will wake up and answer questions appropriately. He is reporting that he is very tired.      In ER the patient was found to be hypotensive. He was given the full sepsis bolus and started on levophed. Chest x-ray showed bibasilar infiltrates. Patient's family reports that they have had concerns he has been aspirating. Patient will be transferred to ICU for close monitoring and vasopressor support.       ACP: CPR, Full Intervention. Patients wife is his decision maker in the even he is unable.      Patient was seen and examined on 24 at 13:54 EDT .     Subjective / Review of systems      24-Patient admitted to the ICU for septic shock and only needed vasopressors very briefly.  Has been off vasopressors since last night.  Developed A-fib with RVR, will give a dose of digoxin.  If blood pressure permits, resume home verapamil.  Will likely need full dose anticoagulation, defer to primary team.  Transfer out of the ICU to hospitalist service.      24-Patient drowsy but easily aroused. Knows what year it is. States that he hurts all over, and is nauseous. He also states that he is \"So, so, so, tired.\" Denies feeling short of breath, or having " chest pain.   4/7/24 patient seen and examined in bed no acute distress, vital signs stable, discussed with RN.  Weak weak and tired.Patient is currently n.p.o. pending speech evaluation.     Review of Systems   Unable to perform ROS: Mental status change       Objective:     Vital Signs  Temp:  [97.5 °F (36.4 °C)-102.8 °F (39.3 °C)] 97.5 °F (36.4 °C)  Heart Rate:  [] 90  Resp:  [13-22] 16  BP: (136-161)/(77-86) 138/78   Body mass index is 19.8 kg/m².    Physical Exam  Constitutional:       Appearance: Normal appearance.   HENT:      Head: Normocephalic and atraumatic.      Nose: Nose normal.      Mouth/Throat:      Mouth: Mucous membranes are moist.   Eyes:      Extraocular Movements: Extraocular movements intact.      Conjunctiva/sclera: Conjunctivae normal.      Pupils: Pupils are equal, round, and reactive to light.   Cardiovascular:      Rate and Rhythm: Regular rhythm. Tachycardia present.      Pulses: Normal pulses.      Heart sounds: Normal heart sounds.   Pulmonary:      Effort: Pulmonary effort is normal.      Breath sounds: Normal breath sounds.   Musculoskeletal:      Cervical back: Normal range of motion.   Skin:     General: Skin is warm and dry.   Neurological:      General: No focal deficit present.      Mental Status: He is alert. He is disoriented.         Scheduled Meds   [Held by provider] atorvastatin, 20 mg, Oral, Daily  [Held by provider] DULoxetine, 90 mg, Oral, Daily  enoxaparin, 1 mg/kg, Subcutaneous, Q12H  insulin lispro, 2-9 Units, Subcutaneous, 4x Daily AC & at Bedtime  metoprolol tartrate, 25 mg, Oral, Q12H  [Held by provider] pantoprazole, 40 mg, Oral, Daily  piperacillin-tazobactam, 3.375 g, Intravenous, Q8H  [Held by provider] pregabalin, 75 mg, Oral, Q12H  [Held by provider] primidone, 150 mg, Oral, Q12H  sodium chloride, 10 mL, Intravenous, Q12H       PRN Meds     acetaminophen **OR** acetaminophen    dextrose    dextrose    glucagon (human recombinant)    metoprolol  tartrate    Morphine    nitroglycerin    ondansetron ODT **OR** ondansetron    oxyCODONE    Pharmacy to Dose enoxaparin (LOVENOX)    sodium chloride    sodium chloride    sodium chloride   Infusions  dextrose 5 % and lactated Ringer's, 75 mL/hr, Last Rate: 75 mL/hr (04/06/24 2307)  Pharmacy to Dose enoxaparin (LOVENOX),           Diagnostic Data    Results from last 7 days   Lab Units 04/07/24  0526 04/07/24  0043 04/06/24  0448 04/05/24  1201   WBC 10*3/mm3 22.33*  --    < > 13.68*   HEMOGLOBIN g/dL 13.0  --    < > 11.9*   HEMATOCRIT % 40.9  --    < > 37.5   PLATELETS 10*3/mm3 248  --    < > 164   GLUCOSE mg/dL  --  172*   < > 180*   CREATININE mg/dL  --  1.11   < > 1.50*   BUN mg/dL  --  23   < > 34*   SODIUM mmol/L  --  140   < > 137   POTASSIUM mmol/L  --  3.5   < > 4.7   AST (SGOT) U/L  --   --   --  26   ALT (SGPT) U/L  --   --   --  20   ALK PHOS U/L  --   --   --  116   BILIRUBIN mg/dL  --   --   --  0.2   ANION GAP mmol/L  --  15.0   < > 10.0    < > = values in this interval not displayed.       CT Abdomen Without Contrast    Result Date: 4/7/2024  Impression: 1.Small bilateral pleural effusions with right basilar atelectasis. 2.Airspace disease in the left lower lobe could be secondary to pneumonia. 3.There is distention of the gallbladder. Electronically Signed: Ronny Slaughter MD  4/7/2024 3:19 AM EDT  Workstation ID: [id number]    CT Chest Without Contrast Diagnostic    Result Date: 4/7/2024  Impression: Small bilateral pleural effusions. Bilateral basilar airspace disease. On the left there may be a combination of atelectasis and pneumonia. Electronically Signed: Ronny Slaughter MD  4/7/2024 3:11 AM EDT  Workstation ID: [id number]    CT Head Without Contrast    Result Date: 4/5/2024  Impression: Chronic findings. No acute intracranial process. Findings suggestive of mild right mastoiditis. Electronically Signed: Heidy Calvin MD  4/5/2024 3:01 PM EDT  Workstation ID: [id number]       I reviewed the patient's new  clinical results.    Assessment/Plan:     Active and Resolved Problems  Active Hospital Problems    Diagnosis  POA    **Septic shock [A41.9, R65.21]  Yes      Resolved Hospital Problems   No resolved problems to display.     Patient downgraded from ICU to PCU level of care.     Septic Shock due to Pneumonia   -Respiratory panel negative   -MRSA swab negative  -NS at 75mL/hr   -Levophed discontinued   -Continue Zosyn   -SLP consult to rule out aspiration pna     Diabetes Mellitus   -Hgb A1c 4/5/24 6.7  -Continue to hold home medications   -Accuchecks ACHS   -SSI ordered     Hypertension   -Not controlled with medication at home  -Monitor BP per protocol     Hyperlipidemia --   -Patient on Lipitor at home  -Last lipid profile 5/26/23 negative  -Restart lipitor when not NPO    Depression  -Patient on Cymbalta at home  -Restart when patient not NPO    Altered Mental Status   -No family at bedside at this time  -Reports of neurologic decline over last few months   -Patient unable to make needs known     Dysphasia   -Failed nursing water screen  -SLP eval today, patient to stay NPO due to mentation   -Repeat tomorrow if patient able to follow commands     DVT prophylaxis:  Medical and mechanical DVT prophylaxis orders are present.      Code status is   Code Status and Medical Interventions:   Ordered at: 04/05/24 1340     Code Status (Patient has no pulse and is not breathing):    CPR (Attempt to Resuscitate)     Medical Interventions (Patient has pulse or is breathing):    Full Support       Plan for disposition:Pending clinical course.     Time: 30 minutes    Signature: Electronically signed by Kendall Haynes MD, 04/07/24, 13:09 EDT.  [facility] Hospitalist Team

## 2024-04-07 NOTE — PROGRESS NOTES
"Pharmacy Antimicrobial Dosing Service    Subjective:  Lux Bray is a 78 y.o.male admitted from [facility] with fever. Pharmacy has been consulted to dose Vancomycin for possible sepsis.    PMH: DM      Assessment/Plan    1. Day #2 Vancomycin: Goal -600 mcg*h/mL. 1500mg (~20mg/kg ABW) IV x1 dose followed by 1250mg (~17mg/kg ABW) IV q24h.  Random level ordered for 4/8 at 1200.  Re-dose when less than 20.    2. Day #3 Piperacillin/Tazobactam: 3.375gm IV q8h for estCrCl > 20 mL/min.    Will continue to monitor drug levels, renal function, culture and sensitivities, and patient clinical status.       Objective:  Relevant clinical data and objective history reviewed:  180.3 cm (71\")   75 kg (165 lb 5.5 oz)   Ideal body weight: 75.3 kg (166 lb 0.1 oz)  Body mass index is 23.06 kg/m².        Results from last 7 days   Lab Units 04/07/24  0043 04/06/24  0448 04/05/24  1201   CREATININE mg/dL 1.11 1.18 1.50*     Estimated Creatinine Clearance: 56.3 mL/min (by C-G formula based on SCr of 1.11 mg/dL).  I/O last 3 completed shifts:  In: 3066 [I.V.:888; IV Piggyback:2178]  Out: 3450 [Urine:3450]    Results from last 7 days   Lab Units 04/06/24  0448 04/05/24  1201 04/05/24  0529   WBC 10*3/mm3 16.45* 13.68* 12.51*     Temperature    04/06/24 1727 04/06/24 1915 04/06/24 2131   Temp: 98.2 °F (36.8 °C) (!) 102.8 °F (39.3 °C) 100.1 °F (37.8 °C)     Baseline culture/source/susceptibility:  Microbiology Results (last 10 days)       Procedure Component Value - Date/Time    MRSA Screen, PCR (Inpatient) - Swab, Nares [[id number]]  (Normal) Collected: 04/06/24 2226    Lab Status: Final result Specimen: Swab from Nares Updated: 04/06/24 2349     MRSA PCR No MRSA Detected    Narrative:      The negative predictive value of this diagnostic test is high and should only be used to consider de-escalating anti-MRSA therapy. A positive result may indicate colonization with MRSA and must be correlated clinically.    MRSA Screen, PCR " (Inpatient) - Swab, Nares [[id number]]  (Normal) Collected: 04/05/24 1406    Lab Status: Final result Specimen: Swab from Nares Updated: 04/05/24 1524     MRSA PCR No MRSA Detected    Narrative:      The negative predictive value of this diagnostic test is high and should only be used to consider de-escalating anti-MRSA therapy. A positive result may indicate colonization with MRSA and must be correlated clinically.    Respiratory Panel PCR w/COVID-19(SARS-CoV-2) DODIE/JOVITA/THEO/PAD/COR/RAJAT In-House, NP Swab in UTM/VTM, 2 HR TAT - Swab, Nasopharynx [[id number]]  (Normal) Collected: 04/05/24 1406    Lab Status: Final result Specimen: Swab from Nasopharynx Updated: 04/05/24 1500     ADENOVIRUS, PCR Not Detected     Coronavirus 229E Not Detected     Coronavirus HKU1 Not Detected     Coronavirus NL63 Not Detected     Coronavirus OC43 Not Detected     COVID19 Not Detected     Human Metapneumovirus Not Detected     Human Rhinovirus/Enterovirus Not Detected     Influenza A PCR Not Detected     Influenza B PCR Not Detected     Parainfluenza Virus 1 Not Detected     Parainfluenza Virus 2 Not Detected     Parainfluenza Virus 3 Not Detected     Parainfluenza Virus 4 Not Detected     RSV, PCR Not Detected     Bordetella pertussis pcr Not Detected     Bordetella parapertussis PCR Not Detected     Chlamydophila pneumoniae PCR Not Detected     Mycoplasma pneumo by PCR Not Detected    Narrative:      In the setting of a positive respiratory panel with a viral infection PLUS a negative procalcitonin without other underlying concern for bacterial infection, consider observing off antibiotics or discontinuation of antibiotics and continue supportive care. If the respiratory panel is positive for atypical bacterial infection (Bordetella pertussis, Chlamydophila pneumoniae, or Mycoplasma pneumoniae), consider antibiotic de-escalation to target atypical bacterial infection.    Blood Culture - Blood, Arm, Left [[id number]]  (Normal) Collected:  04/05/24 1203    Lab Status: Preliminary result Specimen: Blood from Arm, Left Updated: 04/06/24 1230     Blood Culture No growth at 24 hours    Blood Culture - Blood, Arm, Right [[id number]]  (Normal) Collected: 04/05/24 1201    Lab Status: Preliminary result Specimen: Blood from Arm, Right Updated: 04/06/24 1230     Blood Culture No growth at 24 hours            Doug Leal  04/07/24 01:22 EDT

## 2024-04-08 LAB
ANION GAP SERPL CALCULATED.3IONS-SCNC: 11 MMOL/L (ref 5–15)
BASOPHILS # BLD AUTO: 0.05 10*3/MM3 (ref 0–0.2)
BASOPHILS NFR BLD AUTO: 0.3 % (ref 0–1.5)
BUN SERPL-MCNC: 33 MG/DL (ref 8–23)
BUN/CREAT SERPL: 21.9 (ref 7–25)
CALCIUM SPEC-SCNC: 8.8 MG/DL (ref 8.6–10.5)
CHLORIDE SERPL-SCNC: 102 MMOL/L (ref 98–107)
CO2 SERPL-SCNC: 26 MMOL/L (ref 22–29)
CREAT SERPL-MCNC: 1.51 MG/DL (ref 0.76–1.27)
DEPRECATED RDW RBC AUTO: 49.8 FL (ref 37–54)
EGFRCR SERPLBLD CKD-EPI 2021: 46.4 ML/MIN/1.73
EOSINOPHIL # BLD AUTO: 0.15 10*3/MM3 (ref 0–0.4)
EOSINOPHIL NFR BLD AUTO: 0.8 % (ref 0.3–6.2)
ERYTHROCYTE [DISTWIDTH] IN BLOOD BY AUTOMATED COUNT: 14.8 % (ref 12.3–15.4)
GLUCOSE BLDC GLUCOMTR-MCNC: 161 MG/DL (ref 70–105)
GLUCOSE BLDC GLUCOMTR-MCNC: 203 MG/DL (ref 70–105)
GLUCOSE BLDC GLUCOMTR-MCNC: 258 MG/DL (ref 70–105)
GLUCOSE SERPL-MCNC: 142 MG/DL (ref 65–99)
HCT VFR BLD AUTO: 35.3 % (ref 37.5–51)
HGB BLD-MCNC: 11.2 G/DL (ref 13–17.7)
IMM GRANULOCYTES # BLD AUTO: 0.17 10*3/MM3 (ref 0–0.05)
IMM GRANULOCYTES NFR BLD AUTO: 0.9 % (ref 0–0.5)
LYMPHOCYTES # BLD AUTO: 2.2 10*3/MM3 (ref 0.7–3.1)
LYMPHOCYTES NFR BLD AUTO: 11.7 % (ref 19.6–45.3)
MCH RBC QN AUTO: 29.2 PG (ref 26.6–33)
MCHC RBC AUTO-ENTMCNC: 31.7 G/DL (ref 31.5–35.7)
MCV RBC AUTO: 91.9 FL (ref 79–97)
MONOCYTES # BLD AUTO: 1.97 10*3/MM3 (ref 0.1–0.9)
MONOCYTES NFR BLD AUTO: 10.5 % (ref 5–12)
NEUTROPHILS NFR BLD AUTO: 14.26 10*3/MM3 (ref 1.7–7)
NEUTROPHILS NFR BLD AUTO: 75.8 % (ref 42.7–76)
NRBC BLD AUTO-RTO: 0 /100 WBC (ref 0–0.2)
PLATELET # BLD AUTO: 224 10*3/MM3 (ref 140–450)
PMV BLD AUTO: 10.3 FL (ref 6–12)
POTASSIUM SERPL-SCNC: 3.4 MMOL/L (ref 3.5–5.2)
RBC # BLD AUTO: 3.84 10*6/MM3 (ref 4.14–5.8)
SODIUM SERPL-SCNC: 139 MMOL/L (ref 136–145)
WBC NRBC COR # BLD AUTO: 18.8 10*3/MM3 (ref 3.4–10.8)

## 2024-04-08 PROCEDURE — 25010000002 PIPERACILLIN SOD-TAZOBACTAM PER 1 G: Performed by: INTERNAL MEDICINE

## 2024-04-08 PROCEDURE — 99232 SBSQ HOSP IP/OBS MODERATE 35: CPT | Performed by: INTERNAL MEDICINE

## 2024-04-08 PROCEDURE — 25810000003 LACTATED RINGERS PER 1000 ML

## 2024-04-08 PROCEDURE — 63710000001 INSULIN LISPRO (HUMAN) PER 5 UNITS

## 2024-04-08 PROCEDURE — 85025 COMPLETE CBC W/AUTO DIFF WBC: CPT

## 2024-04-08 PROCEDURE — 82948 REAGENT STRIP/BLOOD GLUCOSE: CPT

## 2024-04-08 PROCEDURE — 80048 BASIC METABOLIC PNL TOTAL CA: CPT

## 2024-04-08 PROCEDURE — 25010000002 ENOXAPARIN PER 10 MG: Performed by: NURSE PRACTITIONER

## 2024-04-08 RX ADMIN — SUCRALFATE 1 G: 1 TABLET ORAL at 09:08

## 2024-04-08 RX ADMIN — INSULIN LISPRO 2 UNITS: 100 INJECTION, SOLUTION INTRAVENOUS; SUBCUTANEOUS at 19:18

## 2024-04-08 RX ADMIN — SODIUM CHLORIDE, POTASSIUM CHLORIDE, SODIUM LACTATE AND CALCIUM CHLORIDE 75 ML/HR: 600; 310; 30; 20 INJECTION, SOLUTION INTRAVENOUS at 21:51

## 2024-04-08 RX ADMIN — AMIODARONE HYDROCHLORIDE 200 MG: 200 TABLET ORAL at 09:09

## 2024-04-08 RX ADMIN — METOPROLOL TARTRATE 25 MG: 25 TABLET, FILM COATED ORAL at 21:50

## 2024-04-08 RX ADMIN — ENOXAPARIN SODIUM 60 MG: 100 INJECTION SUBCUTANEOUS at 21:51

## 2024-04-08 RX ADMIN — METOPROLOL TARTRATE 25 MG: 25 TABLET, FILM COATED ORAL at 09:09

## 2024-04-08 RX ADMIN — SUCRALFATE 1 G: 1 TABLET ORAL at 12:18

## 2024-04-08 RX ADMIN — DICYCLOMINE HYDROCHLORIDE 10 MG: 10 CAPSULE ORAL at 12:18

## 2024-04-08 RX ADMIN — Medication 10 ML: at 09:10

## 2024-04-08 RX ADMIN — DICYCLOMINE HYDROCHLORIDE 10 MG: 10 CAPSULE ORAL at 09:08

## 2024-04-08 RX ADMIN — SUCRALFATE 1 G: 1 TABLET ORAL at 18:37

## 2024-04-08 RX ADMIN — INSULIN LISPRO 6 UNITS: 100 INJECTION, SOLUTION INTRAVENOUS; SUBCUTANEOUS at 12:17

## 2024-04-08 RX ADMIN — DOCUSATE SODIUM 100 MG: 100 CAPSULE, LIQUID FILLED ORAL at 21:51

## 2024-04-08 RX ADMIN — PRIMIDONE 150 MG: 50 TABLET ORAL at 21:51

## 2024-04-08 RX ADMIN — BISACODYL 5 MG: 5 TABLET, COATED ORAL at 09:09

## 2024-04-08 RX ADMIN — DOCUSATE SODIUM 100 MG: 100 CAPSULE, LIQUID FILLED ORAL at 09:09

## 2024-04-08 RX ADMIN — PREGABALIN 75 MG: 75 CAPSULE ORAL at 21:51

## 2024-04-08 RX ADMIN — TAMSULOSIN HYDROCHLORIDE 0.4 MG: 0.4 CAPSULE ORAL at 09:09

## 2024-04-08 RX ADMIN — DICYCLOMINE HYDROCHLORIDE 10 MG: 10 CAPSULE ORAL at 21:51

## 2024-04-08 RX ADMIN — ENOXAPARIN SODIUM 60 MG: 100 INJECTION SUBCUTANEOUS at 12:17

## 2024-04-08 RX ADMIN — PREGABALIN 75 MG: 75 CAPSULE ORAL at 09:09

## 2024-04-08 RX ADMIN — PIPERACILLIN AND TAZOBACTAM 3.38 G: 3; .375 INJECTION, POWDER, FOR SOLUTION INTRAVENOUS at 15:21

## 2024-04-08 RX ADMIN — DULOXETINE HYDROCHLORIDE 90 MG: 30 CAPSULE, DELAYED RELEASE ORAL at 09:09

## 2024-04-08 RX ADMIN — PIPERACILLIN AND TAZOBACTAM 3.38 G: 3; .375 INJECTION, POWDER, FOR SOLUTION INTRAVENOUS at 05:02

## 2024-04-08 RX ADMIN — Medication 10 ML: at 21:51

## 2024-04-08 RX ADMIN — PIPERACILLIN AND TAZOBACTAM 3.38 G: 3; .375 INJECTION, POWDER, FOR SOLUTION INTRAVENOUS at 21:53

## 2024-04-08 RX ADMIN — VERAPAMIL HYDROCHLORIDE 240 MG: 120 CAPSULE, DELAYED RELEASE PELLETS ORAL at 21:51

## 2024-04-08 RX ADMIN — DICYCLOMINE HYDROCHLORIDE 10 MG: 10 CAPSULE ORAL at 18:37

## 2024-04-08 RX ADMIN — OXYCODONE 10 MG: 5 TABLET ORAL at 09:09

## 2024-04-08 NOTE — SIGNIFICANT NOTE
24 1416   OTHER   Discipline physical therapist   Rehab Time/Intention   Session Not Performed other (see comments)  (pt is emotionally labile and unwilling to participate in therapy. He is confused and believes his wife  today. Staff are aware and addressing. PT will follow up tomorrow.)   Therapy Assessment/Plan (PT)   Criteria for Skilled Interventions Met (PT) yes;meets criteria   Recommendation   PT - Next Appointment 24

## 2024-04-08 NOTE — PROGRESS NOTES
CARDIOLOGY FOLLOW-UP PROGRESS NOTE      Reason for follow-up:    Atrial Flutter RVR     Attending: Kendall Haynes MD      Subjective .     drowsy     Review of Systems   Unable to perform ROS: Acuity of condition     Pertinent items are noted in HPI, all other systems reviewed and negative  Allergies: Patient has no known allergies.    Scheduled Meds:amiodarone, 200 mg, Oral, Q24H  atorvastatin, 20 mg, Oral, Daily  bisacodyl, 5 mg, Oral, Daily  dicyclomine, 10 mg, Oral, 4x Daily AC & at Bedtime  docusate sodium, 100 mg, Oral, BID  DULoxetine, 90 mg, Oral, Daily  enoxaparin, 1 mg/kg, Subcutaneous, Q12H  insulin lispro, 2-9 Units, Subcutaneous, 4x Daily AC & at Bedtime  metoprolol tartrate, 25 mg, Oral, Q12H  oxyCODONE, 10 mg, Oral, Daily  pantoprazole, 40 mg, Oral, Daily  piperacillin-tazobactam, 3.375 g, Intravenous, Q8H  pregabalin, 75 mg, Oral, BID  primidone, 150 mg, Oral, Q12H  sodium chloride, 10 mL, Intravenous, Q12H  sucralfate, 1 g, Oral, TID AC  tamsulosin, 0.4 mg, Oral, Daily  verapamil ER, 240 mg, Oral, Nightly        Continuous Infusions:lactated ringers, 75 mL/hr, Last Rate: 75 mL/hr (04/07/24 2234)  Pharmacy to Dose enoxaparin (LOVENOX),         PRN Meds:.  acetaminophen **OR** acetaminophen    bisacodyl    dextrose    dextrose    glucagon (human recombinant)    ipratropium-albuterol    magnesium hydroxide    Morphine    nitroglycerin    ondansetron ODT **OR** ondansetron    oxyCODONE    Pharmacy to Dose enoxaparin (LOVENOX)    sodium chloride    sodium chloride    sodium chloride    Objective     VITAL SIGNS  Patient Vitals for the past 24 hrs:   BP Temp Temp src Pulse Resp SpO2 Weight   04/08/24 1300 114/55 98.2 °F (36.8 °C) Oral 73 19 95 % --   04/08/24 0900 130/68 98 °F (36.7 °C) Oral 79 12 92 % --   04/08/24 0505 117/60 98.1 °F (36.7 °C) Oral 87 12 93 % --   04/08/24 0500 -- -- -- -- -- -- 64.7 kg (142 lb 10.2 oz)   04/08/24 0202 115/61 97.6 °F (36.4 °C) Oral 90 13 95 % --   04/07/24 2048  "145/76 97.3 °F (36.3 °C) Oral 102 17 92 % --   04/07/24 1701 -- 97.7 °F (36.5 °C) Oral -- 16 94 % --        Flowsheet Rows      Flowsheet Row First Filed Value   Admission Height 180.3 cm (71\") Documented at 04/05/2024 1128   Admission Weight 72.6 kg (160 lb) Documented at 04/05/2024 1152            Body mass index is 19.89 kg/m².      Intake/Output Summary (Last 24 hours) at 4/8/2024 1436  Last data filed at 4/7/2024 1701  Gross per 24 hour   Intake --   Output 500 ml   Net -500 ml        TELEMETRY:     SR    Physical Exam:  Constitutional:       Appearance: Well-developed.   Eyes:      General: No scleral icterus.        Right eye: No discharge.   HENT:      Head: Normocephalic and atraumatic.   Neck:      Thyroid: No thyromegaly.      Lymphadenopathy: No cervical adenopathy.   Pulmonary:      Effort: Pulmonary effort is normal. No respiratory distress.      Breath sounds: Normal breath sounds. No wheezing. No rales.   Cardiovascular:      Normal rate. Regular rhythm.      No gallop.    Edema:     Peripheral edema absent.   Abdominal:      Tenderness: There is no abdominal tenderness.   Skin:     Findings: No erythema or rash.   Neurological:      Mental Status: Alert and oriented to person, place, and time.            Results Review:   I reviewed the patient's new clinical results.    CBC    Results from last 7 days   Lab Units 04/08/24  0508 04/07/24  0526 04/06/24  0448 04/05/24  1201 04/05/24  0529 04/04/24  1336 04/03/24  0515   WBC 10*3/mm3 18.80* 22.33* 16.45* 13.68* 12.51* 11.25* 8.62   HEMOGLOBIN g/dL 11.2* 13.0 13.2 11.9* 12.0* 12.9* 11.4*   PLATELETS 10*3/mm3 224 248 176 164 183 198 188     BMP   Results from last 7 days   Lab Units 04/08/24  0508 04/07/24  0043 04/06/24  0448 04/05/24  1201 04/05/24  0529 04/04/24  1336 04/03/24  0515   SODIUM mmol/L 139 140 141 137 138 137 140   POTASSIUM mmol/L 3.4* 3.5 4.5 4.7 4.9 4.5 4.8   CHLORIDE mmol/L 102 100 104 99 101 99 101   CO2 mmol/L 26.0 25.0 24.0 28.0 " 29.0 27.0 28.0   BUN mg/dL 33* 23 26* 34* 33* 33* 46*   CREATININE mg/dL 1.51* 1.11 1.18 1.50* 1.45* 1.42* 1.50*   GLUCOSE mg/dL 142* 172* 81 180* 166* 166* 123*   MAGNESIUM mg/dL  --   --  1.8  --   --   --   --    PHOSPHORUS mg/dL  --   --  2.8  --   --   --   --      Cr Clearance Estimated Creatinine Clearance: 35.7 mL/min (A) (by C-G formula based on SCr of 1.51 mg/dL (H)).  Coag     HbA1C   Lab Results   Component Value Date    HGBA1C 6.70 (H) 04/05/2024    HGBA1C 6.80 (H) 05/26/2023    HGBA1C 7.3 (H) 02/22/2023     Blood Glucose   Glucose   Date/Time Value Ref Range Status   04/08/2024 1144 258 (H) 70 - 105 mg/dL Final     Comment:     Serial Number: [id number]:  [id number]   04/07/2024 2050 260 (H) 70 - 105 mg/dL Final     Comment:     Serial Number: [id number]:  [id number]   04/07/2024 1722 332 (H) 70 - 105 mg/dL Final     Comment:     Serial Number: [id number]:  [id number]   04/07/2024 1108 163 (H) 70 - 105 mg/dL Final     Comment:     Serial Number: [id number]:  [id number]   04/07/2024 0706 150 (H) 70 - 105 mg/dL Final     Comment:     Serial Number: [id number]:  [id number]   04/06/2024 2055 161 (H) 70 - 105 mg/dL Final     Comment:     Serial Number: [id number]:  [id number]   04/06/2024 1632 113 (H) 70 - 105 mg/dL Final     Comment:     Serial Number: [id number]:  [id number]   04/06/2024 1101 112 (H) 70 - 105 mg/dL Final     Comment:     Serial Number: [id number]:  [id number]     Infection   Results from last 7 days   Lab Units 04/06/24 2143 04/05/24  1203 04/05/24  1201   BLOODCX   --  No growth at 3 days No growth at 3 days   PROCALCITONIN ng/mL 0.64*  --  0.36*     CMP   Results from last 7 days   Lab Units 04/08/24  0508 04/07/24  0043 04/06/24  0448 04/05/24  1201 04/05/24  0529 04/04/24  1336 04/03/24  0515 04/02/24  0915   SODIUM mmol/L 139 140 141 137 138 137 140 141   POTASSIUM mmol/L 3.4* 3.5 4.5 4.7 4.9 4.5 4.8 5.0   CHLORIDE mmol/L 102 100 104  "99 101 99 101 101   CO2 mmol/L 26.0 25.0 24.0 28.0 29.0 27.0 28.0 32.0*   BUN mg/dL 33* 23 26* 34* 33* 33* 46* 40*   CREATININE mg/dL 1.51* 1.11 1.18 1.50* 1.45* 1.42* 1.50* 1.47*   GLUCOSE mg/dL 142* 172* 81 180* 166* 166* 123* 87   ALBUMIN g/dL  --   --   --  3.4* 3.2* 3.7  --  3.7   BILIRUBIN mg/dL  --   --   --  0.2 0.2 0.2  --  0.2   ALK PHOS U/L  --   --   --  116 112 126*  --  107   AST (SGOT) U/L  --   --   --  26 28 30  --  32   ALT (SGPT) U/L  --   --   --  20 18 17  --  17   AMMONIA umol/L  --   --   --   --   --  34  --   --      ABG    Results from last 7 days   Lab Units 04/01/24  1600   PH, ARTERIAL pH units 7.505*   PCO2, ARTERIAL mm Hg 39.2   PO2 ART mm Hg 99.1   BASE EXCESS ART mmol/L 7.3*     UA    Results from last 7 days   Lab Units 04/05/24  1210   NITRITE UA  Negative   WBC UA /HPF 6-10*   BACTERIA UA /HPF Trace*   SQUAM EPITHEL UA /HPF 0-2     LOBO  No results found for: \"POCMETH\", \"POCAMPHET\", \"POCBARBITUR\", \"POCBENZO\", \"POCCOCAINE\", \"POCOPIATES\", \"POCOXYCODO\", \"POCPHENCYC\", \"POCPROPOXY\", \"POCTHC\", \"POCTRICYC\"  Lysis Labs   Results from last 7 days   Lab Units 04/08/24  0508 04/07/24  0526 04/07/24  0043 04/06/24  0448 04/05/24  1201 04/05/24  0529 04/04/24  1336 04/03/24  0515   HEMOGLOBIN g/dL 11.2* 13.0  --  13.2 11.9* 12.0* 12.9* 11.4*   PLATELETS 10*3/mm3 224 248  --  176 164 183 198 188   CREATININE mg/dL 1.51*  --  1.11 1.18 1.50* 1.45* 1.42* 1.50*     Radiology(recent) CT Abdomen Without Contrast    Result Date: 4/7/2024  Impression: 1.Small bilateral pleural effusions with right basilar atelectasis. 2.Airspace disease in the left lower lobe could be secondary to pneumonia. 3.There is distention of the gallbladder. Electronically Signed: Ronny Slaughter MD  4/7/2024 3:19 AM EDT  Workstation ID: [id number]    CT Chest Without Contrast Diagnostic    Result Date: 4/7/2024  Impression: Small bilateral pleural effusions. Bilateral basilar airspace disease. On the left there may be a combination " of atelectasis and pneumonia. Electronically Signed: Ronny Slaughter MD  4/7/2024 3:11 AM EDT  Workstation ID: [id number]     Imaging Results (Last 24 Hours)       ** No results found for the last 24 hours. **            Results from last 7 days   Lab Units 04/07/24  0043 04/05/24  1201 04/02/24  0915   CK TOTAL U/L  --   --  199   HSTROP T ng/L 41*   < >  --     < > = values in this interval not displayed.       EKG          I personally viewed and interpreted the patient's EKG/Telemetry data:        ECHOCARDIOGRAM:     Results for orders placed during the hospital encounter of 04/05/24    Adult Transthoracic Echo Complete W/ Cont if Necessary Per Protocol    Interpretation Summary    Left ventricular systolic function is normal. Left ventricular ejection fraction appears to be 56 - 60%.    Left ventricular wall thickness is consistent with mild concentric hypertrophy.    Left ventricular diastolic function was normal.    Estimated right ventricular systolic pressure from tricuspid regurgitation is normal (<35 mmHg).    There is a small (<1cm) circumferential pericardial effusion. There is no evidence of cardiac tamponade.        STRESS MYOVIEW:      CARDIAC CATHETERIZATION:      OTHER:         Assessment & Plan            Septic shock        ASSESSMENT:    Paroxysmal Atrial flutter with intermittent RVR  [id number] score is 4  Pt has converted to SR     Sepsis due to Pneumonia  Continue antibiotics as prescribed  Pressure is stabilized  Received IV fluid resuscitation and briefly on pressors     Hypertension  Blood pressures currently stable     Dyslipidemia  Lipitor already p.o.     Mental status changes  No family at present at bedside     Dysphagia  Tolerating PO    PLAN:    Pt has converted to SR  Continue current Rx and supportive care.  Continue BB/Amiodarone  Remains on Lovenox      Additional recommendations per Dr. Alvarenga     Patient is seen and examined and findings are verified.  All data is reviewed by me  personally.  Assessment and plan formulated by APC was done after discussion with attending.  I spent more than 50% of time in taking care of the patient.    Patient is relatively more alert and was able to answer the question.    Heart rate is stable.  Patient is in sinus rhythm now.    Normal S1 and S2.  Heart rate is irregular.  No leg edema noted.    Patient underwent echocardiogram and showed preserved left ventricular function.  Trace pericardial effusion noted.  No significant valvular heart disease noted.  Patient converted into sinus rhythm.  Heart rate is stable.  Patient would need oral anticoagulation eventually.      Electronically signed by Warren Alvarenga MD, 04/08/24, 2:36 PM EDT.          I discussed the patient's findings and my recommendations with patient's RN                  Electronically signed by JONATHAN Robledo, 04/08/24, 12:19 PM EDT.          Wraren Alvarenga MD  04/08/24  14:36 EDT

## 2024-04-08 NOTE — PROGRESS NOTES
"[facility] MEDICINE SERVICE  DAILY PROGRESS NOTE    NAME: Lux Bray  : 1945  MRN: [id number]      LOS: 3 days     PROVIDER OF SERVICE: Kendall Haynes MD    Chief Complaint: Septic shock    Subjective:     History of Present illness      Lux Bray is a 78 y.o. male with PMH of HTN, DM II, Depression,  presented to the hospital after reportedly being sick for a few days at rehab, and was admitted with a principal diagnosis of Septic shock.      Per family the patient has had a neurologic decline over the last few months. CT head was negative for acute abnormality. Ammonia level was normal. Per chart review, the patient's PCP has decreased some of the sedating medications the patient has or was taking. Patient is currently drowsy but will wake up and answer questions appropriately. He is reporting that he is very tired.      In ER the patient was found to be hypotensive. He was given the full sepsis bolus and started on levophed. Chest x-ray showed bibasilar infiltrates. Patient's family reports that they have had concerns he has been aspirating. Patient will be transferred to ICU for close monitoring and vasopressor support.       ACP: CPR, Full Intervention. Patients wife is his decision maker in the even he is unable.      Patient was seen and examined on 24 at 13:54 EDT .     Subjective / Review of systems      24-Patient admitted to the ICU for septic shock and only needed vasopressors very briefly.  Has been off vasopressors since last night.  Developed A-fib with RVR, will give a dose of digoxin.  If blood pressure permits, resume home verapamil.  Will likely need full dose anticoagulation, defer to primary team.  Transfer out of the ICU to hospitalist service.      24-Patient drowsy but easily aroused. Knows what year it is. States that he hurts all over, and is nauseous. He also states that he is \"So, so, so, tired.\" Denies feeling short of breath, or having " chest pain.   4/7/24 patient seen and examined in bed no acute distress, vital signs stable, discussed with RN.  Weak weak and tired.Patient is currently n.p.o. pending speech evaluation.   4/8/2024 patient seen and examined in bed no acute distress, vital signs stable, discussed with RN.  Patient with weakness fatigue..  Apparently was living at home.  Speech recommended that pt initiate a mechanical soft diet with NT liquids as tolerated     Review of Systems   Constitutional:  Positive for fatigue. Negative for appetite change, chills and fever.   HENT:  Negative for congestion.    Eyes:  Negative for pain and redness.   Respiratory:  Positive for shortness of breath. Negative for cough and chest tightness.    Gastrointestinal:  Negative for abdominal pain, diarrhea, nausea and vomiting.   Endocrine: Negative for cold intolerance and heat intolerance.   Genitourinary:  Negative for dysuria, flank pain and frequency.   Musculoskeletal:  Negative for back pain and myalgias.   Neurological:  Positive for weakness. Negative for dizziness and headaches.   Psychiatric/Behavioral:  Negative for sleep disturbance. The patient is not nervous/anxious.        Objective:     Vital Signs  Temp:  [97.3 °F (36.3 °C)-98.1 °F (36.7 °C)] 98 °F (36.7 °C)  Heart Rate:  [] 79  Resp:  [12-25] 12  BP: (115-145)/(60-79) 130/68  Flow (L/min):  [2] 2   Body mass index is 19.89 kg/m².    Physical Exam  Constitutional:       Appearance: Normal appearance.   HENT:      Head: Normocephalic and atraumatic.      Nose: Nose normal.      Mouth/Throat:      Mouth: Mucous membranes are moist.   Eyes:      Extraocular Movements: Extraocular movements intact.      Conjunctiva/sclera: Conjunctivae normal.      Pupils: Pupils are equal, round, and reactive to light.   Cardiovascular:      Rate and Rhythm: Regular rhythm. Tachycardia present.      Pulses: Normal pulses.      Heart sounds: Normal heart sounds.   Pulmonary:      Effort: Pulmonary  effort is normal.      Breath sounds: Normal breath sounds.   Musculoskeletal:      Cervical back: Normal range of motion.   Skin:     General: Skin is warm and dry.   Neurological:      General: No focal deficit present.      Mental Status: He is alert. He is disoriented.         Scheduled Meds   amiodarone, 200 mg, Oral, Q24H  [Held by provider] atorvastatin, 20 mg, Oral, Daily  bisacodyl, 5 mg, Oral, Daily  dicyclomine, 10 mg, Oral, 4x Daily AC & at Bedtime  docusate sodium, 100 mg, Oral, BID  DULoxetine, 90 mg, Oral, Daily  enoxaparin, 1 mg/kg, Subcutaneous, Q12H  insulin lispro, 2-9 Units, Subcutaneous, 4x Daily AC & at Bedtime  metoprolol tartrate, 25 mg, Oral, Q12H  oxyCODONE, 10 mg, Oral, Daily  [Held by provider] pantoprazole, 40 mg, Oral, Daily  piperacillin-tazobactam, 3.375 g, Intravenous, Q8H  pregabalin, 75 mg, Oral, BID  [Held by provider] primidone, 150 mg, Oral, Q12H  sodium chloride, 10 mL, Intravenous, Q12H  sucralfate, 1 g, Oral, TID AC  tamsulosin, 0.4 mg, Oral, Daily  verapamil ER, 240 mg, Oral, Nightly       PRN Meds     acetaminophen **OR** acetaminophen    bisacodyl    dextrose    dextrose    glucagon (human recombinant)    ipratropium-albuterol    magnesium hydroxide    Morphine    nitroglycerin    ondansetron ODT **OR** ondansetron    oxyCODONE    Pharmacy to Dose enoxaparin (LOVENOX)    sodium chloride    sodium chloride    sodium chloride   Infusions  lactated ringers, 75 mL/hr, Last Rate: 75 mL/hr (04/07/24 2234)  Pharmacy to Dose enoxaparin (LOVENOX),           Diagnostic Data    Results from last 7 days   Lab Units 04/08/24  0508 04/06/24  0448 04/05/24  1201   WBC 10*3/mm3 18.80*   < > 13.68*   HEMOGLOBIN g/dL 11.2*   < > 11.9*   HEMATOCRIT % 35.3*   < > 37.5   PLATELETS 10*3/mm3 224   < > 164   GLUCOSE mg/dL 142*   < > 180*   CREATININE mg/dL 1.51*   < > 1.50*   BUN mg/dL 33*   < > 34*   SODIUM mmol/L 139   < > 137   POTASSIUM mmol/L 3.4*   < > 4.7   AST (SGOT) U/L  --   --  26    ALT (SGPT) U/L  --   --  20   ALK PHOS U/L  --   --  116   BILIRUBIN mg/dL  --   --  0.2   ANION GAP mmol/L 11.0   < > 10.0    < > = values in this interval not displayed.       CT Abdomen Without Contrast    Result Date: 4/7/2024  Impression: 1.Small bilateral pleural effusions with right basilar atelectasis. 2.Airspace disease in the left lower lobe could be secondary to pneumonia. 3.There is distention of the gallbladder. Electronically Signed: Ronny Slaughter MD  4/7/2024 3:19 AM EDT  Workstation ID: [id number]    CT Chest Without Contrast Diagnostic    Result Date: 4/7/2024  Impression: Small bilateral pleural effusions. Bilateral basilar airspace disease. On the left there may be a combination of atelectasis and pneumonia. Electronically Signed: Ronny Slaughter MD  4/7/2024 3:11 AM EDT  Workstation ID: [id number]       I reviewed the patient's new clinical results.    Assessment/Plan:     Active and Resolved Problems  Active Hospital Problems    Diagnosis  POA    **Septic shock [A41.9, R65.21]  Yes      Resolved Hospital Problems   No resolved problems to display.     Patient downgraded from ICU to PCU level of care.     Septic Shock due to Pneumonia   -Respiratory panel negative   -MRSA swab negative  -NS at 75mL/hr   -Levophed discontinued   -Continue Zosyn   -SLP consult to rule out aspiration pna     Diabetes Mellitus   -Hgb A1c 4/5/24 6.7  -Continue to hold home medications   -Accuchecks ACHS   -SSI ordered     Hypertension   -Not controlled with medication at home  -Monitor BP per protocol     Hyperlipidemia --   -Patient on Lipitor at home  -Last lipid profile 5/26/23 negative  -Restart lipitor when not NPO    Depression  -Patient on Cymbalta at home  -Restart when patient not NPO    Altered Mental Status improving  -No family at bedside at this time  -Reports of neurologic decline over last few months   -Patient unable to make needs known     Dysphasia   -Failed nursing water screen  -SLP eval today,   recommended that pt initiate a mechanical soft diet with NT liquids as tolerated   -Repeat tomorrow if patient able to follow commands     DVT prophylaxis:  Medical and mechanical DVT prophylaxis orders are present.      Code status is   Code Status and Medical Interventions:   Ordered at: 04/05/24 1340     Code Status (Patient has no pulse and is not breathing):    CPR (Attempt to Resuscitate)     Medical Interventions (Patient has pulse or is breathing):    Full Support       Plan for disposition:Pending clinical course.     Time: 30 minutes    Signature: Electronically signed by Kendall Haynes MD, 04/08/24, 11:11 EDT.  [facility] Hospitalist Team

## 2024-04-08 NOTE — SIGNIFICANT NOTE
24 1426   OTHER   Discipline occupational therapist   Rehab Time/Intention   Session Not Performed other (see comments)  (pt is very emotional and unwilling to participate in therapy. He is confused and believes his wife  today. Staff are aware and addressing. OT will follow up tomorrow.)   Therapy Assessment/Plan (PT)   Criteria for Skilled Interventions Met (PT) yes   Recommendation   OT - Next Appointment 24

## 2024-04-08 NOTE — DISCHARGE PLACEMENT REQUEST
"Josi Bray (78 y.o. Male)       Date of Birth   1945    Social Security Number       Address   [address] [place] [address] [place] [facility]    Home Phone   [phone]    MRN   [id number]       Denominational   Jainism    Marital Status                               Admission Date   4/5/24    Admission Type   Emergency    Admitting Provider   Nick Carmichael MD    Attending Provider   Kendall Haynes MD    Department, Room/Bed   [facility], 2128/1       Discharge Date       Discharge Disposition       Discharge Destination                                 Attending Provider: Kendall Haynes MD    Allergies: No Known Allergies    Isolation: None   Infection: None   Code Status: CPR    Ht: 180.3 cm (71\")   Wt: 64.7 kg (142 lb 10.2 oz)    Admission Cmt: None   Principal Problem: Septic shock [A41.9,R65.21]                   Active Insurance as of 4/5/2024       Primary Coverage       Payor Plan Insurance Group Employer/Plan Group    MEDICARE MEDICARE A & B        Payor Plan Address Payor Plan Phone Number Payor Plan Fax Number Effective Dates    [address] [phone]  9/1/2008 - None Entered    formerly [facility] [id number]         Subscriber Name Subscriber Birth Date Member ID       JOSI BRAY 1945 [id number]               Secondary Coverage       Payor Plan Insurance Group Employer/Plan Group     FOR LIFE  FOR LIFE MC SUP         Payor Plan Address Payor Plan Phone Number Payor Plan Fax Number Effective Dates    [address] [phone]  11/27/2019 - None Entered    [facility] [address]         Subscriber Name Subscriber Birth Date Member ID       JOSI BRAY 1945 [id number]                     Emergency Contacts        (Rel.) Home Phone Work Phone Mobile Phone    RANGEL BRAY (Spouse) [phone] -- --    RITESH KULKARNI (Daughter) -- -- [phone]                 History & Physical        Malena Hassan APRN at " 24 1353       Attestation signed by Nick Carmichael MD at 24 0736      Attending Addendum      Subjective: Somnolent but arousable, able to tell his name and thinks he is at rehab.  Spoke with wife and daughter at the bedside.  Also spoke with his son-in-law(radiologist) over the phone.     Exam: Somnolent but arousable, some gurgling sounds from throat, dry oral mucosa, poor skin turgor.     Assessment / Plan:   Septic shock: Suspected aspiration pneumonia vs HCAP.  History of esophageal strictures needing dilatation.  Start Zosyn and vancomycin.  Check MRSA screen.  Continue maintenance fluids, avoid fluid overload.  Titrate vasopressors for MAP target of 65.        Dr. Nick Carmichael MD MPH  Staff Intensivist  24   14:33 EDT                     Critical Care History and Physical     Lux Bray : 1945 MRN:[id number] LOS:0 ROOM:      Reason for admission: Septic shock     Assessment / Plan     Septic Shock: ( WBC>12 or <4 or >10% bands, HR>90, RR >20 or PCO2 <32, and MAP<65 or SBP<90 )  Likely due to aspiration pneumonia vs HCAP .   Blood cultures pending. UA negative for infection.   Lactic negative. Procal 0.36.   Obtain sputum if cough productive.  Respiratory panel negative.     Started on Zosyn. MRSA negative.    Persistent hypotension in spite of adequate fluid resuscitation.   Patient received total of 2,178 ml fluid bolus in ER  C/w additional fluids as needed. Avoid fluid overload.   Maintenance IVF at 75 ml/hr  Titrate vasopressors for a target MAP of 65.   Currently on low dose levophed    Diabetes Mellitus II -- Hgb A1c 24 6.70  Home medications --> glimepiride, metformin, Jardiance  Hold oral medications while in ICU  Insulin sliding scale ordered    Hypertension -- Not controlled with medication at home  Hyperlipidemia -- Patient on Lipitor at home. Last lipid profile 23 negative. Restart lipitor when not NPO.  Depression -- Patient on Cymbalta  "at home. Restart when patient not NPO.      Code Status (Patient has no pulse and is not breathing): CPR (Attempt to Resuscitate)  Medical Interventions (Patient has pulse or is breathing): Full Support       Nutrition:   NPO Diet NPO Type: Strict NPO     DVT prophylaxis:  Mechanical DVT prophylaxis orders are present.         History of Present illness     Lux Bray is a 78 y.o. male with PMH of HTN, DM II, Depression,  presented to the hospital after reportedly being sick for a few days at rehab, and was admitted with a principal diagnosis of Septic shock.     Per family the patient has had a neurologic decline over the last few months. CT head was negative for acute abnormality. Ammonia level was normal. Per chart review, the patient's PCP has decreased some of the sedating medications the patient has or was taking. Patient is currently drowsy but will wake up and answer questions appropriately. He is reporting that he is very tired.     In ER the patient was found to be hypotensive. He was given the full sepsis bolus and started on levophed. Chest x-ray showed bibasilar infiltrates. Patient's family reports that they have had concerns he has been aspirating. Patient will be transferred to ICU for close monitoring and vasopressor support.      ACP: CPR, Full Intervention. Patients wife is his decision maker in the even he is unable.     Patient was seen and examined on 04/05/24 at 13:54 EDT .    Subjective / Review of systems     Review of Systems     Patient drowsy but easily aroused. Knows what year it is. States that he hurts all over, and is nauseous. He also states that he is \"So, so, so, tired.\" Denies feeling short of breath, or having chest pain.     Past Medical/Surgical/Social/Family History & Allergies     Past Medical History:   Diagnosis Date    ADHD (attention deficit hyperactivity disorder)     Allergic     Arthritis     Colon polyp     Depression     Diabetes mellitus type 2    controlled by " oral meds    Fibromyalgia, primary     HL (hearing loss)     Hyperlipidemia     Hypertension       Past Surgical History:   Procedure Laterality Date    COLONOSCOPY      ENDOSCOPY N/A 11/15/2023    Procedure: ESOPHAGOGASTRODUODENOSCOPY;  Surgeon: George Viveros MD;  Location: [facility] MAIN OR;  Service: Gastroenterology;  Laterality: N/A;  popssible candida, irrgular z-line, gastritis    EYE SURGERY      Cataracts removed: both eyes    HAND SURGERY  1980- [facility]. Abstracted from [facility].    HEMORROIDECTOMY      [facility]. Abstracted from [facility].    TONSILLECTOMY      VASECTOMY        Social History     Socioeconomic History    Marital status:    Tobacco Use    Smoking status: Former     Current packs/day: 0.00     Average packs/day: 0.3 packs/day for 5.0 years (1.3 ttl pk-yrs)     Types: Cigarettes     Start date: 2/10/1966     Quit date: 2/10/1971     Years since quittin.1    Smokeless tobacco: Never   Vaping Use    Vaping status: Never Used   Substance and Sexual Activity    Alcohol use: Not Currently     Alcohol/week: 2.0 standard drinks of alcohol     Types: 1 Glasses of wine, 1 Cans of beer per week    Drug use: No    Sexual activity: Yes     Partners: Female     Birth control/protection: None      Family History   Problem Relation Age of Onset    Diabetes Mother         type2    Stroke Mother     Diabetes Father         type2    Colon cancer Neg Hx     Colon polyps Neg Hx     Crohn's disease Neg Hx     Irritable bowel syndrome Neg Hx     Ulcerative colitis Neg Hx       No Known Allergies   Social Determinants of Health     Tobacco Use: Medium Risk (2024)    Received from [facility]    Patient History     Smoking Tobacco Use: Former     Smokeless Tobacco Use: Never     Passive Exposure: Not on file   Alcohol Use: Not At Risk (2023)    AUDIT-C     Frequency of Alcohol Consumption: Never     Average Number of Drinks: Patient does not drink     Frequency  of Binge Drinking: Never   Financial Resource Strain: Not on file   Food Insecurity: No Food Insecurity (7/14/2023)    Hunger Vital Sign     Worried About Running Out of Food in the Last Year: Never true     Ran Out of Food in the Last Year: Never true   Transportation Needs: Not on file   Physical Activity: Inactive (7/14/2023)    Exercise Vital Sign     Days of Exercise per Week: 0 days     Minutes of Exercise per Session: 0 min   Stress: Not on file   Social Connections: Unknown (10/10/2023)    Family and Community Support     Help with Day-to-Day Activities: Not on file     Lonely or Isolated: Not on file   Interpersonal Safety: Unknown (4/5/2024)    Abuse Screen     Unsafe at Home or Work/School: unable to answer (comment required)     Feels Threatened by Someone?: unable to answer (comment required)     Does Anyone Keep You from Contacting Others or Doint Things Outside the Home?: unable to answer (comment required)     Physical Sign of Abuse Present: no   Depression: At risk (1/31/2024)    PHQ-2     PHQ-2 Score: 16   Housing Stability: Not At Risk (7/14/2023)    Housing Stability     Current Living Arrangements: home     Potentially Unsafe Housing Conditions: none   Utilities: Not on file   Health Literacy: Unknown (7/14/2023)    Education     Help with school or training?: Not on file     Preferred Language: English   Employment: Unknown (10/10/2023)    Employment     Do you want help finding or keeping work or a job?: Not on file   Disabilities: Not At Risk (7/14/2023)    Disabilities     Concentrating, Remembering, or Making Decisions Difficulty: no     Doing Errands Independently Difficulty: no        Home Medications     Prior to Admission medications    Medication Sig Start Date End Date Taking? Authorizing Provider   amphetamine-dextroamphetamine XR (Adderall XR) 20 MG 24 hr capsule Take 1 capsule by mouth Every Morning for 90 days 3/19/24 6/17/24  Montana Morrow MD   atorvastatin (LIPITOR) 20 MG  tablet Take 1 tablet by mouth Daily. 1/17/24   Montana Morrow MD   diazePAM (VALIUM) 10 MG tablet Take 1 about 2 hours prior to procedure.  Take second 1 on arrival to the hospital if needed.  Patient not taking: Reported on 3/1/2024 1/5/24   Montana Morrow MD   dicyclomine (BENTYL) 10 MG capsule Take 1 capsule by mouth 4 (Four) Times a Day Before Meals & at Bedtime. 1/17/24   Montana Morrow MD   DULoxetine (CYMBALTA) 30 MG capsule Take 3 capsules by mouth Daily. 1/17/24   Montana Morrow MD   empagliflozin (Jardiance) 10 MG tablet tablet Take 1 tablet by mouth Daily. 1/17/24   Montana Morrow MD   glimepiride (AMARYL) 2 MG tablet TAKE 3 TABLETS EVERY MORNING BEFORE BREAKFAST 1/17/24   Montana Morrow MD   l-methylfolate-algae-B6-B12 (METANX) 3-90.314-2-35 MG capsule capsule Take 1 capsule by mouth Every 12 (Twelve) Hours. 2/1/24   German Valle MD   metFORMIN (GLUCOPHAGE) 500 MG tablet Take 2 tablets by mouth 2 (Two) Times a Day. 1/17/24   Montana Morrow MD   oxyCODONE-acetaminophen (Percocet)  MG per tablet Take 1 tablet by mouth Every 4 (Four) Hours As Needed for Moderate Pain. Max 4 tablets a day 3/19/24   Montana Morrow MD   pantoprazole (PROTONIX) 40 MG EC tablet Take 1 tablet by mouth Daily. 1/17/24   Montana Morrow MD   pregabalin (Lyrica) 25 MG capsule Take two in AM and two PM 1/18/24 4/17/24  Monatna Morrow MD   primidone (MYSOLINE) 50 MG tablet 3 tablets twice daily 2/6/24   Montana Morrow MD   QUEtiapine (SEROquel) 25 MG tablet Take 1 tablet by mouth Every Night for 30 days.  Patient not taking: Reported on 3/1/2024 1/31/24 3/1/24  Montana Morrow MD   sucralfate (Carafate) 1 GM/10ML suspension TAKE 10 ML THREE TIMES A DAY AS NEEDED FOR HEARTBURN/ABDOMINAL PAIN  Patient not taking: Reported on 3/1/2024 7/25/23   Montana Morrow MD   trandolapril-verapamil (TARKA) 2-240 MG per CR tablet Take 1 tablet by mouth Daily for 180 days.  1/17/24 7/15/24  Montana Morrow MD        Objective / Physical Exam     Vital signs:  Temp: 100.1 °F (37.8 °C)  BP: 90/44  Heart Rate: 90  Resp: 22  SpO2: 97 %  Weight: 72.6 kg (160 lb)    Admission Weight: Weight: 72.6 kg (160 lb)    Physical Exam  Vitals and nursing note reviewed.   Constitutional:       General: He is not in acute distress.     Appearance: He is ill-appearing (chronic).   HENT:      Head: Normocephalic.      Ears:      Comments: Hard of hearing.      Mouth/Throat:      Mouth: Mucous membranes are moist.      Pharynx: Oropharynx is clear.   Eyes:      Extraocular Movements: Extraocular movements intact.      Conjunctiva/sclera: Conjunctivae normal.      Pupils: Pupils are equal, round, and reactive to light.   Cardiovascular:      Rate and Rhythm: Normal rate and regular rhythm.      Pulses: Normal pulses.      Heart sounds: Normal heart sounds.   Pulmonary:      Effort: Pulmonary effort is normal.      Breath sounds: Normal breath sounds.   Abdominal:      General: Bowel sounds are normal.      Palpations: Abdomen is soft.   Musculoskeletal:      Right lower leg: No edema.      Left lower leg: No edema.   Skin:     General: Skin is warm and dry.      Findings: No rash.   Neurological:      General: No focal deficit present.      Mental Status: He is easily aroused. He is lethargic and disoriented.      Comments: Oriented to self and year. Did not know where he was at.    Psychiatric:         Mood and Affect: Mood normal.         Behavior: Behavior normal.          Labs     Results from last 7 days   Lab Units 04/05/24  1201 04/05/24  0529 04/04/24  1336 04/03/24  0515 04/02/24  0915   WBC 10*3/mm3 13.68* 12.51* 11.25* 8.62 10.48   HEMATOCRIT % 37.5 38.2 40.4 37.7 40.0   PLATELETS 10*3/mm3 164 183 198 188 191      Results from last 7 days   Lab Units 04/05/24  1201 04/05/24  0529 04/04/24  1336 04/03/24  0515 04/02/24  0915   SODIUM mmol/L 137 138 137 140 141   POTASSIUM mmol/L 4.7 4.9 4.5  4.8 5.0   CHLORIDE mmol/L 99 101 99 101 101   CO2 mmol/L 28.0 29.0 27.0 28.0 32.0*   BUN mg/dL 34* 33* 33* 46* 40*   CREATININE mg/dL 1.50* 1.45* 1.42* 1.50* 1.47*        Imaging     CT Head Without Contrast    Result Date: 4/5/2024  Impression: Chronic findings. No acute intracranial process. Findings suggestive of mild right mastoiditis. Electronically Signed: Heidy Calvin MD  4/5/2024 3:01 PM EDT  Workstation ID: [id number]    XR Chest 1 View    Result Date: 4/5/2024  Impression: Bibasilar airspace disease, left greater than right, suspicious for pneumonia. Electronically Signed: Tacos Nichols MD  4/5/2024 12:42 PM EDT  Workstation ID: [id number]       Current Medications     Scheduled Meds:  cefepime, 2,000 mg, Intravenous, Once  sodium chloride, 10 mL, Intravenous, Q12H  vancomycin, 20 mg/kg, Intravenous, Once         Continuous Infusions:  norepinephrine, 0.02-0.3 mcg/kg/min, Last Rate: 0.02 mcg/kg/min (04/05/24 1337)  sodium chloride, 125 mL/hr, Last Rate: 125 mL/hr (04/05/24 1339)           JONATHAN Ramirez   Critical Care  04/05/24   13:54 EDT      Electronically signed by Nick Carmichael MD at 04/06/24 0738

## 2024-04-08 NOTE — CASE MANAGEMENT/SOCIAL WORK
Discharge Planning Assessment  [facility]     Patient Name: Lux Bray  MRN: [id number]  Today's Date: 4/8/2024    Admit Date: 4/5/2024    Plan: PT/OT kenny pending. From [facility] (following to see if patient able to return). Would not require precert, PASRR required for SNF.   Discharge Needs Assessment       Row Name 04/08/24 1619       Living Environment    People in Home spouse    Name(s) of People in Home Spouse- Noemy    Current Living Arrangements other (see comments)  [facility]    Potentially Unsafe Housing Conditions none    In the past 12 months has the electric, gas, oil, or water company threatened to shut off services in your home? Pt Unable    Provides Primary Care For no one, unable/limited ability to care for self    Family Caregiver if Needed spouse    Quality of Family Relationships supportive;involved;helpful    Able to Return to Prior Arrangements other (see comments)       Resource/Environmental Concerns    Resource/Environmental Concerns none    Transportation Concerns none       Transportation Needs    In the past 12 months, has lack of transportation kept you from medical appointments or from getting medications? Pt Unable    In the past 12 months, has lack of transportation kept you from meetings, work, or from getting things needed for daily living? Pt Unable       Food Insecurity    Within the past 12 months, you worried that your food would run out before you got the money to buy more. Pt Unable    Within the past 12 months, the food you bought just didn't last and you didn't have money to get more. Pt Unable       Transition Planning    Transportation Anticipated family or friend will provide;health plan transportation       Discharge Needs Assessment    Readmission Within the Last 30 Days no previous admission in last 30 days    Concerns to be Addressed care coordination/care conferences;discharge planning    Anticipated Changes Related to Illness inability to care for self    Provided Post  Acute Provider List? N/A                   Discharge Plan       Row Name 04/08/24 1615       Plan    Plan PT/OT evals pending. From [facility] (following to see if patient able to return). Would not require precert, PASRR required for SNF.    Patient/Family in Agreement with Plan unable to assess    Plan Comments Due to noted altered mental status, CM attempted to contact patient’s spouse, Noemy ([phone]). No answer so  was left to discuss dc planning and TriIMM letter. CM added in [facility] basket and contacted madhuri Limon. Per Indigo dhaliwal, patient was not able to participate with therapy today so they will follow to see if he becomes appropriate to tolerate 3 hours of therapy. CM requested for MSW to complete PASRR. Discharge planning assessment completed per chart review only.                  Continued Care and Services - Admitted Since 4/5/2024       Destination       Service Provider Request Status Selected Services Address Phone Fax Patient Preferred    [facility] Pending - Request Sent N/A [address] [place] [address] [phone] --                   Demographic Summary       Row Name 04/08/24 1618       General Information    Admission Type inpatient    Arrived From emergency department    Required Notices Provided Important Message from Medicare    Referral Source admission list    Reason for Consult discharge planning    Preferred Language English       Contact Information    Permission Granted to Share Info With                    Functional Status       Row Name 04/08/24 1619       Functional Status    Usual Activity Tolerance moderate    Current Activity Tolerance poor       Functional Status, IADL    Medications assistive person    Meal Preparation assistive person    Housekeeping assistive person    Laundry assistive person    Shopping assistive person                Doris Ortiz RN     Office Phone:  [phone]  Office Cell: [phone]

## 2024-04-08 NOTE — CONSULTS
Palliative Care Social Work Progress Note    Code Status:full code    Goals of Care: Full Treatment    Narrative: Palliative care  contacted patients spouse to discuss goals of care. Visit attempted with patient but he is confused. Patient has advance directives on file that state he would not want any life sustaining measures. Wife is very tearful and states she would like to honor his wishes but isnt sure if she's ready to change his code status. She states she will visit him later this evening and has been in contact with Dr. Morrow who plans to visit patient tomorrow.  notified JUAN RAMON Solorzano patient requires assistance with feeds, per spouse request. Palliative care team to revisit goals of care tomorrow. Emotional support provided to spouse.     Plan: Palliative care team following          Suzette Askew

## 2024-04-09 LAB
ANION GAP SERPL CALCULATED.3IONS-SCNC: 8 MMOL/L (ref 5–15)
BASOPHILS # BLD AUTO: 0.02 10*3/MM3 (ref 0–0.2)
BASOPHILS NFR BLD AUTO: 0.1 % (ref 0–1.5)
BUN SERPL-MCNC: 37 MG/DL (ref 8–23)
BUN/CREAT SERPL: 20.4 (ref 7–25)
CALCIUM SPEC-SCNC: 8.3 MG/DL (ref 8.6–10.5)
CHLORIDE SERPL-SCNC: 100 MMOL/L (ref 98–107)
CO2 SERPL-SCNC: 29 MMOL/L (ref 22–29)
CREAT SERPL-MCNC: 1.81 MG/DL (ref 0.76–1.27)
DEPRECATED RDW RBC AUTO: 49.2 FL (ref 37–54)
EGFRCR SERPLBLD CKD-EPI 2021: 37.3 ML/MIN/1.73
EOSINOPHIL # BLD AUTO: 0.17 10*3/MM3 (ref 0–0.4)
EOSINOPHIL NFR BLD AUTO: 1.1 % (ref 0.3–6.2)
ERYTHROCYTE [DISTWIDTH] IN BLOOD BY AUTOMATED COUNT: 14.7 % (ref 12.3–15.4)
GLUCOSE BLDC GLUCOMTR-MCNC: 139 MG/DL (ref 70–105)
GLUCOSE BLDC GLUCOMTR-MCNC: 143 MG/DL (ref 70–105)
GLUCOSE BLDC GLUCOMTR-MCNC: 151 MG/DL (ref 70–105)
GLUCOSE BLDC GLUCOMTR-MCNC: 248 MG/DL (ref 70–105)
GLUCOSE SERPL-MCNC: 199 MG/DL (ref 65–99)
HCT VFR BLD AUTO: 31.6 % (ref 37.5–51)
HGB BLD-MCNC: 10 G/DL (ref 13–17.7)
IMM GRANULOCYTES # BLD AUTO: 0.22 10*3/MM3 (ref 0–0.05)
IMM GRANULOCYTES NFR BLD AUTO: 1.4 % (ref 0–0.5)
LYMPHOCYTES # BLD AUTO: 1.93 10*3/MM3 (ref 0.7–3.1)
LYMPHOCYTES NFR BLD AUTO: 12.5 % (ref 19.6–45.3)
MCH RBC QN AUTO: 29 PG (ref 26.6–33)
MCHC RBC AUTO-ENTMCNC: 31.6 G/DL (ref 31.5–35.7)
MCV RBC AUTO: 91.6 FL (ref 79–97)
MONOCYTES # BLD AUTO: 1.41 10*3/MM3 (ref 0.1–0.9)
MONOCYTES NFR BLD AUTO: 9.1 % (ref 5–12)
NEUTROPHILS NFR BLD AUTO: 11.73 10*3/MM3 (ref 1.7–7)
NEUTROPHILS NFR BLD AUTO: 75.8 % (ref 42.7–76)
NRBC BLD AUTO-RTO: 0 /100 WBC (ref 0–0.2)
PLATELET # BLD AUTO: 242 10*3/MM3 (ref 140–450)
PMV BLD AUTO: 10 FL (ref 6–12)
POTASSIUM SERPL-SCNC: 3.6 MMOL/L (ref 3.5–5.2)
RBC # BLD AUTO: 3.45 10*6/MM3 (ref 4.14–5.8)
SODIUM SERPL-SCNC: 137 MMOL/L (ref 136–145)
WBC NRBC COR # BLD AUTO: 15.48 10*3/MM3 (ref 3.4–10.8)

## 2024-04-09 PROCEDURE — 25010000002 PIPERACILLIN SOD-TAZOBACTAM PER 1 G: Performed by: INTERNAL MEDICINE

## 2024-04-09 PROCEDURE — 80048 BASIC METABOLIC PNL TOTAL CA: CPT | Performed by: INTERNAL MEDICINE

## 2024-04-09 PROCEDURE — 25010000002 ENOXAPARIN PER 10 MG: Performed by: NURSE PRACTITIONER

## 2024-04-09 PROCEDURE — 82948 REAGENT STRIP/BLOOD GLUCOSE: CPT

## 2024-04-09 PROCEDURE — 25810000003 LACTATED RINGERS PER 1000 ML

## 2024-04-09 PROCEDURE — 25010000002 ENOXAPARIN PER 10 MG: Performed by: INTERNAL MEDICINE

## 2024-04-09 PROCEDURE — 97166 OT EVAL MOD COMPLEX 45 MIN: CPT | Performed by: OCCUPATIONAL THERAPIST

## 2024-04-09 PROCEDURE — 99232 SBSQ HOSP IP/OBS MODERATE 35: CPT | Performed by: INTERNAL MEDICINE

## 2024-04-09 PROCEDURE — 85025 COMPLETE CBC W/AUTO DIFF WBC: CPT | Performed by: INTERNAL MEDICINE

## 2024-04-09 PROCEDURE — 92526 ORAL FUNCTION THERAPY: CPT

## 2024-04-09 PROCEDURE — 97162 PT EVAL MOD COMPLEX 30 MIN: CPT

## 2024-04-09 PROCEDURE — 63710000001 INSULIN LISPRO (HUMAN) PER 5 UNITS

## 2024-04-09 RX ORDER — ENOXAPARIN SODIUM 100 MG/ML
1 INJECTION SUBCUTANEOUS EVERY 12 HOURS
Status: DISCONTINUED | OUTPATIENT
Start: 2024-04-09 | End: 2024-04-10

## 2024-04-09 RX ORDER — WARFARIN SODIUM 2.5 MG/1
2.5 TABLET ORAL
Status: COMPLETED | OUTPATIENT
Start: 2024-04-09 | End: 2024-04-09

## 2024-04-09 RX ADMIN — PIPERACILLIN AND TAZOBACTAM 3.38 G: 3; .375 INJECTION, POWDER, FOR SOLUTION INTRAVENOUS at 22:27

## 2024-04-09 RX ADMIN — SUCRALFATE 1 G: 1 TABLET ORAL at 07:50

## 2024-04-09 RX ADMIN — DOCUSATE SODIUM 100 MG: 100 CAPSULE, LIQUID FILLED ORAL at 22:33

## 2024-04-09 RX ADMIN — DOCUSATE SODIUM 100 MG: 100 CAPSULE, LIQUID FILLED ORAL at 11:22

## 2024-04-09 RX ADMIN — PIPERACILLIN AND TAZOBACTAM 3.38 G: 3; .375 INJECTION, POWDER, FOR SOLUTION INTRAVENOUS at 14:39

## 2024-04-09 RX ADMIN — INSULIN LISPRO 2 UNITS: 100 INJECTION, SOLUTION INTRAVENOUS; SUBCUTANEOUS at 07:50

## 2024-04-09 RX ADMIN — SODIUM CHLORIDE, POTASSIUM CHLORIDE, SODIUM LACTATE AND CALCIUM CHLORIDE 75 ML/HR: 600; 310; 30; 20 INJECTION, SOLUTION INTRAVENOUS at 22:27

## 2024-04-09 RX ADMIN — METOPROLOL TARTRATE 25 MG: 25 TABLET, FILM COATED ORAL at 11:22

## 2024-04-09 RX ADMIN — DICYCLOMINE HYDROCHLORIDE 10 MG: 10 CAPSULE ORAL at 22:32

## 2024-04-09 RX ADMIN — PREGABALIN 75 MG: 75 CAPSULE ORAL at 11:22

## 2024-04-09 RX ADMIN — ENOXAPARIN SODIUM 60 MG: 100 INJECTION SUBCUTANEOUS at 11:26

## 2024-04-09 RX ADMIN — AMIODARONE HYDROCHLORIDE 200 MG: 200 TABLET ORAL at 11:22

## 2024-04-09 RX ADMIN — DICYCLOMINE HYDROCHLORIDE 10 MG: 10 CAPSULE ORAL at 11:26

## 2024-04-09 RX ADMIN — DULOXETINE HYDROCHLORIDE 90 MG: 30 CAPSULE, DELAYED RELEASE ORAL at 11:21

## 2024-04-09 RX ADMIN — ACETAMINOPHEN 650 MG: 325 TABLET, FILM COATED ORAL at 22:32

## 2024-04-09 RX ADMIN — PANTOPRAZOLE SODIUM 40 MG: 40 TABLET, DELAYED RELEASE ORAL at 11:23

## 2024-04-09 RX ADMIN — Medication 10 ML: at 22:27

## 2024-04-09 RX ADMIN — METOPROLOL TARTRATE 25 MG: 25 TABLET, FILM COATED ORAL at 22:33

## 2024-04-09 RX ADMIN — OXYCODONE 10 MG: 5 TABLET ORAL at 11:22

## 2024-04-09 RX ADMIN — DICYCLOMINE HYDROCHLORIDE 10 MG: 10 CAPSULE ORAL at 18:12

## 2024-04-09 RX ADMIN — ATORVASTATIN CALCIUM 20 MG: 20 TABLET, FILM COATED ORAL at 11:23

## 2024-04-09 RX ADMIN — BISACODYL 5 MG: 5 TABLET, COATED ORAL at 11:26

## 2024-04-09 RX ADMIN — TAMSULOSIN HYDROCHLORIDE 0.4 MG: 0.4 CAPSULE ORAL at 11:22

## 2024-04-09 RX ADMIN — Medication 10 ML: at 11:23

## 2024-04-09 RX ADMIN — INSULIN LISPRO 4 UNITS: 100 INJECTION, SOLUTION INTRAVENOUS; SUBCUTANEOUS at 22:32

## 2024-04-09 RX ADMIN — ENOXAPARIN SODIUM 60 MG: 100 INJECTION SUBCUTANEOUS at 22:32

## 2024-04-09 RX ADMIN — OXYCODONE 5 MG: 5 TABLET ORAL at 18:12

## 2024-04-09 RX ADMIN — PRIMIDONE 150 MG: 50 TABLET ORAL at 22:33

## 2024-04-09 RX ADMIN — PIPERACILLIN AND TAZOBACTAM 3.38 G: 3; .375 INJECTION, POWDER, FOR SOLUTION INTRAVENOUS at 05:24

## 2024-04-09 RX ADMIN — VERAPAMIL HYDROCHLORIDE 240 MG: 120 CAPSULE, DELAYED RELEASE PELLETS ORAL at 22:33

## 2024-04-09 RX ADMIN — DICYCLOMINE HYDROCHLORIDE 10 MG: 10 CAPSULE ORAL at 07:50

## 2024-04-09 RX ADMIN — SUCRALFATE 1 G: 1 TABLET ORAL at 11:27

## 2024-04-09 RX ADMIN — SODIUM CHLORIDE, POTASSIUM CHLORIDE, SODIUM LACTATE AND CALCIUM CHLORIDE 75 ML/HR: 600; 310; 30; 20 INJECTION, SOLUTION INTRAVENOUS at 10:06

## 2024-04-09 RX ADMIN — SUCRALFATE 1 G: 1 TABLET ORAL at 18:11

## 2024-04-09 RX ADMIN — PRIMIDONE 150 MG: 50 TABLET ORAL at 11:21

## 2024-04-09 RX ADMIN — WARFARIN SODIUM 2.5 MG: 2.5 TABLET ORAL at 18:12

## 2024-04-09 RX ADMIN — PREGABALIN 75 MG: 75 CAPSULE ORAL at 22:32

## 2024-04-09 NOTE — PLAN OF CARE
Goal Outcome Evaluation:  Plan of Care Reviewed With: patient           Outcome Evaluation: Pt is an 78 y.o. M adm from [facility] on 04/05/24 with septic shock, AMS & suspected aspiration PNA.  PMHx significant for DMII, metabolic brain disorder, ADD, depression, OA, hearing loss, and polyneuropathy, involuntary muscle jerk, hx of falls. At baseline pt resides with spouse in [facility] with portable ramp to enter. Spouse reports she assisted pt with bathing, toileting & dressing & that he was amb with RW or cane as of 2 weeks ago. Pt has hx of falls with approx 7-9 falls in last 3 months. He went to [facility] for strengthening & had been there a week prior to coming to hospital.  Upon eval, pt requiring max A for bed mobility & ADLs. Unable to attempt standing due to weakness, dec LE AROM & dec balance. Pt demo dec sitting balance, impaired activity endurance, overall weakness, impaired coordination, confusion & inc pain. He is functioning significantly below his baseline status. OT will continue to follow for tx & recommends SNF upon discharge.      Anticipated Discharge Disposition (OT): skilled nursing facility

## 2024-04-09 NOTE — PLAN OF CARE
Goal Outcome Evaluation:  Plan of Care Reviewed With: patient        Progress: no change   Pt is a 79 y/o M admitted to [facility] on 4/5/24 from rehab facility with complaints of fever and general illness for the past several days. Pt hypotensive in the ED with principle diagnosis of septic shock, transferred to ICU for vasopressor support. XR Chest and CT Chest suspicious for PNA. PMH includes DM, HTN, HLD, depression, and dysphagia. He is currently living with spouse in [facility] with ramp entry. She reports that she has been assisting pt with bathing, dressing and other ADLs, also reporting notable generalized weakness over the past two weeks. He was ambulating with RW as of two weeks ago. Most recently been receiving rehab at [facility] for the past week. Reports 7-9 falls in the past three months. This date, he is disoriented to place, situation, and time. He completes bed mobility max A x 2 and required dep x 2 for repositioning in bed. Unable to attempt transfers or ambulation assessment this date due significant weakness and poor balance in sitting. He seems to be functioning well below his baseline at this time and will require SNF at d/c for continued care and management post d/c. PT will continue to progress and follow during stay.    Anticipated Discharge Disposition (PT): skilled nursing facility

## 2024-04-09 NOTE — CASE MANAGEMENT/SOCIAL WORK
Continued Stay Note  [facility]     Patient Name: Lux Bray  MRN: [id number]  Today's Date: 4/9/2024    Admit Date: 4/5/2024    Plan: [facility]  referral pending acceptance. From [facility] (spouse does not want pt to return). Watch O2 needs. PT/OT evals pending. No precert required, PASRR approved, if needed.   Discharge Plan       Row Name 04/09/24 0943       Plan    Plan [facility]  referral pending acceptance. From [facility] (spouse does not want pt to return). Watch O2 needs. PT/OT evals pending. No precert required, PASRR approved, if needed.    Patient/Family in Agreement with Plan yes    Plan Comments Received callback from patient’s spouse, Noemy to discuss dc planning. She confirmed that patient was at [facility] for about one week and she does not wish for patient to return there at d/c. She reported that patient got “septic shock” there and became sick so she wants him to come home at discharge. She reported that patient is “too weak” to work with therapy at this time. Reported that he had a walker and cane at [facility]. Reported that she needed his things packed up at [facility] and inquired if they would deliver his items to her at home. CM recommended for spouse to contact [facility] directly to discuss patient’s items in his room there and she was agreeable. Discussed IMM letter and TriIMM letter. Discussed DME and she reported that patient did not have O2 at home. Currently requiring 2L. May require walking oximetry to be performed 24-48 hrs prior to d/c. She reported that [facility] was on [facility] while patient was at [facility]. DODIE added in basket and contacted to liaison Zaida.                Doris Ortiz RN     Office Phone: [phone]  Office Cell: [phone]

## 2024-04-09 NOTE — THERAPY EVALUATION
Patient Name: Lux Bray  : 1945    MRN: [id number]                              Today's Date: 2024       Admit Date: 2024    Visit Dx:     ICD-10-CM ICD-9-CM   1. Pneumonia due to infectious organism, unspecified laterality, unspecified part of lung  J18.9 486   2. Severe sepsis  A41.9 038.9    R65.20 995.92   3. Atrial flutter, unspecified type  I48.92 427.32     Patient Active Problem List   Diagnosis    Predisposition to allergic reaction    Attention deficit disorder (ADD) without hyperactivity    Cervical radiculopathy    Cholelithiasis    Deficiency of testosterone biosynthesis    Depression    Enlarged prostate with lower urinary tract symptoms (LUTS)    HTN (hypertension)    Irritable bowel syndrome without diarrhea    Lumbago with sciatica, right side    Arthritis    Diabetes    Vertiginous syndrome    Preventative health care    Vitamin D deficiency, unspecified     Encounter for screening for malignant neoplasm of prostate     Iron deficiency anemia due to chronic blood loss    Medicare annual wellness visit, subsequent    Colon polyp    Gastroesophageal reflux disease without esophagitis    Dysuria    High cholesterol    Hearing loss    Cortical senile cataract    Cataracts, bilateral    Screening for prostate cancer    Screening for hypothyroidism    Need for hepatitis C screening test    Toe pain, bilateral    Tinea unguium    Pre-ulcerative corn or callous    Primary iridocyclitis    Weight loss, non-intentional    Focal myoclonus    Polyneuropathy associated with underlying disease    Tingling of both feet    Generalized weakness    Encephalopathy, metabolic    Leukocytosis    Sepsis    Belching    Acid reflux    Early satiety    Epigastric pressure    Sarcopenia    Hypotension due to drugs    Abdominal pain    Septic shock     Past Medical History:   Diagnosis Date    ADHD (attention deficit hyperactivity disorder)     Allergic     Arthritis     Colon polyp     Depression      Diabetes mellitus type 2    controlled by oral meds    Fibromyalgia, primary     HL (hearing loss)     Hyperlipidemia     Hypertension      Past Surgical History:   Procedure Laterality Date    COLONOSCOPY      ENDOSCOPY N/A 11/15/2023    Procedure: ESOPHAGOGASTRODUODENOSCOPY;  Surgeon: George Viveros MD;  Location: [facility] MAIN OR;  Service: Gastroenterology;  Laterality: N/A;  popssible candida, irrgular z-line, gastritis    EYE SURGERY  2023    Cataracts removed: both eyes    HAND SURGERY  1980 1980-1990- [facility]. Abstracted from [facility].    HEMORROIDECTOMY  1984    [facility]. Abstracted from [facility].    TONSILLECTOMY  1940's    VASECTOMY        General Information       Row Name 04/09/24 1049          OT Time and Intention    Document Type evaluation  -DT     Mode of Treatment occupational therapy  -DT       Row Name 04/09/24 1052 04/09/24 1049       General Information    Patient Profile Reviewed -- yes  -DT    Prior Level of Function --  Spouse reports pt able to amb with RW or cane as of 2 weeks ago. She states he was getting weaker & has hx of falls (approx 7-9 in last 3 months) & went to [facility] for strengthening. She assisted him with bathing, dressing & toileting.  -DT --    Existing Precautions/Restrictions oxygen therapy device and L/min  -DT --    Barriers to Rehab medically complex;previous functional deficit  -DT --      Row Name 04/09/24 1052          Living Environment    People in Home spouse  -DT       Row Name 04/09/24 1052          Home Main Entrance    Number of Stairs, Main Entrance one;other (see comments)  Spouse reports she has a portable ramp  -DT       Row Name 04/09/24 1052          Stairs Within Home, Primary    Number of Stairs, Within Home, Primary twelve  to basement, but pt does not access it.  -DT       Row Name 04/09/24 1052          Cognition    Orientation Status (Cognition) oriented to;person;disoriented to;place;situation;time  -DT       Row Name 04/09/24 1052           Safety Issues, Functional Mobility    Impairments Affecting Function (Mobility) balance;cognition;coordination;endurance/activity tolerance;pain;postural/trunk control;range of motion (ROM);sensation/sensory awareness;strength  -DT     Cognitive Impairments, Mobility Safety/Performance sequencing abilities;other (see comments)  STM memory deficits noted with dec orientation  -DT               User Key  (r) = Recorded By, (t) = Taken By, (c) = Cosigned By      Initials Name Provider Type    DT Mairmar Green, OT Occupational Therapist                     Mobility/ADL's       Row Name 04/09/24 1205          Bed Mobility    Bed Mobility bed mobility (all) activities  -DT     All Activities, [place] (Bed Mobility) maximum assist (25% patient effort)  -DT     Bed Mobility, Safety Issues decreased use of arms for pushing/pulling;decreased use of legs for bridging/pushing  -DT     Assistive Device (Bed Mobility) bed rails;draw sheet;head of bed elevated  -DT       Row Name 04/09/24 1205          Transfers    Comment, (Transfers) NT due to dec balance, strength activity tolerance  -DT       Row Name 04/09/24 1207          Activities of Daily Living    BADL Assessment/Intervention feeding;lower body dressing  -DT       Row Name 04/09/24 1207          Self-Feeding Assessment/Training    [place] Level (Feeding) feeding skills;liquids to mouth;set up;moderate assist (50% patient effort)  -DT       Row Name 04/09/24 1207          Lower Body Dressing Assessment/Training    [place] Level (Lower Body Dressing) lower body dressing skills;dependent (less than 25% patient effort)  -DT     Position (Lower Body Dressing) supine  -DT               User Key  (r) = Recorded By, (t) = Taken By, (c) = Cosigned By      Initials Name Provider Type    Marimar Smith, OT Occupational Therapist                   Obj/Interventions       Row Name 04/09/24 1206          Range of Motion Comprehensive    General  Range of Motion upper extremity range of motion deficits identified;lower extremity range of motion deficits identified  -DT       Row Name 04/09/24 1206          Strength Comprehensive (MMT)    General Manual Muscle Testing (MMT) Assessment upper extremity strength deficits identified;lower extremity strength deficits identified  -DT       Row Name 04/09/24 1206          Motor Skills    Motor Skills coordination  -DT     Coordination bilateral;upper extremity;lower extremity;gross motor deficit;fine motor deficit;moderate impairment  -DT       Row Name 04/09/24 1208          Balance    Balance Assessment sitting static balance  -DT     Static Sitting Balance moderate assist;other (see comments)  left side & posterior leaning  -DT     Position, Sitting Balance sitting edge of bed  -DT               User Key  (r) = Recorded By, (t) = Taken By, (c) = Cosigned By      Initials Name Provider Type    DT Marimar Green, OT Occupational Therapist                   Goals/Plan       Row Name 04/09/24 1214          Transfer Goal 1 (OT)    Activity/Assistive Device (Transfer Goal 1, OT) sit-to-stand/stand-to-sit;bed-to-chair/chair-to-bed  -DT     [place] Level/Cues Needed (Transfer Goal 1, OT) moderate assist (50-74% patient effort)  -DT     Time Frame (Transfer Goal 1, OT) 2 weeks  -DT       Row Name 04/09/24 1214          Dressing Goal 1 (OT)    Activity/Device (Dressing Goal 1, OT) dressing skills, all;upper body dressing  -DT     [place]/Cues Needed (Dressing Goal 1, OT) moderate assist (50-74% patient effort)  -DT     Time Frame (Dressing Goal 1, OT) 2 weeks  -DT       Row Name 04/09/24 1214          Grooming Goal 1 (OT)    Activity/Device (Grooming Goal 1, OT) grooming skills, all  -DT     [place] (Grooming Goal 1, OT) moderate assist (50-74% patient effort)  -DT     Time Frame (Grooming Goal 1, OT) 2 weeks  -DT       Row Name 04/09/24 1214          Self-Feeding Goal 1 (OT)    Activity/Device  (Self-Feeding Goal 1, OT) self-feeding skills, all  -DT     [place] Level/Cues Needed (Self-Feeding Goal 1, OT) set-up required;minimum assist (75% or more patient effort)  -DT     Time Frame (Self-Feeding Goal 1, OT) 2 weeks  -DT       Row Name 04/09/24 1214          Therapy Assessment/Plan (OT)    Planned Therapy Interventions (OT) activity tolerance training;BADL retraining;cognitive/visual perception retraining;functional balance retraining;neuromuscular control/coordination retraining;occupation/activity based interventions;passive ROM/stretching;patient/caregiver education/training;ROM/therapeutic exercise;strengthening exercise;transfer/mobility retraining  -DT               User Key  (r) = Recorded By, (t) = Taken By, (c) = Cosigned By      Initials Name Provider Type    DT Marimar Green, OT Occupational Therapist                   Clinical Impression       Row Name 04/09/24 1208          Pain Assessment    Pretreatment Pain Rating 5/10  -DT     Posttreatment Pain Rating 5/10  -DT     Pain Location - Side/Orientation Bilateral  -DT     Pain Location lower  -DT     Pain Location - extremity  -DT     Pain Intervention(s) Repositioned;Ambulation/increased activity;Emotional support;Therapeutic presence  -DT       Row Name 04/09/24 1208          Plan of Care Review    Plan of Care Reviewed With patient  -DT     Outcome Evaluation Pt is an 78 y.o. M adm from [facility] on 04/05/24 with septic shock, AMS & suspected aspiration PNA.  PMHx significant for DMII, metabolic brain disorder, ADD, depression, OA, hearing loss, and polyneuropathy, involuntary muscle jerk, hx of falls. At baseline pt resides with spouse in [facility] with portable ramp to enter. Spouse reports she assisted pt with bathing, toileting & dressing & that he was amb with RW or cane as of 2 weeks ago. Pt has hx of falls with approx 7-9 falls in last 3 months. He went to [facility] for strengthening & had been there a week prior to coming to hospital.   Upon eval, pt requiring max A for bed mobility & ADLs. Unable to attempt standing due to weakness, dec LE AROM & dec balance. Pt demo dec sitting balance, impaired activity endurance, overall weakness, impaired coordination, confusion & inc pain. He is functioning significantly below his baseline status. OT will continue to follow for tx & recommends SNF upon discharge.  -DT       Row Name 04/09/24 1208          Therapy Assessment/Plan (OT)    Rehab Potential (OT) good, to achieve stated therapy goals  -DT     Criteria for Skilled Therapeutic Interventions Met (OT) yes;meets criteria;skilled treatment is necessary  -DT     Therapy Frequency (OT) 5 times/wk  -DT     Predicted Duration of Therapy Intervention (OT) until d/c  -DT       Row Name 04/09/24 1208          Therapy Plan Review/Discharge Plan (OT)    Anticipated Discharge Disposition (OT) skilled nursing facility  -DT       Row Name 04/09/24 1208          Vital Signs    Pre Systolic BP Rehab 130  -DT     Pre Treatment Diastolic BP 57  -DT     Intra Systolic BP Rehab 122  -DT     Intra Treatment Diastolic BP 57  -DT     Pretreatment Heart Rate (beats/min) 78  -DT     Posttreatment Heart Rate (beats/min) 79  -DT     Pre SpO2 (%) 100  -DT     O2 Delivery Pre Treatment nasal cannula  2L  -DT     Intra SpO2 (%) 92  -DT     O2 Delivery Intra Treatment nasal cannula  2L  -DT     Post SpO2 (%) 95  -DT     O2 Delivery Post Treatment nasal cannula  -DT       Row Name 04/09/24 1208          Positioning and Restraints    Pre-Treatment Position in bed  -DT     Post Treatment Position bed  -DT     In Bed supine;with PT;side rails up x2  -DT               User Key  (r) = Recorded By, (t) = Taken By, (c) = Cosigned By      Initials Name Provider Type    Marimar Smith, OT Occupational Therapist                   Outcome Measures    No documentation.                   Occupational Therapy Education       Title: PT OT SLP Therapies (In Progress)       Topic:  Occupational Therapy (In Progress)       Point: ADL training (Done)       Description:   Instruct learner(s) on proper safety adaptation and remediation techniques during self care or transfers.   Instruct in proper use of assistive devices.                  Learning Progress Summary             Patient Acceptance, E,TB, VU,NR by DT at 4/9/2024 1216    Comment: Role of OT,goals, POC: safety awareness   Significant Other Acceptance, E,TB, VU,NR by DT at 4/9/2024 1216    Comment: Role of OT,goals, POC: safety awareness                         Point: Home exercise program (Not Started)       Description:   Instruct learner(s) on appropriate technique for monitoring, assisting and/or progressing therapeutic exercises/activities.                  Learner Progress:  Not documented in this visit.              Point: Precautions (Done)       Description:   Instruct learner(s) on prescribed precautions during self-care and functional transfers.                  Learning Progress Summary             Patient Acceptance, E,TB, VU,NR by DT at 4/9/2024 1216    Comment: Role of OT,goals, POC: safety awareness   Significant Other Acceptance, E,TB, VU,NR by DT at 4/9/2024 1216    Comment: Role of OT,goals, POC: safety awareness                         Point: Body mechanics (Done)       Description:   Instruct learner(s) on proper positioning and spine alignment during self-care, functional mobility activities and/or exercises.                  Learning Progress Summary             Patient Acceptance, E,TB, VU,NR by DT at 4/9/2024 1216    Comment: Role of OT,goals, POC: safety awareness   Significant Other Acceptance, E,TB, VU,NR by DT at 4/9/2024 1216    Comment: Role of OT,goals, POC: safety awareness                                         User Key       Initials Effective Dates Name Provider Type Discipline    DT 07/11/23 -  Marimar Green OT Occupational Therapist OT                  OT Recommendation and Plan  Planned  Therapy Interventions (OT): activity tolerance training, BADL retraining, cognitive/visual perception retraining, functional balance retraining, neuromuscular control/coordination retraining, occupation/activity based interventions, passive ROM/stretching, patient/caregiver education/training, ROM/therapeutic exercise, strengthening exercise, transfer/mobility retraining  Therapy Frequency (OT): 5 times/wk  Plan of Care Review  Plan of Care Reviewed With: patient  Outcome Evaluation: Pt is an 78 y.o. M adm from [facility] on 04/05/24 with septic shock, AMS & suspected aspiration PNA.  PMHx significant for DMII, metabolic brain disorder, ADD, depression, OA, hearing loss, and polyneuropathy, involuntary muscle jerk, hx of falls. At baseline pt resides with spouse in [facility] with portable ramp to enter. Spouse reports she assisted pt with bathing, toileting & dressing & that he was amb with RW or cane as of 2 weeks ago. Pt has hx of falls with approx 7-9 falls in last 3 months. He went to [facility] for strengthening & had been there a week prior to coming to hospital.  Upon eval, pt requiring max A for bed mobility & ADLs. Unable to attempt standing due to weakness, dec LE AROM & dec balance. Pt demo dec sitting balance, impaired activity endurance, overall weakness, impaired coordination, confusion & inc pain. He is functioning significantly below his baseline status. OT will continue to follow for tx & recommends SNF upon discharge.     Time Calculation:         Time Calculation- OT       Row Name 04/09/24 1216             Time Calculation- OT    OT Start Time 0953  -DT      OT Stop Time 1025  -DT      OT Time Calculation (min) 32 min  -DT      OT Received On 04/09/24  -DT      OT - Next Appointment 04/10/24  -DT      OT Goal Re-Cert Due Date 04/23/24  -DT                User Key  (r) = Recorded By, (t) = Taken By, (c) = Cosigned By      Initials Name Provider Type    Marimar Smith, OT Occupational Therapist                            Marimar Green, OT  4/9/2024

## 2024-04-09 NOTE — PROGRESS NOTES
LOS: 4 days   Admitting Physician- Kendall Haynes MD    Reason For Followup:    Atrial fibrillation  Mental status changes  Pneumonia  Diabetes mellitus  Hypertension    Subjective     Patient is feeling better.  Patient is awake    Objective     Hemodynamics are stable    Review of Systems:   Review of Systems   Constitutional: Positive for malaise/fatigue. Negative for chills and fever.   HENT:  Negative for ear discharge and nosebleeds.    Eyes:  Negative for discharge and redness.   Cardiovascular:  Negative for chest pain, orthopnea, palpitations, paroxysmal nocturnal dyspnea and syncope.   Respiratory:  Positive for shortness of breath. Negative for cough and wheezing.    Endocrine: Negative for heat intolerance.   Skin:  Negative for rash.   Musculoskeletal:  Positive for arthritis and joint pain. Negative for myalgias.   Gastrointestinal:  Negative for abdominal pain, melena, nausea and vomiting.   Genitourinary:  Negative for dysuria and hematuria.   Neurological:  Negative for dizziness, light-headedness, numbness and tremors.   Psychiatric/Behavioral:  Negative for depression. The patient is not nervous/anxious.          Vital Signs  Vitals:    04/09/24 0500 04/09/24 0600 04/09/24 0911 04/09/24 1314   BP: 131/64 117/61 116/60 117/60   BP Location: Left arm  Left arm Left arm   Patient Position: Lying  Lying Lying   Pulse: 79 75 76 74   Resp: 19  14 11   Temp: 98.4 °F (36.9 °C)  98.1 °F (36.7 °C) 98.5 °F (36.9 °C)   TempSrc: Oral  Axillary Oral   SpO2: 98% 96% 98% 99%   Weight: 66.4 kg (146 lb 6.2 oz)      Height:         Wt Readings from Last 1 Encounters:   04/09/24 66.4 kg (146 lb 6.2 oz)       Intake/Output Summary (Last 24 hours) at 4/9/2024 1624  Last data filed at 4/9/2024 1314  Gross per 24 hour   Intake 480 ml   Output 2150 ml   Net -1670 ml     Physical Exam:  Constitutional:       Appearance: Well-developed.   Eyes:      General: No scleral icterus.        Right eye: No discharge.    HENT:      Head: Normocephalic and atraumatic.   Neck:      Thyroid: No thyromegaly.      Lymphadenopathy: No cervical adenopathy.   Pulmonary:      Effort: Pulmonary effort is normal. No respiratory distress.      Breath sounds: Normal breath sounds. No wheezing. No rales.   Cardiovascular:      Normal rate. Regular rhythm.      No gallop.    Edema:     Peripheral edema absent.   Abdominal:      Tenderness: There is no abdominal tenderness.   Skin:     Findings: No erythema or rash.   Neurological:      Mental Status: Alert and oriented to person, place, and time.         Results Review:   Lab Results (last 24 hours)       Procedure Component Value Units Date/Time    Blood Culture - Blood, Arm, Right [[id number]]  (Normal) Collected: 04/05/24 1201    Specimen: Blood from Arm, Right Updated: 04/09/24 1230     Blood Culture No growth at 4 days    Blood Culture - Blood, Arm, Left [[id number]]  (Normal) Collected: 04/05/24 1203    Specimen: Blood from Arm, Left Updated: 04/09/24 1230     Blood Culture No growth at 4 days    POC Glucose 4x Daily Before Meals & at Bedtime [[id number]]  (Abnormal) Collected: 04/09/24 1112    Specimen: Blood Updated: 04/09/24 1114     Glucose 143 mg/dL      Comment: Serial Number: [id number]:  [id number]       POC Glucose 4x Daily Before Meals & at Bedtime [[id number]]  (Abnormal) Collected: 04/09/24 0713    Specimen: Blood Updated: 04/09/24 0716     Glucose 151 mg/dL      Comment: Serial Number: [id number]:  [id number]       Basic Metabolic Panel [[id number]]  (Abnormal) Collected: 04/09/24 0104    Specimen: Blood Updated: 04/09/24 0130     Glucose 199 mg/dL      BUN 37 mg/dL      Creatinine 1.81 mg/dL      Sodium 137 mmol/L      Potassium 3.6 mmol/L      Chloride 100 mmol/L      CO2 29.0 mmol/L      Calcium 8.3 mg/dL      BUN/Creatinine Ratio 20.4     Anion Gap 8.0 mmol/L      eGFR 37.3 mL/min/1.73     Narrative:      GFR Normal >60  Chronic Kidney Disease <60  Kidney Failure  <15    The GFR formula is only valid for adults with stable renal function between ages 18 and 70.    CBC & Differential [[id number]]  (Abnormal) Collected: 04/09/24 0104    Specimen: Blood Updated: 04/09/24 0111    Narrative:      The following orders were created for panel order CBC & Differential.  Procedure                               Abnormality         Status                     ---------                               -----------         ------                     CBC Auto Differential[[id number]]        Abnormal            Final result                 Please view results for these tests on the individual orders.    CBC Auto Differential [[id number]]  (Abnormal) Collected: 04/09/24 0104    Specimen: Blood Updated: 04/09/24 0111     WBC 15.48 10*3/mm3      RBC 3.45 10*6/mm3      Hemoglobin 10.0 g/dL      Hematocrit 31.6 %      MCV 91.6 fL      MCH 29.0 pg      MCHC 31.6 g/dL      RDW 14.7 %      RDW-SD 49.2 fl      MPV 10.0 fL      Platelets 242 10*3/mm3      Neutrophil % 75.8 %      Lymphocyte % 12.5 %      Monocyte % 9.1 %      Eosinophil % 1.1 %      Basophil % 0.1 %      Immature Grans % 1.4 %      Neutrophils, Absolute 11.73 10*3/mm3      Lymphocytes, Absolute 1.93 10*3/mm3      Monocytes, Absolute 1.41 10*3/mm3      Eosinophils, Absolute 0.17 10*3/mm3      Basophils, Absolute 0.02 10*3/mm3      Immature Grans, Absolute 0.22 10*3/mm3      nRBC 0.0 /100 WBC     POC Glucose Once [[id number]]  (Abnormal) Collected: 04/08/24 2044    Specimen: Blood Updated: 04/08/24 2045     Glucose 203 mg/dL      Comment: Serial Number: [id number]:  [id number]       POC Glucose 4x Daily Before Meals & at Bedtime [[id number]]  (Abnormal) Collected: 04/08/24 1839    Specimen: Blood Updated: 04/08/24 1918     Glucose 161 mg/dL      Comment: Serial Number: [id number]:  [id number]             Imaging Results (Last 72 Hours)       Procedure Component Value Units Date/Time    FL Video Swallow With Speech Single Contrast  [[id number]] Resulted: 04/07/24 1435     Updated: 04/07/24 1435    Narrative:      This procedure was auto-finalized with no dictation required.    CT Abdomen Without Contrast [[id number]] Collected: 04/07/24 0314     Updated: 04/07/24 0321    Narrative:      CT ABDOMEN WO CONTRAST    Date of Exam: 4/7/2024 12:10 AM EDT    Indication: Abdominal mass, intra-abdominal neoplasm suspected.    Comparison: CT abdomen pelvis dated 12/11/2023    Technique: Axial CT images were obtained of the abdomen without contrast administration. Sagittal and coronal reconstructions were performed.  Automated exposure control and iterative reconstruction methods were used.      Findings:  There are small bilateral pleural effusions. There is right basilar atelectasis. There is left basilar airspace disease that may be secondary to pneumonia. There is a trace pericardial effusion.    There is distention of the gallbladder. The liver, bilateral adrenal glands, bilateral kidneys, pancreas and spleen are unremarkable. There is diverticulosis without evidence of diverticulitis. There is a moderate amount of stool throughout the colon the   appendix is seen and is normal. The visualized small bowel is normal. There is no free air or free fluid in the abdomen.     There are degenerative changes of the lower thoracic spine and the visualized portions of the lumbar spine. There is a stable compression fracture of T12..      Impression:      Impression:  1.Small bilateral pleural effusions with right basilar atelectasis.  2.Airspace disease in the left lower lobe could be secondary to pneumonia.  3.There is distention of the gallbladder.        Electronically Signed: Ronny Slaughter MD    4/7/2024 3:19 AM EDT    Workstation ID: [id number]    CT Chest Without Contrast Diagnostic [[id number]] Collected: 04/07/24 0308     Updated: 04/07/24 0313    Narrative:      CT CHEST WO CONTRAST DIAGNOSTIC    Date of Exam: 4/7/2024 12:10 AM EDT    Indication: Pneumonia,  complication suspected, xray done.    Comparison: None available.    Technique: Axial CT images were obtained of the chest without contrast administration.  Sagittal and coronal reconstructions were performed.  Automated exposure control and iterative reconstruction methods were used.      Findings:  There are small bilateral pleural effusions. There is bilateral basilar discoid airspace disease favored to be atelectasis. Consolidation in the left lower lobe could be combination of atelectasis and pneumonia. There is mild lingular atelectasis.    There is a trace pericardial effusion. There is mild coronary artery calcific atherosclerosis.    There is no evidence of pathologic adenopathy.    Limited imaging of the upper solid abdominal structures demonstrates mild gallbladder distention. The other upper abdominal structures are normal. There are degenerative changes of the thoracic spine.      Impression:      Impression:  Small bilateral pleural effusions. Bilateral basilar airspace disease. On the left there may be a combination of atelectasis and pneumonia.      Electronically Signed: Ronny Slaughter MD    4/7/2024 3:11 AM EDT    Workstation ID: [id number]          ECG/EMG Results (most recent)       Procedure Component Value Units Date/Time    ECG 12 Lead Altered Mental Status [[id number]] Collected: 04/05/24 1129     Updated: 04/05/24 1202     QTC Interval -- ms     Narrative:      HEART RATE= Invalid  bpm  RR Interval= 0  ms  WI Interval=   ms  P Horizontal Axis=   deg  P Front Axis=   deg  QRSD Interval=   ms  QT Interval=   ms  QTcB= Invalid  ms  QRS Axis=   deg  T Wave Axis=   deg  Electronically Signed By:   Date and Time of Study: 2024-04-05 11:29:29    Adult Transthoracic Echo Complete W/ Cont if Necessary Per Protocol [[id number]] Resulted: 04/07/24 1640     Updated: 04/07/24 1643     EF(MOD-bp) 69.8 %      LVIDd 4.4 cm      LVIDs 3.1 cm      IVSd 1.00 cm      LVPWd 1.10 cm      FS 29.5 %      IVS/LVPW 0.91 cm       ESV(cubed) 29.8 ml      LV Sys Vol (BSA corrected) 12.9 cm2      EDV(cubed) 85.2 ml      LV Santiago Vol (BSA corrected) 42.3 cm2      LV mass(C)d 158.2 grams      LVOT area 3.1 cm2      LVOT diam 2.00 cm      EDV(MOD-sp2) 59.4 ml      EDV(MOD-sp4) 77.1 ml      ESV(MOD-sp2) 17.4 ml      ESV(MOD-sp4) 23.6 ml      SV(MOD-sp2) 42.0 ml      SV(MOD-sp4) 53.5 ml      SI(MOD-sp2) 23.0 ml/m2      SI(MOD-sp4) 29.3 ml/m2      EF(MOD-sp2) 70.7 %      EF(MOD-sp4) 69.4 %      MV E max lefty 84.9 cm/sec      MV A max lefty 43.0 cm/sec      MV dec time 0.17 sec      MV E/A 1.97     Pulm A Revs Dur 0.11 sec      Med Peak E' Lefty 21.8 cm/sec      Lat Peak E' Lefty 24.8 cm/sec      TR max lefty 273.0 cm/sec      Avg E/e' ratio 3.64     SV(LVOT) 44.9 ml      RVIDd 2.7 cm      RV Base 3.3 cm      RV Mid 2.30 cm      RV Length 5.6 cm      TAPSE (>1.6) 1.78 cm      RV S' 17.9 cm/sec      LA dimension (2D)  3.3 cm      Pulm Sys Lefty 56.2 cm/sec      Pulm Santiago Lefty 46.0 cm/sec      Pulm S/D 1.22     Pulm A Revs Lefty 33.9 cm/sec      LV V1 max 99.1 cm/sec      LV V1 max PG 3.9 mmHg      LV V1 mean PG 2.00 mmHg      LV V1 VTI 14.3 cm      Ao pk lefty 124.0 cm/sec      Ao max PG 6.2 mmHg      Ao mean PG 3.0 mmHg      Ao V2 VTI 19.9 cm      MANEUL(I,D) 2.26 cm2      MV max PG 10.5 mmHg      MV mean PG 4.0 mmHg      MV V2 VTI 27.3 cm      MV P1/2t 41.4 msec      MVA(P1/2t) 5.3 cm2      MVA(VTI) 1.65 cm2      MV dec slope 1,216 cm/sec2      TR max PG 29.8 mmHg      RV V1 max PG 4.7 mmHg      RV V1 max 108.0 cm/sec      RV V1 VTI 15.8 cm      PA V2 max 115.5 cm/sec      PA acc time 0.14 sec      Ao root diam 3.2 cm      ACS 2.00 cm      Sinus 3.1 cm      STJ 2.7 cm      RVSP(TR) 33 mmHg      RAP systole 3 mmHg     Narrative:        Left ventricular systolic function is normal. Left ventricular ejection   fraction appears to be 56 - 60%.    Left ventricular wall thickness is consistent with mild concentric   hypertrophy.    Left ventricular diastolic function was  normal.    Estimated right ventricular systolic pressure from tricuspid   regurgitation is normal (<35 mmHg).    There is a small (<1cm) circumferential pericardial effusion. There is   no evidence of cardiac tamponade.      ECG 12 Lead Rhythm Change [[id number]] Collected: 04/06/24 1904     Updated: 04/07/24 1728     QT Interval 337 ms      QTC Interval 499 ms     Narrative:      HEART RATE= 131  bpm  RR Interval= 457  ms  WI Interval=   ms  P Horizontal Axis=   deg  P Front Axis=   deg  QRSD Interval= 93  ms  QT Interval= 337  ms  QTcB= 499  ms  QRS Axis= 40  deg  T Wave Axis= -21  deg  - ABNORMAL ECG -  Atrial fibrillation  Inferior infarct, old  When compared with ECG of 06-Apr-2024 7:54:52,  New or worsened ischemia or infarction  Significant repolarization change  Significant axis, voltage or hypertrophy change  Electronically Signed By: Warren Alvarenga (THEO) 07-Apr-2024 17:28:18  Date and Time of Study: 2024-04-06 19:04:20    ECG 12 Lead Tachycardia [[id number]] Collected: 04/06/24 0754     Updated: 04/07/24 1728     QT Interval 321 ms      QTC Interval 480 ms     Narrative:      HEART RATE= 134  bpm  RR Interval= 448  ms  WI Interval=   ms  P Horizontal Axis=   deg  P Front Axis=   deg  QRSD Interval= 95  ms  QT Interval= 321  ms  QTcB= 480  ms  QRS Axis= 41  deg  T Wave Axis= -5  deg  - ABNORMAL ECG -  Atrial fibrillation  Ventricular premature complex  Low voltage, precordial leads  ST depression, probably rate related  When compared with ECG of 05-Apr-2024 11:44:03,  Significant repolarization change  Significant axis, voltage or hypertrophy change  Electronically Signed By: Warren Alvarenga (THEO) 07-Apr-2024 17:28:24  Date and Time of Study: 2024-04-06 07:54:52    Telemetry Scan [[id number]] Resulted: 04/05/24     Updated: 04/09/24 0806    Telemetry Scan [[id number]] Resulted: 04/05/24     Updated: 04/09/24 0822    Telemetry Scan [[id number]] Resulted: 04/05/24     Updated: 04/09/24 0850    Telemetry Scan [[id number]]  Resulted: 04/05/24     Updated: 04/09/24 0933    Telemetry Scan [[id number]] Resulted: 04/05/24     Updated: 04/09/24 1017    Telemetry Scan [[id number]] Resulted: 04/05/24     Updated: 04/09/24 1040    Telemetry Scan [[id number]] Resulted: 04/05/24     Updated: 04/09/24 1056    Telemetry Scan [[id number]] Resulted: 04/05/24     Updated: 04/09/24 1124    Telemetry Scan [[id number]] Resulted: 04/05/24     Updated: 04/09/24 1241    Telemetry Scan [[id number]] Resulted: 04/05/24     Updated: 04/09/24 1403    Telemetry Scan [[id number]] Resulted: 04/05/24     Updated: 04/09/24 1418    Telemetry Scan [[id number]] Resulted: 04/05/24     Updated: 04/09/24 1513    Telemetry Scan [[id number]] Resulted: 04/05/24     Updated: 04/09/24 1524          CBC    Results from last 7 days   Lab Units 04/09/24  0104 04/08/24  0508 04/07/24  0526 04/06/24  0448 04/05/24  1201 04/05/24  0529 04/04/24  1336   WBC 10*3/mm3 15.48* 18.80* 22.33* 16.45* 13.68* 12.51* 11.25*   HEMOGLOBIN g/dL 10.0* 11.2* 13.0 13.2 11.9* 12.0* 12.9*   PLATELETS 10*3/mm3 242 224 248 176 164 183 198     BMP   Results from last 7 days   Lab Units 04/09/24  0104 04/08/24  0508 04/07/24  0043 04/06/24  0448 04/05/24  1201 04/05/24  0529 04/04/24  1336   SODIUM mmol/L 137 139 140 141 137 138 137   POTASSIUM mmol/L 3.6 3.4* 3.5 4.5 4.7 4.9 4.5   CHLORIDE mmol/L 100 102 100 104 99 101 99   CO2 mmol/L 29.0 26.0 25.0 24.0 28.0 29.0 27.0   BUN mg/dL 37* 33* 23 26* 34* 33* 33*   CREATININE mg/dL 1.81* 1.51* 1.11 1.18 1.50* 1.45* 1.42*   GLUCOSE mg/dL 199* 142* 172* 81 180* 166* 166*   MAGNESIUM mg/dL  --   --   --  1.8  --   --   --    PHOSPHORUS mg/dL  --   --   --  2.8  --   --   --      CMP   Results from last 7 days   Lab Units 04/09/24  0104 04/08/24  0508 04/07/24  0043 04/06/24  0448 04/05/24  1201 04/05/24  0529 04/04/24  1336   SODIUM mmol/L 137 139 140 141 137 138 137   POTASSIUM mmol/L 3.6 3.4* 3.5 4.5 4.7 4.9 4.5   CHLORIDE mmol/L 100 102 100 104 99 101 99   CO2 mmol/L  29.0 26.0 25.0 24.0 28.0 29.0 27.0   BUN mg/dL 37* 33* 23 26* 34* 33* 33*   CREATININE mg/dL 1.81* 1.51* 1.11 1.18 1.50* 1.45* 1.42*   GLUCOSE mg/dL 199* 142* 172* 81 180* 166* 166*   ALBUMIN g/dL  --   --   --   --  3.4* 3.2* 3.7   BILIRUBIN mg/dL  --   --   --   --  0.2 0.2 0.2   ALK PHOS U/L  --   --   --   --  116 112 126*   AST (SGOT) U/L  --   --   --   --  26 28 30   ALT (SGPT) U/L  --   --   --   --  20 18 17   AMMONIA umol/L  --   --   --   --   --   --  34     Cardiac Studies:  Echo- Results for orders placed during the hospital encounter of 04/05/24    Adult Transthoracic Echo Complete W/ Cont if Necessary Per Protocol    Interpretation Summary    Left ventricular systolic function is normal. Left ventricular ejection fraction appears to be 56 - 60%.    Left ventricular wall thickness is consistent with mild concentric hypertrophy.    Left ventricular diastolic function was normal.    Estimated right ventricular systolic pressure from tricuspid regurgitation is normal (<35 mmHg).    There is a small (<1cm) circumferential pericardial effusion. There is no evidence of cardiac tamponade.    Stress Myoview-  Cath-      Medication Review:   Scheduled Meds:amiodarone, 200 mg, Oral, Q24H  atorvastatin, 20 mg, Oral, Daily  bisacodyl, 5 mg, Oral, Daily  dicyclomine, 10 mg, Oral, 4x Daily AC & at Bedtime  docusate sodium, 100 mg, Oral, BID  DULoxetine, 90 mg, Oral, Daily  enoxaparin, 1 mg/kg, Subcutaneous, Q12H  insulin lispro, 2-9 Units, Subcutaneous, 4x Daily AC & at Bedtime  metoprolol tartrate, 25 mg, Oral, Q12H  oxyCODONE, 10 mg, Oral, Daily  pantoprazole, 40 mg, Oral, Daily  piperacillin-tazobactam, 3.375 g, Intravenous, Q8H  pregabalin, 75 mg, Oral, BID  primidone, 150 mg, Oral, Q12H  sodium chloride, 10 mL, Intravenous, Q12H  sucralfate, 1 g, Oral, TID AC  tamsulosin, 0.4 mg, Oral, Daily  verapamil ER, 240 mg, Oral, Nightly      Continuous Infusions:lactated ringers, 75 mL/hr, Last Rate: 75 mL/hr (04/09/24  1006)  Pharmacy to Dose enoxaparin (LOVENOX),       PRN Meds:.  acetaminophen **OR** acetaminophen    bisacodyl    dextrose    dextrose    glucagon (human recombinant)    ipratropium-albuterol    magnesium hydroxide    Morphine    nitroglycerin    ondansetron ODT **OR** ondansetron    oxyCODONE    Pharmacy to Dose enoxaparin (LOVENOX)    sodium chloride    sodium chloride    sodium chloride      Assessment & Plan     Septic shock    MDM:    1.  Atrial fibrillation/flutter:    Patient is in sinus rhythm.  Patient is on metoprolol.  I would consider anticoagulation.    2.  Hypertension:    Blood pressure is controlled patient is on verapamil    3.  Hyperlipidemia    Patient is on Lipitor.    4.  Pneumonia/sepsis:    Patient is on antibiotics.  Further recommendation as per primary team    Warren Alvarenga MD  04/09/24  16:24 EDT

## 2024-04-09 NOTE — THERAPY TREATMENT NOTE
Acute Care - Speech Language Pathology   Swallow Treatment Note MARIO Larsen     Patient Name: Lux Bray  : 1945  MRN: [id number]  Today's Date: 2024               Admit Date: 2024    Visit Dx:     ICD-10-CM ICD-9-CM   1. Pneumonia due to infectious organism, unspecified laterality, unspecified part of lung  J18.9 486   2. Severe sepsis  A41.9 038.9    R65.20 995.92   3. Atrial flutter, unspecified type  I48.92 427.32     Patient Active Problem List   Diagnosis    Predisposition to allergic reaction    Attention deficit disorder (ADD) without hyperactivity    Cervical radiculopathy    Cholelithiasis    Deficiency of testosterone biosynthesis    Depression    Enlarged prostate with lower urinary tract symptoms (LUTS)    HTN (hypertension)    Irritable bowel syndrome without diarrhea    Lumbago with sciatica, right side    Arthritis    Diabetes    Vertiginous syndrome    Preventative health care    Vitamin D deficiency, unspecified     Encounter for screening for malignant neoplasm of prostate     Iron deficiency anemia due to chronic blood loss    Medicare annual wellness visit, subsequent    Colon polyp    Gastroesophageal reflux disease without esophagitis    Dysuria    High cholesterol    Hearing loss    Cortical senile cataract    Cataracts, bilateral    Screening for prostate cancer    Screening for hypothyroidism    Need for hepatitis C screening test    Toe pain, bilateral    Tinea unguium    Pre-ulcerative corn or callous    Primary iridocyclitis    Weight loss, non-intentional    Focal myoclonus    Polyneuropathy associated with underlying disease    Tingling of both feet    Generalized weakness    Encephalopathy, metabolic    Leukocytosis    Sepsis    Belching    Acid reflux    Early satiety    Epigastric pressure    Sarcopenia    Hypotension due to drugs    Abdominal pain    Septic shock     Past Medical History:   Diagnosis Date    ADHD (attention deficit hyperactivity disorder)      Allergic     Arthritis     Colon polyp     Depression     Diabetes mellitus type 2    controlled by oral meds    Fibromyalgia, primary     HL (hearing loss)     Hyperlipidemia     Hypertension      Past Surgical History:   Procedure Laterality Date    COLONOSCOPY      ENDOSCOPY N/A 11/15/2023    Procedure: ESOPHAGOGASTRODUODENOSCOPY;  Surgeon: George Viveros MD;  [place] [facility] [place]  Service: Gastroenterology;  Laterality: N/A;  popssible candida, irrgular z-line, gastritis    EYE SURGERY  2023    Cataracts removed: both eyes    HAND SURGERY  1980 1980-1990- [employer]. Abstracted from [facility].    HEMORROIDECTOMY  1984    [employer]    TONSILLECTOMY  1940's    VASECTOMY         SLP Recommendation and Plan  SLP Swallowing Diagnosis: moderate, pharyngeal dysphagia (04/09/24 1500)  SLP Diet Recommendation: mechanical ground textures, nectar thick liquids, water between meals after oral care, with supervision (04/09/24 1500)  Recommended Precautions and Strategies: upright posture during/after eating, small bites of food and sips of liquid, multiple swallows per bite of food, multiple swallows per sip of liquid, alternate between small bites of food and sips of liquid, general aspiration precautions, reflux precautions, 1:1 supervision (04/09/24 1500)  SLP Rec. for Method of Medication Administration: meds whole, meds crushed, as tolerated (04/09/24 1500)     Monitor for Signs of Aspiration: yes, notify SLP if any concerns, cough, gurgly voice, throat clearing (04/09/24 1500)  Recommended Diagnostics: reassess via clinical swallow evaluation (04/09/24 1500)  Swallow Criteria for Skilled Therapeutic Interventions Met: demonstrates skilled criteria (04/09/24 1500)  Anticipated Discharge Disposition (SLP): inpatient rehabilitation facility (04/09/24 1500)  Rehab Potential/Prognosis, Swallowing: good, to achieve stated therapy goals (04/09/24 1500)  Therapy Frequency (Swallow): PRN  (04/09/24 1500)  Predicted Duration Therapy Intervention (Days): until discharge (04/09/24 1500)  Oral Care Recommendations: Oral Care BID/PRN, Before ice/water (04/09/24 1500)        Daily Summary of Progress (SLP): progress toward functional goals as expected (04/09/24 1500)               Treatment Assessment (SLP): continued, clinical signs of, aspiration (04/09/24 1500)     Plan for Continued Treatment (SLP): continue treatment per plan of care (04/09/24 1500)                SWALLOW EVALUATION (Last 72 Hours)       SLP Adult Swallow Evaluation       Row Name 04/09/24 1500       Rehab Evaluation    Document Type therapy note (daily note)  -CP    Subjective Information no complaints  -CP    Patient Observations alert;cooperative  -CP    Patient Effort good  -CP    Comment Skilled ST targeting dysphagia was conducted today. pt was seen for trials of thin water for possible liquid upgrade. Pt was cuedto take small sips of water but needed max cues to reduce sip size. Pt had throat cleaing frequently after sips of water. pt stated that he is not minding the NTL and it is felt to be safer for pt to remain on NTL at meals and contine the FWP only. Education given to pt on the improtance of small sips. pt verbalized understanding but seemed unable to self-regulate sip size. Pt also educated on rationale for NTL. He verbalized udnerstanding. ST will continue to follow to assure toelrance of diet and make upgrades as indicated.  -CP       General Information       SLP Evaluation Clinical Impression    SLP Swallowing Diagnosis moderate;pharyngeal dysphagia  -CP    Functional Impact risk of aspiration/pneumonia;risk of malnutrition  -CP    Rehab Potential/Prognosis, Swallowing good, to achieve stated therapy goals  -CP    Swallow Criteria for Skilled Therapeutic Interventions Met demonstrates skilled criteria  -CP       SLP Treatment Clinical Impressions    Treatment Assessment (SLP) continued;clinical signs of;aspiration   -CP    Daily Summary of Progress (SLP) progress toward functional goals as expected  -CP    Barriers to Overall Progress (SLP) Other (see comments)  Impulsive  -CP    Plan for Continued Treatment (SLP) continue treatment per plan of care  -CP    Care Plan Review --    Care Plan Review, Other Participant(s) --       Recommendations    Therapy Frequency (Swallow) PRN  -CP    Predicted Duration Therapy Intervention (Days) until discharge  -CP    SLP Diet Recommendation mechanical ground textures;nectar thick liquids;water between meals after oral care, with supervision  -CP    Recommended Diagnostics reassess via clinical swallow evaluation  -CP    Recommended Precautions and Strategies upright posture during/after eating;small bites of food and sips of liquid;multiple swallows per bite of food;multiple swallows per sip of liquid;alternate between small bites of food and sips of liquid;general aspiration precautions;reflux precautions;1:1 supervision  -CP    Oral Care Recommendations Oral Care BID/PRN;Before ice/water  -CP    SLP Rec. for Method of Medication Administration meds whole;meds crushed;as tolerated  -CP    Monitor for Signs of Aspiration yes;notify SLP if any concerns;cough;gurgly voice;throat clearing  -CP    Anticipated Discharge Disposition (SLP) inpatient rehabilitation facility  -CP       Swallow Goals (SLP)    Swallow LTGs Swallow Long Term Goal (free text)  -CP    Swallow STGs diet tolerance goal selection (SLP)  -CP    Diet Tolerance Goal Selection (SLP) Swallow Short Term Goal 1;Swallow Short Term Goal 2;Patient will tolerate trials of  -CP       (LTG) Swallow    (LTG) Swallow Pt will return to baseline level of function, tolerating safest and least restrictive diet recommendation, w/ no s/s of penetration/aspiration.  -CP    [place] (Swallow Long Term Goal) with moderate cues (50-74% accuracy)  -CP    Time Frame (Swallow Long Term Goal) by discharge  -CP    Barriers (Swallow Long Term Goal)  Impulsivity  -CP    Progress/Outcomes (Swallow Long Term Goal) goal ongoing  -CP    Comment (Swallow Long Term Goal) See above  -CP       (STG) Patient will tolerate trials of    Consistencies Trialed (Tolerate trials) thin liquids  -CP    Desired Outcome (Tolerate trials) without signs/symptoms of aspiration;with use of compensatory strategies (see comments)  -CP    [place] (Tolerate trials) independently (over 90% accuracy)  -CP    Time Frame (Tolerate trials) 1 week  -CP    Progress/Outcomes (Tolerate trials) goal ongoing  -CP    Comment (Tolerate trials) See above note  -CP       (STG) Swallow 1    (STG) Swallow 1 Pt will participate in ongoing swallow evaluation, including VFSS if clinically indicated, in order to effectively determine pt's safest and least restrictive diet recommendation.  -CP    [place] (Swallow Short Term Goal 1) with moderate cues (50-74% accuracy)  -CP    Time Frame (Swallow Short Term Goal 1) 1 week  -CP    Progress/Outcomes (Swallow Short Term Goal 1) goal ongoing  -CP    Comment (Swallow Short Term Goal 1) --       (STG) Swallow 2    (STG) Swallow 2 The patient will participate in a meal/follow-up assessment to determine safety and adequacy of recommended diet, independent use of safe swallow compensations, pt/family education and additional goals/recommendations to follow.  -CP    Time Frame (Swallow Short Term Goal 2) 1 week  -CP    Progress/Outcomes (Swallow Short Term Goal 2) goal ongoing  -CP              User Key  (r) = Recorded By, (t) = Taken By, (c) = Cosigned By      Initials Name Effective Dates    CP Keisha Teixeira SLP 06/16/21 -                     EDUCATION  The patient has been educated in the following areas:   Dysphagia (Swallowing Impairment) Oral Care/Hydration Modified Diet Instruction.        SLP GOALS       Row Name 04/09/24 1500 04/07/24 1600          (LTG) Swallow    (LTG) Swallow Pt will return to baseline level of function, tolerating safest and  least restrictive diet recommendation, w/ no s/s of penetration/aspiration.  -CP Pt will return to baseline level of function, tolerating safest and least restrictive diet recommendation, w/ no s/s of penetration/aspiration.  -CP     [place] (Swallow Long Term Goal) with moderate cues (50-74% accuracy)  -CP with moderate cues (50-74% accuracy)  -CP     Time Frame (Swallow Long Term Goal) by discharge  -CP by discharge  -CP     Barriers (Swallow Long Term Goal) Impulsivity  -CP --     Progress/Outcomes (Swallow Long Term Goal) goal ongoing  -CP good progress toward goal  -CP     Comment (Swallow Long Term Goal) See above  -CP See above VFSS  -CP        (STG) Patient will tolerate trials of    Consistencies Trialed (Tolerate trials) thin liquids  -CP thin liquids  -CP     Desired Outcome (Tolerate trials) without signs/symptoms of aspiration;with use of compensatory strategies (see comments)  -CP without signs/symptoms of aspiration;with use of compensatory strategies (see comments)  -CP     [place] (Tolerate trials) independently (over 90% accuracy)  -CP independently (over 90% accuracy)  -CP     Time Frame (Tolerate trials) 1 week  -CP 1 week  -CP     Progress/Outcomes (Tolerate trials) goal ongoing  -CP new goal  -CP     Comment (Tolerate trials) See above note  -CP --        (STG) Swallow 1    (STG) Swallow 1 Pt will participate in ongoing swallow evaluation, including VFSS if clinically indicated, in order to effectively determine pt's safest and least restrictive diet recommendation.  -CP Pt will participate in ongoing swallow evaluation, including VFSS if clinically indicated, in order to effectively determine pt's safest and least restrictive diet recommendation.  -CP     [place] (Swallow Short Term Goal 1) with moderate cues (50-74% accuracy)  -CP with moderate cues (50-74% accuracy)  -CP     Time Frame (Swallow Short Term Goal 1) 1 week  -CP 1 week  -CP     Progress/Outcomes (Swallow Short  Term Goal 1) goal ongoing  -CP goal ongoing  -CP     Comment (Swallow Short Term Goal 1) -- See above VFSS  -CP        (STG) Swallow 2    (STG) Swallow 2 The patient will participate in a meal/follow-up assessment to determine safety and adequacy of recommended diet, independent use of safe swallow compensations, pt/family education and additional goals/recommendations to follow.  -CP The patient will participate in a meal/follow-up assessment to determine safety and adequacy of recommended diet, independent use of safe swallow compensations, pt/family education and additional goals/recommendations to follow.  -CP     Time Frame (Swallow Short Term Goal 2) 1 week  -CP 1 week  -CP     Progress/Outcomes (Swallow Short Term Goal 2) goal ongoing  -CP --               User Key  (r) = Recorded By, (t) = Taken By, (c) = Cosigned By      Initials Name Provider Type    CP Keisha Teixeira, SLP Speech and Language Pathologist                       Time Calculation:                DIANE Cerda  4/9/2024

## 2024-04-09 NOTE — THERAPY EVALUATION
Patient Name: Lux Bray  : 1945    MRN: [id number]                              Today's Date: 2024       Admit Date: 2024    Visit Dx:     ICD-10-CM ICD-9-CM   1. Pneumonia due to infectious organism, unspecified laterality, unspecified part of lung  J18.9 486   2. Severe sepsis  A41.9 038.9    R65.20 995.92   3. Atrial flutter, unspecified type  I48.92 427.32     Patient Active Problem List   Diagnosis    Predisposition to allergic reaction    Attention deficit disorder (ADD) without hyperactivity    Cervical radiculopathy    Cholelithiasis    Deficiency of testosterone biosynthesis    Depression    Enlarged prostate with lower urinary tract symptoms (LUTS)    HTN (hypertension)    Irritable bowel syndrome without diarrhea    Lumbago with sciatica, right side    Arthritis    Diabetes    Vertiginous syndrome    Preventative health care    Vitamin D deficiency, unspecified     Encounter for screening for malignant neoplasm of prostate     Iron deficiency anemia due to chronic blood loss    Medicare annual wellness visit, subsequent    Colon polyp    Gastroesophageal reflux disease without esophagitis    Dysuria    High cholesterol    Hearing loss    Cortical senile cataract    Cataracts, bilateral    Screening for prostate cancer    Screening for hypothyroidism    Need for hepatitis C screening test    Toe pain, bilateral    Tinea unguium    Pre-ulcerative corn or callous    Primary iridocyclitis    Weight loss, non-intentional    Focal myoclonus    Polyneuropathy associated with underlying disease    Tingling of both feet    Generalized weakness    Encephalopathy, metabolic    Leukocytosis    Sepsis    Belching    Acid reflux    Early satiety    Epigastric pressure    Sarcopenia    Hypotension due to drugs    Abdominal pain    Septic shock     Past Medical History:   Diagnosis Date    ADHD (attention deficit hyperactivity disorder)     Allergic     Arthritis     Colon polyp     Depression      Diabetes mellitus type 2    controlled by oral meds    Fibromyalgia, primary     HL (hearing loss)     Hyperlipidemia     Hypertension      Past Surgical History:   Procedure Laterality Date    COLONOSCOPY      ENDOSCOPY N/A 11/15/2023    Procedure: ESOPHAGOGASTRODUODENOSCOPY;  Surgeon: George Viveros MD;  Location: [facility] MAIN OR;  Service: Gastroenterology;  Laterality: N/A;  popssible candida, irrgular z-line, gastritis    EYE SURGERY  2023    Cataracts removed: both eyes    HAND SURGERY  1980 1980-1990- [facility]. Abstracted from [facility].    HEMORROIDECTOMY  1984    [facility]. Abstracted from [employer] [facility].    TONSILLECTOMY  1940's    VASECTOMY        General Information       Row Name 04/09/24 1343          Physical Therapy Time and Intention    Document Type evaluation  -MB     Mode of Treatment physical therapy  -MB       Row Name 04/09/24 1343          General Information    Patient Profile Reviewed yes  -MB     Prior Level of Function independent:;all household mobility;gait;transfer;mod assist:;ADL's;dressing;bathing;home management  -MB     Existing Precautions/Restrictions oxygen therapy device and L/min  2L currently  -MB     Barriers to Rehab medically complex;previous functional deficit  -MB       Row Name 04/09/24 1343          Living Environment    People in Home spouse  -MB       Row Name 04/09/24 1343          Home Main Entrance    Number of Stairs, Main Entrance one  -MB       Row Name 04/09/24 1343          Stairs Within Home, Primary    Stairs, Within Home, Primary flight to basement that pt does not complete  -MB       Row Name 04/09/24 1343          Cognition    Orientation Status (Cognition) disoriented to;place;situation;time  -MB       Row Name 04/09/24 1343          Safety Issues, Functional Mobility    Impairments Affecting Function (Mobility) balance;cognition;coordination;endurance/activity tolerance;pain;postural/trunk control;strength;shortness of breath  -MB     Cognitive  Impairments, Mobility Safety/Performance insight into deficits/self-awareness;problem-solving/reasoning;safety precaution awareness;sequencing abilities  -MB               User Key  (r) = Recorded By, (t) = Taken By, (c) = Cosigned By      Initials Name Provider Type    Wu Eaton, PT Physical Therapist                   Mobility       Row Name 04/09/24 1344          Bed Mobility    Bed Mobility bed mobility (all) activities  -MB     All Activities, [place] (Bed Mobility) maximum assist (25% patient effort);2 person assist  -MB     Assistive Device (Bed Mobility) bed rails;head of bed elevated  -MB       Row Name 04/09/24 1344          Transfers    Comment, (Transfers) transfers not completed this date 2/2 to severe weakness and poor balance  -MB       Row Name 04/09/24 1344          Bed-Chair Transfer    Bed-Chair [place] (Transfers) not tested  -MB       Row Name 04/09/24 1344          Sit-Stand Transfer    Sit-Stand [place] (Transfers) not tested  -MB       Row Name 04/09/24 1344          Gait/Stairs (Locomotion)    Patient was able to Ambulate no, other medical factors prevent ambulation  -MB     Reason Patient was unable to Ambulate Excessive Weakness  -MB               User Key  (r) = Recorded By, (t) = Taken By, (c) = Cosigned By      Initials Name Provider Type    Wu Eaton PT Physical Therapist                   Obj/Interventions       Row Name 04/09/24 1345          Balance    Balance Assessment sitting static balance  -MB     Static Sitting Balance moderate assist  -MB     Position, Sitting Balance unsupported;sitting edge of bed  -MB       Row Name 04/09/24 1345          Sensory Assessment (Somatosensory)    Sensory Assessment (Somatosensory) other (see comments)  reports neuropathy in bilateral feet, very painful  -MB               User Key  (r) = Recorded By, (t) = Taken By, (c) = Cosigned By      Initials Name Provider Type    uW Eaton, PT Physical Therapist                    Goals/Plan       Row Name 04/09/24 1349          Bed Mobility Goal 1 (PT)    Activity/Assistive Device (Bed Mobility Goal 1, PT) bed mobility activities, all  -MB     [place] Level/Cues Needed (Bed Mobility Goal 1, PT) contact guard required  -MB     Time Frame (Bed Mobility Goal 1, PT) long term goal (LTG);2 weeks  -MB       Row Name 04/09/24 1349          Transfer Goal 1 (PT)    Activity/Assistive Device (Transfer Goal 1, PT) transfers, all;walker, rolling  -MB     [place] Level/Cues Needed (Transfer Goal 1, PT) minimum assist (75% or more patient effort)  -MB     Time Frame (Transfer Goal 1, PT) long term goal (LTG);2 weeks  -MB       Row Name 04/09/24 1349          Gait Training Goal 1 (PT)    Activity/Assistive Device (Gait Training Goal 1, PT) gait (walking locomotion);walker, rolling  -MB     [place] Level (Gait Training Goal 1, PT) minimum assist (75% or more patient effort)  -MB     Distance (Gait Training Goal 1, PT) 10  -MB     Time Frame (Gait Training Goal 1, PT) long term goal (LTG);2 weeks  -MB       Row Name 04/09/24 1341          Therapy Assessment/Plan (PT)    Planned Therapy Interventions (PT) balance training;bed mobility training;gait training;home exercise program;neuromuscular re-education;patient/family education;strengthening;transfer training  -MB               User Key  (r) = Recorded By, (t) = Taken By, (c) = Cosigned By      Initials Name Provider Type    Wu Eaton, PT Physical Therapist                   Clinical Impression       Row Name 04/09/24 1347          Pain    Pretreatment Pain Rating 5/10  -MB     Posttreatment Pain Rating 5/10  -MB     Pain Location - Side/Orientation Bilateral  -MB     Pain Location lower  -MB     Pain Location - extremity  -MB     Pain Intervention(s) Nursing Notified;Emotional support;Repositioned  -MB       Row Name 04/09/24 1436 04/09/24 1347       Plan of Care Review    Plan of Care Reviewed With -- patient  -MB     Progress -- no change  -MB    Outcome Evaluation Pt is a 79 y/o M admitted to [facility] on 4/5/24 from rehab facility with complaints of fever and general illness for the past several days. Pt hypotensive in the ED with principle diagnosis of septic shock, transferred to ICU for vasopressor support. XR Chest and CT Chest suspicious for PNA. PMH includes DM, HTN, HLD, depression, and dysphagia. He is currently living with spouse in [facility] with ramp entry. She reports that she has been assisting pt with bathing, dressing and other ADLs, also reporting notable generalized weakness over the past two weeks. He was ambulating with RW as of two weeks ago. Most recently been receiving rehab at [facility] for the past week. Reports 7-9 falls in the past three months. This date, he is disoriented to place, situation, and time. He completes bed mobility max A x 2 and required dep x 2 for repositioning in bed. Unable to attempt transfers or ambulation assessment this date due significant weakness and poor balance in sitting. He seems to be functioning well below his baseline at this time and will require SNF at d/c for continued care and management post d/c. PT will continue to progress and follow during stay.  -MB --      Row Name 04/09/24 1347          Therapy Assessment/Plan (PT)    Rehab Potential (PT) fair, will monitor progress closely  -MB     Criteria for Skilled Interventions Met (PT) yes;meets criteria  -MB     Therapy Frequency (PT) 3 times/wk  -MB     Predicted Duration of Therapy Intervention (PT) until d/c  -MB       Row Name 04/09/24 1347          Vital Signs    Pretreatment Heart Rate (beats/min) 80  -MB     Posttreatment Heart Rate (beats/min) 75  -MB     Pre SpO2 (%) 100  -MB     O2 Delivery Pre Treatment supplemental O2  2L  -MB     Intra SpO2 (%) 94  -MB     O2 Delivery Intra Treatment supplemental O2  2L  -MB     Post SpO2 (%) 95  -MB     O2 Delivery Post Treatment supplemental O2  2L  -MB     Pre Patient Position Supine   -MB     Intra Patient Position Sitting  -MB     Post Patient Position Supine  -MB       Row Name 04/09/24 1347          Positioning and Restraints    Pre-Treatment Position in bed  -MB     Post Treatment Position bed  -MB     In Bed notified nsg;supine;call light within reach;encouraged to call for assist;exit alarm on  -MB               User Key  (r) = Recorded By, (t) = Taken By, (c) = Cosigned By      Initials Name Provider Type    Wu Eaton, MIKEL Physical Therapist                   Outcome Measures       Row Name 04/09/24 1350          How much help from another person do you currently need...    Turning from your back to your side while in flat bed without using bedrails? 2  -MB     Moving from lying on back to sitting on the side of a flat bed without bedrails? 2  -MB     Moving to and from a bed to a chair (including a wheelchair)? 1  -MB     Standing up from a chair using your arms (e.g., wheelchair, bedside chair)? 1  -MB     Climbing 3-5 steps with a railing? 1  -MB     To walk in hospital room? 1  -MB     AM-PAC 6 Clicks Score (PT) 8  -MB     Highest Level of Mobility Goal 3 --> Sit at edge of bed  -MB               User Key  (r) = Recorded By, (t) = Taken By, (c) = Cosigned By      Initials Name Provider Type    Wu Eaton, MIKEL Physical Therapist                                 Physical Therapy Education       Title: PT OT SLP Therapies (In Progress)       Topic: Physical Therapy (Done)       Point: Mobility training (Done)       Learning Progress Summary             Patient Acceptance, E,TB, VU by MB at 4/9/2024 1350                         Point: Body mechanics (Done)       Learning Progress Summary             Patient Acceptance, E,TB, VU by MB at 4/9/2024 1350                         Point: Precautions (Done)       Learning Progress Summary             Patient Acceptance, E,TB, VU by MB at 4/9/2024 1350                                         User Key       Initials Effective Dates Name  Provider Type Discipline    MB 06/06/23 -  Wu Aguirre, PT Physical Therapist PT                  PT Recommendation and Plan  Planned Therapy Interventions (PT): balance training, bed mobility training, gait training, home exercise program, neuromuscular re-education, patient/family education, strengthening, transfer training  Plan of Care Reviewed With: patient  Progress: no change  Outcome Evaluation: Pt is a 77 y/o M admitted to [facility] on 4/5/24 from rehab facility with complaints of fever and general illness for the past several days. Pt hypotensive in the ED with principle diagnosis of septic shock, transferred to ICU for vasopressor support. XR Chest and CT Chest suspicious for PNA. PMH includes DM, HTN, HLD, depression, and dysphagia. He is currently living with spouse in [facility] with ramp entry. She reports that she has been assisting pt with bathing, dressing and other ADLs, also reporting notable generalized weakness over the past two weeks. He was ambulating with RW as of two weeks ago. Most recently been receiving rehab at [facility] for the past week. Reports 7-9 falls in the past three months. This date, he is disoriented to place, situation, and time. He completes bed mobility max A x 2 and required dep x 2 for repositioning in bed. Unable to attempt transfers or ambulation assessment this date due significant weakness and poor balance in sitting. He seems to be functioning well below his baseline at this time and will require SNF at d/c for continued care and management post d/c. PT will continue to progress and follow during stay.     Time Calculation:   PT Evaluation Complexity  History, PT Evaluation Complexity: 1-2 personal factors and/or comorbidities  Examination of Body Systems (PT Eval Complexity): total of 3 or more elements  Clinical Presentation (PT Evaluation Complexity): evolving  Clinical Decision Making (PT Evaluation Complexity): moderate complexity  Overall Complexity (PT Evaluation Complexity):  moderate complexity     PT Charges       Row Name 04/09/24 1350             Time Calculation    Start Time 1018  -MB      Stop Time 1038  -MB      Time Calculation (min) 20 min  -MB      PT Received On 04/09/24  -MB      PT - Next Appointment 04/11/24  -MB      PT Goal Re-Cert Due Date 04/23/24  -MB         Time Calculation- PT    Total Timed Code Minutes- PT 0 minute(s)  -MB                User Key  (r) = Recorded By, (t) = Taken By, (c) = Cosigned By      Initials Name Provider Type    Wu Eaton, PT Physical Therapist                  Therapy Charges for Today       Code Description Service Date Service Provider Modifiers Qty    00368403241 HC PT EVAL MOD COMPLEXITY 4 4/9/2024 Wu Aguirre, PT GP 1            PT G-Codes  AM-PAC 6 Clicks Score (PT): 8  PT Discharge Summary  Anticipated Discharge Disposition (PT): skilled nursing facility    Wu Aguirre PT  4/9/2024

## 2024-04-09 NOTE — PROGRESS NOTES
"[facility] MEDICINE SERVICE  DAILY PROGRESS NOTE    NAME: Lux Bray  : 1945  MRN: [id number]      LOS: 4 days     PROVIDER OF SERVICE: Kendall Haynes MD    Chief Complaint: Septic shock    Subjective:     History of Present illness      Lux Bray is a 78 y.o. male with PMH of HTN, DM II, Depression,  presented to the hospital after reportedly being sick for a few days at rehab, and was admitted with a principal diagnosis of Septic shock.      Per family the patient has had a neurologic decline over the last few months. CT head was negative for acute abnormality. Ammonia level was normal. Per chart review, the patient's PCP has decreased some of the sedating medications the patient has or was taking. Patient is currently drowsy but will wake up and answer questions appropriately. He is reporting that he is very tired.      In ER the patient was found to be hypotensive. He was given the full sepsis bolus and started on levophed. Chest x-ray showed bibasilar infiltrates. Patient's family reports that they have had concerns he has been aspirating. Patient will be transferred to ICU for close monitoring and vasopressor support.       ACP: CPR, Full Intervention. Patients wife is his decision maker in the even he is unable.      Patient was seen and examined on 24 at 13:54 EDT .     Subjective / Review of systems      24-Patient admitted to the ICU for septic shock and only needed vasopressors very briefly.  Has been off vasopressors since last night.  Developed A-fib with RVR, will give a dose of digoxin.  If blood pressure permits, resume home verapamil.  Will likely need full dose anticoagulation, defer to primary team.  Transfer out of the ICU to hospitalist service.      24-Patient drowsy but easily aroused. Knows what year it is. States that he hurts all over, and is nauseous. He also states that he is \"So, so, so, tired.\" Denies feeling short of breath, or having " chest pain.   4/7/24 patient seen and examined in bed no acute distress, vital signs stable, discussed with RN.  Weak weak and tired.Patient is currently n.p.o. pending speech evaluation.   4/8/2024 patient seen and examined in bed no acute distress, vital signs stable, discussed with RN.  Patient with weakness fatigue..  Apparently was living at home.  Speech recommended that pt initiate a mechanical soft diet with NT liquids as tolerated   4/9/24 seen in bed NAD, VSS, some confusion, DW RN    Review of Systems   Constitutional:  Positive for fatigue. Negative for appetite change, chills and fever.   HENT:  Negative for congestion.    Eyes:  Negative for pain and redness.   Respiratory:  Positive for shortness of breath. Negative for cough and chest tightness.    Gastrointestinal:  Negative for abdominal pain, diarrhea, nausea and vomiting.   Endocrine: Negative for cold intolerance and heat intolerance.   Genitourinary:  Negative for dysuria, flank pain and frequency.   Musculoskeletal:  Negative for back pain and myalgias.   Neurological:  Positive for weakness. Negative for dizziness and headaches.   Psychiatric/Behavioral:  Negative for sleep disturbance. The patient is not nervous/anxious.        Objective:     Vital Signs  Temp:  [98.1 °F (36.7 °C)-99 °F (37.2 °C)] 98.1 °F (36.7 °C)  Heart Rate:  [73-97] 76  Resp:  [13-19] 14  BP: (107-131)/(50-70) 116/60  Flow (L/min):  [2] 2   Body mass index is 20.42 kg/m².    Physical Exam  Constitutional:       Appearance: Normal appearance.   HENT:      Head: Normocephalic and atraumatic.      Nose: Nose normal.      Mouth/Throat:      Mouth: Mucous membranes are moist.   Eyes:      Extraocular Movements: Extraocular movements intact.      Conjunctiva/sclera: Conjunctivae normal.      Pupils: Pupils are equal, round, and reactive to light.   Cardiovascular:      Rate and Rhythm: Regular rhythm. Tachycardia present.      Pulses: Normal pulses.      Heart sounds: Normal  heart sounds.   Pulmonary:      Effort: Pulmonary effort is normal.      Breath sounds: Normal breath sounds.   Musculoskeletal:      Cervical back: Normal range of motion.   Skin:     General: Skin is warm and dry.   Neurological:      General: No focal deficit present.      Mental Status: He is alert. He is disoriented.         Scheduled Meds   amiodarone, 200 mg, Oral, Q24H  atorvastatin, 20 mg, Oral, Daily  bisacodyl, 5 mg, Oral, Daily  dicyclomine, 10 mg, Oral, 4x Daily AC & at Bedtime  docusate sodium, 100 mg, Oral, BID  DULoxetine, 90 mg, Oral, Daily  enoxaparin, 1 mg/kg, Subcutaneous, Q12H  insulin lispro, 2-9 Units, Subcutaneous, 4x Daily AC & at Bedtime  metoprolol tartrate, 25 mg, Oral, Q12H  oxyCODONE, 10 mg, Oral, Daily  pantoprazole, 40 mg, Oral, Daily  piperacillin-tazobactam, 3.375 g, Intravenous, Q8H  pregabalin, 75 mg, Oral, BID  primidone, 150 mg, Oral, Q12H  sodium chloride, 10 mL, Intravenous, Q12H  sucralfate, 1 g, Oral, TID AC  tamsulosin, 0.4 mg, Oral, Daily  verapamil ER, 240 mg, Oral, Nightly       PRN Meds     acetaminophen **OR** acetaminophen    bisacodyl    dextrose    dextrose    glucagon (human recombinant)    ipratropium-albuterol    magnesium hydroxide    Morphine    nitroglycerin    ondansetron ODT **OR** ondansetron    oxyCODONE    Pharmacy to Dose enoxaparin (LOVENOX)    sodium chloride    sodium chloride    sodium chloride   Infusions  lactated ringers, 75 mL/hr, Last Rate: 75 mL/hr (04/09/24 1006)  Pharmacy to Dose enoxaparin (LOVENOX),           Diagnostic Data    Results from last 7 days   Lab Units 04/09/24  0104 04/06/24  0448 04/05/24  1201   WBC 10*3/mm3 15.48*   < > 13.68*   HEMOGLOBIN g/dL 10.0*   < > 11.9*   HEMATOCRIT % 31.6*   < > 37.5   PLATELETS 10*3/mm3 242   < > 164   GLUCOSE mg/dL 199*   < > 180*   CREATININE mg/dL 1.81*   < > 1.50*   BUN mg/dL 37*   < > 34*   SODIUM mmol/L 137   < > 137   POTASSIUM mmol/L 3.6   < > 4.7   AST (SGOT) U/L  --   --  26   ALT (SGPT)  U/L  --   --  20   ALK PHOS U/L  --   --  116   BILIRUBIN mg/dL  --   --  0.2   ANION GAP mmol/L 8.0   < > 10.0    < > = values in this interval not displayed.       No radiology results for the last day      I reviewed the patient's new clinical results.    Assessment/Plan:     Active and Resolved Problems  Active Hospital Problems    Diagnosis  POA    **Septic shock [A41.9, R65.21]  Yes      Resolved Hospital Problems   No resolved problems to display.     Patient downgraded from ICU to PCU level of care.     Septic Shock due to Pneumonia   -Respiratory panel negative   -MRSA swab negative  -NS at 75mL/hr   -Levophed discontinued   -Continue Zosyn   -SLP consult to rule out aspiration pna     Diabetes Mellitus   -Hgb A1c 4/5/24 6.7  -Continue to hold home medications   -Accuchecks ACHS   -SSI ordered     Hypertension   -Not controlled with medication at home  -Monitor BP per protocol     Hyperlipidemia --   -Patient on Lipitor at home  -Last lipid profile 5/26/23 negative  -Restart lipitor when not NPO    Depression  -Patient on Cymbalta at home  -Restart when patient not NPO    Altered Mental Status improving  -No family at bedside at this time  -Reports of neurologic decline over last few months   -Patient unable to make needs known     Dysphasia   -Failed nursing water screen  -SLP eval today,  recommended that pt initiate a mechanical soft diet with NT liquids as tolerated   -Repeat tomorrow if patient able to follow commands     DVT prophylaxis:  Medical and mechanical DVT prophylaxis orders are present.      Code status is   Code Status and Medical Interventions:   Ordered at: 04/05/24 1340     Code Status (Patient has no pulse and is not breathing):    CPR (Attempt to Resuscitate)     Medical Interventions (Patient has pulse or is breathing):    Full Support       Plan for disposition:Pending clinical course.     Time: 30 minutes    Signature: Electronically signed by Kendall Haynes MD, 04/09/24, 11:57  EDT.  [facility] Hospitalist Team

## 2024-04-09 NOTE — PLAN OF CARE
Goal Outcome Evaluation:      Spoke to family at length on phone r/t update on pt condition.  Labs collected, IV ABT administered per order, LR running @75ml/hr, external cath in place, palliative was by to see patient on dayshift, no c/o pain or soa noted, no external indicators of pain noted.

## 2024-04-09 NOTE — PROGRESS NOTES
"Pharmacy dosing service  Anticoagulant  Warfarin     Subjective:    Lux Bray is a 78 y.o.male being initiated on warfarin for Atrial Fibrillation.    INR Goal: 2 - 3  Bridge Therapy Present?:  Yes, Enoxaparin 60 mg SQ Q12H - borderline renal function  Interacting Medications Evaluation (New/Present/Discontinued): amiodarone, primidone  Additional Contributing Factors: renal disease, interacting medications, older age      Assessment/Plan:    New start warfarin due to DOAC interaction with primidone. Will start patient on 2.5mg tonight as he is elderly, on interacting medications, and has renal dysfunction with elevated WBC.     Continue to monitor and adjust based on INR.         Date 2/26           INR Baseline           Dose 2.5mg               Objective:  [Ht: 180.3 cm (71\"); Wt: 66.4 kg (146 lb 6.2 oz); BMI: Body mass index is 20.42 kg/m².]    Lab Results   Component Value Date    ALBUMIN 3.4 (L) 04/05/2024     Lab Results   Component Value Date    INR 1.0 02/26/2024    PROTIME 10.5 02/26/2024     Lab Results   Component Value Date    HGB 10.0 (L) 04/09/2024    HGB 11.2 (L) 04/08/2024    HGB 13.0 04/07/2024     Lab Results   Component Value Date    HCT 31.6 (L) 04/09/2024    HCT 35.3 (L) 04/08/2024    HCT 40.9 04/07/2024       Maria D Goel RPH  04/09/24 19:41 EDT   "

## 2024-04-10 LAB
ANION GAP SERPL CALCULATED.3IONS-SCNC: 8 MMOL/L (ref 5–15)
BACTERIA SPEC AEROBE CULT: NORMAL
BACTERIA SPEC AEROBE CULT: NORMAL
BACTERIA UR QL AUTO: ABNORMAL /HPF
BASOPHILS # BLD AUTO: 0.03 10*3/MM3 (ref 0–0.2)
BASOPHILS NFR BLD AUTO: 0.2 % (ref 0–1.5)
BILIRUB UR QL STRIP: NEGATIVE
BUN SERPL-MCNC: 36 MG/DL (ref 8–23)
BUN/CREAT SERPL: 16.9 (ref 7–25)
CALCIUM SPEC-SCNC: 8.4 MG/DL (ref 8.6–10.5)
CHLORIDE SERPL-SCNC: 99 MMOL/L (ref 98–107)
CLARITY UR: ABNORMAL
CO2 SERPL-SCNC: 28 MMOL/L (ref 22–29)
COLOR UR: YELLOW
CREAT SERPL-MCNC: 2.13 MG/DL (ref 0.76–1.27)
DEPRECATED RDW RBC AUTO: 50.1 FL (ref 37–54)
EGFRCR SERPLBLD CKD-EPI 2021: 30.7 ML/MIN/1.73
EOSINOPHIL # BLD AUTO: 0.19 10*3/MM3 (ref 0–0.4)
EOSINOPHIL NFR BLD AUTO: 1.3 % (ref 0.3–6.2)
EOSINOPHIL SPEC QL MICRO: 0 % EOS/100 CELLS (ref 0–0)
ERYTHROCYTE [DISTWIDTH] IN BLOOD BY AUTOMATED COUNT: 14.8 % (ref 12.3–15.4)
GLUCOSE BLDC GLUCOMTR-MCNC: 181 MG/DL (ref 70–105)
GLUCOSE BLDC GLUCOMTR-MCNC: 189 MG/DL (ref 70–105)
GLUCOSE BLDC GLUCOMTR-MCNC: 206 MG/DL (ref 70–105)
GLUCOSE BLDC GLUCOMTR-MCNC: 213 MG/DL (ref 70–105)
GLUCOSE SERPL-MCNC: 278 MG/DL (ref 65–99)
GLUCOSE UR STRIP-MCNC: NEGATIVE MG/DL
HCT VFR BLD AUTO: 29.7 % (ref 37.5–51)
HGB BLD-MCNC: 9.5 G/DL (ref 13–17.7)
HGB UR QL STRIP.AUTO: NEGATIVE
HYALINE CASTS UR QL AUTO: ABNORMAL /LPF
IMM GRANULOCYTES # BLD AUTO: 0.55 10*3/MM3 (ref 0–0.05)
IMM GRANULOCYTES NFR BLD AUTO: 3.9 % (ref 0–0.5)
INR PPP: 1.03 (ref 2–3)
KETONES UR QL STRIP: NEGATIVE
LEUKOCYTE ESTERASE UR QL STRIP.AUTO: NEGATIVE
LYMPHOCYTES # BLD AUTO: 1.84 10*3/MM3 (ref 0.7–3.1)
LYMPHOCYTES NFR BLD AUTO: 13 % (ref 19.6–45.3)
MCH RBC QN AUTO: 29.3 PG (ref 26.6–33)
MCHC RBC AUTO-ENTMCNC: 32 G/DL (ref 31.5–35.7)
MCV RBC AUTO: 91.7 FL (ref 79–97)
MONOCYTES # BLD AUTO: 1.47 10*3/MM3 (ref 0.1–0.9)
MONOCYTES NFR BLD AUTO: 10.4 % (ref 5–12)
NEUTROPHILS NFR BLD AUTO: 10.12 10*3/MM3 (ref 1.7–7)
NEUTROPHILS NFR BLD AUTO: 71.2 % (ref 42.7–76)
NITRITE UR QL STRIP: NEGATIVE
NRBC BLD AUTO-RTO: 0 /100 WBC (ref 0–0.2)
NT-PROBNP SERPL-MCNC: 1555 PG/ML (ref 0–1800)
PH UR STRIP.AUTO: <=5 [PH] (ref 5–8)
PLATELET # BLD AUTO: 253 10*3/MM3 (ref 140–450)
PMV BLD AUTO: 10.5 FL (ref 6–12)
POTASSIUM SERPL-SCNC: 3.4 MMOL/L (ref 3.5–5.2)
POTASSIUM SERPL-SCNC: 4.1 MMOL/L (ref 3.5–5.2)
PROT UR QL STRIP: ABNORMAL
PROTHROMBIN TIME: 11.2 SECONDS (ref 19.4–28.5)
RBC # BLD AUTO: 3.24 10*6/MM3 (ref 4.14–5.8)
RBC # UR STRIP: ABNORMAL /HPF
REF LAB TEST METHOD: ABNORMAL
SODIUM SERPL-SCNC: 135 MMOL/L (ref 136–145)
SODIUM UR-SCNC: 36 MMOL/L
SP GR UR STRIP: 1.01 (ref 1–1.03)
SQUAMOUS #/AREA URNS HPF: ABNORMAL /HPF
UROBILINOGEN UR QL STRIP: ABNORMAL
WBC # UR STRIP: ABNORMAL /HPF
WBC NRBC COR # BLD AUTO: 14.2 10*3/MM3 (ref 3.4–10.8)

## 2024-04-10 PROCEDURE — 85025 COMPLETE CBC W/AUTO DIFF WBC: CPT | Performed by: INTERNAL MEDICINE

## 2024-04-10 PROCEDURE — 80048 BASIC METABOLIC PNL TOTAL CA: CPT | Performed by: INTERNAL MEDICINE

## 2024-04-10 PROCEDURE — 84300 ASSAY OF URINE SODIUM: CPT | Performed by: INTERNAL MEDICINE

## 2024-04-10 PROCEDURE — 82948 REAGENT STRIP/BLOOD GLUCOSE: CPT

## 2024-04-10 PROCEDURE — 25010000002 PIPERACILLIN SOD-TAZOBACTAM PER 1 G: Performed by: INTERNAL MEDICINE

## 2024-04-10 PROCEDURE — 87205 SMEAR GRAM STAIN: CPT | Performed by: INTERNAL MEDICINE

## 2024-04-10 PROCEDURE — 81001 URINALYSIS AUTO W/SCOPE: CPT | Performed by: INTERNAL MEDICINE

## 2024-04-10 PROCEDURE — 97110 THERAPEUTIC EXERCISES: CPT | Performed by: OCCUPATIONAL THERAPIST

## 2024-04-10 PROCEDURE — 99232 SBSQ HOSP IP/OBS MODERATE 35: CPT | Performed by: INTERNAL MEDICINE

## 2024-04-10 PROCEDURE — 84132 ASSAY OF SERUM POTASSIUM: CPT | Performed by: INTERNAL MEDICINE

## 2024-04-10 PROCEDURE — 92526 ORAL FUNCTION THERAPY: CPT

## 2024-04-10 PROCEDURE — 99222 1ST HOSP IP/OBS MODERATE 55: CPT | Performed by: PSYCHIATRY & NEUROLOGY

## 2024-04-10 PROCEDURE — 63710000001 INSULIN LISPRO (HUMAN) PER 5 UNITS

## 2024-04-10 PROCEDURE — 83880 ASSAY OF NATRIURETIC PEPTIDE: CPT | Performed by: INTERNAL MEDICINE

## 2024-04-10 PROCEDURE — 97530 THERAPEUTIC ACTIVITIES: CPT | Performed by: OCCUPATIONAL THERAPIST

## 2024-04-10 PROCEDURE — 97535 SELF CARE MNGMENT TRAINING: CPT | Performed by: OCCUPATIONAL THERAPIST

## 2024-04-10 PROCEDURE — 85610 PROTHROMBIN TIME: CPT | Performed by: INTERNAL MEDICINE

## 2024-04-10 PROCEDURE — 25010000002 ENOXAPARIN PER 10 MG: Performed by: INTERNAL MEDICINE

## 2024-04-10 RX ORDER — ENOXAPARIN SODIUM 100 MG/ML
1 INJECTION SUBCUTANEOUS NIGHTLY
Status: DISCONTINUED | OUTPATIENT
Start: 2024-04-10 | End: 2024-04-11

## 2024-04-10 RX ORDER — WARFARIN SODIUM 2.5 MG/1
2.5 TABLET ORAL
Status: DISCONTINUED | OUTPATIENT
Start: 2024-04-10 | End: 2024-04-11

## 2024-04-10 RX ORDER — POTASSIUM CHLORIDE 20 MEQ/1
40 TABLET, EXTENDED RELEASE ORAL EVERY 4 HOURS
Status: COMPLETED | OUTPATIENT
Start: 2024-04-10 | End: 2024-04-10

## 2024-04-10 RX ADMIN — INSULIN LISPRO 4 UNITS: 100 INJECTION, SOLUTION INTRAVENOUS; SUBCUTANEOUS at 21:36

## 2024-04-10 RX ADMIN — PIPERACILLIN AND TAZOBACTAM 3.38 G: 3; .375 INJECTION, POWDER, FOR SOLUTION INTRAVENOUS at 05:13

## 2024-04-10 RX ADMIN — PRIMIDONE 150 MG: 50 TABLET ORAL at 21:37

## 2024-04-10 RX ADMIN — POTASSIUM CHLORIDE 40 MEQ: 1500 TABLET, EXTENDED RELEASE ORAL at 02:30

## 2024-04-10 RX ADMIN — INSULIN LISPRO 2 UNITS: 100 INJECTION, SOLUTION INTRAVENOUS; SUBCUTANEOUS at 12:34

## 2024-04-10 RX ADMIN — DICYCLOMINE HYDROCHLORIDE 10 MG: 10 CAPSULE ORAL at 08:20

## 2024-04-10 RX ADMIN — DOCUSATE SODIUM 100 MG: 100 CAPSULE, LIQUID FILLED ORAL at 21:37

## 2024-04-10 RX ADMIN — PREGABALIN 75 MG: 75 CAPSULE ORAL at 21:37

## 2024-04-10 RX ADMIN — WARFARIN SODIUM 2.5 MG: 2.5 TABLET ORAL at 18:27

## 2024-04-10 RX ADMIN — PREGABALIN 75 MG: 75 CAPSULE ORAL at 10:10

## 2024-04-10 RX ADMIN — SUCRALFATE 1 G: 1 TABLET ORAL at 18:27

## 2024-04-10 RX ADMIN — PRIMIDONE 150 MG: 50 TABLET ORAL at 10:10

## 2024-04-10 RX ADMIN — DICYCLOMINE HYDROCHLORIDE 10 MG: 10 CAPSULE ORAL at 12:35

## 2024-04-10 RX ADMIN — AMIODARONE HYDROCHLORIDE 200 MG: 200 TABLET ORAL at 10:11

## 2024-04-10 RX ADMIN — POTASSIUM CHLORIDE 40 MEQ: 1500 TABLET, EXTENDED RELEASE ORAL at 05:13

## 2024-04-10 RX ADMIN — METOPROLOL TARTRATE 25 MG: 25 TABLET, FILM COATED ORAL at 10:11

## 2024-04-10 RX ADMIN — BISACODYL 5 MG: 5 TABLET, COATED ORAL at 10:10

## 2024-04-10 RX ADMIN — INSULIN LISPRO 4 UNITS: 100 INJECTION, SOLUTION INTRAVENOUS; SUBCUTANEOUS at 18:26

## 2024-04-10 RX ADMIN — DOCUSATE SODIUM 100 MG: 100 CAPSULE, LIQUID FILLED ORAL at 10:10

## 2024-04-10 RX ADMIN — ATORVASTATIN CALCIUM 20 MG: 20 TABLET, FILM COATED ORAL at 10:11

## 2024-04-10 RX ADMIN — METOPROLOL TARTRATE 25 MG: 25 TABLET, FILM COATED ORAL at 21:37

## 2024-04-10 RX ADMIN — ENOXAPARIN SODIUM 60 MG: 100 INJECTION SUBCUTANEOUS at 21:36

## 2024-04-10 RX ADMIN — Medication 10 ML: at 10:11

## 2024-04-10 RX ADMIN — INSULIN LISPRO 2 UNITS: 100 INJECTION, SOLUTION INTRAVENOUS; SUBCUTANEOUS at 08:19

## 2024-04-10 RX ADMIN — TAMSULOSIN HYDROCHLORIDE 0.4 MG: 0.4 CAPSULE ORAL at 10:10

## 2024-04-10 RX ADMIN — SUCRALFATE 1 G: 1 TABLET ORAL at 12:35

## 2024-04-10 RX ADMIN — DICYCLOMINE HYDROCHLORIDE 10 MG: 10 CAPSULE ORAL at 18:27

## 2024-04-10 RX ADMIN — VERAPAMIL HYDROCHLORIDE 240 MG: 120 CAPSULE, DELAYED RELEASE PELLETS ORAL at 21:36

## 2024-04-10 RX ADMIN — SUCRALFATE 1 G: 1 TABLET ORAL at 08:20

## 2024-04-10 RX ADMIN — DICYCLOMINE HYDROCHLORIDE 10 MG: 10 CAPSULE ORAL at 21:37

## 2024-04-10 RX ADMIN — DULOXETINE HYDROCHLORIDE 90 MG: 30 CAPSULE, DELAYED RELEASE ORAL at 10:09

## 2024-04-10 RX ADMIN — PIPERACILLIN AND TAZOBACTAM 3.38 G: 3; .375 INJECTION, POWDER, FOR SOLUTION INTRAVENOUS at 14:18

## 2024-04-10 RX ADMIN — Medication 10 ML: at 21:37

## 2024-04-10 RX ADMIN — OXYCODONE 10 MG: 5 TABLET ORAL at 10:09

## 2024-04-10 RX ADMIN — PANTOPRAZOLE SODIUM 40 MG: 40 TABLET, DELAYED RELEASE ORAL at 10:11

## 2024-04-10 NOTE — PLAN OF CARE
"Goal Outcome Evaluation:   Assessment: Lux Bray presents with ADL impairments affecting function including balance, endurance / activity tolerance, motor control, pain, postural / trunk control, range of motion (ROM), and strength. Pt demo slight improvements in sitting balance & bed mobility. Encouraging pt to inc independent AROM of BUE & BLE while in bed & to initiate & assist with repositioning using siderails. Dtr arrived at end of tx & encouraging pt. Demonstrated functioning below baseline abilities indicate the need for continued skilled intervention while inpatient. Tolerating session today without incident. Will continue to follow and progress as tolerated.     Plan/Recommendations:   Moderate Intensity Therapy recommended post-acute care. This is recommended as therapy feels the patient would require 3-4 days per week and wouldn't tolerate \"3 hour daily\" rehab intensity. SNF would be the preferred choice. If the patient does not agree to SNF, arrange HH or OP depending on home bound status. If patient is medically complex, consider LTACH.. Pt requires no DME at discharge.     Pt desires Skilled Rehab placement at discharge. Pt cooperative; agreeable to therapeutic recommendations and plan of care.        "

## 2024-04-10 NOTE — PROGRESS NOTES
"Enter Query Response Below      Query Response: Bacterial pneumonia unspecified Electronically signed by Kendall Haynes MD, 04/10/24, 2:01 PM EDT.              If applicable, please update the problem list.     Patient: Lux Bray        : 1945  Account: [id number]           Admit Date:         How to Respond to this query:       a. Click New Note     b. Answer query within the yellow box.                c. Update the Problem List, if applicable.      If you have any questions about this query contact me at: [phone]     Dr. Haynes:    78 y.o. male with history of HTN, DM II, presented with pneumonia and septic shock.  Labs include WBC 12.51, procalcitonin 0.36, 0.64.  MRSA PCR screen negative.  Respiratory panel unremarkable.  Progress note states \"aspiration pneumonia vs HCAP.\"  Speech therapy evaluation included mild, oral dysphagia, pharyngeal dysphagia, and recommended mechanical soft diet with NT liquids as tolerated.  Patient given zosyn IV -4/10.      Please clarify the type of pneumonia the patient was treated/monitored for:    Gram negative pneumonia (excluding Haemophilus influenzae)  Bacterial pneumonia unspecified  Aspiration pneumonia  Other- specify______  Unable to determine    By submitting this query, we are merely seeking further clarification of documentation to accurately reflect all conditions that you are monitoring, evaluating, treating or that extend the hospitalization or utilize additional resources of care. Please utilize your independent clinical judgment when addressing the question(s) above.     This query and your response, once completed, will be entered into the legal medical record.    Sincerely,  Halina Escoto RN, MSN  Clinical Documentation Integrity Program   [email or web address]   "

## 2024-04-10 NOTE — PROGRESS NOTES
"[facility] MEDICINE SERVICE  DAILY PROGRESS NOTE    NAME: Lux Bray  : 1945  MRN: [id number]      LOS: 5 days     PROVIDER OF SERVICE: Kendall Haynes MD    Chief Complaint: Septic shock    Subjective:     History of Present illness      Lux Bray is a 78 y.o. male with PMH of HTN, DM II, Depression,  presented to the hospital after reportedly being sick for a few days at rehab, and was admitted with a principal diagnosis of Septic shock.      Per family the patient has had a neurologic decline over the last few months. CT head was negative for acute abnormality. Ammonia level was normal. Per chart review, the patient's PCP has decreased some of the sedating medications the patient has or was taking. Patient is currently drowsy but will wake up and answer questions appropriately. He is reporting that he is very tired.      In ER the patient was found to be hypotensive. He was given the full sepsis bolus and started on levophed. Chest x-ray showed bibasilar infiltrates. Patient's family reports that they have had concerns he has been aspirating. Patient will be transferred to ICU for close monitoring and vasopressor support.       ACP: CPR, Full Intervention. Patients wife is his decision maker in the even he is unable.      Patient was seen and examined on 24 at 13:54 EDT .     Subjective / Review of systems      24-Patient admitted to the ICU for septic shock and only needed vasopressors very briefly.  Has been off vasopressors since last night.  Developed A-fib with RVR, will give a dose of digoxin.  If blood pressure permits, resume home verapamil.  Will likely need full dose anticoagulation, defer to primary team.  Transfer out of the ICU to hospitalist service.      24-Patient drowsy but easily aroused. Knows what year it is. States that he hurts all over, and is nauseous. He also states that he is \"So, so, so, tired.\" Denies feeling short of breath, or having " chest pain.   4/7/24 patient seen and examined in bed no acute distress, vital signs stable, discussed with RN.  Weak weak and tired.Patient is currently n.p.o. pending speech evaluation.   4/8/2024 patient seen and examined in bed no acute distress, vital signs stable, discussed with RN.  Patient with weakness fatigue..  Apparently was living at home.  Speech recommended that pt initiate a mechanical soft diet with NT liquids as tolerated   4/9/24 seen in bed NAD, VSS, some confusion, JOSE L DELATORRE  4/10/24 patient seen in bed JOANNA, JOSE L DELATORRE, [place], per  wife wanted to take him home, unable to use eliquis, started coumadin, awaiting placement    Review of Systems   Constitutional:  Positive for fatigue. Negative for appetite change, chills and fever.   HENT:  Negative for congestion.    Eyes:  Negative for pain and redness.   Respiratory:  Positive for shortness of breath. Negative for cough and chest tightness.    Gastrointestinal:  Negative for abdominal pain, diarrhea, nausea and vomiting.   Endocrine: Negative for cold intolerance and heat intolerance.   Genitourinary:  Negative for dysuria, flank pain and frequency.   Musculoskeletal:  Negative for back pain and myalgias.   Neurological:  Positive for weakness. Negative for dizziness and headaches.   Psychiatric/Behavioral:  Negative for sleep disturbance. The patient is not nervous/anxious.      Objective:     Vital Signs  Temp:  [97.7 °F (36.5 °C)-99 °F (37.2 °C)] 98 °F (36.7 °C)  Heart Rate:  [67-90] 74  Resp:  [11-23] 11  BP: (112-142)/(53-73) 142/66  Flow (L/min):  [2] 2   Body mass index is 21.28 kg/m².    Physical Exam  Constitutional:       Appearance: Normal appearance.   HENT:      Head: Normocephalic and atraumatic.      Nose: Nose normal.      Mouth/Throat:      Mouth: Mucous membranes are moist.   Eyes:      Extraocular Movements: Extraocular movements intact.      Conjunctiva/sclera: Conjunctivae normal.      Pupils: Pupils are equal, round, and  reactive to light.   Cardiovascular:      Rate and Rhythm: Regular rhythm. Tachycardia present.      Pulses: Normal pulses.      Heart sounds: Normal heart sounds.   Pulmonary:      Effort: Pulmonary effort is normal.      Breath sounds: Normal breath sounds.   Musculoskeletal:      Cervical back: Normal range of motion.   Skin:     General: Skin is warm and dry.   Neurological:      General: No focal deficit present.      Mental Status: He is alert. He is disoriented.       Scheduled Meds   amiodarone, 200 mg, Oral, Q24H  atorvastatin, 20 mg, Oral, Daily  bisacodyl, 5 mg, Oral, Daily  dicyclomine, 10 mg, Oral, 4x Daily AC & at Bedtime  docusate sodium, 100 mg, Oral, BID  DULoxetine, 90 mg, Oral, Daily  enoxaparin, 1 mg/kg, Subcutaneous, Nightly  insulin lispro, 2-9 Units, Subcutaneous, 4x Daily AC & at Bedtime  metoprolol tartrate, 25 mg, Oral, Q12H  oxyCODONE, 10 mg, Oral, Daily  pantoprazole, 40 mg, Oral, Daily  piperacillin-tazobactam, 3.375 g, Intravenous, Q8H  pregabalin, 75 mg, Oral, BID  primidone, 150 mg, Oral, Q12H  sodium chloride, 10 mL, Intravenous, Q12H  sucralfate, 1 g, Oral, TID AC  tamsulosin, 0.4 mg, Oral, Daily  verapamil ER, 240 mg, Oral, Nightly  warfarin, 2.5 mg, Oral, Daily       PRN Meds     acetaminophen **OR** acetaminophen    bisacodyl    dextrose    dextrose    glucagon (human recombinant)    ipratropium-albuterol    magnesium hydroxide    Morphine    nitroglycerin    ondansetron ODT **OR** ondansetron    oxyCODONE    Pharmacy to Dose enoxaparin (LOVENOX)    Pharmacy to dose warfarin    sodium chloride    sodium chloride    sodium chloride   Infusions  lactated ringers, 75 mL/hr, Last Rate: 75 mL/hr (04/09/24 2227)  Pharmacy to Dose enoxaparin (LOVENOX),   Pharmacy to dose warfarin,           Diagnostic Data    Results from last 7 days   Lab Units 04/10/24  0006 04/06/24  0448 04/05/24  1201   WBC 10*3/mm3 14.20*   < > 13.68*   HEMOGLOBIN g/dL 9.5*   < > 11.9*   HEMATOCRIT % 29.7*   < >  37.5   PLATELETS 10*3/mm3 253   < > 164   GLUCOSE mg/dL 278*   < > 180*   CREATININE mg/dL 2.13*   < > 1.50*   BUN mg/dL 36*   < > 34*   SODIUM mmol/L 135*   < > 137   POTASSIUM mmol/L 3.4*   < > 4.7   AST (SGOT) U/L  --   --  26   ALT (SGPT) U/L  --   --  20   ALK PHOS U/L  --   --  116   BILIRUBIN mg/dL  --   --  0.2   ANION GAP mmol/L 8.0   < > 10.0    < > = values in this interval not displayed.     No radiology results for the last day    I reviewed the patient's new clinical results.    Assessment/Plan:     Active and Resolved Problems  Active Hospital Problems    Diagnosis  POA    **Septic shock [A41.9, R65.21]  Yes      Resolved Hospital Problems   No resolved problems to display.     Patient downgraded from ICU to PCU level of care.     Septic Shock due to Pneumonia   -Respiratory panel negative   -MRSA swab negative  -NS at 75mL/hr   -Levophed discontinued   -Continue Zosyn   -SLP consult to rule out aspiration pna     Diabetes Mellitus   -Hgb A1c 4/5/24 6.7  -Continue to hold home medications   -Accuchecks ACHS   -SSI ordered     Hypertension -Not controlled with medication at home  -Monitor BP per protocol   -metoprolol/verapamil    Hyperlipidemia --   -Patient on Lipitor at home  -Last lipid profile 5/26/23 negative  -Restart lipitor when not NPO    Depression  -Patient on Cymbalta at home  -Restart when patient not NPO    Altered Mental Status improving  -No family at bedside at this time  -Reports of neurologic decline over last few months   -Patient unable to make needs known     Dysphasia   -Failed nursing water screen  -SLP eval today,  recommended that pt initiate a mechanical soft diet with NT liquids as tolerated   -Repeat tomorrow if patient able to follow commands     Atrial fibrillation/flutter:Patient is in sinus rhythm.    Patient is on metoprolol.  verapamil  Started anticoagulation.  coumadin-pharmacy to monitor    Elevated BNP  Echo-Left ventricular systolic function is normal. Left  ventricular ejection   fraction appears to be 56 - 60%. .   Check BNP    DVT prophylaxis:  Medical and mechanical DVT prophylaxis orders are present.      Code status is   Code Status and Medical Interventions:   Ordered at: 04/05/24 1340     Code Status (Patient has no pulse and is not breathing):    CPR (Attempt to Resuscitate)     Medical Interventions (Patient has pulse or is breathing):    Full Support       Plan for disposition:Pending clinical course.     Time: 30 minutes    Signature: Electronically signed by Kendall Haynes MD, 04/10/24, 11:25 EDT.  [facility] Hospitalist Team

## 2024-04-10 NOTE — CONSULTS
[facility] CONSULT NOTE    Referring Provider: Kendall Haynes MD   Reason for Consultation: Acute kidney injury      History of present illness: Patient is 78 years old male with history of hypertension, diabetes, who was initially admitted to the hospital with pneumonia and septic shock.  Patient initially required vasopressors.  His ICU stay was complicated by A-fib with RVR.  Patient's respiratory and hemodynamic status improved and has been out of ICU for last 4 days.  Patient is on IV Zosyn for pneumonia.  His lab workup showed worsening creatinine, 2.13 Mg/DL this morning.  Patient has good urine output.  External catheter in place.  Patient feeling very tired.  Complaining of dry mouth and pain in lower extremities.  Denies any chest pain, nausea, vomiting, dysuria.  Patient was lethargic and confused on presentation but improving slowly    History  Past Medical History:   Diagnosis Date    ADHD (attention deficit hyperactivity disorder)     Allergic     Arthritis     Colon polyp     Depression     Diabetes mellitus type 2    controlled by oral meds    Fibromyalgia, primary     HL (hearing loss)     Hyperlipidemia     Hypertension      Past Surgical History:   Procedure Laterality Date    COLONOSCOPY      ENDOSCOPY N/A 11/15/2023    Procedure: ESOPHAGOGASTRODUODENOSCOPY;  Surgeon: George Viveros MD;  [place] [facility] [place];  Service: Gastroenterology;  Laterality: N/A;  popssible candida, irrgular z-line, gastritis    EYE SURGERY  2023    Cataracts removed: both eyes    HAND SURGERY  1980 1980-1990- [facility]. Abstracted from [facility].    HEMORROIDECTOMY  1984    [facility]. Abstracted from [facility].    TONSILLECTOMY  1940's    VASECTOMY       Social History     Tobacco Use    Smoking status: Former     Current packs/day: 0.00     Average packs/day: 0.3 packs/day for 5.0 years (1.3 ttl pk-yrs)     Types: Cigarettes     Start date: 2/10/1966     Quit date: 2/10/1971     Years since quitting:  53.2    Smokeless tobacco: Never   Vaping Use    Vaping status: Never Used   Substance Use Topics    Alcohol use: Not Currently     Alcohol/week: 2.0 standard drinks of alcohol     Types: 1 Glasses of wine, 1 Cans of beer per week    Drug use: No     Family History   Problem Relation Age of Onset    Diabetes Mother         type2    Stroke Mother     Diabetes Father         type2    Colon cancer Neg Hx     Colon polyps Neg Hx     Crohn's disease Neg Hx     Irritable bowel syndrome Neg Hx     Ulcerative colitis Neg Hx        Review of Systems  ROS  As per HPI  Objective     Vital Signs  Temp:  [97.7 °F (36.5 °C)-99 °F (37.2 °C)] 98 °F (36.7 °C)  Heart Rate:  [67-90] 74  Resp:  [11-23] 11  BP: (112-142)/(53-73) 142/66    No intake/output data recorded.  I/O last 3 completed shifts:  In: 720 [P.O.:720]  Out: 2850 [Urine:2850]    Physical Exam:  Physical Exam    General Appearance: Chronically ill-appearing, awake  Skin: warm and dry  HEENT: oral mucosa dry, nonicteric sclera  Neck: supple, no JVD  Lungs: Decreased breath sounds at bases  Heart: RRR, normal S1 and S2  Abdomen: soft, nontender, nondistended  : no palpable bladder  Extremities: 2+ bilateral lower extremities edema    Results Review:   I reviewed the patient's new clinical results.    Lab Results   Component Value Date    CALCIUM 8.4 (L) 04/10/2024    PHOS 2.8 04/06/2024     Results from last 7 days   Lab Units 04/10/24  0006 04/09/24  0104 04/08/24  0508 04/07/24  0043 04/06/24  0448 04/05/24  1201 04/05/24  0529 04/04/24  1336   MAGNESIUM mg/dL  --   --   --   --  1.8  --   --   --    SODIUM mmol/L 135* 137 139   < > 141 137 138 137   POTASSIUM mmol/L 3.4* 3.6 3.4*   < > 4.5 4.7 4.9 4.5   CHLORIDE mmol/L 99 100 102   < > 104 99 101 99   CO2 mmol/L 28.0 29.0 26.0   < > 24.0 28.0 29.0 27.0   BUN mg/dL 36* 37* 33*   < > 26* 34* 33* 33*   CREATININE mg/dL 2.13* 1.81* 1.51*   < > 1.18 1.50* 1.45* 1.42*   GLUCOSE mg/dL 278* 199* 142*   < > 81 180* 166* 166*    CALCIUM mg/dL 8.4* 8.3* 8.8   < > 9.3 9.2 9.2 9.6   WBC 10*3/mm3 14.20* 15.48* 18.80*   < > 16.45* 13.68* 12.51* 11.25*   HEMOGLOBIN g/dL 9.5* 10.0* 11.2*   < > 13.2 11.9* 12.0* 12.9*   PLATELETS 10*3/mm3 253 242 224   < > 176 164 183 198   ALT (SGPT) U/L  --   --   --   --   --  20 18 17   AST (SGOT) U/L  --   --   --   --   --  26 28 30    < > = values in this interval not displayed.     Lab Results   Component Value Date    CKTOTAL 199 04/02/2024    TROPONINT 41 (H) 04/07/2024     Estimated Creatinine Clearance: 27.1 mL/min (A) (by C-G formula based on SCr of 2.13 mg/dL (H)).  Lab Results   Component Value Date    URICACID 7.3 (H) 04/02/2024       Brief Urine Lab Results  (Last result in the past 365 days)        Color   Clarity   Blood   Leuk Est   Nitrite   Protein   CREAT   Urine HCG        04/05/24 1210 Yellow   Cloudy   Negative   Negative   Negative   30 mg/dL (1+)                   Prior to Admission medications    Medication Sig Start Date End Date Taking? Authorizing Provider   atorvastatin (LIPITOR) 20 MG tablet Take 1 tablet by mouth Daily. 1/17/24  Yes Montana Morrow MD   bisacodyl (DULCOLAX) 5 MG EC tablet Take 1 tablet by mouth Daily.   Yes German Valle MD   cefTAZidime (FORTAZ) 2000 mg/50 mL 0.9% NS IVPB Infuse 50 mL into a venous catheter 2 (Two) Times a Day. Infuse 2 gram over 30 intravenously Twice daily at 0600 and 1800 4/5/24 4/9/24 Yes German Valle MD   docusate sodium (COLACE) 100 MG capsule Take 1 capsule by mouth 2 (Two) Times a Day.   Yes German Valle MD   DULoxetine (CYMBALTA) 30 MG capsule Take 3 capsules by mouth Daily. 1/17/24  Yes Montana Morrow MD   glimepiride (AMARYL) 2 MG tablet TAKE 3 TABLETS EVERY MORNING BEFORE BREAKFAST 1/17/24  Yes Montana Morrow MD   l-methylfolate-algae-B6-B12 (METANX) 3-90.314-2-35 MG capsule capsule Take 1 capsule by mouth Every 12 (Twelve) Hours. 2/1/24  Yes Provider, MD German   metFORMIN (GLUCOPHAGE)  500 MG tablet Take 2 tablets by mouth 2 (Two) Times a Day. 1/17/24  Yes Montana Morrow MD   oxyCODONE (ROXICODONE) 5 MG immediate release tablet Take 2 tablets by mouth Every Morning.   Yes German Valle MD   pantoprazole (PROTONIX) 40 MG EC tablet Take 1 tablet by mouth Daily. 1/17/24  Yes Montana Morrow MD   pregabalin (LYRICA) 75 MG capsule Take 1 capsule by mouth 2 (Two) Times a Day.   Yes German Valle MD   primidone (MYSOLINE) 50 MG tablet 3 tablets twice daily 2/6/24  Yes Montana Morrow MD   sucralfate (CARAFATE) 1 g tablet Take 1 tablet by mouth 4 (Four) Times a Day.   Yes German Valle MD   tamsulosin (FLOMAX) 0.4 MG capsule 24 hr capsule Take 1 capsule by mouth Daily.   Yes German Valle MD   verapamil ER (VERELAN) 120 MG 24 hr capsule Take 2 capsules by mouth Every Night.   Yes German Valle MD   acetaminophen (TYLENOL) 325 MG tablet Take 2 tablets by mouth Every 6 (Six) Hours As Needed for Mild Pain.    German Valle MD   bisacodyl (DULCOLAX) 10 MG suppository Insert 1 suppository into the rectum Daily As Needed for Constipation.    German Valle MD   dicyclomine (BENTYL) 10 MG capsule Take 1 capsule by mouth 4 (Four) Times a Day Before Meals & at Bedtime. 1/17/24   Montana Morrow MD   diphenhydrAMINE (BENADRYL) 25 mg capsule Take 12.5 mg by mouth 3 (Three) Times a Day As Needed for Itching.    German Valle MD   ipratropium-albuterol (DUO-NEB) 0.5-2.5 mg/3 ml nebulizer Take 3 mL by nebulization Every 4 (Four) Hours As Needed for Wheezing.    German Valle MD   magnesium hydroxide (MILK OF MAGNESIA) 2400 MG/10ML suspension suspension Take 30 mL by mouth Daily As Needed. Administer if no BM x3 days and after prune juice is given and ineffective after 8 hours (if not a renal patient)  If no BM after 1st and 2nd step bowl regimen is given, notify MD/APRN for further instructions/guidance    German Valle MD    NON FORMULARY Prune Juice QD PRN    ProviderGerman MD   ondansetron (ZOFRAN) 4 MG tablet Take 1 tablet by mouth Every 8 (Eight) Hours As Needed for Nausea or Vomiting.    Provider, MD German       amiodarone, 200 mg, Oral, Q24H  atorvastatin, 20 mg, Oral, Daily  bisacodyl, 5 mg, Oral, Daily  dicyclomine, 10 mg, Oral, 4x Daily AC & at Bedtime  docusate sodium, 100 mg, Oral, BID  DULoxetine, 90 mg, Oral, Daily  enoxaparin, 1 mg/kg, Subcutaneous, Nightly  insulin lispro, 2-9 Units, Subcutaneous, 4x Daily AC & at Bedtime  metoprolol tartrate, 25 mg, Oral, Q12H  oxyCODONE, 10 mg, Oral, Daily  pantoprazole, 40 mg, Oral, Daily  piperacillin-tazobactam, 3.375 g, Intravenous, Q8H  pregabalin, 75 mg, Oral, BID  primidone, 150 mg, Oral, Q12H  sodium chloride, 10 mL, Intravenous, Q12H  sucralfate, 1 g, Oral, TID AC  tamsulosin, 0.4 mg, Oral, Daily  verapamil ER, 240 mg, Oral, Nightly  warfarin, 2.5 mg, Oral, Daily      Pharmacy to Dose enoxaparin (LOVENOX),   Pharmacy to dose warfarin,         Assessment & Plan       1.  Acute kidney injury.  Prerenal versus acute interstitial nephritis.  Creatinine slowly increasing, 2.13 Mg/DL this morning.  Patient has peripheral edema but may be dry intravascularly.  Nonoliguric  2.  Septic shock.  Due to pneumonia.  Improving.  Patient is on IV Zosyn  3.  Hypokalemia.  Mild  4. Pneumonia.  Patient is on IV antibiotics  5.  History of hypertension.  Blood pressure is okay    Plan:  Restart gentle IV hydration  Ordering urinalysis, urine eosinophil, urine sodium  Rule out obstruction.  Ordering bladder scan  Oral potassium replacement  May need to stop Zosyn if creatinine continue to increase    I discussed the patients findings and my recommendations with patient and nursing staff    Gen Ruiz MD  04/10/24  12:21 EDT

## 2024-04-10 NOTE — CONSULTS
"  Referring Provider: Dr Haynes  Reason for Consultation: confusion       Chief complaint \"I have dreams conflicting with reality\"     Subjective .     History of present illness:  The patient is a 78 y.o. male who was admitted secondary to pneumonia and septic shock. PMHx: depression, DM, HTN, HLD, fibromyalgia, arthritis. Psych consult was requested by Dr Haynes 2ry to confusion.  The pt acknowledged hx of depression that became worse recently 2ry to health issues, he also reported short term memory deficits. The pt knew he was in the hospital, knew the reason for admission. He reported some confusional episodes upon awakening, he stated he has disturbing dreams \"passing in reality\" , few nights ago he had a dream that his wife .    When awake the pt denied AVH/Si/HI  The pt displayed good insight, he stated he probably will need to go to the rehab because \"I am too sick for my wife to take care of me\".   Past psych hx: depression     Review of Systems   All systems were reviewed and negative except for:  Constitution:  positive for fatigue  Respiratory: positive for  shortness of air  Musculoskeletal: positive for  back pain and muscle weakness  Neurological: positive for  difficulty walking, confusion, dizziness, and weakness  Behavioral/Psych: positive for  depression    History       Past Medical History:   Diagnosis Date    ADHD (attention deficit hyperactivity disorder)     Allergic     Arthritis     Colon polyp     Depression     Diabetes mellitus type 2    controlled by oral meds    Fibromyalgia, primary     HL (hearing loss)     Hyperlipidemia     Hypertension           Family History   Problem Relation Age of Onset    Diabetes Mother         type2    Stroke Mother     Diabetes Father         type2    Colon cancer Neg Hx     Colon polyps Neg Hx     Crohn's disease Neg Hx     Irritable bowel syndrome Neg Hx     Ulcerative colitis Neg Hx         Social History     Tobacco Use    Smoking status: Former "     Current packs/day: 0.00     Average packs/day: 0.3 packs/day for 5.0 years (1.3 ttl pk-yrs)     Types: Cigarettes     Start date: 2/10/1966     Quit date: 2/10/1971     Years since quittin.2    Smokeless tobacco: Never   Vaping Use    Vaping status: Never Used   Substance Use Topics    Alcohol use: Not Currently     Alcohol/week: 2.0 standard drinks of alcohol     Types: 1 Glasses of wine, 1 Cans of beer per week    Drug use: No          Medications Prior to Admission   Medication Sig Dispense Refill Last Dose    atorvastatin (LIPITOR) 20 MG tablet Take 1 tablet by mouth Daily. 90 tablet 3     bisacodyl (DULCOLAX) 5 MG EC tablet Take 1 tablet by mouth Daily.       [] cefTAZidime (FORTAZ) 2000 mg/50 mL 0.9% NS IVPB Infuse 50 mL into a venous catheter 2 (Two) Times a Day. Infuse 2 gram over 30 intravenously Twice daily at 0600 and 1800       docusate sodium (COLACE) 100 MG capsule Take 1 capsule by mouth 2 (Two) Times a Day.       DULoxetine (CYMBALTA) 30 MG capsule Take 3 capsules by mouth Daily. 270 capsule 3     glimepiride (AMARYL) 2 MG tablet TAKE 3 TABLETS EVERY MORNING BEFORE BREAKFAST 270 tablet 1     l-methylfolate-algae-B6-B12 (METANX) 3-90.314-2-35 MG capsule capsule Take 1 capsule by mouth Every 12 (Twelve) Hours.       metFORMIN (GLUCOPHAGE) 500 MG tablet Take 2 tablets by mouth 2 (Two) Times a Day. 360 tablet 3     oxyCODONE (ROXICODONE) 5 MG immediate release tablet Take 2 tablets by mouth Every Morning.       pantoprazole (PROTONIX) 40 MG EC tablet Take 1 tablet by mouth Daily. 90 tablet 1     pregabalin (LYRICA) 75 MG capsule Take 1 capsule by mouth 2 (Two) Times a Day.       primidone (MYSOLINE) 50 MG tablet 3 tablets twice daily 485 tablet 1     sucralfate (CARAFATE) 1 g tablet Take 1 tablet by mouth 4 (Four) Times a Day.       tamsulosin (FLOMAX) 0.4 MG capsule 24 hr capsule Take 1 capsule by mouth Daily.       verapamil ER (VERELAN) 120 MG 24 hr capsule Take 2 capsules by mouth  Every Night.       acetaminophen (TYLENOL) 325 MG tablet Take 2 tablets by mouth Every 6 (Six) Hours As Needed for Mild Pain.       bisacodyl (DULCOLAX) 10 MG suppository Insert 1 suppository into the rectum Daily As Needed for Constipation.       dicyclomine (BENTYL) 10 MG capsule Take 1 capsule by mouth 4 (Four) Times a Day Before Meals & at Bedtime. 540 capsule 1     diphenhydrAMINE (BENADRYL) 25 mg capsule Take 12.5 mg by mouth 3 (Three) Times a Day As Needed for Itching.       ipratropium-albuterol (DUO-NEB) 0.5-2.5 mg/3 ml nebulizer Take 3 mL by nebulization Every 4 (Four) Hours As Needed for Wheezing.       magnesium hydroxide (MILK OF MAGNESIA) 2400 MG/10ML suspension suspension Take 30 mL by mouth Daily As Needed. Administer if no BM x3 days and after prune juice is given and ineffective after 8 hours (if not a renal patient)  If no BM after 1st and 2nd step bowl regimen is given, notify MD/APRN for further instructions/guidance       NON FORMULARY Prune Juice QD PRN       ondansetron (ZOFRAN) 4 MG tablet Take 1 tablet by mouth Every 8 (Eight) Hours As Needed for Nausea or Vomiting.           Scheduled Meds:  amiodarone, 200 mg, Oral, Q24H  atorvastatin, 20 mg, Oral, Daily  bisacodyl, 5 mg, Oral, Daily  dicyclomine, 10 mg, Oral, 4x Daily AC & at Bedtime  docusate sodium, 100 mg, Oral, BID  DULoxetine, 90 mg, Oral, Daily  enoxaparin, 1 mg/kg, Subcutaneous, Nightly  insulin lispro, 2-9 Units, Subcutaneous, 4x Daily AC & at Bedtime  metoprolol tartrate, 25 mg, Oral, Q12H  oxyCODONE, 10 mg, Oral, Daily  pantoprazole, 40 mg, Oral, Daily  piperacillin-tazobactam, 3.375 g, Intravenous, Q8H  pregabalin, 75 mg, Oral, BID  primidone, 150 mg, Oral, Q12H  sodium chloride, 10 mL, Intravenous, Q12H  sucralfate, 1 g, Oral, TID AC  tamsulosin, 0.4 mg, Oral, Daily  verapamil ER, 240 mg, Oral, Nightly  warfarin, 2.5 mg, Oral, Daily         Continuous Infusions:  Pharmacy to Dose enoxaparin (LOVENOX),   Pharmacy to dose  "warfarin,         PRN Meds:    acetaminophen **OR** acetaminophen    bisacodyl    dextrose    dextrose    glucagon (human recombinant)    ipratropium-albuterol    magnesium hydroxide    Morphine    nitroglycerin    ondansetron ODT **OR** ondansetron    oxyCODONE    Pharmacy to Dose enoxaparin (LOVENOX)    Pharmacy to dose warfarin    sodium chloride    sodium chloride    sodium chloride      Allergies:  Patient has no known allergies.      Objective     Vital Signs   /66 (BP Location: Left arm, Patient Position: Lying)   Pulse 74   Temp 98 °F (36.7 °C) (Axillary)   Resp 11   Ht 180.3 cm (71\")   Wt 69.2 kg (152 lb 8.9 oz)   SpO2 95%   BMI 21.28 kg/m²     Physical Exam:    Musculoskeletal:   Muscle strength and tone: decreased   Abnormal Movements: none   Gait: unable to assess, the pt was in bed      General Appearance:    In NAD, appears top be very weak         Mental Status Exam:   Hygiene:   good  Cooperation:  Cooperative  Eye Contact:  Poor  Behavior and Psychomotor Activity: Slow  Speech:   soft voice   Mood: depressed and tired   Affect:  Restricted and Congruent  Thought Process:  Goal directed, Linear, and Organized  Associations: Intact   Thought Content:   mood congruent   Language: appropriate   Suicidal Ideations:  None  Homicidal:  None  Hallucinations:  Not demonstrated today  Delusion:  None  Orientation:  To person, Place, and Situation  Memory:   decreased   Concentration and computation:decreased   Attention span: fair   Fund of knowledge: appropriate   Reliability:  good  Insight:  Good  Judgement:  Good  Impulse Control:  Good      Medications and allergies reviewed      Lab Results   Component Value Date    GLUCOSE 278 (H) 04/10/2024    CALCIUM 8.4 (L) 04/10/2024     (L) 04/10/2024    K 3.4 (L) 04/10/2024    CO2 28.0 04/10/2024    CL 99 04/10/2024    BUN 36 (H) 04/10/2024    CREATININE 2.13 (H) 04/10/2024    EGFRIFAFRI >60 09/27/2017    EGFRIFNONA 58 (L) 06/11/2021    BCR " "16.9 04/10/2024    ANIONGAP 8.0 04/10/2024       Last Urine Toxicity  More data exists         Latest Ref Rng & Units 4/5/2024 2/27/2024   LAST URINE TOXICITY RESULTS   Barbiturates Screen, Urine Negative Positive  SCREEN POSITIVE       Benzodiazepine Screen, Urine Negative Negative  NEGATIVE       Cocaine Screen, Urine Negative Negative  NEGATIVE       Methadone Screen , Urine Negative Negative  NEGATIVE          Details          This result is from an external source.               No results found for: \"PHENYTOIN\", \"PHENOBARB\", \"VALPROATE\", \"CBMZ\"    Lab Results   Component Value Date     (L) 04/10/2024    BUN 36 (H) 04/10/2024    CREATININE 2.13 (H) 04/10/2024    TSH 1.180 04/05/2024    WBC 14.20 (H) 04/10/2024       Brief Urine Lab Results  (Last result in the past 365 days)        Color   Clarity   Blood   Leuk Est   Nitrite   Protein   CREAT   Urine HCG        04/05/24 1210 Yellow   Cloudy   Negative   Negative   Negative   30 mg/dL (1+)                   Assessment & Plan       Septic shock          Assessment: Major depressive d/o moderate recurrent  Delirium due to medical condition (septic shock)   Treatment Plan: the pt presented with the sxs of depression and mild delirium  The pt has very good insight, understands that all his dreams are not real  The pt is very weak medically   Cont cymbalta 90 mg po daily  The pt is not combative, not aggressive   He is on amiodarone with  , antipsychotic meds are not indicated   Cont to provide support,   Keep the pt oriented   Decrease overstimulation   Will follow   Treatment Plan discussed with: Patient and nursing     I discussed the patients findings and my recommendations with patient and nursing staff    I have reviewed and approved the behavioral health treatment plans and problem list. Yes  Thank you for the consult   Referring MD has access to consult report and progress notes in EMR       This document has been electronically signed by Jacklyn" ELADIO Gavin MD  April 10, 2024 13:41 EDT    Part of this note may be an electronic transcription/translation of spoken language to printed text using the Dragon Dictation System.

## 2024-04-10 NOTE — PROGRESS NOTES
LOS: 6 days   Admitting Physician- Kendall Haynes MD    Reason For Followup:    Atrial fibrillation  Mental status changes  Pneumonia  Diabetes mellitus  Hypertension    Subjective     Patient is feeling better.  Patient is awake    Objective     Hemodynamics are stable    Review of Systems:   Review of Systems   Constitutional: Negative for chills and fever.   HENT:  Negative for ear discharge and nosebleeds.    Eyes:  Negative for discharge and redness.   Cardiovascular:  Negative for chest pain, orthopnea, palpitations, paroxysmal nocturnal dyspnea and syncope.   Respiratory:  Positive for shortness of breath. Negative for cough and wheezing.    Endocrine: Negative for heat intolerance.   Skin:  Negative for rash.   Musculoskeletal:  Negative for arthritis and myalgias.   Gastrointestinal:  Negative for bloating, abdominal pain, melena, nausea and vomiting.   Genitourinary:  Negative for dysuria and hematuria.   Neurological:  Negative for dizziness, light-headedness, numbness and tremors.   Psychiatric/Behavioral:  Negative for depression. The patient is not nervous/anxious.          Vital Signs  Vitals:    04/11/24 0946 04/11/24 1314 04/11/24 1546 04/11/24 1707   BP: 139/67 106/57  117/56   BP Location:    Left arm   Patient Position:    Lying   Pulse: 83 72  75   Resp: 16 16  14   Temp: 98.1 °F (36.7 °C) 98.1 °F (36.7 °C)  98.2 °F (36.8 °C)   TempSrc: Oral Oral  Oral   SpO2: 91% 94%  93%   Weight:   73.2 kg (161 lb 6 oz)    Height:         Wt Readings from Last 1 Encounters:   04/11/24 73.2 kg (161 lb 6 oz)       Intake/Output Summary (Last 24 hours) at 4/11/2024 1905  Last data filed at 4/11/2024 1314  Gross per 24 hour   Intake 720 ml   Output 700 ml   Net 20 ml     Physical Exam:  Constitutional:       Appearance: Well-developed.   Eyes:      General: No scleral icterus.        Right eye: No discharge.   HENT:      Head: Normocephalic and atraumatic.   Neck:      Thyroid: No thyromegaly.       Lymphadenopathy: No cervical adenopathy.   Pulmonary:      Effort: Pulmonary effort is normal. No respiratory distress.      Breath sounds: Normal breath sounds. No wheezing. No rales.   Cardiovascular:      Normal rate. Regular rhythm.      No gallop.    Edema:     Peripheral edema absent.   Abdominal:      General: There is no distension.      Tenderness: There is no abdominal tenderness.   Skin:     Findings: No erythema or rash.   Neurological:      Mental Status: Alert and oriented to person, place, and time.         Results Review:   Lab Results (last 24 hours)       Procedure Component Value Units Date/Time    POC Glucose 4x Daily Before Meals & at Bedtime [[id number]]  (Abnormal) Collected: 04/11/24 1704    Specimen: Blood Updated: 04/11/24 1707     Glucose 177 mg/dL      Comment: Serial Number: [id number]:  [id number]       Calcitriol (1,25 di-OH Vitamin D) [[id number]] Collected: 04/11/24 1535    Specimen: Blood Updated: 04/11/24 1537    Rickettsia Species DNA, Real-Time PCR [[id number]] Collected: 04/11/24 1535    Specimen: Blood Updated: 04/11/24 1537    Ehrlichia Profile DNA PCR [[id number]] Collected: 04/11/24 1535    Specimen: Blood Updated: 04/11/24 1537    Lead, Blood [[id number]] Collected: 04/11/24 1535    Specimen: Blood Updated: 04/11/24 1537    CK [[id number]]  (Abnormal) Collected: 04/11/24 0238    Specimen: Blood Updated: 04/11/24 1210     Creatine Kinase 13 U/L     BNP [[id number]]  (Normal) Collected: 04/11/24 0238    Specimen: Blood Updated: 04/11/24 1145     proBNP 1,661.0 pg/mL     Narrative:      This assay is used as an aid in the diagnosis of individuals suspected of having heart failure. It can be used as an aid in the diagnosis of acute decompensated heart failure (ADHF) in patients presenting with signs and symptoms of ADHF to the emergency department (ED). In addition, NT-proBNP of <300 pg/mL indicates ADHF is not likely.    Age Range Result Interpretation  NT-proBNP Concentration  (pg/mL:      <50             Positive            >450                   Gray                 300-450                    Negative             <300    50-75           Positive            >900                  Gray                300-900                  Negative            <300      >75             Positive            >1800                  Gray                300-1800                  Negative            <300    POC Glucose 4x Daily Before Meals & at Bedtime [[id number]]  (Abnormal) Collected: 04/11/24 1101    Specimen: Blood Updated: 04/11/24 1102     Glucose 181 mg/dL      Comment: Serial Number: [id number]:  [id number]       POC Glucose 4x Daily Before Meals & at Bedtime [[id number]]  (Abnormal) Collected: 04/11/24 0723    Specimen: Blood Updated: 04/11/24 0725     Glucose 156 mg/dL      Comment: Serial Number: [id number]:  [id number]       Renal Function Panel [[id number]]  (Abnormal) Collected: 04/11/24 0238    Specimen: Blood Updated: 04/11/24 0308     Glucose 178 mg/dL      BUN 32 mg/dL      Creatinine 2.13 mg/dL      Sodium 139 mmol/L      Potassium 3.9 mmol/L      Chloride 102 mmol/L      CO2 30.0 mmol/L      Calcium 8.5 mg/dL      Albumin 2.3 g/dL      Phosphorus 2.3 mg/dL      Anion Gap 7.0 mmol/L      BUN/Creatinine Ratio 15.0     eGFR 30.7 mL/min/1.73     Narrative:      GFR Normal >60  Chronic Kidney Disease <60  Kidney Failure <15    The GFR formula is only valid for adults with stable renal function between ages 18 and 70.    Protime-INR [[id number]]  (Abnormal) Collected: 04/11/24 0238    Specimen: Blood Updated: 04/11/24 0257     Protime 11.3 Seconds      INR 1.04    Eosinophil Smear - Urine, Urine, Catheter [[id number]]  (Normal) Collected: 04/10/24 2121    Specimen: Urine, Catheter Updated: 04/10/24 2225     Eosinophil Smear 0 % EOS/100 Cells     Urinalysis With Microscopic If Indicated (No Culture) - Urine, Clean Catch [[id number]]  (Abnormal) Collected: 04/10/24 2119    Specimen: Urine,  Clean Catch Updated: 04/10/24 2156     Color, UA Yellow     Appearance, UA Slightly Cloudy     Comment: Result checked          pH, UA <=5.0     Specific Gravity, UA 1.012     Glucose, UA Negative     Ketones, UA Negative     Bilirubin, UA Negative     Blood, UA Negative     Protein, UA 30 mg/dL (1+)     Leuk Esterase, UA Negative     Nitrite, UA Negative     Urobilinogen, UA 0.2 E.U./dL    Urinalysis, Microscopic Only - Urine, Clean Catch [[id number]]  (Abnormal) Collected: 04/10/24 2119    Specimen: Urine, Clean Catch Updated: 04/10/24 2156     RBC, UA 0-2 /HPF      WBC, UA 3-5 /HPF      Bacteria, UA None Seen /HPF      Squamous Epithelial Cells, UA 3-6 /HPF      Hyaline Casts, UA 0-2 /LPF      Methodology Automated Microscopy    Sodium, Urine, Random - Urine, Clean Catch [[id number]] Collected: 04/10/24 2116    Specimen: Urine, Clean Catch Updated: 04/10/24 2152     Sodium, Urine 36 mmol/L     Narrative:      Reference intervals for random urine have not been established.  Clinical usage is dependent upon physician's interpretation in combination with other laboratory tests.       POC Glucose Once [[id number]]  (Abnormal) Collected: 04/10/24 2112    Specimen: Blood Updated: 04/10/24 2113     Glucose 213 mg/dL      Comment: Serial Number: [id number]:  [id number]             Imaging Results (Last 72 Hours)       Procedure Component Value Units Date/Time    US Renal Bilateral [[id number]] Collected: 04/11/24 1517     Updated: 04/11/24 1526    Narrative:      US RENAL BILATERAL    Date of Exam: 4/11/2024 2:50 PM EDT    Indication: Acute renal insufficiency.    Comparison: CT of the abdomen pelvis without contrast dated 4/7/2024 and abdomen MRI with and without contrast dated 9/27/2017    Technique: Grayscale and color Doppler ultrasound evaluation of the kidneys and urinary bladder was performed.      Findings:  The left kidney is not visualized and is likely obscured due to excessive bowel gas. The right kidney  measures 10.8 x 5.9 x 5.3 cm. It is normal in echogenicity with minimal cortical thinning. No suspicious renal mass. No hydro nephrosis or definitive   shadowing stones. No perinephric fluid collection. The bladder demonstrates no focal abnormality. There is 126 cc of urine within the bladder.      Impression:      Impression:  Minimal cortical thinning on the right kidney which otherwise has an unremarkable sonographic appearance. No hydronephrosis. The left kidney is not visualized and is likely obscured by bowel gas and less there has been interval left nephrectomy over the   past 4 days.        Electronically Signed: Jerome Grimes DO    4/11/2024 3:24 PM EDT    Workstation ID: [id number]          ECG/EMG Results (most recent)       Procedure Component Value Units Date/Time    ECG 12 Lead Altered Mental Status [[id number]] Collected: 04/05/24 1129     Updated: 04/05/24 1202     QTC Interval -- ms     Narrative:      HEART RATE= Invalid  bpm  RR Interval= 0  ms  AR Interval=   ms  P Horizontal Axis=   deg  P Front Axis=   deg  QRSD Interval=   ms  QT Interval=   ms  QTcB= Invalid  ms  QRS Axis=   deg  T Wave Axis=   deg  Electronically Signed By:   Date and Time of Study: 2024-04-05 11:29:29    Adult Transthoracic Echo Complete W/ Cont if Necessary Per Protocol [[id number]] Resulted: 04/07/24 1640     Updated: 04/07/24 1643     EF(MOD-bp) 69.8 %      LVIDd 4.4 cm      LVIDs 3.1 cm      IVSd 1.00 cm      LVPWd 1.10 cm      FS 29.5 %      IVS/LVPW 0.91 cm      ESV(cubed) 29.8 ml      LV Sys Vol (BSA corrected) 12.9 cm2      EDV(cubed) 85.2 ml      LV Santiago Vol (BSA corrected) 42.3 cm2      LV mass(C)d 158.2 grams      LVOT area 3.1 cm2      LVOT diam 2.00 cm      EDV(MOD-sp2) 59.4 ml      EDV(MOD-sp4) 77.1 ml      ESV(MOD-sp2) 17.4 ml      ESV(MOD-sp4) 23.6 ml      SV(MOD-sp2) 42.0 ml      SV(MOD-sp4) 53.5 ml      SI(MOD-sp2) 23.0 ml/m2      SI(MOD-sp4) 29.3 ml/m2      EF(MOD-sp2) 70.7 %      EF(MOD-sp4) 69.4 %      MV  E max lefty 84.9 cm/sec      MV A max lefty 43.0 cm/sec      MV dec time 0.17 sec      MV E/A 1.97     Pulm A Revs Dur 0.11 sec      Med Peak E' Lefty 21.8 cm/sec      Lat Peak E' Lefty 24.8 cm/sec      TR max lefty 273.0 cm/sec      Avg E/e' ratio 3.64     SV(LVOT) 44.9 ml      RVIDd 2.7 cm      RV Base 3.3 cm      RV Mid 2.30 cm      RV Length 5.6 cm      TAPSE (>1.6) 1.78 cm      RV S' 17.9 cm/sec      LA dimension (2D)  3.3 cm      Pulm Sys Lefty 56.2 cm/sec      Pulm Santiago Lefty 46.0 cm/sec      Pulm S/D 1.22     Pulm A Revs Lefty 33.9 cm/sec      LV V1 max 99.1 cm/sec      LV V1 max PG 3.9 mmHg      LV V1 mean PG 2.00 mmHg      LV V1 VTI 14.3 cm      Ao pk lefty 124.0 cm/sec      Ao max PG 6.2 mmHg      Ao mean PG 3.0 mmHg      Ao V2 VTI 19.9 cm      MANUEL(I,D) 2.26 cm2      MV max PG 10.5 mmHg      MV mean PG 4.0 mmHg      MV V2 VTI 27.3 cm      MV P1/2t 41.4 msec      MVA(P1/2t) 5.3 cm2      MVA(VTI) 1.65 cm2      MV dec slope 1,216 cm/sec2      TR max PG 29.8 mmHg      RV V1 max PG 4.7 mmHg      RV V1 max 108.0 cm/sec      RV V1 VTI 15.8 cm      PA V2 max 115.5 cm/sec      PA acc time 0.14 sec      Ao root diam 3.2 cm      ACS 2.00 cm      Sinus 3.1 cm      STJ 2.7 cm      RVSP(TR) 33 mmHg      RAP systole 3 mmHg     Narrative:        Left ventricular systolic function is normal. Left ventricular ejection   fraction appears to be 56 - 60%.    Left ventricular wall thickness is consistent with mild concentric   hypertrophy.    Left ventricular diastolic function was normal.    Estimated right ventricular systolic pressure from tricuspid   regurgitation is normal (<35 mmHg).    There is a small (<1cm) circumferential pericardial effusion. There is   no evidence of cardiac tamponade.      ECG 12 Lead Rhythm Change [[id number]] Collected: 04/06/24 1904     Updated: 04/07/24 1728     QT Interval 337 ms      QTC Interval 499 ms     Narrative:      HEART RATE= 131  bpm  RR Interval= 457  ms  CO Interval=   ms  P Horizontal Axis=    deg  P Front Axis=   deg  QRSD Interval= 93  ms  QT Interval= 337  ms  QTcB= 499  ms  QRS Axis= 40  deg  T Wave Axis= -21  deg  - ABNORMAL ECG -  Atrial fibrillation  Inferior infarct, old  When compared with ECG of 06-Apr-2024 7:54:52,  New or worsened ischemia or infarction  Significant repolarization change  Significant axis, voltage or hypertrophy change  Electronically Signed By: Warren Alvarenga ([facility]) 07-Apr-2024 17:28:18  Date and Time of Study: 2024-04-06 19:04:20    ECG 12 Lead Tachycardia [[id number]] Collected: 04/06/24 0754     Updated: 04/07/24 1728     QT Interval 321 ms      QTC Interval 480 ms     Narrative:      HEART RATE= 134  bpm  RR Interval= 448  ms  CA Interval=   ms  P Horizontal Axis=   deg  P Front Axis=   deg  QRSD Interval= 95  ms  QT Interval= 321  ms  QTcB= 480  ms  QRS Axis= 41  deg  T Wave Axis= -5  deg  - ABNORMAL ECG -  Atrial fibrillation  Ventricular premature complex  Low voltage, precordial leads  ST depression, probably rate related  When compared with ECG of 05-Apr-2024 11:44:03,  Significant repolarization change  Significant axis, voltage or hypertrophy change  Electronically Signed By: Warren Alvarenga ([facility]) 07-Apr-2024 17:28:24  Date and Time of Study: 2024-04-06 07:54:52    Telemetry Scan [[id number]] Resulted: 04/05/24     Updated: 04/09/24 0806    Telemetry Scan [[id number]] Resulted: 04/05/24     Updated: 04/09/24 0822    Telemetry Scan [[id number]] Resulted: 04/05/24     Updated: 04/09/24 0850    Telemetry Scan [[id number]] Resulted: 04/05/24     Updated: 04/09/24 0933    Telemetry Scan [[id number]] Resulted: 04/05/24     Updated: 04/09/24 1017    Telemetry Scan [[id number]] Resulted: 04/05/24     Updated: 04/09/24 1040    Telemetry Scan [[id number]] Resulted: 04/05/24     Updated: 04/09/24 1056    Telemetry Scan [[id number]] Resulted: 04/05/24     Updated: 04/09/24 1124    Telemetry Scan [[id number]] Resulted: 04/05/24     Updated: 04/09/24 1241    Telemetry Scan [[id number]] Resulted:  04/05/24     Updated: 04/09/24 1403    Telemetry Scan [[id number]] Resulted: 04/05/24     Updated: 04/09/24 1418    Telemetry Scan [[id number]] Resulted: 04/05/24     Updated: 04/09/24 1513    Telemetry Scan [[id number]] Resulted: 04/05/24     Updated: 04/09/24 1524    Telemetry Scan [[id number]] Resulted: 04/05/24     Updated: 04/10/24 0609    Telemetry Scan [[id number]] Resulted: 04/05/24     Updated: 04/10/24 0610    Telemetry Scan [[id number]] Resulted: 04/05/24     Updated: 04/10/24 0610    Telemetry Scan [[id number]] Resulted: 04/05/24     Updated: 04/10/24 0701    Telemetry Scan [[id number]] Resulted: 04/05/24     Updated: 04/10/24 0823    Telemetry Scan [[id number]] Resulted: 04/05/24     Updated: 04/10/24 0917    Telemetry Scan [[id number]] Resulted: 04/05/24     Updated: 04/10/24 0939    Telemetry Scan [[id number]] Resulted: 04/05/24     Updated: 04/10/24 1049    Telemetry Scan [[id number]] Resulted: 04/05/24     Updated: 04/10/24 1245    Telemetry Scan [[id number]] Resulted: 04/05/24     Updated: 04/11/24 0854    Telemetry Scan [[id number]] Resulted: 04/05/24     Updated: 04/11/24 0937    Telemetry Scan [[id number]] Resulted: 04/05/24     Updated: 04/11/24 0957    Telemetry Scan [[id number]] Resulted: 04/05/24     Updated: 04/11/24 1049    Telemetry Scan [[id number]] Resulted: 04/05/24     Updated: 04/11/24 1201    Telemetry Scan [[id number]] Resulted: 04/05/24     Updated: 04/11/24 1333    Telemetry Scan [[id number]] Resulted: 04/05/24     Updated: 04/11/24 1333          CBC    Results from last 7 days   Lab Units 04/10/24  0006 04/09/24  0104 04/08/24  0508 04/07/24  0526 04/06/24  0448 04/05/24  1201 04/05/24  0529   WBC 10*3/mm3 14.20* 15.48* 18.80* 22.33* 16.45* 13.68* 12.51*   HEMOGLOBIN g/dL 9.5* 10.0* 11.2* 13.0 13.2 11.9* 12.0*   PLATELETS 10*3/mm3 253 242 224 248 176 164 183     BMP   Results from last 7 days   Lab Units 04/11/24  0238 04/10/24  1310 04/10/24  0006 04/09/24  0104 04/08/24  0508 04/07/24  0043  04/06/24  0448 04/05/24  1201   SODIUM mmol/L 139  --  135* 137 139 140 141 137   POTASSIUM mmol/L 3.9 4.1 3.4* 3.6 3.4* 3.5 4.5 4.7   CHLORIDE mmol/L 102  --  99 100 102 100 104 99   CO2 mmol/L 30.0*  --  28.0 29.0 26.0 25.0 24.0 28.0   BUN mg/dL 32*  --  36* 37* 33* 23 26* 34*   CREATININE mg/dL 2.13*  --  2.13* 1.81* 1.51* 1.11 1.18 1.50*   GLUCOSE mg/dL 178*  --  278* 199* 142* 172* 81 180*   MAGNESIUM mg/dL  --   --   --   --   --   --  1.8  --    PHOSPHORUS mg/dL 2.3*  --   --   --   --   --  2.8  --      CMP   Results from last 7 days   Lab Units 04/11/24  0238 04/10/24  1310 04/10/24  0006 04/09/24  0104 04/08/24  0508 04/07/24  0043 04/06/24  0448 04/05/24  1201 04/05/24  0529   SODIUM mmol/L 139  --  135* 137 139 140 141 137 138   POTASSIUM mmol/L 3.9 4.1 3.4* 3.6 3.4* 3.5 4.5 4.7 4.9   CHLORIDE mmol/L 102  --  99 100 102 100 104 99 101   CO2 mmol/L 30.0*  --  28.0 29.0 26.0 25.0 24.0 28.0 29.0   BUN mg/dL 32*  --  36* 37* 33* 23 26* 34* 33*   CREATININE mg/dL 2.13*  --  2.13* 1.81* 1.51* 1.11 1.18 1.50* 1.45*   GLUCOSE mg/dL 178*  --  278* 199* 142* 172* 81 180* 166*   ALBUMIN g/dL 2.3*  --   --   --   --   --   --  3.4* 3.2*   BILIRUBIN mg/dL  --   --   --   --   --   --   --  0.2 0.2   ALK PHOS U/L  --   --   --   --   --   --   --  116 112   AST (SGOT) U/L  --   --   --   --   --   --   --  26 28   ALT (SGPT) U/L  --   --   --   --   --   --   --  20 18     Cardiac Studies:  Echo- Results for orders placed during the hospital encounter of 04/05/24    Adult Transthoracic Echo Complete W/ Cont if Necessary Per Protocol    Interpretation Summary    Left ventricular systolic function is normal. Left ventricular ejection fraction appears to be 56 - 60%.    Left ventricular wall thickness is consistent with mild concentric hypertrophy.    Left ventricular diastolic function was normal.    Estimated right ventricular systolic pressure from tricuspid regurgitation is normal (<35 mmHg).    There is a small  (<1cm) circumferential pericardial effusion. There is no evidence of cardiac tamponade.    Stress Myoview-  Cath-      Medication Review:   Scheduled Meds:amiodarone, 200 mg, Oral, Q24H  atorvastatin, 20 mg, Oral, Daily  bisacodyl, 5 mg, Oral, Daily  dicyclomine, 10 mg, Oral, 4x Daily AC & at Bedtime  docusate sodium, 100 mg, Oral, BID  DULoxetine, 90 mg, Oral, Daily  enoxaparin, 1 mg/kg, Subcutaneous, Nightly  insulin lispro, 2-9 Units, Subcutaneous, 4x Daily AC & at Bedtime  metoprolol tartrate, 25 mg, Oral, Q12H  oxyCODONE, 10 mg, Oral, Daily  pantoprazole, 40 mg, Oral, Daily  pregabalin, 75 mg, Oral, BID  primidone, 150 mg, Oral, Q12H  sodium chloride, 10 mL, Intravenous, Q12H  sucralfate, 1 g, Oral, TID AC  tamsulosin, 0.4 mg, Oral, Daily  verapamil ER, 240 mg, Oral, Nightly  warfarin, 3 mg, Oral, Daily      Continuous Infusions:Pharmacy to Dose enoxaparin (LOVENOX),   Pharmacy to dose warfarin,   sodium chloride, 100 mL/hr, Last Rate: 100 mL/hr (04/11/24 1215)      PRN Meds:.  acetaminophen **OR** acetaminophen    bisacodyl    dextrose    dextrose    glucagon (human recombinant)    ipratropium-albuterol    magnesium hydroxide    Morphine    nitroglycerin    ondansetron ODT **OR** ondansetron    oxyCODONE    Pharmacy to Dose enoxaparin (LOVENOX)    Pharmacy to dose warfarin    sodium chloride    sodium chloride    sodium chloride      Assessment & Plan     Septic shock    MDM:    1.  Atrial fibrillation/flutter:    Patient is in sinus rhythm.  Patient is on metoprolol.  I would consider anticoagulation.    2.  Hypertension:    Blood pressure is controlled patient is on verapamil    3.  Hyperlipidemia    Patient is on Lipitor.    4.  Pneumonia/sepsis:    Patient is on antibiotics.  Further recommendation as per primary team      Has been started on warfarin, followed by primary team  In SR  Continue current Rx and supportive care.   Additional recommendations per Dr. Alvarenga    Patient is seen and examined and  findings are verified.  All data is reviewed by me personally.  Assessment and plan formulated by APC was done after discussion with attending.  I spent more than 50% of time in taking care of the patient.    Patient is sitting up in chair no respiratory distress    Normal S1 and S2.  Abdomen is distended decreased breath sound.  Normal S1 and S2.  Heart rate is irregular no leg edema    Patient is started on Coumadin patient is on Lovenox.  Patient is maintaining sinus rhythm.  No leg edema noted.  Blood pressure is controlled        Electronically signed by Warren Alvarenga MD, 04/11/24, 7:00 PM EDT.      Warren Alvarenga MD  04/11/24  19:05 EDT

## 2024-04-10 NOTE — PROGRESS NOTES
"Pharmacy dosing service  Anticoagulant  Warfarin     Subjective:    Lux Bray is a 78 y.o.male being initiated on warfarin for Atrial Fibrillation.    INR Goal: 2 - 3  Bridge Therapy Present?:  Yes, Enoxaparin 60 mg SQ Q24H   Interacting Medications Evaluation (New/Present/Discontinued): amiodarone, primidone  Additional Contributing Factors: renal disease, interacting medications, older age      Assessment/Plan:    New start warfarin due to DOAC interaction with primidone. Will continue patient on warfarin 2.5 mg daily bridged with enoxaparin. Renally adjusted enoxaparin to 1 mg/kg once daily for crcl <30 ml/min.     Continue to monitor and adjust based on INR.         Date 4/9 4/10          INR ----- 1.03          Dose 2.5mg 2.5 mg              Objective:  [Ht: 180.3 cm (71\"); Wt: 69.2 kg (152 lb 8.9 oz); BMI: Body mass index is 21.28 kg/m².]    Lab Results   Component Value Date    ALBUMIN 3.4 (L) 04/05/2024     Lab Results   Component Value Date    INR 1.03 (L) 04/10/2024    INR 1.0 02/26/2024    PROTIME 11.2 (L) 04/10/2024    PROTIME 10.5 02/26/2024     Lab Results   Component Value Date    HGB 9.5 (L) 04/10/2024    HGB 10.0 (L) 04/09/2024    HGB 11.2 (L) 04/08/2024     Lab Results   Component Value Date    HCT 29.7 (L) 04/10/2024    HCT 31.6 (L) 04/09/2024    HCT 35.3 (L) 04/08/2024       Mavis Martínez, PharmD  04/10/24 09:21 EDT       "

## 2024-04-10 NOTE — PLAN OF CARE
Goal Outcome Evaluation:      Patient verbalized no c/o pain or soa, no external indicators of pain noted, SCD's on, K 3.4 obtained prn replacement orders, administering per protocol.

## 2024-04-10 NOTE — THERAPY TREATMENT NOTE
Acute Care - Speech Language Pathology   Swallow Treatment Note MARIO Larsen     Patient Name: Lux Bray  : 1945  MRN: [id number]  Today's Date: 4/10/2024               Admit Date: 2024    Visit Dx:     ICD-10-CM ICD-9-CM   1. Pneumonia due to infectious organism, unspecified laterality, unspecified part of lung  J18.9 486   2. Severe sepsis  A41.9 038.9    R65.20 995.92   3. Atrial flutter, unspecified type  I48.92 427.32     Patient Active Problem List   Diagnosis    Predisposition to allergic reaction    Attention deficit disorder (ADD) without hyperactivity    Cervical radiculopathy    Cholelithiasis    Deficiency of testosterone biosynthesis    Depression    Enlarged prostate with lower urinary tract symptoms (LUTS)    HTN (hypertension)    Irritable bowel syndrome without diarrhea    Lumbago with sciatica, right side    Arthritis    Diabetes    Vertiginous syndrome    Preventative health care    Vitamin D deficiency, unspecified     Encounter for screening for malignant neoplasm of prostate     Iron deficiency anemia due to chronic blood loss    Medicare annual wellness visit, subsequent    Colon polyp    Gastroesophageal reflux disease without esophagitis    Dysuria    High cholesterol    Hearing loss    Cortical senile cataract    Cataracts, bilateral    Screening for prostate cancer    Screening for hypothyroidism    Need for hepatitis C screening test    Toe pain, bilateral    Tinea unguium    Pre-ulcerative corn or callous    Primary iridocyclitis    Weight loss, non-intentional    Focal myoclonus    Polyneuropathy associated with underlying disease    Tingling of both feet    Generalized weakness    Encephalopathy, metabolic    Leukocytosis    Sepsis    Belching    Acid reflux    Early satiety    Epigastric pressure    Sarcopenia    Hypotension due to drugs    Abdominal pain    Septic shock     SLP Recommendation and Plan    EDUCATION  The patient has been educated in the following areas:    Dysphagia (Swallowing Impairment) Oral Care/Hydration Modified Diet Instruction.        SLP GOALS       Row Name 04/10/24 1500       (LTG) Swallow    (LTG) Swallow Pt will return to baseline level of function, tolerating safest and least restrictive diet recommendation, w/ no s/s of penetration/aspiration.  -PF    [place] (Swallow Long Term Goal) with moderate cues (50-74% accuracy)  -PF    Time Frame (Swallow Long Term Goal) by discharge  -PF    Barriers (Swallow Long Term Goal) Impulsivity  -PF    Progress/Outcomes (Swallow Long Term Goal) goal ongoing  -PF    Comment (Swallow Long Term Goal) see below  -PF       (STG) Patient will tolerate trials of    Consistencies Trialed (Tolerate trials) thin liquids  -PF    Desired Outcome (Tolerate trials) without signs/symptoms of aspiration;with use of compensatory strategies (see comments)  -PF    [place] (Tolerate trials) independently (over 90% accuracy)  -PF    Time Frame (Tolerate trials) 1 week  -PF    Progress/Outcomes (Tolerate trials) goal ongoing  -PF    Comment (Tolerate trials) see below  -PF       (STG) Swallow 1    (STG) Swallow 1 Pt will participate in ongoing swallow evaluation, including VFSS if clinically indicated, in order to effectively determine pt's safest and least restrictive diet recommendation.  -PF    [place] (Swallow Short Term Goal 1) with moderate cues (50-74% accuracy)  -PF    Time Frame (Swallow Short Term Goal 1) 1 week  -PF    Progress/Outcomes (Swallow Short Term Goal 1) goal ongoing  -PF    Comment (Swallow Short Term Goal 1) see below  -PF       (STG) Swallow 2    (STG) Swallow 2 The patient will participate in a meal/follow-up assessment to determine safety and adequacy of recommended diet, independent use of safe swallow compensations, pt/family education and additional goals/recommendations to follow.  -PF    Time Frame (Swallow Short Term Goal 2) 1 week  -PF    Progress/Outcomes (Swallow Short Term Goal 2) goal  ongoing  -PF    Comment (Swallow Short Term Goal 2) Pt was seen for skilled dysphagia therapy to ensure diet tolerance and safety.  Pt is currently on mechanical ground and NTL.   He is on 2L NC.  Upon room entry, pt was asleep but easily awaken upon verbal stimuli.  RN at bedside.  He was brought up in bed at a 90 degree angle hip flexion prior to being provided PO.  He accepted NT by straw x 6 and declined solid trials this date.  Pt did not show difficulty w/ straw, however is impulsive and has a tendency to consume sequential large sips of NT by straw.  He required SLP pulling straw away to reduce intake rate and amount.  No labial spillage occured.  oral transit unremarkable.  No clinical s/s of aspiration or respiratory difficulty noted on any trials given.  Pt stated he likes NTL.  ST recommends pt to remain on mechanical ground and NTL at this time w/ [place] Water Protocol, which has been d/w nsg. ST will continue to complete meal assessment(s) to ensure tolerance and safety of rec'd diet, provide further recs as indicated.  -PF              User Key  (r) = Recorded By, (t) = Taken By, (c) = Cosigned By      Initials Name Provider Type    PF Fakto, [place], SLP Speech and Language Pathologist                    DIANE Ziegler  4/10/2024

## 2024-04-10 NOTE — CASE MANAGEMENT/SOCIAL WORK
Continued Stay Note   [facility]     Patient Name: Lux Bray  MRN: [id number]  Today's Date: 4/10/2024    Admit Date: 4/5/2024    Plan: [facility] accepted. No precert required, PASRR approved. From [facility] (spouse does not want pt to return). [facility]  following for afater SNF.   Discharge Plan       Row Name 04/10/24 1535       Plan    Plan [facility] accepted. No precert required, PASRR approved. From [facility] (spouse does not want pt to return). [facility]  following for afater SNF.    Plan Comments Received update from [facility] that they can accept and will have bed available tomorrow, 4/11 if ready. Pharmacy updated for facility.                  Doris Ortiz RN     Office Phone: [phone]  Office Cell: [phone]

## 2024-04-10 NOTE — CASE MANAGEMENT/SOCIAL WORK
Continued Stay Note  [facility]     Patient Name: Lux Bray  MRN: [id number]  Today's Date: 4/10/2024    Admit Date: 4/5/2024    Plan: [facility] referral pending acceptance. No precert required, PASRR approved. [facility] referral pending acceptance. From [facility] (spouse does not want pt to return).   Discharge Plan       Row Name 04/10/24 1155       Plan    Plan [facility] referral pending acceptance. No precert required, PASRR approved. [facility] referral pending acceptance. From [facility] (spouse does not want pt to return).    Patient/Family in Agreement with Plan yes    Provided Post Acute Provider List? Yes    Post Acute Provider List Nursing Home    Provided Post Acute Provider Quality & Resource List? Yes    Post Acute Provider Quality and Resource List Nursing Home    Delivered To Support Person    Support Person Spouse- Noemy    Method of Delivery Telephone    Plan Comments CM discussed with patient’s spouse, Noemy that PT/OT currently recommending SNF at d/c. Reviewed list and discussed that ratings could be found on Medicare.gov. ECF list emailed as requested to [email or web address]. Spouse requested referral to [facility]. CM added in basket and sent to liahai MCCANN to review.                Doris Ortiz RN     Office Phone: [phone]  Office Cell: [phone]

## 2024-04-10 NOTE — THERAPY TREATMENT NOTE
"Subjective: Pt agreeable to therapeutic plan of care. Pt stating he is feeling a little better today. States he doesn't feel as down.   Cognition: oriented to Person, Place, Time, and Situation    Objective:     Bed Mobility: Mod-A and Max-A with v.c. for initiation, sequencing of steps for rolling & sup<>sit.   Functional Transfers: N/A or Not attempted.     Balance: sitting EOB Mod-A with left side leaning. Pt positioned in right side leaning position with brief improvements in leaning once pt pushes up to center, but it is temporary.   Functional Ambulation: N/A or Not attempted.    Feeding: Mod-A and Max-A  ADL Position: edge of bed sitting  ADL Comments:     Therapeutic Exercise - 10 Reps B UE and B LE AROM lying supine and unsupported sitting / EOB    Vitals: WNL    Pain: pt c/o bilateral hip stiffness & pain.  Interventions for pain: Repositioned, Increased Activity, and Therapeutic Presence, ROM ex.   Education: Provided education on the importance of mobility in the acute care setting, Verbal/Tactile Cues, ADL training, and Transfer Training      Assessment: Lux Bray presents with ADL impairments affecting function including balance, endurance / activity tolerance, motor control, pain, postural / trunk control, range of motion (ROM), and strength. Pt demo slight improvements in sitting balance & bed mobility. Encouraging pt to inc independent AROM of BUE & BLE while in bed & to initiate & assist with repositioning using siderails. Dtr arrived at end of tx & encouraging pt. Demonstrated functioning below baseline abilities indicate the need for continued skilled intervention while inpatient. Tolerating session today without incident. Will continue to follow and progress as tolerated.     Plan/Recommendations:   Moderate Intensity Therapy recommended post-acute care. This is recommended as therapy feels the patient would require 3-4 days per week and wouldn't tolerate \"3 hour daily\" rehab intensity. " SNF would be the preferred choice. If the patient does not agree to SNF, arrange HH or OP depending on home bound status. If patient is medically complex, consider LTACH.. Pt requires no DME at discharge.     Pt desires Skilled Rehab placement at discharge. Pt cooperative; agreeable to therapeutic recommendations and plan of care.     Modified [place]: N/A = No pre-op stroke/TIA    Post-Tx Position: Supine with HOB Elevated, Alarms activated, and Call light and personal items within reach. Dtr at b/s helping pt with feeding.   PPE: gloves

## 2024-04-11 ENCOUNTER — APPOINTMENT (OUTPATIENT)
Dept: ULTRASOUND IMAGING | Facility: HOSPITAL | Age: 79
End: 2024-04-11
Payer: MEDICARE

## 2024-04-11 ENCOUNTER — APPOINTMENT (OUTPATIENT)
Dept: MRI IMAGING | Facility: HOSPITAL | Age: 79
End: 2024-04-11
Payer: MEDICARE

## 2024-04-11 LAB
ALBUMIN SERPL-MCNC: 2.3 G/DL (ref 3.5–5.2)
ANION GAP SERPL CALCULATED.3IONS-SCNC: 7 MMOL/L (ref 5–15)
BUN SERPL-MCNC: 32 MG/DL (ref 8–23)
BUN/CREAT SERPL: 15 (ref 7–25)
CALCIUM SPEC-SCNC: 8.5 MG/DL (ref 8.6–10.5)
CHLORIDE SERPL-SCNC: 102 MMOL/L (ref 98–107)
CK SERPL-CCNC: 13 U/L (ref 20–200)
CO2 SERPL-SCNC: 30 MMOL/L (ref 22–29)
CREAT SERPL-MCNC: 2.13 MG/DL (ref 0.76–1.27)
EGFRCR SERPLBLD CKD-EPI 2021: 30.7 ML/MIN/1.73
GLUCOSE BLDC GLUCOMTR-MCNC: 156 MG/DL (ref 70–105)
GLUCOSE BLDC GLUCOMTR-MCNC: 177 MG/DL (ref 70–105)
GLUCOSE BLDC GLUCOMTR-MCNC: 181 MG/DL (ref 70–105)
GLUCOSE BLDC GLUCOMTR-MCNC: 219 MG/DL (ref 70–105)
GLUCOSE SERPL-MCNC: 178 MG/DL (ref 65–99)
INR PPP: 1.04 (ref 2–3)
NT-PROBNP SERPL-MCNC: 1661 PG/ML (ref 0–1800)
PHOSPHATE SERPL-MCNC: 2.3 MG/DL (ref 2.5–4.5)
POTASSIUM SERPL-SCNC: 3.9 MMOL/L (ref 3.5–5.2)
PROTHROMBIN TIME: 11.3 SECONDS (ref 19.4–28.5)
SODIUM SERPL-SCNC: 139 MMOL/L (ref 136–145)

## 2024-04-11 PROCEDURE — 82652 VIT D 1 25-DIHYDROXY: CPT | Performed by: INTERNAL MEDICINE

## 2024-04-11 PROCEDURE — 97112 NEUROMUSCULAR REEDUCATION: CPT

## 2024-04-11 PROCEDURE — 99232 SBSQ HOSP IP/OBS MODERATE 35: CPT | Performed by: INTERNAL MEDICINE

## 2024-04-11 PROCEDURE — 82948 REAGENT STRIP/BLOOD GLUCOSE: CPT

## 2024-04-11 PROCEDURE — 99231 SBSQ HOSP IP/OBS SF/LOW 25: CPT | Performed by: PSYCHIATRY & NEUROLOGY

## 2024-04-11 PROCEDURE — 70551 MRI BRAIN STEM W/O DYE: CPT

## 2024-04-11 PROCEDURE — 25010000002 ENOXAPARIN PER 10 MG: Performed by: INTERNAL MEDICINE

## 2024-04-11 PROCEDURE — 97535 SELF CARE MNGMENT TRAINING: CPT | Performed by: OCCUPATIONAL THERAPIST

## 2024-04-11 PROCEDURE — 87798 DETECT AGENT NOS DNA AMP: CPT | Performed by: INTERNAL MEDICINE

## 2024-04-11 PROCEDURE — 63710000001 INSULIN LISPRO (HUMAN) PER 5 UNITS

## 2024-04-11 PROCEDURE — 25810000003 SODIUM CHLORIDE 0.9 % SOLUTION: Performed by: INTERNAL MEDICINE

## 2024-04-11 PROCEDURE — 82550 ASSAY OF CK (CPK): CPT | Performed by: INTERNAL MEDICINE

## 2024-04-11 PROCEDURE — 99223 1ST HOSP IP/OBS HIGH 75: CPT | Performed by: NURSE PRACTITIONER

## 2024-04-11 PROCEDURE — 83880 ASSAY OF NATRIURETIC PEPTIDE: CPT | Performed by: INTERNAL MEDICINE

## 2024-04-11 PROCEDURE — 80069 RENAL FUNCTION PANEL: CPT | Performed by: INTERNAL MEDICINE

## 2024-04-11 PROCEDURE — 87468 ANAPLSMA PHGCYTOPHLM AMP PRB: CPT | Performed by: INTERNAL MEDICINE

## 2024-04-11 PROCEDURE — 83655 ASSAY OF LEAD: CPT | Performed by: INTERNAL MEDICINE

## 2024-04-11 PROCEDURE — 87484 EHRLICHA CHAFFEENSIS AMP PRB: CPT | Performed by: INTERNAL MEDICINE

## 2024-04-11 PROCEDURE — 97530 THERAPEUTIC ACTIVITIES: CPT

## 2024-04-11 PROCEDURE — 85610 PROTHROMBIN TIME: CPT | Performed by: INTERNAL MEDICINE

## 2024-04-11 PROCEDURE — 97530 THERAPEUTIC ACTIVITIES: CPT | Performed by: OCCUPATIONAL THERAPIST

## 2024-04-11 PROCEDURE — 76775 US EXAM ABDO BACK WALL LIM: CPT

## 2024-04-11 RX ORDER — ENOXAPARIN SODIUM 100 MG/ML
1 INJECTION SUBCUTANEOUS NIGHTLY
Status: DISCONTINUED | OUTPATIENT
Start: 2024-04-11 | End: 2024-04-12

## 2024-04-11 RX ORDER — SODIUM CHLORIDE 9 MG/ML
75 INJECTION, SOLUTION INTRAVENOUS CONTINUOUS
Status: DISCONTINUED | OUTPATIENT
Start: 2024-04-11 | End: 2024-04-11

## 2024-04-11 RX ORDER — SODIUM CHLORIDE 9 MG/ML
50 INJECTION, SOLUTION INTRAVENOUS CONTINUOUS
Status: DISCONTINUED | OUTPATIENT
Start: 2024-04-11 | End: 2024-04-15

## 2024-04-11 RX ORDER — WARFARIN SODIUM 3 MG/1
3 TABLET ORAL
Status: DISCONTINUED | OUTPATIENT
Start: 2024-04-11 | End: 2024-04-12

## 2024-04-11 RX ADMIN — PRIMIDONE 150 MG: 50 TABLET ORAL at 23:38

## 2024-04-11 RX ADMIN — PREGABALIN 75 MG: 75 CAPSULE ORAL at 23:38

## 2024-04-11 RX ADMIN — PREGABALIN 75 MG: 75 CAPSULE ORAL at 09:38

## 2024-04-11 RX ADMIN — OXYCODONE 5 MG: 5 TABLET ORAL at 23:38

## 2024-04-11 RX ADMIN — DICYCLOMINE HYDROCHLORIDE 10 MG: 10 CAPSULE ORAL at 17:22

## 2024-04-11 RX ADMIN — VERAPAMIL HYDROCHLORIDE 240 MG: 120 CAPSULE, DELAYED RELEASE PELLETS ORAL at 23:39

## 2024-04-11 RX ADMIN — SODIUM CHLORIDE 75 ML/HR: 9 INJECTION, SOLUTION INTRAVENOUS at 11:00

## 2024-04-11 RX ADMIN — METOPROLOL TARTRATE 25 MG: 25 TABLET, FILM COATED ORAL at 09:41

## 2024-04-11 RX ADMIN — SODIUM CHLORIDE 100 ML/HR: 9 INJECTION, SOLUTION INTRAVENOUS at 12:15

## 2024-04-11 RX ADMIN — ATORVASTATIN CALCIUM 20 MG: 20 TABLET, FILM COATED ORAL at 09:38

## 2024-04-11 RX ADMIN — INSULIN LISPRO 2 UNITS: 100 INJECTION, SOLUTION INTRAVENOUS; SUBCUTANEOUS at 09:38

## 2024-04-11 RX ADMIN — DULOXETINE HYDROCHLORIDE 90 MG: 30 CAPSULE, DELAYED RELEASE ORAL at 09:38

## 2024-04-11 RX ADMIN — ENOXAPARIN SODIUM 70 MG: 100 INJECTION SUBCUTANEOUS at 23:37

## 2024-04-11 RX ADMIN — PANTOPRAZOLE SODIUM 40 MG: 40 TABLET, DELAYED RELEASE ORAL at 09:39

## 2024-04-11 RX ADMIN — DOCUSATE SODIUM 100 MG: 100 CAPSULE, LIQUID FILLED ORAL at 09:40

## 2024-04-11 RX ADMIN — ACETAMINOPHEN 650 MG: 325 TABLET, FILM COATED ORAL at 23:38

## 2024-04-11 RX ADMIN — DICYCLOMINE HYDROCHLORIDE 10 MG: 10 CAPSULE ORAL at 09:40

## 2024-04-11 RX ADMIN — INSULIN LISPRO 4 UNITS: 100 INJECTION, SOLUTION INTRAVENOUS; SUBCUTANEOUS at 23:40

## 2024-04-11 RX ADMIN — WARFARIN SODIUM 3 MG: 3 TABLET ORAL at 18:04

## 2024-04-11 RX ADMIN — PRIMIDONE 150 MG: 50 TABLET ORAL at 09:42

## 2024-04-11 RX ADMIN — METOPROLOL TARTRATE 25 MG: 25 TABLET, FILM COATED ORAL at 23:37

## 2024-04-11 RX ADMIN — Medication 10 ML: at 23:46

## 2024-04-11 RX ADMIN — TAMSULOSIN HYDROCHLORIDE 0.4 MG: 0.4 CAPSULE ORAL at 09:38

## 2024-04-11 RX ADMIN — OXYCODONE 5 MG: 5 TABLET ORAL at 17:22

## 2024-04-11 RX ADMIN — DICYCLOMINE HYDROCHLORIDE 10 MG: 10 CAPSULE ORAL at 23:43

## 2024-04-11 RX ADMIN — OXYCODONE 10 MG: 5 TABLET ORAL at 09:39

## 2024-04-11 RX ADMIN — Medication 10 ML: at 09:41

## 2024-04-11 RX ADMIN — AMIODARONE HYDROCHLORIDE 200 MG: 200 TABLET ORAL at 09:39

## 2024-04-11 RX ADMIN — SUCRALFATE 1 G: 1 TABLET ORAL at 09:40

## 2024-04-11 RX ADMIN — DOCUSATE SODIUM 100 MG: 100 CAPSULE, LIQUID FILLED ORAL at 23:37

## 2024-04-11 RX ADMIN — BISACODYL 5 MG: 5 TABLET, COATED ORAL at 09:38

## 2024-04-11 NOTE — CONSULTS
Visited with patient in regards to the completion of an Advance Directive.  Patient stated that he had a living will in place.   encouraged patient to provide hospital with a copy of his advance directive.  Please contact pastoral care if any further assistance is needed.

## 2024-04-11 NOTE — THERAPY TREATMENT NOTE
"Subjective: Pt agreeable to therapeutic plan of care.    Objective:     Bed mobility - Mod-A, Max-A, and Assist x 2  Sat EOB x 5 minutes:  worked on sitting balance in midline, forward wt shift.  Pt with trunk rigidity and posterior/L side lean at times.  Max verbal cues for positioning.   Transfers - Dependent  Attempted sit to stand from EOB, unable to come to stand.    Utilized sit to stand lift and encouraged pt effort to assist with standing. Pt soiled with BM, pt up in standing lift while cleaned up, lowered pt back down to bed for rest.  Used lift to transfer pt bed to chair.   Ambulation - 0 feet N/A or Not attempted.      Vitals: WNL    Pain: 5 VAS   Location: generalized, facial expression of pain with movement in bed and during ROM  Intervention for pain: Repositioned and Therapeutic Presence    Education: Provided education on the importance of mobility in the acute care setting    Assessment: Lux Bray presents with functional mobility impairments which indicate the need for skilled intervention. Pt with limited effort with bed mobility or attempt for standing, very rigid trunk and legs limiting his  movement.  Grimaces in pain with bed mobility.  Pt with drastic functional decline over past month, tolerated position in bedside recliner well, RN present during transfer and nsg will need to utilize mechanical lift to transfer pt back to bed. Tolerating session today without incident. Will continue to follow and progress as tolerated.     Plan/Recommendations:   If medically appropriate, Moderate Intensity Therapy recommended post-acute care. This is recommended as therapy feels the patient would require 3-4 days per week and wouldn't tolerate \"3 hour daily\" rehab intensity. SNF would be the preferred choice. If the patient does not agree to SNF, arrange HH or OP depending on home bound status. If patient is medically complex, consider LTACH. Pt requires no DME at discharge.     Pt desires Skilled " Rehab placement at discharge. Pt cooperative; agreeable to therapeutic recommendations and plan of care.         Basic Mobility 6-click:  Rollin = Total, A lot = 2, A little = 3; 4 = None  Supine>Sit:   1 = Total, A lot = 2, A little = 3; 4 = None   Sit>Stand with arms:  1 = Total, A lot = 2, A little = 3; 4 = None  Bed>Chair:   1 = Total, A lot = 2, A little = 3; 4 = None  Ambulate in room:  1 = Total, A lot = 2, A little = 3; 4 = None  3-5 Steps with railin = Total, A lot = 2, A little = 3; 4 = None  Score: 8    Modified [place]: 5 = Severe disability (Requires constant nursing care and attention, bedridden, incontinent)    Post-Tx Position: Up in Chair, Alarms activated, and Call light and personal items within reach  PPE: gloves and surgical mask

## 2024-04-11 NOTE — PLAN OF CARE
Goal Outcome Evaluation:              Outcome Evaluation: patient alert and oriented  person and place patient says he is tired today. pt and ot working with patient today, see note.     new orders for MRI and labs, awaiting results. turning patient every 2 hours and as needed, bed alam on and call ight in reach

## 2024-04-11 NOTE — PROGRESS NOTES
[facility] Progress Note      Patient Name: Lux Bray  : 1945  MRN: [id number]  Primary Care Physician:  Montana Morrow MD  Date of admission: 2024    Subjective     Interval History:     Patient was seen and examined this morning.  He was resting comfortably  Feeling very tired and complaining of generalized aches and pains  no acute distress overnight    Review of Systems:   As noted above    Objective     Vitals:   Temp:  [98 °F (36.7 °C)-99.7 °F (37.6 °C)] 98.1 °F (36.7 °C)  Heart Rate:  [70-85] 83  Resp:  [13-18] 16  BP: (112-141)/(50-67) 139/67  Flow (L/min):  [2] 2    Intake/Output Summary (Last 24 hours) at 2024 1133  Last data filed at 2024 0141  Gross per 24 hour   Intake 480 ml   Output 1750 ml   Net -1270 ml       Physical Exam:      General Appearance: Chronically ill-appearing, awake  Skin: warm and dry  HEENT: oral mucosa dry, nonicteric sclera  Neck: supple, no JVD  Lungs: Decreased breath sounds at bases  Heart: RRR, normal S1 and S2  Abdomen: soft, nontender, nondistended  : no palpable bladder  Extremities: 2+ bilateral lower extremities edema    Scheduled Meds:     amiodarone, 200 mg, Oral, Q24H  atorvastatin, 20 mg, Oral, Daily  bisacodyl, 5 mg, Oral, Daily  dicyclomine, 10 mg, Oral, 4x Daily AC & at Bedtime  docusate sodium, 100 mg, Oral, BID  DULoxetine, 90 mg, Oral, Daily  enoxaparin, 1 mg/kg, Subcutaneous, Nightly  insulin lispro, 2-9 Units, Subcutaneous, 4x Daily AC & at Bedtime  metoprolol tartrate, 25 mg, Oral, Q12H  oxyCODONE, 10 mg, Oral, Daily  pantoprazole, 40 mg, Oral, Daily  pregabalin, 75 mg, Oral, BID  primidone, 150 mg, Oral, Q12H  sodium chloride, 10 mL, Intravenous, Q12H  sucralfate, 1 g, Oral, TID AC  tamsulosin, 0.4 mg, Oral, Daily  verapamil ER, 240 mg, Oral, Nightly  warfarin, 3 mg, Oral, Daily      IV Meds:   Pharmacy to Dose enoxaparin (LOVENOX),   Pharmacy to dose warfarin,   sodium chloride, 75 mL/hr, Last  Rate: 75 mL/hr (04/11/24 1100)  sodium chloride, 100 mL/hr        Results Reviewed:   I have personally reviewed the results from the time of this admission to 4/11/2024 11:33 EDT     Results from last 7 days   Lab Units 04/11/24  0238 04/10/24  1310 04/10/24  0006 04/09/24  0104 04/06/24  0448 04/05/24  1201 04/05/24  0529 04/04/24  1336   SODIUM mmol/L 139  --  135* 137   < > 137 138 137   POTASSIUM mmol/L 3.9 4.1 3.4* 3.6   < > 4.7 4.9 4.5   CHLORIDE mmol/L 102  --  99 100   < > 99 101 99   CO2 mmol/L 30.0*  --  28.0 29.0   < > 28.0 29.0 27.0   BUN mg/dL 32*  --  36* 37*   < > 34* 33* 33*   CREATININE mg/dL 2.13*  --  2.13* 1.81*   < > 1.50* 1.45* 1.42*   CALCIUM mg/dL 8.5*  --  8.4* 8.3*   < > 9.2 9.2 9.6   BILIRUBIN mg/dL  --   --   --   --   --  0.2 0.2 0.2   ALK PHOS U/L  --   --   --   --   --  116 112 126*   ALT (SGPT) U/L  --   --   --   --   --  20 18 17   AST (SGOT) U/L  --   --   --   --   --  26 28 30   GLUCOSE mg/dL 178*  --  278* 199*   < > 180* 166* 166*    < > = values in this interval not displayed.     Estimated Creatinine Clearance: 27.7 mL/min (A) (by C-G formula based on SCr of 2.13 mg/dL (H)).  Results from last 7 days   Lab Units 04/11/24 0238 04/06/24 0448   MAGNESIUM mg/dL  --  1.8   PHOSPHORUS mg/dL 2.3* 2.8         Results from last 7 days   Lab Units 04/10/24  0006 04/09/24  0104 04/08/24  0508 04/07/24  0526 04/06/24 0448   WBC 10*3/mm3 14.20* 15.48* 18.80* 22.33* 16.45*   HEMOGLOBIN g/dL 9.5* 10.0* 11.2* 13.0 13.2   PLATELETS 10*3/mm3 253 242 224 248 176     Results from last 7 days   Lab Units 04/11/24  0238 04/10/24  0006   INR  1.04* 1.03*       Assessment / Plan     ASSESSMENT:    1.  Acute kidney injury.  ATN versus AIN. Urine eosinophil negative. Nonoliguric.  Creatinine seems to be plateauing, 2.13 Mg/DL this morning.  Patient has peripheral edema but may be dry intravascularly.    2.  Septic shock.  Due to pneumonia.  Improving.  Patient is on IV Zosyn  3.  Hypokalemia.  resolved  4. Pneumonia.  Patient is on IV antibiotics  5.  Benign essential hypertension.  Blood pressure is okay    PLAN:  Continue gentle IV hydration  Follow renal ultrasound  Repeat labs in a.m.    Gen Ruiz MD  04/11/24  11:33 EDT    [facility]  [phone]

## 2024-04-11 NOTE — PROGRESS NOTES
"  Chief complaint fatigue     Subjective .     History of present illness:  The patient is a 78 y.o. male who was admitted secondary to pneumonia and septic shock. PMHx: depression, DM, HTN, HLD, fibromyalgia, arthritis. Psych consult was requested by Dr Haynes 2ry to confusion.  The pt acknowledged hx of depression that became worse recently 2ry to health issues, he also reported short term memory deficits. The pt knew he was in the hospital, knew the reason for admission. He reported some confusional episodes upon awakening, he stated he has disturbing dreams \"passing in reality\" , few nights ago he had a dream that his wife .    When awake the pt denied AVH/Si/HI  The pt displayed good insight, he stated he probably will need to go to the rehab because \"I am too sick for my wife to take care of me\".   Past psych hx: depression     Today no new complains verbalized, he had uneventful night, no agitation, no combative behavior, no confusional episodes      Review of Systems              All systems were reviewed and negative except for:  Constitution:  positive for fatigue  Respiratory: positive for  shortness of air  Musculoskeletal: positive for  back pain and muscle weakness  Neurological: positive for  difficulty walking, confusion, dizziness, and weakness  Behavioral/Psych: positive for  depression.      Review of Systems   Pertinent items are noted in HPI, all other systems reviewed and negative    History          Medications Prior to Admission   Medication Sig Dispense Refill Last Dose    atorvastatin (LIPITOR) 20 MG tablet Take 1 tablet by mouth Daily. 90 tablet 3     bisacodyl (DULCOLAX) 5 MG EC tablet Take 1 tablet by mouth Daily.       [] cefTAZidime (FORTAZ) 2000 mg/50 mL 0.9% NS IVPB Infuse 50 mL into a venous catheter 2 (Two) Times a Day. Infuse 2 gram over 30 intravenously Twice daily at 0600 and 1800       docusate sodium (COLACE) 100 MG capsule Take 1 capsule by mouth 2 (Two) Times a " Day.       DULoxetine (CYMBALTA) 30 MG capsule Take 3 capsules by mouth Daily. 270 capsule 3     glimepiride (AMARYL) 2 MG tablet TAKE 3 TABLETS EVERY MORNING BEFORE BREAKFAST 270 tablet 1     l-methylfolate-algae-B6-B12 (METANX) 3-90.314-2-35 MG capsule capsule Take 1 capsule by mouth Every 12 (Twelve) Hours.       metFORMIN (GLUCOPHAGE) 500 MG tablet Take 2 tablets by mouth 2 (Two) Times a Day. 360 tablet 3     oxyCODONE (ROXICODONE) 5 MG immediate release tablet Take 2 tablets by mouth Every Morning.       pantoprazole (PROTONIX) 40 MG EC tablet Take 1 tablet by mouth Daily. 90 tablet 1     pregabalin (LYRICA) 75 MG capsule Take 1 capsule by mouth 2 (Two) Times a Day.       primidone (MYSOLINE) 50 MG tablet 3 tablets twice daily 485 tablet 1     sucralfate (CARAFATE) 1 g tablet Take 1 tablet by mouth 4 (Four) Times a Day.       tamsulosin (FLOMAX) 0.4 MG capsule 24 hr capsule Take 1 capsule by mouth Daily.       verapamil ER (VERELAN) 120 MG 24 hr capsule Take 2 capsules by mouth Every Night.       acetaminophen (TYLENOL) 325 MG tablet Take 2 tablets by mouth Every 6 (Six) Hours As Needed for Mild Pain.       bisacodyl (DULCOLAX) 10 MG suppository Insert 1 suppository into the rectum Daily As Needed for Constipation.       dicyclomine (BENTYL) 10 MG capsule Take 1 capsule by mouth 4 (Four) Times a Day Before Meals & at Bedtime. 540 capsule 1     diphenhydrAMINE (BENADRYL) 25 mg capsule Take 12.5 mg by mouth 3 (Three) Times a Day As Needed for Itching.       ipratropium-albuterol (DUO-NEB) 0.5-2.5 mg/3 ml nebulizer Take 3 mL by nebulization Every 4 (Four) Hours As Needed for Wheezing.       magnesium hydroxide (MILK OF MAGNESIA) 2400 MG/10ML suspension suspension Take 30 mL by mouth Daily As Needed. Administer if no BM x3 days and after prune juice is given and ineffective after 8 hours (if not a renal patient)  If no BM after 1st and 2nd step bowl regimen is given, notify MD/APRN for further instructions/guidance  "      NON FORMULARY Prune Juice QD PRN       ondansetron (ZOFRAN) 4 MG tablet Take 1 tablet by mouth Every 8 (Eight) Hours As Needed for Nausea or Vomiting.           Scheduled Meds:  amiodarone, 200 mg, Oral, Q24H  atorvastatin, 20 mg, Oral, Daily  bisacodyl, 5 mg, Oral, Daily  dicyclomine, 10 mg, Oral, 4x Daily AC & at Bedtime  docusate sodium, 100 mg, Oral, BID  DULoxetine, 90 mg, Oral, Daily  enoxaparin, 1 mg/kg, Subcutaneous, Nightly  insulin lispro, 2-9 Units, Subcutaneous, 4x Daily AC & at Bedtime  metoprolol tartrate, 25 mg, Oral, Q12H  oxyCODONE, 10 mg, Oral, Daily  pantoprazole, 40 mg, Oral, Daily  pregabalin, 75 mg, Oral, BID  primidone, 150 mg, Oral, Q12H  sodium chloride, 10 mL, Intravenous, Q12H  sucralfate, 1 g, Oral, TID AC  tamsulosin, 0.4 mg, Oral, Daily  verapamil ER, 240 mg, Oral, Nightly  warfarin, 3 mg, Oral, Daily         Continuous Infusions:  Pharmacy to Dose enoxaparin (LOVENOX),   Pharmacy to dose warfarin,   sodium chloride, 100 mL/hr, Last Rate: 100 mL/hr (04/11/24 1215)        PRN Meds:    acetaminophen **OR** acetaminophen    bisacodyl    dextrose    dextrose    glucagon (human recombinant)    ipratropium-albuterol    magnesium hydroxide    Morphine    nitroglycerin    ondansetron ODT **OR** ondansetron    oxyCODONE    Pharmacy to Dose enoxaparin (LOVENOX)    Pharmacy to dose warfarin    sodium chloride    sodium chloride    sodium chloride      Allergies:  Patient has no known allergies.      Objective     Vital Signs   /67   Pulse 83   Temp 98.1 °F (36.7 °C) (Oral)   Resp 16   Ht 180.3 cm (71\")   Wt 70.8 kg (156 lb 1.4 oz)   SpO2 91%   BMI 21.77 kg/m²     Physical Exam:     General Appearance:    In NAD , pale        Mental Status Exam:    Hygiene:   good  Cooperation:  Cooperative  Eye Contact:  Poor  Psychomotor Behavior:  Slow  Affect:  Restricted  Hopelessness: Denies  Speech:  Monotone  Thought Progress:  Goal directed  Thought Content:  Mood congruent  Suicidal:  " "None  Homicidal:  None  Hallucinations:  Not demonstrated today  Delusion:  None  Memory:  Deficits  Orientation:  Person, Place, and Situation  Reliability:  fair  Insight:  Good  Judgement:  Good  Impulse Control:  Good  Physical/Medical Issues:  Yes      Medications and allergies reviewed      Lab Results   Component Value Date    GLUCOSE 178 (H) 04/11/2024    CALCIUM 8.5 (L) 04/11/2024     04/11/2024    K 3.9 04/11/2024    CO2 30.0 (H) 04/11/2024     04/11/2024    BUN 32 (H) 04/11/2024    CREATININE 2.13 (H) 04/11/2024    EGFRIFAFRI >60 09/27/2017    EGFRIFNONA 58 (L) 06/11/2021    BCR 15.0 04/11/2024    ANIONGAP 7.0 04/11/2024       Last Urine Toxicity  More data exists         Latest Ref Rng & Units 4/5/2024 2/27/2024   LAST URINE TOXICITY RESULTS   Barbiturates Screen, Urine Negative Positive  SCREEN POSITIVE       Benzodiazepine Screen, Urine Negative Negative  NEGATIVE       Cocaine Screen, Urine Negative Negative  NEGATIVE       Methadone Screen , Urine Negative Negative  NEGATIVE          Details          This result is from an external source.               No results found for: \"PHENYTOIN\", \"PHENOBARB\", \"VALPROATE\", \"CBMZ\"    Lab Results   Component Value Date     04/11/2024    BUN 32 (H) 04/11/2024    CREATININE 2.13 (H) 04/11/2024    TSH 1.180 04/05/2024    WBC 14.20 (H) 04/10/2024       Brief Urine Lab Results  (Last result in the past 365 days)        Color   Clarity   Blood   Leuk Est   Nitrite   Protein   CREAT   Urine HCG        04/10/24 2119 Yellow   Slightly Cloudy  Comment: Result checked     Negative   Negative   Negative   30 mg/dL (1+)                   Assessment & Plan       Septic shock          Assessment:  Major depressive d/o moderate recurrent  Delirium due to medical condition (septic shock)  Treatment Plan: the pt presented with the sxs of depression and mild delirium  The pt has very good insight, understands that all his dreams are not real  The pt is very weak " medically   Cont cymbalta 90 mg po daily  The pt is not combative, not aggressive     , antipsychotic meds are not indicated   Cont to provide support,   Keep the pt oriented   Decrease overstimulation   Will follow PRN   Treatment Plan discussed with: Patient and nursing     I discussed the patients findings and my recommendations with patient and nursing staff    I have reviewed and approved the behavioral health treatment plans and problem list. Yes     Referring MD has access to consult report and progress notes in EMR     Jacklyn Gavin MD  04/11/24  13:07 EDT

## 2024-04-11 NOTE — PLAN OF CARE
Goal Outcome Evaluation:          Lux Bray presents with functional mobility impairments which indicate the need for skilled intervention. Pt with limited effort with bed mobility or attempt for standing, very rigid trunk and legs limiting his  movement.  Grimaces in pain with bed mobility.  Pt with drastic functional decline over past month, tolerated position in bedside recliner well, RN present during transfer and nsg will need to utilize mechanical lift to transfer pt back to bed. Tolerating session today without incident. Will continue to follow and progress as tolerated.

## 2024-04-11 NOTE — PROGRESS NOTES
"Pharmacy dosing service  Anticoagulant  Warfarin     Subjective:    Lux rBay is a 78 y.o.male being initiated on warfarin for Atrial Fibrillation.    INR Goal: 2 - 3  Bridge Therapy Present?:  Yes, Enoxaparin 60 mg SQ Q24H   Interacting Medications Evaluation (New/Present/Discontinued): amiodarone, primidone  Additional Contributing Factors: renal disease, interacting medications, older age      Assessment/Plan:    New start warfarin due to DOAC interaction with primidone. Will increase patient to warfarin 3 mg daily bridged with enoxaparin. Renally adjusted enoxaparin to 1 mg/kg once daily for crcl <30 ml/min.     Continue to monitor and adjust based on INR.         Date 4/9 4/10 4/11         INR ----- 1.03 1.04         Dose 2.5mg 2.5 mg 3 mg             Objective:  [Ht: 180.3 cm (71\"); Wt: 70.8 kg (156 lb 1.4 oz); BMI: Body mass index is 21.77 kg/m².]    Lab Results   Component Value Date    ALBUMIN 2.3 (L) 04/11/2024     Lab Results   Component Value Date    INR 1.04 (L) 04/11/2024    INR 1.03 (L) 04/10/2024    INR 1.0 02/26/2024    PROTIME 11.3 (L) 04/11/2024    PROTIME 11.2 (L) 04/10/2024    PROTIME 10.5 02/26/2024     Lab Results   Component Value Date    HGB 9.5 (L) 04/10/2024    HGB 10.0 (L) 04/09/2024    HGB 11.2 (L) 04/08/2024     Lab Results   Component Value Date    HCT 29.7 (L) 04/10/2024    HCT 31.6 (L) 04/09/2024    HCT 35.3 (L) 04/08/2024       Mavis Martínez, PharmD  04/11/24 10:00 EDT         "

## 2024-04-11 NOTE — PROGRESS NOTES
"[facility] MEDICINE SERVICE  DAILY PROGRESS NOTE    NAME: Lux Bray  : 1945  MRN: [id number]      LOS: 6 days     PROVIDER OF SERVICE: Kendall Haynes MD    Chief Complaint: Septic shock    Subjective:     History of Present illness      Lux Bray is a 78 y.o. male with PMH of HTN, DM II, Depression,  presented to the hospital after reportedly being sick for a few days at rehab, and was admitted with a principal diagnosis of Septic shock.      Per family the patient has had a neurologic decline over the last few months. CT head was negative for acute abnormality. Ammonia level was normal. Per chart review, the patient's PCP has decreased some of the sedating medications the patient has or was taking. Patient is currently drowsy but will wake up and answer questions appropriately. He is reporting that he is very tired.      In ER the patient was found to be hypotensive. He was given the full sepsis bolus and started on levophed. Chest x-ray showed bibasilar infiltrates. Patient's family reports that they have had concerns he has been aspirating. Patient will be transferred to ICU for close monitoring and vasopressor support.       ACP: CPR, Full Intervention. Patients wife is his decision maker in the even he is unable.      Patient was seen and examined on 24 at 13:54 EDT .     Subjective / Review of systems      24-Patient admitted to the ICU for septic shock and only needed vasopressors very briefly.  Has been off vasopressors since last night.  Developed A-fib with RVR, will give a dose of digoxin.  If blood pressure permits, resume home verapamil.  Will likely need full dose anticoagulation, defer to primary team.  Transfer out of the ICU to hospitalist service.      24-Patient drowsy but easily aroused. Knows what year it is. States that he hurts all over, and is nauseous. He also states that he is \"So, so, so, tired.\" Denies feeling short of breath, or having " chest pain.   4/7/24 patient seen and examined in bed no acute distress, vital signs stable, discussed with RN.  Weak weak and tired.Patient is currently n.p.o. pending speech evaluation.   4/8/2024 patient seen and examined in bed no acute distress, vital signs stable, discussed with RN.  Patient with weakness fatigue..  Apparently was living at home.  Speech recommended that pt initiate a mechanical soft diet with NT liquids as tolerated   4/9/24 seen in bed NAD, VSS, some confusion, JOSE L RN  4/10/24 patient seen in bed JOANNA, JOSE L DELATORRE, [place], per  wife wanted to take him home, unable to use eliquis, started coumadin, awaiting placement  4/11/24 seen in bed NAD, weakness and fatigue, JOSE L family says he has had increased loss of mobility over the last 6 month    Review of Systems   Constitutional:  Positive for fatigue. Negative for appetite change, chills and fever.   HENT:  Negative for congestion.    Eyes:  Negative for pain and redness.   Respiratory:  Positive for shortness of breath. Negative for cough and chest tightness.    Gastrointestinal:  Negative for abdominal pain, diarrhea, nausea and vomiting.   Endocrine: Negative for cold intolerance and heat intolerance.   Genitourinary:  Negative for dysuria, flank pain and frequency.   Musculoskeletal:  Negative for back pain and myalgias.   Neurological:  Positive for weakness. Negative for dizziness and headaches.   Psychiatric/Behavioral:  Negative for sleep disturbance. The patient is not nervous/anxious.      Objective:     Vital Signs  Temp:  [98 °F (36.7 °C)-99.7 °F (37.6 °C)] 98.1 °F (36.7 °C)  Heart Rate:  [70-85] 83  Resp:  [13-18] 16  BP: (112-141)/(50-67) 139/67  Flow (L/min):  [2] 2   Body mass index is 21.77 kg/m².    Physical Exam  Constitutional:       Appearance: Normal appearance.   HENT:      Head: Normocephalic and atraumatic.      Nose: Nose normal.      Mouth/Throat:      Mouth: Mucous membranes are moist.   Eyes:      Extraocular  Movements: Extraocular movements intact.      Conjunctiva/sclera: Conjunctivae normal.      Pupils: Pupils are equal, round, and reactive to light.   Cardiovascular:      Rate and Rhythm: Regular rhythm. Tachycardia present.      Pulses: Normal pulses.      Heart sounds: Normal heart sounds.   Pulmonary:      Effort: Pulmonary effort is normal.      Breath sounds: Normal breath sounds.   Musculoskeletal:      Cervical back: Normal range of motion.   Skin:     General: Skin is warm and dry.   Neurological:      General: No focal deficit present.      Mental Status: He is alert. He is disoriented.       Scheduled Meds   amiodarone, 200 mg, Oral, Q24H  atorvastatin, 20 mg, Oral, Daily  bisacodyl, 5 mg, Oral, Daily  dicyclomine, 10 mg, Oral, 4x Daily AC & at Bedtime  docusate sodium, 100 mg, Oral, BID  DULoxetine, 90 mg, Oral, Daily  enoxaparin, 1 mg/kg, Subcutaneous, Nightly  insulin lispro, 2-9 Units, Subcutaneous, 4x Daily AC & at Bedtime  metoprolol tartrate, 25 mg, Oral, Q12H  oxyCODONE, 10 mg, Oral, Daily  pantoprazole, 40 mg, Oral, Daily  pregabalin, 75 mg, Oral, BID  primidone, 150 mg, Oral, Q12H  sodium chloride, 10 mL, Intravenous, Q12H  sucralfate, 1 g, Oral, TID AC  tamsulosin, 0.4 mg, Oral, Daily  verapamil ER, 240 mg, Oral, Nightly  warfarin, 3 mg, Oral, Daily       PRN Meds     acetaminophen **OR** acetaminophen    bisacodyl    dextrose    dextrose    glucagon (human recombinant)    ipratropium-albuterol    magnesium hydroxide    Morphine    nitroglycerin    ondansetron ODT **OR** ondansetron    oxyCODONE    Pharmacy to Dose enoxaparin (LOVENOX)    Pharmacy to dose warfarin    sodium chloride    sodium chloride    sodium chloride   Infusions  Pharmacy to Dose enoxaparin (LOVENOX),   Pharmacy to dose warfarin,   sodium chloride, 75 mL/hr, Last Rate: 75 mL/hr (04/11/24 1100)  sodium chloride, 100 mL/hr        Diagnostic Data    Results from last 7 days   Lab Units 04/11/24  0238 04/10/24  1310  04/10/24  0006 04/06/24  0448 04/05/24  1201   WBC 10*3/mm3  --   --  14.20*   < > 13.68*   HEMOGLOBIN g/dL  --   --  9.5*   < > 11.9*   HEMATOCRIT %  --   --  29.7*   < > 37.5   PLATELETS 10*3/mm3  --   --  253   < > 164   GLUCOSE mg/dL 178*  --  278*   < > 180*   CREATININE mg/dL 2.13*  --  2.13*   < > 1.50*   BUN mg/dL 32*  --  36*   < > 34*   SODIUM mmol/L 139  --  135*   < > 137   POTASSIUM mmol/L 3.9   < > 3.4*   < > 4.7   AST (SGOT) U/L  --   --   --   --  26   ALT (SGPT) U/L  --   --   --   --  20   ALK PHOS U/L  --   --   --   --  116   BILIRUBIN mg/dL  --   --   --   --  0.2   ANION GAP mmol/L 7.0  --  8.0   < > 10.0    < > = values in this interval not displayed.     No radiology results for the last day    I reviewed the patient's new clinical results.    Assessment/Plan:     Active and Resolved Problems  Active Hospital Problems    Diagnosis  POA    **Septic shock [A41.9, R65.21]  Yes      Resolved Hospital Problems   No resolved problems to display.     Patient downgraded from ICU to PCU level of care.     Septic Shock due to Pneumonia   -Respiratory panel negative   -MRSA swab negative  -NS at 75mL/hr   -Levophed discontinued   -received Zosyn   -SLP consult to rule out aspiration pna     Diabetes Mellitus   -Hgb A1c 4/5/24 6.7  -Continue to hold home medications   -Accuchecks ACHS   -SSI ordered     Hypertension -Not controlled with medication at home  -Monitor BP per protocol   -metoprolol/verapamil    Hyperlipidemia --   -Patient on Lipitor at home  -Last lipid profile 5/26/23 negative  -Restart lipitor when not NPO    Depression  -Patient on Cymbalta at home  -Restart when patient not NPO    Altered Mental Status improving  -No family at bedside at this time  -Reports of neurologic decline over last few months   -Patient unable to make needs known     Progressive weakness over the last 6 months. that started with tremors.  Will obtain MRI.  Consulted neurology.    Dysphasia   -Failed nursing water  screen  -SLP eval today,  recommended that pt initiate a mechanical soft diet with NT liquids as tolerated   -Repeat tomorrow if patient able to follow commands     Atrial fibrillation/flutter:Patient is in sinus rhythm.    Patient is on metoprolol.  verapamil  Started anticoagulation.  coumadin-pharmacy to monitor    Elevated BNP-1661  Echo-Left ventricular systolic function is normal. Left ventricular ejection   fraction appears to be 56 - 60%. .   monitor BNP    DVT prophylaxis:  Medical and mechanical DVT prophylaxis orders are present.      Code status is   Code Status and Medical Interventions:   Ordered at: 04/05/24 1340     Code Status (Patient has no pulse and is not breathing):    CPR (Attempt to Resuscitate)     Medical Interventions (Patient has pulse or is breathing):    Full Support       Plan for disposition:Pending clinical course.   Long discussion with family over 30 min ACP      Signature: Electronically signed by Kendall Haynes MD, 04/11/24, 11:48 EDT.  [facility] Hospitalist Team

## 2024-04-11 NOTE — THERAPY TREATMENT NOTE
Subjective: Pt agreeable to therapeutic plan of care.  Cognition: oriented to Person. Pt stating he does not remember daughter coming in yesterday when questioned. Pt stating he is continuing to have bad dreams. Pt requiring mod v.c. for initiation & follow through of tasks.     Objective:     Bed Mobility: Max-A   Functional Transfers: Attempted sit<>stand with max A X2. Pt unable to come to full standing posture. Utilized sit to stand lift for transfer from bed>recliner with poor LE initiation to stand. Pt may need to be a emmanuel lift transfer until he increases his LE strength.      Balance: sitting EOB Min-A-mod A with mod v.c. to initiate attempt to self-correct.   Functional Ambulation: N/A or Not attempted.    Feeding: Min-A  ADL Position: sitting up in bed  ADL Comments:     Toileting: Dependent  ADL Position: Using sit<>stand lift & pt had a BM.   ADL Comments:     Vitals: WNL    Pain: Pt c/o back pain & stiffness today. He did not assign a number.  Interventions for pain: Repositioned, RN notified, Increased Activity, and Therapeutic Presence. Encouraging active, independent movement by pt in bed to use arms & legs to attempt to reposition   Education: Provided education on the importance of mobility in the acute care setting, Verbal/Tactile Cues, ADL training, and Transfer Training      Assessment: Lux Bray presents with ADL impairments affecting function including balance, cognition, coordination, endurance / activity tolerance, pain, postural / trunk control, and strength. Pt is demo minor gains in sitting balance on EOB & feeding skills. His progress is complicated by s/s of depression, dec initiation of self movement requiring v.c. for initiation, extreme fatigue & dec STM. Able to get pt up to recliner & OOB this date with sit<>stand lift, but pt quickly fatigues & has dec posture, so emmanuel lift may be required until LE strength & posture improve. Demonstrated functioning below baseline  "abilities indicate the need for continued skilled intervention while inpatient. Tolerating session today without incident. Will continue to follow and progress as tolerated.     Plan/Recommendations:   Moderate Intensity Therapy recommended post-acute care. This is recommended as therapy feels the patient would require 3-4 days per week and wouldn't tolerate \"3 hour daily\" rehab intensity. SNF would be the preferred choice. If the patient does not agree to SNF, arrange HH or OP depending on home bound status. If patient is medically complex, consider LTACH.. Pt requires no DME at discharge.     Pt desires Skilled Rehab placement at discharge. Pt cooperative; agreeable to therapeutic recommendations and plan of care.     Modified [place]: N/A = No pre-op stroke/TIA    Post-Tx Position: Up in Chair, Alarms activated, and Call light and personal items within reach  PPE: gloves    "

## 2024-04-11 NOTE — CONSULTS
Infectious Diseases Consult Note    Referring Provider: Kendall Haynes MD    Reason for Consultation: Leukocytosis/weakness    Patient Care Team:  Montana Morrow MD as PCP - General (Family Medicine)  George Viveros MD as Consulting Physician (Gastroenterology)    Chief complaint weakness, back pain    Subjective     The patient has been afebrile for the last 24 hours.  The patient is on 2 L of oxygen by nasal cannula hemodynamically stable, and is tolerating antimicrobial therapy.    History of present illness:      This is a 78-year-old female who presents to the hospital from rehab on 2023 with hypotension and worsening weakness.  Family was afraid the patient may have aspirated.  Patient apparently has been declining for a number weeks in terms of generalized weakness.  Patient did have 1 fever at admission but has been afebrile since.  Denies significant shortness of breath or cough.  Denies GI symptoms or urinary symptoms.    Review of Systems   Review of Systems   Constitutional:  Positive for fatigue.   HENT: Negative.     Eyes: Negative.    Respiratory: Negative.     Cardiovascular: Negative.    Gastrointestinal: Negative.    Endocrine: Negative.    Genitourinary: Negative.    Musculoskeletal:  Positive for back pain.   Skin: Negative.    Neurological:  Positive for weakness.   Psychiatric/Behavioral: Negative.     All other systems reviewed and are negative.      Medications  Medications Prior to Admission   Medication Sig Dispense Refill Last Dose    atorvastatin (LIPITOR) 20 MG tablet Take 1 tablet by mouth Daily. 90 tablet 3     bisacodyl (DULCOLAX) 5 MG EC tablet Take 1 tablet by mouth Daily.       [] cefTAZidime (FORTAZ) 2000 mg/50 mL 0.9% NS IVPB Infuse 50 mL into a venous catheter 2 (Two) Times a Day. Infuse 2 gram over 30 intravenously Twice daily at 0600 and 1800       docusate sodium (COLACE) 100 MG capsule Take 1 capsule by mouth 2 (Two) Times a Day.       DULoxetine  (CYMBALTA) 30 MG capsule Take 3 capsules by mouth Daily. 270 capsule 3     glimepiride (AMARYL) 2 MG tablet TAKE 3 TABLETS EVERY MORNING BEFORE BREAKFAST 270 tablet 1     l-methylfolate-algae-B6-B12 (METANX) 3-90.314-2-35 MG capsule capsule Take 1 capsule by mouth Every 12 (Twelve) Hours.       metFORMIN (GLUCOPHAGE) 500 MG tablet Take 2 tablets by mouth 2 (Two) Times a Day. 360 tablet 3     oxyCODONE (ROXICODONE) 5 MG immediate release tablet Take 2 tablets by mouth Every Morning.       pantoprazole (PROTONIX) 40 MG EC tablet Take 1 tablet by mouth Daily. 90 tablet 1     pregabalin (LYRICA) 75 MG capsule Take 1 capsule by mouth 2 (Two) Times a Day.       primidone (MYSOLINE) 50 MG tablet 3 tablets twice daily 485 tablet 1     sucralfate (CARAFATE) 1 g tablet Take 1 tablet by mouth 4 (Four) Times a Day.       tamsulosin (FLOMAX) 0.4 MG capsule 24 hr capsule Take 1 capsule by mouth Daily.       verapamil ER (VERELAN) 120 MG 24 hr capsule Take 2 capsules by mouth Every Night.       acetaminophen (TYLENOL) 325 MG tablet Take 2 tablets by mouth Every 6 (Six) Hours As Needed for Mild Pain.       bisacodyl (DULCOLAX) 10 MG suppository Insert 1 suppository into the rectum Daily As Needed for Constipation.       dicyclomine (BENTYL) 10 MG capsule Take 1 capsule by mouth 4 (Four) Times a Day Before Meals & at Bedtime. 540 capsule 1     diphenhydrAMINE (BENADRYL) 25 mg capsule Take 12.5 mg by mouth 3 (Three) Times a Day As Needed for Itching.       ipratropium-albuterol (DUO-NEB) 0.5-2.5 mg/3 ml nebulizer Take 3 mL by nebulization Every 4 (Four) Hours As Needed for Wheezing.       magnesium hydroxide (MILK OF MAGNESIA) 2400 MG/10ML suspension suspension Take 30 mL by mouth Daily As Needed. Administer if no BM x3 days and after prune juice is given and ineffective after 8 hours (if not a renal patient)  If no BM after 1st and 2nd step bowl regimen is given, notify MD/APRN for further instructions/guidance       NON FORMULARY  Prune Juice QD PRN       ondansetron (ZOFRAN) 4 MG tablet Take 1 tablet by mouth Every 8 (Eight) Hours As Needed for Nausea or Vomiting.          History  Past Medical History:   Diagnosis Date    ADHD (attention deficit hyperactivity disorder)     Allergic     Arthritis     Colon polyp     Depression     Diabetes mellitus type 2    controlled by oral meds    Fibromyalgia, primary     HL (hearing loss)     Hyperlipidemia     Hypertension      Past Surgical History:   Procedure Laterality Date    COLONOSCOPY      ENDOSCOPY N/A 11/15/2023    Procedure: ESOPHAGOGASTRODUODENOSCOPY;  Surgeon: George Viveros MD;  Location: [facility] [place] MAIN OR;  Service: Gastroenterology;  Laterality: N/A;  popssible candida, irrgular z-line, gastritis    EYE SURGERY  2023    Cataracts removed: both eyes    HAND SURGERY  1980 1980-1990- [facility]. Abstracted from [employer]    HEMORROIDECTOMY  1984    [facility]. Abstracted from [employer]    TONSILLECTOMY  1940's    VASECTOMY         Family History  Family History   Problem Relation Age of Onset    Diabetes Mother         type2    Stroke Mother     Diabetes Father         type2    Colon cancer Neg Hx     Colon polyps Neg Hx     Crohn's disease Neg Hx     Irritable bowel syndrome Neg Hx     Ulcerative colitis Neg Hx        Social History   reports that he quit smoking about 53 years ago. His smoking use included cigarettes. He started smoking about 58 years ago. He has a 1.3 pack-year smoking history. He has never used smokeless tobacco. He reports that he does not currently use alcohol after a past usage of about 2.0 standard drinks of alcohol per week. He reports that he does not use drugs.    Allergies  Patient has no known allergies.    Objective     Vital Signs   Vital Signs (last 24 hours)         04/10 0700  04/11 0659 04/11 0700  04/11 1545   Most Recent      Temp (°F) 98 -  99.7      98.1     98.1 (36.7) 04/11 1314    Heart Rate 70 -  85    72 -  83     72 04/11 1314    Resp 11 -  18       16     16 04/11 1314    /55 -  142/66    106/57 -  139/67     106/57 04/11 1314    SpO2 (%) 91 -  96    91 -  94     94 04/11 1314    Flow (L/min)   2      2     2 04/11 1314            Physical Exam:  Physical Exam  Vitals and nursing note reviewed.   Constitutional:       General: He is not in acute distress.     Appearance: He is well-developed and normal weight. He is ill-appearing. He is not diaphoretic.   HENT:      Head: Normocephalic and atraumatic.   Eyes:      Conjunctiva/sclera: Conjunctivae normal.      Pupils: Pupils are equal, round, and reactive to light.   Cardiovascular:      Rate and Rhythm: Normal rate and regular rhythm.      Heart sounds: Normal heart sounds, S1 normal and S2 normal.   Pulmonary:      Effort: Pulmonary effort is normal. No respiratory distress.      Breath sounds: Normal breath sounds. No stridor. No wheezing or rales.   Abdominal:      General: Bowel sounds are normal. There is no distension.      Palpations: Abdomen is soft. There is no mass.      Tenderness: There is no abdominal tenderness. There is no guarding.   Genitourinary:     Comments: External catheter  Musculoskeletal:         General: No deformity.      Cervical back: Neck supple.   Skin:     General: Skin is warm and dry.      Coloration: Skin is not pale.      Findings: No erythema or rash.   Neurological:      Mental Status: He is alert and oriented to person, place, and time.      Comments: Generalized weakness    Appears to be alert and orient x 3-just slightly slow to respond to some questions         Microbiology  Microbiology Results (last 10 days)       Procedure Component Value - Date/Time    Eosinophil Smear - Urine, Urine, Catheter [[id number]]  (Normal) Collected: 04/10/24 2121    Lab Status: Final result Specimen: Urine, Catheter Updated: 04/10/24 2225     Eosinophil Smear 0 % EOS/100 Cells     MRSA Screen, PCR (Inpatient) - Swab, Nares [[id number]]  (Normal) Collected: 04/06/24 2226    Lab  Status: Final result Specimen: Swab from Nares Updated: 04/06/24 2349     MRSA PCR No MRSA Detected    Narrative:      The negative predictive value of this diagnostic test is high and should only be used to consider de-escalating anti-MRSA therapy. A positive result may indicate colonization with MRSA and must be correlated clinically.    MRSA Screen, PCR (Inpatient) - Swab, Nares [[id number]]  (Normal) Collected: 04/05/24 1406    Lab Status: Final result Specimen: Swab from Nares Updated: 04/05/24 1524     MRSA PCR No MRSA Detected    Narrative:      The negative predictive value of this diagnostic test is high and should only be used to consider de-escalating anti-MRSA therapy. A positive result may indicate colonization with MRSA and must be correlated clinically.    Respiratory Panel PCR w/COVID-19(SARS-CoV-2) DODIE/JOVITA/THEO/PAD/COR/RAJAT In-House, NP Swab in UTM/VTM, 2 HR TAT - Swab, Nasopharynx [[id number]]  (Normal) Collected: 04/05/24 1406    Lab Status: Final result Specimen: Swab from Nasopharynx Updated: 04/05/24 1500     ADENOVIRUS, PCR Not Detected     Coronavirus 229E Not Detected     Coronavirus HKU1 Not Detected     Coronavirus NL63 Not Detected     Coronavirus OC43 Not Detected     COVID19 Not Detected     Human Metapneumovirus Not Detected     Human Rhinovirus/Enterovirus Not Detected     Influenza A PCR Not Detected     Influenza B PCR Not Detected     Parainfluenza Virus 1 Not Detected     Parainfluenza Virus 2 Not Detected     Parainfluenza Virus 3 Not Detected     Parainfluenza Virus 4 Not Detected     RSV, PCR Not Detected     Bordetella pertussis pcr Not Detected     Bordetella parapertussis PCR Not Detected     Chlamydophila pneumoniae PCR Not Detected     Mycoplasma pneumo by PCR Not Detected    Narrative:      In the setting of a positive respiratory panel with a viral infection PLUS a negative procalcitonin without other underlying concern for bacterial infection, consider observing off  antibiotics or discontinuation of antibiotics and continue supportive care. If the respiratory panel is positive for atypical bacterial infection (Bordetella pertussis, Chlamydophila pneumoniae, or Mycoplasma pneumoniae), consider antibiotic de-escalation to target atypical bacterial infection.    Blood Culture - Blood, Arm, Left [[id number]]  (Normal) Collected: 04/05/24 1203    Lab Status: Final result Specimen: Blood from Arm, Left Updated: 04/10/24 1230     Blood Culture No growth at 5 days    Blood Culture - Blood, Arm, Right [[id number]]  (Normal) Collected: 04/05/24 1201    Lab Status: Final result Specimen: Blood from Arm, Right Updated: 04/10/24 1230     Blood Culture No growth at 5 days            Laboratory  Results from last 7 days   Lab Units 04/10/24  0006   WBC 10*3/mm3 14.20*   HEMOGLOBIN g/dL 9.5*   HEMATOCRIT % 29.7*   PLATELETS 10*3/mm3 253     Results from last 7 days   Lab Units 04/11/24  0238   SODIUM mmol/L 139   POTASSIUM mmol/L 3.9   CHLORIDE mmol/L 102   CO2 mmol/L 30.0*   BUN mg/dL 32*   CREATININE mg/dL 2.13*   GLUCOSE mg/dL 178*   CALCIUM mg/dL 8.5*     Results from last 7 days   Lab Units 04/11/24  0238   SODIUM mmol/L 139   POTASSIUM mmol/L 3.9   CHLORIDE mmol/L 102   CO2 mmol/L 30.0*   BUN mg/dL 32*   CREATININE mg/dL 2.13*   GLUCOSE mg/dL 178*   CALCIUM mg/dL 8.5*     Results from last 7 days   Lab Units 04/11/24  0238   CK TOTAL U/L 13*               Radiology  Imaging Results (Last 72 Hours)       Procedure Component Value Units Date/Time    US Renal Bilateral [[id number]] Collected: 04/11/24 1517     Updated: 04/11/24 1526    Narrative:      US RENAL BILATERAL    Date of Exam: 4/11/2024 2:50 PM EDT    Indication: Acute renal insufficiency.    Comparison: CT of the abdomen pelvis without contrast dated 4/7/2024 and abdomen MRI with and without contrast dated 9/27/2017    Technique: Grayscale and color Doppler ultrasound evaluation of the kidneys and urinary bladder was  performed.      Findings:  The left kidney is not visualized and is likely obscured due to excessive bowel gas. The right kidney measures 10.8 x 5.9 x 5.3 cm. It is normal in echogenicity with minimal cortical thinning. No suspicious renal mass. No hydro nephrosis or definitive   shadowing stones. No perinephric fluid collection. The bladder demonstrates no focal abnormality. There is 126 cc of urine within the bladder.      Impression:      Impression:  Minimal cortical thinning on the right kidney which otherwise has an unremarkable sonographic appearance. No hydronephrosis. The left kidney is not visualized and is likely obscured by bowel gas and less there has been interval left nephrectomy over the   past 4 days.        Electronically Signed: Jerome Grimes DO    4/11/2024 3:24 PM EDT    Workstation ID: [id number]            Cardiology      Results Review:  I have reviewed all clinical data, test, lab, and imaging results.       Schedule Meds  amiodarone, 200 mg, Oral, Q24H  atorvastatin, 20 mg, Oral, Daily  bisacodyl, 5 mg, Oral, Daily  dicyclomine, 10 mg, Oral, 4x Daily AC & at Bedtime  docusate sodium, 100 mg, Oral, BID  DULoxetine, 90 mg, Oral, Daily  enoxaparin, 1 mg/kg, Subcutaneous, Nightly  insulin lispro, 2-9 Units, Subcutaneous, 4x Daily AC & at Bedtime  metoprolol tartrate, 25 mg, Oral, Q12H  oxyCODONE, 10 mg, Oral, Daily  pantoprazole, 40 mg, Oral, Daily  pregabalin, 75 mg, Oral, BID  primidone, 150 mg, Oral, Q12H  sodium chloride, 10 mL, Intravenous, Q12H  sucralfate, 1 g, Oral, TID AC  tamsulosin, 0.4 mg, Oral, Daily  verapamil ER, 240 mg, Oral, Nightly  warfarin, 3 mg, Oral, Daily        Infusion Meds  Pharmacy to Dose enoxaparin (LOVENOX),   Pharmacy to dose warfarin,   sodium chloride, 100 mL/hr, Last Rate: 100 mL/hr (04/11/24 1215)        PRN Meds    acetaminophen **OR** acetaminophen    bisacodyl    dextrose    dextrose    glucagon (human recombinant)    ipratropium-albuterol    magnesium  hydroxide    Morphine    nitroglycerin    ondansetron ODT **OR** ondansetron    oxyCODONE    Pharmacy to Dose enoxaparin (LOVENOX)    Pharmacy to dose warfarin    sodium chloride    sodium chloride    sodium chloride      Assessment & Plan       Assessment    Leukocytosis-likely related to pneumonia.  Is trending down.  Patient denies any current diarrhea.    Mild left lower lobe pneumonia at admission.  Patient is currently on 2 L of oxygen by nasal cannula and does not complain of significant shortness of breath or productive cough.  MRSA of the nares screen is negative.  Patient was concern for possible aspiration at his rehab facility    One high-grade fever at admission.  Blood cultures are negative and COVID-19 screen and respiratory viral DNA panel are negative.  Likely related to pneumonia    Progressive weakness.  Appears from neurology's note that patient was seen in an outpatient neurologist in October 2023 and had a positive EMG and nerve conduction studies.  Currently on Lyrica for polyneuropathy.    Type 2 diabetes    Plan    Patient received 5 days of IV Zosyn and symptoms appear to have resolved and white blood cell count is trending down    Monitor off antimicrobial therapy  Neurology was consulted today to workup patient for the generalized weakness and I have ordered an MRI of the brain without contrast  Continue supportive care  Case discussed with hospitalist  Case discussed with RN  Not much more to add from infectious disease standpoint-we will sign off at this time-please call with any questions.          Chana Hernandez, APRN  04/11/24  15:45 EDT    Note is dictated utilizing voice recognition software/Dragon

## 2024-04-11 NOTE — CONSULTS
Primary Care Provider: Provider, No Known     Consult requested by:  Dr. Haynes     Reason for Consultation: Neurological evaluation, weakness     History taken from: patient    Chief complaint: AMS, low BP        SUBJECTIVE:    History of present illness: Background per H&P:    Lux Bray is a 78 y.o. male with PMH of HTN, DM II, Depression,  presented to the hospital after reportedly being sick for a few days at rehab, and was admitted with a principal diagnosis of Septic shock.      Per family the patient has had a neurologic decline over the last few months. CT head was negative for acute abnormality. Ammonia level was normal. Per chart review, the patient's PCP has decreased some of the sedating medications the patient has or was taking. Patient is currently drowsy but will wake up and answer questions appropriately. He is reporting that he is very tired.      In ER the patient was found to be hypotensive. He was given the full sepsis bolus and started on levophed. Chest x-ray showed bibasilar infiltrates. Patient's family reports that they have had concerns he has been aspirating. Patient will be transferred to ICU for close monitoring and vasopressor support.      - Portions of the above HPI were copied from previous encounters and edited as appropriate. PMH as detailed below.     Patient is a poor historian, no family at bedside.    Chart reviewed.    Patient recently saw neurology outpatient October 30, 2023 for peripheral neuropathy.  He had positive EMG and nerve conduction studies in the past consistent with PN.  At that time likely patient had decent strength, neurologically intact.      Review of Systems   Unable to perform ROS: Age           PATIENT HISTORY:  Past Medical History:   Diagnosis Date    ADHD (attention deficit hyperactivity disorder)     Allergic     Arthritis     Colon polyp     Depression     Diabetes mellitus type 2    controlled by oral meds    Fibromyalgia, primary     HL  (hearing loss)     Hyperlipidemia     Hypertension    ,   Past Surgical History:   Procedure Laterality Date    COLONOSCOPY      ENDOSCOPY N/A 11/15/2023    Procedure: ESOPHAGOGASTRODUODENOSCOPY;  Surgeon: George Viveros MD;  Location: [facility] [place];  Service: Gastroenterology;  Laterality: N/A;  popssible candida, irrgular z-line, gastritis    EYE SURGERY      Cataracts removed: both eyes    [facility]  1980- [facility]    HEMORROIDECTOMY      [facility]. [facility] from [facility].    TONSILLECTOMY  0's    VASECTOMY     ,   Family History   Problem Relation Age of Onset    Diabetes Mother         type2    Stroke Mother     Diabetes Father         type2    Colon cancer Neg Hx     Colon polyps Neg Hx     Crohn's disease Neg Hx     Irritable bowel syndrome Neg Hx     Ulcerative colitis Neg Hx    ,   Social History     Tobacco Use    Smoking status: Former     Current packs/day: 0.00     Average packs/day: 0.3 packs/day for 5.0 years (1.3 ttl pk-yrs)     Types: Cigarettes     Start date: 2/10/1966     Quit date: 2/10/1971     Years since quittin.2    Smokeless tobacco: Never   Vaping Use    Vaping status: Never Used   Substance Use Topics    Alcohol use: Not Currently     Alcohol/week: 2.0 standard drinks of alcohol     Types: 1 Glasses of wine, 1 Cans of beer per week    Drug use: No   ,   Prior to Admission medications    Medication Sig Start Date End Date Taking? Authorizing Provider   atorvastatin (LIPITOR) 20 MG tablet Take 1 tablet by mouth Daily. 24  Yes Motnana Morrow MD   bisacodyl (DULCOLAX) 5 MG EC tablet Take 1 tablet by mouth Daily.   Yes ProviderGerman MD   docusate sodium (COLACE) 100 MG capsule Take 1 capsule by mouth 2 (Two) Times a Day.   Yes ProviderGerman MD   DULoxetine (CYMBALTA) 30 MG capsule Take 3 capsules by mouth Daily. 24  Yes Montana Morrow MD   glimepiride (AMARYL) 2 MG tablet TAKE 3 TABLETS EVERY  MORNING BEFORE BREAKFAST 1/17/24  Yes Montana Morrow MD   l-methylfolate-algae-B6-B12 (METANX) 3-90.314-2-35 MG capsule capsule Take 1 capsule by mouth Every 12 (Twelve) Hours. 2/1/24  Yes German Valle MD   metFORMIN (GLUCOPHAGE) 500 MG tablet Take 2 tablets by mouth 2 (Two) Times a Day. 1/17/24  Yes Montana Morrow MD   oxyCODONE (ROXICODONE) 5 MG immediate release tablet Take 2 tablets by mouth Every Morning.   Yes German Valle MD   pantoprazole (PROTONIX) 40 MG EC tablet Take 1 tablet by mouth Daily. 1/17/24  Yes Montana Morrow MD   pregabalin (LYRICA) 75 MG capsule Take 1 capsule by mouth 2 (Two) Times a Day.   Yes German Valle MD   primidone (MYSOLINE) 50 MG tablet 3 tablets twice daily 2/6/24  Yes Montana Morrow MD   sucralfate (CARAFATE) 1 g tablet Take 1 tablet by mouth 4 (Four) Times a Day.   Yes German Valle MD   tamsulosin (FLOMAX) 0.4 MG capsule 24 hr capsule Take 1 capsule by mouth Daily.   Yes German Valle MD   verapamil ER (VERELAN) 120 MG 24 hr capsule Take 2 capsules by mouth Every Night.   Yes German Valle MD   acetaminophen (TYLENOL) 325 MG tablet Take 2 tablets by mouth Every 6 (Six) Hours As Needed for Mild Pain.    German Valle MD   bisacodyl (DULCOLAX) 10 MG suppository Insert 1 suppository into the rectum Daily As Needed for Constipation.    German Valle MD   dicyclomine (BENTYL) 10 MG capsule Take 1 capsule by mouth 4 (Four) Times a Day Before Meals & at Bedtime. 1/17/24   Montana Morrow MD   diphenhydrAMINE (BENADRYL) 25 mg capsule Take 12.5 mg by mouth 3 (Three) Times a Day As Needed for Itching.    German Valle MD   ipratropium-albuterol (DUO-NEB) 0.5-2.5 mg/3 ml nebulizer Take 3 mL by nebulization Every 4 (Four) Hours As Needed for Wheezing.    German Valle MD   magnesium hydroxide (MILK OF MAGNESIA) 2400 MG/10ML suspension suspension Take 30 mL by mouth Daily As Needed.  Administer if no BM x3 days and after prune juice is given and ineffective after 8 hours (if not a renal patient)  If no BM after 1st and 2nd step bowl regimen is given, notify MD/JONATHAN for further instructions/guidance    German Valle MD   NON FORMULARY Prune Juice QD PRN    German Valle MD   ondansetron (ZOFRAN) 4 MG tablet Take 1 tablet by mouth Every 8 (Eight) Hours As Needed for Nausea or Vomiting.    Provider, MD German    Allergies:  Patient has no known allergies.    Current Facility-Administered Medications   Medication Dose Route Frequency Provider Last Rate Last Admin    acetaminophen (TYLENOL) tablet 650 mg  650 mg Oral Q4H PRN Malena Hassan APRN   650 mg at 04/09/24 2232    Or    acetaminophen (TYLENOL) suppository 650 mg  650 mg Rectal Q4H PRN Malena Hassan APRN   650 mg at 04/06/24 1909    amiodarone (PACERONE) tablet 200 mg  200 mg Oral Q24H Warren Alvarenga MD   200 mg at 04/11/24 0939    atorvastatin (LIPITOR) tablet 20 mg  20 mg Oral Daily Kendall Haynes MD   20 mg at 04/11/24 0938    bisacodyl (DULCOLAX) EC tablet 5 mg  5 mg Oral Daily Kendall Haynes MD   5 mg at 04/11/24 0938    bisacodyl (DULCOLAX) suppository 10 mg  10 mg Rectal Daily PRN Kendall Haynes MD        dextrose (D50W) (25 g/50 mL) IV injection 25 g  25 g Intravenous Q15 Min PRN Malena Hassan APRN        dextrose (GLUTOSE) oral gel 15 g  15 g Oral Q15 Min PRN Malena Hassan APRN        dicyclomine (BENTYL) capsule 10 mg  10 mg Oral 4x Daily AC & at Bedtime Kendall Haynes MD   10 mg at 04/11/24 0940    docusate sodium (COLACE) capsule 100 mg  100 mg Oral BID Kendall aHynes MD   100 mg at 04/11/24 0940    DULoxetine (CYMBALTA) DR capsule 90 mg  90 mg Oral Daily Katrina Aguayo APRN   90 mg at 04/11/24 0938    Enoxaparin Sodium (LOVENOX) syringe 60 mg  1 mg/kg Subcutaneous Nightly Kendall Haynes MD   60 mg at 04/10/24 0561    glucagon (GLUCAGEN)  injection 1 mg  1 mg Intramuscular Q15 Min PRN Malena Hassan APRN        insulin lispro (HUMALOG/ADMELOG) injection 2-9 Units  2-9 Units Subcutaneous 4x Daily AC & at Bedtime Malena Hassan APRN   2 Units at 04/11/24 0938    ipratropium-albuterol (DUO-NEB) nebulizer solution 3 mL  3 mL Nebulization Q4H PRN Kendall Haynes MD        magnesium hydroxide (MILK OF MAGNESIA) suspension 10 mL  10 mL Oral Daily PRN Kendall Haynes MD        metoprolol tartrate (LOPRESSOR) tablet 25 mg  25 mg Oral Q12H Warren Alvarenga MD   25 mg at 04/11/24 0941    morphine injection 4 mg  4 mg Intravenous Q4H PRN Sonali Be MD   4 mg at 04/07/24 2105    nitroglycerin (NITROSTAT) SL tablet 0.4 mg  0.4 mg Sublingual Q5 Min PRN Malena Hassan APRN        ondansetron ODT (ZOFRAN-ODT) disintegrating tablet 4 mg  4 mg Oral Q6H PRN Malena Hassan APRN        Or    ondansetron (ZOFRAN) injection 4 mg  4 mg Intravenous Q6H PRN Malena Hassan APRN        oxyCODONE (ROXICODONE) immediate release tablet 10 mg  10 mg Oral Daily Kendall Haynes MD   10 mg at 04/11/24 0939    oxyCODONE (ROXICODONE) immediate release tablet 5 mg  5 mg Oral Q4H PRN Kendall Haynes MD   5 mg at 04/09/24 1812    pantoprazole (PROTONIX) EC tablet 40 mg  40 mg Oral Daily Kendall Haynes MD   40 mg at 04/11/24 0939    Pharmacy to Dose enoxaparin (LOVENOX)   Does not apply Continuous PRN Kristin Noguera APRN        Pharmacy to dose warfarin   Does not apply Continuous PRN Kendall Haynes MD        pregabalin (LYRICA) capsule 75 mg  75 mg Oral BID Kendall Haynes MD   75 mg at 04/11/24 0938    primidone (MYSOLINE) tablet 150 mg  150 mg Oral Q12H Haynes, Kendall N, MD   150 mg at 04/11/24 0942    sodium chloride 0.9 % flush 10 mL  10 mL Intravenous PRN Jos Stroud DO        sodium chloride 0.9 % flush 10 mL  10 mL Intravenous Q12H Malena Hassan APRN   10 mL at 04/11/24 0941    sodium  chloride 0.9 % flush 10 mL  10 mL Intravenous PRN Malena Hassan APRN        sodium chloride 0.9 % infusion 40 mL  40 mL Intravenous PRN Malena Hassan APRN        sodium chloride 0.9 % infusion  100 mL/hr Intravenous Continuous Kendall Haynes  mL/hr at 04/11/24 1215 100 mL/hr at 04/11/24 1215    sucralfate (CARAFATE) tablet 1 g  1 g Oral TID AC Kendall Haynes MD   1 g at 04/11/24 0940    tamsulosin (FLOMAX) 24 hr capsule 0.4 mg  0.4 mg Oral Daily Kendall Haynes MD   0.4 mg at 04/11/24 0938    verapamil ER (VERELAN) 24 hr capsule 240 mg  240 mg Oral Nightly Kendall Haynes MD   240 mg at 04/10/24 2136    warfarin (COUMADIN) tablet 3 mg  3 mg Oral Daily Kendall Haynes MD            ________________________________________________________        OBJECTIVE:    PHYSICAL EXAM:    Constitutional: The patient is in no apparent distress, awake and alert. There is no shortness of breath.     PSYCHIATRIC: Appears depressed     HEENT:  Normocephalic, atraumatic.     Chest: Breathing unlabored    Cardiac: Regular rate and rhythm.     Extremities:  Bilateral edema in hands and feet     NEUROLOGICAL:    Cognition:   Oriented to name, hospital  Fund of knowledge limited .  Slow to respond   Concentration and attention normal.   Language normal with normal comprehension, fluent speech, intact repetition and naming.      Cranial nerves;    II - pupils bilaterally equal reacting to light,  No new Visual field deficits;  Fundoscopic exam- Not able to be done, non-dilated exam  III,IV,VI: EOMI with no diplopia  V: Normal facial sensations  VII: No facial asymmetry,  VIII: No New hearing abnormality  IX, X, XI: normal gag and shoulder shrug;  XII: tongue is in the midline.    Sensory:  Intact to light touch in all extremities.     Motor: Strength 5/5 upper extremities  Lower extremities 3-/5 with minimal participation.  No involuntary movements present. Increased tone in lowers.   Deep  tendon reflexes: 0/4 and symmetrical in biceps, brachioradialis, triceps, bilateral 0/4 knees and ankles. Both plantars are flexor.    Gait and balance: Deferred.     Physical exam performed by ALEXANDRU Davila.  ________________________________________________________   RESULTS REVIEW:    VITAL SIGNS:   Temp:  [98 °F (36.7 °C)-99.7 °F (37.6 °C)] 98.1 °F (36.7 °C)  Heart Rate:  [72-85] 72  Resp:  [13-18] 16  BP: (106-141)/(50-67) 106/57     LABS:      Lab 04/11/24  0238 04/10/24  0006 04/09/24  0104 04/08/24  0508 04/07/24  0526 04/06/24  2143 04/06/24  0448 04/05/24  1223 04/05/24  1201   WBC  --  14.20* 15.48* 18.80* 22.33*  --  16.45*  --  13.68*   HEMOGLOBIN  --  9.5* 10.0* 11.2* 13.0  --  13.2  --  11.9*   HEMATOCRIT  --  29.7* 31.6* 35.3* 40.9  --  43.7  --  37.5   PLATELETS  --  253 242 224 248  --  176  --  164   NEUTROS ABS  --  10.12* 11.73* 14.26* 17.44*  --  12.22*  --  9.76*   IMMATURE GRANS (ABS)  --  0.55* 0.22* 0.17* 0.16*  --  0.12*  --  0.07*   LYMPHS ABS  --  1.84 1.93 2.20 2.00  --  1.74  --  1.89   MONOS ABS  --  1.47* 1.41* 1.97* 2.66*  --  2.33*  --  1.92*   EOS ABS  --  0.19 0.17 0.15 0.01  --  0.00  --  0.02   MCV  --  91.7 91.6 91.9 91.5  --  95.6  --  93.5   PROCALCITONIN  --   --   --   --   --  0.64*  --   --  0.36*   LACTATE  --   --   --   --   --  1.1  --  1.0  --    PROTIME 11.3* 11.2*  --   --   --   --   --   --   --          Lab 04/11/24  0238 04/10/24  1310 04/10/24  0006 04/09/24  0104 04/08/24  0508 04/07/24  0043 04/06/24  0448 04/05/24  1201   SODIUM 139  --  135* 137 139 140 141 137   POTASSIUM 3.9 4.1 3.4* 3.6 3.4* 3.5 4.5 4.7   CHLORIDE 102  --  99 100 102 100 104 99   CO2 30.0*  --  28.0 29.0 26.0 25.0 24.0 28.0   ANION GAP 7.0  --  8.0 8.0 11.0 15.0 13.0 10.0   BUN 32*  --  36* 37* 33* 23 26* 34*   CREATININE 2.13*  --  2.13* 1.81* 1.51* 1.11 1.18 1.50*   EGFR 30.7*  --  30.7* 37.3* 46.4* 67.1 62.4 46.8*   GLUCOSE 178*  --  278* 199* 142* 172* 81 180*   CALCIUM 8.5*  --   8.4* 8.3* 8.8 9.1 9.3 9.2   MAGNESIUM  --   --   --   --   --   --  1.8  --    PHOSPHORUS 2.3*  --   --   --   --   --  2.8  --    HEMOGLOBIN A1C  --   --   --   --   --   --   --  6.70*   TSH  --   --   --   --   --   --   --  1.180         Lab 04/11/24  0238 04/05/24  1201 04/05/24  0529   TOTAL PROTEIN  --  6.5 6.4   ALBUMIN 2.3* 3.4* 3.2*   GLOBULIN  --  3.1 3.2   ALT (SGPT)  --  20 18   AST (SGOT)  --  26 28   BILIRUBIN  --  0.2 0.2   ALK PHOS  --  116 112         Lab 04/11/24  0238 04/10/24  1310 04/10/24  0006 04/07/24  0043 04/06/24  2143 04/05/24  2001 04/05/24  1201   PROBNP 1,661.0 1,555.0  --   --   --   --  4,297.0*   HSTROP T  --   --   --  41* 44* 40* 48*   PROTIME 11.3*  --  11.2*  --   --   --   --    INR 1.04*  --  1.03*  --   --   --   --                  UA          4/1/2024    14:10 4/5/2024    12:10 4/10/2024    21:19   Urinalysis   Squamous Epithelial Cells, UA  0-2  3-6    Specific Gravity, UA 1.029  1.021  1.012    Ketones, UA Trace  Negative  Negative    Blood, UA Negative  Negative  Negative    Leukocytes, UA Negative  Negative  Negative    Nitrite, UA Negative  Negative  Negative    RBC, UA  0-2  0-2    WBC, UA  6-10  3-5    Bacteria, UA  Trace  None Seen        Lab Results   Component Value Date    TSH 1.180 04/05/2024    LDL 49 05/26/2023    HGBA1C 6.70 (H) 04/05/2024    VVCAMDNQ20 252 05/26/2023       IMAGING STUDIES:  No radiology results for the last day    I reviewed the patient's new clinical results.    ________________________________________________________     PROBLEM LIST:    Septic shock            ASSESSMENT/PLAN:    Left side weakness, ongoing per patient  Lower extremity weakness, chronic polyneuropathy   Cognitive decline, delirium   Polypharmacy   Deconditioning due to recent illness   - Check MRI brain   - NH3: , A1C 6.70, TSH 1.180, T4 1.18, Cr 2.13, CK 13, UA negative for infection, Na 135, BUN 36, check B12 and folate   - Will rule out stroke with MRI   - Continue  PT/OT    Polyneuropathy- on Lyrica 75 mg BID     Hypertension  - Continue home medications  - BP goal 130/90, avoid hypotension    Type 2 Diabetes Mellitus  - A1C: P  - Strict glycemic control, SSI, diabetic diet, diabetes educator    Depression-psychiatry evaluated patient    Pneumonia-patient downgraded from ICU,   Continuing antibiotics    Will follow-up on MRI.    I discussed the patient's findings and my recommendations with patient    Zaida Luis Miguel, JONATHAN  04/11/24  14:30 EDT

## 2024-04-11 NOTE — PROGRESS NOTES
LOS: 6 days   Admitting Physician- Kendall Haynes MD    Reason For Followup:    Atrial fibrillation converted to sinus rhythm  Pneumonia  Shortness of breath    Subjective     Patient is feeling better    Objective     Heart rate is controlled    Review of Systems:   Review of Systems   Constitutional: Negative for chills and fever.   HENT:  Negative for ear discharge and nosebleeds.    Eyes:  Negative for discharge and redness.   Cardiovascular:  Negative for chest pain, orthopnea, palpitations, paroxysmal nocturnal dyspnea and syncope.   Respiratory:  Positive for shortness of breath. Negative for cough and wheezing.    Endocrine: Negative for heat intolerance.   Skin:  Negative for rash.   Musculoskeletal:  Negative for arthritis and myalgias.   Gastrointestinal:  Negative for abdominal pain, melena, nausea and vomiting.   Genitourinary:  Negative for dysuria and hematuria.   Neurological:  Negative for dizziness, light-headedness, numbness and tremors.   Psychiatric/Behavioral:  Negative for depression. The patient is not nervous/anxious.          Vital Signs  Vitals:    04/11/24 0946 04/11/24 1314 04/11/24 1546 04/11/24 1707   BP: 139/67 106/57  117/56   BP Location:    Left arm   Patient Position:    Lying   Pulse: 83 72  75   Resp: 16 16  14   Temp: 98.1 °F (36.7 °C) 98.1 °F (36.7 °C)  98.2 °F (36.8 °C)   TempSrc: Oral Oral  Oral   SpO2: 91% 94%  93%   Weight:   73.2 kg (161 lb 6 oz)    Height:         Wt Readings from Last 1 Encounters:   04/11/24 73.2 kg (161 lb 6 oz)       Intake/Output Summary (Last 24 hours) at 4/11/2024 1900  Last data filed at 4/11/2024 1314  Gross per 24 hour   Intake 720 ml   Output 700 ml   Net 20 ml     Physical Exam:  Constitutional:       Appearance: Well-developed.   Eyes:      General: No scleral icterus.        Right eye: No discharge.   HENT:      Head: Normocephalic and atraumatic.   Neck:      Thyroid: No thyromegaly.      Lymphadenopathy: No cervical adenopathy.    Pulmonary:      Effort: Pulmonary effort is normal. No respiratory distress.      Breath sounds: Normal breath sounds. No wheezing. No rales.   Cardiovascular:      Normal rate. Regular rhythm.      No gallop.    Edema:     Peripheral edema absent.   Abdominal:      General: There is distension.      Tenderness: There is abdominal tenderness.   Skin:     Findings: No erythema or rash.   Neurological:      Mental Status: Alert and oriented to person, place, and time.     MDM:    1.    Results Review:   Lab Results (last 24 hours)       Procedure Component Value Units Date/Time    POC Glucose 4x Daily Before Meals & at Bedtime [[id number]]  (Abnormal) Collected: 04/11/24 1704    Specimen: Blood Updated: 04/11/24 1707     Glucose 177 mg/dL      Comment: Serial Number: [id number]:  [id number]       Calcitriol (1,25 di-OH Vitamin D) [[id number]] Collected: 04/11/24 1535    Specimen: Blood Updated: 04/11/24 1537    Rickettsia Species DNA, Real-Time PCR [[id number]] Collected: 04/11/24 1535    Specimen: Blood Updated: 04/11/24 1537    Ehrlichia Profile DNA PCR [[id number]] Collected: 04/11/24 1535    Specimen: Blood Updated: 04/11/24 1537    Lead, Blood [[id number]] Collected: 04/11/24 1535    Specimen: Blood Updated: 04/11/24 1537    CK [[id number]]  (Abnormal) Collected: 04/11/24 0238    Specimen: Blood Updated: 04/11/24 1210     Creatine Kinase 13 U/L     BNP [[id number]]  (Normal) Collected: 04/11/24 0238    Specimen: Blood Updated: 04/11/24 1145     proBNP 1,661.0 pg/mL     Narrative:      This assay is used as an aid in the diagnosis of individuals suspected of having heart failure. It can be used as an aid in the diagnosis of acute decompensated heart failure (ADHF) in patients presenting with signs and symptoms of ADHF to the emergency department (ED). In addition, NT-proBNP of <300 pg/mL indicates ADHF is not likely.    Age Range Result Interpretation  NT-proBNP Concentration (pg/mL:      <50             Positive             >450                   Gray                 300-450                    Negative             <300    50-75           Positive            >900                  Gray                300-900                  Negative            <300      >75             Positive            >1800                  Gray                300-1800                  Negative            <300    POC Glucose 4x Daily Before Meals & at Bedtime [[id number]]  (Abnormal) Collected: 04/11/24 1101    Specimen: Blood Updated: 04/11/24 1102     Glucose 181 mg/dL      Comment: Serial Number: [id number]:  [id number]       POC Glucose 4x Daily Before Meals & at Bedtime [[id number]]  (Abnormal) Collected: 04/11/24 0723    Specimen: Blood Updated: 04/11/24 0725     Glucose 156 mg/dL      Comment: Serial Number: [id number]:  [id number]       Renal Function Panel [[id number]]  (Abnormal) Collected: 04/11/24 0238    Specimen: Blood Updated: 04/11/24 0308     Glucose 178 mg/dL      BUN 32 mg/dL      Creatinine 2.13 mg/dL      Sodium 139 mmol/L      Potassium 3.9 mmol/L      Chloride 102 mmol/L      CO2 30.0 mmol/L      Calcium 8.5 mg/dL      Albumin 2.3 g/dL      Phosphorus 2.3 mg/dL      Anion Gap 7.0 mmol/L      BUN/Creatinine Ratio 15.0     eGFR 30.7 mL/min/1.73     Narrative:      GFR Normal >60  Chronic Kidney Disease <60  Kidney Failure <15    The GFR formula is only valid for adults with stable renal function between ages 18 and 70.    Protime-INR [[id number]]  (Abnormal) Collected: 04/11/24 0238    Specimen: Blood Updated: 04/11/24 0257     Protime 11.3 Seconds      INR 1.04    Eosinophil Smear - Urine, Urine, Catheter [[id number]]  (Normal) Collected: 04/10/24 2121    Specimen: Urine, Catheter Updated: 04/10/24 2225     Eosinophil Smear 0 % EOS/100 Cells     Urinalysis With Microscopic If Indicated (No Culture) - Urine, Clean Catch [[id number]]  (Abnormal) Collected: 04/10/24 2119    Specimen: Urine, Clean Catch Updated: 04/10/24 2156      Color, UA Yellow     Appearance, UA Slightly Cloudy     Comment: Result checked          pH, UA <=5.0     Specific Gravity, UA 1.012     Glucose, UA Negative     Ketones, UA Negative     Bilirubin, UA Negative     Blood, UA Negative     Protein, UA 30 mg/dL (1+)     Leuk Esterase, UA Negative     Nitrite, UA Negative     Urobilinogen, UA 0.2 E.U./dL    Urinalysis, Microscopic Only - Urine, Clean Catch [[id number]]  (Abnormal) Collected: 04/10/24 2119    Specimen: Urine, Clean Catch Updated: 04/10/24 2156     RBC, UA 0-2 /HPF      WBC, UA 3-5 /HPF      Bacteria, UA None Seen /HPF      Squamous Epithelial Cells, UA 3-6 /HPF      Hyaline Casts, UA 0-2 /LPF      Methodology Automated Microscopy    Sodium, Urine, Random - Urine, Clean Catch [[id number]] Collected: 04/10/24 2116    Specimen: Urine, Clean Catch Updated: 04/10/24 2152     Sodium, Urine 36 mmol/L     Narrative:      Reference intervals for random urine have not been established.  Clinical usage is dependent upon physician's interpretation in combination with other laboratory tests.       POC Glucose Once [[id number]]  (Abnormal) Collected: 04/10/24 2112    Specimen: Blood Updated: 04/10/24 2113     Glucose 213 mg/dL      Comment: Serial Number: [id number]:  [id number]             Imaging Results (Last 72 Hours)       Procedure Component Value Units Date/Time    US Renal Bilateral [[id number]] Collected: 04/11/24 1517     Updated: 04/11/24 1526    Narrative:      US RENAL BILATERAL    Date of Exam: 4/11/2024 2:50 PM EDT    Indication: Acute renal insufficiency.    Comparison: CT of the abdomen pelvis without contrast dated 4/7/2024 and abdomen MRI with and without contrast dated 9/27/2017    Technique: Grayscale and color Doppler ultrasound evaluation of the kidneys and urinary bladder was performed.      Findings:  The left kidney is not visualized and is likely obscured due to excessive bowel gas. The right kidney measures 10.8 x 5.9 x 5.3 cm. It is  normal in echogenicity with minimal cortical thinning. No suspicious renal mass. No hydro nephrosis or definitive   shadowing stones. No perinephric fluid collection. The bladder demonstrates no focal abnormality. There is 126 cc of urine within the bladder.      Impression:      Impression:  Minimal cortical thinning on the right kidney which otherwise has an unremarkable sonographic appearance. No hydronephrosis. The left kidney is not visualized and is likely obscured by bowel gas and less there has been interval left nephrectomy over the   past 4 days.        Electronically Signed: Jerome Grimes DO    4/11/2024 3:24 PM EDT    Workstation ID: [id number]          ECG/EMG Results (most recent)       Procedure Component Value Units Date/Time    ECG 12 Lead Altered Mental Status [[id number]] Collected: 04/05/24 1129     Updated: 04/05/24 1202     QTC Interval -- ms     Narrative:      HEART RATE= Invalid  bpm  RR Interval= 0  ms  UT Interval=   ms  P Horizontal Axis=   deg  P Front Axis=   deg  QRSD Interval=   ms  QT Interval=   ms  QTcB= Invalid  ms  QRS Axis=   deg  T Wave Axis=   deg  Electronically Signed By:   Date and Time of Study: 2024-04-05 11:29:29    Adult Transthoracic Echo Complete W/ Cont if Necessary Per Protocol [[id number]] Resulted: 04/07/24 1640     Updated: 04/07/24 1643     EF(MOD-bp) 69.8 %      LVIDd 4.4 cm      LVIDs 3.1 cm      IVSd 1.00 cm      LVPWd 1.10 cm      FS 29.5 %      IVS/LVPW 0.91 cm      ESV(cubed) 29.8 ml      LV Sys Vol (BSA corrected) 12.9 cm2      EDV(cubed) 85.2 ml      LV Santiago Vol (BSA corrected) 42.3 cm2      LV mass(C)d 158.2 grams      LVOT area 3.1 cm2      LVOT diam 2.00 cm      EDV(MOD-sp2) 59.4 ml      EDV(MOD-sp4) 77.1 ml      ESV(MOD-sp2) 17.4 ml      ESV(MOD-sp4) 23.6 ml      SV(MOD-sp2) 42.0 ml      SV(MOD-sp4) 53.5 ml      SI(MOD-sp2) 23.0 ml/m2      SI(MOD-sp4) 29.3 ml/m2      EF(MOD-sp2) 70.7 %      EF(MOD-sp4) 69.4 %      MV E max kemal 84.9 cm/sec      MV A max  lefty 43.0 cm/sec      MV dec time 0.17 sec      MV E/A 1.97     Pulm A Revs Dur 0.11 sec      Med Peak E' Lefty 21.8 cm/sec      Lat Peak E' Lefty 24.8 cm/sec      TR max lefty 273.0 cm/sec      Avg E/e' ratio 3.64     SV(LVOT) 44.9 ml      RVIDd 2.7 cm      RV Base 3.3 cm      RV Mid 2.30 cm      RV Length 5.6 cm      TAPSE (>1.6) 1.78 cm      RV S' 17.9 cm/sec      LA dimension (2D)  3.3 cm      Pulm Sys Lefty 56.2 cm/sec      Pulm Santiago Lefty 46.0 cm/sec      Pulm S/D 1.22     Pulm A Revs Lefty 33.9 cm/sec      LV V1 max 99.1 cm/sec      LV V1 max PG 3.9 mmHg      LV V1 mean PG 2.00 mmHg      LV V1 VTI 14.3 cm      Ao pk lefty 124.0 cm/sec      Ao max PG 6.2 mmHg      Ao mean PG 3.0 mmHg      Ao V2 VTI 19.9 cm      MANUEL(I,D) 2.26 cm2      MV max PG 10.5 mmHg      MV mean PG 4.0 mmHg      MV V2 VTI 27.3 cm      MV P1/2t 41.4 msec      MVA(P1/2t) 5.3 cm2      MVA(VTI) 1.65 cm2      MV dec slope 1,216 cm/sec2      TR max PG 29.8 mmHg      RV V1 max PG 4.7 mmHg      RV V1 max 108.0 cm/sec      RV V1 VTI 15.8 cm      PA V2 max 115.5 cm/sec      PA acc time 0.14 sec      Ao root diam 3.2 cm      ACS 2.00 cm      Sinus 3.1 cm      STJ 2.7 cm      RVSP(TR) 33 mmHg      RAP systole 3 mmHg     Narrative:        Left ventricular systolic function is normal. Left ventricular ejection   fraction appears to be 56 - 60%.    Left ventricular wall thickness is consistent with mild concentric   hypertrophy.    Left ventricular diastolic function was normal.    Estimated right ventricular systolic pressure from tricuspid   regurgitation is normal (<35 mmHg).    There is a small (<1cm) circumferential pericardial effusion. There is   no evidence of cardiac tamponade.      ECG 12 Lead Rhythm Change [[id number]] Collected: 04/06/24 1904     Updated: 04/07/24 1728     QT Interval 337 ms      QTC Interval 499 ms     Narrative:      HEART RATE= 131  bpm  RR Interval= 457  ms  KS Interval=   ms  P Horizontal Axis=   deg  P Front Axis=   deg  QRSD Interval=  93  ms  QT Interval= 337  ms  QTcB= 499  ms  QRS Axis= 40  deg  T Wave Axis= -21  deg  - ABNORMAL ECG -  Atrial fibrillation  Inferior infarct, old  When compared with ECG of 06-Apr-2024 7:54:52,  New or worsened ischemia or infarction  Significant repolarization change  Significant axis, voltage or hypertrophy change  Electronically Signed By: Warren Alvarenga (THEO) 07-Apr-2024 17:28:18  Date and Time of Study: 2024-04-06 19:04:20    ECG 12 Lead Tachycardia [[id number]] Collected: 04/06/24 0754     Updated: 04/07/24 1728     QT Interval 321 ms      QTC Interval 480 ms     Narrative:      HEART RATE= 134  bpm  RR Interval= 448  ms  MT Interval=   ms  P Horizontal Axis=   deg  P Front Axis=   deg  QRSD Interval= 95  ms  QT Interval= 321  ms  QTcB= 480  ms  QRS Axis= 41  deg  T Wave Axis= -5  deg  - ABNORMAL ECG -  Atrial fibrillation  Ventricular premature complex  Low voltage, precordial leads  ST depression, probably rate related  When compared with ECG of 05-Apr-2024 11:44:03,  Significant repolarization change  Significant axis, voltage or hypertrophy change  Electronically Signed By: Warren Alvarenga (THEO) 07-Apr-2024 17:28:24  Date and Time of Study: 2024-04-06 07:54:52    Telemetry Scan [[id number]] Resulted: 04/05/24     Updated: 04/09/24 0806    Telemetry Scan [[id number]] Resulted: 04/05/24     Updated: 04/09/24 0822    Telemetry Scan [[id number]] Resulted: 04/05/24     Updated: 04/09/24 0850    Telemetry Scan [[id number]] Resulted: 04/05/24     Updated: 04/09/24 0933    Telemetry Scan [[id number]] Resulted: 04/05/24     Updated: 04/09/24 1017    Telemetry Scan [[id number]] Resulted: 04/05/24     Updated: 04/09/24 1040    Telemetry Scan [[id number]] Resulted: 04/05/24     Updated: 04/09/24 1056    Telemetry Scan [[id number]] Resulted: 04/05/24     Updated: 04/09/24 1124    Telemetry Scan [[id number]] Resulted: 04/05/24     Updated: 04/09/24 1241    Telemetry Scan [[id number]] Resulted: 04/05/24     Updated: 04/09/24 1401     Telemetry Scan [[id number]] Resulted: 04/05/24     Updated: 04/09/24 1418    Telemetry Scan [[id number]] Resulted: 04/05/24     Updated: 04/09/24 1513    Telemetry Scan [[id number]] Resulted: 04/05/24     Updated: 04/09/24 1524    Telemetry Scan [[id number]] Resulted: 04/05/24     Updated: 04/10/24 0609    Telemetry Scan [[id number]] Resulted: 04/05/24     Updated: 04/10/24 0610    Telemetry Scan [[id number]] Resulted: 04/05/24     Updated: 04/10/24 0610    Telemetry Scan [[id number]] Resulted: 04/05/24     Updated: 04/10/24 0701    Telemetry Scan [[id number]] Resulted: 04/05/24     Updated: 04/10/24 0823    Telemetry Scan [[id number]] Resulted: 04/05/24     Updated: 04/10/24 0917    Telemetry Scan [[id number]] Resulted: 04/05/24     Updated: 04/10/24 0939    Telemetry Scan [[id number]] Resulted: 04/05/24     Updated: 04/10/24 1049    Telemetry Scan [[id number]] Resulted: 04/05/24     Updated: 04/10/24 1245    Telemetry Scan [[id number]] Resulted: 04/05/24     Updated: 04/11/24 0854    Telemetry Scan [[id number]] Resulted: 04/05/24     Updated: 04/11/24 0937    Telemetry Scan [[id number]] Resulted: 04/05/24     Updated: 04/11/24 0957    Telemetry Scan [[id number]] Resulted: 04/05/24     Updated: 04/11/24 1049    Telemetry Scan [[id number]] Resulted: 04/05/24     Updated: 04/11/24 1201    Telemetry Scan [[id number]] Resulted: 04/05/24     Updated: 04/11/24 1333    Telemetry Scan [[id number]] Resulted: 04/05/24     Updated: 04/11/24 1333          CBC    Results from last 7 days   Lab Units 04/10/24  0006 04/09/24  0104 04/08/24  0508 04/07/24  0526 04/06/24  0448 04/05/24  1201 04/05/24  0529   WBC 10*3/mm3 14.20* 15.48* 18.80* 22.33* 16.45* 13.68* 12.51*   HEMOGLOBIN g/dL 9.5* 10.0* 11.2* 13.0 13.2 11.9* 12.0*   PLATELETS 10*3/mm3 253 242 224 248 176 164 183     BMP   Results from last 7 days   Lab Units 04/11/24  0238 04/10/24  1310 04/10/24  0006 04/09/24  0104 04/08/24  0508 04/07/24  0043 04/06/24  0448 04/05/24  1201   SODIUM  mmol/L 139  --  135* 137 139 140 141 137   POTASSIUM mmol/L 3.9 4.1 3.4* 3.6 3.4* 3.5 4.5 4.7   CHLORIDE mmol/L 102  --  99 100 102 100 104 99   CO2 mmol/L 30.0*  --  28.0 29.0 26.0 25.0 24.0 28.0   BUN mg/dL 32*  --  36* 37* 33* 23 26* 34*   CREATININE mg/dL 2.13*  --  2.13* 1.81* 1.51* 1.11 1.18 1.50*   GLUCOSE mg/dL 178*  --  278* 199* 142* 172* 81 180*   MAGNESIUM mg/dL  --   --   --   --   --   --  1.8  --    PHOSPHORUS mg/dL 2.3*  --   --   --   --   --  2.8  --      CMP   Results from last 7 days   Lab Units 04/11/24  0238 04/10/24  1310 04/10/24  0006 04/09/24  0104 04/08/24  0508 04/07/24  0043 04/06/24  0448 04/05/24  1201 04/05/24  0529   SODIUM mmol/L 139  --  135* 137 139 140 141 137 138   POTASSIUM mmol/L 3.9 4.1 3.4* 3.6 3.4* 3.5 4.5 4.7 4.9   CHLORIDE mmol/L 102  --  99 100 102 100 104 99 101   CO2 mmol/L 30.0*  --  28.0 29.0 26.0 25.0 24.0 28.0 29.0   BUN mg/dL 32*  --  36* 37* 33* 23 26* 34* 33*   CREATININE mg/dL 2.13*  --  2.13* 1.81* 1.51* 1.11 1.18 1.50* 1.45*   GLUCOSE mg/dL 178*  --  278* 199* 142* 172* 81 180* 166*   ALBUMIN g/dL 2.3*  --   --   --   --   --   --  3.4* 3.2*   BILIRUBIN mg/dL  --   --   --   --   --   --   --  0.2 0.2   ALK PHOS U/L  --   --   --   --   --   --   --  116 112   AST (SGOT) U/L  --   --   --   --   --   --   --  26 28   ALT (SGPT) U/L  --   --   --   --   --   --   --  20 18     Cardiac Studies:  Echo- Results for orders placed during the hospital encounter of 04/05/24    Adult Transthoracic Echo Complete W/ Cont if Necessary Per Protocol    Interpretation Summary    Left ventricular systolic function is normal. Left ventricular ejection fraction appears to be 56 - 60%.    Left ventricular wall thickness is consistent with mild concentric hypertrophy.    Left ventricular diastolic function was normal.    Estimated right ventricular systolic pressure from tricuspid regurgitation is normal (<35 mmHg).    There is a small (<1cm) circumferential pericardial  effusion. There is no evidence of cardiac tamponade.    Stress Myoview-  Cath-      Medication Review:   Scheduled Meds:amiodarone, 200 mg, Oral, Q24H  atorvastatin, 20 mg, Oral, Daily  bisacodyl, 5 mg, Oral, Daily  dicyclomine, 10 mg, Oral, 4x Daily AC & at Bedtime  docusate sodium, 100 mg, Oral, BID  DULoxetine, 90 mg, Oral, Daily  enoxaparin, 1 mg/kg, Subcutaneous, Nightly  insulin lispro, 2-9 Units, Subcutaneous, 4x Daily AC & at Bedtime  metoprolol tartrate, 25 mg, Oral, Q12H  oxyCODONE, 10 mg, Oral, Daily  pantoprazole, 40 mg, Oral, Daily  pregabalin, 75 mg, Oral, BID  primidone, 150 mg, Oral, Q12H  sodium chloride, 10 mL, Intravenous, Q12H  sucralfate, 1 g, Oral, TID AC  tamsulosin, 0.4 mg, Oral, Daily  verapamil ER, 240 mg, Oral, Nightly  warfarin, 3 mg, Oral, Daily      Continuous Infusions:Pharmacy to Dose enoxaparin (LOVENOX),   Pharmacy to dose warfarin,   sodium chloride, 100 mL/hr, Last Rate: 100 mL/hr (04/11/24 1215)      PRN Meds:.  acetaminophen **OR** acetaminophen    bisacodyl    dextrose    dextrose    glucagon (human recombinant)    ipratropium-albuterol    magnesium hydroxide    Morphine    nitroglycerin    ondansetron ODT **OR** ondansetron    oxyCODONE    Pharmacy to Dose enoxaparin (LOVENOX)    Pharmacy to dose warfarin    sodium chloride    sodium chloride    sodium chloride      Assessment & Plan     Septic shock    MDM:    1.  Atrial fibrillation paroxysmal:    Patient is in sinus rhythm.  Patient has been started on Coumadin.    2.  Hypertension:    Blood pressure is controlled patient is on verapamil    3.  Hyperlipidemia:    Patient is on Lipitor we shall follow    Electronically signed by Warren Alvarenga MD, 04/11/24, 7:03 PM EDT.      Warren Alvarenga MD  04/11/24  19:00 EDT

## 2024-04-11 NOTE — CASE MANAGEMENT/SOCIAL WORK
Continued Stay Note  [facility]     Patient Name: Lux Bray  MRN: [id number]  Today's Date: 4/11/2024    Admit Date: 4/5/2024    Plan: [facility] accepted. No precert required. PASRR approved. From [facility] (spouse does not want pt to return). [facility].   Discharge Plan       Row Name 04/11/24 1412       Plan    Plan [facility] accepted. No precert required. PASRR approved. From [facility] (spouse does not want pt to return). [facility] following for after SNF.    Patient/Family in Agreement with Plan yes    Plan Comments CM confirmed with [facility] liaison that bed is available, CM updated MD. CM rec'd call from patient's spouse, Noemy, DODIE notified spouse that [facility] has bed for patient. Spouse requested MD contacted her by phone, CM updated MD/RN. Barrier to D/C: renal function monitoring, MRI head pending, neurology and ID consults pending.                    Expected Discharge Date and Time       Expected Discharge Date Expected Discharge Time    Apr 13, 2024           Phone communication or documentation only - no physical contact with patient or family.     SUSAN ParraN, RN    [address] [address]    Office: [phone]  Fax: [phone]

## 2024-04-11 NOTE — PLAN OF CARE
Goal Outcome Evaluation:      No c/o pain or soa noted at this time, external cath collecting clear yellow urine, continues with 2L n/c.VSS

## 2024-04-12 ENCOUNTER — APPOINTMENT (OUTPATIENT)
Dept: ULTRASOUND IMAGING | Facility: HOSPITAL | Age: 79
End: 2024-04-12
Payer: MEDICARE

## 2024-04-12 LAB
ALBUMIN SERPL-MCNC: 2.2 G/DL (ref 3.5–5.2)
ANION GAP SERPL CALCULATED.3IONS-SCNC: 9 MMOL/L (ref 5–15)
BUN SERPL-MCNC: 34 MG/DL (ref 8–23)
BUN/CREAT SERPL: 18.1 (ref 7–25)
CALCIUM SPEC-SCNC: 8.3 MG/DL (ref 8.6–10.5)
CHLORIDE SERPL-SCNC: 101 MMOL/L (ref 98–107)
CO2 SERPL-SCNC: 29 MMOL/L (ref 22–29)
CORTIS SERPL-MCNC: 13.08 MCG/DL
CREAT SERPL-MCNC: 1.88 MG/DL (ref 0.76–1.27)
EGFRCR SERPLBLD CKD-EPI 2021: 35.7 ML/MIN/1.73
FOLATE SERPL-MCNC: >20 NG/ML (ref 4.78–24.2)
GLUCOSE BLDC GLUCOMTR-MCNC: 140 MG/DL (ref 70–105)
GLUCOSE BLDC GLUCOMTR-MCNC: 143 MG/DL (ref 70–105)
GLUCOSE BLDC GLUCOMTR-MCNC: 144 MG/DL (ref 70–105)
GLUCOSE BLDC GLUCOMTR-MCNC: 185 MG/DL (ref 70–105)
GLUCOSE SERPL-MCNC: 156 MG/DL (ref 65–99)
INR PPP: 1.11 (ref 2–3)
LEAD BLDV-MCNC: <1 UG/DL (ref 0–3.4)
NT-PROBNP SERPL-MCNC: 1383 PG/ML (ref 0–1800)
PHOSPHATE SERPL-MCNC: 2.8 MG/DL (ref 2.5–4.5)
POTASSIUM SERPL-SCNC: 3.6 MMOL/L (ref 3.5–5.2)
PROTHROMBIN TIME: 12 SECONDS (ref 19.4–28.5)
SODIUM SERPL-SCNC: 139 MMOL/L (ref 136–145)
VIT B12 BLD-MCNC: >2000 PG/ML (ref 211–946)

## 2024-04-12 PROCEDURE — 97530 THERAPEUTIC ACTIVITIES: CPT

## 2024-04-12 PROCEDURE — 97535 SELF CARE MNGMENT TRAINING: CPT

## 2024-04-12 PROCEDURE — 82746 ASSAY OF FOLIC ACID SERUM: CPT | Performed by: NURSE PRACTITIONER

## 2024-04-12 PROCEDURE — 97112 NEUROMUSCULAR REEDUCATION: CPT

## 2024-04-12 PROCEDURE — 82948 REAGENT STRIP/BLOOD GLUCOSE: CPT

## 2024-04-12 PROCEDURE — 84145 PROCALCITONIN (PCT): CPT | Performed by: INTERNAL MEDICINE

## 2024-04-12 PROCEDURE — 80069 RENAL FUNCTION PANEL: CPT | Performed by: INTERNAL MEDICINE

## 2024-04-12 PROCEDURE — 25010000002 ENOXAPARIN PER 10 MG: Performed by: INTERNAL MEDICINE

## 2024-04-12 PROCEDURE — 83880 ASSAY OF NATRIURETIC PEPTIDE: CPT | Performed by: INTERNAL MEDICINE

## 2024-04-12 PROCEDURE — 85610 PROTHROMBIN TIME: CPT | Performed by: INTERNAL MEDICINE

## 2024-04-12 PROCEDURE — 76705 ECHO EXAM OF ABDOMEN: CPT

## 2024-04-12 PROCEDURE — 99232 SBSQ HOSP IP/OBS MODERATE 35: CPT | Performed by: INTERNAL MEDICINE

## 2024-04-12 PROCEDURE — 82607 VITAMIN B-12: CPT | Performed by: NURSE PRACTITIONER

## 2024-04-12 PROCEDURE — 97110 THERAPEUTIC EXERCISES: CPT

## 2024-04-12 PROCEDURE — 82533 TOTAL CORTISOL: CPT | Performed by: INTERNAL MEDICINE

## 2024-04-12 PROCEDURE — 99232 SBSQ HOSP IP/OBS MODERATE 35: CPT | Performed by: NURSE PRACTITIONER

## 2024-04-12 PROCEDURE — 25810000003 SODIUM CHLORIDE 0.9 % SOLUTION: Performed by: INTERNAL MEDICINE

## 2024-04-12 PROCEDURE — 82306 VITAMIN D 25 HYDROXY: CPT | Performed by: INTERNAL MEDICINE

## 2024-04-12 RX ORDER — MODAFINIL 100 MG/1
100 TABLET ORAL DAILY
Qty: 7 TABLET | Refills: 0 | Status: SHIPPED | OUTPATIENT
Start: 2024-04-12

## 2024-04-12 RX ORDER — VERAPAMIL HYDROCHLORIDE 240 MG/1
240 CAPSULE, EXTENDED RELEASE ORAL NIGHTLY
Qty: 30 CAPSULE | Refills: 0 | Status: SHIPPED | OUTPATIENT
Start: 2024-04-12

## 2024-04-12 RX ORDER — ENOXAPARIN SODIUM 100 MG/ML
1 INJECTION SUBCUTANEOUS EVERY 12 HOURS
Qty: 4.8 ML | Refills: 0 | Status: SHIPPED | OUTPATIENT
Start: 2024-04-12 | End: 2024-04-17 | Stop reason: HOSPADM

## 2024-04-12 RX ORDER — WARFARIN SODIUM 4 MG/1
TABLET ORAL
Qty: 30 TABLET | Refills: 3 | Status: SHIPPED | OUTPATIENT
Start: 2024-04-12

## 2024-04-12 RX ORDER — ENOXAPARIN SODIUM 100 MG/ML
1 INJECTION SUBCUTANEOUS EVERY 12 HOURS
Status: DISCONTINUED | OUTPATIENT
Start: 2024-04-12 | End: 2024-04-16

## 2024-04-12 RX ORDER — NALOXONE HYDROCHLORIDE 4 MG/.1ML
SPRAY NASAL
Qty: 2 EACH | Refills: 0 | Status: SHIPPED | OUTPATIENT
Start: 2024-04-12

## 2024-04-12 RX ORDER — OXYCODONE HYDROCHLORIDE 5 MG/1
5 TABLET ORAL EVERY 4 HOURS PRN
Status: DISCONTINUED | OUTPATIENT
Start: 2024-04-12 | End: 2024-04-17 | Stop reason: HOSPADM

## 2024-04-12 RX ORDER — MODAFINIL 100 MG/1
100 TABLET ORAL DAILY
Status: DISPENSED | OUTPATIENT
Start: 2024-04-12 | End: 2024-04-17

## 2024-04-12 RX ORDER — WARFARIN SODIUM 5 MG/1
5 TABLET ORAL
Status: DISCONTINUED | OUTPATIENT
Start: 2024-04-12 | End: 2024-04-15

## 2024-04-12 RX ORDER — AMIODARONE HYDROCHLORIDE 200 MG/1
200 TABLET ORAL
Qty: 30 TABLET | Refills: 0 | Status: SHIPPED | OUTPATIENT
Start: 2024-04-13

## 2024-04-12 RX ORDER — OXYCODONE HYDROCHLORIDE 5 MG/1
5 TABLET ORAL EVERY MORNING
Qty: 30 TABLET | Refills: 0 | Status: SHIPPED | OUTPATIENT
Start: 2024-04-12 | End: 2024-04-17

## 2024-04-12 RX ADMIN — PREGABALIN 75 MG: 75 CAPSULE ORAL at 08:22

## 2024-04-12 RX ADMIN — DICYCLOMINE HYDROCHLORIDE 10 MG: 10 CAPSULE ORAL at 18:22

## 2024-04-12 RX ADMIN — SODIUM CHLORIDE 100 ML/HR: 9 INJECTION, SOLUTION INTRAVENOUS at 09:47

## 2024-04-12 RX ADMIN — ACETAMINOPHEN 650 MG: 325 TABLET, FILM COATED ORAL at 20:17

## 2024-04-12 RX ADMIN — OXYCODONE 5 MG: 5 TABLET ORAL at 04:45

## 2024-04-12 RX ADMIN — OXYCODONE 10 MG: 5 TABLET ORAL at 08:22

## 2024-04-12 RX ADMIN — ENOXAPARIN SODIUM 80 MG: 100 INJECTION SUBCUTANEOUS at 23:40

## 2024-04-12 RX ADMIN — DICYCLOMINE HYDROCHLORIDE 10 MG: 10 CAPSULE ORAL at 08:22

## 2024-04-12 RX ADMIN — ENOXAPARIN SODIUM 80 MG: 100 INJECTION SUBCUTANEOUS at 12:57

## 2024-04-12 RX ADMIN — DICYCLOMINE HYDROCHLORIDE 10 MG: 10 CAPSULE ORAL at 20:18

## 2024-04-12 RX ADMIN — PANTOPRAZOLE SODIUM 40 MG: 40 TABLET, DELAYED RELEASE ORAL at 08:21

## 2024-04-12 RX ADMIN — Medication 10 ML: at 08:23

## 2024-04-12 RX ADMIN — PREGABALIN 75 MG: 75 CAPSULE ORAL at 20:21

## 2024-04-12 RX ADMIN — DICYCLOMINE HYDROCHLORIDE 10 MG: 10 CAPSULE ORAL at 12:57

## 2024-04-12 RX ADMIN — DULOXETINE HYDROCHLORIDE 90 MG: 30 CAPSULE, DELAYED RELEASE ORAL at 08:22

## 2024-04-12 RX ADMIN — PRIMIDONE 150 MG: 50 TABLET ORAL at 08:21

## 2024-04-12 RX ADMIN — DOCUSATE SODIUM 100 MG: 100 CAPSULE, LIQUID FILLED ORAL at 20:20

## 2024-04-12 RX ADMIN — ATORVASTATIN CALCIUM 20 MG: 20 TABLET, FILM COATED ORAL at 08:21

## 2024-04-12 RX ADMIN — AMIODARONE HYDROCHLORIDE 200 MG: 200 TABLET ORAL at 08:21

## 2024-04-12 RX ADMIN — Medication 10 ML: at 20:21

## 2024-04-12 RX ADMIN — SUCRALFATE 1 G: 1 TABLET ORAL at 12:57

## 2024-04-12 RX ADMIN — WARFARIN SODIUM 5 MG: 5 TABLET ORAL at 18:22

## 2024-04-12 RX ADMIN — PRIMIDONE 150 MG: 50 TABLET ORAL at 20:19

## 2024-04-12 RX ADMIN — ACETAMINOPHEN 650 MG: 325 TABLET, FILM COATED ORAL at 04:45

## 2024-04-12 RX ADMIN — METOPROLOL TARTRATE 25 MG: 25 TABLET, FILM COATED ORAL at 08:21

## 2024-04-12 RX ADMIN — OXYCODONE 5 MG: 5 TABLET ORAL at 17:43

## 2024-04-12 RX ADMIN — TAMSULOSIN HYDROCHLORIDE 0.4 MG: 0.4 CAPSULE ORAL at 08:21

## 2024-04-12 NOTE — CASE MANAGEMENT/SOCIAL WORK
"Physicians Statement of Medical Necessity for  Ambulance Transportation    GENERAL INFORMATION     Name: Lux Bray  YOB: 1945  Medicare #: [id number]   Transport Date: 4/12/24 (Valid for round trips this date, or for scheduled repetitive trips for 60 days from the date signed below.)  Origin: [facility]  Destination: [address]  Is the Patient's stay covered under Medicare Part A (PPS/DRG?)Yes  Closest appropriate facility? Yes  If this a hosp-hosp transfer? No  Is this a hospice patient? No    MEDICAL NECESSITY QUESTIONAIRE    Ambulance Transportation is medically necessary only if other means of transportation are contraindicated or would be potentially harmful to the patient.  To meet this requirement, the patient must be either \"bed confined\" or suffer from a condition such that transport by means other than an ambulance is contraindicated by the patient's condition.  The following questions must be answered by the healthcare professional signing below for this form to be valid:     1) Describe the MEDICAL CONDITION (physical and/or mental) of this patient AT THE TIME OF AMBULANCE TRANSPORT that requires the patient to be transported in an ambulance, and why transport by other means is contraindicated by the patient's condition: confusion, emmanuel lift transfer, completely dependent for ADLs, 2L O2.   Past Medical History:   Diagnosis Date    ADHD (attention deficit hyperactivity disorder)     Allergic     Arthritis     Colon polyp     Depression     Diabetes mellitus type 2    controlled by oral meds    Fibromyalgia, primary     HL (hearing loss)     Hyperlipidemia     Hypertension       Past Surgical History:   Procedure Laterality Date    COLONOSCOPY      ENDOSCOPY N/A 11/15/2023    Procedure: ESOPHAGOGASTRODUODENOSCOPY;  Surgeon: George Viveros MD;  Location: [facility] MAIN OR;  Service: Gastroenterology;  Laterality: N/A;  " "popssible candida, irrgular z-line, gastritis    EYE SURGERY  2023    Cataracts removed: both eyes    HAND SURGERY  1980 1980-1990- [facility]. [facility] from [facility].    HEMORROIDECTOMY  1984    [facility]. Abstracted from [facility].    TONSILLECTOMY  1940's    VASECTOMY        2) Is this patient \"bed confined\" as defined below?Yes   To be \"bed confined\" the patient must satisfy all three of the following criteria:  (1) unable to get up from bed without assistance; AND (2) unable to ambulate;  AND (3) unable to sit in a chair or wheelchair.  3) Can this patient safely be transported by car or wheelchair van (I.e., may safely sit during transport, without an attendant or monitoring?)No   4. In addition to completing questions 1-3 above, please check any of the following conditions that apply*:          *Note: supporting documentation for any boxes checked must be maintained in the patient's medical records Patient is confused, Requires oxygen - unable to self administer, and Unable to tolerate seated position for time needed to transport      SIGNATURE OF PHYSICIAN OR OTHER AUTHORIZED HEALTHCARE PROFESSIONAL    I certify that the above information is true and correct based on my evaluation of this patient, and represent that the patient requires transport by ambulance and that other forms of transport are contraindicated.  I understand that this information will be used by the Centers for Medicare and Medicaid Services (CMS) to support the determiniation of medical necessity for ambulance services, and I represent that I have personal knowledge of the patient's condition at the time of transport.       If this box is checked, I also certify that the patient is physically or mentally incapable of signing the ambulance service's claim form and that the institution with which I am affiliated has furnished care, services or assistance to the patient.  My signature below is made on behalf of the patient pursuant to 42 " .36(b)(4). In accordance with 42 .37, the specific reason(s) that the patient is physically or mentally incapable of signing the claim for is as follows:     Signature of Physician or Healthcare Professional  Date/Time:        (For Scheduled repetitive transport, this form is not valid for transports performed more than 60 days after this date).                                                                                                                                            --------------------------------------------------------------------------------------------  Printed Name and Credentials of Physician or Authorized Healthcare Professional     *Form must be signed by patient's attending physician for scheduled, repetitive transports,.  For non-repetitive ambulance transports, if unable to obtain the signature of the attending physician, any of the following may sign (please select below):     Physician  Clinical Nurse Specialist  Registered Nurse     Physician Assistant  Discharge Planner  Licensed Practical Nurse     Nurse Practitioner

## 2024-04-12 NOTE — CASE MANAGEMENT/SOCIAL WORK
Continued Stay Note  [facility]     Patient Name: Lux Bray  MRN: [id number]  Today's Date: 4/12/2024    Admit Date: 4/5/2024    Plan: [facility] accepted. No precert required. PASRR approved. From [facility] (spouse does not want pt to return). [facility] following for after SNF.   Discharge Plan       Row Name 04/12/24 1615       Plan    Plan Comments CM notified by RN/MD that patient will d/c today. CM confirmed with [facility] liaison that bed available today. CM arranged [facility]. CM spoke to patient's wife by phone, wife and daughter requested to speak with MD regarding d/c today. MD contacted family regarding their concerns, d/c canceled for today. CM updated [facility] and [facility] liaison. Barrier to D/C: liver u/s pending.                      Expected Discharge Date and Time       Expected Discharge Date Expected Discharge Time    Apr 13, 2024           Phone communication or documentation only - no physical contact with patient or family.     MEJIA Parra, RN    [address] [address]    Office: [phone]  Fax: [phone]

## 2024-04-12 NOTE — THERAPY TREATMENT NOTE
"Subjective: Pt agreeable to therapeutic plan of care.    Objective:     Bed mobility - Rolling, Supine<>sitting Max-A and Assist x 2. DEPx2 for scooting up towards HOB    ADLs - DEP for pericare while rolling L/R    Balance - Pt tolerated unsupported sitting at EOB ~10 min. Initially required mod A for balance with posterior and L sided lean. Able to progress to CGA-min A for balance with frequent cues to achieve midline posture.     Transfers - x2 STS from EOB Max-A and Assist x 2. Pt unable to come to full stand.     Ambulation - 0 feet N/A or Not attempted.    Therapeutic Exercise - 10 Reps B LE AAROM lying supine    Vitals: WNL    Education: Provided education on the importance of mobility in the acute care setting, Verbal/Tactile Cues, and Transfer Training    Assessment: Lux Bray presents with functional mobility impairments which indicate the need for skilled intervention. Tolerating session today without incident. Pt with notable edema in all extremities. Pt presents with impaired sitting balance, decreased activity tolerance, and increased weakness. Pt far from functional baseline and will require SNF placement at discharge to address functional deficits. Will continue to follow and progress as tolerated.     Plan/Recommendations:   If medically appropriate, Moderate Intensity Therapy recommended post-acute care. This is recommended as therapy feels the patient would require 3-4 days per week and wouldn't tolerate \"3 hour daily\" rehab intensity. SNF would be the preferred choice. If the patient does not agree to SNF, arrange HH or OP depending on home bound status. If patient is medically complex, consider LTACH. Pt requires no DME at discharge.     Pt desires Skilled Rehab placement at discharge. Pt cooperative; agreeable to therapeutic recommendations and plan of care.         Basic Mobility 6-click:  Rollin = Total, A lot = 2, A little = 3; 4 = None  Supine>Sit:   1 = Total, A lot = 2, " A little = 3; 4 = None   Sit>Stand with arms:  1 = Total, A lot = 2, A little = 3; 4 = None  Bed>Chair:   1 = Total, A lot = 2, A little = 3; 4 = None  Ambulate in room:  1 = Total, A lot = 2, A little = 3; 4 = None  3-5 Steps with railin = Total, A lot = 2, A little = 3; 4 = None  Score: 9    Modified [place]: N/A = No pre-op stroke/TIA    Post-Tx Position: Supine with HOB Elevated, Alarms activated, and Call light and personal items within reach  PPE: gloves

## 2024-04-12 NOTE — PLAN OF CARE
Goal Outcome Evaluation:      Patient slept all night and staff turned patient q2h. Family called and asked about decrease in dosage in pain medication.Gave several chocolate boosts throughout the night and educated on adequate nutrition. Patient verbalized understanding. Daughter concerned about withdrawal r/t patient taking medication for several years routinely. Nurse provided tylenol and oxycodone and patient reported medicine alleviated pain. Patient has call light within reach.

## 2024-04-12 NOTE — PROGRESS NOTES
LOS: 7 days     Chief Complaint:  AMS, left leg weakness       SUBJECTIVE:    History taken from: patient chart RN    Interval History: Lux Bray was admitted on 4/5/2024 presented to the hospital after reportedly being sick for a few days at rehab, and was admitted with a principal diagnosis of Septic shock.      Per family the patient has had a neurologic decline over the last few months. CT head was negative for acute abnormality. Ammonia level was normal. Per chart review, the patient's PCP has decreased some of the sedating medications the patient has or was taking. Patient is currently drowsy but will wake up and answer questions appropriately. He is reporting that he is very tired.      In ER the patient was found to be hypotensive. He was given the full sepsis bolus and started on levophed. Chest x-ray showed bibasilar infiltrates. Patient's family reports that they have had concerns he has been aspirating. Patient will be transferred to ICU for close monitoring and vasopressor support.      Patient recently saw neurology outpatient October 30, 2023 for peripheral neuropathy.  He had positive EMG and nerve conduction studies in the past consistent with PN.  At that time likely patient had decent strength, neurologically intact.    - Portions of the above HPI were copied from previous encounters and edited as appropriate.    Patient Complaints: Pt denies any new complaints.       Review of Systems   Constitutional:  Negative for chills, diaphoresis and fatigue.   Eyes:  Negative for photophobia and visual disturbance.   Neurological:  Positive for weakness and numbness (chronic BLE numbness). Negative for dizziness, tremors, seizures, syncope, facial asymmetry, speech difficulty, light-headedness and headaches.          Pertinent PMH:  has a past medical history of ADHD (attention deficit hyperactivity disorder), Allergic, Arthritis, Colon polyp, Depression, Diabetes mellitus (type 2),  Fibromyalgia, primary, HL (hearing loss), Hyperlipidemia, and Hypertension.   ________________________________________________     OBJECTIVE:  Pt examined at .     Neurologic Exam    On exam:  GENERAL: NAD, awake and alert  HEENT: Normocephalic, Atraumatic. Oral mucosa clear and moist.   RESP: Breathing unlabored, breath sounds normal  CARDIO: RRR  SKIN: Warm, dry. No apparent rash.   PSYCH: Mood/affect flat    NEURO:  Oriented to person, place  EOMI, PERRL, no visual field deficits  No facial asymmetry  Speech clear without dysarthria  Sensations intact and equal bilaterally, somewhat decreased in BLE distally  Strength 5/5 in BUE, pt does not fully participate in LE exam. He is observed to move his legs spontaneously while staff is helping clean him.     ________________________________________________   RESULTS REVIEW    VITAL SIGNS:  Temp:  [98 °F (36.7 °C)-98.5 °F (36.9 °C)] 98.5 °F (36.9 °C)  Heart Rate:  [65-80] 74  Resp:  [12-20] 20  BP: (105-139)/(55-69) 139/56    LABS:       Lab 04/12/24  0435 04/11/24  0238 04/10/24  0006 04/09/24  0104 04/08/24  0508 04/07/24  0526 04/06/24  2143 04/06/24  0448   WBC  --   --  14.20* 15.48* 18.80* 22.33*  --  16.45*   HEMOGLOBIN  --   --  9.5* 10.0* 11.2* 13.0  --  13.2   HEMATOCRIT  --   --  29.7* 31.6* 35.3* 40.9  --  43.7   PLATELETS  --   --  253 242 224 248  --  176   NEUTROS ABS  --   --  10.12* 11.73* 14.26* 17.44*  --  12.22*   IMMATURE GRANS (ABS)  --   --  0.55* 0.22* 0.17* 0.16*  --  0.12*   LYMPHS ABS  --   --  1.84 1.93 2.20 2.00  --  1.74   MONOS ABS  --   --  1.47* 1.41* 1.97* 2.66*  --  2.33*   EOS ABS  --   --  0.19 0.17 0.15 0.01  --  0.00   MCV  --   --  91.7 91.6 91.9 91.5  --  95.6   PROCALCITONIN  --   --   --   --   --   --  0.64*  --    LACTATE  --   --   --   --   --   --  1.1  --    PROTIME 12.0* 11.3* 11.2*  --   --   --   --   --          Lab 04/12/24  0435 04/11/24  0238 04/10/24  1310 04/10/24  0006 04/09/24  0104 04/08/24  0508  04/07/24  0043 04/06/24 0448   SODIUM 139 139  --  135* 137 139   < > 141   POTASSIUM 3.6 3.9 4.1 3.4* 3.6 3.4*   < > 4.5   CHLORIDE 101 102  --  99 100 102   < > 104   CO2 29.0 30.0*  --  28.0 29.0 26.0   < > 24.0   ANION GAP 9.0 7.0  --  8.0 8.0 11.0   < > 13.0   BUN 34* 32*  --  36* 37* 33*   < > 26*   CREATININE 1.88* 2.13*  --  2.13* 1.81* 1.51*   < > 1.18   EGFR 35.7* 30.7*  --  30.7* 37.3* 46.4*   < > 62.4   GLUCOSE 156* 178*  --  278* 199* 142*   < > 81   CALCIUM 8.3* 8.5*  --  8.4* 8.3* 8.8   < > 9.3   MAGNESIUM  --   --   --   --   --   --   --  1.8   PHOSPHORUS 2.8 2.3*  --   --   --   --   --  2.8    < > = values in this interval not displayed.         Lab 04/12/24 0435 04/11/24 0238   ALBUMIN 2.2* 2.3*         Lab 04/12/24  0435 04/11/24  0238 04/10/24  1310 04/10/24  0006 04/07/24  0043 04/06/24  2143 04/05/24  2001   PROBNP 1,383.0 1,661.0 1,555.0  --   --   --   --    HSTROP T  --   --   --   --  41* 44* 40*   PROTIME 12.0* 11.3*  --  11.2*  --   --   --    INR 1.11* 1.04*  --  1.03*  --   --   --              Lab 04/12/24 0435   FOLATE >20.00   VITAMIN B 12 >2,000*         UA          4/1/2024    14:10 4/5/2024    12:10 4/10/2024    21:19   Urinalysis   Squamous Epithelial Cells, UA  0-2  3-6    Specific Gravity, UA 1.029  1.021  1.012    Ketones, UA Trace  Negative  Negative    Blood, UA Negative  Negative  Negative    Leukocytes, UA Negative  Negative  Negative    Nitrite, UA Negative  Negative  Negative    RBC, UA  0-2  0-2    WBC, UA  6-10  3-5    Bacteria, UA  Trace  None Seen        Lab Results   Component Value Date    TSH 1.180 04/05/2024    LDL 49 05/26/2023    HGBA1C 6.70 (H) 04/05/2024    [id number] >2,000 (H) 04/12/2024         IMAGING STUDIES:  MRI Brain Without Contrast    Result Date: 4/11/2024  Impression: Brain atrophy with chronic microvascular ischemic changes No acute intracranial abnormality Electronically Signed: Moisés Clark MD  4/11/2024 8:17 PM EDT  Workstation ID:  [id number]    US Renal Bilateral    Result Date: 4/11/2024  Impression: Minimal cortical thinning on the right kidney which otherwise has an unremarkable sonographic appearance. No hydronephrosis. The left kidney is not visualized and is likely obscured by bowel gas and less there has been interval left nephrectomy over the past 4 days. Electronically Signed: Jeromeshiva Grimes DO  4/11/2024 3:24 PM EDT  Workstation ID: [id number]     I reviewed the patient's new clinical results.    ________________________________________________      PROBLEM LIST:    Septic shock    Predisposition to allergic reaction    Attention deficit disorder (ADD) without hyperactivity    Cervical radiculopathy    Cholelithiasis    Deficiency of testosterone biosynthesis    Depression    Enlarged prostate with lower urinary tract symptoms (LUTS)    HTN (hypertension)    Irritable bowel syndrome without diarrhea    Lumbago with sciatica, right side    Arthritis    Diabetes    Vertiginous syndrome    Vitamin D deficiency, unspecified     Gastroesophageal reflux disease without esophagitis    High cholesterol    Hearing loss    Polyneuropathy associated with underlying disease    Generalized weakness    Leukocytosis    Acid reflux        ASSESSMENT/PLAN:  1 Left side weakness, ongoing per patient. Chronic lower extremity weakness with known polyneuropathy. No acute stroke. Likely exacerbation of underlying chronic conditions due to acute infection  - MRI brain: Brain atrophy with chronic microvascular ischemic changes. No acute intracranial abnormality  - NH3: 34, A1C 6.70, TSH 1.180, T4 1.18, Cr 2.13, CK 13, UA negative for infection, Na 135, BUN 36, B12: >2000, and folate:>20  - Continue PT/OT    2. Acute delirium likely second to sepsis/pneumonia in the setting of chronic cognitive decline, polypharmacy, and deconditioning due to recent illness   - Continue treating underlying conditions  - Supportive conditions     3. Polyneuropathy- on Lyrica 75  mg BID    Depression-psychiatry evaluated patient     4. Sepsis, Pneumonia-patient downgraded from ICU, Continuing antibiotics    5. CATRACHO  - Nephrology following    6. Afib  - Cardiology following         **Please refer to previous notes for further details and recommendations.     Will sign off. Please call with questions or concerns.       I discussed the patient's findings and my recommendations with patient, family, nursing staff, and consulting provider    JONATHAN Pate  04/12/24  13:34 EDT

## 2024-04-12 NOTE — PLAN OF CARE
"Assessment: Lux Bray presents with functional mobility impairments which indicate the need for skilled intervention. Tolerating session today without incident. Pt with notable edema in all extremities. Pt presents with impaired sitting balance, decreased activity tolerance, and increased weakness. Pt far from functional baseline and will require SNF placement at discharge to address functional deficits. Will continue to follow and progress as tolerated.     Plan/Recommendations:   If medically appropriate, Moderate Intensity Therapy recommended post-acute care. This is recommended as therapy feels the patient would require 3-4 days per week and wouldn't tolerate \"3 hour daily\" rehab intensity. SNF would be the preferred choice. If the patient does not agree to SNF, arrange HH or OP depending on home bound status. If patient is medically complex, consider LTACH. Pt requires no DME at discharge.     Pt desires Skilled Rehab placement at discharge. Pt cooperative; agreeable to therapeutic recommendations and plan of care.     "

## 2024-04-12 NOTE — PROGRESS NOTES
"[facility] MEDICINE SERVICE  DAILY PROGRESS NOTE    NAME: Lux Bray  : 1945  MRN: [id number]      LOS: 7 days     PROVIDER OF SERVICE: Kendall Haynes MD    Chief Complaint: Septic shock    Subjective:     History of Present illness      Lux Bray is a 78 y.o. male with PMH of HTN, DM II, Depression,  presented to the hospital after reportedly being sick for a few days at rehab, and was admitted with a principal diagnosis of Septic shock.      Per family the patient has had a neurologic decline over the last few months. CT head was negative for acute abnormality. Ammonia level was normal. Per chart review, the patient's PCP has decreased some of the sedating medications the patient has or was taking. Patient is currently drowsy but will wake up and answer questions appropriately. He is reporting that he is very tired.      In ER the patient was found to be hypotensive. He was given the full sepsis bolus and started on levophed. Chest x-ray showed bibasilar infiltrates. Patient's family reports that they have had concerns he has been aspirating. Patient will be transferred to ICU for close monitoring and vasopressor support.       ACP: CPR, Full Intervention. Patients wife is his decision maker in the even he is unable.      Patient was seen and examined on 24 at 13:54 EDT .     24-Patient admitted to the ICU for septic shock and only needed vasopressors very briefly.  Has been off vasopressors since last night.  Developed A-fib with RVR, will give a dose of digoxin.  If blood pressure permits, resume home verapamil.  Will likely need full dose anticoagulation, defer to primary team.  Transfer out of the ICU to hospitalist service.      24-Patient drowsy but easily aroused. Knows what year it is. States that he hurts all over, and is nauseous. He also states that he is \"So, so, so, tired.\" Denies feeling short of breath, or having chest pain.   24 patient seen and " examined in bed no acute distress, vital signs stable, discussed with RN.  Weak weak and tired.Patient is currently n.p.o. pending speech evaluation.   4/8/2024 patient seen and examined in bed no acute distress, vital signs stable, discussed with RN.  Patient with weakness fatigue..  Apparently was living at home.  Speech recommended that pt initiate a mechanical soft diet with NT liquids as tolerated   4/9/24 seen in bed NAD, VSS, some confusion, JOSE L RN  4/10/24 patient seen in bed JOANNA, JOSE L DELATORRE, [place], per  wife wanted to take him home, unable to use eliquis, started coumadin, awaiting placement  4/11/24 seen in bed NAD, weakness and fatigue, DW family says he has had increased loss of mobility over the last 6 month  4/12/24 patient seen and examined in bed no acute distress, patient is weak tired, fatigue. very sleepy.  Vital signs stable, renal function slightly improved.  Discussed with RN. Long discussion with family >30min    Review of Systems   Constitutional:  Positive for fatigue. Negative for appetite change, chills and fever.   HENT:  Negative for congestion.    Eyes:  Negative for pain and redness.   Respiratory:  Positive for shortness of breath. Negative for cough and chest tightness.    Gastrointestinal:  Negative for abdominal pain, diarrhea, nausea and vomiting.   Endocrine: Negative for cold intolerance and heat intolerance.   Genitourinary:  Negative for dysuria, flank pain and frequency.   Musculoskeletal:  Negative for back pain and myalgias.   Neurological:  Positive for weakness. Negative for dizziness and headaches.   Psychiatric/Behavioral:  Negative for sleep disturbance. The patient is not nervous/anxious.      Objective:     Vital Signs  Temp:  [98 °F (36.7 °C)-98.5 °F (36.9 °C)] 98 °F (36.7 °C)  Heart Rate:  [65-80] 68  Resp:  [12-16] 13  BP: (105-138)/(55-69) 105/55  Flow (L/min):  [2] 2   Body mass index is 24.2 kg/m².    Physical Exam  Constitutional:       Appearance: Normal  appearance.   HENT:      Head: Normocephalic and atraumatic.      Nose: Nose normal.      Mouth/Throat:      Mouth: Mucous membranes are moist.   Eyes:      Extraocular Movements: Extraocular movements intact.      Conjunctiva/sclera: Conjunctivae normal.      Pupils: Pupils are equal, round, and reactive to light.   Cardiovascular:      Rate and Rhythm: Regular rhythm. Tachycardia present.      Pulses: Normal pulses.      Heart sounds: Normal heart sounds.   Pulmonary:      Effort: Pulmonary effort is normal.      Breath sounds: Normal breath sounds.   Musculoskeletal:      Cervical back: Normal range of motion.   Skin:     General: Skin is warm and dry.   Neurological:      General: No focal deficit present.      Mental Status: He is alert. He is disoriented.       Scheduled Meds   amiodarone, 200 mg, Oral, Q24H  atorvastatin, 20 mg, Oral, Daily  bisacodyl, 5 mg, Oral, Daily  dicyclomine, 10 mg, Oral, 4x Daily AC & at Bedtime  docusate sodium, 100 mg, Oral, BID  DULoxetine, 90 mg, Oral, Daily  enoxaparin, 1 mg/kg, Subcutaneous, Q12H  insulin lispro, 2-9 Units, Subcutaneous, 4x Daily AC & at Bedtime  metoprolol tartrate, 25 mg, Oral, Q12H  oxyCODONE, 10 mg, Oral, Daily  pantoprazole, 40 mg, Oral, Daily  pregabalin, 75 mg, Oral, BID  primidone, 150 mg, Oral, Q12H  sodium chloride, 10 mL, Intravenous, Q12H  sucralfate, 1 g, Oral, TID AC  tamsulosin, 0.4 mg, Oral, Daily  verapamil ER, 240 mg, Oral, Nightly  warfarin, 5 mg, Oral, Daily       PRN Meds     acetaminophen **OR** acetaminophen    bisacodyl    dextrose    dextrose    glucagon (human recombinant)    ipratropium-albuterol    magnesium hydroxide    nitroglycerin    ondansetron ODT **OR** ondansetron    oxyCODONE    Pharmacy to Dose enoxaparin (LOVENOX)    Pharmacy to dose warfarin    sodium chloride    sodium chloride    sodium chloride   Infusions  Pharmacy to Dose enoxaparin (LOVENOX),   Pharmacy to dose warfarin,   sodium chloride, 100 mL/hr, Last Rate: 100  mL/hr (04/12/24 0947)        Diagnostic Data    Results from last 7 days   Lab Units 04/12/24  0435 04/10/24  1310 04/10/24  0006   WBC 10*3/mm3  --   --  14.20*   HEMOGLOBIN g/dL  --   --  9.5*   HEMATOCRIT %  --   --  29.7*   PLATELETS 10*3/mm3  --   --  253   GLUCOSE mg/dL 156*   < > 278*   CREATININE mg/dL 1.88*   < > 2.13*   BUN mg/dL 34*   < > 36*   SODIUM mmol/L 139   < > 135*   POTASSIUM mmol/L 3.6   < > 3.4*   ANION GAP mmol/L 9.0   < > 8.0    < > = values in this interval not displayed.     MRI Brain Without Contrast    Result Date: 4/11/2024  Impression: Brain atrophy with chronic microvascular ischemic changes No acute intracranial abnormality Electronically Signed: Moisés Clark MD  4/11/2024 8:17 PM EDT  Workstation ID: [id number]    US Renal Bilateral    Result Date: 4/11/2024  Impression: Minimal cortical thinning on the right kidney which otherwise has an unremarkable sonographic appearance. No hydronephrosis. The left kidney is not visualized and is likely obscured by bowel gas and less there has been interval left nephrectomy over the past 4 days. Electronically Signed: Jerome Grimes DO  4/11/2024 3:24 PM EDT  Workstation ID: [id number]     I reviewed the patient's new clinical results.    Assessment/Plan:     Active and Resolved Problems  Active Hospital Problems    Diagnosis  POA    **Septic shock [A41.9, R65.21]  Yes    High cholesterol [E78.00]  Yes    Hearing loss [H91.90]  Yes    Acid reflux [K21.9]  Yes    Generalized weakness [R53.1]  Yes    Leukocytosis [D72.829]  Yes    Polyneuropathy associated with underlying disease [G63]  Yes    Gastroesophageal reflux disease without esophagitis [K21.9]  Yes    Vitamin D deficiency, unspecified  [E55.9]  Yes    Depression [F32.A]  Yes    HTN (hypertension) [I10]  Yes    Arthritis [M19.90]  Yes    Lumbago with sciatica, right side [M54.41]  Yes    Cholelithiasis [K80.20]  Yes    Irritable bowel syndrome without diarrhea [K58.9]  Yes    Cervical  radiculopathy [M54.12]  Yes    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     Patient downgraded from ICU to PCU level of care.     Septic Shock due to Pneumonia   -Respiratory panel negative   -MRSA swab negative  -NS at 75mL/hr   -Levophed discontinued   -received Zosyn   -SLP consult to rule out aspiration pna     Acute kidney injury. ATN versus AIN. Urine eosinophil negative. Nonoliguric.   Nephrology following-IV fluids  -Creatinine seems to be plateauing, 2.13 Mg/DL this morning.  - Patient has peripheral edema but may be dry intravascularly.     Diabetes Mellitus   -Hgb A1c 4/5/24 6.7  -Continue to hold home medications   -Accuchecks ACHS   -SSI ordered     Hypertension -Not controlled with medication at home  -Monitor BP per protocol   -metoprolol/verapamil    Hyperlipidemia --   -Patient on Lipitor at home  -Last lipid profile 5/26/23 negative  -Restart lipitor when not NPO    Depression  -Patient on Cymbalta at home  -Restart when patient not NPO    Altered Mental Status improving  -No family at bedside at this time  -Reports of neurologic decline over last few months   -Patient unable to make needs known     Progressive weakness over the last 6 months. that started with tremors.  Will obtain MRI.  Consulted neurology.    Dysphasia   -Failed nursing water screen  -SLP eval today,  recommended that pt initiate a mechanical soft diet with NT liquids as tolerated   -Repeat tomorrow if patient able to follow commands     Atrial fibrillation/flutter:Patient is in sinus rhythm.    Patient is on metoprolol.  verapamil  Started anticoagulation.  coumadin-pharmacy to  monitor    Elevated BNP-1661  Echo-Left ventricular systolic function is normal. Left ventricular ejection   fraction appears to be 56 - 60%. .   monitor BNP    DVT prophylaxis:  Medical and mechanical DVT prophylaxis orders are present.      Code status is   Code Status and Medical Interventions:   Ordered at: 04/05/24 1340     Code Status (Patient has no pulse and is not breathing):    CPR (Attempt to Resuscitate)     Medical Interventions (Patient has pulse or is breathing):    Full Support       Plan for disposition:Pending clinical course.   Long discussion with family over 30 min ACP      Signature: Electronically signed by Kendall Haynes MD, 04/12/24, 12:03 EDT.  [facility], [place], operated by [facility] Hospitalist Team

## 2024-04-12 NOTE — PROGRESS NOTES
LOS: 7 days   Admitting Physician- Kendall Haynes MD    Reason For Followup:    Atrial fibrillation converted to sinus rhythm  Pneumonia  Shortness of breath    Subjective     No complaints    Objective     Patient is in sinus rhythm hemodynamics are stable    Review of Systems:   Review of Systems   Constitutional: Negative for chills and fever.   HENT:  Negative for ear discharge and nosebleeds.    Eyes:  Negative for discharge and redness.   Cardiovascular:  Negative for chest pain, orthopnea, palpitations, paroxysmal nocturnal dyspnea and syncope.   Respiratory:  Positive for shortness of breath. Negative for cough and wheezing.    Endocrine: Negative for heat intolerance.   Skin:  Negative for rash.   Musculoskeletal:  Negative for arthritis and myalgias.   Gastrointestinal:  Negative for abdominal pain, melena, nausea and vomiting.   Genitourinary:  Negative for dysuria and hematuria.   Neurological:  Negative for dizziness, light-headedness, numbness and tremors.   Psychiatric/Behavioral:  Negative for depression. The patient is not nervous/anxious.          Vital Signs  Vitals:    04/12/24 0500 04/12/24 1001 04/12/24 1332 04/12/24 1650   BP: 114/58 105/55 139/56 134/62   BP Location: Left arm Left arm Left arm Left arm   Patient Position: Lying Lying Lying Lying   Pulse: 65 68 74 76   Resp: 12 13 20 15   Temp: 98.2 °F (36.8 °C) 98 °F (36.7 °C) 98.5 °F (36.9 °C) 100.2 °F (37.9 °C)   TempSrc: Oral Oral Axillary Axillary   SpO2: 93%  (!) 89% 93%   Weight: 78.7 kg (173 lb 8 oz)      Height:         Wt Readings from Last 1 Encounters:   04/12/24 78.7 kg (173 lb 8 oz)       Intake/Output Summary (Last 24 hours) at 4/12/2024 1748  Last data filed at 4/12/2024 1236  Gross per 24 hour   Intake 480 ml   Output 925 ml   Net -445 ml     Physical Exam:  Constitutional:       Appearance: Well-developed.   Eyes:      General: No scleral icterus.        Right eye: No discharge.   HENT:      Head: Normocephalic and  atraumatic.   Neck:      Thyroid: No thyromegaly.      Lymphadenopathy: No cervical adenopathy.   Pulmonary:      Effort: Pulmonary effort is normal. No respiratory distress.      Breath sounds: Normal breath sounds. No wheezing. No rales.   Cardiovascular:      Normal rate. Regular rhythm.      No gallop.    Edema:     Peripheral edema absent.   Abdominal:      General: There is distension.      Tenderness: There is abdominal tenderness.   Skin:     Findings: No erythema or rash.   Neurological:      Mental Status: Alert and oriented to person, place, and time.     MDM:    1.    Results Review:   Lab Results (last 24 hours)       Procedure Component Value Units Date/Time    POC Glucose Once [[id number]]  (Abnormal) Collected: 04/12/24 1720    Specimen: Blood Updated: 04/12/24 1722     Glucose 185 mg/dL      Comment: Serial Number: [id number]:  [id number]       Cortisol [[id number]] Collected: 04/12/24 0435    Specimen: Blood from Arm, Left Updated: 04/12/24 1551     Cortisol 13.08 mcg/dL     Narrative:      Cortisol Reference Ranges:    Cortisol 6AM - 10AM Range: 6.02-18.40 mcg/dl  Cortisol 4PM - 8PM Range: 2.68-10.50 mcg/dl      Results may be falsely increased if patient taking Biotin.      BNP [[id number]]  (Normal) Collected: 04/12/24 0435    Specimen: Blood from Arm, Left Updated: 04/12/24 1324     proBNP 1,383.0 pg/mL     Narrative:      This assay is used as an aid in the diagnosis of individuals suspected of having heart failure. It can be used as an aid in the diagnosis of acute decompensated heart failure (ADHF) in patients presenting with signs and symptoms of ADHF to the emergency department (ED). In addition, NT-proBNP of <300 pg/mL indicates ADHF is not likely.    Age Range Result Interpretation  NT-proBNP Concentration (pg/mL:      <50             Positive            >450                   Gray                 300-450                    Negative             <300    50-75           Positive             >900                  Gray                300-900                  Negative            <300      >75             Positive            >1800                  Gray                300-1800                  Negative            <300    POC Glucose 4x Daily Before Meals & at Bedtime [[id number]]  (Abnormal) Collected: 04/12/24 1142    Specimen: Blood Updated: 04/12/24 1145     Glucose 140 mg/dL      Comment: Serial Number: [id number]:  [id number]       Lead, Blood [[id number]] Collected: 04/11/24 1535    Specimen: Blood Updated: 04/12/24 1113     Lead <1.0 ug/dL      Comment: Testing performed by Inductively coupled plasma/Mass Spectrometry.  Analysis by inductively coupled plasma/mass  spectrometry (ICP/MS)                            Environmental Exposure:                             WHO Recommendation     <5.0                            Occupational Exposure:                             OSHA Lead Std          40.0                             BATSHEVA                    30.0                                  Detection Limit =  1.0       Narrative:      Test(s) 007631-Lead, Blood (Adult)  was developed and its performance characteristics determined  by [employer]. It has not been cleared or approved by the Food  and Drug Administration.  Performed at:   - [address]  [id number]  : Gus Bell PhD, Phone:  [phone]    Vitamin B12 [[id number]]  (Abnormal) Collected: 04/12/24 0435    Specimen: Blood from Arm, Left Updated: 04/12/24 1049     Vitamin B-12 >2,000 pg/mL     Narrative:      Results may be falsely increased if patient taking Biotin.      Folate [[id number]]  (Normal) Collected: 04/12/24 0435    Specimen: Blood from Arm, Left Updated: 04/12/24 1049     Folate >20.00 ng/mL     Narrative:      Results may be falsely increased if patient taking Biotin.      POC Glucose 4x Daily Before Meals & at Bedtime [[id number]]  (Abnormal) Collected: 04/12/24 0730    Specimen:  Blood Updated: 04/12/24 0731     Glucose 143 mg/dL      Comment: Serial Number: [id number]:  [id number]       Renal Function Panel [[id number]]  (Abnormal) Collected: 04/12/24 0435    Specimen: Blood from Arm, Left Updated: 04/12/24 0509     Glucose 156 mg/dL      BUN 34 mg/dL      Creatinine 1.88 mg/dL      Sodium 139 mmol/L      Potassium 3.6 mmol/L      Chloride 101 mmol/L      CO2 29.0 mmol/L      Calcium 8.3 mg/dL      Albumin 2.2 g/dL      Phosphorus 2.8 mg/dL      Anion Gap 9.0 mmol/L      BUN/Creatinine Ratio 18.1     eGFR 35.7 mL/min/1.73     Narrative:      GFR Normal >60  Chronic Kidney Disease <60  Kidney Failure <15    The GFR formula is only valid for adults with stable renal function between ages 18 and 70.    Protime-INR [[id number]]  (Abnormal) Collected: 04/12/24 0435    Specimen: Blood from Arm, Left Updated: 04/12/24 0459     Protime 12.0 Seconds      INR 1.11    POC Glucose Once [[id number]]  (Abnormal) Collected: 04/11/24 2050    Specimen: Blood Updated: 04/11/24 2052     Glucose 219 mg/dL      Comment: Serial Number: [id number]:  [id number]             Imaging Results (Last 72 Hours)       Procedure Component Value Units Date/Time    US Liver [[id number]] Collected: 04/12/24 1629     Updated: 04/12/24 1634    Narrative:      US LIVER    Date of Exam: 4/12/2024 4:10 PM EDT    Indication: poor po intake.    Comparison: Noncontrast CT of the abdomen and pelvis performed on April 7, 2024    Technique: Grayscale and color Doppler ultrasound evaluation of the liver was performed.      Findings:  The liver appears normal in size and echogenicity. No focal lesions identified. Normal flow is present within the hepatic vasculature.    The visualized portion of the gallbladder appears within normal limits. There is no intrahepatic biliary duct dilatation. Common bile duct measures 0.4 cm.    Partial visualization of a small to moderate right pleural effusion.      Impression:       Impression:    The liver appears sonographically unremarkable.    Partial visualization of a small to moderate right pleural effusion.        Electronically Signed: Luciano Arenas MD    4/12/2024 4:31 PM EDT    Workstation ID: [id number]    MRI Brain Without Contrast [[id number]] Collected: 04/11/24 2012     Updated: 04/11/24 2019    Narrative:      MRI BRAIN WO CONTRAST    Date of Exam: 4/11/2024 7:34 PM EDT    Indication: Mental status change, persistent or worsening.     Comparison: CT brain dated 4/5/2024    Technique:  Routine multiplanar/multisequence sequence images of the brain were obtained without contrast administration.      Findings: The ventricles are midline with mild extraocular rotation. There is diffuse brain atrophy. Periventricular T2/FLAIR white matter intensities compatible with chronic microvascular ischemic changes  There is no diffusion restriction to suggest acute infarct. There is no evidence of acute or chronic intracranial hemorrhage. No mass effect or midline shift. No abnormal extra-axial collections. The major vascular flow voids appear intact. The basal   ganglia, brainstem and cerebellum appear within normal limits. Calvarial and superficial soft tissue signal is within normal limits. Orbits appear unremarkable. The paranasal sinuses and the mastoid air cells appear well aerated. Midline structures are   intact.         Impression:      Impression:  Brain atrophy with chronic microvascular ischemic changes    No acute intracranial abnormality        Electronically Signed: Moisés Clark MD    4/11/2024 8:17 PM EDT    Workstation ID: [id number]    US Renal Bilateral [[id number]] Collected: 04/11/24 1517     Updated: 04/11/24 1526    Narrative:      US RENAL BILATERAL    Date of Exam: 4/11/2024 2:50 PM EDT    Indication: Acute renal insufficiency.    Comparison: CT of the abdomen pelvis without contrast dated 4/7/2024 and abdomen MRI with and without contrast dated 9/27/2017    Technique:  Grayscale and color Doppler ultrasound evaluation of the kidneys and urinary bladder was performed.      Findings:  The left kidney is not visualized and is likely obscured due to excessive bowel gas. The right kidney measures 10.8 x 5.9 x 5.3 cm. It is normal in echogenicity with minimal cortical thinning. No suspicious renal mass. No hydro nephrosis or definitive   shadowing stones. No perinephric fluid collection. The bladder demonstrates no focal abnormality. There is 126 cc of urine within the bladder.      Impression:      Impression:  Minimal cortical thinning on the right kidney which otherwise has an unremarkable sonographic appearance. No hydronephrosis. The left kidney is not visualized and is likely obscured by bowel gas and less there has been interval left nephrectomy over the   past 4 days.        Electronically Signed: Jerome Grimes DO    4/11/2024 3:24 PM EDT    Workstation ID: [id number]          ECG/EMG Results (most recent)       Procedure Component Value Units Date/Time    ECG 12 Lead Altered Mental Status [[id number]] Collected: 04/05/24 1129     Updated: 04/05/24 1202     QTC Interval -- ms     Narrative:      HEART RATE= Invalid  bpm  RR Interval= 0  ms  MN Interval=   ms  P Horizontal Axis=   deg  P Front Axis=   deg  QRSD Interval=   ms  QT Interval=   ms  QTcB= Invalid  ms  QRS Axis=   deg  T Wave Axis=   deg  Electronically Signed By:   Date and Time of Study: 2024-04-05 11:29:29    Adult Transthoracic Echo Complete W/ Cont if Necessary Per Protocol [[id number]] Resulted: 04/07/24 1640     Updated: 04/07/24 1643     EF(MOD-bp) 69.8 %      LVIDd 4.4 cm      LVIDs 3.1 cm      IVSd 1.00 cm      LVPWd 1.10 cm      FS 29.5 %      IVS/LVPW 0.91 cm      ESV(cubed) 29.8 ml      LV Sys Vol (BSA corrected) 12.9 cm2      EDV(cubed) 85.2 ml      LV Santiago Vol (BSA corrected) 42.3 cm2      LV mass(C)d 158.2 grams      LVOT area 3.1 cm2      LVOT diam 2.00 cm      EDV(MOD-sp2) 59.4 ml      EDV(MOD-sp4) 77.1  ml      ESV(MOD-sp2) 17.4 ml      ESV(MOD-sp4) 23.6 ml      SV(MOD-sp2) 42.0 ml      SV(MOD-sp4) 53.5 ml      SI(MOD-sp2) 23.0 ml/m2      SI(MOD-sp4) 29.3 ml/m2      EF(MOD-sp2) 70.7 %      EF(MOD-sp4) 69.4 %      MV E max lefty 84.9 cm/sec      MV A max lefty 43.0 cm/sec      MV dec time 0.17 sec      MV E/A 1.97     Pulm A Revs Dur 0.11 sec      Med Peak E' Lefty 21.8 cm/sec      Lat Peak E' Lefty 24.8 cm/sec      TR max lefty 273.0 cm/sec      Avg E/e' ratio 3.64     SV(LVOT) 44.9 ml      RVIDd 2.7 cm      RV Base 3.3 cm      RV Mid 2.30 cm      RV Length 5.6 cm      TAPSE (>1.6) 1.78 cm      RV S' 17.9 cm/sec      LA dimension (2D)  3.3 cm      Pulm Sys Lefty 56.2 cm/sec      Pulm Santiago Lefty 46.0 cm/sec      Pulm S/D 1.22     Pulm A Revs Lefty 33.9 cm/sec      LV V1 max 99.1 cm/sec      LV V1 max PG 3.9 mmHg      LV V1 mean PG 2.00 mmHg      LV V1 VTI 14.3 cm      Ao pk lefty 124.0 cm/sec      Ao max PG 6.2 mmHg      Ao mean PG 3.0 mmHg      Ao V2 VTI 19.9 cm      MANUEL(I,D) 2.26 cm2      MV max PG 10.5 mmHg      MV mean PG 4.0 mmHg      MV V2 VTI 27.3 cm      MV P1/2t 41.4 msec      MVA(P1/2t) 5.3 cm2      MVA(VTI) 1.65 cm2      MV dec slope 1,216 cm/sec2      TR max PG 29.8 mmHg      RV V1 max PG 4.7 mmHg      RV V1 max 108.0 cm/sec      RV V1 VTI 15.8 cm      PA V2 max 115.5 cm/sec      PA acc time 0.14 sec      Ao root diam 3.2 cm      ACS 2.00 cm      Sinus 3.1 cm      STJ 2.7 cm      RVSP(TR) 33 mmHg      RAP systole 3 mmHg     Narrative:        Left ventricular systolic function is normal. Left ventricular ejection   fraction appears to be 56 - 60%.    Left ventricular wall thickness is consistent with mild concentric   hypertrophy.    Left ventricular diastolic function was normal.    Estimated right ventricular systolic pressure from tricuspid   regurgitation is normal (<35 mmHg).    There is a small (<1cm) circumferential pericardial effusion. There is   no evidence of cardiac tamponade.      ECG 12 Lead Rhythm Change  [[id number]] Collected: 04/06/24 1904     Updated: 04/07/24 1728     QT Interval 337 ms      QTC Interval 499 ms     Narrative:      HEART RATE= 131  bpm  RR Interval= 457  ms  IA Interval=   ms  P Horizontal Axis=   deg  P Front Axis=   deg  QRSD Interval= 93  ms  QT Interval= 337  ms  QTcB= 499  ms  QRS Axis= 40  deg  T Wave Axis= -21  deg  - ABNORMAL ECG -  Atrial fibrillation  Inferior infarct, old  When compared with ECG of 06-Apr-2024 7:54:52,  New or worsened ischemia or infarction  Significant repolarization change  Significant axis, voltage or hypertrophy change  Electronically Signed By: Warren Alvarenga ([email or web address]) 07-Apr-2024 17:28:18  Date and Time of Study: 2024-04-06 19:04:20    ECG 12 Lead Tachycardia [[id number]] Collected: 04/06/24 0754     Updated: 04/07/24 1728     QT Interval 321 ms      QTC Interval 480 ms     Narrative:      HEART RATE= 134  bpm  RR Interval= 448  ms  IA Interval=   ms  P Horizontal Axis=   deg  P Front Axis=   deg  QRSD Interval= 95  ms  QT Interval= 321  ms  QTcB= 480  ms  QRS Axis= 41  deg  T Wave Axis= -5  deg  - ABNORMAL ECG -  Atrial fibrillation  Ventricular premature complex  Low voltage, precordial leads  ST depression, probably rate related  When compared with ECG of 05-Apr-2024 11:44:03,  Significant repolarization change  Significant axis, voltage or hypertrophy change  Electronically Signed By: Warren Alvarenga (THE[facility]) 07-Apr-2024 17:28:24  Date and Time of Study: 2024-04-06 07:54:52    Telemetry Scan [[id number]] Resulted: 04/05/24     Updated: 04/09/24 0806    Telemetry Scan [[id number]] Resulted: 04/05/24     Updated: 04/09/24 0822    Telemetry Scan [[id number]] Resulted: 04/05/24     Updated: 04/09/24 0850    Telemetry Scan [[id number]] Resulted: 04/05/24     Updated: 04/09/24 0933    Telemetry Scan [[id number]] Resulted: 04/05/24     Updated: 04/09/24 1017    Telemetry Scan [[id number]] Resulted: 04/05/24     Updated: 04/09/24 1040    Telemetry Scan [[id number]] Resulted: 04/05/24      Updated: 04/09/24 1056    Telemetry Scan [[id number]] Resulted: 04/05/24     Updated: 04/09/24 1124    Telemetry Scan [[id number]] Resulted: 04/05/24     Updated: 04/09/24 1241    Telemetry Scan [[id number]] Resulted: 04/05/24     Updated: 04/09/24 1403    Telemetry Scan [[id number]] Resulted: 04/05/24     Updated: 04/09/24 1418    Telemetry Scan [[id number]] Resulted: 04/05/24     Updated: 04/09/24 1513    Telemetry Scan [[id number]] Resulted: 04/05/24     Updated: 04/09/24 1524    Telemetry Scan [[id number]] Resulted: 04/05/24     Updated: 04/10/24 0609    Telemetry Scan [[id number]] Resulted: 04/05/24     Updated: 04/10/24 0610    Telemetry Scan [[id number]] Resulted: 04/05/24     Updated: 04/10/24 0610    Telemetry Scan [[id number]] Resulted: 04/05/24     Updated: 04/10/24 0701    Telemetry Scan [[id number]] Resulted: 04/05/24     Updated: 04/10/24 0823    Telemetry Scan [[id number]] Resulted: 04/05/24     Updated: 04/10/24 0917    Telemetry Scan [[id number]] Resulted: 04/05/24     Updated: 04/10/24 0939    Telemetry Scan [[id number]] Resulted: 04/05/24     Updated: 04/10/24 1049    Telemetry Scan [[id number]] Resulted: 04/05/24     Updated: 04/10/24 1245    Telemetry Scan [[id number]] Resulted: 04/05/24     Updated: 04/11/24 0854    Telemetry Scan [[id number]] Resulted: 04/05/24     Updated: 04/11/24 0937    Telemetry Scan [[id number]] Resulted: 04/05/24     Updated: 04/11/24 0957    Telemetry Scan [[id number]] Resulted: 04/05/24     Updated: 04/11/24 1049    Telemetry Scan [[id number]] Resulted: 04/05/24     Updated: 04/11/24 1201    Telemetry Scan [[id number]] Resulted: 04/05/24     Updated: 04/11/24 1333    Telemetry Scan [[id number]] Resulted: 04/05/24     Updated: 04/11/24 1333    Telemetry Scan [[id number]] Resulted: 04/05/24     Updated: 04/12/24 1138    Telemetry Scan [[id number]] Resulted: 04/05/24     Updated: 04/12/24 1144    Telemetry Scan [[id number]] Resulted: 04/05/24     Updated: 04/12/24 1430          CBC    Results  from last 7 days   Lab Units 04/10/24  0006 04/09/24  0104 04/08/24  0508 04/07/24  0526 04/06/24 0448   WBC 10*3/mm3 14.20* 15.48* 18.80* 22.33* 16.45*   HEMOGLOBIN g/dL 9.5* 10.0* 11.2* 13.0 13.2   PLATELETS 10*3/mm3 253 242 224 248 176     BMP   Results from last 7 days   Lab Units 04/12/24 0435 04/11/24  0238 04/10/24  1310 04/10/24  0006 04/09/24  0104 04/08/24  0508 04/07/24  0043 04/06/24  0448   SODIUM mmol/L 139 139  --  135* 137 139 140 141   POTASSIUM mmol/L 3.6 3.9 4.1 3.4* 3.6 3.4* 3.5 4.5   CHLORIDE mmol/L 101 102  --  99 100 102 100 104   CO2 mmol/L 29.0 30.0*  --  28.0 29.0 26.0 25.0 24.0   BUN mg/dL 34* 32*  --  36* 37* 33* 23 26*   CREATININE mg/dL 1.88* 2.13*  --  2.13* 1.81* 1.51* 1.11 1.18   GLUCOSE mg/dL 156* 178*  --  278* 199* 142* 172* 81   MAGNESIUM mg/dL  --   --   --   --   --   --   --  1.8   PHOSPHORUS mg/dL 2.8 2.3*  --   --   --   --   --  2.8     CMP   Results from last 7 days   Lab Units 04/12/24 0435 04/11/24  0238 04/10/24  1310 04/10/24  0006 04/09/24  0104 04/08/24  0508 04/07/24 0043 04/06/24 0448   SODIUM mmol/L 139 139  --  135* 137 139 140 141   POTASSIUM mmol/L 3.6 3.9 4.1 3.4* 3.6 3.4* 3.5 4.5   CHLORIDE mmol/L 101 102  --  99 100 102 100 104   CO2 mmol/L 29.0 30.0*  --  28.0 29.0 26.0 25.0 24.0   BUN mg/dL 34* 32*  --  36* 37* 33* 23 26*   CREATININE mg/dL 1.88* 2.13*  --  2.13* 1.81* 1.51* 1.11 1.18   GLUCOSE mg/dL 156* 178*  --  278* 199* 142* 172* 81   ALBUMIN g/dL 2.2* 2.3*  --   --   --   --   --   --      Cardiac Studies:  Echo- Results for orders placed during the hospital encounter of 04/05/24    Adult Transthoracic Echo Complete W/ Cont if Necessary Per Protocol    Interpretation Summary    Left ventricular systolic function is normal. Left ventricular ejection fraction appears to be 56 - 60%.    Left ventricular wall thickness is consistent with mild concentric hypertrophy.    Left ventricular diastolic function was normal.    Estimated right ventricular  systolic pressure from tricuspid regurgitation is normal (<35 mmHg).    There is a small (<1cm) circumferential pericardial effusion. There is no evidence of cardiac tamponade.    Stress Myoview-  Cath-      Medication Review:   Scheduled Meds:amiodarone, 200 mg, Oral, Q24H  atorvastatin, 20 mg, Oral, Daily  bisacodyl, 5 mg, Oral, Daily  dicyclomine, 10 mg, Oral, 4x Daily AC & at Bedtime  docusate sodium, 100 mg, Oral, BID  DULoxetine, 90 mg, Oral, Daily  enoxaparin, 1 mg/kg, Subcutaneous, Q12H  insulin lispro, 2-9 Units, Subcutaneous, 4x Daily AC & at Bedtime  metoprolol tartrate, 25 mg, Oral, Q12H  modafinil, 100 mg, Oral, Daily  oxyCODONE, 10 mg, Oral, Daily  pantoprazole, 40 mg, Oral, Daily  pregabalin, 75 mg, Oral, BID  primidone, 150 mg, Oral, Q12H  sodium chloride, 10 mL, Intravenous, Q12H  sucralfate, 1 g, Oral, TID AC  tamsulosin, 0.4 mg, Oral, Daily  verapamil ER, 240 mg, Oral, Nightly  warfarin, 5 mg, Oral, Daily      Continuous Infusions:Pharmacy to Dose enoxaparin (LOVENOX),   Pharmacy to dose warfarin,   sodium chloride, 100 mL/hr, Last Rate: 100 mL/hr (04/12/24 0947)      PRN Meds:.  acetaminophen **OR** acetaminophen    bisacodyl    dextrose    dextrose    glucagon (human recombinant)    ipratropium-albuterol    magnesium hydroxide    nitroglycerin    ondansetron ODT **OR** ondansetron    oxyCODONE    Pharmacy to Dose enoxaparin (LOVENOX)    Pharmacy to dose warfarin    sodium chloride    sodium chloride    sodium chloride      Assessment & Plan     Septic shock    Predisposition to allergic reaction    Attention deficit disorder (ADD) without hyperactivity    Cervical radiculopathy    Cholelithiasis    Deficiency of testosterone biosynthesis    Depression    Enlarged prostate with lower urinary tract symptoms (LUTS)    HTN (hypertension)    Irritable bowel syndrome without diarrhea    Lumbago with sciatica, right side    Arthritis    Diabetes    Vertiginous syndrome    Vitamin D deficiency,  unspecified     Gastroesophageal reflux disease without esophagitis    High cholesterol    Hearing loss    Polyneuropathy associated with underlying disease    Generalized weakness    Leukocytosis    Acid reflux    MDM:    1.  Atrial fibrillation paroxysmal:    Patient is maintaining sinus rhythm anticoagulation on board    2.  Hypertension:    Blood pressure is controlled continue current therapy    3.  Hyperlipidemia:    Patient is on Lipitor we shall follow    4.  Disposition:    Patient can be discharged from cardiovascular standpoint    Electronically signed by Warren Alvarenga MD, 04/12/24, 5:48 PM EDT.        Warren Alvarenga MD  04/12/24  17:48 EDT

## 2024-04-12 NOTE — PROGRESS NOTES
[facility] Progress Note      Patient Name: Lux Bray  : 1945  MRN: [id number]  Primary Care Physician:  Montana Morrow MD  Date of admission: 2024    Subjective     Interval History:     Patient feeling tired.  Complaining of dry mouth  Good urine output  No acute distress overnight    Review of Systems:   As noted above    Objective     Vitals:   Temp:  [98 °F (36.7 °C)-98.5 °F (36.9 °C)] 98 °F (36.7 °C)  Heart Rate:  [65-80] 68  Resp:  [12-16] 13  BP: (105-138)/(55-69) 105/55  Flow (L/min):  [2] 2    Intake/Output Summary (Last 24 hours) at 2024 1104  Last data filed at 2024 0541  Gross per 24 hour   Intake 120 ml   Output 925 ml   Net -805 ml       Physical Exam:      General Appearance: Chronically ill-appearing, awake  Skin: warm and dry  HEENT: oral mucosa dry, nonicteric sclera  Neck: supple, no JVD  Lungs: Decreased breath sounds at bases  Heart: RRR, normal S1 and S2  Abdomen: soft, nontender, nondistended  : no palpable bladder  Extremities: 1+ bilateral lower extremities edema    Scheduled Meds:     amiodarone, 200 mg, Oral, Q24H  atorvastatin, 20 mg, Oral, Daily  bisacodyl, 5 mg, Oral, Daily  dicyclomine, 10 mg, Oral, 4x Daily AC & at Bedtime  docusate sodium, 100 mg, Oral, BID  DULoxetine, 90 mg, Oral, Daily  enoxaparin, 1 mg/kg, Subcutaneous, Q12H  insulin lispro, 2-9 Units, Subcutaneous, 4x Daily AC & at Bedtime  metoprolol tartrate, 25 mg, Oral, Q12H  oxyCODONE, 10 mg, Oral, Daily  pantoprazole, 40 mg, Oral, Daily  pregabalin, 75 mg, Oral, BID  primidone, 150 mg, Oral, Q12H  sodium chloride, 10 mL, Intravenous, Q12H  sucralfate, 1 g, Oral, TID AC  tamsulosin, 0.4 mg, Oral, Daily  verapamil ER, 240 mg, Oral, Nightly  warfarin, 5 mg, Oral, Daily      IV Meds:   Pharmacy to Dose enoxaparin (LOVENOX),   Pharmacy to dose warfarin,   sodium chloride, 100 mL/hr, Last Rate: 100 mL/hr (24 9020)        Results Reviewed:   I have personally  reviewed the results from the time of this admission to 4/12/2024 11:04 EDT     Results from last 7 days   Lab Units 04/12/24  0435 04/11/24  0238 04/10/24  1310 04/10/24  0006 04/06/24  0448 04/05/24  1201   SODIUM mmol/L 139 139  --  135*   < > 137   POTASSIUM mmol/L 3.6 3.9 4.1 3.4*   < > 4.7   CHLORIDE mmol/L 101 102  --  99   < > 99   CO2 mmol/L 29.0 30.0*  --  28.0   < > 28.0   BUN mg/dL 34* 32*  --  36*   < > 34*   CREATININE mg/dL 1.88* 2.13*  --  2.13*   < > 1.50*   CALCIUM mg/dL 8.3* 8.5*  --  8.4*   < > 9.2   BILIRUBIN mg/dL  --   --   --   --   --  0.2   ALK PHOS U/L  --   --   --   --   --  116   ALT (SGPT) U/L  --   --   --   --   --  20   AST (SGOT) U/L  --   --   --   --   --  26   GLUCOSE mg/dL 156* 178*  --  278*   < > 180*    < > = values in this interval not displayed.     Estimated Creatinine Clearance: 34.9 mL/min (A) (by C-G formula based on SCr of 1.88 mg/dL (H)).  Results from last 7 days   Lab Units 04/12/24 0435 04/11/24 0238 04/06/24 0448   MAGNESIUM mg/dL  --   --  1.8   PHOSPHORUS mg/dL 2.8 2.3* 2.8         Results from last 7 days   Lab Units 04/10/24  0006 04/09/24  0104 04/08/24  0508 04/07/24  0526 04/06/24 0448   WBC 10*3/mm3 14.20* 15.48* 18.80* 22.33* 16.45*   HEMOGLOBIN g/dL 9.5* 10.0* 11.2* 13.0 13.2   PLATELETS 10*3/mm3 253 242 224 248 176     Results from last 7 days   Lab Units 04/12/24 0435 04/11/24 0238 04/10/24  0006   INR  1.11* 1.04* 1.03*       Assessment / Plan     ASSESSMENT:    1.  Acute kidney injury.  ATN versus AIN. Urine eosinophil negative. Nonoliguric.  Creatinine seems to be plateauing, 2.13 Mg/DL this morning.  Patient has peripheral edema but may be dry intravascularly.    2.  Septic shock.  Due to pneumonia.  Improving.  Patient is on IV Zosyn  3.  Hypokalemia. resolved  4. Pneumonia.  Patient is on IV antibiotics  5.  Benign essential hypertension.  Blood pressure is okay    PLAN:  Continue gentle IV hydration  Follow renal ultrasound  Repeat labs  in a.m.    Gen Ruiz MD  04/12/24  11:04 EDT    [facility]  [phone]

## 2024-04-12 NOTE — PROGRESS NOTES
"Pharmacy dosing service  Anticoagulant  Warfarin     Subjective:    Lux Bray is a 78 y.o.male being initiated on warfarin for Atrial Fibrillation.    INR Goal: 2 - 3  Bridge Therapy Present?:  Yes, Enoxaparin 80 mg SQ Q24H   Interacting Medications Evaluation (New/Present/Discontinued): amiodarone, primidone  Additional Contributing Factors: renal disease, interacting medications, older age      Assessment/Plan:    New start warfarin due to DOAC interaction with primidone. Will increase patient to warfarin 5 mg daily bridged with enoxaparin. Renally adjusted enoxaparin from 1 mg/kg once daily to 1mg/kg (also updated for updated weight) for CrCl >30 ml/min.     Continue to monitor and adjust based on INR.         Date 4/9 4/10 4/11 4/12        INR ----- 1.03 1.04 1.11        Dose 2.5mg 2.5 mg 3 mg 5mg            Objective:  [Ht: 180.3 cm (71\"); Wt: 78.7 kg (173 lb 8 oz); BMI: Body mass index is 24.2 kg/m².]    Lab Results   Component Value Date    ALBUMIN 2.2 (L) 04/12/2024     Lab Results   Component Value Date    INR 1.11 (L) 04/12/2024    INR 1.04 (L) 04/11/2024    INR 1.03 (L) 04/10/2024    PROTIME 12.0 (L) 04/12/2024    PROTIME 11.3 (L) 04/11/2024    PROTIME 11.2 (L) 04/10/2024     Lab Results   Component Value Date    HGB 9.5 (L) 04/10/2024    HGB 10.0 (L) 04/09/2024    HGB 11.2 (L) 04/08/2024     Lab Results   Component Value Date    HCT 29.7 (L) 04/10/2024    HCT 31.6 (L) 04/09/2024    HCT 35.3 (L) 04/08/2024       [place]ana Vargas [facility]  04/12/24 10:06 EDT           "

## 2024-04-13 ENCOUNTER — APPOINTMENT (OUTPATIENT)
Dept: CT IMAGING | Facility: HOSPITAL | Age: 79
End: 2024-04-13
Payer: MEDICARE

## 2024-04-13 ENCOUNTER — APPOINTMENT (OUTPATIENT)
Dept: GENERAL RADIOLOGY | Facility: HOSPITAL | Age: 79
End: 2024-04-13
Payer: MEDICARE

## 2024-04-13 LAB
ALBUMIN SERPL-MCNC: 2.3 G/DL (ref 3.5–5.2)
ALBUMIN/GLOB SERPL: 0.7 G/DL
ALP SERPL-CCNC: 280 U/L (ref 39–117)
ALT SERPL W P-5'-P-CCNC: 71 U/L (ref 1–41)
ANION GAP SERPL CALCULATED.3IONS-SCNC: 8 MMOL/L (ref 5–15)
AST SERPL-CCNC: 37 U/L (ref 1–40)
BASOPHILS # BLD AUTO: 0.07 10*3/MM3 (ref 0–0.2)
BASOPHILS NFR BLD AUTO: 0.4 % (ref 0–1.5)
BILIRUB SERPL-MCNC: <0.2 MG/DL (ref 0–1.2)
BUN SERPL-MCNC: 30 MG/DL (ref 8–23)
BUN/CREAT SERPL: 18.6 (ref 7–25)
CALCIUM SPEC-SCNC: 8.7 MG/DL (ref 8.6–10.5)
CHLORIDE SERPL-SCNC: 102 MMOL/L (ref 98–107)
CO2 SERPL-SCNC: 28 MMOL/L (ref 22–29)
CREAT SERPL-MCNC: 1.61 MG/DL (ref 0.76–1.27)
DEPRECATED RDW RBC AUTO: 51.5 FL (ref 37–54)
EGFRCR SERPLBLD CKD-EPI 2021: 43 ML/MIN/1.73
EOSINOPHIL # BLD AUTO: 0.25 10*3/MM3 (ref 0–0.4)
EOSINOPHIL NFR BLD AUTO: 1.3 % (ref 0.3–6.2)
ERYTHROCYTE [DISTWIDTH] IN BLOOD BY AUTOMATED COUNT: 15.1 % (ref 12.3–15.4)
GLOBULIN UR ELPH-MCNC: 3.1 GM/DL
GLUCOSE BLDC GLUCOMTR-MCNC: 135 MG/DL (ref 70–105)
GLUCOSE BLDC GLUCOMTR-MCNC: 157 MG/DL (ref 70–105)
GLUCOSE BLDC GLUCOMTR-MCNC: 162 MG/DL (ref 70–105)
GLUCOSE BLDC GLUCOMTR-MCNC: 215 MG/DL (ref 70–105)
GLUCOSE SERPL-MCNC: 194 MG/DL (ref 65–99)
HCT VFR BLD AUTO: 34.1 % (ref 37.5–51)
HGB BLD-MCNC: 10.8 G/DL (ref 13–17.7)
IMM GRANULOCYTES # BLD AUTO: 0.83 10*3/MM3 (ref 0–0.05)
IMM GRANULOCYTES NFR BLD AUTO: 4.2 % (ref 0–0.5)
INR PPP: 1.28 (ref 2–3)
LYMPHOCYTES # BLD AUTO: 2.22 10*3/MM3 (ref 0.7–3.1)
LYMPHOCYTES NFR BLD AUTO: 11.3 % (ref 19.6–45.3)
MCH RBC QN AUTO: 29.3 PG (ref 26.6–33)
MCHC RBC AUTO-ENTMCNC: 31.7 G/DL (ref 31.5–35.7)
MCV RBC AUTO: 92.7 FL (ref 79–97)
MONOCYTES # BLD AUTO: 2 10*3/MM3 (ref 0.1–0.9)
MONOCYTES NFR BLD AUTO: 10.1 % (ref 5–12)
NEUTROPHILS NFR BLD AUTO: 14.35 10*3/MM3 (ref 1.7–7)
NEUTROPHILS NFR BLD AUTO: 72.7 % (ref 42.7–76)
NRBC BLD AUTO-RTO: 0 /100 WBC (ref 0–0.2)
PLATELET # BLD AUTO: 383 10*3/MM3 (ref 140–450)
PMV BLD AUTO: 9.9 FL (ref 6–12)
POTASSIUM SERPL-SCNC: 3.9 MMOL/L (ref 3.5–5.2)
PROCALCITONIN SERPL-MCNC: 0.21 NG/ML (ref 0–0.25)
PROCALCITONIN SERPL-MCNC: 0.3 NG/ML (ref 0–0.25)
PROT SERPL-MCNC: 5.4 G/DL (ref 6–8.5)
PROTHROMBIN TIME: 13.7 SECONDS (ref 19.4–28.5)
RBC # BLD AUTO: 3.68 10*6/MM3 (ref 4.14–5.8)
SODIUM SERPL-SCNC: 138 MMOL/L (ref 136–145)
WBC NRBC COR # BLD AUTO: 19.72 10*3/MM3 (ref 3.4–10.8)

## 2024-04-13 PROCEDURE — 80053 COMPREHEN METABOLIC PANEL: CPT | Performed by: INTERNAL MEDICINE

## 2024-04-13 PROCEDURE — 82948 REAGENT STRIP/BLOOD GLUCOSE: CPT

## 2024-04-13 PROCEDURE — 25010000002 ENOXAPARIN PER 10 MG: Performed by: INTERNAL MEDICINE

## 2024-04-13 PROCEDURE — 87147 CULTURE TYPE IMMUNOLOGIC: CPT | Performed by: INTERNAL MEDICINE

## 2024-04-13 PROCEDURE — 25810000003 SODIUM CHLORIDE 0.9 % SOLUTION: Performed by: INTERNAL MEDICINE

## 2024-04-13 PROCEDURE — 85025 COMPLETE CBC W/AUTO DIFF WBC: CPT | Performed by: INTERNAL MEDICINE

## 2024-04-13 PROCEDURE — 87040 BLOOD CULTURE FOR BACTERIA: CPT | Performed by: INTERNAL MEDICINE

## 2024-04-13 PROCEDURE — 84145 PROCALCITONIN (PCT): CPT | Performed by: INTERNAL MEDICINE

## 2024-04-13 PROCEDURE — 87154 CUL TYP ID BLD PTHGN 6+ TRGT: CPT | Performed by: INTERNAL MEDICINE

## 2024-04-13 PROCEDURE — 71045 X-RAY EXAM CHEST 1 VIEW: CPT

## 2024-04-13 PROCEDURE — 74176 CT ABD & PELVIS W/O CONTRAST: CPT

## 2024-04-13 PROCEDURE — 63710000001 INSULIN LISPRO (HUMAN) PER 5 UNITS

## 2024-04-13 PROCEDURE — 85610 PROTHROMBIN TIME: CPT | Performed by: INTERNAL MEDICINE

## 2024-04-13 PROCEDURE — 71250 CT THORAX DX C-: CPT

## 2024-04-13 RX ORDER — PRIMIDONE 50 MG/1
100 TABLET ORAL EVERY 12 HOURS SCHEDULED
Status: DISCONTINUED | OUTPATIENT
Start: 2024-04-13 | End: 2024-04-16

## 2024-04-13 RX ORDER — WHEY PROTEIN ISOLATE 6 G-25/7 G
1 POWDER (GRAM) ORAL 2 TIMES DAILY
Status: DISCONTINUED | OUTPATIENT
Start: 2024-04-13 | End: 2024-04-17 | Stop reason: HOSPADM

## 2024-04-13 RX ORDER — ATORVASTATIN CALCIUM 10 MG/1
10 TABLET, FILM COATED ORAL DAILY
Status: DISCONTINUED | OUTPATIENT
Start: 2024-04-14 | End: 2024-04-16

## 2024-04-13 RX ADMIN — INSULIN LISPRO 2 UNITS: 100 INJECTION, SOLUTION INTRAVENOUS; SUBCUTANEOUS at 08:41

## 2024-04-13 RX ADMIN — PRIMIDONE 150 MG: 50 TABLET ORAL at 08:39

## 2024-04-13 RX ADMIN — PRIMIDONE 100 MG: 50 TABLET ORAL at 21:35

## 2024-04-13 RX ADMIN — INSULIN LISPRO 4 UNITS: 100 INJECTION, SOLUTION INTRAVENOUS; SUBCUTANEOUS at 13:14

## 2024-04-13 RX ADMIN — DULOXETINE HYDROCHLORIDE 90 MG: 30 CAPSULE, DELAYED RELEASE ORAL at 08:41

## 2024-04-13 RX ADMIN — ATORVASTATIN CALCIUM 20 MG: 20 TABLET, FILM COATED ORAL at 08:39

## 2024-04-13 RX ADMIN — PREGABALIN 75 MG: 75 CAPSULE ORAL at 08:40

## 2024-04-13 RX ADMIN — Medication 10 ML: at 08:46

## 2024-04-13 RX ADMIN — DICYCLOMINE HYDROCHLORIDE 10 MG: 10 CAPSULE ORAL at 21:35

## 2024-04-13 RX ADMIN — MODAFINIL 100 MG: 100 TABLET ORAL at 08:41

## 2024-04-13 RX ADMIN — TAMSULOSIN HYDROCHLORIDE 0.4 MG: 0.4 CAPSULE ORAL at 08:41

## 2024-04-13 RX ADMIN — BISACODYL 5 MG: 5 TABLET, COATED ORAL at 08:41

## 2024-04-13 RX ADMIN — DICYCLOMINE HYDROCHLORIDE 10 MG: 10 CAPSULE ORAL at 08:41

## 2024-04-13 RX ADMIN — DICYCLOMINE HYDROCHLORIDE 10 MG: 10 CAPSULE ORAL at 13:13

## 2024-04-13 RX ADMIN — PREGABALIN 75 MG: 75 CAPSULE ORAL at 21:35

## 2024-04-13 RX ADMIN — SUCRALFATE 1 G: 1 TABLET ORAL at 08:41

## 2024-04-13 RX ADMIN — OXYCODONE 10 MG: 5 TABLET ORAL at 08:40

## 2024-04-13 RX ADMIN — Medication 1 PACKET: at 21:56

## 2024-04-13 RX ADMIN — AMIODARONE HYDROCHLORIDE 200 MG: 200 TABLET ORAL at 08:41

## 2024-04-13 RX ADMIN — WARFARIN SODIUM 5 MG: 5 TABLET ORAL at 18:21

## 2024-04-13 RX ADMIN — SODIUM CHLORIDE 50 ML/HR: 9 INJECTION, SOLUTION INTRAVENOUS at 13:22

## 2024-04-13 RX ADMIN — DOCUSATE SODIUM 100 MG: 100 CAPSULE, LIQUID FILLED ORAL at 08:46

## 2024-04-13 RX ADMIN — DICYCLOMINE HYDROCHLORIDE 10 MG: 10 CAPSULE ORAL at 18:21

## 2024-04-13 RX ADMIN — METOPROLOL TARTRATE 25 MG: 25 TABLET, FILM COATED ORAL at 08:40

## 2024-04-13 RX ADMIN — DOCUSATE SODIUM 100 MG: 100 CAPSULE, LIQUID FILLED ORAL at 21:35

## 2024-04-13 RX ADMIN — VERAPAMIL HYDROCHLORIDE 240 MG: 120 CAPSULE, DELAYED RELEASE PELLETS ORAL at 21:34

## 2024-04-13 RX ADMIN — SUCRALFATE 1 G: 1 TABLET ORAL at 13:13

## 2024-04-13 RX ADMIN — ENOXAPARIN SODIUM 80 MG: 100 INJECTION SUBCUTANEOUS at 13:13

## 2024-04-13 RX ADMIN — METOPROLOL TARTRATE 25 MG: 25 TABLET, FILM COATED ORAL at 21:35

## 2024-04-13 RX ADMIN — PANTOPRAZOLE SODIUM 40 MG: 40 TABLET, DELAYED RELEASE ORAL at 08:40

## 2024-04-13 RX ADMIN — Medication 10 ML: at 21:36

## 2024-04-13 RX ADMIN — SUCRALFATE 1 G: 1 TABLET ORAL at 18:21

## 2024-04-13 RX ADMIN — ENOXAPARIN SODIUM 80 MG: 100 INJECTION SUBCUTANEOUS at 22:50

## 2024-04-13 RX ADMIN — OXYCODONE 5 MG: 5 TABLET ORAL at 13:54

## 2024-04-13 RX ADMIN — INSULIN LISPRO 2 UNITS: 100 INJECTION, SOLUTION INTRAVENOUS; SUBCUTANEOUS at 18:21

## 2024-04-13 NOTE — PROGRESS NOTES
"Pharmacy dosing service  Anticoagulant  Warfarin     Subjective:    Lux Bray is a 78 y.o.male being initiated on warfarin for Atrial Fibrillation.    INR Goal: 2 - 3  Bridge Therapy Present?:  Yes, Enoxaparin 80 mg SQ Q24H   Interacting Medications Evaluation (New/Present/Discontinued): amiodarone, primidone  Additional Contributing Factors: renal disease, interacting medications, older age      Assessment/Plan:    New start warfarin due to DOAC interaction with primidone.     INR subtherapeutic but increasing appropriately today. Will continue warfarin 5mg daily.     Continues lovenox bridge until INR >2    Continue to monitor and adjust based on INR.         Date 4/9 4/10 4/11 4/12 4/13       INR ----- 1.03 1.04 1.11 1.28       Dose 2.5mg 2.5 mg 3 mg 5mg 5mg            Objective:  [Ht: 180.3 cm (71\"); Wt: 77.7 kg (171 lb 4.8 oz); BMI: Body mass index is 23.89 kg/m².]    Lab Results   Component Value Date    ALBUMIN 2.3 (L) 04/13/2024     Lab Results   Component Value Date    INR 1.28 (L) 04/13/2024    INR 1.11 (L) 04/12/2024    INR 1.04 (L) 04/11/2024    PROTIME 13.7 (L) 04/13/2024    PROTIME 12.0 (L) 04/12/2024    PROTIME 11.3 (L) 04/11/2024     Lab Results   Component Value Date    HGB 10.8 (L) 04/13/2024    HGB 9.5 (L) 04/10/2024    HGB 10.0 (L) 04/09/2024     Lab Results   Component Value Date    HCT 34.1 (L) 04/13/2024    HCT 29.7 (L) 04/10/2024    HCT 31.6 (L) 04/09/2024       Joaquina Whitaker, PharmD  04/13/24 15:03 EDT             "

## 2024-04-13 NOTE — PLAN OF CARE
Goal Outcome Evaluation:         Patient alert and able to make needs known with intermittent confusion per baseline according to family. Patient complaining of generalized neck and back pain this shift relieved by positioning and prn tylenol. Patient is free of signs and symptoms of acute distress. Call light remains in reach.

## 2024-04-13 NOTE — PLAN OF CARE
Goal Outcome Evaluation:      Pt is more alert, enjoys vanila boost, had two on day shift. ID consulted for elevated WBC, CT of chest and abdomin,  Vitals stable.

## 2024-04-13 NOTE — PROGRESS NOTES
"[facility] MEDICINE SERVICE  DAILY PROGRESS NOTE    NAME: Lux Bray  : 1945  MRN: [id number]      LOS: 8 days     PROVIDER OF SERVICE: Kendall Haynes MD    Chief Complaint: Septic shock    Subjective:     History of Present illness      Lux Bray is a 78 y.o. male with PMH of HTN, DM II, Depression,  presented to the hospital after reportedly being sick for a few days at rehab, and was admitted with a principal diagnosis of Septic shock.      Per family the patient has had a neurologic decline over the last few months. CT head was negative for acute abnormality. Ammonia level was normal. Per chart review, the patient's PCP has decreased some of the sedating medications the patient has or was taking. Patient is currently drowsy but will wake up and answer questions appropriately. He is reporting that he is very tired.      In ER the patient was found to be hypotensive. He was given the full sepsis bolus and started on levophed. Chest x-ray showed bibasilar infiltrates. Patient's family reports that they have had concerns he has been aspirating. Patient will be transferred to ICU for close monitoring and vasopressor support.       ACP: CPR, Full Intervention. Patients wife is his decision maker in the even he is unable.      Patient was seen and examined on 24 at 13:54 EDT .     24-Patient admitted to the ICU for septic shock and only needed vasopressors very briefly.  Has been off vasopressors since last night.  Developed A-fib with RVR, will give a dose of digoxin.  If blood pressure permits, resume home verapamil.  Will likely need full dose anticoagulation, defer to primary team.  Transfer out of the ICU to hospitalist service.      24-Patient drowsy but easily aroused. Knows what year it is. States that he hurts all over, and is nauseous. He also states that he is \"So, so, so, tired.\" Denies feeling short of breath, or having chest pain.   24 patient seen and " examined in bed no acute distress, vital signs stable, discussed with RN.  Weak weak and tired.Patient is currently n.p.o. pending speech evaluation.   4/8/2024 patient seen and examined in bed no acute distress, vital signs stable, discussed with RN.  Patient with weakness fatigue..  Apparently was living at home.  Speech recommended that pt initiate a mechanical soft diet with NT liquids as tolerated   4/9/24 seen in bed NAD, VSS, some confusion, JOSE L DELATORRE  4/10/24 patient seen in bed JOANNA, JOSE L DELATORRE, [place], per  wife wanted to take him home, unable to use eliquis, started coumadin, awaiting placement  4/11/24 seen in bed NAD, weakness and fatigue, DW family says he has had increased loss of mobility over the last 6 month  4/12/24 patient seen and examined in bed no acute distress, patient is weak tired, fatigue. very sleepy.  Vital signs stable, renal function slightly improved.  Discussed with RN. Long discussion with family >30min  4/13/2024 patient seen and examined is more alert today able to carry a conversation.  Eating better.  Discussed with RN, WBC count has increased to 19.  Temperature 100.2   Off antibiotics.  INR pending.  Creatinine pending, on IV fluids.  BNP pending,    Review of Systems   Constitutional:  Positive for fatigue. Negative for appetite change, chills and fever.   HENT:  Negative for congestion.    Eyes:  Negative for pain and redness.   Respiratory:  Positive for shortness of breath. Negative for cough and chest tightness.    Gastrointestinal:  Negative for abdominal pain, diarrhea, nausea and vomiting.   Endocrine: Negative for cold intolerance and heat intolerance.   Genitourinary:  Negative for dysuria, flank pain and frequency.   Musculoskeletal:  Negative for back pain and myalgias.   Neurological:  Positive for weakness. Negative for dizziness and headaches.   Psychiatric/Behavioral:  Negative for sleep disturbance. The patient is not nervous/anxious.      Objective:     Vital  Signs  Temp:  [98.3 °F (36.8 °C)-100.2 °F (37.9 °C)] 99 °F (37.2 °C)  Heart Rate:  [74-82] 81  Resp:  [13-22] 14  BP: (123-139)/(56-62) 123/58  Flow (L/min):  [2-3] 2   Body mass index is 23.89 kg/m².    Physical Exam  Constitutional:       Appearance: Normal appearance.   HENT:      Head: Normocephalic and atraumatic.      Nose: Nose normal.      Mouth/Throat:      Mouth: Mucous membranes are moist.   Eyes:      Extraocular Movements: Extraocular movements intact.      Conjunctiva/sclera: Conjunctivae normal.      Pupils: Pupils are equal, round, and reactive to light.   Cardiovascular:      Rate and Rhythm: Regular rhythm. Tachycardia present.      Pulses: Normal pulses.      Heart sounds: Normal heart sounds.   Pulmonary:      Effort: Pulmonary effort is normal.      Breath sounds: Normal breath sounds.   Musculoskeletal:      Cervical back: Normal range of motion.   Skin:     General: Skin is warm and dry.   Neurological:      General: No focal deficit present.      Mental Status: He is alert. He is disoriented.       Scheduled Meds   amiodarone, 200 mg, Oral, Q24H  [START ON 4/14/2024] atorvastatin, 10 mg, Oral, Daily  Beneprotein, 1 packet, Oral, BID  bisacodyl, 5 mg, Oral, Daily  dicyclomine, 10 mg, Oral, 4x Daily AC & at Bedtime  docusate sodium, 100 mg, Oral, BID  DULoxetine, 90 mg, Oral, Daily  enoxaparin, 1 mg/kg, Subcutaneous, Q12H  insulin lispro, 2-9 Units, Subcutaneous, 4x Daily AC & at Bedtime  metoprolol tartrate, 25 mg, Oral, Q12H  modafinil, 100 mg, Oral, Daily  oxyCODONE, 10 mg, Oral, Daily  pantoprazole, 40 mg, Oral, Daily  pregabalin, 75 mg, Oral, BID  primidone, 150 mg, Oral, Q12H  sodium chloride, 10 mL, Intravenous, Q12H  sucralfate, 1 g, Oral, TID AC  tamsulosin, 0.4 mg, Oral, Daily  verapamil ER, 240 mg, Oral, Nightly  warfarin, 5 mg, Oral, Daily       PRN Meds     acetaminophen **OR** acetaminophen    bisacodyl    dextrose    dextrose    glucagon (human recombinant)     ipratropium-albuterol    magnesium hydroxide    nitroglycerin    ondansetron ODT **OR** ondansetron    oxyCODONE    Pharmacy to Dose enoxaparin (LOVENOX)    Pharmacy to dose warfarin    sodium chloride    sodium chloride    sodium chloride   Infusions  Pharmacy to Dose enoxaparin (LOVENOX),   Pharmacy to dose warfarin,   sodium chloride, 50 mL/hr, Last Rate: 100 mL/hr (04/12/24 0947)        Diagnostic Data    Results from last 7 days   Lab Units 04/13/24  1148 04/12/24  0435   WBC 10*3/mm3 19.72*  --    HEMOGLOBIN g/dL 10.8*  --    HEMATOCRIT % 34.1*  --    PLATELETS 10*3/mm3 383  --    GLUCOSE mg/dL  --  156*   CREATININE mg/dL  --  1.88*   BUN mg/dL  --  34*   SODIUM mmol/L  --  139   POTASSIUM mmol/L  --  3.6   ANION GAP mmol/L  --  9.0     XR Chest 1 View    Result Date: 4/13/2024  Impression: 1. Cardiomegaly with left greater than right pleural effusions, likely similar to the prior chest CT dated 4/7/2024. 2. Left basilar airspace opacity representing compressive atelectasis or pneumonia. Electronically Signed: Frank Mcghee  4/13/2024 9:17 AM EDT  Workstation ID: [id number]    US Liver    Result Date: 4/12/2024  Impression: The liver appears sonographically unremarkable. Partial visualization of a small to moderate right pleural effusion. Electronically Signed: Luciano Arenas MD  4/12/2024 4:31 PM EDT  Workstation ID: [id number]    MRI Brain Without Contrast    Result Date: 4/11/2024  Impression: Brain atrophy with chronic microvascular ischemic changes No acute intracranial abnormality Electronically Signed: Moisés Clark MD  4/11/2024 8:17 PM EDT  Workstation ID: [id number]    US Renal Bilateral    Result Date: 4/11/2024  Impression: Minimal cortical thinning on the right kidney which otherwise has an unremarkable sonographic appearance. No hydronephrosis. The left kidney is not visualized and is likely obscured by bowel gas and less there has been interval left nephrectomy over the past 4 days.  Electronically Signed: Jerome Grimes,   4/11/2024 3:24 PM EDT  Workstation ID: [id number]     I reviewed the patient's new clinical results.    Assessment/Plan:     Active and Resolved Problems  Active Hospital Problems    Diagnosis  POA    **Septic shock [A41.9, R65.21]  Yes    High cholesterol [E78.00]  Yes    Hearing loss [H91.90]  Yes    Acid reflux [K21.9]  Yes    Generalized weakness [R53.1]  Yes    Leukocytosis [D72.829]  Yes    Polyneuropathy associated with underlying disease [G63]  Yes    Gastroesophageal reflux disease without esophagitis [K21.9]  Yes    Vitamin D deficiency, unspecified  [E55.9]  Yes    Depression [F32.A]  Yes    HTN (hypertension) [I10]  Yes    Arthritis [M19.90]  Yes    Lumbago with sciatica, right side [M54.41]  Yes    Cholelithiasis [K80.20]  Yes    Irritable bowel syndrome without diarrhea [K58.9]  Yes    Cervical radiculopathy [M54.12]  Yes    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     Patient downgraded from ICU to PCU level of care.     Septic Shock due to Pneumonia   -WBC-14>19.7  Tmax 100.2  -Respiratory panel negative   -MRSA swab negative  -NS at 50mL/hr   -Levophed discontinued   -received Zosyn   -ID consulted  -SLP consult to rule out aspiration pna     Acute kidney injury. ATN versus AIN. Urine eosinophil negative. Nonoliguric.   Nephrology following-IV fluids  -Creatinine seems to be plateauing, 2.13>1.88 Mg/DL   - Patient has peripheral edema but may be dry intravascularly. Low alb/CHF     Diabetes Mellitus   -Hgb A1c 4/5/24 6.7  -Continue to hold home medications   -Accuchecks ACHS   -SSI ordered   -add farxiga    Hypertension -Not  controlled with medication at home  -Monitor BP per protocol   -metoprolol/verapamil/amiodarone    Hyperlipidemia --   -Patient on Lipitor at home  -Last lipid profile 5/26/23 negative  -Restart lipitor when not NPO    Depression  -Patient on Cymbalta at home  -Restart when patient not NPO    Altered Mental Status improving  -No family at bedside at this time  -Reports of neurologic decline over last few months   -Patient unable to make needs known   Hx tremor on primidone taper dose down    Progressive weakness over the last 6 months. that started with tremors.  Will obtain MRI.  Consulted neurology.    Dysphasia   -Failed nursing water screen  -SLP eval today,  recommended that pt initiate a mechanical soft diet with NT liquids as tolerated   -poor po intake    Atrial fibrillation/flutter:Patient is in sinus rhythm.    Patient is on metoprolol.  verapamil  Started anticoagulation coumadin-lovenox.  coumadin-pharmacy to monitor    Elevated BNP-1661  Echo-Left ventricular systolic function is normal. Left ventricular ejection   fraction appears to be 56 - 60%. .   monitor BNP  Pleural effusion on cxr  -receiving fluids- decreased to 50 cc hr    DVT prophylaxis:  Medical and mechanical DVT prophylaxis orders are present.      Code status is   Code Status and Medical Interventions:   Ordered at: 04/05/24 1340     Code Status (Patient has no pulse and is not breathing):    CPR (Attempt to Resuscitate)     Medical Interventions (Patient has pulse or is breathing):    Full Support       Plan for disposition:Pending clinical course.   Long discussion with family over 30 min ACP      Signature: Electronically signed by Kendall Haynes MD, 04/13/24, 12:52 EDT.  [facility] Hospitalist Team

## 2024-04-13 NOTE — PROGRESS NOTES
Infectious Diseases Progress Note      LOS: 8 days   Patient Care Team:  Montana Morrow MD as PCP - General (Family Medicine)  George Viveros MD as Consulting Physician (Gastroenterology)    Chief Complaint: Weakness    Subjective     I was reconsulted today since patient white count worsened.  Patient had low-grade fever up to 100.2.  He is afebrile other than that.  He is currently on 2 L of oxygen via nasal cannula.  He is awake and alert but is complaining of weakness    Review of Systems:   Review of Systems   Constitutional:  Positive for fatigue.        Objective     Vital Signs  Temp:  [97.4 °F (36.3 °C)-100.2 °F (37.9 °C)] 97.4 °F (36.3 °C)  Heart Rate:  [76-83] 83  Resp:  [13-22] 15  BP: (115-135)/(51-62) 115/51    Physical Exam:  Physical Exam  Vitals and nursing note reviewed.   Constitutional:       Appearance: He is well-developed. He is ill-appearing.   HENT:      Head: Normocephalic and atraumatic.   Eyes:      Pupils: Pupils are equal, round, and reactive to light.   Cardiovascular:      Rate and Rhythm: Normal rate and regular rhythm.      Heart sounds: Normal heart sounds.   Pulmonary:      Effort: Pulmonary effort is normal. No respiratory distress.      Breath sounds: Normal breath sounds. No wheezing or rales.   Abdominal:      General: Bowel sounds are normal. There is no distension.      Palpations: Abdomen is soft. There is no mass.      Tenderness: There is no abdominal tenderness. There is no guarding or rebound.   Musculoskeletal:         General: No deformity. Normal range of motion.      Cervical back: Normal range of motion and neck supple.   Skin:     General: Skin is warm.      Findings: No erythema or rash.   Neurological:      Mental Status: He is alert and oriented to person, place, and time.      Cranial Nerves: No cranial nerve deficit.          Results Review:    I have reviewed all clinical data, test, lab, and imaging results.     Radiology  XR Chest 1 View    Result  Date: 4/13/2024  XR CHEST 1 VW Date of Exam: 4/13/2024 8:50 AM EDT Indication: Pna Comparison: Chest radiograph dated 4/5/2024, CT chest dated 4/7/2024 Findings: There is mild cardiomegaly. There is normal pulmonary vascularity. There is left greater than right pleural effusion which appears likely similar to the prior CT from 4/7/2024. There is left basilar airspace disease representing atelectasis or pneumonia.  There is no evidence of pneumothorax. There are degenerative changes of the thoracic spine.     Impression: 1. Cardiomegaly with left greater than right pleural effusions, likely similar to the prior chest CT dated 4/7/2024. 2. Left basilar airspace opacity representing compressive atelectasis or pneumonia. Electronically Signed: Frank Mcghee  4/13/2024 9:17 AM EDT  Workstation ID: [id number]    US Liver    Result Date: 4/12/2024  US LIVER Date of Exam: 4/12/2024 4:10 PM EDT Indication: poor po intake. Comparison: Noncontrast CT of the abdomen and pelvis performed on April 7, 2024 Technique: Grayscale and color Doppler ultrasound evaluation of the liver was performed. Findings: The liver appears normal in size and echogenicity. No focal lesions identified. Normal flow is present within the hepatic vasculature. The visualized portion of the gallbladder appears within normal limits. There is no intrahepatic biliary duct dilatation. Common bile duct measures 0.4 cm. Partial visualization of a small to moderate right pleural effusion.     Impression: The liver appears sonographically unremarkable. Partial visualization of a small to moderate right pleural effusion. Electronically Signed: Luciano Arenas MD  4/12/2024 4:31 PM EDT  Workstation ID: [id number]     Cardiology    Laboratory    Results from last 7 days   Lab Units 04/13/24  1148 04/10/24  0006 04/09/24  0104 04/08/24  0508 04/07/24  0526   WBC 10*3/mm3 19.72* 14.20* 15.48* 18.80* 22.33*   HEMOGLOBIN g/dL 10.8* 9.5* 10.0* 11.2* 13.0   HEMATOCRIT % 34.1*  29.7* 31.6* 35.3* 40.9   PLATELETS 10*3/mm3 383 253 242 224 248     Results from last 7 days   Lab Units 04/12/24  0435 04/11/24  0238 04/10/24  1310 04/10/24  0006 04/09/24  0104 04/08/24  0508 04/07/24  0043   SODIUM mmol/L 139 139  --  135* 137 139 140   POTASSIUM mmol/L 3.6 3.9 4.1 3.4* 3.6 3.4* 3.5   CHLORIDE mmol/L 101 102  --  99 100 102 100   CO2 mmol/L 29.0 30.0*  --  28.0 29.0 26.0 25.0   BUN mg/dL 34* 32*  --  36* 37* 33* 23   CREATININE mg/dL 1.88* 2.13*  --  2.13* 1.81* 1.51* 1.11   GLUCOSE mg/dL 156* 178*  --  278* 199* 142* 172*   ALBUMIN g/dL 2.2* 2.3*  --   --   --   --   --    CALCIUM mg/dL 8.3* 8.5*  --  8.4* 8.3* 8.8 9.1     Results from last 7 days   Lab Units 04/11/24  0238   CK TOTAL U/L 13*             Microbiology   Microbiology Results (last 10 days)       Procedure Component Value - Date/Time    Eosinophil Smear - Urine, Urine, Catheter [[id number]]  (Normal) Collected: 04/10/24 2121    Lab Status: Final result Specimen: Urine, Catheter Updated: 04/10/24 2225     Eosinophil Smear 0 % EOS/100 Cells     MRSA Screen, PCR (Inpatient) - Swab, Nares [[id number]]  (Normal) Collected: 04/06/24 2226    Lab Status: Final result Specimen: Swab from Nares Updated: 04/06/24 2349     MRSA PCR No MRSA Detected    Narrative:      The negative predictive value of this diagnostic test is high and should only be used to consider de-escalating anti-MRSA therapy. A positive result may indicate colonization with MRSA and must be correlated clinically.    MRSA Screen, PCR (Inpatient) - Swab, Nares [[id number]]  (Normal) Collected: 04/05/24 1406    Lab Status: Final result Specimen: Swab from Nares Updated: 04/05/24 1524     MRSA PCR No MRSA Detected    Narrative:      The negative predictive value of this diagnostic test is high and should only be used to consider de-escalating anti-MRSA therapy. A positive result may indicate colonization with MRSA and must be correlated clinically.    Respiratory Panel PCR  w/COVID-19(SARS-CoV-2) DODIE/JOVITA/THEO/PAD/COR/RAJAT In-House, NP Swab in UTM/VTM, 2 HR TAT - Swab, Nasopharynx [[id number]]  (Normal) Collected: 04/05/24 1406    Lab Status: Final result Specimen: Swab from Nasopharynx Updated: 04/05/24 1500     ADENOVIRUS, PCR Not Detected     Coronavirus 229E Not Detected     Coronavirus HKU1 Not Detected     Coronavirus NL63 Not Detected     Coronavirus OC43 Not Detected     COVID19 Not Detected     Human Metapneumovirus Not Detected     Human Rhinovirus/Enterovirus Not Detected     Influenza A PCR Not Detected     Influenza B PCR Not Detected     Parainfluenza Virus 1 Not Detected     Parainfluenza Virus 2 Not Detected     Parainfluenza Virus 3 Not Detected     Parainfluenza Virus 4 Not Detected     RSV, PCR Not Detected     Bordetella pertussis pcr Not Detected     Bordetella parapertussis PCR Not Detected     Chlamydophila pneumoniae PCR Not Detected     Mycoplasma pneumo by PCR Not Detected    Narrative:      In the setting of a positive respiratory panel with a viral infection PLUS a negative procalcitonin without other underlying concern for bacterial infection, consider observing off antibiotics or discontinuation of antibiotics and continue supportive care. If the respiratory panel is positive for atypical bacterial infection (Bordetella pertussis, Chlamydophila pneumoniae, or Mycoplasma pneumoniae), consider antibiotic de-escalation to target atypical bacterial infection.    Blood Culture - Blood, Arm, Left [[id number]]  (Normal) Collected: 04/05/24 1203    Lab Status: Final result Specimen: Blood from Arm, Left Updated: 04/10/24 1230     Blood Culture No growth at 5 days    Blood Culture - Blood, Arm, Right [[id number]]  (Normal) Collected: 04/05/24 1201    Lab Status: Final result Specimen: Blood from Arm, Right Updated: 04/10/24 1230     Blood Culture No growth at 5 days            Medication Review:       Schedule Meds  amiodarone, 200 mg, Oral, Q24H  [START ON 4/14/2024]  atorvastatin, 10 mg, Oral, Daily  Beneprotein, 1 packet, Oral, BID  bisacodyl, 5 mg, Oral, Daily  dicyclomine, 10 mg, Oral, 4x Daily AC & at Bedtime  docusate sodium, 100 mg, Oral, BID  DULoxetine, 90 mg, Oral, Daily  enoxaparin, 1 mg/kg, Subcutaneous, Q12H  insulin lispro, 2-9 Units, Subcutaneous, 4x Daily AC & at Bedtime  metoprolol tartrate, 25 mg, Oral, Q12H  modafinil, 100 mg, Oral, Daily  oxyCODONE, 10 mg, Oral, Daily  pantoprazole, 40 mg, Oral, Daily  pregabalin, 75 mg, Oral, BID  primidone, 150 mg, Oral, Q12H  sodium chloride, 10 mL, Intravenous, Q12H  sucralfate, 1 g, Oral, TID AC  tamsulosin, 0.4 mg, Oral, Daily  verapamil ER, 240 mg, Oral, Nightly  warfarin, 5 mg, Oral, Daily        Infusion Meds  Pharmacy to Dose enoxaparin (LOVENOX),   Pharmacy to dose warfarin,   sodium chloride, 50 mL/hr, Last Rate: 50 mL/hr (04/13/24 1322)        PRN Meds    acetaminophen **OR** acetaminophen    bisacodyl    dextrose    dextrose    glucagon (human recombinant)    ipratropium-albuterol    magnesium hydroxide    nitroglycerin    ondansetron ODT **OR** ondansetron    oxyCODONE    Pharmacy to Dose enoxaparin (LOVENOX)    Pharmacy to dose warfarin    sodium chloride    sodium chloride    sodium chloride        Assessment & Plan       Antimicrobial Therapy   1.         2.        3.        4.        5.            Assessment     Reactive leukocytosis.  Worsened today.  Currently on no steroids and no diarrhea to suspect C. difficile colitis.  Beside that patient has no clinical signs of sepsis    Left lower lobe pneumonia on admission treated with 5 days of antimicrobial therapy.  Currently on 2 L of oxygen     Progressive weakness.  Appears from neurology's note that patient was seen in an outpatient neurologist in October 2023 and had a positive EMG and nerve conduction studies.  Currently on Lyrica for polyneuropathy.     Type 2 diabetes     Plan     Monitor patient off antimicrobial therapy since he is hemodynamically  stable  Request CT scan of the chest without contrast to rule out pneumonia  Request CT scan of abdomen and pelvis without contrast  Request 2 sets of blood cultures.  Currently has a midline and  Supportive care  A.m. labs      Paige Zhu MD  04/13/24  14:03 EDT    Note is dictated utilizing voice recognition software/Dragon

## 2024-04-13 NOTE — PROGRESS NOTES
[facility] Progress Note      Patient Name: Lux Bray  : 1945  MRN: [id number]  Primary Care Physician:  Montana Morrow MD  Date of admission: 2024    Subjective     Interval History:     Patient feeling tired but otherwise resting comfortably  Good urine output  No acute distress overnight    Review of Systems:   As noted above    Objective     Vitals:   Temp:  [98.3 °F (36.8 °C)-100.2 °F (37.9 °C)] 99 °F (37.2 °C)  Heart Rate:  [74-82] 81  Resp:  [13-22] 14  BP: (123-139)/(56-62) 123/58  Flow (L/min):  [2-3] 2    Intake/Output Summary (Last 24 hours) at 2024 1137  Last data filed at 2024 0500  Gross per 24 hour   Intake 600 ml   Output 1175 ml   Net -575 ml       Physical Exam:      General Appearance: Chronically ill-appearing, awake  Skin: warm and dry  HEENT: oral mucosa dry, nonicteric sclera  Neck: supple, no JVD  Lungs: Decreased breath sounds at bases  Heart: RRR, normal S1 and S2  Abdomen: soft, nontender, nondistended  : no palpable bladder  Extremities: 1+ bilateral lower extremities edema    Scheduled Meds:     amiodarone, 200 mg, Oral, Q24H  atorvastatin, 20 mg, Oral, Daily  bisacodyl, 5 mg, Oral, Daily  dicyclomine, 10 mg, Oral, 4x Daily AC & at Bedtime  docusate sodium, 100 mg, Oral, BID  DULoxetine, 90 mg, Oral, Daily  enoxaparin, 1 mg/kg, Subcutaneous, Q12H  insulin lispro, 2-9 Units, Subcutaneous, 4x Daily AC & at Bedtime  metoprolol tartrate, 25 mg, Oral, Q12H  modafinil, 100 mg, Oral, Daily  oxyCODONE, 10 mg, Oral, Daily  pantoprazole, 40 mg, Oral, Daily  pregabalin, 75 mg, Oral, BID  primidone, 150 mg, Oral, Q12H  sodium chloride, 10 mL, Intravenous, Q12H  sucralfate, 1 g, Oral, TID AC  tamsulosin, 0.4 mg, Oral, Daily  verapamil ER, 240 mg, Oral, Nightly  warfarin, 5 mg, Oral, Daily      IV Meds:   Pharmacy to Dose enoxaparin (LOVENOX),   Pharmacy to dose warfarin,   sodium chloride, 100 mL/hr, Last Rate: 100 mL/hr (24  0947)        Results Reviewed:   I have personally reviewed the results from the time of this admission to 4/13/2024 11:37 EDT     Results from last 7 days   Lab Units 04/12/24  0435 04/11/24  0238 04/10/24  1310 04/10/24  0006   SODIUM mmol/L 139 139  --  135*   POTASSIUM mmol/L 3.6 3.9 4.1 3.4*   CHLORIDE mmol/L 101 102  --  99   CO2 mmol/L 29.0 30.0*  --  28.0   BUN mg/dL 34* 32*  --  36*   CREATININE mg/dL 1.88* 2.13*  --  2.13*   CALCIUM mg/dL 8.3* 8.5*  --  8.4*   GLUCOSE mg/dL 156* 178*  --  278*     Estimated Creatinine Clearance: 34.4 mL/min (A) (by C-G formula based on SCr of 1.88 mg/dL (H)).  Results from last 7 days   Lab Units 04/12/24 0435 04/11/24  0238   PHOSPHORUS mg/dL 2.8 2.3*         Results from last 7 days   Lab Units 04/10/24  0006 04/09/24  0104 04/08/24  0508 04/07/24  0526   WBC 10*3/mm3 14.20* 15.48* 18.80* 22.33*   HEMOGLOBIN g/dL 9.5* 10.0* 11.2* 13.0   PLATELETS 10*3/mm3 253 242 224 248     Results from last 7 days   Lab Units 04/12/24  0435 04/11/24  0238 04/10/24  0006   INR  1.11* 1.04* 1.03*       Assessment / Plan     ASSESSMENT:    1.  Acute kidney injury.  ATN versus AIN. Urine eosinophil negative. Nonoliguric.  Creatinine improving, 1.8 Mg/DL this morning.  Patient has peripheral edema but may be dry intravascularly.    2.  Septic shock.  Due to pneumonia.  Improving.  Patient is on IV Zosyn  3.  Hypokalemia. resolved  4. Pneumonia.  Patient is on IV antibiotics  5.  Benign essential hypertension.  Blood pressure is okay    PLAN:  Continue gentle IV hydration  Repeat labs in a.m.    Gen Ruiz MD  04/13/24  11:37 EDT    [facility]  [phone]

## 2024-04-14 LAB
25(OH)D3 SERPL-MCNC: 41 NG/ML (ref 30–100)
ALBUMIN SERPL-MCNC: 2.3 G/DL (ref 3.5–5.2)
ALBUMIN/GLOB SERPL: 0.8 G/DL
ALP SERPL-CCNC: 260 U/L (ref 39–117)
ALT SERPL W P-5'-P-CCNC: 74 U/L (ref 1–41)
ANION GAP SERPL CALCULATED.3IONS-SCNC: 10 MMOL/L (ref 5–15)
AST SERPL-CCNC: 39 U/L (ref 1–40)
BACTERIA BLD CULT: ABNORMAL
BASOPHILS # BLD AUTO: 0.06 10*3/MM3 (ref 0–0.2)
BASOPHILS NFR BLD AUTO: 0.3 % (ref 0–1.5)
BILIRUB SERPL-MCNC: <0.2 MG/DL (ref 0–1.2)
BOTTLE TYPE: ABNORMAL
BUN SERPL-MCNC: 30 MG/DL (ref 8–23)
BUN/CREAT SERPL: 19 (ref 7–25)
CALCIUM SPEC-SCNC: 8.5 MG/DL (ref 8.6–10.5)
CHLORIDE SERPL-SCNC: 101 MMOL/L (ref 98–107)
CO2 SERPL-SCNC: 27 MMOL/L (ref 22–29)
CREAT SERPL-MCNC: 1.58 MG/DL (ref 0.76–1.27)
DEPRECATED RDW RBC AUTO: 52.5 FL (ref 37–54)
EGFRCR SERPLBLD CKD-EPI 2021: 43.9 ML/MIN/1.73
EOSINOPHIL # BLD AUTO: 0.26 10*3/MM3 (ref 0–0.4)
EOSINOPHIL NFR BLD AUTO: 1.3 % (ref 0.3–6.2)
ERYTHROCYTE [DISTWIDTH] IN BLOOD BY AUTOMATED COUNT: 15.2 % (ref 12.3–15.4)
GLOBULIN UR ELPH-MCNC: 3 GM/DL
GLUCOSE BLDC GLUCOMTR-MCNC: 130 MG/DL (ref 70–105)
GLUCOSE BLDC GLUCOMTR-MCNC: 151 MG/DL (ref 70–105)
GLUCOSE BLDC GLUCOMTR-MCNC: 175 MG/DL (ref 70–105)
GLUCOSE BLDC GLUCOMTR-MCNC: 216 MG/DL (ref 70–105)
GLUCOSE SERPL-MCNC: 119 MG/DL (ref 65–99)
HCT VFR BLD AUTO: 30.7 % (ref 37.5–51)
HGB BLD-MCNC: 9.4 G/DL (ref 13–17.7)
IMM GRANULOCYTES # BLD AUTO: 0.83 10*3/MM3 (ref 0–0.05)
IMM GRANULOCYTES NFR BLD AUTO: 4 % (ref 0–0.5)
INR PPP: 1.42 (ref 2–3)
LYMPHOCYTES # BLD AUTO: 2.97 10*3/MM3 (ref 0.7–3.1)
LYMPHOCYTES NFR BLD AUTO: 14.4 % (ref 19.6–45.3)
MCH RBC QN AUTO: 28.7 PG (ref 26.6–33)
MCHC RBC AUTO-ENTMCNC: 30.6 G/DL (ref 31.5–35.7)
MCV RBC AUTO: 93.6 FL (ref 79–97)
MONOCYTES # BLD AUTO: 1.91 10*3/MM3 (ref 0.1–0.9)
MONOCYTES NFR BLD AUTO: 9.3 % (ref 5–12)
NEUTROPHILS NFR BLD AUTO: 14.55 10*3/MM3 (ref 1.7–7)
NEUTROPHILS NFR BLD AUTO: 70.7 % (ref 42.7–76)
NRBC BLD AUTO-RTO: 0 /100 WBC (ref 0–0.2)
NT-PROBNP SERPL-MCNC: <36 PG/ML (ref 0–1800)
PHOSPHATE SERPL-MCNC: 2.7 MG/DL (ref 2.5–4.5)
PLATELET # BLD AUTO: 363 10*3/MM3 (ref 140–450)
PMV BLD AUTO: 10 FL (ref 6–12)
POTASSIUM SERPL-SCNC: 3.9 MMOL/L (ref 3.5–5.2)
PROT SERPL-MCNC: 5.3 G/DL (ref 6–8.5)
PROTHROMBIN TIME: 15.1 SECONDS (ref 19.4–28.5)
RBC # BLD AUTO: 3.28 10*6/MM3 (ref 4.14–5.8)
SODIUM SERPL-SCNC: 138 MMOL/L (ref 136–145)
WBC NRBC COR # BLD AUTO: 20.58 10*3/MM3 (ref 3.4–10.8)

## 2024-04-14 PROCEDURE — 84100 ASSAY OF PHOSPHORUS: CPT | Performed by: INTERNAL MEDICINE

## 2024-04-14 PROCEDURE — 82948 REAGENT STRIP/BLOOD GLUCOSE: CPT

## 2024-04-14 PROCEDURE — 25010000002 ENOXAPARIN PER 10 MG: Performed by: INTERNAL MEDICINE

## 2024-04-14 PROCEDURE — 80053 COMPREHEN METABOLIC PANEL: CPT | Performed by: INTERNAL MEDICINE

## 2024-04-14 PROCEDURE — 87040 BLOOD CULTURE FOR BACTERIA: CPT | Performed by: INTERNAL MEDICINE

## 2024-04-14 PROCEDURE — 25810000003 SODIUM CHLORIDE 0.9 % SOLUTION: Performed by: INTERNAL MEDICINE

## 2024-04-14 PROCEDURE — 63710000001 INSULIN LISPRO (HUMAN) PER 5 UNITS

## 2024-04-14 PROCEDURE — 83880 ASSAY OF NATRIURETIC PEPTIDE: CPT | Performed by: INTERNAL MEDICINE

## 2024-04-14 PROCEDURE — 84446 ASSAY OF VITAMIN E: CPT | Performed by: INTERNAL MEDICINE

## 2024-04-14 PROCEDURE — 84590 ASSAY OF VITAMIN A: CPT | Performed by: INTERNAL MEDICINE

## 2024-04-14 PROCEDURE — 85025 COMPLETE CBC W/AUTO DIFF WBC: CPT | Performed by: INTERNAL MEDICINE

## 2024-04-14 PROCEDURE — 85610 PROTHROMBIN TIME: CPT | Performed by: INTERNAL MEDICINE

## 2024-04-14 RX ADMIN — SUCRALFATE 1 G: 1 TABLET ORAL at 09:06

## 2024-04-14 RX ADMIN — METOPROLOL TARTRATE 25 MG: 25 TABLET, FILM COATED ORAL at 20:54

## 2024-04-14 RX ADMIN — DULOXETINE HYDROCHLORIDE 90 MG: 30 CAPSULE, DELAYED RELEASE ORAL at 09:07

## 2024-04-14 RX ADMIN — OXYCODONE 5 MG: 5 TABLET ORAL at 21:25

## 2024-04-14 RX ADMIN — METOPROLOL TARTRATE 25 MG: 25 TABLET, FILM COATED ORAL at 09:07

## 2024-04-14 RX ADMIN — PRIMIDONE 100 MG: 50 TABLET ORAL at 20:55

## 2024-04-14 RX ADMIN — OXYCODONE 5 MG: 5 TABLET ORAL at 17:16

## 2024-04-14 RX ADMIN — VERAPAMIL HYDROCHLORIDE 240 MG: 120 CAPSULE, DELAYED RELEASE PELLETS ORAL at 20:54

## 2024-04-14 RX ADMIN — DOCUSATE SODIUM 100 MG: 100 CAPSULE, LIQUID FILLED ORAL at 09:07

## 2024-04-14 RX ADMIN — INSULIN LISPRO 4 UNITS: 100 INJECTION, SOLUTION INTRAVENOUS; SUBCUTANEOUS at 20:55

## 2024-04-14 RX ADMIN — Medication 10 ML: at 10:17

## 2024-04-14 RX ADMIN — ENOXAPARIN SODIUM 80 MG: 100 INJECTION SUBCUTANEOUS at 11:49

## 2024-04-14 RX ADMIN — INSULIN LISPRO 2 UNITS: 100 INJECTION, SOLUTION INTRAVENOUS; SUBCUTANEOUS at 11:49

## 2024-04-14 RX ADMIN — PREGABALIN 75 MG: 75 CAPSULE ORAL at 09:06

## 2024-04-14 RX ADMIN — ENOXAPARIN SODIUM 80 MG: 100 INJECTION SUBCUTANEOUS at 23:29

## 2024-04-14 RX ADMIN — DICYCLOMINE HYDROCHLORIDE 10 MG: 10 CAPSULE ORAL at 20:54

## 2024-04-14 RX ADMIN — OXYCODONE 10 MG: 5 TABLET ORAL at 09:07

## 2024-04-14 RX ADMIN — WARFARIN SODIUM 5 MG: 5 TABLET ORAL at 17:06

## 2024-04-14 RX ADMIN — Medication 1 PACKET: at 20:55

## 2024-04-14 RX ADMIN — PANTOPRAZOLE SODIUM 40 MG: 40 TABLET, DELAYED RELEASE ORAL at 09:07

## 2024-04-14 RX ADMIN — Medication 1 PACKET: at 14:06

## 2024-04-14 RX ADMIN — TAMSULOSIN HYDROCHLORIDE 0.4 MG: 0.4 CAPSULE ORAL at 09:06

## 2024-04-14 RX ADMIN — DICYCLOMINE HYDROCHLORIDE 10 MG: 10 CAPSULE ORAL at 09:07

## 2024-04-14 RX ADMIN — SUCRALFATE 1 G: 1 TABLET ORAL at 11:49

## 2024-04-14 RX ADMIN — ATORVASTATIN CALCIUM 10 MG: 10 TABLET, FILM COATED ORAL at 09:07

## 2024-04-14 RX ADMIN — MODAFINIL 100 MG: 100 TABLET ORAL at 09:07

## 2024-04-14 RX ADMIN — DICYCLOMINE HYDROCHLORIDE 10 MG: 10 CAPSULE ORAL at 11:49

## 2024-04-14 RX ADMIN — AMIODARONE HYDROCHLORIDE 200 MG: 200 TABLET ORAL at 09:07

## 2024-04-14 RX ADMIN — DICYCLOMINE HYDROCHLORIDE 10 MG: 10 CAPSULE ORAL at 17:06

## 2024-04-14 RX ADMIN — PREGABALIN 75 MG: 75 CAPSULE ORAL at 20:55

## 2024-04-14 RX ADMIN — SODIUM CHLORIDE 50 ML/HR: 9 INJECTION, SOLUTION INTRAVENOUS at 10:08

## 2024-04-14 RX ADMIN — PRIMIDONE 100 MG: 50 TABLET ORAL at 09:07

## 2024-04-14 RX ADMIN — SUCRALFATE 1 G: 1 TABLET ORAL at 17:06

## 2024-04-14 RX ADMIN — Medication 10 ML: at 20:54

## 2024-04-14 RX ADMIN — INSULIN LISPRO 2 UNITS: 100 INJECTION, SOLUTION INTRAVENOUS; SUBCUTANEOUS at 17:06

## 2024-04-14 NOTE — PROGRESS NOTES
Infectious Diseases Progress Note      LOS: 9 days   Patient Care Team:  Montana Morrow MD as PCP - General (Family Medicine)  George Viveros MD as Consulting Physician (Gastroenterology)    Chief Complaint: Weakness    Subjective     Patient's been afebrile for the last 24 hours.  He is currently on room air, hemodynamically stable and does not complain of anything but general weakness and fatigue.  Denies any abdominal pain, nausea, vomiting or diarrhea    Review of Systems:   Review of Systems   Constitutional:  Positive for fatigue.   Neurological:  Positive for weakness.        Objective     Vital Signs  Temp:  [97.9 °F (36.6 °C)-98.7 °F (37.1 °C)] 97.9 °F (36.6 °C)  Heart Rate:  [69-82] 69  Resp:  [9-15] 15  BP: ()/(51-67) 98/51    Physical Exam:  Physical Exam  Vitals and nursing note reviewed.   Constitutional:       Appearance: He is well-developed. He is ill-appearing.   HENT:      Head: Normocephalic and atraumatic.   Eyes:      Pupils: Pupils are equal, round, and reactive to light.   Cardiovascular:      Rate and Rhythm: Normal rate and regular rhythm.      Heart sounds: Normal heart sounds.   Pulmonary:      Effort: Pulmonary effort is normal. No respiratory distress.      Breath sounds: Normal breath sounds. No wheezing or rales.   Abdominal:      General: Bowel sounds are normal. There is no distension.      Palpations: Abdomen is soft. There is no mass.      Tenderness: There is no abdominal tenderness. There is no guarding or rebound.   Musculoskeletal:         General: No deformity. Normal range of motion.      Cervical back: Normal range of motion and neck supple.   Skin:     General: Skin is warm.      Findings: No erythema or rash.   Neurological:      Mental Status: He is alert and oriented to person, place, and time.      Cranial Nerves: No cranial nerve deficit.          Results Review:    I have reviewed all clinical data, test, lab, and imaging results.     Radiology  CT  Chest Without Contrast Diagnostic    Result Date: 4/13/2024  CT ABDOMEN PELVIS WO CONTRAST, CT CHEST WO CONTRAST DIAGNOSTIC Date of Exam: 4/13/2024 4:20 PM EDT Indication: Leukocytosis. Comparison: None available. Technique: Axial CT images were obtained of the abdomen and pelvis without the administration of contrast. Sagittal and coronal reconstructions were performed.  Automated exposure control and iterative reconstruction methods were used. FINDINGS: Thoracic inlet: Unremarkable. Great vessels: Mild vascular calcifications are present. Mediastinum/Aleksandra: No pathologically enlarged mediastinal lymph nodes are seen. The esophagus is unremarkable. Lung parenchyma/pleura: Moderate left and small right pleural effusions with bibasilar atelectasis. No acute infiltrate is definitely identified. No suspicious pulmonary nodules are seen. No pneumothorax is seen. Trachea and airways: The trachea and central airways appear unremarkable. Heart and pericardium: Mild coronary artery calcifications. Small pericardial effusion Liver: The liver is unremarkable in morphology. Evaluation for focal liver lesions is limited without IV contrast. No biliary dilation is seen. Gallbladder: Unremarkable. Pancreas: Pancreas appears atrophic. Spleen: Unremarkable. Adrenal glands: Unremarkable. Genitourinary tract: Punctate nonobstructing calculus within the right kidney. Kidneys are otherwise unremarkable. No hydronephrosis is seen. Visualized portions of the ureters, urinary bladder, and prostate gland appear unremarkable Gastrointestinal tract: Limited evaluation of the hollow viscera due to lack of IV contrast administration. There is suggestion of wall thickening and pericolonic stranding involving the proximal/mid colon. Underlying colitis may be considered in the appropriate clinical setting. Colonic diverticula are seen. There is no evidence of bowel obstruction. Appendix: No findings to suggest acute appendicitis. Other findings:  No free air or free fluid is identified. No pathologically enlarged lymph nodes are seen. Vascular calcifications are present. Bones and soft tissues: Bones are demineralized. Chronic T12 compression fracture noted. There are degenerative changes within the spine. Superficial soft tissues demonstrate no acute abnormality. There appears to be a midline catheter which terminates at about the level of the right axillary vein. Superficial soft tissue edema noted within the abdominal wall and within the bilateral thighs.     1.Examination is limited due to lack of IV contrast administration. Moderate left and small right pleural effusions with bibasilar atelectasis. 2.Suggestion of wall thickening and stranding about the proximal/mid colon. Infectious or inflammatory colitis may be considered in the appropriate clinical setting. Further evaluation of the hollow viscera is limited due to lack of IV contrast administration. 3.Stranding within the superficial soft tissues of the abdomen and bilateral thighs. ditional findings as detailed above. Electronically Signed: Tramaine Sutherland MD  4/13/2024 5:08 PM EDT  Workstation ID: [id number]    CT Abdomen Pelvis Without Contrast    Result Date: 4/13/2024  CT ABDOMEN PELVIS WO CONTRAST, CT CHEST WO CONTRAST DIAGNOSTIC Date of Exam: 4/13/2024 4:20 PM EDT Indication: Leukocytosis. Comparison: None available. Technique: Axial CT images were obtained of the abdomen and pelvis without the administration of contrast. Sagittal and coronal reconstructions were performed.  Automated exposure control and iterative reconstruction methods were used. FINDINGS: Thoracic inlet: Unremarkable. Great vessels: Mild vascular calcifications are present. Mediastinum/Aleksandra: No pathologically enlarged mediastinal lymph nodes are seen. The esophagus is unremarkable. Lung parenchyma/pleura: Moderate left and small right pleural effusions with bibasilar atelectasis. No acute infiltrate is definitely  identified. No suspicious pulmonary nodules are seen. No pneumothorax is seen. Trachea and airways: The trachea and central airways appear unremarkable. Heart and pericardium: Mild coronary artery calcifications. Small pericardial effusion Liver: The liver is unremarkable in morphology. Evaluation for focal liver lesions is limited without IV contrast. No biliary dilation is seen. Gallbladder: Unremarkable. Pancreas: Pancreas appears atrophic. Spleen: Unremarkable. Adrenal glands: Unremarkable. Genitourinary tract: Punctate nonobstructing calculus within the right kidney. Kidneys are otherwise unremarkable. No hydronephrosis is seen. Visualized portions of the ureters, urinary bladder, and prostate gland appear unremarkable Gastrointestinal tract: Limited evaluation of the hollow viscera due to lack of IV contrast administration. There is suggestion of wall thickening and pericolonic stranding involving the proximal/mid colon. Underlying colitis may be considered in the appropriate clinical setting. Colonic diverticula are seen. There is no evidence of bowel obstruction. Appendix: No findings to suggest acute appendicitis. Other findings: No free air or free fluid is identified. No pathologically enlarged lymph nodes are seen. Vascular calcifications are present. Bones and soft tissues: Bones are demineralized. Chronic T12 compression fracture noted. There are degenerative changes within the spine. Superficial soft tissues demonstrate no acute abnormality. There appears to be a midline catheter which terminates at about the level of the right axillary vein. Superficial soft tissue edema noted within the abdominal wall and within the bilateral thighs.     1.Examination is limited due to lack of IV contrast administration. Moderate left and small right pleural effusions with bibasilar atelectasis. 2.Suggestion of wall thickening and stranding about the proximal/mid colon. Infectious or inflammatory colitis may be  considered in the appropriate clinical setting. Further evaluation of the hollow viscera is limited due to lack of IV contrast administration. 3.Stranding within the superficial soft tissues of the abdomen and bilateral thighs. ditional findings as detailed above. Electronically Signed: Tramaine Sutherland MD  4/13/2024 5:08 PM EDT  Workstation ID: [id number]     Cardiology    Laboratory    Results from last 7 days   Lab Units 04/14/24  0200 04/13/24  1148 04/10/24  0006 04/09/24  0104 04/08/24  0508   WBC 10*3/mm3 20.58* 19.72* 14.20* 15.48* 18.80*   HEMOGLOBIN g/dL 9.4* 10.8* 9.5* 10.0* 11.2*   HEMATOCRIT % 30.7* 34.1* 29.7* 31.6* 35.3*   PLATELETS 10*3/mm3 363 383 253 242 224     Results from last 7 days   Lab Units 04/14/24  0200 04/13/24  1420 04/12/24  0435 04/11/24  0238 04/10/24  1310 04/10/24  0006 04/09/24  0104 04/08/24  0508   SODIUM mmol/L 138 138 139 139  --  135* 137 139   POTASSIUM mmol/L 3.9 3.9 3.6 3.9 4.1 3.4* 3.6 3.4*   CHLORIDE mmol/L 101 102 101 102  --  99 100 102   CO2 mmol/L 27.0 28.0 29.0 30.0*  --  28.0 29.0 26.0   BUN mg/dL 30* 30* 34* 32*  --  36* 37* 33*   CREATININE mg/dL 1.58* 1.61* 1.88* 2.13*  --  2.13* 1.81* 1.51*   GLUCOSE mg/dL 119* 194* 156* 178*  --  278* 199* 142*   ALBUMIN g/dL 2.3* 2.3* 2.2* 2.3*  --   --   --   --    BILIRUBIN mg/dL <0.2 <0.2  --   --   --   --   --   --    ALK PHOS U/L 260* 280*  --   --   --   --   --   --    AST (SGOT) U/L 39 37  --   --   --   --   --   --    ALT (SGPT) U/L 74* 71*  --   --   --   --   --   --    CALCIUM mg/dL 8.5* 8.7 8.3* 8.5*  --  8.4* 8.3* 8.8     Results from last 7 days   Lab Units 04/11/24  0238   CK TOTAL U/L 13*             Microbiology   Microbiology Results (last 10 days)       Procedure Component Value - Date/Time    Eosinophil Smear - Urine, Urine, Catheter [[id number]]  (Normal) Collected: 04/10/24 2121    Lab Status: Final result Specimen: Urine, Catheter Updated: 04/10/24 2225     Eosinophil Smear 0 % EOS/100 Cells     MRSA  Screen, PCR (Inpatient) - Swab, Nares [[id number]]  (Normal) Collected: 04/06/24 2226    Lab Status: Final result Specimen: Swab from Nares Updated: 04/06/24 2349     MRSA PCR No MRSA Detected    Narrative:      The negative predictive value of this diagnostic test is high and should only be used to consider de-escalating anti-MRSA therapy. A positive result may indicate colonization with MRSA and must be correlated clinically.    MRSA Screen, PCR (Inpatient) - Swab, Nares [[id number]]  (Normal) Collected: 04/05/24 1406    Lab Status: Final result Specimen: Swab from Nares Updated: 04/05/24 1524     MRSA PCR No MRSA Detected    Narrative:      The negative predictive value of this diagnostic test is high and should only be used to consider de-escalating anti-MRSA therapy. A positive result may indicate colonization with MRSA and must be correlated clinically.    Respiratory Panel PCR w/COVID-19(SARS-CoV-2) DODIE/JOVITA/THEO/PAD/COR/RAJAT In-House, NP Swab in UTM/VTM, 2 HR TAT - Swab, Nasopharynx [[id number]]  (Normal) Collected: 04/05/24 1406    Lab Status: Final result Specimen: Swab from Nasopharynx Updated: 04/05/24 1500     ADENOVIRUS, PCR Not Detected     Coronavirus 229E Not Detected     Coronavirus HKU1 Not Detected     Coronavirus NL63 Not Detected     Coronavirus OC43 Not Detected     COVID19 Not Detected     Human Metapneumovirus Not Detected     Human Rhinovirus/Enterovirus Not Detected     Influenza A PCR Not Detected     Influenza B PCR Not Detected     Parainfluenza Virus 1 Not Detected     Parainfluenza Virus 2 Not Detected     Parainfluenza Virus 3 Not Detected     Parainfluenza Virus 4 Not Detected     RSV, PCR Not Detected     Bordetella pertussis pcr Not Detected     Bordetella parapertussis PCR Not Detected     Chlamydophila pneumoniae PCR Not Detected     Mycoplasma pneumo by PCR Not Detected    Narrative:      In the setting of a positive respiratory panel with a viral infection PLUS a negative  procalcitonin without other underlying concern for bacterial infection, consider observing off antibiotics or discontinuation of antibiotics and continue supportive care. If the respiratory panel is positive for atypical bacterial infection (Bordetella pertussis, Chlamydophila pneumoniae, or Mycoplasma pneumoniae), consider antibiotic de-escalation to target atypical bacterial infection.    Blood Culture - Blood, Arm, Left [[id number]]  (Normal) Collected: 04/05/24 1203    Lab Status: Final result Specimen: Blood from Arm, Left Updated: 04/10/24 1230     Blood Culture No growth at 5 days    Blood Culture - Blood, Arm, Right [[id number]]  (Normal) Collected: 04/05/24 1201    Lab Status: Final result Specimen: Blood from Arm, Right Updated: 04/10/24 1230     Blood Culture No growth at 5 days            Medication Review:       Schedule Meds  amiodarone, 200 mg, Oral, Q24H  atorvastatin, 10 mg, Oral, Daily  Beneprotein, 1 packet, Oral, BID  bisacodyl, 5 mg, Oral, Daily  dicyclomine, 10 mg, Oral, 4x Daily AC & at Bedtime  docusate sodium, 100 mg, Oral, BID  DULoxetine, 90 mg, Oral, Daily  enoxaparin, 1 mg/kg, Subcutaneous, Q12H  insulin lispro, 2-9 Units, Subcutaneous, 4x Daily AC & at Bedtime  metoprolol tartrate, 25 mg, Oral, Q12H  modafinil, 100 mg, Oral, Daily  oxyCODONE, 10 mg, Oral, Daily  pantoprazole, 40 mg, Oral, Daily  pregabalin, 75 mg, Oral, BID  primidone, 100 mg, Oral, Q12H  sodium chloride, 10 mL, Intravenous, Q12H  sucralfate, 1 g, Oral, TID AC  tamsulosin, 0.4 mg, Oral, Daily  verapamil ER, 240 mg, Oral, Nightly  warfarin, 5 mg, Oral, Daily        Infusion Meds  Pharmacy to Dose enoxaparin (LOVENOX),   Pharmacy to dose warfarin,   sodium chloride, 50 mL/hr, Last Rate: 50 mL/hr (04/14/24 1008)        PRN Meds    acetaminophen **OR** acetaminophen    bisacodyl    dextrose    dextrose    glucagon (human recombinant)    ipratropium-albuterol    magnesium hydroxide    nitroglycerin    ondansetron ODT **OR**  ondansetron    oxyCODONE    Pharmacy to Dose enoxaparin (LOVENOX)    Pharmacy to dose warfarin    sodium chloride    sodium chloride    sodium chloride        Assessment & Plan       Antimicrobial Therapy   1.         2.        3.        4.        5.            Assessment     Reactive leukocytosis.  Worsened today.  Currently on no steroids and no diarrhea to suspect C. difficile colitis.  Beside that patient has no clinical signs of sepsis.  CT of the chest, abdomen pelvis marked on possible colitis however patient has no abdominal pain and denies GI symptoms.  His there is no signs of infection to the abdomen or bilateral thighs    Left lower lobe pneumonia on admission treated with 5 days of antimicrobial therapy.  Currently on 2 L of oxygen     Progressive weakness.  Appears from neurology's note that patient was seen in an outpatient neurologist in October 2023 and had a positive EMG and nerve conduction studies.  Currently on Lyrica for polyneuropathy.     Type 2 diabetes     Plan     Monitor patient off antimicrobial  therapy since he is hemodynamically stable  Blood cultures pending  Supportive care  Lesli Hernandez, JONATHAN  04/14/24  14:17 EDT    Note is dictated utilizing voice recognition software/Dragon

## 2024-04-14 NOTE — PLAN OF CARE
Goal Outcome Evaluation:              Outcome Evaluation: Pt rested most of the night. Pts family called and discussed care plan with this RN. Pts family requested a call from provider after rounds. Pt did not need interventions for pain overnight.

## 2024-04-14 NOTE — PROGRESS NOTES
[facility] Progress Note      Patient Name: Lux Bray  : 1945  MRN: [id number]  Primary Care Physician:  Montana Morrow MD  Date of admission: 2024    Subjective     Interval History:     Patient resting comfortably.  No acute distress overnight  Good urine output    Review of Systems:   As noted above    Objective     Vitals:   Temp:  [97.4 °F (36.3 °C)-98.7 °F (37.1 °C)] 98 °F (36.7 °C)  Heart Rate:  [72-83] 75  Resp:  [9-15] 12  BP: (115-133)/(51-67) 129/65  Flow (L/min):  [2] 2    Intake/Output Summary (Last 24 hours) at 2024 1143  Last data filed at 2024 0900  Gross per 24 hour   Intake 460 ml   Output 2500 ml   Net -2040 ml       Physical Exam:      General Appearance: Chronically ill-appearing, awake  Skin: warm and dry  HEENT: oral mucosa dry, nonicteric sclera  Neck: supple, no JVD  Lungs: Decreased breath sounds at bases  Heart: RRR, normal S1 and S2  Abdomen: soft, nontender, nondistended  : no palpable bladder  Extremities: 1+ bilateral lower extremities edema    Scheduled Meds:     amiodarone, 200 mg, Oral, Q24H  atorvastatin, 10 mg, Oral, Daily  Beneprotein, 1 packet, Oral, BID  bisacodyl, 5 mg, Oral, Daily  dicyclomine, 10 mg, Oral, 4x Daily AC & at Bedtime  docusate sodium, 100 mg, Oral, BID  DULoxetine, 90 mg, Oral, Daily  enoxaparin, 1 mg/kg, Subcutaneous, Q12H  insulin lispro, 2-9 Units, Subcutaneous, 4x Daily AC & at Bedtime  metoprolol tartrate, 25 mg, Oral, Q12H  modafinil, 100 mg, Oral, Daily  oxyCODONE, 10 mg, Oral, Daily  pantoprazole, 40 mg, Oral, Daily  pregabalin, 75 mg, Oral, BID  primidone, 100 mg, Oral, Q12H  sodium chloride, 10 mL, Intravenous, Q12H  sucralfate, 1 g, Oral, TID AC  tamsulosin, 0.4 mg, Oral, Daily  verapamil ER, 240 mg, Oral, Nightly  warfarin, 5 mg, Oral, Daily      IV Meds:   Pharmacy to Dose enoxaparin (LOVENOX),   Pharmacy to dose warfarin,   sodium chloride, 50 mL/hr, Last Rate: 50 mL/hr (24  1008)        Results Reviewed:   I have personally reviewed the results from the time of this admission to 4/14/2024 11:43 EDT     Results from last 7 days   Lab Units 04/14/24 0200 04/13/24 1420 04/12/24  0435   SODIUM mmol/L 138 138 139   POTASSIUM mmol/L 3.9 3.9 3.6   CHLORIDE mmol/L 101 102 101   CO2 mmol/L 27.0 28.0 29.0   BUN mg/dL 30* 30* 34*   CREATININE mg/dL 1.58* 1.61* 1.88*   CALCIUM mg/dL 8.5* 8.7 8.3*   BILIRUBIN mg/dL <0.2 <0.2  --    ALK PHOS U/L 260* 280*  --    ALT (SGPT) U/L 74* 71*  --    AST (SGOT) U/L 39 37  --    GLUCOSE mg/dL 119* 194* 156*     Estimated Creatinine Clearance: 40.3 mL/min (A) (by C-G formula based on SCr of 1.58 mg/dL (H)).  Results from last 7 days   Lab Units 04/14/24 0200 04/12/24 0435 04/11/24  0238   PHOSPHORUS mg/dL 2.7 2.8 2.3*         Results from last 7 days   Lab Units 04/14/24  0200 04/13/24  1148 04/10/24  0006 04/09/24  0104 04/08/24  0508   WBC 10*3/mm3 20.58* 19.72* 14.20* 15.48* 18.80*   HEMOGLOBIN g/dL 9.4* 10.8* 9.5* 10.0* 11.2*   PLATELETS 10*3/mm3 363 383 253 242 224     Results from last 7 days   Lab Units 04/14/24  0200 04/13/24  1420 04/12/24  0435 04/11/24  0238 04/10/24  0006   INR  1.42* 1.28* 1.11* 1.04* 1.03*       Assessment / Plan     ASSESSMENT:    1.  Acute kidney injury.  ATN versus AIN. Urine eosinophil negative. Nonoliguric.  Creatinine improving, 1.58 Mg/DL this morning.   2.  Septic shock.  Due to pneumonia.  Improving.  Off antibiotics now   3.  Hypokalemia. resolved  5.  Benign essential hypertension.  Blood pressure is okay    PLAN:  Continue gentle IV hydration  Follow-up in 2 to 4 weeks on discharge    Gen Ruiz MD  04/14/24  11:43 EDT    [facility]  [phone]

## 2024-04-14 NOTE — PROGRESS NOTES
"Pharmacy dosing service  Anticoagulant  Warfarin     Subjective:    Lux Bray is a 78 y.o.male being initiated on warfarin for Atrial Fibrillation.    INR Goal: 2 - 3  Bridge Therapy Present?:  Yes, Enoxaparin 80 mg SQ Q24H   Interacting Medications Evaluation (New/Present/Discontinued): amiodarone, primidone  Additional Contributing Factors: renal disease, interacting medications, older age      Assessment/Plan:    New start warfarin due to DOAC interaction with primidone.     INR subtherapeutic but increasing appropriately. Will continue warfarin 5mg daily.     Continues lovenox bridge until INR >2    Continue to monitor and adjust based on INR.         Date 4/9 4/10 4/11 4/12 4/13 4/14      INR ----- 1.03 1.04 1.11 1.28 1.42      Dose 2.5mg 2.5 mg 3 mg 5mg 5mg  5mg          Objective:  [Ht: 180.3 cm (71\"); Wt: 77.7 kg (171 lb 4.8 oz); BMI: Body mass index is 23.89 kg/m².]    Lab Results   Component Value Date    ALBUMIN 2.3 (L) 04/13/2024     Lab Results   Component Value Date    INR 1.28 (L) 04/13/2024    INR 1.11 (L) 04/12/2024    INR 1.04 (L) 04/11/2024    PROTIME 13.7 (L) 04/13/2024    PROTIME 12.0 (L) 04/12/2024    PROTIME 11.3 (L) 04/11/2024     Lab Results   Component Value Date    HGB 10.8 (L) 04/13/2024    HGB 9.5 (L) 04/10/2024    HGB 10.0 (L) 04/09/2024     Lab Results   Component Value Date    HCT 34.1 (L) 04/13/2024    HCT 29.7 (L) 04/10/2024    HCT 31.6 (L) 04/09/2024       Joaquina Whitaker, PharmD  04/13/24 15:03 EDT               "

## 2024-04-14 NOTE — PROGRESS NOTES
"[facility] MEDICINE SERVICE  DAILY PROGRESS NOTE    NAME: Lux Bray  : 1945  MRN: [id number]      LOS: 9 days     PROVIDER OF SERVICE: Kendall Haynes MD    Chief Complaint: Septic shock    Subjective:     History of Present illness      Lux Bray is a 78 y.o. male with PMH of HTN, DM II, Depression,  presented to the hospital after reportedly being sick for a few days at rehab, and was admitted with a principal diagnosis of Septic shock.      Per family the patient has had a neurologic decline over the last few months. CT head was negative for acute abnormality. Ammonia level was normal. Per chart review, the patient's PCP has decreased some of the sedating medications the patient has or was taking. Patient is currently drowsy but will wake up and answer questions appropriately. He is reporting that he is very tired.      In ER the patient was found to be hypotensive. He was given the full sepsis bolus and started on levophed. Chest x-ray showed bibasilar infiltrates. Patient's family reports that they have had concerns he has been aspirating. Patient will be transferred to ICU for close monitoring and vasopressor support.       ACP: CPR, Full Intervention. Patients wife is his decision maker in the even he is unable.      Patient was seen and examined on 24 at 13:54 EDT .     24-Patient admitted to the ICU for septic shock and only needed vasopressors very briefly.  Has been off vasopressors since last night.  Developed A-fib with RVR, will give a dose of digoxin.  If blood pressure permits, resume home verapamil.  Will likely need full dose anticoagulation, defer to primary team.  Transfer out of the ICU to hospitalist service.      24-Patient drowsy but easily aroused. Knows what year it is. States that he hurts all over, and is nauseous. He also states that he is \"So, so, so, tired.\" Denies feeling short of breath, or having chest pain.   24 patient seen and " examined in bed no acute distress, vital signs stable, discussed with RN.  Weak weak and tired.Patient is currently n.p.o. pending speech evaluation.   4/8/2024 patient seen and examined in bed no acute distress, vital signs stable, discussed with RN.  Patient with weakness fatigue..  Apparently was living at home.  Speech recommended that pt initiate a mechanical soft diet with NT liquids as tolerated   4/9/24 seen in bed NAD, VSS, some confusion, JOSE L DELATORRE  4/10/24 patient seen in bed JOANNA, JOSE L DELATORRE, [place], per  wife wanted to take him home, unable to use eliquis, started coumadin, awaiting placement  4/11/24 seen in bed NAD, weakness and fatigue, DW family says he has had increased loss of mobility over the last 6 month  4/12/24 patient seen and examined in bed no acute distress, patient is weak tired, fatigue. very sleepy.  Vital signs stable, renal function slightly improved.  Discussed with RN. Long discussion with family >30min  4/13/2024 patient seen and examined is more alert today able to carry a conversation.  Eating better.  Discussed with RN, WBC count has increased to 19.  Temperature 100.2   Off antibiotics.  INR 1.4.  Creatinine 1.5 on IV fluids.  BNP pending,.  4/14/2024 patient seen and examined in bed no acute distress, no events overnight, vital signs stable, discussed with RN, awaiting cultures.  WBC 20.5 afebrile    Review of Systems   Constitutional:  Positive for fatigue. Negative for appetite change, chills and fever.   HENT:  Negative for congestion.    Eyes:  Negative for pain and redness.   Respiratory:  Positive for shortness of breath. Negative for cough and chest tightness.    Gastrointestinal:  Negative for abdominal pain, diarrhea, nausea and vomiting.   Endocrine: Negative for cold intolerance and heat intolerance.   Genitourinary:  Negative for dysuria, flank pain and frequency.   Musculoskeletal:  Negative for back pain and myalgias.   Neurological:  Positive for weakness.  Negative for dizziness and headaches.   Psychiatric/Behavioral:  Negative for sleep disturbance. The patient is not nervous/anxious.      Objective:     Vital Signs  Temp:  [97.9 °F (36.6 °C)-98.7 °F (37.1 °C)] 97.9 °F (36.6 °C)  Heart Rate:  [69-82] 69  Resp:  [9-15] 15  BP: ()/(51-67) 98/51  Flow (L/min):  [2] 2   Body mass index is 23.52 kg/m².    Physical Exam  Constitutional:       Appearance: Normal appearance.   HENT:      Head: Normocephalic and atraumatic.      Nose: Nose normal.      Mouth/Throat:      Mouth: Mucous membranes are moist.   Eyes:      Extraocular Movements: Extraocular movements intact.      Conjunctiva/sclera: Conjunctivae normal.      Pupils: Pupils are equal, round, and reactive to light.   Cardiovascular:      Rate and Rhythm: Regular rhythm. Tachycardia present.      Pulses: Normal pulses.      Heart sounds: Normal heart sounds.   Pulmonary:      Effort: Pulmonary effort is normal.      Breath sounds: Normal breath sounds.   Musculoskeletal:      Cervical back: Normal range of motion.   Skin:     General: Skin is warm and dry.   Neurological:      General: No focal deficit present.      Mental Status: He is alert. He is disoriented.       Scheduled Meds   amiodarone, 200 mg, Oral, Q24H  atorvastatin, 10 mg, Oral, Daily  Beneprotein, 1 packet, Oral, BID  bisacodyl, 5 mg, Oral, Daily  dicyclomine, 10 mg, Oral, 4x Daily AC & at Bedtime  docusate sodium, 100 mg, Oral, BID  DULoxetine, 90 mg, Oral, Daily  enoxaparin, 1 mg/kg, Subcutaneous, Q12H  insulin lispro, 2-9 Units, Subcutaneous, 4x Daily AC & at Bedtime  metoprolol tartrate, 25 mg, Oral, Q12H  modafinil, 100 mg, Oral, Daily  oxyCODONE, 10 mg, Oral, Daily  pantoprazole, 40 mg, Oral, Daily  pregabalin, 75 mg, Oral, BID  primidone, 100 mg, Oral, Q12H  sodium chloride, 10 mL, Intravenous, Q12H  sucralfate, 1 g, Oral, TID AC  tamsulosin, 0.4 mg, Oral, Daily  verapamil ER, 240 mg, Oral, Nightly  warfarin, 5 mg, Oral, Daily       PRN  Meds     acetaminophen **OR** acetaminophen    bisacodyl    dextrose    dextrose    glucagon (human recombinant)    ipratropium-albuterol    magnesium hydroxide    nitroglycerin    ondansetron ODT **OR** ondansetron    oxyCODONE    Pharmacy to Dose enoxaparin (LOVENOX)    Pharmacy to dose warfarin    sodium chloride    sodium chloride    sodium chloride   Infusions  Pharmacy to Dose enoxaparin (LOVENOX),   Pharmacy to dose warfarin,   sodium chloride, 50 mL/hr, Last Rate: 50 mL/hr (04/14/24 1008)        Diagnostic Data    Results from last 7 days   Lab Units 04/14/24  0200   WBC 10*3/mm3 20.58*   HEMOGLOBIN g/dL 9.4*   HEMATOCRIT % 30.7*   PLATELETS 10*3/mm3 363   GLUCOSE mg/dL 119*   CREATININE mg/dL 1.58*   BUN mg/dL 30*   SODIUM mmol/L 138   POTASSIUM mmol/L 3.9   AST (SGOT) U/L 39   ALT (SGPT) U/L 74*   ALK PHOS U/L 260*   BILIRUBIN mg/dL <0.2   ANION GAP mmol/L 10.0     CT Chest Without Contrast Diagnostic    Result Date: 4/13/2024  1.Examination is limited due to lack of IV contrast administration. Moderate left and small right pleural effusions with bibasilar atelectasis. 2.Suggestion of wall thickening and stranding about the proximal/mid colon. Infectious or inflammatory colitis may be considered in the appropriate clinical setting. Further evaluation of the hollow viscera is limited due to lack of IV contrast administration. 3.Stranding within the superficial soft tissues of the abdomen and bilateral thighs. ditional findings as detailed above. Electronically Signed: Tramaine Sutherland MD  4/13/2024 5:08 PM EDT  Workstation ID: [id number]    CT Abdomen Pelvis Without Contrast    Result Date: 4/13/2024  1.Examination is limited due to lack of IV contrast administration. Moderate left and small right pleural effusions with bibasilar atelectasis. 2.Suggestion of wall thickening and stranding about the proximal/mid colon. Infectious or inflammatory colitis may be considered in the appropriate clinical setting.  Further evaluation of the hollow viscera is limited due to lack of IV contrast administration. 3.Stranding within the superficial soft tissues of the abdomen and bilateral thighs. ditional findings as detailed above. Electronically Signed: Tramaine Sutherland MD  4/13/2024 5:08 PM EDT  Workstation ID: [id number]    XR Chest 1 View    Result Date: 4/13/2024  Impression: 1. Cardiomegaly with left greater than right pleural effusions, likely similar to the prior chest CT dated 4/7/2024. 2. Left basilar airspace opacity representing compressive atelectasis or pneumonia. Electronically Signed: Frank Villavicencioelor  4/13/2024 9:17 AM EDT  Workstation ID: [id number]    US Liver    Result Date: 4/12/2024  Impression: The liver appears sonographically unremarkable. Partial visualization of a small to moderate right pleural effusion. Electronically Signed: Luciano Arenas MD  4/12/2024 4:31 PM EDT  Workstation ID: [id number]     I reviewed the patient's new clinical results.    Assessment/Plan:     Active and Resolved Problems  Active Hospital Problems    Diagnosis  POA    **Septic shock [A41.9, R65.21]  Yes    High cholesterol [E78.00]  Yes    Hearing loss [H91.90]  Yes    Acid reflux [K21.9]  Yes    Generalized weakness [R53.1]  Yes    Leukocytosis [D72.829]  Yes    Polyneuropathy associated with underlying disease [G63]  Yes    Gastroesophageal reflux disease without esophagitis [K21.9]  Yes    Vitamin D deficiency, unspecified  [E55.9]  Yes    Depression [F32.A]  Yes    HTN (hypertension) [I10]  Yes    Arthritis [M19.90]  Yes    Lumbago with sciatica, right side [M54.41]  Yes    Cholelithiasis [K80.20]  Yes    Irritable bowel syndrome without diarrhea [K58.9]  Yes    Cervical radiculopathy [M54.12]  Yes    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     Patient downgraded from ICU to PCU level of care.     Septic Shock due to Pneumonia   -WBC-14>19.7  Tmax 100.2  -Respiratory panel negative   -MRSA swab negative  -NS at 50mL/hr   -Levophed discontinued   -received Zosyn   -ID consulted-blood cultures sent  -SLP consult to rule out aspiration pna     Acute kidney injury. ATN versus AIN. Urine eosinophil negative. Nonoliguric.   Nephrology following-IV fluids  -Creatinine seems to be plateauing, 2.13>1.88 Mg/DL   - Patient has peripheral edema but may be dry intravascularly. Low alb/CHF     Diabetes Mellitus   -Hgb A1c 4/5/24 6.7  -Continue to hold home medications   -Accuchecks ACHS   -SSI ordered   -add farxiga    Hypertension -Not controlled with medication at home  -Monitor BP per protocol   -metoprolol/verapamil/amiodarone    Hyperlipidemia --   -Patient on Lipitor at home  -Last lipid profile 5/26/23 negative  -Restart lipitor when not NPO    Depression  -Patient on Cymbalta at home  -Restart when patient not NPO    Altered Mental Status improving  -No family at bedside at this time  -Reports of neurologic decline over last few months   -Patient unable to make needs known   Hx tremor on primidone taper dose down    Progressive weakness over the last 6 months. that started with tremors.  MRI. Impression:Brain atrophy with chronic microvascular ischemic changes  No acute intracranial abnormality   consulted neurology.    Dysphasia   -Failed nursing water screen  -SLP eval today,  recommended that pt initiate a mechanical soft diet with NT liquids as tolerated   -poor po intake    Atrial fibrillation/flutter:Patient is in sinus rhythm.    Patient is on metoprolol.  verapamil  Started anticoagulation coumadin-lovenox.  coumadin-pharmacy to monitor    Elevated BNP-1661  Echo-Left ventricular systolic  function is normal. Left ventricular ejection   fraction appears to be 56 - 60%. .   monitor BNP  Pleural effusion on cxr  -receiving fluids- decreased to 50 cc hr    DVT prophylaxis:  Medical and mechanical DVT prophylaxis orders are present.      Code status is   Code Status and Medical Interventions:   Ordered at: 04/05/24 1340     Code Status (Patient has no pulse and is not breathing):    CPR (Attempt to Resuscitate)     Medical Interventions (Patient has pulse or is breathing):    Full Support       Plan for disposition:Pending clinical course.   Long discussion with family over 30 min ACP      Signature: Electronically signed by Kendall Haynes MD, 04/14/24, 13:46 EDT.  [facility] Hospitalist Team

## 2024-04-15 LAB
1,25(OH)2D SERPL-MCNC: 32.2 PG/ML (ref 24.8–81.5)
ALBUMIN SERPL-MCNC: 2.1 G/DL (ref 3.5–5.2)
ALBUMIN/GLOB SERPL: 0.6 G/DL
ALP SERPL-CCNC: 270 U/L (ref 39–117)
ALT SERPL W P-5'-P-CCNC: 68 U/L (ref 1–41)
ANION GAP SERPL CALCULATED.3IONS-SCNC: 10 MMOL/L (ref 5–15)
AST SERPL-CCNC: 45 U/L (ref 1–40)
BASOPHILS # BLD AUTO: 0.05 10*3/MM3 (ref 0–0.2)
BASOPHILS NFR BLD AUTO: 0.3 % (ref 0–1.5)
BILIRUB SERPL-MCNC: <0.2 MG/DL (ref 0–1.2)
BUN SERPL-MCNC: 33 MG/DL (ref 8–23)
BUN/CREAT SERPL: 24.1 (ref 7–25)
CALCIUM SPEC-SCNC: 8.6 MG/DL (ref 8.6–10.5)
CHLORIDE SERPL-SCNC: 102 MMOL/L (ref 98–107)
CO2 SERPL-SCNC: 26 MMOL/L (ref 22–29)
CREAT SERPL-MCNC: 1.37 MG/DL (ref 0.76–1.27)
DEPRECATED RDW RBC AUTO: 49.1 FL (ref 37–54)
EGFRCR SERPLBLD CKD-EPI 2021: 52.1 ML/MIN/1.73
EOSINOPHIL # BLD AUTO: 0.24 10*3/MM3 (ref 0–0.4)
EOSINOPHIL NFR BLD AUTO: 1.2 % (ref 0.3–6.2)
ERYTHROCYTE [DISTWIDTH] IN BLOOD BY AUTOMATED COUNT: 14.8 % (ref 12.3–15.4)
GLOBULIN UR ELPH-MCNC: 3.4 GM/DL
GLUCOSE BLDC GLUCOMTR-MCNC: 122 MG/DL (ref 70–105)
GLUCOSE BLDC GLUCOMTR-MCNC: 144 MG/DL (ref 70–105)
GLUCOSE BLDC GLUCOMTR-MCNC: 147 MG/DL (ref 70–105)
GLUCOSE BLDC GLUCOMTR-MCNC: 219 MG/DL (ref 70–105)
GLUCOSE SERPL-MCNC: 142 MG/DL (ref 65–99)
HCT VFR BLD AUTO: 30.8 % (ref 37.5–51)
HGB BLD-MCNC: 10.1 G/DL (ref 13–17.7)
IMM GRANULOCYTES # BLD AUTO: 0.82 10*3/MM3 (ref 0–0.05)
IMM GRANULOCYTES NFR BLD AUTO: 4.2 % (ref 0–0.5)
INR PPP: 1.91 (ref 2–3)
LYMPHOCYTES # BLD AUTO: 3.12 10*3/MM3 (ref 0.7–3.1)
LYMPHOCYTES NFR BLD AUTO: 15.9 % (ref 19.6–45.3)
MCH RBC QN AUTO: 29.4 PG (ref 26.6–33)
MCHC RBC AUTO-ENTMCNC: 32.8 G/DL (ref 31.5–35.7)
MCV RBC AUTO: 89.8 FL (ref 79–97)
MONOCYTES # BLD AUTO: 1.79 10*3/MM3 (ref 0.1–0.9)
MONOCYTES NFR BLD AUTO: 9.1 % (ref 5–12)
NEUTROPHILS NFR BLD AUTO: 13.58 10*3/MM3 (ref 1.7–7)
NEUTROPHILS NFR BLD AUTO: 69.3 % (ref 42.7–76)
NRBC BLD AUTO-RTO: 0 /100 WBC (ref 0–0.2)
NT-PROBNP SERPL-MCNC: 769.2 PG/ML (ref 0–1800)
PHOSPHATE SERPL-MCNC: 2.7 MG/DL (ref 2.5–4.5)
PLATELET # BLD AUTO: 408 10*3/MM3 (ref 140–450)
PMV BLD AUTO: 9.7 FL (ref 6–12)
POTASSIUM SERPL-SCNC: 3.8 MMOL/L (ref 3.5–5.2)
PROT SERPL-MCNC: 5.5 G/DL (ref 6–8.5)
PROTHROMBIN TIME: 19.8 SECONDS (ref 19.4–28.5)
RBC # BLD AUTO: 3.43 10*6/MM3 (ref 4.14–5.8)
SODIUM SERPL-SCNC: 138 MMOL/L (ref 136–145)
WBC NRBC COR # BLD AUTO: 19.6 10*3/MM3 (ref 3.4–10.8)

## 2024-04-15 PROCEDURE — 82948 REAGENT STRIP/BLOOD GLUCOSE: CPT

## 2024-04-15 PROCEDURE — 63710000001 INSULIN LISPRO (HUMAN) PER 5 UNITS

## 2024-04-15 PROCEDURE — 85610 PROTHROMBIN TIME: CPT | Performed by: INTERNAL MEDICINE

## 2024-04-15 PROCEDURE — 97112 NEUROMUSCULAR REEDUCATION: CPT

## 2024-04-15 PROCEDURE — 83880 ASSAY OF NATRIURETIC PEPTIDE: CPT | Performed by: INTERNAL MEDICINE

## 2024-04-15 PROCEDURE — 97129 THER IVNTJ 1ST 15 MIN: CPT | Performed by: OCCUPATIONAL THERAPIST

## 2024-04-15 PROCEDURE — 25010000002 ENOXAPARIN PER 10 MG: Performed by: INTERNAL MEDICINE

## 2024-04-15 PROCEDURE — 85025 COMPLETE CBC W/AUTO DIFF WBC: CPT | Performed by: NURSE PRACTITIONER

## 2024-04-15 PROCEDURE — 83825 ASSAY OF MERCURY: CPT | Performed by: INTERNAL MEDICINE

## 2024-04-15 PROCEDURE — 97110 THERAPEUTIC EXERCISES: CPT | Performed by: OCCUPATIONAL THERAPIST

## 2024-04-15 PROCEDURE — 97530 THERAPEUTIC ACTIVITIES: CPT | Performed by: OCCUPATIONAL THERAPIST

## 2024-04-15 PROCEDURE — 87040 BLOOD CULTURE FOR BACTERIA: CPT | Performed by: INTERNAL MEDICINE

## 2024-04-15 PROCEDURE — 97530 THERAPEUTIC ACTIVITIES: CPT

## 2024-04-15 PROCEDURE — 97535 SELF CARE MNGMENT TRAINING: CPT | Performed by: OCCUPATIONAL THERAPIST

## 2024-04-15 PROCEDURE — 80053 COMPREHEN METABOLIC PANEL: CPT | Performed by: NURSE PRACTITIONER

## 2024-04-15 PROCEDURE — 97110 THERAPEUTIC EXERCISES: CPT

## 2024-04-15 PROCEDURE — 84100 ASSAY OF PHOSPHORUS: CPT | Performed by: INTERNAL MEDICINE

## 2024-04-15 RX ORDER — WARFARIN SODIUM 4 MG/1
4 TABLET ORAL
Status: DISCONTINUED | OUTPATIENT
Start: 2024-04-16 | End: 2024-04-17 | Stop reason: HOSPADM

## 2024-04-15 RX ORDER — WARFARIN SODIUM 2.5 MG/1
2.5 TABLET ORAL
Status: COMPLETED | OUTPATIENT
Start: 2024-04-15 | End: 2024-04-15

## 2024-04-15 RX ADMIN — SUCRALFATE 1 G: 1 TABLET ORAL at 13:07

## 2024-04-15 RX ADMIN — DICYCLOMINE HYDROCHLORIDE 10 MG: 10 CAPSULE ORAL at 13:07

## 2024-04-15 RX ADMIN — BISACODYL 5 MG: 5 TABLET, COATED ORAL at 09:59

## 2024-04-15 RX ADMIN — PANTOPRAZOLE SODIUM 40 MG: 40 TABLET, DELAYED RELEASE ORAL at 09:59

## 2024-04-15 RX ADMIN — ATORVASTATIN CALCIUM 10 MG: 10 TABLET, FILM COATED ORAL at 10:00

## 2024-04-15 RX ADMIN — ENOXAPARIN SODIUM 80 MG: 100 INJECTION SUBCUTANEOUS at 13:06

## 2024-04-15 RX ADMIN — Medication 10 ML: at 21:18

## 2024-04-15 RX ADMIN — SUCRALFATE 1 G: 1 TABLET ORAL at 18:08

## 2024-04-15 RX ADMIN — PRIMIDONE 100 MG: 50 TABLET ORAL at 21:17

## 2024-04-15 RX ADMIN — DOCUSATE SODIUM 100 MG: 100 CAPSULE, LIQUID FILLED ORAL at 21:18

## 2024-04-15 RX ADMIN — METOPROLOL TARTRATE 25 MG: 25 TABLET, FILM COATED ORAL at 09:59

## 2024-04-15 RX ADMIN — OXYCODONE 5 MG: 5 TABLET ORAL at 18:08

## 2024-04-15 RX ADMIN — DOCUSATE SODIUM 100 MG: 100 CAPSULE, LIQUID FILLED ORAL at 10:00

## 2024-04-15 RX ADMIN — DICYCLOMINE HYDROCHLORIDE 10 MG: 10 CAPSULE ORAL at 09:59

## 2024-04-15 RX ADMIN — VERAPAMIL HYDROCHLORIDE 240 MG: 120 CAPSULE, DELAYED RELEASE PELLETS ORAL at 21:17

## 2024-04-15 RX ADMIN — SUCRALFATE 1 G: 1 TABLET ORAL at 09:59

## 2024-04-15 RX ADMIN — INSULIN LISPRO 4 UNITS: 100 INJECTION, SOLUTION INTRAVENOUS; SUBCUTANEOUS at 18:08

## 2024-04-15 RX ADMIN — Medication 10 ML: at 10:00

## 2024-04-15 RX ADMIN — AMIODARONE HYDROCHLORIDE 200 MG: 200 TABLET ORAL at 10:00

## 2024-04-15 RX ADMIN — OXYCODONE 10 MG: 5 TABLET ORAL at 09:59

## 2024-04-15 RX ADMIN — PRIMIDONE 100 MG: 50 TABLET ORAL at 10:00

## 2024-04-15 RX ADMIN — ENOXAPARIN SODIUM 80 MG: 100 INJECTION SUBCUTANEOUS at 22:22

## 2024-04-15 RX ADMIN — DICYCLOMINE HYDROCHLORIDE 10 MG: 10 CAPSULE ORAL at 21:18

## 2024-04-15 RX ADMIN — MODAFINIL 100 MG: 100 TABLET ORAL at 09:59

## 2024-04-15 RX ADMIN — METOPROLOL TARTRATE 25 MG: 25 TABLET, FILM COATED ORAL at 21:18

## 2024-04-15 RX ADMIN — DULOXETINE HYDROCHLORIDE 90 MG: 30 CAPSULE, DELAYED RELEASE ORAL at 09:59

## 2024-04-15 RX ADMIN — TAMSULOSIN HYDROCHLORIDE 0.4 MG: 0.4 CAPSULE ORAL at 10:00

## 2024-04-15 RX ADMIN — PREGABALIN 75 MG: 75 CAPSULE ORAL at 10:00

## 2024-04-15 RX ADMIN — WARFARIN SODIUM 2.5 MG: 2.5 TABLET ORAL at 18:08

## 2024-04-15 RX ADMIN — OXYCODONE 5 MG: 5 TABLET ORAL at 22:22

## 2024-04-15 RX ADMIN — PREGABALIN 75 MG: 75 CAPSULE ORAL at 21:18

## 2024-04-15 RX ADMIN — DICYCLOMINE HYDROCHLORIDE 10 MG: 10 CAPSULE ORAL at 18:08

## 2024-04-15 NOTE — THERAPY TREATMENT NOTE
Subjective: Pt agreeable to therapeutic plan of care.  Cognition: oriented to Person, situation. Focus with tx this date on cognitive sequencing & initiation skills required for transfers, bed mobility.     Objective:     Bed Mobility: Max-A for sup>sit with mod v.c. for sequencing & initiation. Encouraging pt to break down the transfer into steps to encourage ind movement & participation.        Balance: sitting EOB SBA and Min-A. Pt transferred to right side of bed this date instead of left side & pt had right side leaning at first upon sitting.   Functional Ambulation: N/A or Not attempted.    Bathing: SBA with min v.c. for initiation of  washing face, arms & top of legs.  ADL Position: edge of bed sitting  ADL Comments: Pt demo improving sitting balance & endurance to participate with EOB ADLs.     Feeding: Min-A for drinking from cup while sitting EOB. Pt demo improving hand<>mouth coordination.   ADL Position: edge of bed sitting  ADL Comments:     Therapeutic Exercise - 5 Reps B UE , trunk flex/ext & rotation, neck AROM while pt positioned in long sitting in bed to provide hamstring & back prolonged stretch.     Vitals: WNL    Pain: Pt c/o tightness & pain in hips & back during bed mobility transfers.   Interventions for pain: Repositioned, Increased Activity, and Therapeutic Presence,   Education: Provided education on the importance of mobility in the acute care setting, Verbal/Tactile Cues, ADL training, and Transfer Training      Assessment: Lux Bray presents with ADL impairments affecting function including balance, cognition, coordination, endurance / activity tolerance, grasp, pain, postural / trunk control, range of motion (ROM), and strength. Pt continues to make steady progress with inc sitting balance & tolerance to begin participating with ADLs sitting EOB. He still requires v.c. for initiation & sequencing to problem solve during transfers, but it is improving. Pt is motivated to  "participate, but does express he continues to feel sad & \"down\".  Providing pt with encouragement & praise on his progress. Demonstrated functioning below baseline abilities indicate the need for continued skilled intervention while inpatient. Tolerating session today without incident. Will continue to follow and progress as tolerated.     Plan/Recommendations:   Moderate Intensity Therapy recommended post-acute care. This is recommended as therapy feels the patient would require 3-4 days per week and wouldn't tolerate \"3 hour daily\" rehab intensity. SNF would be the preferred choice. If the patient does not agree to SNF, arrange HH or OP depending on home bound status. If patient is medically complex, consider LTACH.. Pt requires no DME at discharge.     Pt desires Skilled Rehab placement at discharge. Pt cooperative; agreeable to therapeutic recommendations and plan of care.     Modified [place]: N/A = No pre-op stroke/TIA    Post-Tx Position: Supine with HOB Elevated, Alarms activated, and Call light and personal items within reach  PPE: gloves    "

## 2024-04-15 NOTE — CONSULTS
Nutrition Services    Patient Name: Lux Bray  YOB: 1945  MRN: [id number]  Admission date: 4/5/2024        NUTRITION SCREENING      Trending Narrative: 4/15: Nutrition assessment and POC initiation related to LOS x 10 days.  Patient presented to ED on 4/5 with hypotension and was admitted with a principle dx of septic shock.  Patient has a PMHx inlcuding: HTN, DM2, and depression.  Family reports a neurological decline over the past few months. RD unable to visit patient at this time due to patient working with therapy at time of attempted visit.  RD will return at a later time if patient is not discharged today. SLP evaluated patient on 4/10 and documented Mechanical ground diet with nectar thick liquids is the most appropriate for safety.        PO Diet: Diet: Diabetic; Consistent Carbohydrate; Texture: Mechanical Ground (NDD 2); Fluid Consistency: Nectar Thick   PO Supplements: None    Trending PO Intake:  4/15: 58% average po intake x last 8 documented meals       Nutritionally-Pertinent Medications RDN Reviewed, C/W clinical course         Labs (reviewed below): Hyperglycemia- consistent CHO diet in place     Results from last 7 days   Lab Units 04/15/24  0436 04/14/24  0200 04/13/24  1420   SODIUM mmol/L 138 138 138   POTASSIUM mmol/L 3.8 3.9 3.9   CHLORIDE mmol/L 102 101 102   CO2 mmol/L 26.0 27.0 28.0   BUN mg/dL 33* 30* 30*   CREATININE mg/dL 1.37* 1.58* 1.61*   CALCIUM mg/dL 8.6 8.5* 8.7   BILIRUBIN mg/dL <0.2 <0.2 <0.2   ALK PHOS U/L 270* 260* 280*   ALT (SGPT) U/L 68* 74* 71*   AST (SGOT) U/L 45* 39 37   GLUCOSE mg/dL 142* 119* 194*     Results from last 7 days   Lab Units 04/15/24  0436   PHOSPHORUS mg/dL 2.7   HEMOGLOBIN g/dL 10.1*   HEMATOCRIT % 30.8*     Lab Results   Component Value Date    HGBA1C 6.70 (H) 04/05/2024          GI Function:  Last documented BM 4/14       Skin: 3+ edema to R hand, L/R legs, L/R ankles, L/R feet   Skin intact       Weight Review: Estimated body mass  "index is 23.86 kg/m² as calculated from the following:    Height as of this encounter: 180.3 cm (71\").    Weight as of this encounter: 77.6 kg (171 lb 1.2 oz).    Comment:   4/15: 171#  7% weight gain in the last 3 months - possible due to fluid status/present edema     Wt Readings from Last 30 Encounters:   04/15/24 0500 77.6 kg (171 lb 1.2 oz)   04/14/24 0500 76.5 kg (168 lb 10.4 oz)   04/13/24 0500 77.7 kg (171 lb 4.8 oz)   04/12/24 0500 78.7 kg (173 lb 8 oz)   04/11/24 1546 73.2 kg (161 lb 6 oz)   04/11/24 0500 70.8 kg (156 lb 1.4 oz)   04/10/24 0500 69.2 kg (152 lb 8.9 oz)   04/09/24 0500 66.4 kg (146 lb 6.2 oz)   04/08/24 0500 64.7 kg (142 lb 10.2 oz)   04/07/24 0523 64.4 kg (142 lb)   04/07/24 0500 64.8 kg (142 lb 13.7 oz)   04/06/24 0600 75 kg (165 lb 5.5 oz)   04/05/24 1510 75.8 kg (167 lb 1.7 oz)   04/05/24 1152 72.6 kg (160 lb)   01/31/24 1325 72.8 kg (160 lb 6.4 oz)   11/15/23 0902 64.4 kg (142 lb)   11/13/23 1056 64.4 kg (142 lb)   11/09/23 1123 64.9 kg (143 lb)   10/30/23 1010 66.2 kg (146 lb)   09/30/23 2317 68 kg (150 lb)   07/21/23 0913 66 kg (145 lb 9.6 oz)   07/14/23 0449 65.9 kg (145 lb 4.5 oz)   07/13/23 2346 68.5 kg (151 lb)   06/30/23 0809 68.5 kg (151 lb)   05/26/23 1313 68.5 kg (151 lb)   02/22/23 0809 66.5 kg (146 lb 9.6 oz)   08/29/22 1607 68 kg (150 lb)   03/10/22 0905 73 kg (161 lb)   06/11/21 0934 68.9 kg (152 lb)   02/10/21 1042 68.2 kg (150 lb 6.4 oz)   12/02/20 1714 69.4 kg (153 lb)   11/27/19 0907 73.6 kg (162 lb 3.2 oz)   12/27/18 0836 74.4 kg (164 lb)   10/31/18 1342 74.4 kg (164 lb)   01/31/18 1330 76.7 kg (169 lb)   10/06/17 1103 80.5 kg (177 lb 8 oz)   09/26/17 1544 81.1 kg (178 lb 12.8 oz)   09/16/17 1456 79.6 kg (175 lb 8 oz)   04/27/17 1616 79.8 kg (176 lb)   02/06/17 1400 78.9 kg (174 lb)   01/09/17 1530 81.6 kg (180 lb)   11/11/16 1308 79.4 kg (175 lb)   10/28/16 1753 77.1 kg (170 lb)   07/07/16 1041 77.1 kg (170 lb)          --      Trending Physical   Appearance, NFPE " 4/15: Unable to assess at this time             Nutrition Problem Statement: Difficulty chewing/swallowing related to altered mental status as evidenced by need for altered texture diet.         Nutrition Intervention: Continue to encourage good po intake    Will monitor tolerance per protocol.          Monitoring/Evaluation Per protocol, I&O, PO intake, Supplement intake, Pertinent labs, Weight, Skin status, GI status, Symptoms, POC/GOC, Swallow function, Hemodynamic stability            RD to follow up per protocol.    Electronically signed by:  Jennifer Sanchez RD  04/15/24 15:15 EDT

## 2024-04-15 NOTE — PROGRESS NOTES
Infectious Diseases Progress Note      LOS: 10 days   Patient Care Team:  Montana Morrow MD as PCP - General (Family Medicine)  George Viveros MD as Consulting Physician (Gastroenterology)    Chief Complaint: Weakness    Subjective     Patient's been afebrile for the last 24 hours.  He is currently on room air, hemodynamically stable and does not complain of anything but general weakness and fatigue.  Denies any abdominal pain, nausea, vomiting or diarrhea.  Feels that weakness is improving    Review of Systems:   Review of Systems   Constitutional:  Positive for fatigue.   Neurological:  Positive for weakness.        Objective     Vital Signs  Temp:  [97.8 °F (36.6 °C)-98.4 °F (36.9 °C)] 98.2 °F (36.8 °C)  Heart Rate:  [69-84] 84  Resp:  [14-21] 14  BP: (114-129)/(51-70) 119/70    Physical Exam:  Physical Exam  Vitals and nursing note reviewed.   Constitutional:       Appearance: He is well-developed. He is ill-appearing.   HENT:      Head: Normocephalic and atraumatic.   Eyes:      Pupils: Pupils are equal, round, and reactive to light.   Cardiovascular:      Rate and Rhythm: Normal rate and regular rhythm.      Heart sounds: Normal heart sounds.   Pulmonary:      Effort: Pulmonary effort is normal. No respiratory distress.      Breath sounds: Normal breath sounds. No wheezing or rales.   Abdominal:      General: Bowel sounds are normal. There is no distension.      Palpations: Abdomen is soft. There is no mass.      Tenderness: There is no abdominal tenderness. There is no guarding or rebound.   Musculoskeletal:         General: No deformity. Normal range of motion.      Cervical back: Normal range of motion and neck supple.   Skin:     General: Skin is warm.      Findings: No erythema or rash.   Neurological:      Mental Status: He is alert and oriented to person, place, and time.      Cranial Nerves: No cranial nerve deficit.          Results Review:    I have reviewed all clinical data, test, lab, and  imaging results.     Radiology  No Radiology Exams Resulted Within Past 24 Hours    Cardiology    Laboratory    Results from last 7 days   Lab Units 04/15/24  0436 04/14/24  0200 04/13/24  1148 04/10/24  0006 04/09/24  0104   WBC 10*3/mm3 19.60* 20.58* 19.72* 14.20* 15.48*   HEMOGLOBIN g/dL 10.1* 9.4* 10.8* 9.5* 10.0*   HEMATOCRIT % 30.8* 30.7* 34.1* 29.7* 31.6*   PLATELETS 10*3/mm3 408 363 383 808 242     Results from last 7 days   Lab Units 04/15/24  0436 04/14/24  0200 04/13/24  1420 04/12/24  0435 04/11/24  0238 04/10/24  1310 04/10/24  0006 04/09/24  0104   SODIUM mmol/L 138 138 138 139 139  --  135* 137   POTASSIUM mmol/L 3.8 3.9 3.9 3.6 3.9 4.1 3.4* 3.6   CHLORIDE mmol/L 102 101 102 101 102  --  99 100   CO2 mmol/L 26.0 27.0 28.0 29.0 30.0*  --  28.0 29.0   BUN mg/dL 33* 30* 30* 34* 32*  --  36* 37*   CREATININE mg/dL 1.37* 1.58* 1.61* 1.88* 2.13*  --  2.13* 1.81*   GLUCOSE mg/dL 142* 119* 194* 156* 178*  --  278* 199*   ALBUMIN g/dL 2.1* 2.3* 2.3* 2.2* 2.3*  --   --   --    BILIRUBIN mg/dL <0.2 <0.2 <0.2  --   --   --   --   --    ALK PHOS U/L 270* 260* 280*  --   --   --   --   --    AST (SGOT) U/L 45* 39 37  --   --   --   --   --    ALT (SGPT) U/L 68* 74* 71*  --   --   --   --   --    CALCIUM mg/dL 8.6 8.5* 8.7 8.3* 8.5*  --  8.4* 8.3*     Results from last 7 days   Lab Units 04/11/24  0238   CK TOTAL U/L 13*             Microbiology   Microbiology Results (last 10 days)       Procedure Component Value - Date/Time    Blood Culture - Blood, Hand, Left [[id number]]  (Normal) Collected: 04/14/24 0159    Lab Status: Preliminary result Specimen: Blood from Hand, Left Updated: 04/15/24 0215     Blood Culture No growth at 24 hours    Blood Culture - Blood, Blood, Central Line [[id number]]  (Abnormal) Collected: 04/13/24 1420    Lab Status: Preliminary result Specimen: Blood, Central Line Updated: 04/15/24 0429     Blood Culture Abnormal Stain     Gram Stain Anaerobic Bottle Gram positive cocci in clusters       Aerobic Bottle Gram positive cocci in clusters    Blood Culture ID, PCR - Blood, Blood, Central Line [[id number]]  (Abnormal) Collected: 04/13/24 1420    Lab Status: Final result Specimen: Blood, Central Line Updated: 04/14/24 1700     BCID, PCR Staph spp, not aureus or lugdunensis. Identification by BCID2 PCR.     BOTTLE TYPE Anaerobic Bottle    Eosinophil Smear - Urine, Urine, Catheter [[id number]]  (Normal) Collected: 04/10/24 2121    Lab Status: Final result Specimen: Urine, Catheter Updated: 04/10/24 2225     Eosinophil Smear 0 % EOS/100 Cells     MRSA Screen, PCR (Inpatient) - Swab, Nares [[id number]]  (Normal) Collected: 04/06/24 2226    Lab Status: Final result Specimen: Swab from Nares Updated: 04/06/24 2349     MRSA PCR No MRSA Detected    Narrative:      The negative predictive value of this diagnostic test is high and should only be used to consider de-escalating anti-MRSA therapy. A positive result may indicate colonization with MRSA and must be correlated clinically.    MRSA Screen, PCR (Inpatient) - Swab, Nares [[id number]]  (Normal) Collected: 04/05/24 1406    Lab Status: Final result Specimen: Swab from Nares Updated: 04/05/24 1524     MRSA PCR No MRSA Detected    Narrative:      The negative predictive value of this diagnostic test is high and should only be used to consider de-escalating anti-MRSA therapy. A positive result may indicate colonization with MRSA and must be correlated clinically.    Respiratory Panel PCR w/COVID-19(SARS-CoV-2) DODIE/JOVITA/THEO/PAD/COR/RAJAT In-House, NP Swab in UTM/VTM, 2 HR TAT - Swab, Nasopharynx [[id number]]  (Normal) Collected: 04/05/24 1406    Lab Status: Final result Specimen: Swab from Nasopharynx Updated: 04/05/24 1500     ADENOVIRUS, PCR Not Detected     Coronavirus 229E Not Detected     Coronavirus HKU1 Not Detected     Coronavirus NL63 Not Detected     Coronavirus OC43 Not Detected     COVID19 Not Detected     Human Metapneumovirus Not Detected     Human  Rhinovirus/Enterovirus Not Detected     Influenza A PCR Not Detected     Influenza B PCR Not Detected     Parainfluenza Virus 1 Not Detected     Parainfluenza Virus 2 Not Detected     Parainfluenza Virus 3 Not Detected     Parainfluenza Virus 4 Not Detected     RSV, PCR Not Detected     Bordetella pertussis pcr Not Detected     Bordetella parapertussis PCR Not Detected     Chlamydophila pneumoniae PCR Not Detected     Mycoplasma pneumo by PCR Not Detected    Narrative:      In the setting of a positive respiratory panel with a viral infection PLUS a negative procalcitonin without other underlying concern for bacterial infection, consider observing off antibiotics or discontinuation of antibiotics and continue supportive care. If the respiratory panel is positive for atypical bacterial infection (Bordetella pertussis, Chlamydophila pneumoniae, or Mycoplasma pneumoniae), consider antibiotic de-escalation to target atypical bacterial infection.            Medication Review:       Schedule Meds  amiodarone, 200 mg, Oral, Q24H  atorvastatin, 10 mg, Oral, Daily  Beneprotein, 1 packet, Oral, BID  bisacodyl, 5 mg, Oral, Daily  dicyclomine, 10 mg, Oral, 4x Daily AC & at Bedtime  docusate sodium, 100 mg, Oral, BID  DULoxetine, 90 mg, Oral, Daily  enoxaparin, 1 mg/kg, Subcutaneous, Q12H  insulin lispro, 2-9 Units, Subcutaneous, 4x Daily AC & at Bedtime  metoprolol tartrate, 25 mg, Oral, Q12H  modafinil, 100 mg, Oral, Daily  oxyCODONE, 10 mg, Oral, Daily  pantoprazole, 40 mg, Oral, Daily  pregabalin, 75 mg, Oral, BID  primidone, 100 mg, Oral, Q12H  sodium chloride, 10 mL, Intravenous, Q12H  sucralfate, 1 g, Oral, TID AC  tamsulosin, 0.4 mg, Oral, Daily  verapamil ER, 240 mg, Oral, Nightly  warfarin, 5 mg, Oral, Daily        Infusion Meds  Pharmacy to Dose enoxaparin (LOVENOX),   Pharmacy to dose warfarin,         PRN Meds    acetaminophen **OR** acetaminophen    bisacodyl    dextrose    dextrose    glucagon (human  recombinant)    ipratropium-albuterol    magnesium hydroxide    nitroglycerin    ondansetron ODT **OR** ondansetron    oxyCODONE    Pharmacy to Dose enoxaparin (LOVENOX)    Pharmacy to dose warfarin    sodium chloride    sodium chloride    sodium chloride        Assessment & Plan       Antimicrobial Therapy   1.         2.        3.        4.        5.            Assessment     Reactive leukocytosis.  Worsened today.  Currently on no steroids and no diarrhea to suspect C. difficile colitis.  Beside that patient has no clinical signs of sepsis.  CT of the chest, abdomen pelvis marked on possible colitis however patient has no abdominal pain and denies GI symptoms.  His there is no signs of infection to the abdomen or bilateral thighs    Left lower lobe pneumonia on admission treated with 5 days of antimicrobial therapy.  Currently on 2 L of oxygen     Progressive weakness.  Appears from neurology's note that patient was seen in an outpatient neurologist in October 2023 and had a positive EMG and nerve conduction studies.  Currently on Lyrica for polyneuropathy.     Type 2 diabetes    Positive blood culture with 1 out of 2 blood culture sets showing a coagulase-negative Staphylococcus.  Patient has no history of cardiac valve surgery or cardiac device placement.  Patient does have a midline placed that was placed there 10 days ago     Plan     Monitor patient off antimicrobial  therapy since he is hemodynamically stable  Repeat blood cultures x 2  Supportive care  A.m. labs      Chana Hernandez, JONATHAN  04/15/24  13:05 EDT    Note is dictated utilizing voice recognition software/Dragon

## 2024-04-15 NOTE — CASE MANAGEMENT/SOCIAL WORK
Continued Stay Note  MARIO Larsen     Patient Name: Lux Bray  MRN: [id number]  Today's Date: 4/15/2024    Admit Date: 4/5/2024    Plan: [facility] accepted. No precert required. PASRR approved. From [facility] (spouse does not want pt to return). [facility] following for after SNF.   Discharge Plan       Row Name 04/15/24 1204       Plan    Plan Comments DC Barriers: Pending blood cultures, ID following, WBC monitoring (WBC 19.60).                Doris Ortiz RN     Office Phone: [phone]  Office Cell: [phone]

## 2024-04-15 NOTE — PROGRESS NOTES
"Pharmacy dosing service  Anticoagulant  Warfarin     Subjective:    Lux Bray is a 78 y.o.male being initiated on warfarin for Atrial Fibrillation.    INR Goal: 2 - 3  Bridge Therapy Present?:  Yes, Enoxaparin 80 mg SQ Q24H   Interacting Medications Evaluation (New/Present/Discontinued): amiodarone, primidone (dose decreased 4/13)   Additional Contributing Factors: renal disease, interacting medications, older age      Assessment/Plan:    New start warfarin due to DOAC interaction with primidone.     INR subtherapeutic but increasing towards goal. Did have a significant jump in INR from yesterday of ~0.5. Increase in INR may be 2/2 primidone dose reduction on 4/13. Will give warfarin 2.5mg tonight and then adjust to warfarin 4mg daily starting tomorrow.     Continues lovenox bridge until INR >2    Continue to monitor and adjust based on INR.         Date 4/9 4/10 4/11 4/12 4/13 4/14 4/15     INR ----- 1.03 1.04 1.11 1.28 1.42 1.91     Dose 2.5mg 2.5 mg 3 mg 5mg 5mg  5mg 2.5mg          Objective:  [Ht: 180.3 cm (71\"); Wt: 77.6 kg (171 lb 1.2 oz); BMI: Body mass index is 23.86 kg/m².]    Lab Results   Component Value Date    ALBUMIN 2.1 (L) 04/15/2024     Lab Results   Component Value Date    INR 1.91 (L) 04/15/2024    INR 1.42 (L) 04/14/2024    INR 1.28 (L) 04/13/2024    PROTIME 19.8 04/15/2024    PROTIME 15.1 (L) 04/14/2024    PROTIME 13.7 (L) 04/13/2024     Lab Results   Component Value Date    HGB 10.1 (L) 04/15/2024    HGB 9.4 (L) 04/14/2024    HGB 10.8 (L) 04/13/2024     Lab Results   Component Value Date    HCT 30.8 (L) 04/15/2024    HCT 30.7 (L) 04/14/2024    HCT 34.1 (L) 04/13/2024       Joaquina Whitaker, PharmD  04/15/24 15:01 EDT                 "

## 2024-04-15 NOTE — PLAN OF CARE
Assessment: Lux Bray is being monitored for sepstic shock d/t PNA and presents with functional mobility impairments which indicate the need for skilled intervention. Focused on improving patient's participation in bed mobility and ADL's this date for improving strength and reducing need for assist. Pt tolerated supine there ex well with mild reports of knee pain. Raised HOB to long-sitting and pt demo good  ability on the bed-rails for pull-ups and cross body reaches. Pt has difficulty sequencing movements, requiring min-mod for BLE off EOB and mod-max for sidelying>sit. Pt tolerated sitting EOB 5-10 minutes with CGA-SBA with cues for UE support on bed and bed-rails. Facilitated dynamic balance with forward reaching to cup of water and thickened orange juice, and pt able to grab/drink indep. Pt reports feeling sad about his condition, but edu regarding ability to strengthen and improve his independence. Pt reports feeling more confident in his balance, and says his goal moving forward is to be able to use his walker again. Changing frequency to 5x/wk for this pt. Tolerating session today without incident. Will continue to follow and progress as tolerated.

## 2024-04-15 NOTE — PLAN OF CARE
Goal Outcome Evaluation:      Pt  more alert, was able to feed himself a pudding, drank 3 boost supplemental drink. . Vitals stable.

## 2024-04-15 NOTE — PROGRESS NOTES
[facility] Progress Note      Patient Name: Lux Bray  : 1945  MRN: [id number]  Primary Care Physician:  Montana Morrow MD  Date of admission: 2024    Subjective     Interval History:     Patient resting comfortably  IV fluids stopped due to edema  No acute respiratory distress overnight    Review of Systems:   As noted above    Objective     Vitals:   Temp:  [97.8 °F (36.6 °C)-98.4 °F (36.9 °C)] 98.2 °F (36.8 °C)  Heart Rate:  [69-84] 84  Resp:  [14-21] 14  BP: (114-129)/(51-70) 119/70  Flow (L/min):  [2] 2    Intake/Output Summary (Last 24 hours) at 4/15/2024 1314  Last data filed at 4/15/2024 0848  Gross per 24 hour   Intake 120 ml   Output 2200 ml   Net -2080 ml       Physical Exam:      General Appearance: Chronically ill-appearing, awake  Skin: warm and dry  HEENT: oral mucosa dry, nonicteric sclera  Neck: supple, no JVD  Lungs: Decreased breath sounds at bases  Heart: RRR, normal S1 and S2  Abdomen: soft, nontender, nondistended  : no palpable bladder  Extremities: 1+ bilateral lower extremities edema    Scheduled Meds:     amiodarone, 200 mg, Oral, Q24H  atorvastatin, 10 mg, Oral, Daily  Beneprotein, 1 packet, Oral, BID  bisacodyl, 5 mg, Oral, Daily  dicyclomine, 10 mg, Oral, 4x Daily AC & at Bedtime  docusate sodium, 100 mg, Oral, BID  DULoxetine, 90 mg, Oral, Daily  enoxaparin, 1 mg/kg, Subcutaneous, Q12H  insulin lispro, 2-9 Units, Subcutaneous, 4x Daily AC & at Bedtime  metoprolol tartrate, 25 mg, Oral, Q12H  modafinil, 100 mg, Oral, Daily  oxyCODONE, 10 mg, Oral, Daily  pantoprazole, 40 mg, Oral, Daily  pregabalin, 75 mg, Oral, BID  primidone, 100 mg, Oral, Q12H  sodium chloride, 10 mL, Intravenous, Q12H  sucralfate, 1 g, Oral, TID AC  tamsulosin, 0.4 mg, Oral, Daily  verapamil ER, 240 mg, Oral, Nightly  warfarin, 5 mg, Oral, Daily      IV Meds:   Pharmacy to Dose enoxaparin (LOVENOX),   Pharmacy to dose warfarin,         Results Reviewed:   I have  personally reviewed the results from the time of this admission to 4/15/2024 13:14 EDT     Results from last 7 days   Lab Units 04/15/24  0436 04/14/24  0200 04/13/24  1420   SODIUM mmol/L 138 138 138   POTASSIUM mmol/L 3.8 3.9 3.9   CHLORIDE mmol/L 102 101 102   CO2 mmol/L 26.0 27.0 28.0   BUN mg/dL 33* 30* 30*   CREATININE mg/dL 1.37* 1.58* 1.61*   CALCIUM mg/dL 8.6 8.5* 8.7   BILIRUBIN mg/dL <0.2 <0.2 <0.2   ALK PHOS U/L 270* 260* 280*   ALT (SGPT) U/L 68* 74* 71*   AST (SGOT) U/L 45* 39 37   GLUCOSE mg/dL 142* 119* 194*     Estimated Creatinine Clearance: 47.2 mL/min (A) (by C-G formula based on SCr of 1.37 mg/dL (H)).  Results from last 7 days   Lab Units 04/15/24  0436 04/14/24  0200 04/12/24  0435   PHOSPHORUS mg/dL 2.7 2.7 2.8         Results from last 7 days   Lab Units 04/15/24  0436 04/14/24  0200 04/13/24  1148 04/10/24  0006 04/09/24  0104   WBC 10*3/mm3 19.60* 20.58* 19.72* 14.20* 15.48*   HEMOGLOBIN g/dL 10.1* 9.4* 10.8* 9.5* 10.0*   PLATELETS 10*3/mm3 408 363 383 253 242     Results from last 7 days   Lab Units 04/15/24  0436 04/14/24  0200 04/13/24  1420 04/12/24  0435 04/11/24  0238   INR  1.91* 1.42* 1.28* 1.11* 1.04*       Assessment / Plan     ASSESSMENT:    1.  Acute kidney injury.  Nonoliguric ATN.  Renal function improving.  Very good urine output.  Creatinine improving slowly, 1.371D/D of this morning.  Last lites okay  2.  Septic shock.  Due to pneumonia.  Improving.  Off antibiotics now   3.  Hypokalemia. resolved  5.  Benign essential hypertension.  Blood pressure is okay    PLAN:  Discontinued IV fluids  Okay to discharge from renal standpoint.  Follow-up in 2-4 weeks from discharge    Gen Ruiz MD  04/15/24  13:14 EDT    [facility]  [phone]

## 2024-04-15 NOTE — THERAPY TREATMENT NOTE
Subjective: Pt agreeable to therapeutic plan of care. Pt reports his goal is to be able to use his walker again with standing and walking.    Objective:     Bed mobility - Max-A Verbal cues for improving indep/participation. HOB lifted to long-sitting, pt able to reach/roll onto side, needed Vikram with BLE off EOB, mod-max for bringing body upright.  Transfers - N/A or Not attempted.  Ambulation - N/A feet N/A or Not attempted.    Therapeutic Exercise - 10 Reps B UE and B LE AROM lying supine and unsupported sitting / EOB  Supine: ankle pumps, heel slides, hip abd  HOB elevated: pull ups with BUE on bed-rails, cross-body reaches to bed-rails  Seated: balance, fwd reach    Vitals: Hypotensive; diastolic BP low each read: 111/49 > 101/46. MAP always WNL.    Pain: 6 VAS   Location: generalized body aches: back, hip, R knee  Intervention for pain: Repositioned, RN notified, Increased Activity, and Therapeutic Presence    Education: Provided education on the importance of mobility in the acute care setting, Verbal/Tactile Cues, ADL training, Transfer Training, and Energy conservation strategies    Assessment: Lux Bray is being monitored for sepstic shock d/t PNA and presents with functional mobility impairments which indicate the need for skilled intervention. Focused on improving patient's participation in bed mobility and ADL's this date for improving strength and reducing need for assist. Pt tolerated supine there ex well with mild reports of knee pain. Raised HOB to long-sitting and pt demo good  ability on the bed-rails for pull-ups and cross body reaches. Pt has difficulty sequencing movements, requiring min-mod for BLE off EOB and mod-max for sidelying>sit. Pt tolerated sitting EOB 5-10 minutes with CGA-SBA with cues for UE support on bed and bed-rails. Facilitated dynamic balance with forward reaching to cup of water and thickened orange juice, and pt able to grab/drink indep. Pt reports feeling sad  "about his condition, but edu regarding ability to strengthen and improve his independence. Pt reports feeling more confident in his balance, and says his goal moving forward is to be able to use his walker again. Changing frequency to 5x/wk for this pt. Tolerating session today without incident. Will continue to follow and progress as tolerated.     Plan/Recommendations:   If medically appropriate, Moderate Intensity Therapy recommended post-acute care. This is recommended as therapy feels the patient would require 3-4 days per week and wouldn't tolerate \"3 hour daily\" rehab intensity. SNF would be the preferred choice. If the patient does not agree to SNF, arrange HH or OP depending on home bound status. If patient is medically complex, consider LTACH. Pt requires no DME at discharge.     Pt desires Skilled Rehab placement at discharge. Pt cooperative; agreeable to therapeutic recommendations and plan of care.         Basic Mobility 6-click:  Rollin = Total, A lot = 2, A little = 3; 4 = None  Supine>Sit:   1 = Total, A lot = 2, A little = 3; 4 = None   Sit>Stand with arms:  1 = Total, A lot = 2, A little = 3; 4 = None  Bed>Chair:   1 = Total, A lot = 2, A little = 3; 4 = None  Ambulate in room:  1 = Total, A lot = 2, A little = 3; 4 = None  3-5 Steps with railin = Total, A lot = 2, A little = 3; 4 = None  Score: 9    Modified Yovany: N/A = No pre-op stroke/TIA    Post-Tx Position: Supine with HOB Elevated, Alarms activated, and Call light and personal items within reach  PPE: gloves   "

## 2024-04-15 NOTE — PLAN OF CARE
Goal Outcome Evaluation:      Patient has been resting most of the night. Patient has been turned throughout the night. Patient is on 2L of O2. Patient is being discharge to [facility] when ready. Patient could possibly be discharged tomorrow.

## 2024-04-15 NOTE — PROGRESS NOTES
"[facility] MEDICINE SERVICE  DAILY PROGRESS NOTE    NAME: Lux Bray  : 1945  MRN: [id number]      LOS: 10 days     PROVIDER OF SERVICE: Kendall Haynes MD    Chief Complaint: Septic shock    Subjective:     History of Present illness      Lux Bray is a 78 y.o. male with PMH of HTN, DM II, Depression,  presented to the hospital after reportedly being sick for a few days at rehab, and was admitted with a principal diagnosis of Septic shock.      Per family the patient has had a neurologic decline over the last few months. CT head was negative for acute abnormality. Ammonia level was normal. Per chart review, the patient's PCP has decreased some of the sedating medications the patient has or was taking. Patient is currently drowsy but will wake up and answer questions appropriately. He is reporting that he is very tired.      In ER the patient was found to be hypotensive. He was given the full sepsis bolus and started on levophed. Chest x-ray showed bibasilar infiltrates. Patient's family reports that they have had concerns he has been aspirating. Patient will be transferred to ICU for close monitoring and vasopressor support.       ACP: CPR, Full Intervention. Patients wife is his decision maker in the even he is unable.      Patient was seen and examined on 24 at 13:54 EDT .     24-Patient admitted to the ICU for septic shock and only needed vasopressors very briefly.  Has been off vasopressors since last night.  Developed A-fib with RVR, will give a dose of digoxin.  If blood pressure permits, resume home verapamil.  Will likely need full dose anticoagulation, defer to primary team.  Transfer out of the ICU to hospitalist service.      24-Patient drowsy but easily aroused. Knows what year it is. States that he hurts all over, and is nauseous. He also states that he is \"So, so, so, tired.\" Denies feeling short of breath, or having chest pain.   24 patient seen and " examined in bed no acute distress, vital signs stable, discussed with RN.  Weak weak and tired.Patient is currently n.p.o. pending speech evaluation.   4/8/2024 patient seen and examined in bed no acute distress, vital signs stable, discussed with RN.  Patient with weakness fatigue..  Apparently was living at home.  Speech recommended that pt initiate a mechanical soft diet with NT liquids as tolerated   4/9/24 seen in bed NAD, CODY, some confusion, JOSE L DELATORRE  4/10/24 patient seen in bed JOANNA, JOSE L DELATORRE, cody, per  wife wanted to take him home, unable to use eliquis, started coumadin, awaiting placement  4/11/24 seen in bed NAD, weakness and fatigue, DW family says he has had increased loss of mobility over the last 6 month  4/12/24 patient seen and examined in bed no acute distress, patient is weak tired, fatigue. very sleepy.  Vital signs stable, renal function slightly improved.  Discussed with RN. Long discussion with family >30min  4/13/2024 patient seen and examined is more alert today able to carry a conversation.  Eating better.  Discussed with RN, WBC count has increased to 19.  Temperature 100.2   Off antibiotics.  INR 1.4.  Creatinine 1.5 on IV fluids.  BNP pending,.  4/14/2024 patient seen and examined in bed no acute distress, no events overnight, vital signs stable, discussed with RN, awaiting cultures.  WBC 20.5 afebrile  4/15/2024 patient seen and examined in bed no acute distress, patient still fatigue weak discussed with RN, ID repeated blood cultures.    Review of Systems   Constitutional:  Positive for fatigue. Negative for appetite change, chills and fever.   HENT:  Negative for congestion.    Eyes:  Negative for pain and redness.   Respiratory:  Positive for shortness of breath. Negative for cough and chest tightness.    Gastrointestinal:  Negative for abdominal pain, diarrhea, nausea and vomiting.   Endocrine: Negative for cold intolerance and heat intolerance.   Genitourinary:  Negative for  dysuria, flank pain and frequency.   Musculoskeletal:  Negative for back pain and myalgias.   Neurological:  Positive for weakness. Negative for dizziness and headaches.   Psychiatric/Behavioral:  Negative for sleep disturbance. The patient is not nervous/anxious.      Objective:     Vital Signs  Temp:  [97.8 °F (36.6 °C)-98.4 °F (36.9 °C)] 98.2 °F (36.8 °C)  Heart Rate:  [69-84] 73  Resp:  [14-21] 14  BP: (111-129)/(49-70) 111/49  Flow (L/min):  [2] 2   Body mass index is 23.86 kg/m².    Physical Exam  Constitutional:       Appearance: Normal appearance.   HENT:      Head: Normocephalic and atraumatic.      Nose: Nose normal.      Mouth/Throat:      Mouth: Mucous membranes are moist.   Eyes:      Extraocular Movements: Extraocular movements intact.      Conjunctiva/sclera: Conjunctivae normal.      Pupils: Pupils are equal, round, and reactive to light.   Cardiovascular:      Rate and Rhythm: Regular rhythm. Tachycardia present.      Pulses: Normal pulses.      Heart sounds: Normal heart sounds.   Pulmonary:      Effort: Pulmonary effort is normal.      Breath sounds: Normal breath sounds.   Musculoskeletal:      Cervical back: Normal range of motion.   Skin:     General: Skin is warm and dry.   Neurological:      General: No focal deficit present.      Mental Status: He is alert. He is disoriented.       Scheduled Meds   amiodarone, 200 mg, Oral, Q24H  atorvastatin, 10 mg, Oral, Daily  Beneprotein, 1 packet, Oral, BID  bisacodyl, 5 mg, Oral, Daily  dicyclomine, 10 mg, Oral, 4x Daily AC & at Bedtime  docusate sodium, 100 mg, Oral, BID  DULoxetine, 90 mg, Oral, Daily  enoxaparin, 1 mg/kg, Subcutaneous, Q12H  insulin lispro, 2-9 Units, Subcutaneous, 4x Daily AC & at Bedtime  metoprolol tartrate, 25 mg, Oral, Q12H  modafinil, 100 mg, Oral, Daily  oxyCODONE, 10 mg, Oral, Daily  pantoprazole, 40 mg, Oral, Daily  pregabalin, 75 mg, Oral, BID  primidone, 100 mg, Oral, Q12H  sodium chloride, 10 mL, Intravenous,  Q12H  sucralfate, 1 g, Oral, TID AC  tamsulosin, 0.4 mg, Oral, Daily  verapamil ER, 240 mg, Oral, Nightly  warfarin, 5 mg, Oral, Daily       PRN Meds     acetaminophen **OR** acetaminophen    bisacodyl    dextrose    dextrose    glucagon (human recombinant)    ipratropium-albuterol    magnesium hydroxide    nitroglycerin    ondansetron ODT **OR** ondansetron    oxyCODONE    Pharmacy to Dose enoxaparin (LOVENOX)    Pharmacy to dose warfarin    sodium chloride    sodium chloride    sodium chloride   Infusions  Pharmacy to Dose enoxaparin (LOVENOX),   Pharmacy to dose warfarin,         Diagnostic Data    Results from last 7 days   Lab Units 04/15/24  0436   WBC 10*3/mm3 19.60*   HEMOGLOBIN g/dL 10.1*   HEMATOCRIT % 30.8*   PLATELETS 10*3/mm3 408   GLUCOSE mg/dL 142*   CREATININE mg/dL 1.37*   BUN mg/dL 33*   SODIUM mmol/L 138   POTASSIUM mmol/L 3.8   AST (SGOT) U/L 45*   ALT (SGPT) U/L 68*   ALK PHOS U/L 270*   BILIRUBIN mg/dL <0.2   ANION GAP mmol/L 10.0     CT Chest Without Contrast Diagnostic    Result Date: 4/13/2024  1.Examination is limited due to lack of IV contrast administration. Moderate left and small right pleural effusions with bibasilar atelectasis. 2.Suggestion of wall thickening and stranding about the proximal/mid colon. Infectious or inflammatory colitis may be considered in the appropriate clinical setting. Further evaluation of the hollow viscera is limited due to lack of IV contrast administration. 3.Stranding within the superficial soft tissues of the abdomen and bilateral thighs. ditional findings as detailed above. Electronically Signed: Tramaine Sutherland MD  4/13/2024 5:08 PM EDT  Workstation ID: [id number]    CT Abdomen Pelvis Without Contrast    Result Date: 4/13/2024  1.Examination is limited due to lack of IV contrast administration. Moderate left and small right pleural effusions with bibasilar atelectasis. 2.Suggestion of wall thickening and stranding about the proximal/mid colon. Infectious  or inflammatory colitis may be considered in the appropriate clinical setting. Further evaluation of the hollow viscera is limited due to lack of IV contrast administration. 3.Stranding within the superficial soft tissues of the abdomen and bilateral thighs. ditional findings as detailed above. Electronically Signed: Tramaine Sutherland MD  4/13/2024 5:08 PM EDT  Workstation ID: [id number]     I reviewed the patient's new clinical results.    Assessment/Plan:     Active and Resolved Problems  Active Hospital Problems    Diagnosis  POA    **Septic shock [A41.9, R65.21]  Yes    High cholesterol [E78.00]  Yes    Hearing loss [H91.90]  Yes    Acid reflux [K21.9]  Yes    Generalized weakness [R53.1]  Yes    Leukocytosis [D72.829]  Yes    Polyneuropathy associated with underlying disease [G63]  Yes    Gastroesophageal reflux disease without esophagitis [K21.9]  Yes    Vitamin D deficiency, unspecified  [E55.9]  Yes    Depression [F32.A]  Yes    HTN (hypertension) [I10]  Yes    Arthritis [M19.90]  Yes    Lumbago with sciatica, right side [M54.41]  Yes    Cholelithiasis [K80.20]  Yes    Irritable bowel syndrome without diarrhea [K58.9]  Yes    Cervical radiculopathy [M54.12]  Yes    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     Patient downgraded from ICU to PCU level of care.     Septic Shock due to Pneumonia   -WBC-14>19.7  Tmax 100.2  -Respiratory panel negative   -MRSA swab negative  -NS at 50mL/hr   -Levophed discontinued   -received Zosyn   -ID consulted-blood cultures sent  -SLP consult to rule out aspiration pna     Acute kidney injury. ATN versus AIN. Urine eosinophil negative.  Nonoliguric.   Nephrology following-IV fluids  -Creatinine seems to be plateauing, 2.13>1.88 Mg/DL   - Patient has peripheral edema but may be dry intravascularly. Low alb/CHF     Diabetes Mellitus   -Hgb A1c 4/5/24 6.7  -Continue to hold home medications   -Accuchecks ACHS   -SSI ordered   -add farxiga    Hypertension -Not controlled with medication at home  -Monitor BP per protocol   -metoprolol/verapamil/amiodarone    Hyperlipidemia --   -Patient on Lipitor at home  -Last lipid profile 5/26/23 negative  -Restart lipitor when not NPO    Depression  -Patient on Cymbalta at home  -Restart when patient not NPO    Altered Mental Status improving  -No family at bedside at this time  -Reports of neurologic decline over last few months   -Patient unable to make needs known   Hx tremor on primidone taper dose down    Progressive weakness over the last 6 months. that started with tremors.  MRI. Impression:Brain atrophy with chronic microvascular ischemic changes  No acute intracranial abnormality   consulted neurology.    Dysphasia   -Failed nursing water screen  -SLP eval today,  recommended that pt initiate a mechanical soft diet with NT liquids as tolerated   -poor po intake    Atrial fibrillation/flutter:Patient is in sinus rhythm.    Patient is on metoprolol.  verapamil  Started anticoagulation coumadin-lovenox.  coumadin-pharmacy to monitor    Elevated BNP-1661  Echo-Left ventricular systolic function is normal. Left ventricular ejection   fraction appears to be 56 - 60%. .   monitor BNP  Pleural effusion on cxr  -receiving fluids- decreased to 50 cc hr    DVT prophylaxis:  Medical and mechanical DVT prophylaxis orders are present.      Code status is   Code Status and Medical Interventions:   Ordered at: 04/05/24 1340     Code Status (Patient has no pulse and is not breathing):    CPR (Attempt to Resuscitate)     Medical Interventions (Patient has pulse or is breathing):    Full Support       Plan for  disposition:Pending clinical course.   Long discussion with family over 30 min ACP      Signature: Electronically signed by Kendall Haynes MD, 04/15/24, 14:38 EDT.  [facility] Hospitalist Team

## 2024-04-15 NOTE — PLAN OF CARE
"Goal Outcome Evaluation:   Assessment: Lux Bray presents with ADL impairments affecting function including balance, cognition, coordination, endurance / activity tolerance, grasp, pain, postural / trunk control, range of motion (ROM), and strength. Pt continues to make steady progress with inc sitting balance & tolerance to begin participating with ADLs sitting EOB. He still requires v.c. for initiation & sequencing to problem solve during transfers, but it is improving. Pt is motivated to participate, but does express he continues to feel sad & \"down\".  Providing pt with encouragement & praise on his progress. Demonstrated functioning below baseline abilities indicate the need for continued skilled intervention while inpatient. Tolerating session today without incident. Will continue to follow and progress as tolerated.     Plan/Recommendations:   Moderate Intensity Therapy recommended post-acute care. This is recommended as therapy feels the patient would require 3-4 days per week and wouldn't tolerate \"3 hour daily\" rehab intensity. SNF would be the preferred choice. If the patient does not agree to SNF, arrange HH or OP depending on home bound status. If patient is medically complex, consider LTACH.. Pt requires no DME at discharge.     Pt desires Skilled Rehab placement at discharge. Pt cooperative; agreeable to therapeutic recommendations and plan of care.                   "

## 2024-04-16 ENCOUNTER — APPOINTMENT (OUTPATIENT)
Dept: MRI IMAGING | Facility: HOSPITAL | Age: 79
End: 2024-04-16
Payer: MEDICARE

## 2024-04-16 ENCOUNTER — TELEPHONE (OUTPATIENT)
Dept: FAMILY MEDICINE CLINIC | Facility: CLINIC | Age: 79
End: 2024-04-16
Payer: MEDICARE

## 2024-04-16 PROBLEM — E44.0 MODERATE MALNUTRITION: Status: ACTIVE | Noted: 2024-04-16

## 2024-04-16 LAB
A PHAGOCYTOPH DNA BLD QL NAA+PROBE: NEGATIVE
ALBUMIN SERPL-MCNC: 2.2 G/DL (ref 3.5–5.2)
ANION GAP SERPL CALCULATED.3IONS-SCNC: 8 MMOL/L (ref 5–15)
BACTERIA SPEC AEROBE CULT: ABNORMAL
BASOPHILS # BLD AUTO: 0.04 10*3/MM3 (ref 0–0.2)
BASOPHILS NFR BLD AUTO: 0.2 % (ref 0–1.5)
BUN SERPL-MCNC: 31 MG/DL (ref 8–23)
BUN/CREAT SERPL: 21.5 (ref 7–25)
CALCIUM SPEC-SCNC: 8.5 MG/DL (ref 8.6–10.5)
CHLORIDE SERPL-SCNC: 100 MMOL/L (ref 98–107)
CO2 SERPL-SCNC: 31 MMOL/L (ref 22–29)
CREAT SERPL-MCNC: 1.44 MG/DL (ref 0.76–1.27)
DEPRECATED RDW RBC AUTO: 49.6 FL (ref 37–54)
E CHAFFEENSIS DNA BLD QL NAA+PROBE: NEGATIVE
EGFRCR SERPLBLD CKD-EPI 2021: 49.1 ML/MIN/1.73
EOSINOPHIL # BLD AUTO: 0.24 10*3/MM3 (ref 0–0.4)
EOSINOPHIL NFR BLD AUTO: 1.4 % (ref 0.3–6.2)
ERYTHROCYTE [DISTWIDTH] IN BLOOD BY AUTOMATED COUNT: 14.8 % (ref 12.3–15.4)
GLUCOSE BLDC GLUCOMTR-MCNC: 133 MG/DL (ref 70–105)
GLUCOSE BLDC GLUCOMTR-MCNC: 145 MG/DL (ref 70–105)
GLUCOSE BLDC GLUCOMTR-MCNC: 148 MG/DL (ref 70–105)
GLUCOSE BLDC GLUCOMTR-MCNC: 151 MG/DL (ref 70–105)
GLUCOSE BLDC GLUCOMTR-MCNC: 167 MG/DL (ref 70–105)
GLUCOSE SERPL-MCNC: 180 MG/DL (ref 65–99)
GRAM STN SPEC: ABNORMAL
GRAM STN SPEC: ABNORMAL
HCT VFR BLD AUTO: 32.4 % (ref 37.5–51)
HGB BLD-MCNC: 10.4 G/DL (ref 13–17.7)
IMM GRANULOCYTES # BLD AUTO: 0.67 10*3/MM3 (ref 0–0.05)
IMM GRANULOCYTES NFR BLD AUTO: 3.9 % (ref 0–0.5)
INR PPP: 2.02 (ref 2–3)
ISOLATED FROM: ABNORMAL
LYMPHOCYTES # BLD AUTO: 2.86 10*3/MM3 (ref 0.7–3.1)
LYMPHOCYTES NFR BLD AUTO: 16.8 % (ref 19.6–45.3)
MCH RBC QN AUTO: 29.1 PG (ref 26.6–33)
MCHC RBC AUTO-ENTMCNC: 32.1 G/DL (ref 31.5–35.7)
MCV RBC AUTO: 90.8 FL (ref 79–97)
MONOCYTES # BLD AUTO: 1.55 10*3/MM3 (ref 0.1–0.9)
MONOCYTES NFR BLD AUTO: 9.1 % (ref 5–12)
NEUTROPHILS NFR BLD AUTO: 11.68 10*3/MM3 (ref 1.7–7)
NEUTROPHILS NFR BLD AUTO: 68.6 % (ref 42.7–76)
NRBC BLD AUTO-RTO: 0 /100 WBC (ref 0–0.2)
PHOSPHATE SERPL-MCNC: 3.2 MG/DL (ref 2.5–4.5)
PLATELET # BLD AUTO: 375 10*3/MM3 (ref 140–450)
PMV BLD AUTO: 9.4 FL (ref 6–12)
POTASSIUM SERPL-SCNC: 4 MMOL/L (ref 3.5–5.2)
PROTHROMBIN TIME: 20.9 SECONDS (ref 19.4–28.5)
RBC # BLD AUTO: 3.57 10*6/MM3 (ref 4.14–5.8)
RICKETTSIA RICKETTSII DNA, RT: NOT DETECTED
SODIUM SERPL-SCNC: 139 MMOL/L (ref 136–145)
WBC NRBC COR # BLD AUTO: 17.04 10*3/MM3 (ref 3.4–10.8)

## 2024-04-16 PROCEDURE — 25010000002 FUROSEMIDE PER 20 MG: Performed by: HOSPITALIST

## 2024-04-16 PROCEDURE — 85025 COMPLETE CBC W/AUTO DIFF WBC: CPT | Performed by: NURSE PRACTITIONER

## 2024-04-16 PROCEDURE — 97530 THERAPEUTIC ACTIVITIES: CPT

## 2024-04-16 PROCEDURE — 97112 NEUROMUSCULAR REEDUCATION: CPT

## 2024-04-16 PROCEDURE — 97530 THERAPEUTIC ACTIVITIES: CPT | Performed by: OCCUPATIONAL THERAPIST

## 2024-04-16 PROCEDURE — 63710000001 INSULIN LISPRO (HUMAN) PER 5 UNITS

## 2024-04-16 PROCEDURE — 97129 THER IVNTJ 1ST 15 MIN: CPT | Performed by: OCCUPATIONAL THERAPIST

## 2024-04-16 PROCEDURE — 72148 MRI LUMBAR SPINE W/O DYE: CPT

## 2024-04-16 PROCEDURE — 82948 REAGENT STRIP/BLOOD GLUCOSE: CPT

## 2024-04-16 PROCEDURE — 97110 THERAPEUTIC EXERCISES: CPT

## 2024-04-16 PROCEDURE — 85610 PROTHROMBIN TIME: CPT | Performed by: INTERNAL MEDICINE

## 2024-04-16 PROCEDURE — 80069 RENAL FUNCTION PANEL: CPT | Performed by: INTERNAL MEDICINE

## 2024-04-16 PROCEDURE — 97112 NEUROMUSCULAR REEDUCATION: CPT | Performed by: OCCUPATIONAL THERAPIST

## 2024-04-16 PROCEDURE — 72141 MRI NECK SPINE W/O DYE: CPT

## 2024-04-16 PROCEDURE — 97110 THERAPEUTIC EXERCISES: CPT | Performed by: OCCUPATIONAL THERAPIST

## 2024-04-16 RX ORDER — PRIMIDONE 50 MG/1
50 TABLET ORAL EVERY 12 HOURS SCHEDULED
Status: DISCONTINUED | OUTPATIENT
Start: 2024-04-16 | End: 2024-04-17 | Stop reason: HOSPADM

## 2024-04-16 RX ORDER — FUROSEMIDE 10 MG/ML
20 INJECTION INTRAMUSCULAR; INTRAVENOUS ONCE
Status: COMPLETED | OUTPATIENT
Start: 2024-04-16 | End: 2024-04-16

## 2024-04-16 RX ADMIN — DULOXETINE HYDROCHLORIDE 90 MG: 30 CAPSULE, DELAYED RELEASE ORAL at 08:35

## 2024-04-16 RX ADMIN — FUROSEMIDE 20 MG: 10 INJECTION, SOLUTION INTRAMUSCULAR; INTRAVENOUS at 12:16

## 2024-04-16 RX ADMIN — OXYCODONE 10 MG: 5 TABLET ORAL at 08:36

## 2024-04-16 RX ADMIN — DICYCLOMINE HYDROCHLORIDE 10 MG: 10 CAPSULE ORAL at 12:16

## 2024-04-16 RX ADMIN — SUCRALFATE 1 G: 1 TABLET ORAL at 16:56

## 2024-04-16 RX ADMIN — Medication 1 PACKET: at 08:36

## 2024-04-16 RX ADMIN — PRIMIDONE 50 MG: 50 TABLET ORAL at 20:29

## 2024-04-16 RX ADMIN — AMIODARONE HYDROCHLORIDE 200 MG: 200 TABLET ORAL at 08:36

## 2024-04-16 RX ADMIN — DICYCLOMINE HYDROCHLORIDE 10 MG: 10 CAPSULE ORAL at 08:36

## 2024-04-16 RX ADMIN — Medication 10 ML: at 20:32

## 2024-04-16 RX ADMIN — DICYCLOMINE HYDROCHLORIDE 10 MG: 10 CAPSULE ORAL at 16:57

## 2024-04-16 RX ADMIN — ATORVASTATIN CALCIUM 10 MG: 10 TABLET, FILM COATED ORAL at 08:36

## 2024-04-16 RX ADMIN — SUCRALFATE 1 G: 1 TABLET ORAL at 08:35

## 2024-04-16 RX ADMIN — OXYCODONE 5 MG: 5 TABLET ORAL at 16:56

## 2024-04-16 RX ADMIN — VERAPAMIL HYDROCHLORIDE 240 MG: 120 CAPSULE, DELAYED RELEASE PELLETS ORAL at 20:28

## 2024-04-16 RX ADMIN — PRIMIDONE 100 MG: 50 TABLET ORAL at 08:35

## 2024-04-16 RX ADMIN — DOCUSATE SODIUM 100 MG: 100 CAPSULE, LIQUID FILLED ORAL at 20:29

## 2024-04-16 RX ADMIN — OXYCODONE 5 MG: 5 TABLET ORAL at 21:42

## 2024-04-16 RX ADMIN — Medication 12.5 MG: at 20:29

## 2024-04-16 RX ADMIN — WARFARIN SODIUM 4 MG: 4 TABLET ORAL at 17:04

## 2024-04-16 RX ADMIN — METOPROLOL TARTRATE 25 MG: 25 TABLET, FILM COATED ORAL at 08:36

## 2024-04-16 RX ADMIN — TAMSULOSIN HYDROCHLORIDE 0.4 MG: 0.4 CAPSULE ORAL at 08:36

## 2024-04-16 RX ADMIN — SUCRALFATE 1 G: 1 TABLET ORAL at 12:16

## 2024-04-16 RX ADMIN — PREGABALIN 75 MG: 75 CAPSULE ORAL at 20:29

## 2024-04-16 RX ADMIN — DOCUSATE SODIUM 100 MG: 100 CAPSULE, LIQUID FILLED ORAL at 08:36

## 2024-04-16 RX ADMIN — BISACODYL 5 MG: 5 TABLET, COATED ORAL at 08:36

## 2024-04-16 RX ADMIN — PANTOPRAZOLE SODIUM 40 MG: 40 TABLET, DELAYED RELEASE ORAL at 08:36

## 2024-04-16 RX ADMIN — INSULIN LISPRO 2 UNITS: 100 INJECTION, SOLUTION INTRAVENOUS; SUBCUTANEOUS at 08:34

## 2024-04-16 RX ADMIN — DICYCLOMINE HYDROCHLORIDE 10 MG: 10 CAPSULE ORAL at 20:29

## 2024-04-16 RX ADMIN — INSULIN LISPRO 2 UNITS: 100 INJECTION, SOLUTION INTRAVENOUS; SUBCUTANEOUS at 12:16

## 2024-04-16 RX ADMIN — MODAFINIL 100 MG: 100 TABLET ORAL at 08:35

## 2024-04-16 RX ADMIN — Medication 10 ML: at 08:36

## 2024-04-16 RX ADMIN — PREGABALIN 75 MG: 75 CAPSULE ORAL at 08:35

## 2024-04-16 NOTE — PROGRESS NOTES
Nutrition Services    Patient Name:  Lux Bray  YOB: 1945  MRN: [id number]  Admit Date:  4/5/2024    Checking in on patient to complete an NFPE and visit patient in room.  Patient was sitting up in bed at time of visit and begin fed by staff.  Patient was eating well and had a good appetite. Patient was drinking a Boost GC at time of visit.  RD will add Boost Glucose Control BID (Provides 380 kcals, 32 g protein if consumed)     Patient has 3+ edema documented at this time.        Trending Physical   Appearance, NFPE 4/16: NFPE completed, consistent with nutrition diagnosis of malnutrition using AND/ASPEN criteria. See MSA below.        Nutrition Diagnosis            Nutrition Dx Problem 1 Moderate chronic disease related malnutrition related to hypermetabolism in the setting of multiple chronic diseases and chronic neurological decline as evidenced by 3+ edema documented and evidence of moderate muscle and fat wasting per NFPE.        Nutrition Dx Problem 2 Difficulty chewing/swallowing related to altered mental status as evidenced by need for altered texture diet.         Malnutrition Severity Assessment      Patient meets criteria for : Moderate (non-severe) Malnutrition  Malnutrition Type (Last 8 Hours)       Malnutrition Severity Assessment       Row Name 04/16/24 0839       Malnutrition Severity Assessment    Malnutrition Type Chronic Disease - Related Malnutrition      Row Name 04/16/24 1333       Muscle Loss    Loss of Muscle Mass Findings Moderate    Clavicle Bone Region Moderate - some protrusion in females, visible in males    Acromion Bone Region Moderate - acromion may slightly protrude    Dorsal Hand Region Moderate - slight depression      Row Name 04/16/24 1330       Fat Loss    Subcutaneous Fat Loss Findings Moderate    Orbital Region  Moderate -  somewhat hollowness, slightly dark circles    Upper Arm Region Moderate - some fat tissue, not ample      Row Name 04/16/24 5164        Fluid Accumulation (Edema)    Fluid Acumulation Findings Severe    Fluid Accumulation  Severe equals 3+ or 4+ pitting edema      Row Name 04/16/24 6076       Criteria Met (Must meet criteria for severity in at least 2 of these categories: M Wasting, Fat Loss, Fluid, Secondary Signs, Wt. Status, Intake)    Patient meets criteria for  Moderate (non-severe) Malnutrition                           Electronically signed by:  Jennifer Sanchez RD  04/16/24 13:40 EDT

## 2024-04-16 NOTE — TELEPHONE ENCOUNTER
I spoke with Noemy and let her know that Dr. Morrow does not see patients in the hospital, but will see her [employer] message and will likely respond to her from there.

## 2024-04-16 NOTE — PROGRESS NOTES
[facility] Progress Note      Patient Name: Lux Bray  : 1945  MRN: [id number]  Primary Care Physician:  Montana Morrow MD  Date of admission: 2024    Subjective     Interval History:     Patient resting comfortably  No new complaints noted overnight.  Denies nausea or vomiting.  No fever no chills.  Noted lower extremity and upper extremity edema.  Review of Systems:   As noted above    Objective     Vitals:   Temp:  [97.4 °F (36.3 °C)-98.2 °F (36.8 °C)] 98.2 °F (36.8 °C)  Heart Rate:  [65-82] 81  Resp:  [12-16] 13  BP: ()/(49-73) 116/55  Flow (L/min):  [1-8] 1    Intake/Output Summary (Last 24 hours) at 2024 0956  Last data filed at 2024 0527  Gross per 24 hour   Intake --   Output 700 ml   Net -700 ml       Physical Exam:      General Appearance: Chronically ill-appearing, awake  Skin: warm and dry  HEENT: oral mucosa dry, nonicteric sclera  Neck: supple, no JVD  Lungs: Decreased breath sounds at bases  Heart: RRR, normal S1 and S2  Abdomen: soft, nontender, nondistended  : no palpable bladder  Extremities: 1+ bilateral lower extremities edema    Scheduled Meds:     amiodarone, 200 mg, Oral, Q24H  atorvastatin, 10 mg, Oral, Daily  Beneprotein, 1 packet, Oral, BID  bisacodyl, 5 mg, Oral, Daily  dicyclomine, 10 mg, Oral, 4x Daily AC & at Bedtime  docusate sodium, 100 mg, Oral, BID  DULoxetine, 90 mg, Oral, Daily  enoxaparin, 1 mg/kg, Subcutaneous, Q12H  insulin lispro, 2-9 Units, Subcutaneous, 4x Daily AC & at Bedtime  metoprolol tartrate, 25 mg, Oral, Q12H  modafinil, 100 mg, Oral, Daily  oxyCODONE, 10 mg, Oral, Daily  pantoprazole, 40 mg, Oral, Daily  pregabalin, 75 mg, Oral, BID  primidone, 100 mg, Oral, Q12H  sodium chloride, 10 mL, Intravenous, Q12H  sucralfate, 1 g, Oral, TID AC  tamsulosin, 0.4 mg, Oral, Daily  verapamil ER, 240 mg, Oral, Nightly  warfarin, 4 mg, Oral, Daily      IV Meds:   Pharmacy to Dose enoxaparin (LOVENOX),    Pharmacy to dose warfarin,         Results Reviewed:   I have personally reviewed the results from the time of this admission to 4/16/2024 09:56 EDT     Results from last 7 days   Lab Units 04/16/24  0158 04/15/24  0436 04/14/24  0200 04/13/24  1420   SODIUM mmol/L 139 138 138 138   POTASSIUM mmol/L 4.0 3.8 3.9 3.9   CHLORIDE mmol/L 100 102 101 102   CO2 mmol/L 31.0* 26.0 27.0 28.0   BUN mg/dL 31* 33* 30* 30*   CREATININE mg/dL 1.44* 1.37* 1.58* 1.61*   CALCIUM mg/dL 8.5* 8.6 8.5* 8.7   BILIRUBIN mg/dL  --  <0.2 <0.2 <0.2   ALK PHOS U/L  --  270* 260* 280*   ALT (SGPT) U/L  --  68* 74* 71*   AST (SGOT) U/L  --  45* 39 37   GLUCOSE mg/dL 180* 142* 119* 194*     Estimated Creatinine Clearance: 45.3 mL/min (A) (by C-G formula based on SCr of 1.44 mg/dL (H)).  Results from last 7 days   Lab Units 04/16/24  0158 04/15/24  0436 04/14/24  0200   PHOSPHORUS mg/dL 3.2 2.7 2.7         Results from last 7 days   Lab Units 04/16/24  0158 04/15/24  0436 04/14/24  0200 04/13/24  1148 04/10/24  0006   WBC 10*3/mm3 17.04* 19.60* 20.58* 19.72* 14.20*   HEMOGLOBIN g/dL 10.4* 10.1* 9.4* 10.8* 9.5*   PLATELETS 10*3/mm3 375 408 363 383 253     Results from last 7 days   Lab Units 04/16/24  0158 04/15/24  0436 04/14/24  0200 04/13/24  1420 04/12/24  0435   INR  2.02 1.91* 1.42* 1.28* 1.11*       Assessment / Plan     ASSESSMENT:    1.  Acute kidney injury.  Nonoliguric ATN.  Renal function improved .  Very good urine output.  Urine output 1600 cc last 24 hours.  Hypervolemic  2.  Septic shock.  Due to pneumonia.  Improving.  Off antibiotics now   3.  Hypokalemia. resolved  5.  Benign essential hypertension.  Blood pressure is okay    PLAN:  Will give 1 dose of Lasix today given hypervolemia   okay to discharge home from nephrology standpoint  Will arrange follow-up in nephrology clinic in 1 to 2 weeks     Rodríguez Varela MD  04/16/24  09:56 EDT    [facility]  [phone]

## 2024-04-16 NOTE — PROGRESS NOTES
Infectious Diseases Progress Note      LOS: 11 days   Patient Care Team:  Montana Morrow MD as PCP - General (Family Medicine)  George Viveros MD as Consulting Physician (Gastroenterology)    Chief Complaint: Weakness    Subjective     Patient's been afebrile for the last 24 hours.  He is currently on room air, hemodynamically stable and does not complain of anything but general weakness and fatigue.  Denies any new symptoms today    Review of Systems:   Review of Systems   Constitutional:  Positive for fatigue.   Neurological:  Positive for weakness.        Objective     Vital Signs  Temp:  [97.4 °F (36.3 °C)-98.2 °F (36.8 °C)] 98 °F (36.7 °C)  Heart Rate:  [65-82] 68  Resp:  [12-16] 15  BP: ()/(48-73) 99/48    Physical Exam:  Physical Exam  Vitals and nursing note reviewed.   Constitutional:       Appearance: He is well-developed. He is ill-appearing.   HENT:      Head: Normocephalic and atraumatic.   Eyes:      Pupils: Pupils are equal, round, and reactive to light.   Cardiovascular:      Rate and Rhythm: Normal rate and regular rhythm.      Heart sounds: Normal heart sounds.   Pulmonary:      Effort: Pulmonary effort is normal. No respiratory distress.      Breath sounds: Normal breath sounds. No wheezing or rales.   Abdominal:      General: Bowel sounds are normal. There is no distension.      Palpations: Abdomen is soft. There is no mass.      Tenderness: There is no abdominal tenderness. There is no guarding or rebound.   Musculoskeletal:         General: No deformity. Normal range of motion.      Cervical back: Normal range of motion and neck supple.   Skin:     General: Skin is warm.      Findings: No erythema or rash.   Neurological:      Mental Status: He is alert and oriented to person, place, and time.      Cranial Nerves: No cranial nerve deficit.          Results Review:    I have reviewed all clinical data, test, lab, and imaging results.     Radiology  No Radiology Exams Resulted  Within Past 24 Hours    Cardiology    Laboratory    Results from last 7 days   Lab Units 04/16/24  0158 04/15/24  0436 04/14/24  0200 04/13/24  1148 04/10/24  0006   WBC 10*3/mm3 17.04* 19.60* 20.58* 19.72* 14.20*   HEMOGLOBIN g/dL 10.4* 10.1* 9.4* 10.8* 9.5*   HEMATOCRIT % 32.4* 30.8* 30.7* 34.1* 29.7*   PLATELETS 10*3/mm3 375 408 363 383 253     Results from last 7 days   Lab Units 04/16/24  0158 04/15/24  0436 04/14/24  0200 04/13/24  1420 04/12/24  0435 04/11/24  0238 04/10/24  1310 04/10/24  0006   SODIUM mmol/L 139 138 138 138 139 139  --  135*   POTASSIUM mmol/L 4.0 3.8 3.9 3.9 3.6 3.9 4.1 3.4*   CHLORIDE mmol/L 100 102 101 102 101 102  --  99   CO2 mmol/L 31.0* 26.0 27.0 28.0 29.0 30.0*  --  28.0   BUN mg/dL 31* 33* 30* 30* 34* 32*  --  36*   CREATININE mg/dL 1.44* 1.37* 1.58* 1.61* 1.88* 2.13*  --  2.13*   GLUCOSE mg/dL 180* 142* 119* 194* 156* 178*  --  278*   ALBUMIN g/dL 2.2* 2.1* 2.3* 2.3* 2.2* 2.3*  --   --    BILIRUBIN mg/dL  --  <0.2 <0.2 <0.2  --   --   --   --    ALK PHOS U/L  --  270* 260* 280*  --   --   --   --    AST (SGOT) U/L  --  45* 39 37  --   --   --   --    ALT (SGPT) U/L  --  68* 74* 71*  --   --   --   --    CALCIUM mg/dL 8.5* 8.6 8.5* 8.7 8.3* 8.5*  --  8.4*     Results from last 7 days   Lab Units 04/11/24  0238   CK TOTAL U/L 13*             Microbiology   Microbiology Results (last 10 days)       Procedure Component Value - Date/Time    Blood Culture - Blood, Wrist, Right [[id number]]  (Normal) Collected: 04/15/24 0942    Lab Status: Preliminary result Specimen: Blood from Wrist, Right Updated: 04/16/24 1000     Blood Culture No growth at 24 hours    Blood Culture - Blood, Wrist, Left [[id number]]  (Normal) Collected: 04/15/24 0942    Lab Status: Preliminary result Specimen: Blood from Wrist, Left Updated: 04/16/24 1000     Blood Culture No growth at 24 hours    Blood Culture - Blood, Hand, Left [[id number]]  (Normal) Collected: 04/14/24 0159    Lab Status: Preliminary result  Specimen: Blood from Hand, Left Updated: 04/16/24 0215     Blood Culture No growth at 2 days    Blood Culture - Blood, Blood, Central Line [[id number]]  (Abnormal) Collected: 04/13/24 1420    Lab Status: Final result Specimen: Blood, Central Line Updated: 04/16/24 0652     Blood Culture Staphylococcus, coagulase negative     Isolated from Aerobic and Anaerobic Bottles     Gram Stain Anaerobic Bottle Gram positive cocci in clusters      Aerobic Bottle Gram positive cocci in clusters    Narrative:      Probable contaminant requires clinical correlation, susceptibility not performed unless requested by physician.      Blood Culture ID, PCR - Blood, Blood, Central Line [[id number]]  (Abnormal) Collected: 04/13/24 1420    Lab Status: Final result Specimen: Blood, Central Line Updated: 04/14/24 1700     BCID, PCR Staph spp, not aureus or lugdunensis. Identification by BCID2 PCR.     BOTTLE TYPE Anaerobic Bottle    Eosinophil Smear - Urine, Urine, Catheter [[id number]]  (Normal) Collected: 04/10/24 2121    Lab Status: Final result Specimen: Urine, Catheter Updated: 04/10/24 2225     Eosinophil Smear 0 % EOS/100 Cells     MRSA Screen, PCR (Inpatient) - Swab, Nares [[id number]]  (Normal) Collected: 04/06/24 2226    Lab Status: Final result Specimen: Swab from Nares Updated: 04/06/24 2349     MRSA PCR No MRSA Detected    Narrative:      The negative predictive value of this diagnostic test is high and should only be used to consider de-escalating anti-MRSA therapy. A positive result may indicate colonization with MRSA and must be correlated clinically.            Medication Review:       Schedule Meds  amiodarone, 200 mg, Oral, Q24H  atorvastatin, 10 mg, Oral, Daily  Beneprotein, 1 packet, Oral, BID  bisacodyl, 5 mg, Oral, Daily  dicyclomine, 10 mg, Oral, 4x Daily AC & at Bedtime  docusate sodium, 100 mg, Oral, BID  DULoxetine, 90 mg, Oral, Daily  insulin lispro, 2-9 Units, Subcutaneous, 4x Daily AC & at Bedtime  metoprolol  tartrate, 12.5 mg, Oral, Q12H  modafinil, 100 mg, Oral, Daily  oxyCODONE, 10 mg, Oral, Daily  pantoprazole, 40 mg, Oral, Daily  pregabalin, 75 mg, Oral, BID  primidone, 50 mg, Oral, Q12H  sodium chloride, 10 mL, Intravenous, Q12H  sucralfate, 1 g, Oral, TID AC  tamsulosin, 0.4 mg, Oral, Daily  verapamil ER, 240 mg, Oral, Nightly  warfarin, 4 mg, Oral, Daily        Infusion Meds  Pharmacy to dose warfarin,         PRN Meds    acetaminophen **OR** acetaminophen    bisacodyl    dextrose    dextrose    glucagon (human recombinant)    ipratropium-albuterol    magnesium hydroxide    nitroglycerin    ondansetron ODT **OR** ondansetron    oxyCODONE    Pharmacy to dose warfarin    sodium chloride    sodium chloride    sodium chloride        Assessment & Plan       Antimicrobial Therapy   1.         2.        3.        4.        5.            Assessment     Reactive leukocytosis.  Patient has no obvious clinical signs of bacterial infection.  Patient was treated for aspiration pneumonia this admission and finish antibiotics.  CT scan of the chest, abdomen pelvis indicated colitis however patient has no GI symptoms including abdominal pain or diarrhea.  White count is trending down    Left lower lobe pneumonia on admission treated with 5 days of antimicrobial therapy.  Currently on 2 L of oxygen     Progressive weakness.  Appears from neurology's note that patient was seen in an outpatient neurologist in October 2023 and had a positive EMG and nerve conduction studies.  Currently on Lyrica for polyneuropathy.     Type 2 diabetes    Positive blood culture with 1 out of 2 blood culture sets showing a coagulase-negative Staphylococcus.  Patient has no history of cardiac valve surgery or cardiac device placement.  Patient does have a midline placed that was placed there 10 days ago.  Repeat blood cultures are negative so far     Plan     Monitor patient off antimicrobial  therapy since he is hemodynamically stable  Supportive  kim Hernandez, APRN  04/16/24  14:27 EDT    Note is dictated utilizing voice recognition software/Dragon

## 2024-04-16 NOTE — PROGRESS NOTES
"[facility] MEDICINE SERVICE  DAILY PROGRESS NOTE    NAME: Lux Bray  : 1945  MRN: [id number]      LOS: 11 days     PROVIDER OF SERVICE: Kendall Haynes MD    Chief Complaint: Septic shock    Subjective:     History of Present illness      Lux Bray is a 78 y.o. male with PMH of HTN, DM II, Depression,  presented to the hospital after reportedly being sick for a few days at rehab, and was admitted with a principal diagnosis of Septic shock.      Per family the patient has had a neurologic decline over the last few months. CT head was negative for acute abnormality. Ammonia level was normal. Per chart review, the patient's PCP has decreased some of the sedating medications the patient has or was taking. Patient is currently drowsy but will wake up and answer questions appropriately. He is reporting that he is very tired.      In ER the patient was found to be hypotensive. He was given the full sepsis bolus and started on levophed. Chest x-ray showed bibasilar infiltrates. Patient's family reports that they have had concerns he has been aspirating. Patient will be transferred to ICU for close monitoring and vasopressor support.       ACP: CPR, Full Intervention. Patients wife is his decision maker in the even he is unable.      Patient was seen and examined on 24 at 13:54 EDT .     24-Patient admitted to the ICU for septic shock and only needed vasopressors very briefly.  Has been off vasopressors since last night.  Developed A-fib with RVR, will give a dose of digoxin.  If blood pressure permits, resume home verapamil.  Will likely need full dose anticoagulation, defer to primary team.  Transfer out of the ICU to hospitalist service.      24-Patient drowsy but easily aroused. Knows what year it is. States that he hurts all over, and is nauseous. He also states that he is \"So, so, so, tired.\" Denies feeling short of breath, or having chest pain.   24 patient seen and " examined in bed no acute distress, vital signs stable, discussed with RN.  Weak weak and tired.Patient is currently n.p.o. pending speech evaluation.   4/8/2024 patient seen and examined in bed no acute distress, vital signs stable, discussed with RN.  Patient with weakness fatigue..  Apparently was living at home.  Speech recommended that pt initiate a mechanical soft diet with NT liquids as tolerated   4/9/24 seen in bed NAD, CODY, some confusion, JOSE L DELATORRE  4/10/24 patient seen in bed JOANNA, JOSE L RN, cody, per  wife wanted to take him home, unable to use eliquis, started coumadin, awaiting placement  4/11/24 seen in bed NAD, weakness and fatigue, DW family says he has had increased loss of mobility over the last 6 month  4/12/24 patient seen and examined in bed no acute distress, patient is weak tired, fatigue. very sleepy.  Vital signs stable, renal function slightly improved.  Discussed with RN. Long discussion with family >30min  4/13/2024 patient seen and examined is more alert today able to carry a conversation.  Eating better.  Discussed with RN, WBC count has increased to 19.  Temperature 100.2   Off antibiotics.  INR 1.4.  Creatinine 1.5 on IV fluids.  BNP pending,.  4/14/2024 patient seen and examined in bed no acute distress, no events overnight, vital signs stable, discussed with RN, awaiting cultures.  WBC 20.5 afebrile  4/15/2024 patient seen and examined in bed no acute distress, patient still fatigue weak discussed with RN, ID repeated blood cultures.  4/16/24 patient seen in bed JOANNA, JOSE L RN, more alert today, decreasing dose of primidone, awaiting BC    Review of Systems   Constitutional:  Positive for fatigue. Negative for appetite change, chills and fever.   HENT:  Negative for congestion.    Eyes:  Negative for pain and redness.   Respiratory:  Positive for shortness of breath. Negative for cough and chest tightness.    Gastrointestinal:  Negative for abdominal pain, diarrhea, nausea and  vomiting.   Endocrine: Negative for cold intolerance and heat intolerance.   Genitourinary:  Negative for dysuria, flank pain and frequency.   Musculoskeletal:  Negative for back pain and myalgias.   Neurological:  Positive for weakness. Negative for dizziness and headaches.   Psychiatric/Behavioral:  Negative for sleep disturbance. The patient is not nervous/anxious.      Objective:     Vital Signs  Temp:  [97.4 °F (36.3 °C)-98.2 °F (36.8 °C)] 98 °F (36.7 °C)  Heart Rate:  [65-82] 68  Resp:  [12-16] 15  BP: ()/(48-73) 99/48  Flow (L/min):  [1-8] 1   Body mass index is 24.04 kg/m².    Physical Exam  Constitutional:       Appearance: Normal appearance.   HENT:      Head: Normocephalic and atraumatic.      Nose: Nose normal.      Mouth/Throat:      Mouth: Mucous membranes are moist.   Eyes:      Extraocular Movements: Extraocular movements intact.      Conjunctiva/sclera: Conjunctivae normal.      Pupils: Pupils are equal, round, and reactive to light.   Cardiovascular:      Rate and Rhythm: Regular rhythm. Tachycardia present.      Pulses: Normal pulses.      Heart sounds: Normal heart sounds.   Pulmonary:      Effort: Pulmonary effort is normal.      Breath sounds: Normal breath sounds.   Musculoskeletal:      Cervical back: Normal range of motion.   Skin:     General: Skin is warm and dry.   Neurological:      General: No focal deficit present.      Mental Status: He is alert. He is disoriented.       Scheduled Meds   amiodarone, 200 mg, Oral, Q24H  atorvastatin, 10 mg, Oral, Daily  Beneprotein, 1 packet, Oral, BID  bisacodyl, 5 mg, Oral, Daily  dicyclomine, 10 mg, Oral, 4x Daily AC & at Bedtime  docusate sodium, 100 mg, Oral, BID  DULoxetine, 90 mg, Oral, Daily  insulin lispro, 2-9 Units, Subcutaneous, 4x Daily AC & at Bedtime  metoprolol tartrate, 25 mg, Oral, Q12H  modafinil, 100 mg, Oral, Daily  oxyCODONE, 10 mg, Oral, Daily  pantoprazole, 40 mg, Oral, Daily  pregabalin, 75 mg, Oral, BID  primidone, 50  mg, Oral, Q12H  sodium chloride, 10 mL, Intravenous, Q12H  sucralfate, 1 g, Oral, TID AC  tamsulosin, 0.4 mg, Oral, Daily  verapamil ER, 240 mg, Oral, Nightly  warfarin, 4 mg, Oral, Daily       PRN Meds     acetaminophen **OR** acetaminophen    bisacodyl    dextrose    dextrose    glucagon (human recombinant)    ipratropium-albuterol    magnesium hydroxide    nitroglycerin    ondansetron ODT **OR** ondansetron    oxyCODONE    Pharmacy to dose warfarin    sodium chloride    sodium chloride    sodium chloride   Infusions  Pharmacy to dose warfarin,         Diagnostic Data    Results from last 7 days   Lab Units 04/16/24  0158 04/15/24  0436   WBC 10*3/mm3 17.04* 19.60*   HEMOGLOBIN g/dL 10.4* 10.1*   HEMATOCRIT % 32.4* 30.8*   PLATELETS 10*3/mm3 375 408   GLUCOSE mg/dL 180* 142*   CREATININE mg/dL 1.44* 1.37*   BUN mg/dL 31* 33*   SODIUM mmol/L 139 138   POTASSIUM mmol/L 4.0 3.8   AST (SGOT) U/L  --  45*   ALT (SGPT) U/L  --  68*   ALK PHOS U/L  --  270*   BILIRUBIN mg/dL  --  <0.2   ANION GAP mmol/L 8.0 10.0     No radiology results for the last day    I reviewed the patient's new clinical results.    Assessment/Plan:     Active and Resolved Problems  Active Hospital Problems    Diagnosis  POA    **Septic shock [A41.9, R65.21]  Yes    High cholesterol [E78.00]  Yes    Hearing loss [H91.90]  Yes    Acid reflux [K21.9]  Yes    Generalized weakness [R53.1]  Yes    Leukocytosis [D72.829]  Yes    Polyneuropathy associated with underlying disease [G63]  Yes    Gastroesophageal reflux disease without esophagitis [K21.9]  Yes    Vitamin D deficiency, unspecified  [E55.9]  Yes    Depression [F32.A]  Yes    HTN (hypertension) [I10]  Yes    Arthritis [M19.90]  Yes    Lumbago with sciatica, right side [M54.41]  Yes    Cholelithiasis [K80.20]  Yes    Irritable bowel syndrome without diarrhea [K58.9]  Yes    Cervical radiculopathy [M54.12]  Yes    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     Patient downgraded from ICU to PCU level of care.     Septic Shock due to Pneumonia   -WBC-14>19.7  Tmax 100.2  -Respiratory panel negative   -MRSA swab negative  -NS at 50mL/hr   -Levophed discontinued   -received Zosyn   -ID consulted-blood cultures sent  -SLP consult to rule out aspiration pna     Acute kidney injury. ATN versus AIN. Urine eosinophil negative. Nonoliguric.   Nephrology following-IV fluids  -Creatinine seems to be plateauing, 2.13>1.88 Mg/DL   - Patient has peripheral edema but may be dry intravascularly. Low alb/CHF     Diabetes Mellitus   -Hgb A1c 4/5/24 6.7  -Continue to hold home medications   -Accuchecks ACHS   -SSI ordered   -add farxiga    Hypertension -Not controlled with medication at home-BP soft  -Monitor BP per protocol   -decreased metoprolol/verapamil    Hyperlipidemia --   -Patient on Lipitor at home  -Last lipid profile 5/26/23 negative  -Restart lipitor when not NPO    Depression  -Patient on Cymbalta at home  -Restart when patient not NPO    Altered Mental Status improving  -No family at bedside at this time  -Reports of neurologic decline over last 6 months   -Patient unable to make needs known   Hx tremor on primidone taper dose down-may cause drowsiness     Progressive weakness over the last 6 months. that started with tremors.  MRI. Impression:Brain atrophy with chronic microvascular ischemic changes  No acute intracranial abnormality   consulted neurology.    Dysphasia   -Failed nursing water screen  -SLP eval today,  recommended that pt initiate a mechanical soft diet with NT liquids as tolerated   -poor po intake    Atrial fibrillation/flutter:Patient is in sinus rhythm.     Patient is on metoprolol.  Verapamil/amio  Started anticoagulation coumadin-lovenox.  coumadin-pharmacy to monitor    Elevated BNP-1661  Echo-Left ventricular systolic function is normal. Left ventricular ejection   fraction appears to be 56 - 60%. .   monitor BNP  Pleural effusion on cxr  -receiving fluids- decreased to 50 cc hr    DVT prophylaxis:  Medical and mechanical DVT prophylaxis orders are present.    Code status is   Code Status and Medical Interventions:   Ordered at: 04/05/24 1340     Code Status (Patient has no pulse and is not breathing):    CPR (Attempt to Resuscitate)     Medical Interventions (Patient has pulse or is breathing):    Full Support     Plan for disposition:Pending clinical course.   Long discussion with family over 30 min ACP    Signature: Electronically signed by Kendall Haynes MD, 04/16/24, 14:12 EDT.  [facility] Hospitalist Team

## 2024-04-16 NOTE — CASE MANAGEMENT/SOCIAL WORK
Continued Stay Note  MARIO Larsen     Patient Name: Lux Bray  MRN: [id number]  Today's Date: 4/16/2024    Admit Date: 4/5/2024    Plan: [facility] accepted. No precert required. PASRR approved. From [facility] (spouse does not want pt to return). [facility] following for after SNF.   Discharge Plan       Row Name 04/16/24 0933       Plan    Plan Comments CM discussed with Kanwal Quigley that anticipated d/c today, 4/16. Liaison reported that facility is unable to take anymore admissions for today but bed ready tomorrow, 4/17. CM updated MD and RN via secure chat and requested for d/c order from 4/12 to be discontinued.                  Doris Ortiz RN     Office Phone: [phone]  Office Cell: [phone]

## 2024-04-16 NOTE — THERAPY TREATMENT NOTE
Subjective: Pt agreeable to therapeutic plan of care.    Objective:     Bed mobility - Mod-A with HOB elevated to long sitting position. Pt able to clear RLE, but required Vikram for clearing LLE. ModA for sidelying>sit.  Transfers - Max-A and Assist x 2  Ambulation - N/A feet N/A or Not attempted.    Therapeutic Exercise - 10 Reps B UE and B LE AROM lying supine and unsupported sitting / EOB  HOB elevated: pull ups with BUE on bed-rails, cross-body reaches to bed-rails  Seated: balance, fwd reach to chair, anterior weight shift to modified table top, sit to stand    Vitals: WNL    Pain: 0 VAS   Location: minimal pain; pt reporting weakness rather than pain  Intervention for pain: Repositioned, RN notified, Increased Activity, and Therapeutic Presence    Education: Provided education on the importance of mobility in the acute care setting, Verbal/Tactile Cues, Transfer Training, and Energy conservation strategies    Assessment: Lux Bray is being monitored for septic shock d/t PNA and presents with functional mobility impairments which indicate the need for skilled intervention. Pt on 1L O2 via NC with O2 sat remaining > 90% throughout. Pt continues to have flat affect, difficulty initiating movements, hip/low back stiffness, and bradykinesia with bed mobility. Pt continues to report goal of working on standing today. Elevated HOB, so pt was in long-sitting and instructed through bed exercises. Pt demo improved UE strength/endurance with pull-ups and improved understanding of sequencing. Pt able to bring RLE off EOB, but required assistance with LLE. Pt required verbal cues for reaching for bed rail to initiate rolling to sit EOB. Supervision required for seated balance. Demonstrated and instructed anterior weight shift exercise to prime sit>stands and improve UE and LE weight bearing tolerance. Pt with minimal hip clearance on first bout, but able to achieve improved knee extension and fwd weight shift with  "each rep. Max A x 2 required for 4 bouts of STS. Pt demo good determination to improve strength. Tolerating session today without incident. Will continue to follow and progress as tolerated.     Plan/Recommendations:   If medically appropriate, Moderate Intensity Therapy recommended post-acute care. This is recommended as therapy feels the patient would require 3-4 days per week and wouldn't tolerate \"3 hour daily\" rehab intensity. SNF would be the preferred choice. If the patient does not agree to SNF, arrange HH or OP depending on home bound status. If patient is medically complex, consider LTACH. Pt requires no DME at discharge.     Pt desires Skilled Rehab placement at discharge. Pt cooperative; agreeable to therapeutic recommendations and plan of care.         Basic Mobility 6-click:  Rollin = Total, A lot = 2, A little = 3; 4 = None  Supine>Sit:   1 = Total, A lot = 2, A little = 3; 4 = None   Sit>Stand with arms:  1 = Total, A lot = 2, A little = 3; 4 = None  Bed>Chair:   1 = Total, A lot = 2, A little = 3; 4 = None  Ambulate in room:  1 = Total, A lot = 2, A little = 3; 4 = None  3-5 Steps with railin = Total, A lot = 2, A little = 3; 4 = None  Score: 10    Modified [place]: N/A = No pre-op stroke/TIA    Post-Tx Position: Supine with HOB Elevated, Alarms activated, and Call light and personal items within reach  PPE: gloves    "

## 2024-04-16 NOTE — PLAN OF CARE
Problem: Adult Inpatient Plan of Care  Goal: Absence of Hospital-Acquired Illness or Injury  Intervention: Identify and Manage Fall Risk  Recent Flowsheet Documentation  Taken 4/16/2024 0200 by Michael Vieyra RN  Safety Promotion/Fall Prevention:   room organization consistent   safety round/check completed   nonskid shoes/slippers when out of bed   fall prevention program maintained   clutter free environment maintained   assistive device/personal items within reach   activity supervised  Taken 4/16/2024 0003 by Michael Vieyra RN  Safety Promotion/Fall Prevention:   safety round/check completed   room organization consistent   nonskid shoes/slippers when out of bed   fall prevention program maintained   clutter free environment maintained   assistive device/personal items within reach   activity supervised  Taken 4/15/2024 2200 by Michael Vieyra RN  Safety Promotion/Fall Prevention:   safety round/check completed   room organization consistent   nonskid shoes/slippers when out of bed   fall prevention program maintained   clutter free environment maintained   assistive device/personal items within reach   activity supervised  Taken 4/15/2024 2000 by Michael Vieyra RN  Safety Promotion/Fall Prevention:   safety round/check completed   room organization consistent   nonskid shoes/slippers when out of bed   fall prevention program maintained   assistive device/personal items within reach   activity supervised   clutter free environment maintained  Intervention: Prevent Skin Injury  Recent Flowsheet Documentation  Taken 4/16/2024 0003 by Michael Vieyra RN  Body Position: supine  Skin Protection:   adhesive use limited   incontinence pads utilized   transparent dressing maintained   tubing/devices free from skin contact  Taken 4/15/2024 2000 by Michael Vieyra RN  Body Position:   side-lying   right  Skin Protection:   adhesive use limited   incontinence pads utilized   transparent dressing  maintained   tubing/devices free from skin contact  Intervention: Prevent and Manage VTE (Venous Thromboembolism) Risk  Recent Flowsheet Documentation  Taken 4/16/2024 0003 by Michael Vieyra RN  Activity Management: activity encouraged  Taken 4/15/2024 2000 by Michael Vieyra RN  Activity Management: activity encouraged  Range of Motion: active ROM (range of motion) encouraged  Intervention: Prevent Infection  Recent Flowsheet Documentation  Taken 4/16/2024 0200 by Michael Vieyra RN  Infection Prevention:   single patient room provided   rest/sleep promoted   hand hygiene promoted   environmental surveillance performed  Taken 4/16/2024 0003 by Michael Vieyra RN  Infection Prevention:   single patient room provided   rest/sleep promoted   hand hygiene promoted   environmental surveillance performed  Taken 4/15/2024 2200 by Michael Vieyra RN  Infection Prevention:   single patient room provided   rest/sleep promoted   hand hygiene promoted   environmental surveillance performed  Taken 4/15/2024 2000 by Michael Vieyra RN  Infection Prevention:   single patient room provided   rest/sleep promoted   hand hygiene promoted   environmental surveillance performed     Problem: Adult Inpatient Plan of Care  Goal: Absence of Hospital-Acquired Illness or Injury  Intervention: Prevent Skin Injury  Recent Flowsheet Documentation  Taken 4/16/2024 0003 by Michael Vieyra RN  Body Position: supine  Skin Protection:   adhesive use limited   incontinence pads utilized   transparent dressing maintained   tubing/devices free from skin contact  Taken 4/15/2024 2000 by Michael Vieyra RN  Body Position:   side-lying   right  Skin Protection:   adhesive use limited   incontinence pads utilized   transparent dressing maintained   tubing/devices free from skin contact     Problem: Adult Inpatient Plan of Care  Goal: Absence of Hospital-Acquired Illness or Injury  Intervention: Prevent Infection  Recent Flowsheet  Documentation  Taken 4/16/2024 0200 by Michael Vieyra RN  Infection Prevention:   single patient room provided   rest/sleep promoted   hand hygiene promoted   environmental surveillance performed  Taken 4/16/2024 0003 by Michael Vieyra RN  Infection Prevention:   single patient room provided   rest/sleep promoted   hand hygiene promoted   environmental surveillance performed  Taken 4/15/2024 2200 by Michael Vieyra RN  Infection Prevention:   single patient room provided   rest/sleep promoted   hand hygiene promoted   environmental surveillance performed  Taken 4/15/2024 2000 by Michael Vieyra RN  Infection Prevention:   single patient room provided   rest/sleep promoted   hand hygiene promoted   environmental surveillance performed     Problem: Adult Inpatient Plan of Care  Goal: Optimal Comfort and Wellbeing  Intervention: Provide Person-Centered Care  Recent Flowsheet Documentation  Taken 4/16/2024 0003 by Michael Vieyra RN  Trust Relationship/Rapport: care explained  Taken 4/15/2024 2000 by Michael Vieyra RN  Trust Relationship/Rapport: care explained     Problem: Hypertension Comorbidity  Goal: Blood Pressure in Desired Range  Intervention: Maintain Blood Pressure Management  Recent Flowsheet Documentation  Taken 4/16/2024 0200 by Michael Vieyra RN  Medication Review/Management: medications reviewed  Taken 4/16/2024 0003 by Michael Vieyra RN  Medication Review/Management: medications reviewed  Taken 4/15/2024 2200 by Michael Vieyra RN  Medication Review/Management: medications reviewed  Taken 4/15/2024 2000 by Michael Vieyra RN  Medication Review/Management: medications reviewed   Goal Outcome Evaluation:

## 2024-04-16 NOTE — THERAPY TREATMENT NOTE
Subjective: Pt agreeable to therapeutic plan of care.   Cognition: oriented to Person and Situation. Pt demo improved initiation & sequencing & problem solving skills this date requiring less v.c. for bed mobility. He was more talkative & expressing his wants & needs more. Pt also remembering therapist this date not by name, but by face.     Objective:     Bed Mobility: Mod-A with min-mod v.c. HOB elevated with pt in long sitting prior sitting EOB to help with stretching of back & hamstring muscles.   Functional Transfers: Sit<>stand = max A X2. Pt unable to come to complete upright posture, but did demo improved & improving clearance from bed into standing & inc standing endurance with each stand & from previous standing attempts in prior treatment sessions.      Balance: sitting EOB SBA  Functional Ambulation: N/A or Not attempted.    Lower Body Dressing: Max-A  ADL Position: supine  ADL Comments:     Therapeutic Exercise - 5-10 Reps BUE, trunk flex/ext ex in long sitting & EOB sitting.       Vitals: WNL    Pain: Pt has generalized hip & back pain due to stiffness & tightness Repositioned, Increased Activity, and Therapeutic Presence  Education: Provided education on the importance of mobility in the acute care setting, Verbal/Tactile Cues, ADL training, and Transfer Training      Assessment: Lux Bray presents with ADL impairments affecting function including balance, cognition, coordination, endurance / activity tolerance, grasp, motor planning, pain, postural / trunk control, range of motion (ROM), and strength. Pt continuing to make good steady progress with improved cognition, sitting balance, trunk flex AROM & activity tolerance this date. Demonstrated functioning below baseline abilities indicate the need for continued skilled intervention while inpatient. Tolerating session today without incident. Will continue to follow and progress as tolerated.     Plan/Recommendations:   Moderate Intensity  "Therapy recommended post-acute care. This is recommended as therapy feels the patient would require 3-4 days per week and wouldn't tolerate \"3 hour daily\" rehab intensity. SNF would be the preferred choice. If the patient does not agree to SNF, arrange HH or OP depending on home bound status. If patient is medically complex, consider LTACH.. Pt requires no DME at discharge.     Pt desires Skilled Rehab placement at discharge. Pt cooperative; agreeable to therapeutic recommendations and plan of care.     Modified White: N/A = No pre-op stroke/TIA    Post-Tx Position: Supine with HOB Elevated, Alarms activated, and Call light and personal items within reach  PPE: gloves    "

## 2024-04-16 NOTE — PLAN OF CARE
Goal Outcome Evaluation:      Pt continues on 1L NC throughout shift. A/Ox4 seems more alert today. Pain medicine given for back and shoulder pain, effective. MRI of spine ordered per family request. Plan to D/C to [facility] when able. VSS.    Problem: Adult Inpatient Plan of Care  Goal: Plan of Care Review  Outcome: Ongoing, Progressing

## 2024-04-16 NOTE — PLAN OF CARE
"Goal Outcome Evaluation:  Assessment: Lux Bray presents with ADL impairments affecting function including balance, cognition, coordination, endurance / activity tolerance, grasp, motor planning, pain, postural / trunk control, range of motion (ROM), and strength. Pt continuing to make good steady progress with improved cognition, sitting balance, trunk flex AROM & activity tolerance this date. Demonstrated functioning below baseline abilities indicate the need for continued skilled intervention while inpatient. Tolerating session today without incident. Will continue to follow and progress as tolerated.     Plan/Recommendations:   Moderate Intensity Therapy recommended post-acute care. This is recommended as therapy feels the patient would require 3-4 days per week and wouldn't tolerate \"3 hour daily\" rehab intensity. SNF would be the preferred choice. If the patient does not agree to SNF, arrange HH or OP depending on home bound status. If patient is medically complex, consider LTACH.. Pt requires no DME at discharge.     Pt desires Skilled Rehab placement at discharge. Pt cooperative; agreeable to therapeutic recommendations and plan of care.   "

## 2024-04-16 NOTE — TELEPHONE ENCOUNTER
Caller: RANGEL ANDREWS    Relationship to patient: Emergency Contact    Best call back number: [phone]    Patient is needing:     PATIENT'S WIFE CALLED TO LET DR. MCCRARY KNOW THE PATIENT IS STILL IN THE HOSPITAL, AND IF HE IS GOING OVER THERE TODAY SHE IS WANTING TO SEE IF HE CAN CHECK ON HER  AND SEE HOW HE IS DOING

## 2024-04-16 NOTE — PLAN OF CARE
Assessment: Lux Bray is being monitored for septic shock d/t PNA and presents with functional mobility impairments which indicate the need for skilled intervention. Pt on 1L O2 via NC with O2 sat remaining > 90% throughout. Pt continues to have flat affect, difficulty initiating movements, hip/low back stiffness, and bradykinesia with bed mobility. Pt continues to report goal of working on standing today. Elevated HOB, so pt was in long-sitting and instructed through bed exercises. Pt demo improved UE strength/endurance with pull-ups and improved understanding of sequencing. Pt able to bring RLE off EOB, but required assistance with LLE. Pt required verbal cues for reaching for bed rail to initiate rolling to sit EOB. Supervision required for seated balance. Demonstrated and instructed anterior weight shift exercise to prime sit>stands and improve UE and LE weight bearing tolerance. Pt with minimal hip clearance on first bout, but able to achieve improved knee extension and fwd weight shift with each rep. Max A x 2 required for 4 bouts of STS. Pt demo good determination to improve strength. Tolerating session today without incident. Will continue to follow and progress as tolerated.

## 2024-04-16 NOTE — PROGRESS NOTES
"Pharmacy dosing service  Anticoagulant  Warfarin     Subjective:    Lux Bray is a 78 y.o.male being initiated on warfarin for Atrial Fibrillation.    INR Goal: 2 - 3  Bridge Therapy Present?:  Yes, Enoxaparin 80 mg SQ Q24H --> discontinued 4/16  Interacting Medications Evaluation (New/Present/Discontinued): amiodarone, primidone (dose decreased 4/13)   Additional Contributing Factors: renal disease, interacting medications, older age      Assessment/Plan:    New start warfarin due to DOAC interaction with primidone.     INR is therapeutic today. Lovenox was discontinued since INR is > 2. Will start warfarin 4 mg daily today and continue to monitor.    Continue to monitor and adjust based on INR.         Date 4/9 4/10 4/11 4/12 4/13 4/14 4/15 4/16    INR ----- 1.03 1.04 1.11 1.28 1.42 1.91 2.02    Dose 2.5mg 2.5 mg 3 mg 5mg 5mg  5mg 2.5mg  4mg        Objective:  [Ht: 180.3 cm (71\"); Wt: 78.2 kg (172 lb 6.4 oz); BMI: Body mass index is 24.04 kg/m².]    Lab Results   Component Value Date    ALBUMIN 2.2 (L) 04/16/2024     Lab Results   Component Value Date    INR 2.02 04/16/2024    INR 1.91 (L) 04/15/2024    INR 1.42 (L) 04/14/2024    PROTIME 20.9 04/16/2024    PROTIME 19.8 04/15/2024    PROTIME 15.1 (L) 04/14/2024     Lab Results   Component Value Date    HGB 10.4 (L) 04/16/2024    HGB 10.1 (L) 04/15/2024    HGB 9.4 (L) 04/14/2024     Lab Results   Component Value Date    HCT 32.4 (L) 04/16/2024    HCT 30.8 (L) 04/15/2024    HCT 30.7 (L) 04/14/2024       Georgiana Shah RPH  04/16/24 13:29 EDT                   "

## 2024-04-16 NOTE — CASE MANAGEMENT/SOCIAL WORK
Continued Stay Note  MARIO Larsen     Patient Name: uLx Bray  MRN: [id number]  Today's Date: 4/16/2024    Admit Date: 4/5/2024    Plan: [facility] accepted. No precert required. PASRR approved. From [facility] (spouse does not want pt to return). [facility] following for after SNF.   Discharge Plan       Row Name 04/16/24 1530       Plan    Patient/Family in Agreement with Plan yes    Plan Comments CM contacted patient' spouse, Noemy ([phone]) to discuss dc planning, IMM letter, and TriIMM letter. Discussed with patient's spouse and daughter on speaker phone that they still had concerns to be addressed prior to discussing discharge. Reported that 6 months ago at 77 years old, patient could run 3 miles. Daughter reported that she is  to a doctor, a radiologist at [facility]. Reported that even though WBC is trending down (17.04 today, 19.60 yesterday) and blood cultures negative so far, they still would like to rule out everything. They requested for MRI spine to be completed. CM sent request to hospitalist, JC YOON, and ID NP.                  Doris Ortiz RN     Office Phone: [phone]  Office Cell: [phone]

## 2024-04-17 VITALS
SYSTOLIC BLOOD PRESSURE: 102 MMHG | WEIGHT: 159.83 LBS | TEMPERATURE: 96.9 F | DIASTOLIC BLOOD PRESSURE: 81 MMHG | HEIGHT: 71 IN | OXYGEN SATURATION: 93 % | BODY MASS INDEX: 22.38 KG/M2 | HEART RATE: 66 BPM | RESPIRATION RATE: 10 BRPM

## 2024-04-17 LAB
ANION GAP SERPL CALCULATED.3IONS-SCNC: 6 MMOL/L (ref 5–15)
BASOPHILS # BLD AUTO: 0.03 10*3/MM3 (ref 0–0.2)
BASOPHILS NFR BLD AUTO: 0.2 % (ref 0–1.5)
BUN SERPL-MCNC: 27 MG/DL (ref 8–23)
BUN/CREAT SERPL: 19.1 (ref 7–25)
CALCIUM SPEC-SCNC: 8.3 MG/DL (ref 8.6–10.5)
CHLORIDE SERPL-SCNC: 97 MMOL/L (ref 98–107)
CO2 SERPL-SCNC: 32 MMOL/L (ref 22–29)
CREAT SERPL-MCNC: 1.41 MG/DL (ref 0.76–1.27)
DEPRECATED RDW RBC AUTO: 48.8 FL (ref 37–54)
EGFRCR SERPLBLD CKD-EPI 2021: 50.4 ML/MIN/1.73
EOSINOPHIL # BLD AUTO: 0.15 10*3/MM3 (ref 0–0.4)
EOSINOPHIL NFR BLD AUTO: 1.1 % (ref 0.3–6.2)
ERYTHROCYTE [DISTWIDTH] IN BLOOD BY AUTOMATED COUNT: 14.6 % (ref 12.3–15.4)
GLUCOSE BLDC GLUCOMTR-MCNC: 126 MG/DL (ref 70–105)
GLUCOSE BLDC GLUCOMTR-MCNC: 178 MG/DL (ref 70–105)
GLUCOSE SERPL-MCNC: 205 MG/DL (ref 65–99)
HCT VFR BLD AUTO: 31 % (ref 37.5–51)
HGB BLD-MCNC: 9.9 G/DL (ref 13–17.7)
IMM GRANULOCYTES # BLD AUTO: 0.29 10*3/MM3 (ref 0–0.05)
IMM GRANULOCYTES NFR BLD AUTO: 2.1 % (ref 0–0.5)
INR PPP: 2.38 (ref 2–3)
LYMPHOCYTES # BLD AUTO: 2.25 10*3/MM3 (ref 0.7–3.1)
LYMPHOCYTES NFR BLD AUTO: 16.4 % (ref 19.6–45.3)
MCH RBC QN AUTO: 28.9 PG (ref 26.6–33)
MCHC RBC AUTO-ENTMCNC: 31.9 G/DL (ref 31.5–35.7)
MCV RBC AUTO: 90.4 FL (ref 79–97)
MERCURY BLD-MCNC: <1 UG/L (ref 0–14.9)
MONOCYTES # BLD AUTO: 1.04 10*3/MM3 (ref 0.1–0.9)
MONOCYTES NFR BLD AUTO: 7.6 % (ref 5–12)
NEUTROPHILS NFR BLD AUTO: 72.6 % (ref 42.7–76)
NEUTROPHILS NFR BLD AUTO: 9.92 10*3/MM3 (ref 1.7–7)
NRBC BLD AUTO-RTO: 0 /100 WBC (ref 0–0.2)
PLATELET # BLD AUTO: 378 10*3/MM3 (ref 140–450)
PMV BLD AUTO: 9.6 FL (ref 6–12)
POTASSIUM SERPL-SCNC: 4.3 MMOL/L (ref 3.5–5.2)
PROTHROMBIN TIME: 24.3 SECONDS (ref 19.4–28.5)
RBC # BLD AUTO: 3.43 10*6/MM3 (ref 4.14–5.8)
SODIUM SERPL-SCNC: 135 MMOL/L (ref 136–145)
WBC NRBC COR # BLD AUTO: 13.68 10*3/MM3 (ref 3.4–10.8)

## 2024-04-17 PROCEDURE — 99222 1ST HOSP IP/OBS MODERATE 55: CPT

## 2024-04-17 PROCEDURE — 97535 SELF CARE MNGMENT TRAINING: CPT

## 2024-04-17 PROCEDURE — 85025 COMPLETE CBC W/AUTO DIFF WBC: CPT | Performed by: NURSE PRACTITIONER

## 2024-04-17 PROCEDURE — 97530 THERAPEUTIC ACTIVITIES: CPT

## 2024-04-17 PROCEDURE — 97110 THERAPEUTIC EXERCISES: CPT

## 2024-04-17 PROCEDURE — 97112 NEUROMUSCULAR REEDUCATION: CPT

## 2024-04-17 PROCEDURE — 63710000001 INSULIN LISPRO (HUMAN) PER 5 UNITS

## 2024-04-17 PROCEDURE — 85610 PROTHROMBIN TIME: CPT | Performed by: INTERNAL MEDICINE

## 2024-04-17 PROCEDURE — 82948 REAGENT STRIP/BLOOD GLUCOSE: CPT

## 2024-04-17 PROCEDURE — 80048 BASIC METABOLIC PNL TOTAL CA: CPT | Performed by: NURSE PRACTITIONER

## 2024-04-17 RX ORDER — OXYCODONE HYDROCHLORIDE 5 MG/1
5 TABLET ORAL EVERY 8 HOURS PRN
Qty: 20 TABLET | Refills: 0 | Status: SHIPPED | OUTPATIENT
Start: 2024-04-17

## 2024-04-17 RX ORDER — PRIMIDONE 50 MG/1
50 TABLET ORAL 2 TIMES DAILY
Qty: 30 TABLET | Refills: 0 | Status: SHIPPED | OUTPATIENT
Start: 2024-04-17

## 2024-04-17 RX ADMIN — PREGABALIN 75 MG: 75 CAPSULE ORAL at 08:25

## 2024-04-17 RX ADMIN — DICYCLOMINE HYDROCHLORIDE 10 MG: 10 CAPSULE ORAL at 08:24

## 2024-04-17 RX ADMIN — INSULIN LISPRO 2 UNITS: 100 INJECTION, SOLUTION INTRAVENOUS; SUBCUTANEOUS at 12:33

## 2024-04-17 RX ADMIN — SUCRALFATE 1 G: 1 TABLET ORAL at 08:24

## 2024-04-17 RX ADMIN — PRIMIDONE 50 MG: 50 TABLET ORAL at 08:24

## 2024-04-17 RX ADMIN — DOCUSATE SODIUM 100 MG: 100 CAPSULE, LIQUID FILLED ORAL at 08:24

## 2024-04-17 RX ADMIN — BISACODYL 5 MG: 5 TABLET, COATED ORAL at 08:24

## 2024-04-17 RX ADMIN — Medication 10 ML: at 08:25

## 2024-04-17 RX ADMIN — PANTOPRAZOLE SODIUM 40 MG: 40 TABLET, DELAYED RELEASE ORAL at 08:25

## 2024-04-17 RX ADMIN — SUCRALFATE 1 G: 1 TABLET ORAL at 12:33

## 2024-04-17 RX ADMIN — TAMSULOSIN HYDROCHLORIDE 0.4 MG: 0.4 CAPSULE ORAL at 08:24

## 2024-04-17 RX ADMIN — OXYCODONE 10 MG: 5 TABLET ORAL at 08:25

## 2024-04-17 RX ADMIN — Medication 12.5 MG: at 08:24

## 2024-04-17 RX ADMIN — AMIODARONE HYDROCHLORIDE 200 MG: 200 TABLET ORAL at 08:24

## 2024-04-17 RX ADMIN — DICYCLOMINE HYDROCHLORIDE 10 MG: 10 CAPSULE ORAL at 12:33

## 2024-04-17 RX ADMIN — DULOXETINE HYDROCHLORIDE 90 MG: 30 CAPSULE, DELAYED RELEASE ORAL at 08:25

## 2024-04-17 NOTE — PLAN OF CARE
Problem: Adult Inpatient Plan of Care  Goal: Absence of Hospital-Acquired Illness or Injury  Intervention: Identify and Manage Fall Risk  Recent Flowsheet Documentation  Taken 4/17/2024 0430 by Michael Vieyra RN  Safety Promotion/Fall Prevention:   safety round/check completed   room organization consistent   nonskid shoes/slippers when out of bed   fall prevention program maintained   activity supervised   assistive device/personal items within reach   clutter free environment maintained  Taken 4/17/2024 0200 by Michael Vieyra RN  Safety Promotion/Fall Prevention:   room organization consistent   safety round/check completed   nonskid shoes/slippers when out of bed   fall prevention program maintained   clutter free environment maintained   assistive device/personal items within reach   activity supervised  Taken 4/17/2024 0000 by Michael Vieyra RN  Safety Promotion/Fall Prevention:   safety round/check completed   room organization consistent   nonskid shoes/slippers when out of bed   fall prevention program maintained   clutter free environment maintained   assistive device/personal items within reach   activity supervised  Taken 4/16/2024 2200 by Michael Vieyra RN  Safety Promotion/Fall Prevention: patient off unit  Taken 4/16/2024 2000 by Michael Vieyra RN  Safety Promotion/Fall Prevention:   room organization consistent   safety round/check completed   nonskid shoes/slippers when out of bed   fall prevention program maintained   clutter free environment maintained   assistive device/personal items within reach   activity supervised  Intervention: Prevent Skin Injury  Recent Flowsheet Documentation  Taken 4/17/2024 0430 by Michael Vieyra RN  Body Position:   side-lying   left  Skin Protection:   adhesive use limited   incontinence pads utilized   transparent dressing maintained   tubing/devices free from skin contact  Taken 4/17/2024 0000 by Michael Vieyra RN  Body Position:    side-lying   left  Taken 4/16/2024 2000 by Michael Vieyra RN  Body Position:   side-lying   right  Skin Protection:   adhesive use limited   incontinence pads utilized   transparent dressing maintained   tubing/devices free from skin contact  Intervention: Prevent and Manage VTE (Venous Thromboembolism) Risk  Recent Flowsheet Documentation  Taken 4/17/2024 0430 by Michael Vieyra RN  Activity Management: activity encouraged  Range of Motion: active ROM (range of motion) encouraged  Taken 4/17/2024 0000 by Michael Vieyra RN  Activity Management: activity encouraged  Taken 4/16/2024 2000 by Michael Vieyra RN  Activity Management: activity encouraged  Range of Motion: active ROM (range of motion) encouraged  Intervention: Prevent Infection  Recent Flowsheet Documentation  Taken 4/17/2024 0430 by Michael Vieyra RN  Infection Prevention:   single patient room provided   rest/sleep promoted   hand hygiene promoted   environmental surveillance performed  Taken 4/17/2024 0200 by Michael Vieyra RN  Infection Prevention:   single patient room provided   rest/sleep promoted   hand hygiene promoted   environmental surveillance performed  Taken 4/17/2024 0000 by Michael Vieyra RN  Infection Prevention:   single patient room provided   rest/sleep promoted   hand hygiene promoted   environmental surveillance performed  Taken 4/16/2024 2000 by Michael Vieyra RN  Infection Prevention:   single patient room provided   rest/sleep promoted   hand hygiene promoted   environmental surveillance performed     Problem: Adult Inpatient Plan of Care  Goal: Absence of Hospital-Acquired Illness or Injury  Intervention: Prevent Skin Injury  Recent Flowsheet Documentation  Taken 4/17/2024 0430 by Michael Vieyra RN  Body Position:   side-lying   left  Skin Protection:   adhesive use limited   incontinence pads utilized   transparent dressing maintained   tubing/devices free from skin contact  Taken 4/17/2024 0000 by  Michael Vieyra RN  Body Position:   side-lying   left  Taken 4/16/2024 2000 by Michael Vieyra RN  Body Position:   side-lying   right  Skin Protection:   adhesive use limited   incontinence pads utilized   transparent dressing maintained   tubing/devices free from skin contact     Problem: Adult Inpatient Plan of Care  Goal: Absence of Hospital-Acquired Illness or Injury  Intervention: Prevent Infection  Recent Flowsheet Documentation  Taken 4/17/2024 0430 by Michael Vieyra RN  Infection Prevention:   single patient room provided   rest/sleep promoted   hand hygiene promoted   environmental surveillance performed  Taken 4/17/2024 0200 by Michael Vieyra RN  Infection Prevention:   single patient room provided   rest/sleep promoted   hand hygiene promoted   environmental surveillance performed  Taken 4/17/2024 0000 by Michael Vieyra RN  Infection Prevention:   single patient room provided   rest/sleep promoted   hand hygiene promoted   environmental surveillance performed  Taken 4/16/2024 2000 by Michael Vieyra RN  Infection Prevention:   single patient room provided   rest/sleep promoted   hand hygiene promoted   environmental surveillance performed     Problem: Diabetes Comorbidity  Goal: Blood Glucose Level Within Targeted Range  Intervention: Monitor and Manage Glycemia  Recent Flowsheet Documentation  Taken 4/17/2024 0430 by Michael Vieyra RN  Glycemic Management: blood glucose monitored  Taken 4/16/2024 2000 by Michael Vieyra RN  Glycemic Management: blood glucose monitored     Problem: Adult Inpatient Plan of Care  Goal: Optimal Comfort and Wellbeing  Intervention: Provide Person-Centered Care  Recent Flowsheet Documentation  Taken 4/17/2024 0430 by Michael Vieyra RN  Trust Relationship/Rapport: care explained  Taken 4/17/2024 0000 by Michael Vieyra RN  Trust Relationship/Rapport: care explained  Taken 4/16/2024 2000 by Michael Vieyra RN  Trust Relationship/Rapport: care  explained   Goal Outcome Evaluation:

## 2024-04-17 NOTE — PROGRESS NOTES
[facility] Progress Note      Patient Name: Lux Bray  : 1945  MRN: [id number]  Primary Care Physician:  Montana Morrow MD  Date of admission: 2024    Subjective     Interval History:     Patient resting comfortably  Complaining of neck and back pain.  Good urine output  Denies any chest pain, shortness of breath, nausea or vomiting    Review of Systems:   As noted above    Objective     Vitals:   Temp:  [97.5 °F (36.4 °C)-98.6 °F (37 °C)] 98.1 °F (36.7 °C)  Heart Rate:  [68-96] 79  Resp:  [11-17] 17  BP: ()/(44-67) 113/54  Flow (L/min):  [1-3] 3    Intake/Output Summary (Last 24 hours) at 2024 1016  Last data filed at 2024 0900  Gross per 24 hour   Intake 720 ml   Output 850 ml   Net -130 ml       Physical Exam:      General Appearance: Chronically ill-appearing, awake  Skin: warm and dry  HEENT: oral mucosa dry, nonicteric sclera  Neck: supple, no JVD  Lungs: Decreased breath sounds at bases  Heart: RRR, normal S1 and S2  Abdomen: soft, nontender, nondistended  : no palpable bladder  Extremities: 1+ bilateral lower extremities edema    Scheduled Meds:     amiodarone, 200 mg, Oral, Q24H  Beneprotein, 1 packet, Oral, BID  bisacodyl, 5 mg, Oral, Daily  dicyclomine, 10 mg, Oral, 4x Daily AC & at Bedtime  docusate sodium, 100 mg, Oral, BID  DULoxetine, 90 mg, Oral, Daily  insulin lispro, 2-9 Units, Subcutaneous, 4x Daily AC & at Bedtime  metoprolol tartrate, 12.5 mg, Oral, Q12H  oxyCODONE, 10 mg, Oral, Daily  pantoprazole, 40 mg, Oral, Daily  pregabalin, 75 mg, Oral, BID  primidone, 50 mg, Oral, Q12H  sodium chloride, 10 mL, Intravenous, Q12H  sucralfate, 1 g, Oral, TID AC  tamsulosin, 0.4 mg, Oral, Daily  verapamil ER, 240 mg, Oral, Nightly  warfarin, 4 mg, Oral, Daily      IV Meds:   Pharmacy to dose warfarin,         Results Reviewed:   I have personally reviewed the results from the time of this admission to 2024 10:16 EDT     Results from  last 7 days   Lab Units 04/16/24  0158 04/15/24  0436 04/14/24  0200 04/13/24  1420   SODIUM mmol/L 139 138 138 138   POTASSIUM mmol/L 4.0 3.8 3.9 3.9   CHLORIDE mmol/L 100 102 101 102   CO2 mmol/L 31.0* 26.0 27.0 28.0   BUN mg/dL 31* 33* 30* 30*   CREATININE mg/dL 1.44* 1.37* 1.58* 1.61*   CALCIUM mg/dL 8.5* 8.6 8.5* 8.7   BILIRUBIN mg/dL  --  <0.2 <0.2 <0.2   ALK PHOS U/L  --  270* 260* 280*   ALT (SGPT) U/L  --  68* 74* 71*   AST (SGOT) U/L  --  45* 39 37   GLUCOSE mg/dL 180* 142* 119* 194*     Estimated Creatinine Clearance: 42 mL/min (A) (by C-G formula based on SCr of 1.44 mg/dL (H)).  Results from last 7 days   Lab Units 04/16/24  0158 04/15/24  0436 04/14/24  0200   PHOSPHORUS mg/dL 3.2 2.7 2.7         Results from last 7 days   Lab Units 04/16/24  0158 04/15/24  0436 04/14/24  0200 04/13/24  1148   WBC 10*3/mm3 17.04* 19.60* 20.58* 19.72*   HEMOGLOBIN g/dL 10.4* 10.1* 9.4* 10.8*   PLATELETS 10*3/mm3 375 408 363 383     Results from last 7 days   Lab Units 04/16/24  0158 04/15/24  0436 04/14/24  0200 04/13/24  1420 04/12/24  0435   INR  2.02 1.91* 1.42* 1.28* 1.11*       Assessment / Plan     ASSESSMENT:    1.  Acute kidney injury.  Nonoliguric ATN.  Renal function improving slowly.  Electrolytes okay.  Edema improving slowly.  Off IV fluids   2.  Septic shock.  Due to pneumonia.  Improved   3.  Hypokalemia. resolved  5.  Benign essential hypertension.  Blood pressure is okay    PLAN:    Continue current treatment  Monitoring renal function and electrolytes    Gen Ruiz MD  04/17/24  10:16 EDT    [facility]  [phone]

## 2024-04-17 NOTE — DISCHARGE SUMMARY
[facility] Medicine [facility]  Discharge Summary    Date of Service: 24  Patient Name: Lux Bray  : 1945  MRN: [id number]    Date of Admission: 2024  Discharge Diagnosis: Septic Shock due to Pneumonia   Date of Discharge:  24  Primary Care Physician: Montana Morrow MD      Presenting Problem:   Septic shock [A41.9, R65.21]  Severe sepsis [A41.9, R65.20]  Atrial flutter, unspecified type [I48.92]  Pneumonia due to infectious organism, unspecified laterality, unspecified part of lung [J18.9]    Active and Resolved Hospital Problems:  Active Hospital Problems    Diagnosis POA    **Septic shock [A41.9, R65.21] Yes    Moderate malnutrition [E44.0] Yes    High cholesterol [E78.00] Yes    Hearing loss [H91.90] Yes    Acid reflux [K21.9] Yes    Generalized weakness [R53.1] Yes    Leukocytosis [D72.829] Yes    Polyneuropathy associated with underlying disease [G63] Yes    Gastroesophageal reflux disease without esophagitis [K21.9] Yes    Vitamin D deficiency, unspecified  [E55.9] Yes    Depression [F32.A] Yes    HTN (hypertension) [I10] Yes    Arthritis [M19.90] Yes    Lumbago with sciatica, right side [M54.41] Yes    Cholelithiasis [K80.20] Yes    Irritable bowel syndrome without diarrhea [K58.9] Yes    Cervical radiculopathy [M54.12] Yes    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     Septic Shock due to Pneumonia   -WBC-14>19.7  Tmax 100.2  -Respiratory panel negative   -MRSA swab negative  -NS at 50mL/hr   -Levophed discontinued   -received Zosyn   -ID consulted-blood cultures sent-did not recommended any further antibiotics  -SLP consult to  rule out aspiration pna      Acute kidney injury. ATN versus AIN. Urine eosinophil negative. Nonoliguric.   Nephrology following-IV fluids dc  -Creatinine seems to be plateauing, 2.13>1.88>1.4 Mg/DL   - Patient has peripheral edema but may be dry intravascularly. Low alb/CHF      Diabetes Mellitus   -Hgb A1c 4/5/24 6.7  -Continue to hold home medications   -Accuchecks ACHS   -SSI ordered   -add farxiga     Hypertension -Not controlled with medication at home-BP soft  -Monitor BP per protocol   -decreased metoprolol/verapamil     Hyperlipidemia --   -Patient on Lipitor at home  -Last lipid profile 5/26/23 negative  -Restart lipitor when not NPO     Depression  -Patient on Cymbalta at home  -Restart when patient not NPO     Altered Mental Status improving  -No family at bedside at this time  -Reports of neurologic decline over last 6 months   -Patient unable to make needs known   Hx tremor on primidone taper dose down-may cause drowsiness      Progressive weakness over the last 6 months. that started with tremors.  MRI. Impression:Brain atrophy with chronic microvascular ischemic changes  No acute intracranial abnormality   consulted neurology.     Dysphasia   -Failed nursing water screen  -SLP eval today,  recommended that pt initiate a mechanical soft diet with NT liquids as tolerated   -poor po intake     Atrial fibrillation/flutter:Patient is in sinus rhythm.    Patient is on metoprolol.  Verapamil/amio  Started anticoagulation coumadin-lovenox.  coumadin-pharmacy to monitor     Elevated BNP-1661  Echo-Left ventricular systolic function is normal. Left ventricular ejection   fraction appears to be 56 - 60%. .   monitor BNP  Pleural effusion on cxr  -receiving fluids- decreased to 50 cc hr     Weakness:  Degenerative disc disease, lumbosacral spine  Lumbar stenosis with neurogenic claudication  Lumbosacral radiculopathy  Cervical stenosis without myelopathy   per surgery  - No neurosurgical intervention or  "follow-up necessary at this time  - Follow-up with PCP and/or pain management for further care of back pain  - Medical management      Hospital Course     HPI:  Lux Bray is a 78 y.o. male with PMH of HTN, DM II, Depression,  presented to the hospital after reportedly being sick for a few days at rehab, and was admitted with a principal diagnosis of Septic shock.      Per family the patient has had a neurologic decline over the last few months. CT head was negative for acute abnormality. Ammonia level was normal. Per chart review, the patient's PCP has decreased some of the sedating medications the patient has or was taking. Patient is currently drowsy but will wake up and answer questions appropriately. He is reporting that he is very tired.      In ER the patient was found to be hypotensive. He was given the full sepsis bolus and started on levophed. Chest x-ray showed bibasilar infiltrates. Patient's family reports that they have had concerns he has been aspirating. Patient will be transferred to ICU for close monitoring and vasopressor support.       ACP: CPR, Full Intervention. Patients wife is his decision maker in the even he is unable.      Patient was seen and examined on 04/05/24 at 13:54 EDT .     4/6/24-Patient admitted to the ICU for septic shock and only needed vasopressors very briefly.  Has been off vasopressors since last night.  Developed A-fib with RVR, will give a dose of digoxin.  If blood pressure permits, resume home verapamil.  Will likely need full dose anticoagulation, defer to primary team.  Transfer out of the ICU to hospitalist service.      4/6/24-Patient drowsy but easily aroused. Knows what year it is. States that he hurts all over, and is nauseous. He also states that he is \"So, so, so, tired.\" Denies feeling short of breath, or having chest pain.   4/7/24 patient seen and examined in bed no acute distress, vital signs stable, discussed with RN.  Weak weak and tired.Patient is " currently n.p.o. pending speech evaluation.   4/8/2024 patient seen and examined in bed no acute distress, vital signs stable, discussed with RN.  Patient with weakness fatigue..  Apparently was living at home.  Speech recommended that pt initiate a mechanical soft diet with NT liquids as tolerated   4/9/24 seen in bed NAD, CODY, some confusion, JOSE L RN  4/10/24 patient seen in bed JOANNA, JOSE L RN, vss, per  wife wanted to take him home, unable to use eliquis, started coumadin, awaiting placement  4/11/24 seen in bed NAD, weakness and fatigue, DW family says he has had increased loss of mobility over the last 6 month  4/12/24 patient seen and examined in bed no acute distress, patient is weak tired, fatigue. very sleepy.  Vital signs stable, renal function slightly improved.  Discussed with RN. Long discussion with family >30min  4/13/2024 patient seen and examined is more alert today able to carry a conversation.  Eating better.  Discussed with RN, WBC count has increased to 19.  Temperature 100.2   Off antibiotics.  INR 1.4.  Creatinine 1.5 on IV fluids.  BNP pending,.  4/14/2024 patient seen and examined in bed no acute distress, no events overnight, vital signs stable, discussed with RN, awaiting cultures.  WBC 20.5 afebrile  4/15/2024 patient seen and examined in bed no acute distress, patient still fatigue weak discussed with RN, ID repeated blood cultures.  4/16/24 patient seen in bed JOANNA, JOSE L RN, more alert today, decreasing dose of primidone, awaiting BC  4/17/2024 patient seen and examined in bed no acute distress, vital signs stable, discussed with patient disease.  Patient has been off antibiotics for a 5 days.  And WBC count is improving.  Discussed with wife.  Will try to discharge patient to nursing home today.  Discussed with , discussed with RN.  Neurosurgery saw the patient because of his back pain.  They did not recommend any surgery at this time.    DISCHARGE Follow Up  Recommendations for labs and diagnostics: Follow-up with PCP in a week.  Follow-up with cardiology in 2 weeks.  Follow-up with nephrology in 2 weeks.  Pain management 1 week.    Reasons For Change In Medications and Indications for New Medications:    START taking:  amiodarone (PACERONE)   empagliflozin (Jardiance)   metoprolol tartrate (LOPRESSOR)   modafinil (Provigil)   naloxone (NARCAN)   warfarin (Coumadin)    CHANGE how you take:  oxyCODONE (ROXICODONE) -- how much to take, when to take this, reasons to take this  primidone (MYSOLINE) -- how much to take, how to take this, when to take this  verapamil ER (VERELAN) -- medication strength   STOP taking:  cefTAZidime (FORTAZ)   diphenhydrAMINE 25 mg capsule (BENADRYL)   glimepiride 2 MG tablet (AMARYL)   Day of Discharge     Vital Signs:  Temp:  [96.9 °F (36.1 °C)-98.6 °F (37 °C)] 96.9 °F (36.1 °C)  Heart Rate:  [66-96] 66  Resp:  [10-17] 10  BP: (102-113)/(44-81) 102/81  Flow (L/min):  [1-3] 2    Physical Exam Constitutional:       Appearance: Normal appearance.   HENT:      Head: Normocephalic and atraumatic.      Nose: Nose normal.      Mouth/Throat:      Mouth: Mucous membranes are moist.   Eyes:      Extraocular Movements: Extraocular movements intact.      Conjunctiva/sclera: Conjunctivae normal.      Pupils: Pupils are equal, round, and reactive to light.   Cardiovascular:      Rate and Rhythm: Regular rhythm. Tachycardia present.      Pulses: Normal pulses.      Heart sounds: Normal heart sounds.   Pulmonary:      Effort: Pulmonary effort is normal.      Breath sounds: Normal breath sounds.   Musculoskeletal:      Cervical back: Normal range of motion.   Skin:     General: Skin is warm and dry.   Neurological:      General: No focal deficit present.      Mental Status: He is alert.  Sleepy      Pertinent  and/or Most Recent Results     LAB RESULTS:      Lab 04/17/24  1139 04/16/24  0158 04/15/24  0436 04/14/24  0200 04/13/24  1420 04/13/24  1148  04/12/24  0435   WBC 13.68* 17.04* 19.60* 20.58*  --  19.72*  --    HEMOGLOBIN 9.9* 10.4* 10.1* 9.4*  --  10.8*  --    HEMATOCRIT 31.0* 32.4* 30.8* 30.7*  --  34.1*  --    PLATELETS 378 375 408 363  --  383  --    NEUTROS ABS 9.92* 11.68* 13.58* 14.55*  --  14.35*  --    IMMATURE GRANS (ABS) 0.29* 0.67* 0.82* 0.83*  --  0.83*  --    LYMPHS ABS 2.25 2.86 3.12* 2.97  --  2.22  --    MONOS ABS 1.04* 1.55* 1.79* 1.91*  --  2.00*  --    EOS ABS 0.15 0.24 0.24 0.26  --  0.25  --    MCV 90.4 90.8 89.8 93.6  --  92.7  --    PROCALCITONIN  --   --   --   --  0.21  --  0.30*   PROTIME 24.3 20.9 19.8 15.1* 13.7*  --  12.0*         Lab 04/17/24  1139 04/16/24  0158 04/15/24  0436 04/14/24  0200 04/13/24  1420 04/12/24  0435 04/11/24  0238   SODIUM 135* 139 138 138 138 139 139   POTASSIUM 4.3 4.0 3.8 3.9 3.9 3.6 3.9   CHLORIDE 97* 100 102 101 102 101 102   CO2 32.0* 31.0* 26.0 27.0 28.0 29.0 30.0*   ANION GAP 6.0 8.0 10.0 10.0 8.0 9.0 7.0   BUN 27* 31* 33* 30* 30* 34* 32*   CREATININE 1.41* 1.44* 1.37* 1.58* 1.61* 1.88* 2.13*   EGFR 50.4* 49.1* 52.1* 43.9* 43.0* 35.7* 30.7*   GLUCOSE 205* 180* 142* 119* 194* 156* 178*   CALCIUM 8.3* 8.5* 8.6 8.5* 8.7 8.3* 8.5*   PHOSPHORUS  --  3.2 2.7 2.7  --  2.8 2.3*         Lab 04/16/24  0158 04/15/24  0436 04/14/24  0200 04/13/24 1420 04/12/24 0435   TOTAL PROTEIN  --  5.5* 5.3* 5.4*  --    ALBUMIN 2.2* 2.1* 2.3* 2.3* 2.2*   GLOBULIN  --  3.4 3.0 3.1  --    ALT (SGPT)  --  68* 74* 71*  --    AST (SGOT)  --  45* 39 37  --    BILIRUBIN  --  <0.2 <0.2 <0.2  --    ALK PHOS  --  270* 260* 280*  --          Lab 04/17/24  1139 04/16/24  0158 04/15/24  0436 04/14/24  0200 04/13/24 1420 04/12/24 0435 04/11/24  0238   PROBNP  --   --  769.2 <36.0  --  1,383.0 1,661.0   PROTIME 24.3 20.9 19.8 15.1* 13.7* 12.0* 11.3*   INR 2.38 2.02 1.91* 1.42* 1.28* 1.11* 1.04*             Lab 04/12/24 0435   FOLATE >20.00   VITAMIN B 12 >2,000*         Brief Urine Lab Results  (Last result in the past 365 days)         Color   Clarity   Blood   Leuk Est   Nitrite   Protein   CREAT   Urine HCG        04/10/24 2119 Yellow   Slightly Cloudy  Comment: Result checked     Negative   Negative   Negative   30 mg/dL (1+)                 Microbiology Results (last 10 days)       Procedure Component Value - Date/Time    Blood Culture - Blood, Wrist, Right [[id number]]  (Normal) Collected: 04/15/24 0942    Lab Status: Preliminary result Specimen: Blood from Wrist, Right Updated: 04/17/24 1000     Blood Culture No growth at 2 days    Blood Culture - Blood, Wrist, Left [[id number]]  (Normal) Collected: 04/15/24 0942    Lab Status: Preliminary result Specimen: Blood from Wrist, Left Updated: 04/17/24 1000     Blood Culture No growth at 2 days    Blood Culture - Blood, Hand, Left [[id number]]  (Normal) Collected: 04/14/24 0159    Lab Status: Preliminary result Specimen: Blood from Hand, Left Updated: 04/17/24 0215     Blood Culture No growth at 3 days    Blood Culture - Blood, Blood, Central Line [[id number]]  (Abnormal) Collected: 04/13/24 1420    Lab Status: Final result Specimen: Blood, Central Line Updated: 04/16/24 0652     Blood Culture Staphylococcus, coagulase negative     Isolated from Aerobic and Anaerobic Bottles     Gram Stain Anaerobic Bottle Gram positive cocci in clusters      Aerobic Bottle Gram positive cocci in clusters    Narrative:      Probable contaminant requires clinical correlation, susceptibility not performed unless requested by physician.      Blood Culture ID, PCR - Blood, Blood, Central Line [[id number]]  (Abnormal) Collected: 04/13/24 1420    Lab Status: Final result Specimen: Blood, Central Line Updated: 04/14/24 1700     BCID, PCR Staph spp, not aureus or lugdunensis. Identification by BCID2 PCR.     BOTTLE TYPE Anaerobic Bottle    Eosinophil Smear - Urine, Urine, Catheter [[id number]]  (Normal) Collected: 04/10/24 2121    Lab Status: Final result Specimen: Urine, Catheter Updated: 04/10/24 2225     Eosinophil  Smear 0 % EOS/100 Cells             MRI Lumbar Spine Without Contrast    Result Date: 4/17/2024  Impression: Impression: 1. There is a chronic inferior endplate compression fracture of T12 with a small component of edema suggesting that there may be reinjury, clinical correlation is recommended. 2. There is multilevel lumbar degenerative change most prominent at L4-L5 where there is severe canal/lateral recess stenosis. 3. Multilevel neural foraminal narrowing up to severe on the right at L4-L5 and moderate at multiple levels as described. 4. Minimal anterolisthesis of L4 on L5. Electronically Signed: Ghislaine Calvin MD  4/17/2024 7:57 AM EDT  Workstation ID: [id number]    MRI Cervical Spine Without Contrast    Result Date: 4/17/2024  Impression: Impression: 1. Multilevel cervical degenerative disc disease and facet arthropathy. There is multilevel canal stenosis which is greatest at C5-C6. 2. Multilevel neural foraminal narrowing including moderate to severe C5-C6 and C6-C7 foraminal stenosis. 3. There is no evidence of cord signal abnormality. Electronically Signed: Ghislaine Calvin MD  4/17/2024 7:32 AM EDT  Workstation ID: [id number]    CT Chest Without Contrast Diagnostic    Result Date: 4/13/2024  Impression: 1.Examination is limited due to lack of IV contrast administration. Moderate left and small right pleural effusions with bibasilar atelectasis. 2.Suggestion of wall thickening and stranding about the proximal/mid colon. Infectious or inflammatory colitis may be considered in the appropriate clinical setting. Further evaluation of the hollow viscera is limited due to lack of IV contrast administration. 3.Stranding within the superficial soft tissues of the abdomen and bilateral thighs. ditional findings as detailed above. Electronically Signed: Tramaine Sutherland MD  4/13/2024 5:08 PM EDT  Workstation ID: [id number]    CT Abdomen Pelvis Without Contrast    Result Date: 4/13/2024  Impression: 1.Examination is limited  due to lack of IV contrast administration. Moderate left and small right pleural effusions with bibasilar atelectasis. 2.Suggestion of wall thickening and stranding about the proximal/mid colon. Infectious or inflammatory colitis may be considered in the appropriate clinical setting. Further evaluation of the hollow viscera is limited due to lack of IV contrast administration. 3.Stranding within the superficial soft tissues of the abdomen and bilateral thighs. ditional findings as detailed above. Electronically Signed: Tramaine Sutherland MD  4/13/2024 5:08 PM EDT  Workstation ID: [id number]    XR Chest 1 View    Result Date: 4/13/2024  Impression: Impression: 1. Cardiomegaly with left greater than right pleural effusions, likely similar to the prior chest CT dated 4/7/2024. 2. Left basilar airspace opacity representing compressive atelectasis or pneumonia. Electronically Signed: Frank Mcghee  4/13/2024 9:17 AM EDT  Workstation ID: [id number]    US Liver    Result Date: 4/12/2024  Impression: Impression: The liver appears sonographically unremarkable. Partial visualization of a small to moderate right pleural effusion. Electronically Signed: Luciano Arenas MD  4/12/2024 4:31 PM EDT  Workstation ID: [id number]    MRI Brain Without Contrast    Result Date: 4/11/2024  Impression: Impression: Brain atrophy with chronic microvascular ischemic changes No acute intracranial abnormality Electronically Signed: Moisés Clark MD  4/11/2024 8:17 PM EDT  Workstation ID: [id number]    US Renal Bilateral    Result Date: 4/11/2024  Impression: Impression: Minimal cortical thinning on the right kidney which otherwise has an unremarkable sonographic appearance. No hydronephrosis. The left kidney is not visualized and is likely obscured by bowel gas and less there has been interval left nephrectomy over the past 4 days. Electronically Signed: Jerome Grimes DO  4/11/2024 3:24 PM EDT  Workstation ID: [id number]    CT Abdomen Without  Contrast    Result Date: 4/7/2024  Impression: Impression: 1.Small bilateral pleural effusions with right basilar atelectasis. 2.Airspace disease in the left lower lobe could be secondary to pneumonia. 3.There is distention of the gallbladder. Electronically Signed: Ronny Slaughter MD  4/7/2024 3:19 AM EDT  Workstation ID: [id number]    CT Chest Without Contrast Diagnostic    Result Date: 4/7/2024  Impression: Impression: Small bilateral pleural effusions. Bilateral basilar airspace disease. On the left there may be a combination of atelectasis and pneumonia. Electronically Signed: Ronny Slaughter MD  4/7/2024 3:11 AM EDT  Workstation ID: [id number]    CT Head Without Contrast    Result Date: 4/5/2024  Impression: Impression: Chronic findings. No acute intracranial process. Findings suggestive of mild right mastoiditis. Electronically Signed: Heidy Calvin MD  4/5/2024 3:01 PM EDT  Workstation ID: [id number]    XR Chest 1 View    Result Date: 4/5/2024  Impression: Impression: Bibasilar airspace disease, left greater than right, suspicious for pneumonia. Electronically Signed: Tacos Nichols MD  4/5/2024 12:42 PM EDT  Workstation ID: [id number]             Results for orders placed during the hospital encounter of 04/05/24    Adult Transthoracic Echo Complete W/ Cont if Necessary Per Protocol    Interpretation Summary    Left ventricular systolic function is normal. Left ventricular ejection fraction appears to be 56 - 60%.    Left ventricular wall thickness is consistent with mild concentric hypertrophy.    Left ventricular diastolic function was normal.    Estimated right ventricular systolic pressure from tricuspid regurgitation is normal (<35 mmHg).    There is a small (<1cm) circumferential pericardial effusion. There is no evidence of cardiac tamponade.      Labs Pending at Discharge:  Pending Labs       Order Current Status    Vitamin A & E In process    Blood Culture - Blood, Hand, Left Preliminary result    Blood Culture  - Blood, Wrist, Left Preliminary result    Blood Culture - Blood, Wrist, Right Preliminary result            Procedures Performed           Consults:   Consults       Date and Time Order Name Status Description    4/17/2024  8:55 AM Inpatient Neurosurgery Consult Completed     4/13/2024  1:01 PM Inpatient Infectious Diseases Consult      4/11/2024 11:56 AM Inpatient Infectious Diseases Consult Completed     4/11/2024 11:55 AM Inpatient Neurology Consult General Completed     4/10/2024 11:40 AM Inpatient Psychiatrist Consult      4/10/2024 11:34 AM Inpatient Nephrology Consult Completed     4/6/2024  7:10 PM Inpatient Cardiology Consult Completed     4/6/2024  6:42 AM Inpatient Hospitalist Consult              Assessment  MDM: This is a 78 y.o. male being eval by neurosurgery for chronic low back pain with worsening difficulty walking. His symptoms are consistent with chronic degenerative changes resulting in radiculopathy and neurogenic claudication secondary to severe central and foraminal stenosis at L4-5 and L5-S1.  He has some weakness with hip flexion, but has good strength distally, and there are no compressive MRI findings to explain this weakness.  He has no saddle anesthesia.  Urinary issues likely secondary to CATRACHO. No high-grade compression of the cauda equina or conus medullaris.     Chronic mild T12 compression fracture with some localized marrow edema.  Patient's pain does not localize to the level however.     Patient is not myelopathic, and MRI cervical spine also shows chronic degenerative findings with no cord compression.     Based on the above, patient does not require urgent/emergent neurosurgical intervention at this time.  Based on his comorbidities, I do not think surgery will be a good option for this patient, now or in the future.  Patient also voiced that he is not interested in any sort of surgical option.  As such I recommend he follow-up with his PCP and/or pain management after  discharge for further evaluation and management.        Plan  Degenerative disc disease, lumbosacral spine  Lumbar stenosis with neurogenic claudication  Lumbosacral radiculopathy  Cervical stenosis without myelopathy     - No neurosurgical intervention or follow-up necessary at this time  - Follow-up with PCP and/or pain management for further care of back pain  - Medical management per primary team     Neurosurgery will sign off at this time.  Call with any questions or concerns.        I discussed my assessment and recommendations with Dr. Pippa Lynn PA-C  4/17/2024  10:34 EDT        ASSESSMENT:     1.  Acute kidney injury.  Nonoliguric ATN.  Renal function improving slowly.  Electrolytes okay.  Edema improving slowly.  Off IV fluids   2.  Septic shock.  Due to pneumonia.  Improved   3.  Hypokalemia. resolved  5.  Benign essential hypertension.  Blood pressure is okay     PLAN:     Continue current treatment  Monitoring renal function and electrolytes     Gen Ruiz MD  04/17/24  10:16 EDT     [facility]  [phone]  Discharge Details        Discharge Medications        New Medications        Instructions Start Date   amiodarone 200 MG tablet  Commonly known as: PACERONE   200 mg, Oral, Every 24 Hours Scheduled      empagliflozin 10 MG tablet tablet  Commonly known as: Jardiance   10 mg, Oral, Daily      metoprolol tartrate 25 MG tablet  Commonly known as: LOPRESSOR   25 mg, Oral, Every 12 Hours Scheduled      modafinil 100 MG tablet  Commonly known as: Provigil   100 mg, Oral, Daily      naloxone 4 MG/0.1ML nasal spray  Commonly known as: NARCAN   Call 911. Don't prime. [place] in 1 nostril for overdose. Repeat in 2-3 minutes in other nostril if no or minimal breathing/responsiveness.      warfarin 4 MG tablet  Commonly known as: Coumadin   Inr 2-3  Check INR qd  dc lovenox once INR 2             Changes to Medications        Instructions Start Date   oxyCODONE 5 MG  immediate release tablet  Commonly known as: ROXICODONE  What changed:   how much to take  when to take this  reasons to take this   5 mg, Oral, Every 8 Hours PRN      primidone 50 MG tablet  Commonly known as: MYSOLINE  What changed:   how much to take  how to take this  when to take this   50 mg, Oral, 2 Times Daily, 3 tablets twice daily      verapamil  MG 24 hr capsule  Commonly known as: VERELAN  What changed: medication strength   240 mg, Oral, Nightly             Continue These Medications        Instructions Start Date   acetaminophen 325 MG tablet  Commonly known as: TYLENOL   650 mg, Oral, Every 6 Hours PRN      atorvastatin 20 MG tablet  Commonly known as: LIPITOR   20 mg, Oral, Daily      bisacodyl 5 MG EC tablet  Commonly known as: DULCOLAX   5 mg, Oral, Daily      bisacodyl 10 MG suppository  Commonly known as: DULCOLAX   10 mg, Rectal, Daily PRN      dicyclomine 10 MG capsule  Commonly known as: BENTYL   10 mg, Oral, 4 Times Daily Before Meals & Nightly      docusate sodium 100 MG capsule  Commonly known as: COLACE   100 mg, Oral, 2 Times Daily      DULoxetine 30 MG capsule  Commonly known as: CYMBALTA   90 mg, Oral, Daily      ipratropium-albuterol 0.5-2.5 mg/3 ml nebulizer  Commonly known as: DUO-NEB   3 mL, Nebulization, Every 4 Hours PRN      l-methylfolate-algae-B6-B12 3-90.314-2-35 MG capsule capsule   1 capsule, Oral, Every 12 Hours Scheduled      magnesium hydroxide 2400 MG/10ML suspension suspension  Commonly known as: MILK OF MAGNESIA   30 mL, Oral, Daily PRN, Administer if no BM x3 days and after prune juice is given and ineffective after 8 hours (if not a renal patient) If no BM after 1st and 2nd step bowl regimen is given, notify MD/APRN for further instructions/guidance       metFORMIN 500 MG tablet  Commonly known as: GLUCOPHAGE   1,000 mg, Oral, 2 Times Daily      NON FORMULARY   Prune Juice QD PRN       ondansetron 4 MG tablet  Commonly known as: ZOFRAN   4 mg, Oral, Every 8  Hours PRN      pantoprazole 40 MG EC tablet  Commonly known as: PROTONIX   40 mg, Oral, Daily      pregabalin 75 MG capsule  Commonly known as: LYRICA   75 mg, Oral, 2 Times Daily      sucralfate 1 g tablet  Commonly known as: CARAFATE   1 g, Oral, 4 Times Daily      tamsulosin 0.4 MG capsule 24 hr capsule  Commonly known as: FLOMAX   1 capsule, Oral, Daily             Stop These Medications      cefTAZidime  Commonly known as: FORTAZ     diphenhydrAMINE 25 mg capsule  Commonly known as: BENADRYL     glimepiride 2 MG tablet  Commonly known as: AMARYL              No Known Allergies      Discharge Disposition:   Skilled Nursing Facility (DC - External)    Diet:  Hospital:  Diet Order   Procedures    Diet: Diabetic; Consistent Carbohydrate; Texture: Mechanical Ground (NDD 2); Fluid Consistency: Nectar Thick         Discharge Activity:   Activity Instructions       Gradually Increase Activity Until at Pre-Hospitalization Level                CODE STATUS:  Code Status and Medical Interventions:   Ordered at: 04/05/24 1340     Code Status (Patient has no pulse and is not breathing):    CPR (Attempt to Resuscitate)     Medical Interventions (Patient has pulse or is breathing):    Full Support         Future Appointments   Date Time Provider Department Center   5/31/2024  2:30 PM Montana Morrow MD [facility]  [facility]       Additional Instructions for the Follow-ups that You Need to Schedule       Discharge Follow-up with PCP   As directed       Currently Documented PCP:    Montana Morrow MD    PCP Phone Number:    [phone]     Follow Up Details: 1 week        Discharge Follow-up with Specified Provider: cardiology; 2 Weeks   As directed      To: cardiology   Follow Up: 2 Weeks                Time spent on Discharge including face to face service:  >30 minutes    Signature: Electronically signed by Kendall Haynes MD, 04/17/24, 14:30 EDT.  [facility] Hospitalist Team

## 2024-04-17 NOTE — PLAN OF CARE
"Goal Outcome Evaluation:     Assessment: Lux Bray presents with ADL impairments affecting function including balance, cognition, coordination, endurance / activity tolerance, grasp, pain, postural / trunk control, range of motion (ROM), shortness of breath, and strength. Demonstrated functioning below baseline abilities indicate the need for continued skilled intervention while inpatient. Tolerating session today without incident. Pt feeling weak and generally stiff and painful this date but gave his best effort at EOB sitting and working on postural control. One attempt to stand EOB partially successful with max (A) of two but unable to come up fully straight into standing. Pt arms still so weak and edematous and note contractures in his lt hand, so he requires full assist to self feed at this time. Very limited exercise tolerance at this time due to physical, cognitive impairments and pain. Per CM notes pt was very active and exercised regularly unitl last year. Will continue to follow and progress as tolerated.      Plan/Recommendations:   Moderate Intensity Therapy recommended post-acute care. This is recommended as therapy feels the patient would require 3-4 days per week and wouldn't tolerate \"3 hour daily\" rehab intensity. SNF would be the preferred choice. If the patient does not agree to SNF, arrange HH or OP depending on home bound status. If patient is medically complex, consider LTACH.. Pt requires no DME at discharge.      Pt desires Skilled Rehab placement at discharge. Pt cooperative; agreeable to therapeutic recommendations and plan of care.   "

## 2024-04-17 NOTE — PLAN OF CARE
Assessment: Lux Bray is being monitored for septic shock d/t PNA and presents with functional mobility impairments which indicate the need for skilled intervention. Pt had MRI done of low back d/t c/o pain, which showed chronic endplate compression frx T12. Neurosurgery reports no surgical intervention indicated and to focus on pain control. Pt lethargic upon arrival, keeping eyes closed majority of the session. Able to tolerate LE exercises in supine, but has reduced sitting EOB tolerance this date. Pt leaning significantly to the L d/t R-sided pain. Attempted to use the [facility] sit>stand lift to put pt in chair, but the machine was not working. Pt required max A x 2 for STS using RW, and pt able to tolerate for 15 seconds. Tolerating session today without incident. Will continue to follow and progress as tolerated.

## 2024-04-17 NOTE — CASE MANAGEMENT/SOCIAL WORK
Continued Stay Note   Chava     Patient Name: Lux Bray  MRN: [id number]  Today's Date: 4/17/2024    Admit Date: 4/5/2024    Plan: [facility] accepted. No precert required. PASRR approved. From [facility] (spouse does not want pt to return). [facility] following for after SNF.   Discharge Plan       Row Name 04/17/24 1212       Plan    Patient/Family in Agreement with Plan yes    Plan Comments Discharge orders noted as well as neurosurgery consult. Neurosurgery signed off. Bedside RN discussed d/c with spouse who was agreeable. CM discussed with [facility] liaison Jackie that bed was ready and transport would be scheduled. EMS medical necessity form completed and information packet provided to RN for EMS. [facility] transportation scheduled via ad hoc. CM spoke with patient's spouse, Noemy and Sayra, patient’s daughter ([phone]) who requested to be contacted to discuss the antibiotic regimen and what claudication means in regards to lumbar stenosis. CM updated MD and RN of request.    Final Discharge Disposition Code 03 - skilled nursing facility (SNF)    Final Note [facility]             Expected Discharge Date and Time       Expected Discharge Date Expected Discharge Time    Apr 17, 2024      Case Management Discharge Note  Final Note: [facility]  Selected Continued Care - Admitted Since 4/5/2024       Destination Coordination complete.      Service Provider Selected Services Address Phone Fax Patient Preferred    [facility] [place]  [address], [place] [address] [phone]            Transportation Services  Ambulance: [facility]  Final Discharge Disposition Code: 03 - skilled nursing facility (SNF)     Doris Ortiz RN     Office Phone: [phone]  Office Cell: [phone]

## 2024-04-17 NOTE — THERAPY TREATMENT NOTE
Subjective: Pt agreeable to therapeutic plan of care.  Cognition: memory: Impaired short term, arousal/alertness: Listless and Confused, safety/judgement: limited, and awareness of deficits: fair awareness of safety precautions and poor awareness of defits    Objective: edema in hands, forearms, genitals, knees. Lt hand stiff with limited flexion of all digits < 25% of full ROM needed to complete a full composite fist. Pt painful with stretching.  strength is nonetheless 4-/5 BUE.    Bed Mobility: Max-A and Assist x 2   Functional Transfers: Assist x 2, Dependent, with rolling walker, and utilizing compensatory strategy     Balance: supported, static, with UE support, and standing Assist x 2, Dependent, and with rolling walker  Functional Ambulation: N/A or Not attempted. Could not come fully to stand at EOB. Attempted Tammy lift but device was not working. Notified RN who left [facility] for [facility].    Upper Body Dressing, Lower Body Dressing, Toileting, Grooming, Feeding, and Bathing: Dependent  ADL Position: edge of bed sitting and supine  ADL Comments: positioned pt arms on pillows and attempted to elicit sustained attention to complete some hand and arm exercises with very limited success.    Vitals: 98/57 after OT    Pain: 7 VAS  Location: rt low/back, side & generalized  Interventions for pain: Repositioned, Increased Activity, and Therapeutic Presence  Education: Provided education on the importance of mobility in the acute care setting, Verbal/Tactile Cues, ADL training, and Transfer Training    Assessment: Lux Bray presents with ADL impairments affecting function including balance, cognition, coordination, endurance / activity tolerance, grasp, pain, postural / trunk control, range of motion (ROM), shortness of breath, and strength. Demonstrated functioning below baseline abilities indicate the need for continued skilled intervention while inpatient. Tolerating session today without incident. Pt  "feeling weak and generally stiff and painful this date but gave his best effort at EOB sitting and working on postural control. One attempt to stand EOB partially successful with max (A) of two but unable to come up fully straight into standing. Pt arms still so weak and edematous and note contractures in his lt hand, so he requires full assist to self feed at this time. Very limited exercise tolerance. Will continue to follow and progress as tolerated.     Plan/Recommendations:   Moderate Intensity Therapy recommended post-acute care. This is recommended as therapy feels the patient would require 3-4 days per week and wouldn't tolerate \"3 hour daily\" rehab intensity. SNF would be the preferred choice. If the patient does not agree to SNF, arrange HH or OP depending on home bound status. If patient is medically complex, consider LTACH.. Pt requires no DME at discharge.     Pt desires Skilled Rehab placement at discharge. Pt cooperative; agreeable to therapeutic recommendations and plan of care.     Modified [place]: 5 = Severe disability (Requires constant nursing care and attention, bedridden, incontinent)    Post-Tx Position: Supine with HOB Elevated, Alarms activated, and Call light and personal items within reach  PPE: gloves    "

## 2024-04-17 NOTE — CASE MANAGEMENT/SOCIAL WORK
"Physicians Statement of Medical Necessity for  Ambulance Transportation    GENERAL INFORMATION     Name: uLx Bray  YOB: 1945  Medicare #: [id number]   Transport Date: 4/17/24 (Valid for round trips this date, or for scheduled repetitive trips for 60 days from the date signed below.)  Origin: [facility]  Destination: [facility]  Is the Patient's stay covered under Medicare Part A (PPS/DRG?)Yes  Closest appropriate facility? Yes  If this a hosp-hosp transfer? No  Is this a hospice patient? No    MEDICAL NECESSITY QUESTIONAIRE    Ambulance Transportation is medically necessary only if other means of transportation are contraindicated or would be potentially harmful to the patient.  To meet this requirement, the patient must be either \"bed confined\" or suffer from a condition such that transport by means other than an ambulance is contraindicated by the patient's condition.  The following questions must be answered by the healthcare professional signing below for this form to be valid:     1) Describe the MEDICAL CONDITION (physical and/or mental) of this patient AT THE TIME OF AMBULANCE TRANSPORT that requires the patient to be transported in an ambulance, and why transport by other means is contraindicated by the patient's condition: Max assist x2, per therapy, 3L O2, cognitive impairments and unable to self-administer O2, difficulty initiating movements, hip/low back stiffness, and bradykinesia with bed mobility.   Past Medical History:   Diagnosis Date    ADHD (attention deficit hyperactivity disorder)     Allergic     Arthritis     Colon polyp     Depression     Diabetes mellitus type 2    controlled by oral meds    Fibromyalgia, primary     HL (hearing loss)     Hyperlipidemia     Hypertension       Past Surgical History:   Procedure Laterality Date    COLONOSCOPY      ENDOSCOPY N/A 11/15/2023    Procedure: ESOPHAGOGASTRODUODENOSCOPY;  Surgeon: George Viveros MD;  Location: " "[facility]  MAIN OR;  Service: Gastroenterology;  Laterality: N/A;  popssible candida, irrgular z-line, gastritis    EYE SURGERY  2023    Cataracts removed: both eyes    HAND SURGERY  1980 1980-1990- [facility]. Abstracted from [facility].    HEMORROIDECTOMY  1984    [facility]    TONSILLECTOMY  1940's    VASECTOMY        2) Is this patient \"bed confined\" as defined below?Yes   To be \"bed confined\" the patient must satisfy all three of the following criteria:  (1) unable to get up from bed without assistance; AND (2) unable to ambulate;  AND (3) unable to sit in a chair or wheelchair.  3) Can this patient safely be transported by car or wheelchair van (I.e., may safely sit during transport, without an attendant or monitoring?)No   4. In addition to completing questions 1-3 above, please check any of the following conditions that apply*:          *Note: supporting documentation for any boxes checked must be maintained in the patient's medical records Patient is confused, Moderate/severe pain on movement, Requires oxygen - unable to self administer, and Unable to tolerate seated position for time needed to transport      SIGNATURE OF PHYSICIAN OR OTHER AUTHORIZED HEALTHCARE PROFESSIONAL    I certify that the above information is true and correct based on my evaluation of this patient, and represent that the patient requires transport by ambulance and that other forms of transport are contraindicated.  I understand that this information will be used by the Centers for Medicare and Medicaid Services (CMS) to support the determiniation of medical necessity for ambulance services, and I represent that I have personal knowledge of the patient's condition at the time of transport.    JANNETTE Ortiz RN   If this box is checked, I also certify that the patient is physically or mentally incapable of signing the ambulance service's claim form and that the institution with which I am affiliated has furnished care, " services or assistance to the patient.  My signature below is made on behalf of the patient pursuant to 42 .36(b)(4). In accordance with 42 .37, the specific reason(s) that the patient is physically or mentally incapable of signing the claim for is as follows: Dr. Haynes    Signature of Physician or Healthcare Professional  Date/Time:   4/17/24     (For Scheduled repetitive transport, this form is not valid for transports performed more than 60 days after this date).                                                                                                                                            --------------------------------------------------------------------------------------------  Printed Name and Credentials of Physician or Authorized Healthcare Professional     *Form must be signed by patient's attending physician for scheduled, repetitive transports,.  For non-repetitive ambulance transports, if unable to obtain the signature of the attending physician, any of the following may sign (please select below):     Physician  Clinical Nurse Specialist  Registered Nurse     Physician Assistant  Discharge Planner  Licensed Practical Nurse     Nurse Practitioner   X

## 2024-04-17 NOTE — PROGRESS NOTES
Infectious Diseases Progress Note      LOS: 12 days   Patient Care Team:  Montana Morrow MD as PCP - General (Family Medicine)  George Viveros MD as Consulting Physician (Gastroenterology)    Chief Complaint: Weakness    Subjective     Patient's been afebrile for the last 24 hours.  He is currently on room air, hemodynamically stable and does not complain of anything but general weakness and fatigue.  Denies any new symptoms today    Review of Systems:   Review of Systems   Constitutional:  Positive for fatigue.   Neurological:  Positive for weakness.        Objective     Vital Signs  Temp:  [96.9 °F (36.1 °C)-98.6 °F (37 °C)] 96.9 °F (36.1 °C)  Heart Rate:  [66-96] 66  Resp:  [10-17] 10  BP: (102-113)/(44-81) 102/81    Physical Exam:  Physical Exam  Vitals and nursing note reviewed.   Constitutional:       Appearance: He is well-developed. He is ill-appearing.   HENT:      Head: Normocephalic and atraumatic.   Eyes:      Pupils: Pupils are equal, round, and reactive to light.   Cardiovascular:      Rate and Rhythm: Normal rate and regular rhythm.      Heart sounds: Normal heart sounds.   Pulmonary:      Effort: Pulmonary effort is normal. No respiratory distress.      Breath sounds: Normal breath sounds. No wheezing or rales.   Abdominal:      General: Bowel sounds are normal. There is no distension.      Palpations: Abdomen is soft. There is no mass.      Tenderness: There is no abdominal tenderness. There is no guarding or rebound.   Musculoskeletal:         General: No deformity. Normal range of motion.      Cervical back: Normal range of motion and neck supple.   Skin:     General: Skin is warm.      Findings: No erythema or rash.   Neurological:      Mental Status: He is alert and oriented to person, place, and time.      Cranial Nerves: No cranial nerve deficit.          Results Review:    I have reviewed all clinical data, test, lab, and imaging results.     Radiology  MRI Lumbar Spine Without  Contrast    Result Date: 4/17/2024  MRI LUMBAR SPINE WO CONTRAST Date of Exam: 4/16/2024 10:30 PM EDT Indication: Myelopathy, chronic, lumbar spine.  Comparison: CT of the abdomen and pelvis 4/13/2024, MRI of the lumbar spine 12/10/2020 Technique:  Routine multiplanar/multisequence sequence images of the lumbar spine were obtained without contrast administration.  Findings: ALIGNMENT: There is mild levoconvex lumbar scoliosis. Trace anterolisthesis of L4 on L5. DISK SPACE: There is disc space narrowing at all lumbar levels. Fluid signal within the discs at multiple levels corresponds to vacuum phenomenon on comparison CT. VERTEBRA: There is an inferior endplate compression fracture of T12 which is largely chronic. However, there is a component of mild edema suggesting potential acute on chronic injury. The lumbar vertebral body heights are maintained. Multilevel Modic endplate changes are noted. CORD: Distal spinal cord and conus medullaris appear unremarkable terminating above the L2 level.  SOFT TISSUES: There is atrophy of the paraspinous muscles. Intra-abdominal contents are grossly unremarkable. LEVELS: T12-L1: Mild disc/endplate osteophyte complex with bilateral facet joint hypertrophy. No significant canal stenosis or neuroforaminal narrowing. L1-L2: Mild disc bulge asymmetric to the left with bilateral facet joint hypertrophy. The central canal is patent. There is mild narrowing of the lateral recesses. There is moderate left and mild right neural foraminal narrowing. L2-L3: Diffuse disc bulge with bilateral facet joint hypertrophy and ligamentum flavum thickening result in mild canal stenosis. There is right greater than left lateral recess stenosis. There is moderate left and mild right neural foraminal narrowing. L3-L4: Diffuse disc bulge and bilateral facet joint hypertrophy with ligamentum flavum thickening. There is mild canal stenosis. Mild bilateral neural foraminal narrowing. L4-L5: Disc  bulge/endplate osteophyte complex asymmetric to the right with marked bilateral facet joint hypertrophy result in severe canal and lateral recess stenosis. Severe right and moderate left neural foraminal narrowing. L5-S1: Disc bulge/endplate osteophyte complex asymmetric to the right with severe bilateral facet joint hypertrophy. There is no significant canal stenosis. Moderate left and mild right neural foraminal narrowing.     Impression: 1. There is a chronic inferior endplate compression fracture of T12 with a small component of edema suggesting that there may be reinjury, clinical correlation is recommended. 2. There is multilevel lumbar degenerative change most prominent at L4-L5 where there is severe canal/lateral recess stenosis. 3. Multilevel neural foraminal narrowing up to severe on the right at L4-L5 and moderate at multiple levels as described. 4. Minimal anterolisthesis of L4 on L5. Electronically Signed: Ghislaine Calvin MD  4/17/2024 7:57 AM EDT  Workstation ID: [id number]    MRI Cervical Spine Without Contrast    Result Date: 4/17/2024  MRI CERVICAL SPINE WO CONTRAST Date of Exam: 4/16/2024 10:30 PM EDT Indication: Myelopathy, chronic, cervical spine.  Comparison: 7/25/2023 Technique:  Routine multiplanar/multisequence sequence images of the cervical spine were obtained without contrast administration.  Findings: ALIGNMENT: There is trace anterolisthesis of C2 on C3 and C7 on T1. DISK SPACE: There is multilevel disc space narrowing. VERTEBRA: No fracture or destructive process. No marrow edema. CORD: Normal anatomy and signal intensity. CRANIOVETEBRAL JUNCTION: Normal SOFT TISSUES: No mass nor lymphadenopathy. LEVELS: C2-C3: There is a tiny central disc protrusion with left greater than right facet joint hypertrophy. There is no significant canal stenosis or right foraminal narrowing. Mild left neural foraminal stenosis. C3-C4: There is a broad-based posterior disc osteophyte complex with right greater  than left uncovertebral spurring and bilateral facet joint hypertrophy. There is effacement of the ventral thecal sac with mild canal stenosis. Mild to moderate right and mild left neural foraminal narrowing. C4-C5: There is a broad-based posterior disc osteophyte complex with bilateral uncovertebral spurring and facet joint hypertrophy. There is effacement of the ventral thecal sac with mild to moderate canal stenosis. Moderate bilateral neural foraminal narrowing slightly greater on the left. C5-C6: There is a broad-based posterior disc osteophyte complex with bilateral uncovertebral spurring and facet joint hypertrophy. There is effacement of the ventral thecal sac with moderate canal stenosis. There is moderate to severe bilateral neural foraminal narrowing slightly greater on the right. C6-C7: There is a broad-based posterior disc osteophyte complex with bilateral uncovertebral spurring and facet joint hypertrophy. There is mild canal stenosis with moderate to severe bilateral neural foraminal narrowing. C7-T1: Bilateral uncovertebral spurring and facet joint hypertrophy with mild bilateral neural foraminal narrowing. No canal stenosis.     Impression: 1. Multilevel cervical degenerative disc disease and facet arthropathy. There is multilevel canal stenosis which is greatest at C5-C6. 2. Multilevel neural foraminal narrowing including moderate to severe C5-C6 and C6-C7 foraminal stenosis. 3. There is no evidence of cord signal abnormality. Electronically Signed: Ghislaine Calvin MD  4/17/2024 7:32 AM EDT  Workstation ID: [id number]     Cardiology    Laboratory    Results from last 7 days   Lab Units 04/17/24  1139 04/16/24  0158 04/15/24  0436 04/14/24  0200 04/13/24  1148   WBC 10*3/mm3 13.68* 17.04* 19.60* 20.58* 19.72*   HEMOGLOBIN g/dL 9.9* 10.4* 10.1* 9.4* 10.8*   HEMATOCRIT % 31.0* 32.4* 30.8* 30.7* 34.1*   PLATELETS 10*3/mm3 378 375 408 363 383     Results from last 7 days   Lab Units 04/17/24  1139  04/16/24  0158 04/15/24  0436 04/14/24  0200 04/13/24  1420 04/12/24  0435 04/11/24  0238   SODIUM mmol/L 135* 139 138 138 138 139 139   POTASSIUM mmol/L 4.3 4.0 3.8 3.9 3.9 3.6 3.9   CHLORIDE mmol/L 97* 100 102 101 102 101 102   CO2 mmol/L 32.0* 31.0* 26.0 27.0 28.0 29.0 30.0*   BUN mg/dL 27* 31* 33* 30* 30* 34* 32*   CREATININE mg/dL 1.41* 1.44* 1.37* 1.58* 1.61* 1.88* 2.13*   GLUCOSE mg/dL 205* 180* 142* 119* 194* 156* 178*   ALBUMIN g/dL  --  2.2* 2.1* 2.3* 2.3* 2.2* 2.3*   BILIRUBIN mg/dL  --   --  <0.2 <0.2 <0.2  --   --    ALK PHOS U/L  --   --  270* 260* 280*  --   --    AST (SGOT) U/L  --   --  45* 39 37  --   --    ALT (SGPT) U/L  --   --  68* 74* 71*  --   --    CALCIUM mg/dL 8.3* 8.5* 8.6 8.5* 8.7 8.3* 8.5*     Results from last 7 days   Lab Units 04/11/24  0238   CK TOTAL U/L 13*             Microbiology   Microbiology Results (last 10 days)       Procedure Component Value - Date/Time    Blood Culture - Blood, Wrist, Right [[id number]]  (Normal) Collected: 04/15/24 0942    Lab Status: Preliminary result Specimen: Blood from Wrist, Right Updated: 04/17/24 1000     Blood Culture No growth at 2 days    Blood Culture - Blood, Wrist, Left [[id number]]  (Normal) Collected: 04/15/24 0942    Lab Status: Preliminary result Specimen: Blood from Wrist, Left Updated: 04/17/24 1000     Blood Culture No growth at 2 days    Blood Culture - Blood, Hand, Left [[id number]]  (Normal) Collected: 04/14/24 0159    Lab Status: Preliminary result Specimen: Blood from Hand, Left Updated: 04/17/24 0215     Blood Culture No growth at 3 days    Blood Culture - Blood, Blood, Central Line [[id number]]  (Abnormal) Collected: 04/13/24 1420    Lab Status: Final result Specimen: Blood, Central Line Updated: 04/16/24 0652     Blood Culture Staphylococcus, coagulase negative     Isolated from Aerobic and Anaerobic Bottles     Gram Stain Anaerobic Bottle Gram positive cocci in clusters      Aerobic Bottle Gram positive cocci in clusters     Narrative:      Probable contaminant requires clinical correlation, susceptibility not performed unless requested by physician.      Blood Culture ID, PCR - Blood, Blood, Central Line [[id number]]  (Abnormal) Collected: 04/13/24 1420    Lab Status: Final result Specimen: Blood, Central Line Updated: 04/14/24 1700     BCID, PCR Staph spp, not aureus or lugdunensis. Identification by BCID2 PCR.     BOTTLE TYPE Anaerobic Bottle    Eosinophil Smear - Urine, Urine, Catheter [[id number]]  (Normal) Collected: 04/10/24 2121    Lab Status: Final result Specimen: Urine, Catheter Updated: 04/10/24 2225     Eosinophil Smear 0 % EOS/100 Cells             Medication Review:       Schedule Meds  amiodarone, 200 mg, Oral, Q24H  Beneprotein, 1 packet, Oral, BID  bisacodyl, 5 mg, Oral, Daily  dicyclomine, 10 mg, Oral, 4x Daily AC & at Bedtime  docusate sodium, 100 mg, Oral, BID  DULoxetine, 90 mg, Oral, Daily  insulin lispro, 2-9 Units, Subcutaneous, 4x Daily AC & at Bedtime  metoprolol tartrate, 12.5 mg, Oral, Q12H  oxyCODONE, 10 mg, Oral, Daily  pantoprazole, 40 mg, Oral, Daily  pregabalin, 75 mg, Oral, BID  primidone, 50 mg, Oral, Q12H  sodium chloride, 10 mL, Intravenous, Q12H  sucralfate, 1 g, Oral, TID AC  tamsulosin, 0.4 mg, Oral, Daily  verapamil ER, 240 mg, Oral, Nightly  warfarin, 4 mg, Oral, Daily        Infusion Meds  Pharmacy to dose warfarin,         PRN Meds    acetaminophen **OR** acetaminophen    bisacodyl    dextrose    dextrose    glucagon (human recombinant)    ipratropium-albuterol    magnesium hydroxide    nitroglycerin    ondansetron ODT **OR** ondansetron    oxyCODONE    Pharmacy to dose warfarin    sodium chloride    sodium chloride    sodium chloride        Assessment & Plan       Antimicrobial Therapy   1.         2.        3.        4.        5.            Assessment     Reactive leukocytosis.  Patient has no obvious clinical signs of bacterial infection.  Patient was treated for aspiration pneumonia this  admission and finish antibiotics.  CT scan of the chest, abdomen pelvis indicated colitis however patient has no GI symptoms including abdominal pain or diarrhea.  White count is trending down    Left lower lobe pneumonia on admission treated with 5 days of antimicrobial therapy.  Currently on 2 L of oxygen     Progressive weakness.  Appears from neurology's note that patient was seen in an outpatient neurologist in October 2023 and had a positive EMG and nerve conduction studies.  Currently on Lyrica for polyneuropathy.  MRI of the cervical spine and lumbar spine did not show any infectious process.  Neurosurgery now also following     Type 2 diabetes    Positive blood culture with 1 out of 2 blood culture sets showing a coagulase-negative Staphylococcus.  Patient has no history of cardiac valve surgery or cardiac device placement.  Patient does have a midline placed that was placed there 10 days ago.  Repeat blood cultures are negative so far     Plan     Monitor patient off antimicrobial  therapy since he is hemodynamically stable  Supportive care  Okay to discharge from Infectious Disease standpoint      JONATHAN Snowden  04/17/24  15:18 EDT    Note is dictated utilizing voice recognition software/Dragon

## 2024-04-17 NOTE — CONSULTS
Neurosurgery Consultation      Patient: Lux Bray    Sex: male    YOB: 1945    Date of Admission: 4/5/2024 11:32 AM    Admitting Dx: Septic shock [A41.9, R65.21]  Severe sepsis [A41.9, R65.20]  Atrial flutter, unspecified type [I48.92]  Pneumonia due to infectious organism, unspecified laterality, unspecified part of lung [J18.9]    Reason for Consult: Back pain      Subjective     CC: Chronic low back pain    HPI:  Patient is a 78 y.o. male with diabetes who is currently in the hospital being treated for pneumonia and septic shock.  Neurosurgery was consulted due to back pain.  Patient describes low back pain going on for over a year, but has noticeably gotten worse over the past few weeks.  He denies injury or inciting event.  He endorses pain numbness and tingling going down his legs.  He also reports lower extremity weakness, and is unable to bear weight at this time.  He currently has an external catheter as well as acute kidney injury.  He denies numbness in his groin or genitals.      PMH:  Past Medical History:   Diagnosis Date    ADHD (attention deficit hyperactivity disorder)     Allergic     Arthritis     Colon polyp     Depression     Diabetes mellitus type 2    controlled by oral meds    Fibromyalgia, primary     HL (hearing loss)     Hyperlipidemia     Hypertension          Current Facility-Administered Medications:     acetaminophen (TYLENOL) tablet 650 mg, 650 mg, Oral, Q4H PRN, 650 mg at 04/12/24 2017 **OR** acetaminophen (TYLENOL) suppository 650 mg, 650 mg, Rectal, Q4H PRN, Malena Hassan, APRN, 650 mg at 04/06/24 1909    amiodarone (PACERONE) tablet 200 mg, 200 mg, Oral, Q24H, Warren Alvarenga MD, 200 mg at 04/17/24 0824    Beneprotein packet 1 packet, 1 packet, Oral, BID, Kendall Haynes MD, 1 packet at 04/16/24 0836    bisacodyl (DULCOLAX) EC tablet 5 mg, 5 mg, Oral, Daily, Kendall Haynes MD, 5 mg at 04/17/24 0824    bisacodyl (DULCOLAX) suppository 10 mg, 10  mg, Rectal, Daily PRN, Kendall Haynes MD    dextrose (D50W) (25 g/50 mL) IV injection 25 g, 25 g, Intravenous, Q15 Min PRN, Malena Hassan APRN    dextrose (GLUTOSE) oral gel 15 g, 15 g, Oral, Q15 Min PRN, Malena Hassan APRN    dicyclomine (BENTYL) capsule 10 mg, 10 mg, Oral, 4x Daily AC & at Bedtime, Kendall Haynes MD, 10 mg at 04/17/24 0824    docusate sodium (COLACE) capsule 100 mg, 100 mg, Oral, BID, Kendall Haynes MD, 100 mg at 04/17/24 0824    DULoxetine (CYMBALTA) DR capsule 90 mg, 90 mg, Oral, Daily, Katrina Aguayo APRN, 90 mg at 04/17/24 0825    glucagon (GLUCAGEN) injection 1 mg, 1 mg, Intramuscular, Q15 Min PRN, Malena Hassan APRN    insulin lispro (HUMALOG/ADMELOG) injection 2-9 Units, 2-9 Units, Subcutaneous, 4x Daily AC & at Bedtime, Malena Hassan APRN, 2 Units at 04/16/24 1216    ipratropium-albuterol (DUO-NEB) nebulizer solution 3 mL, 3 mL, Nebulization, Q4H PRN, Kendall Haynes MD    magnesium hydroxide (MILK OF MAGNESIA) suspension 10 mL, 10 mL, Oral, Daily PRN, Kendall Haynes MD    metoprolol tartrate (LOPRESSOR) half tablet 12.5 mg, 12.5 mg, Oral, Q12H, Kendall Haynes MD, 12.5 mg at 04/17/24 0824    nitroglycerin (NITROSTAT) SL tablet 0.4 mg, 0.4 mg, Sublingual, Q5 Min PRN, Malena Hassan APRN    ondansetron ODT (ZOFRAN-ODT) disintegrating tablet 4 mg, 4 mg, Oral, Q6H PRN **OR** ondansetron (ZOFRAN) injection 4 mg, 4 mg, Intravenous, Q6H PRN, Malena Hassan APRN    oxyCODONE (ROXICODONE) immediate release tablet 10 mg, 10 mg, Oral, Daily, Kendall Haynes MD, 10 mg at 04/17/24 0825    oxyCODONE (ROXICODONE) immediate release tablet 5 mg, 5 mg, Oral, Q4H PRN, Kendall Haynes MD, 5 mg at 04/16/24 2142    pantoprazole (PROTONIX) EC tablet 40 mg, 40 mg, Oral, Daily, Kendall Haynes MD, 40 mg at 04/17/24 0825    Pharmacy to dose warfarin, , Does not apply, Continuous PRN, Kendall Haynes MD    pregabalin  (LYRICA) capsule 75 mg, 75 mg, Oral, BID, Kendall Haynes MD, 75 mg at 24 08    primidone (MYSOLINE) tablet 50 mg, 50 mg, Oral, Q12H, Kendall Haynes MD, 50 mg at 24    sodium chloride 0.9 % flush 10 mL, 10 mL, Intravenous, PRN, Jos Stroud, DO    sodium chloride 0.9 % flush 10 mL, 10 mL, Intravenous, Q12H, Malena Hassan APRN, 10 mL at 24 08    sodium chloride 0.9 % flush 10 mL, 10 mL, Intravenous, PRN, Malena Hassan APRENRIQUE    sodium chloride 0.9 % infusion 40 mL, 40 mL, Intravenous, PRN, Malena Hassan APRN    sucralfate (CARAFATE) tablet 1 g, 1 g, Oral, TID AC, Kendall Haynes MD, 1 g at 24    tamsulosin (FLOMAX) 24 hr capsule 0.4 mg, 0.4 mg, Oral, Daily, Kendall Haynes MD, 0.4 mg at 24    verapamil ER (VERELAN) 24 hr capsule 240 mg, 240 mg, Oral, Nightly, Kendall Haynes MD, 240 mg at 24    warfarin (COUMADIN) tablet 4 mg, 4 mg, Oral, Daily, Kendall Haynes MD, 4 mg at 24    No Known Allergies    Past Surgical History:   Procedure Laterality Date    COLONOSCOPY      ENDOSCOPY N/A 11/15/2023    Procedure: ESOPHAGOGASTRODUODENOSCOPY;  Surgeon: George Viveros MD;  Location: [facility] MAIN OR;  Service: Gastroenterology;  Laterality: N/A;  popssible candida, irrgular z-line, gastritis    EYE SURGERY      Cataracts removed: both eyes    HAND SURGERY  1980-1990- [facility]. Abstracted from [facility].    HEMORROIDECTOMY      [facility]. Abstracted from [facility].    TONSILLECTOMY  0's    VASECTOMY         Social History     Socioeconomic History    Marital status:    Tobacco Use    Smoking status: Former     Current packs/day: 0.00     Average packs/day: 0.3 packs/day for 5.0 years (1.3 ttl pk-yrs)     Types: Cigarettes     Start date: 2/10/1966     Quit date: 2/10/1971     Years since quittin.2    Smokeless tobacco: Never   Vaping Use    Vaping status: Never Used    Substance and Sexual Activity    Alcohol use: Not Currently     Alcohol/week: 2.0 standard drinks of alcohol     Types: 1 Glasses of wine, 1 Cans of beer per week    Drug use: No    Sexual activity: Yes     Partners: Female     Birth control/protection: None       Family History   Problem Relation Age of Onset    Diabetes Mother         type2    Stroke Mother     Diabetes Father         type2    Colon cancer Neg Hx     Colon polyps Neg Hx     Crohn's disease Neg Hx     Irritable bowel syndrome Neg Hx     Ulcerative colitis Neg Hx          Current Medications:  Scheduled Meds:amiodarone, 200 mg, Oral, Q24H  Beneprotein, 1 packet, Oral, BID  bisacodyl, 5 mg, Oral, Daily  dicyclomine, 10 mg, Oral, 4x Daily AC & at Bedtime  docusate sodium, 100 mg, Oral, BID  DULoxetine, 90 mg, Oral, Daily  insulin lispro, 2-9 Units, Subcutaneous, 4x Daily AC & at Bedtime  metoprolol tartrate, 12.5 mg, Oral, Q12H  oxyCODONE, 10 mg, Oral, Daily  pantoprazole, 40 mg, Oral, Daily  pregabalin, 75 mg, Oral, BID  primidone, 50 mg, Oral, Q12H  sodium chloride, 10 mL, Intravenous, Q12H  sucralfate, 1 g, Oral, TID AC  tamsulosin, 0.4 mg, Oral, Daily  verapamil ER, 240 mg, Oral, Nightly  warfarin, 4 mg, Oral, Daily      Continuous Infusions:Pharmacy to dose warfarin,       PRN Meds:   acetaminophen **OR** acetaminophen    bisacodyl    dextrose    dextrose    glucagon (human recombinant)    ipratropium-albuterol    magnesium hydroxide    nitroglycerin    ondansetron ODT **OR** ondansetron    oxyCODONE    Pharmacy to dose warfarin    sodium chloride    sodium chloride    sodium chloride    ROS: 14 point review of systems was completed and is negative except for what is noted in HPI      Objective     Vitals:    04/17/24 0112 04/17/24 0549 04/17/24 0825 04/17/24 0857   BP: 105/62 111/67 111/65 113/54   BP Location: Left arm Left arm Left arm Left arm   Patient Position: Lying Lying Lying Lying   Pulse: 73 79 83 79   Resp: 11 14 17   Temp: 97.5 °F  (36.4 °C) 98.6 °F (37 °C)  98.1 °F (36.7 °C)   TempSrc: Oral Oral  Oral   SpO2: 96% 94% 93% 90%   Weight:  72.5 kg (159 lb 13.3 oz)     Height:           Physical exam  General  - awake, cooperative, in no acute distress  Cardiac  - RRR, no peripheral edema  Respiratory  - Normal respiratory rate and effort      NEUROLOGIC    MOTOR:  - CRAWFORD symmetrically  - 3/5 bilateral hip flexion  - 5/5 bilateral knees  - 5/5 bilateral ankle dorsiflexion and plantarflexion  REFLEXES:  - Negative clonus  SENSORY:  - Normal to touch and pinprick, axial and peripheral  GAIT:  - Deferred            RESULTS REVIEW:  Results from last 7 days   Lab Units 04/16/24  0158 04/15/24  0436 04/14/24  0200   WBC 10*3/mm3 17.04* 19.60* 20.58*   HEMOGLOBIN g/dL 10.4* 10.1* 9.4*   HEMATOCRIT % 32.4* 30.8* 30.7*   PLATELETS 10*3/mm3 375 408 363        Results from last 7 days   Lab Units 04/16/24  0158 04/15/24  0436 04/14/24  0200 04/13/24  1420   SODIUM mmol/L 139 138 138 138   POTASSIUM mmol/L 4.0 3.8 3.9 3.9   CHLORIDE mmol/L 100 102 101 102   CO2 mmol/L 31.0* 26.0 27.0 28.0   BUN mg/dL 31* 33* 30* 30*   CREATININE mg/dL 1.44* 1.37* 1.58* 1.61*   CALCIUM mg/dL 8.5* 8.6 8.5* 8.7   BILIRUBIN mg/dL  --  <0.2 <0.2 <0.2   ALK PHOS U/L  --  270* 260* 280*   ALT (SGPT) U/L  --  68* 74* 71*   AST (SGOT) U/L  --  45* 39 37   GLUCOSE mg/dL 180* 142* 119* 194*              MRI Lumbar Spine Without Contrast    Result Date: 4/17/2024  MRI LUMBAR SPINE WO CONTRAST Date of Exam: 4/16/2024 10:30 PM EDT Indication: Myelopathy, chronic, lumbar spine.  Comparison: CT of the abdomen and pelvis 4/13/2024, MRI of the lumbar spine 12/10/2020 Technique:  Routine multiplanar/multisequence sequence images of the lumbar spine were obtained without contrast administration.  Findings: ALIGNMENT: There is mild levoconvex lumbar scoliosis. Trace anterolisthesis of L4 on L5. DISK SPACE: There is disc space narrowing at all lumbar levels. Fluid signal within the discs at  multiple levels corresponds to vacuum phenomenon on comparison CT. VERTEBRA: There is an inferior endplate compression fracture of T12 which is largely chronic. However, there is a component of mild edema suggesting potential acute on chronic injury. The lumbar vertebral body heights are maintained. Multilevel Modic endplate changes are noted. CORD: Distal spinal cord and conus medullaris appear unremarkable terminating above the L2 level.  SOFT TISSUES: There is atrophy of the paraspinous muscles. Intra-abdominal contents are grossly unremarkable. LEVELS: T12-L1: Mild disc/endplate osteophyte complex with bilateral facet joint hypertrophy. No significant canal stenosis or neuroforaminal narrowing. L1-L2: Mild disc bulge asymmetric to the left with bilateral facet joint hypertrophy. The central canal is patent. There is mild narrowing of the lateral recesses. There is moderate left and mild right neural foraminal narrowing. L2-L3: Diffuse disc bulge with bilateral facet joint hypertrophy and ligamentum flavum thickening result in mild canal stenosis. There is right greater than left lateral recess stenosis. There is moderate left and mild right neural foraminal narrowing. L3-L4: Diffuse disc bulge and bilateral facet joint hypertrophy with ligamentum flavum thickening. There is mild canal stenosis. Mild bilateral neural foraminal narrowing. L4-L5: Disc bulge/endplate osteophyte complex asymmetric to the right with marked bilateral facet joint hypertrophy result in severe canal and lateral recess stenosis. Severe right and moderate left neural foraminal narrowing. L5-S1: Disc bulge/endplate osteophyte complex asymmetric to the right with severe bilateral facet joint hypertrophy. There is no significant canal stenosis. Moderate left and mild right neural foraminal narrowing.     Impression: Impression: 1. There is a chronic inferior endplate compression fracture of T12 with a small component of edema suggesting that  there may be reinjury, clinical correlation is recommended. 2. There is multilevel lumbar degenerative change most prominent at L4-L5 where there is severe canal/lateral recess stenosis. 3. Multilevel neural foraminal narrowing up to severe on the right at L4-L5 and moderate at multiple levels as described. 4. Minimal anterolisthesis of L4 on L5. Electronically Signed: Ghislaine Calvin MD  4/17/2024 7:57 AM EDT  Workstation ID: [id number]    MRI Cervical Spine Without Contrast    Result Date: 4/17/2024  MRI CERVICAL SPINE WO CONTRAST Date of Exam: 4/16/2024 10:30 PM EDT Indication: Myelopathy, chronic, cervical spine.  Comparison: 7/25/2023 Technique:  Routine multiplanar/multisequence sequence images of the cervical spine were obtained without contrast administration.  Findings: ALIGNMENT: There is trace anterolisthesis of C2 on C3 and C7 on T1. DISK SPACE: There is multilevel disc space narrowing. VERTEBRA: No fracture or destructive process. No marrow edema. CORD: Normal anatomy and signal intensity. CRANIOVETEBRAL JUNCTION: Normal SOFT TISSUES: No mass nor lymphadenopathy. LEVELS: C2-C3: There is a tiny central disc protrusion with left greater than right facet joint hypertrophy. There is no significant canal stenosis or right foraminal narrowing. Mild left neural foraminal stenosis. C3-C4: There is a broad-based posterior disc osteophyte complex with right greater than left uncovertebral spurring and bilateral facet joint hypertrophy. There is effacement of the ventral thecal sac with mild canal stenosis. Mild to moderate right and mild left neural foraminal narrowing. C4-C5: There is a broad-based posterior disc osteophyte complex with bilateral uncovertebral spurring and facet joint hypertrophy. There is effacement of the ventral thecal sac with mild to moderate canal stenosis. Moderate bilateral neural foraminal narrowing slightly greater on the left. C5-C6: There is a broad-based posterior disc osteophyte  complex with bilateral uncovertebral spurring and facet joint hypertrophy. There is effacement of the ventral thecal sac with moderate canal stenosis. There is moderate to severe bilateral neural foraminal narrowing slightly greater on the right. C6-C7: There is a broad-based posterior disc osteophyte complex with bilateral uncovertebral spurring and facet joint hypertrophy. There is mild canal stenosis with moderate to severe bilateral neural foraminal narrowing. C7-T1: Bilateral uncovertebral spurring and facet joint hypertrophy with mild bilateral neural foraminal narrowing. No canal stenosis.     Impression: Impression: 1. Multilevel cervical degenerative disc disease and facet arthropathy. There is multilevel canal stenosis which is greatest at C5-C6. 2. Multilevel neural foraminal narrowing including moderate to severe C5-C6 and C6-C7 foraminal stenosis. 3. There is no evidence of cord signal abnormality. Electronically Signed: Ghislaine Calvin MD  4/17/2024 7:32 AM EDT  Workstation ID: [id number]   CT Head Without Contrast    Result Date: 4/5/2024  CT HEAD WO CONTRAST Date of Exam: 4/5/2024 2:49 PM EDT Indication: ams. Comparison: 7/14/2023. Technique: Axial CT images were obtained of the head without contrast administration.  Coronal reconstructions were performed.  Automated exposure control and iterative reconstruction methods were used. Findings: There is moderate atrophy. Ventricular dilatation is compatible with atrophy. Periventricular hypodensity likely relates to chronic small vessel ischemic insult. There is no acute mass effect or edema. There are no findings suspicious for acute CVA or hemorrhage. There is motion on the exam which somewhat degrades detail. Visualized portions of paranasal sinuses are clear. A few of the mastoid air cells on the right are opacified.     Impression: Impression: Chronic findings. No acute intracranial process. Findings suggestive of mild right mastoiditis. Electronically  Signed: Heidy Calvin MD  4/5/2024 3:01 PM EDT  Workstation ID: [id number]   MRI Brain Without Contrast    Result Date: 4/11/2024  MRI BRAIN WO CONTRAST Date of Exam: 4/11/2024 7:34 PM EDT Indication: Mental status change, persistent or worsening.  Comparison: CT brain dated 4/5/2024 Technique:  Routine multiplanar/multisequence sequence images of the brain were obtained without contrast administration. Findings: The ventricles are midline with mild extraocular rotation. There is diffuse brain atrophy. Periventricular T2/FLAIR white matter intensities compatible with chronic microvascular ischemic changes There is no diffusion restriction to suggest acute infarct. There is no evidence of acute or chronic intracranial hemorrhage. No mass effect or midline shift. No abnormal extra-axial collections. The major vascular flow voids appear intact. The basal ganglia, brainstem and cerebellum appear within normal limits. Calvarial and superficial soft tissue signal is within normal limits. Orbits appear unremarkable. The paranasal sinuses and the mastoid air cells appear well aerated. Midline structures are intact.     Impression: Impression: Brain atrophy with chronic microvascular ischemic changes No acute intracranial abnormality Electronically Signed: Moisés Clark MD  4/11/2024 8:17 PM EDT  Workstation ID: [id number]         Assessment     MDM: This is a 78 y.o. male being eval by neurosurgery for chronic low back pain with worsening difficulty walking. His symptoms are consistent with chronic degenerative changes resulting in radiculopathy and neurogenic claudication secondary to severe central and foraminal stenosis at L4-5 and L5-S1.  He has some weakness with hip flexion, but has good strength distally, and there are no compressive MRI findings to explain this weakness.  He has no saddle anesthesia.  Urinary issues likely secondary to CATRACHO. No high-grade compression of the cauda equina or conus  medullaris.    Chronic mild T12 compression fracture with some localized marrow edema.  Patient's pain does not localize to the level however.    Patient is not myelopathic, and MRI cervical spine also shows chronic degenerative findings with no cord compression.    Based on the above, patient does not require urgent/emergent neurosurgical intervention at this time.  Based on his comorbidities, I do not think surgery will be a good option for this patient, now or in the future.  Patient also voiced that he is not interested in any sort of surgical option.  As such I recommend he follow-up with his PCP and/or pain management after discharge for further evaluation and management.      Plan     Degenerative disc disease, lumbosacral spine  Lumbar stenosis with neurogenic claudication  Lumbosacral radiculopathy  Cervical stenosis without myelopathy    - No neurosurgical intervention or follow-up necessary at this time  - Follow-up with PCP and/or pain management for further care of back pain  - Medical management per primary team    Neurosurgery will sign off at this time.  Call with any questions or concerns.      I discussed my assessment and recommendations with Dr. Pippa Lynn PA-C  4/17/2024  10:34 EDT      Part of this note may be an electronic transcription/translation of spoken language to printed text using the Dragon Dictation System.

## 2024-04-17 NOTE — THERAPY TREATMENT NOTE
"Subjective: Pt agreeable to therapeutic plan of care. Pt reports feeling tired upon arrival.    Objective:     Bed mobility - Max-A and Assist x 2  Transfers - Max-A, Assist x 2, and with rolling walker  Ambulation - N/A feet N/A or Not attempted.    Therapeutic Exercise - 10 Reps B UE and B LE AROM lying supine and unsupported sitting / EOB    Vitals: WNL    Pain: 7 VAS   Location: low back pain  Intervention for pain: Repositioned, Increased Activity, and Therapeutic Presence    Education: Provided education on the importance of mobility in the acute care setting, Verbal/Tactile Cues, Transfer Training, and Energy conservation strategies    Assessment: Lux Bray is being monitored for septic shock d/t PNA and presents with functional mobility impairments which indicate the need for skilled intervention. Pt had MRI done of low back d/t c/o pain, which showed chronic endplate compression frx T12. Neurosurgery reports no surgical intervention indicated and to focus on pain control. Pt lethargic upon arrival, keeping eyes closed majority of the session. Able to tolerate LE exercises in supine, but has reduced sitting EOB tolerance this date. Pt leaning significantly to the L d/t R-sided pain. Attempted to use the bryan sit>stand lift to put pt in chair, but the machine was not working. Pt required max A x 2 for STS using RW, and pt able to tolerate for 15 seconds. Tolerating session today without incident. Will continue to follow and progress as tolerated.     Plan/Recommendations:   If medically appropriate, Moderate Intensity Therapy recommended post-acute care. This is recommended as therapy feels the patient would require 3-4 days per week and wouldn't tolerate \"3 hour daily\" rehab intensity. SNF would be the preferred choice. If the patient does not agree to SNF, arrange HH or OP depending on home bound status. If patient is medically complex, consider LTACH. Pt requires no DME at discharge.     Pt desires " Skilled Rehab placement at discharge. Pt cooperative; agreeable to therapeutic recommendations and plan of care.         Basic Mobility 6-click:  Rollin = Total, A lot = 2, A little = 3; 4 = None  Supine>Sit:   1 = Total, A lot = 2, A little = 3; 4 = None   Sit>Stand with arms:  1 = Total, A lot = 2, A little = 3; 4 = None  Bed>Chair:   1 = Total, A lot = 2, A little = 3; 4 = None  Ambulate in room:  1 = Total, A lot = 2, A little = 3; 4 = None  3-5 Steps with railin = Total, A lot = 2, A little = 3; 4 = None  Score: 10    Modified Barton: N/A = No pre-op stroke/TIA    Post-Tx Position: Supine with HOB Elevated, Alarms activated, and Call light and personal items within reach  PPE: gloves

## 2024-04-18 ENCOUNTER — PATIENT OUTREACH (OUTPATIENT)
Dept: CASE MANAGEMENT | Facility: OTHER | Age: 79
End: 2024-04-18
Payer: MEDICARE

## 2024-04-18 NOTE — OUTREACH NOTE
Care Coordination    AMBULATORY CASE MANAGEMENT NOTE    Send day 7 survey to SNF.    Colleen CONKLIN  Ambulatory Case Management    4/18/2024, 07:38 EDT

## 2024-04-19 LAB — BACTERIA SPEC AEROBE CULT: NORMAL

## 2024-04-20 LAB
A-TOCOPHEROL VIT E SERPL-MCNC: 12.8 MG/L (ref 9–29)
BACTERIA SPEC AEROBE CULT: NORMAL
BACTERIA SPEC AEROBE CULT: NORMAL
GAMMA TOCOPHEROL SERPL-MCNC: 0.6 MG/L (ref 0.5–4.9)
VIT A SERPL-MCNC: 24.1 UG/DL (ref 22–69.5)

## 2024-04-25 ENCOUNTER — PATIENT OUTREACH (OUTPATIENT)
Dept: CASE MANAGEMENT | Facility: OTHER | Age: 79
End: 2024-04-25
Payer: MEDICARE

## 2024-04-25 NOTE — OUTREACH NOTE
AMBULATORY CASE MANAGEMENT NOTE    Care Coordination    Sent day 7 survey via email to [facility] to ask about discharge barriers and possible dates.       Colleen CONKLIN  Ambulatory Case Management    4/25/2024, 08:17 EDT

## 2024-05-02 ENCOUNTER — PATIENT OUTREACH (OUTPATIENT)
Dept: CASE MANAGEMENT | Facility: OTHER | Age: 79
End: 2024-05-02
Payer: MEDICARE

## 2024-05-02 LAB
QT INTERVAL: 380 MS
QTC INTERVAL: 521 MS

## 2024-05-02 NOTE — OUTREACH NOTE
AMBULATORY CASE MANAGEMENT NOTE    Care Coordination    Per SNF [facility]- patient expected to use full 100 days of stay.     Colleen CONKLIN  Ambulatory Case Management    5/2/2024, 09:06 EDT

## 2024-05-15 RX ORDER — PRIMIDONE 50 MG/1
150 TABLET ORAL 2 TIMES DAILY
Qty: 540 TABLET | Refills: 0 | Status: SHIPPED | OUTPATIENT
Start: 2024-05-15

## 2024-05-16 ENCOUNTER — PATIENT OUTREACH (OUTPATIENT)
Dept: CASE MANAGEMENT | Facility: OTHER | Age: 79
End: 2024-05-16
Payer: MEDICARE

## 2024-05-16 NOTE — OUTREACH NOTE
AMBULATORY CASE MANAGEMENT NOTE    Care Coordination    4/25 Current barrier- He is currently a emmanuel lift and needs to be able to transfer to discharge home.    5/16 Sent survey via email to [facility] BRIAN[facility].     Colleen CONKLIN  Ambulatory Case Management    5/16/2024, 07:20 EDT

## 2024-05-20 ENCOUNTER — OFFICE VISIT (OUTPATIENT)
Dept: CARDIOLOGY | Facility: CLINIC | Age: 79
End: 2024-05-20
Payer: MEDICARE

## 2024-05-20 VITALS
RESPIRATION RATE: 18 BRPM | HEART RATE: 63 BPM | HEIGHT: 71 IN | DIASTOLIC BLOOD PRESSURE: 46 MMHG | WEIGHT: 146 LBS | SYSTOLIC BLOOD PRESSURE: 76 MMHG | BODY MASS INDEX: 20.44 KG/M2 | OXYGEN SATURATION: 97 %

## 2024-05-20 DIAGNOSIS — Z74.09 IMPAIRED MOBILITY: ICD-10-CM

## 2024-05-20 DIAGNOSIS — H91.90 HEARING LOSS, UNSPECIFIED HEARING LOSS TYPE, UNSPECIFIED LATERALITY: ICD-10-CM

## 2024-05-20 DIAGNOSIS — R65.21 SEPTIC SHOCK: ICD-10-CM

## 2024-05-20 DIAGNOSIS — I48.0 PAROXYSMAL ATRIAL FIBRILLATION: Primary | ICD-10-CM

## 2024-05-20 DIAGNOSIS — A41.9 SEPTIC SHOCK: ICD-10-CM

## 2024-05-20 RX ORDER — OMEPRAZOLE 20 MG/1
20 CAPSULE, DELAYED RELEASE ORAL DAILY
COMMUNITY

## 2024-05-20 RX ORDER — QUETIAPINE FUMARATE 25 MG/1
TABLET, FILM COATED ORAL
COMMUNITY
Start: 2024-05-12 | End: 2024-05-20

## 2024-05-20 NOTE — PROGRESS NOTES
"      Subjective:     Encounter Date:05/20/2024      Patient ID: Lux Bray is a 78 y.o. male.    Chief Complaint:  Chief Complaint   Patient presents with    Follow-up       HPI:    78-year-old gentleman, history of essential tremors, primarily wheelchair-bound presented to [facility] cardiology clinic to establish care  In April, patient was admitted to [facility] in setting of septic shock secondary to aspiration pneumonia, was initially in the ICU for resuscitation, during critical care stay, was noted to be in new atrial fibrillation with RVR which was treated with digoxin and subsequently amiodarone.  Currently resides in a nursing facility and participating in physical therapy.  Says overall feels well and does not feel lightheaded or dizzy or palpitations.  Gets medications from nursing staff.  Blood pressure in the office noted to be in the 70s however patient is asymptomatic with a strong bounding pulse, repeat blood pressure by  manual check in the high 90s.  Will call nursing facility and talk to nurse Nunez to stop verapamil for the time being and report back with repeat blood pressure at the end of the week.      Objective:         BP (!) 76/46 (BP Location: Left arm, Patient Position: Sitting, Cuff Size: Large Adult)   Pulse 63   Resp 18   Ht 180.3 cm (71\")   Wt 66.2 kg (146 lb)   SpO2 97%   BMI 20.36 kg/m²   Repeat blood pressure 96/70    Physical exam  General-no acute distress  CVS-S1-S2 normal, no murmur  Respiratory-clear breath sounds  GI-soft nontender abdomen  No pedal edema  Alert oriented X3    ROS:  14 point review of systems negative except as mentioned above    Current Outpatient Medications:     acetaminophen (TYLENOL) 325 MG tablet, Take 2 tablets by mouth Every 6 (Six) Hours As Needed for Mild Pain., Disp: , Rfl:     amiodarone (PACERONE) 200 MG tablet, Take 1 tablet by mouth Daily., Disp: 30 tablet, Rfl: 0    atorvastatin (LIPITOR) 20 MG tablet, Take 1 tablet by " mouth Daily., Disp: 90 tablet, Rfl: 3    bisacodyl (DULCOLAX) 10 MG suppository, Insert 1 suppository into the rectum Daily As Needed for Constipation., Disp: , Rfl:     dicyclomine (BENTYL) 10 MG capsule, Take 1 capsule by mouth 4 (Four) Times a Day Before Meals & at Bedtime., Disp: 540 capsule, Rfl: 1    docusate sodium (COLACE) 100 MG capsule, Take 1 capsule by mouth 2 (Two) Times a Day., Disp: , Rfl:     DULoxetine (CYMBALTA) 30 MG capsule, Take 3 capsules by mouth Daily., Disp: 270 capsule, Rfl: 3    empagliflozin (Jardiance) 10 MG tablet tablet, Take 1 tablet by mouth Daily., Disp: 30 tablet, Rfl: 0    ipratropium-albuterol (DUO-NEB) 0.5-2.5 mg/3 ml nebulizer, Take 3 mL by nebulization Every 4 (Four) Hours As Needed for Wheezing., Disp: , Rfl:     l-methylfolate-algae-B6-B12 (METANX) 3-90.314-2-35 MG capsule capsule, Take 1 capsule by mouth Every 12 (Twelve) Hours., Disp: , Rfl:     magnesium hydroxide (MILK OF MAGNESIA) 2400 MG/10ML suspension suspension, Take 30 mL by mouth Daily As Needed. Administer if no BM x3 days and after prune juice is given and ineffective after 8 hours (if not a renal patient) If no BM after 1st and 2nd step bowl regimen is given, notify MD/APRN for further instructions/guidance, Disp: , Rfl:     metFORMIN (GLUCOPHAGE) 500 MG tablet, Take 2 tablets by mouth 2 (Two) Times a Day., Disp: 360 tablet, Rfl: 3    metoprolol tartrate (LOPRESSOR) 25 MG tablet, Take 1 tablet by mouth Every 12 (Twelve) Hours., Disp: 60 tablet, Rfl: 0    modafinil (Provigil) 100 MG tablet, Take 1 tablet by mouth Daily., Disp: 7 tablet, Rfl: 0    naloxone (NARCAN) 4 MG/0.1ML nasal spray, Call 911. Don't prime. [place] in 1 nostril for overdose. Repeat in 2-3 minutes in other nostril if no or minimal breathing/responsiveness., Disp: 2 each, Rfl: 0    NON FORMULARY, Prune Juice QD PRN, Disp: , Rfl:     omeprazole (priLOSEC) 20 MG capsule, Take 1 capsule by mouth Daily., Disp: , Rfl:     ondansetron (ZOFRAN) 4 MG  tablet, Take 1 tablet by mouth Every 8 (Eight) Hours As Needed for Nausea or Vomiting., Disp: , Rfl:     oxyCODONE (ROXICODONE) 5 MG immediate release tablet, Take 1 tablet by mouth Every 8 (Eight) Hours As Needed for Moderate Pain., Disp: 20 tablet, Rfl: 0    pregabalin (LYRICA) 75 MG capsule, Take 1 capsule by mouth 2 (Two) Times a Day., Disp: , Rfl:     primidone (MYSOLINE) 50 MG tablet, TAKE 3 TABLETS TWICE A DAY, Disp: 540 tablet, Rfl: 0    verapamil ER (VERELAN) 240 MG 24 hr capsule, Take 1 capsule by mouth Every Night., Disp: 30 capsule, Rfl: 0    warfarin (Coumadin) 4 MG tablet, Inr 2-3  Check INR qd  dc lovenox once INR 2, Disp: 30 tablet, Rfl: 3    Problem List:  Patient Active Problem List   Diagnosis    Predisposition to allergic reaction    Attention deficit disorder (ADD) without hyperactivity    Cervical radiculopathy    Cholelithiasis    Deficiency of testosterone biosynthesis    Depression    Enlarged prostate with lower urinary tract symptoms (LUTS)    HTN (hypertension)    Irritable bowel syndrome without diarrhea    Lumbago with sciatica, right side    Arthritis    Diabetes    Vertiginous syndrome    Vitamin D deficiency, unspecified     Iron deficiency anemia due to chronic blood loss    Colon polyp    Gastroesophageal reflux disease without esophagitis    Dysuria    High cholesterol    Hearing loss    Cortical senile cataract    Cataracts, bilateral    Toe pain, bilateral    Tinea unguium    Pre-ulcerative corn or callous    Primary iridocyclitis    Weight loss, non-intentional    Focal myoclonus    Polyneuropathy associated with underlying disease    Tingling of both feet    Generalized weakness    Encephalopathy, metabolic    Leukocytosis    Sepsis    Belching    Acid reflux    Early satiety    Epigastric pressure    Sarcopenia    Hypotension due to drugs    Abdominal pain    Septic shock    Moderate malnutrition     Past Medical History:  Past Medical History:   Diagnosis Date    ADHD  (attention deficit hyperactivity disorder)     Allergic     Arthritis     Colon polyp     Depression     Diabetes mellitus type 2    controlled by oral meds    Fibromyalgia, primary     HL (hearing loss)     Hyperlipidemia     Hypertension      Past Surgical History:  Past Surgical History:   Procedure Laterality Date    COLONOSCOPY      ENDOSCOPY N/A 11/15/2023    Procedure: ESOPHAGOGASTRODUODENOSCOPY;  Surgeon: George Viveros MD;  [place] [facility] [place]  Service: Gastroenterology;  Laterality: N/A;  popssible candida, irrgular z-line, gastritis    EYE SURGERY      Cataracts removed: both eyes    HAND SURGERY  1980- [facility]. Abstracted from [facility].    HEMORROIDECTOMY      [facility]. Abstracted from [facility].    TONSILLECTOMY      VASECTOMY       Social History:  Social History     Socioeconomic History    Marital status:    Tobacco Use    Smoking status: Former     Current packs/day: 0.00     Average packs/day: 0.3 packs/day for 5.0 years (1.3 ttl pk-yrs)     Types: Cigarettes     Start date: 2/10/1966     Quit date: 2/10/1971     Years since quittin.3    Smokeless tobacco: Never   Vaping Use    Vaping status: Never Used   Substance and Sexual Activity    Alcohol use: Not Currently     Alcohol/week: 2.0 standard drinks of alcohol     Types: 1 Glasses of wine, 1 Cans of beer per week    Drug use: No    Sexual activity: Yes     Partners: Female     Birth control/protection: None     Allergies:  No Known Allergies  Immunizations:  Immunization History   Administered Date(s) Administered    ABRYSVO (RSV, 60+ or pregnant women 32-36 wks) 2023    COVID-19 (PFIZER) BIVALENT 12+YRS 2022    COVID-19 (PFIZER) Purple Cap Monovalent 2021, 2021, 2021    COVID-19 F23 (PFIZER) 12YRS+ (COMIRNATY) 10/03/2023    DTaP 2021    FLUAD TRI 65YR+ 2021    Fluzone (or Fluarix & Flulaval for VFC) >6mos 2019, 2022    Fluzone High Dose =>65  Years ([facility] ONLY) 08/29/2015, 09/05/2016, 09/04/2018, 09/03/2020    Fluzone High-Dose 65+yrs 09/08/2023    H1N1 Inj 12/19/2009    Hepatitis A 09/24/2019, 09/03/2020    Influenza Seasonal Injectable 09/08/2021    Pneumococcal Conjugate 13-Valent (PCV13) 07/14/2016    Pneumococcal Polysaccharide (PPSV23) 07/06/2015    Shingrix 10/27/2019, 12/23/2019, 02/17/2020            In-Office Procedure(s):  No orders to display        ASCVD RIsk Score::  The ASCVD Risk score (Vicente VILLAR, et al., 2019) failed to calculate for the following reasons:    The 2019 ASCVD risk score is only valid for ages 40 to 79    Imaging:    Results for orders placed during the hospital encounter of 04/05/24    XR Chest 1 View    Narrative  XR CHEST 1 VW    Date of Exam: 4/13/2024 8:50 AM EDT    Indication: Pna    Comparison: Chest radiograph dated 4/5/2024, CT chest dated 4/7/2024    Findings:  There is mild cardiomegaly. There is normal pulmonary vascularity. There is left greater than right pleural effusion which appears likely similar to the prior CT from 4/7/2024. There is left basilar airspace disease representing atelectasis or pneumonia.  There is no evidence of pneumothorax. There are degenerative changes of the thoracic spine.    Impression  Impression:  1. Cardiomegaly with left greater than right pleural effusions, likely similar to the prior chest CT dated 4/7/2024.  2. Left basilar airspace opacity representing compressive atelectasis or pneumonia.      Electronically Signed: Frank Mcghee  4/13/2024 9:17 AM EDT  Workstation ID: [id number]       Results for orders placed during the hospital encounter of 04/05/24    CT Abdomen Pelvis Without Contrast    Narrative  CT ABDOMEN PELVIS WO CONTRAST, CT CHEST WO CONTRAST DIAGNOSTIC    Date of Exam: 4/13/2024 4:20 PM EDT    Indication: Leukocytosis.    Comparison: None available.    Technique: Axial CT images were obtained of the abdomen and pelvis without the administration of contrast.  Sagittal and coronal reconstructions were performed.  Automated exposure control and iterative reconstruction methods were used.    FINDINGS:      Thoracic inlet: Unremarkable.    Great vessels: Mild vascular calcifications are present.    Mediastinum/Aleksandra: No pathologically enlarged mediastinal lymph nodes are seen. The esophagus is unremarkable.    Lung parenchyma/pleura: Moderate left and small right pleural effusions with bibasilar atelectasis. No acute infiltrate is definitely identified. No suspicious pulmonary nodules are seen. No pneumothorax is seen.    Trachea and airways: The trachea and central airways appear unremarkable.    Heart and pericardium: Mild coronary artery calcifications. Small pericardial effusion    Liver: The liver is unremarkable in morphology. Evaluation for focal liver lesions is limited without IV contrast. No biliary dilation is seen.    Gallbladder: Unremarkable.    Pancreas: Pancreas appears atrophic.    Spleen: Unremarkable.    Adrenal glands: Unremarkable.    Genitourinary tract: Punctate nonobstructing calculus within the right kidney. Kidneys are otherwise unremarkable. No hydronephrosis is seen. Visualized portions of the ureters, urinary bladder, and prostate gland appear unremarkable    Gastrointestinal tract: Limited evaluation of the hollow viscera due to lack of IV contrast administration. There is suggestion of wall thickening and pericolonic stranding involving the proximal/mid colon. Underlying colitis may be considered in the  appropriate clinical setting. Colonic diverticula are seen. There is no evidence of bowel obstruction.    Appendix: No findings to suggest acute appendicitis.    Other findings: No free air or free fluid is identified. No pathologically enlarged lymph nodes are seen. Vascular calcifications are present.    Bones and soft tissues: Bones are demineralized. Chronic T12 compression fracture noted. There are degenerative changes within the spine.  Superficial soft tissues demonstrate no acute abnormality. There appears to be a midline catheter which terminates  at about the level of the right axillary vein. Superficial soft tissue edema noted within the abdominal wall and within the bilateral thighs.    Impression  1.Examination is limited due to lack of IV contrast administration. Moderate left and small right pleural effusions with bibasilar atelectasis.  2.Suggestion of wall thickening and stranding about the proximal/mid colon. Infectious or inflammatory colitis may be considered in the appropriate clinical setting. Further evaluation of the hollow viscera is limited due to lack of IV contrast  administration.  3.Stranding within the superficial soft tissues of the abdomen and bilateral thighs. ditional findings as detailed above.      Electronically Signed: Tramaine Sutherland MD  4/13/2024 5:08 PM EDT  Workstation ID: [id number]      Results for orders placed during the hospital encounter of 04/05/24    CT Abdomen Pelvis Without Contrast    Narrative  CT ABDOMEN PELVIS WO CONTRAST, CT CHEST WO CONTRAST DIAGNOSTIC    Date of Exam: 4/13/2024 4:20 PM EDT    Indication: Leukocytosis.    Comparison: None available.    Technique: Axial CT images were obtained of the abdomen and pelvis without the administration of contrast. Sagittal and coronal reconstructions were performed.  Automated exposure control and iterative reconstruction methods were used.    FINDINGS:      Thoracic inlet: Unremarkable.    Great vessels: Mild vascular calcifications are present.    Mediastinum/Aleksandra: No pathologically enlarged mediastinal lymph nodes are seen. The esophagus is unremarkable.    Lung parenchyma/pleura: Moderate left and small right pleural effusions with bibasilar atelectasis. No acute infiltrate is definitely identified. No suspicious pulmonary nodules are seen. No pneumothorax is seen.    Trachea and airways: The trachea and central airways appear unremarkable.    Heart  and pericardium: Mild coronary artery calcifications. Small pericardial effusion    Liver: The liver is unremarkable in morphology. Evaluation for focal liver lesions is limited without IV contrast. No biliary dilation is seen.    Gallbladder: Unremarkable.    Pancreas: Pancreas appears atrophic.    Spleen: Unremarkable.    Adrenal glands: Unremarkable.    Genitourinary tract: Punctate nonobstructing calculus within the right kidney. Kidneys are otherwise unremarkable. No hydronephrosis is seen. Visualized portions of the ureters, urinary bladder, and prostate gland appear unremarkable    Gastrointestinal tract: Limited evaluation of the hollow viscera due to lack of IV contrast administration. There is suggestion of wall thickening and pericolonic stranding involving the proximal/mid colon. Underlying colitis may be considered in the  appropriate clinical setting. Colonic diverticula are seen. There is no evidence of bowel obstruction.    Appendix: No findings to suggest acute appendicitis.    Other findings: No free air or free fluid is identified. No pathologically enlarged lymph nodes are seen. Vascular calcifications are present.    Bones and soft tissues: Bones are demineralized. Chronic T12 compression fracture noted. There are degenerative changes within the spine. Superficial soft tissues demonstrate no acute abnormality. There appears to be a midline catheter which terminates  at about the level of the right axillary vein. Superficial soft tissue edema noted within the abdominal wall and within the bilateral thighs.    Impression  1.Examination is limited due to lack of IV contrast administration. Moderate left and small right pleural effusions with bibasilar atelectasis.  2.Suggestion of wall thickening and stranding about the proximal/mid colon. Infectious or inflammatory colitis may be considered in the appropriate clinical setting. Further evaluation of the hollow viscera is limited due to lack of IV  contrast  administration.  3.Stranding within the superficial soft tissues of the abdomen and bilateral thighs. ditional findings as detailed above.      Electronically Signed: Tramaine Sutherland MD  4/13/2024 5:08 PM EDT  Workstation ID: [id number]        Most recent EKG as reviewed and interpreted by me:    ECG 12 Lead    Date/Time: 5/20/2024 10:34 AM  Performed by: Lucero Romeo MD    Authorized by: Lucero Romeo MD  Comparison: compared with previous ECG   Similar to previous ECG  Rhythm: sinus rhythm  Conduction: 1st degree AV block           Most recent echo as reviewed and interpreted by me:  Results for orders placed during the hospital encounter of 04/05/24    Adult Transthoracic Echo Complete W/ Cont if Necessary Per Protocol    Interpretation Summary    Left ventricular systolic function is normal. Left ventricular ejection fraction appears to be 56 - 60%.    Left ventricular wall thickness is consistent with mild concentric hypertrophy.    Left ventricular diastolic function was normal.    Estimated right ventricular systolic pressure from tricuspid regurgitation is normal (<35 mmHg).    There is a small (<1cm) circumferential pericardial effusion. There is no evidence of cardiac tamponade.          Assessment & Plan :       Relative hypotension  Recent admission to [facility] in setting of septic shock secondary to pneumonia, primarily wheelchair-bound  Also started on verapamil, metoprolol and amiodarone for A-fib with RVR during critical illness  Repeat manual blood pressure in the high 90s, patient asymptomatic and mentating well  Advised to stop verapamil, also informed she will be who is a nurse taking care of the patient at his primary nursing facility.  Advised to reach out if patient has worsening symptoms including lightheadedness, dizziness or generalized weakness.  Will ask nursing facility to call the office in a week with repeat blood pressures  If blood pressures continue to be low, will  stop metoprolol and start midodrine.    Paroxysmal atrial fibrillation while in the hospital for septic shock/pneumonia  Currently in sinus rhythm  Continue amiodarone  Continue metoprolol  Stop verapamil in setting of relative hypotension  Anticoagulation with warfarin, ensure follow-up in Coumadin clinic    History of essential tremors  Primarily wheelchair-bound  Currently resides in a nursing facility, participating in physical therapy    Follow-up in 2 months with cardiologist Dr. Alvarenga  Part of this note may be an electronic transcription/translation of spoken language to printed text using the Dragon Dictation System.    Lucero Romeo MD  05/20/24  .

## 2024-05-23 ENCOUNTER — PATIENT OUTREACH (OUTPATIENT)
Dept: CASE MANAGEMENT | Facility: OTHER | Age: 79
End: 2024-05-23
Payer: MEDICARE

## 2024-05-23 NOTE — OUTREACH NOTE
AMBULATORY CASE MANAGEMENT NOTE    Care Coordination    Check on SNF discharge barriers and possible dates. [facility]. Discharged from [facility] 4/17/24.       Colleen CONKLIN  Ambulatory Case Management    5/23/2024, 07:14 EDT

## 2024-05-30 ENCOUNTER — PATIENT OUTREACH (OUTPATIENT)
Dept: CASE MANAGEMENT | Facility: OTHER | Age: 79
End: 2024-05-30
Payer: MEDICARE

## 2024-05-30 NOTE — OUTREACH NOTE
AMBULATORY CASE MANAGEMENT NOTE    Care Coordination    Check on SNF discharge barriers and possible dates. [facility]. Discharged from [facility] 4/17/24. Sent survey via chart and following up in email.       Colleen CONKLIN  Ambulatory Case Management    5/30/2024, 07:55 EDT

## 2024-06-06 ENCOUNTER — PATIENT OUTREACH (OUTPATIENT)
Dept: CASE MANAGEMENT | Facility: OTHER | Age: 79
End: 2024-06-06
Payer: MEDICARE

## 2024-06-06 RX ORDER — PANTOPRAZOLE SODIUM 40 MG/1
40 TABLET, DELAYED RELEASE ORAL DAILY
Qty: 90 TABLET | Refills: 3 | Status: SHIPPED | OUTPATIENT
Start: 2024-06-06

## 2024-06-06 NOTE — OUTREACH NOTE
AMBULATORY CASE MANAGEMENT NOTE  Care Coordination    Check on SNF discharge barriers and possible dates via email and chart. [facility]. Discharged from [facility] 4/17/24.     Colleen CONKLIN  Ambulatory Case Management    6/6/2024, 08:15 EDT

## 2024-06-11 DIAGNOSIS — M19.90 ARTHRITIS: ICD-10-CM

## 2024-06-11 DIAGNOSIS — F98.8 ATTENTION DEFICIT DISORDER (ADD) WITHOUT HYPERACTIVITY: ICD-10-CM

## 2024-06-11 RX ORDER — DEXTROAMPHETAMINE SACCHARATE, AMPHETAMINE ASPARTATE MONOHYDRATE, DEXTROAMPHETAMINE SULFATE AND AMPHETAMINE SULFATE 5; 5; 5; 5 MG/1; MG/1; MG/1; MG/1
20 CAPSULE, EXTENDED RELEASE ORAL EVERY MORNING
Qty: 30 CAPSULE | Refills: 0 | Status: SHIPPED | OUTPATIENT
Start: 2024-06-11 | End: 2024-07-11

## 2024-06-11 RX ORDER — OXYCODONE HYDROCHLORIDE 5 MG/1
5 TABLET ORAL EVERY 8 HOURS PRN
Qty: 90 TABLET | Refills: 0 | Status: SHIPPED | OUTPATIENT
Start: 2024-06-11 | End: 2024-06-13

## 2024-06-13 ENCOUNTER — PATIENT OUTREACH (OUTPATIENT)
Dept: CASE MANAGEMENT | Facility: OTHER | Age: 79
End: 2024-06-13
Payer: MEDICARE

## 2024-06-13 DIAGNOSIS — G89.29 CHRONIC BILATERAL LOW BACK PAIN WITH RIGHT-SIDED SCIATICA: Primary | ICD-10-CM

## 2024-06-13 DIAGNOSIS — M54.12 CERVICAL RADICULOPATHY: ICD-10-CM

## 2024-06-13 DIAGNOSIS — M54.41 CHRONIC BILATERAL LOW BACK PAIN WITH RIGHT-SIDED SCIATICA: Primary | ICD-10-CM

## 2024-06-13 DIAGNOSIS — M19.90 ARTHRITIS: ICD-10-CM

## 2024-06-13 DIAGNOSIS — G63 POLYNEUROPATHY ASSOCIATED WITH UNDERLYING DISEASE: ICD-10-CM

## 2024-06-13 RX ORDER — HYDROCODONE BITARTRATE AND ACETAMINOPHEN 10; 325 MG/1; MG/1
1 TABLET ORAL 4 TIMES DAILY PRN
Qty: 120 TABLET | Refills: 0 | Status: SHIPPED | OUTPATIENT
Start: 2024-06-13

## 2024-06-13 RX ORDER — OXYCODONE HYDROCHLORIDE 5 MG/1
5 TABLET ORAL 4 TIMES DAILY PRN
Qty: 120 TABLET | Refills: 0 | Status: SHIPPED | OUTPATIENT
Start: 2024-06-13

## 2024-06-13 RX ORDER — KETOCONAZOLE 20 MG/G
1 CREAM TOPICAL DAILY
Qty: 60 G | Refills: 1 | Status: SHIPPED | OUTPATIENT
Start: 2024-06-13

## 2024-06-13 NOTE — OUTREACH NOTE
AMBULATORY CASE MANAGEMENT NOTE    Care Coordination      Check on SNF discharge barriers and possible dates. [facility]. Discharged from [facility] 4/17/24.   6/6 update- reaching a plateau in progress. Spouse is wanting to try and take him home with addtional caregivers. looking at possible discharge 6/17/24    Colleen CONKLIN  Ambulatory Case Management    6/13/2024, 08:19 EDT

## 2024-06-17 ENCOUNTER — PATIENT OUTREACH (OUTPATIENT)
Dept: CASE MANAGEMENT | Facility: OTHER | Age: 79
End: 2024-06-17
Payer: MEDICARE

## 2024-06-17 NOTE — OUTREACH NOTE
AMBULATORY CASE MANAGEMENT NOTE    Care Coordination    Survey sent to SNF for possible discharge dates .    Colleen CONKLIN  Ambulatory Case Management    6/17/2024, 08:50 EDT

## 2024-06-18 ENCOUNTER — TELEPHONE (OUTPATIENT)
Dept: FAMILY MEDICINE CLINIC | Facility: CLINIC | Age: 79
End: 2024-06-18

## 2024-06-18 DIAGNOSIS — G63 POLYNEUROPATHY ASSOCIATED WITH UNDERLYING DISEASE: ICD-10-CM

## 2024-06-18 DIAGNOSIS — M54.12 CERVICAL RADICULOPATHY: ICD-10-CM

## 2024-06-18 DIAGNOSIS — G89.29 CHRONIC BILATERAL LOW BACK PAIN WITH RIGHT-SIDED SCIATICA: Primary | ICD-10-CM

## 2024-06-18 DIAGNOSIS — M54.41 CHRONIC BILATERAL LOW BACK PAIN WITH RIGHT-SIDED SCIATICA: Primary | ICD-10-CM

## 2024-06-18 RX ORDER — OXYCODONE AND ACETAMINOPHEN 10; 325 MG/1; MG/1
1 TABLET ORAL EVERY 6 HOURS PRN
Qty: 120 TABLET | Refills: 0 | Status: SHIPPED | OUTPATIENT
Start: 2024-06-18

## 2024-06-18 NOTE — TELEPHONE ENCOUNTER
Pharmacy Name:  [facility]     Reference Number (if applicable):     Pharmacy representative name: PREET    Pharmacy representative phone number: [phone]    What medication are you calling in regards to: oxyCODONE-acetaminophen (Percocet)  MG per tablet     What question does the pharmacy have: PATIENT PICKED UP OXYCODONE 5-325 FROM [facility] NOW HAS AN ORDER FOR THE . PHARMACY IS CHECKING TO MAKE SURE WHICH ONE HE IS TO BE TAKING.    Who is the provider that prescribed the medication: DR. NOELLE MCCRARY    Additional notes:

## 2024-06-18 NOTE — TELEPHONE ENCOUNTER
Okay so tell the [facility] pharmacy that they will not be picking up the 10/325 right now.  They will use the 5/325 and when they are done them were increasing the dose to the 10.  Hank find out how she is giving him the 5mg/325mg tabs.    Is it one 3 x a day or two  3 times a day or what??   Obviously she cannot  the 10/3/2025 right now.

## 2024-06-18 NOTE — TELEPHONE ENCOUNTER
Hank call his wife Noemy and find out about this.  Did they  a oxycodone 5/325 from [facility] recently?  If so why are they calling for a 10/325mg now??

## 2024-06-20 ENCOUNTER — PATIENT OUTREACH (OUTPATIENT)
Dept: CASE MANAGEMENT | Facility: OTHER | Age: 79
End: 2024-06-20
Payer: MEDICARE

## 2024-06-20 NOTE — OUTREACH NOTE
AMBULATORY CASE MANAGEMENT NOTE    Care Coordination    Check on possible discharge from SNF Jaime CONKLIN  Ambulatory Case Management    6/20/2024, 07:47 EDT

## 2024-06-27 ENCOUNTER — PATIENT OUTREACH (OUTPATIENT)
Dept: CASE MANAGEMENT | Facility: OTHER | Age: 79
End: 2024-06-27
Payer: MEDICARE

## 2024-06-27 NOTE — OUTREACH NOTE
AMBULATORY CASE MANAGEMENT NOTE    Care Coordination    6/20 per SNF appealed discharge and won. Still at [facility]   Check on possible discharge form [facility].       Colleen CONKLIN  Ambulatory Case Management    6/27/2024, 07:36 EDT

## 2024-07-02 ENCOUNTER — TELEPHONE (OUTPATIENT)
Dept: FAMILY MEDICINE CLINIC | Facility: CLINIC | Age: 79
End: 2024-07-02
Payer: MEDICARE

## 2024-07-02 NOTE — TELEPHONE ENCOUNTER
Patient is discharging from [facility] tomorrow and I gave verbal orders to [facility] to start home health for patient.

## 2024-07-03 ENCOUNTER — PATIENT OUTREACH (OUTPATIENT)
Dept: CASE MANAGEMENT | Facility: OTHER | Age: 79
End: 2024-07-03
Payer: MEDICARE

## 2024-07-03 ENCOUNTER — READMISSION MANAGEMENT (OUTPATIENT)
Dept: CALL CENTER | Facility: HOSPITAL | Age: 79
End: 2024-07-03
Payer: MEDICARE

## 2024-07-03 NOTE — OUTREACH NOTE
AMBULATORY CASE MANAGEMENT NOTE    Care Coordination    SNF Follow-up    6/20 Per SNF- appealed and won SNF discharge   Per chart - 7/3 discharged from SNF [facility].   Transferred HRCM to [facility].     Colleen CONKLIN  Ambulatory Case Management    7/3/2024, 11:21 EDT

## 2024-07-03 NOTE — OUTREACH NOTE
Prep Survey      Flowsheet Row Responses   [facility] facility patient discharged from? Non-BH   Is LACE score < 7 ? Non-BH Discharge   Eligibility [facility]   Date of Discharge 07/02/24   Discharge diagnosis unknown   Does the patient have one of the following disease processes/diagnoses(primary or secondary)? Other   Prep survey completed? Yes            Krystal Whitney Registered Nurse

## 2024-07-04 DIAGNOSIS — G89.29 CHRONIC BILATERAL LOW BACK PAIN WITH RIGHT-SIDED SCIATICA: ICD-10-CM

## 2024-07-04 DIAGNOSIS — M54.41 CHRONIC BILATERAL LOW BACK PAIN WITH RIGHT-SIDED SCIATICA: ICD-10-CM

## 2024-07-04 DIAGNOSIS — G63 POLYNEUROPATHY ASSOCIATED WITH UNDERLYING DISEASE: ICD-10-CM

## 2024-07-04 DIAGNOSIS — M54.12 CERVICAL RADICULOPATHY: ICD-10-CM

## 2024-07-04 DIAGNOSIS — F98.8 ATTENTION DEFICIT DISORDER (ADD) WITHOUT HYPERACTIVITY: ICD-10-CM

## 2024-07-05 ENCOUNTER — TRANSITIONAL CARE MANAGEMENT TELEPHONE ENCOUNTER (OUTPATIENT)
Dept: CALL CENTER | Facility: HOSPITAL | Age: 79
End: 2024-07-05
Payer: MEDICARE

## 2024-07-05 DIAGNOSIS — F98.8 ATTENTION DEFICIT DISORDER (ADD) WITHOUT HYPERACTIVITY: ICD-10-CM

## 2024-07-05 RX ORDER — KETOCONAZOLE 20 MG/G
1 CREAM TOPICAL DAILY
Qty: 60 G | Refills: 1 | Status: SHIPPED | OUTPATIENT
Start: 2024-07-05

## 2024-07-05 RX ORDER — DEXTROAMPHETAMINE SACCHARATE, AMPHETAMINE ASPARTATE MONOHYDRATE, DEXTROAMPHETAMINE SULFATE AND AMPHETAMINE SULFATE 5; 5; 5; 5 MG/1; MG/1; MG/1; MG/1
20 CAPSULE, EXTENDED RELEASE ORAL EVERY MORNING
Qty: 30 CAPSULE | Refills: 0 | Status: SHIPPED | OUTPATIENT
Start: 2024-07-05 | End: 2024-07-05

## 2024-07-05 RX ORDER — DULOXETIN HYDROCHLORIDE 30 MG/1
90 CAPSULE, DELAYED RELEASE ORAL DAILY
Qty: 270 CAPSULE | Refills: 3 | Status: SHIPPED | OUTPATIENT
Start: 2024-07-05

## 2024-07-05 RX ORDER — HYDROCODONE BITARTRATE AND ACETAMINOPHEN 10; 325 MG/1; MG/1
1 TABLET ORAL 4 TIMES DAILY PRN
Qty: 120 TABLET | Refills: 0 | Status: SHIPPED | OUTPATIENT
Start: 2024-07-05

## 2024-07-05 RX ORDER — DEXTROAMPHETAMINE SACCHARATE, AMPHETAMINE ASPARTATE MONOHYDRATE, DEXTROAMPHETAMINE SULFATE AND AMPHETAMINE SULFATE 5; 5; 5; 5 MG/1; MG/1; MG/1; MG/1
20 CAPSULE, EXTENDED RELEASE ORAL EVERY MORNING
Qty: 30 CAPSULE | Refills: 0 | Status: SHIPPED | OUTPATIENT
Start: 2024-07-05 | End: 2024-08-04

## 2024-07-05 NOTE — OUTREACH NOTE
Call Center TCM Note      Flowsheet Row Responses   [facility] patient discharged from? Non-   Does the patient have one of the following disease processes/diagnoses(primary or secondary)? Other   TCM attempt successful? Yes   Call start time 1037   Call end time 1038   Discharge diagnosis septic shock   Is patient permission given to speak with other caregiver? Yes   Person spoke with today (if not patient) and relationship wife Noemy   Does the patient have an appointment with their PCP within 7-14 days of discharge? No   Nursing Interventions Routed TCM call to PCP office   Home health comments HH arranged at SNF discharge, they were with pt in home at time of my call.   Psychosocial issues? No   Did the patient receive a copy of their discharge instructions? Yes   Nursing interventions Reviewed instructions with patient   What is the patient's perception of their health status since discharge? Worsening   Is the patient/caregiver able to teach back signs and symptoms related to disease process for when to call PCP? Yes   Is the patient/caregiver able to teach back signs and symptoms related to disease process for when to call 911? Yes   Is the patient/caregiver able to teach back the hierarchy of who to call/visit for symptoms/problems? PCP, Specialist, Home health nurse, Urgent Care, ED, 911 Yes   If the patient is a current smoker, are they able to teach back resources for cessation? Not a smoker   TCM call completed? Yes   Wrap up additional comments D/C DX: Septic shock - Very brief call as HH was with pt and wife wanted to be present for this. She does state pt is not doing at all well, she is quite upset. She does not want me to call later at all. No TCM APPT is scheduled as yet.   Call end time 1038            Peyton Sarah MA    7/5/2024, 10:41 EDT

## 2024-07-05 NOTE — TELEPHONE ENCOUNTER
Caller: RANGEL ANDREWS     Relationship:Emergency Contact     Best call back number: [phone]     Requested Prescriptions:   Requested Prescriptions     Pending Prescriptions Disp Refills    amphetamine-dextroamphetamine XR (Adderall XR) 20 MG 24 hr capsule 30 capsule 0     Sig: Take 1 capsule by mouth Every Morning for 30 days        Pharmacy where request should be sent: [id number] - [place], [place] - [address] [place] [address] - [phone]  - [phone] FX     Last office visit with prescribing clinician: 3/1/2024   Last telemedicine visit with prescribing clinician: Visit date not found   Next office visit with prescribing clinician: Visit date not found     Additional details provided by patient: PATIENT ONLY HAS 2 DAYS LEFT    Does the patient have less than a 3 day supply:  [x] Yes  [] No    Would you like a call back once the refill request has been completed: [] Yes [] No    If the office needs to give you a call back, can they leave a voicemail: [] Yes [] No    [place] Rep   07/05/24 15:03 EDT

## 2024-07-08 DIAGNOSIS — G63 POLYNEUROPATHY ASSOCIATED WITH UNDERLYING DISEASE: Primary | ICD-10-CM

## 2024-07-08 NOTE — TELEPHONE ENCOUNTER
PATIENT'S WIFE CALLED FOR MEDICATION REFILL/ NEW PRESCRIPTION OF  pregabalin (LYRICA) 75 MG capsule   HE IS OUT OF MEDICATION    [place] STATES DR. PEREA WOULD REFILL ON THE PHONE    [id number] - [place], [place] - [address] - [phone]  - [phone]  [phone]     CALL BACK NUMBER [phone]

## 2024-07-09 DIAGNOSIS — G63 POLYNEUROPATHY ASSOCIATED WITH UNDERLYING DISEASE: ICD-10-CM

## 2024-07-09 RX ORDER — PREGABALIN 75 MG/1
75 CAPSULE ORAL 2 TIMES DAILY
Qty: 180 CAPSULE | Refills: 0 | Status: SHIPPED | OUTPATIENT
Start: 2024-07-09 | End: 2024-10-07

## 2024-07-09 RX ORDER — PREGABALIN 75 MG/1
75 CAPSULE ORAL 2 TIMES DAILY
Qty: 180 CAPSULE | Refills: 0 | Status: CANCELLED | OUTPATIENT
Start: 2024-07-09 | End: 2024-10-07

## 2024-07-09 NOTE — TELEPHONE ENCOUNTER
[facility] REQUESTS ORDERS/COORDINATION WITH HOME HEALTH. ALSO, PT'S WIFE CALLED TO GET FÁTIMA LIFT ARRANGED. PT HAS NOT BEEN MOVED FOR 6 DAYS. PT'S WIFE UNDERSTOOD LIFT HAD BEEN ORDERED.

## 2024-07-11 ENCOUNTER — PATIENT OUTREACH (OUTPATIENT)
Dept: CASE MANAGEMENT | Facility: CLINIC | Age: 79
End: 2024-07-11
Payer: MEDICARE

## 2024-07-11 ENCOUNTER — TELEPHONE (OUTPATIENT)
Dept: CASE MANAGEMENT | Facility: CLINIC | Age: 79
End: 2024-07-11
Payer: MEDICARE

## 2024-07-11 RX ORDER — QUETIAPINE FUMARATE 25 MG/1
25 TABLET, FILM COATED ORAL NIGHTLY
Qty: 60 TABLET | Refills: 5 | Status: SHIPPED | OUTPATIENT
Start: 2024-07-11

## 2024-07-11 NOTE — TELEPHONE ENCOUNTER
ACM notes patient's wife has been calling PCP office regarding mercedes lift that patient was supposed to receive after being discharged from rehab.  Any DME needed at home on discharge should have been set up by rehab.  Attempted to contact discharge planner at [facility] regarding DME order for Mercedes Lift.  No answer, left message to call ACM back at direct number to discuss further.

## 2024-07-11 NOTE — OUTREACH NOTE
AMBULATORY CASE MANAGEMENT NOTE    Names and Relationships of Patient/Support Persons: Contact: [facility] - Lou; Relationship:  -     Care Coordination    Spoke with Lou, /Discharge Planner from [facility].  Discussed that patient has been calling PCP office regarding a emmanuel lift she was supposed to have ordered from Rehab.  Lou explained that there were some difficulties with patient's wife regarding DME, as initially they had ordered a hospital bed to be delivered and the wife cancelled the order with Samra, then called after patient was discharged and said the hospital bed is needed.  She states that when discussing emmanuel lifts with wife, wife stated she did not need a emmanuel lift and that during discharge planning meetings she showed Lou and the therapist a paper that showed she had purchased a sit to stand lift herself.  Will discuss further with wife.      Christina CHAPA  Ambulatory Case Management    7/11/2024, 15:51 EDT

## 2024-07-11 NOTE — TELEPHONE ENCOUNTER
[facility] REQUESTS CONFIRMATION THAT PCP WILL FOLLOW PT'S WARFARIN/INR LEVEL. WARFARIN WAS ORDERED BY HOSPITALIST, PT HAS NO CARDIOLOGIST. FIRST HOME HEALTH VISIT NEXT WEEK.

## 2024-07-11 NOTE — TELEPHONE ENCOUNTER
PT'S WIFE STATES NO PROGRESS ON FÁTIMA LIFT. PT HAS NOW BEEN HOME 7 DAYS AND HAS NOT BEEN MOVED. PT'S WIFE STATES [facility] TOLD PT'S DAUGHTER HE WOULD GET FÁTIMA LIFT SUPPLIED. NO ORDERS APPEAR FOR FÁTIMA LIFT.

## 2024-07-12 ENCOUNTER — TELEPHONE (OUTPATIENT)
Dept: FAMILY MEDICINE CLINIC | Facility: CLINIC | Age: 79
End: 2024-07-12
Payer: MEDICARE

## 2024-07-12 RX ORDER — WARFARIN SODIUM 4 MG/1
TABLET ORAL
Qty: 30 TABLET | Refills: 3 | Status: SHIPPED | OUTPATIENT
Start: 2024-07-12 | End: 2024-07-12 | Stop reason: SDUPTHER

## 2024-07-12 RX ORDER — WARFARIN SODIUM 4 MG/1
TABLET ORAL
Qty: 30 TABLET | Refills: 3 | Status: SHIPPED | OUTPATIENT
Start: 2024-07-12

## 2024-07-12 NOTE — TELEPHONE ENCOUNTER
Nurse with [facility] said she called on Monday 7/8 to ask about patient's Warfarin but there is no message in chart.    Patient's INR on Monday 7/8/2024 was 1.8 and he is currently on Warfarin 3mg daily.    Please advise TODAY if there is any change of Warfarin and when to repeat.

## 2024-07-16 NOTE — TELEPHONE ENCOUNTER
I called and spoke with Kenyatta with [facility], 1st thing is that Alen was admitted to [facility] yesterday and he is moving into [facility] on Thursday. In the meantime, she was calling to confirm his warfarin dose. Message from last week stated that from [facility], patient's inr was 1.8 and patient was currently taking 3mg warfarin. Instructions were to take 6mg for 2 days then back down to 3mg daily, repeat inr in 1 week. They are confused because patient's last few fills of warfarin have been 4mg pills. They stated that Noemy does not know what his dose should be. I tried looking back to find other notes regarding his inr from home health but there are none. Please advise on warfarin dose and when he should have another inr done.

## 2024-07-16 NOTE — TELEPHONE ENCOUNTER
Noemy tell them to give Alen 4 mg warfarin a day.  I need the INR done weekly until we get his INR regulated.  Tell them to call the INR to us and we will dose his warfarin.   I am glad he is getting into a facility.  Hopefully hospice can still follow him there.  Let me know if there is anything I can do

## 2024-07-16 NOTE — TELEPHONE ENCOUNTER
Caller:     STEPHANI ORTA [facility] [phone]         What was the call regarding:     HOME HEALTH DID THE PATIENT'S INR A FEW DAYS AGO AND THEY WANTED TO KNOW THE RESULTS AND WHEN HIS NEXT INR IS DUE?     Is it okay if the provider responds through MyChart: CAN YOU CALL AND ADVISE PLEASE

## 2024-07-17 NOTE — TELEPHONE ENCOUNTER
FYI only, I gave Kenyatta ([facility] nurse) instructions, she verbalized understanding. She also stated that once Lux is admitted to [facility], he will be followed by their in house physician, they do not allow outside PCP. I did verify with the nurse, asking if Noemy is aware of this, and she stated she was and was okay with it.

## 2024-07-24 NOTE — TELEPHONE ENCOUNTER
Provider: DR LITTLE    Caller: RANGEL    Relationship to Patient: WIFE    Pharmacy:     Phone Number: [phone]    Reason for Call:  PT IS IN A NURSING HOME AND HE ISN'T ABLE TO STAND OR WALK AND IS EXPERIENCING INCONTINENCE.    When was the patient last seen: 10-30-24   PT HAD AN EMG DONE ON 10-27-24    When did it start: BACK IN APRIL PT HAD SEPTIC SHOCK AND OTHER HEALTH ISSUES.    PT'S WIFE IS ASKING TO SPEAK W/DR LITTLE REGARDING PT'S CONDITION. SHE IS VERY CONCERNED FOR HER  AND WANTING TO KNOW THE EMG TEST RESULTS AND WANTS TO DISCUSS OTHER MEDICAL CONCERNS WITH DR LITTLE.

## 2024-07-25 NOTE — TELEPHONE ENCOUNTER
Caller: YESSICAENRIQUERANGEL    Relationship: Emergency Contact    Best call back number: [phone]    What was the call regarding: RANGEL JUST SPOKE WITH DR LITTLE BUT REALIZED SHE FORGOT TO ASK IF ITS POSSIBLE TO PATIENT HAS ALS.     PLEASE REVIEW  THANK YOU

## 2024-08-05 NOTE — TELEPHONE ENCOUNTER
Caller: RANGEL ANDREWS    Relationship: Emergency Contact    Best call back number: [phone]    What is the medical concern/diagnosis: UNKNOWN    What specialty or service is being requested: NEUROMUSCULAR    What is the provider, practice or medical service name: DR LEAVITT    What is the office location: [facility]    What is the office phone number: [phone]    Any additional details: DR LITTLE HAD SUGGESTED THEY GO FOLLOW UP WITH A NEUROMUSCULAR SPECIALISTS AND THEY WOULD LIKE TO SEE DR. LEAVITT, AS HE RECOMMENDED, BUT THEY NEED A REFERRAL AND MED RECS TO PROCEED WITH THAT SPECIALIST. PLEASE CALL Stephie JULIANA WHEN REFERRAL HAS BEEN SENT.

## 2024-08-06 NOTE — TELEPHONE ENCOUNTER
PT WIFE CALLING TO MAKE SURE REF WAS SENT TOLD LOOKS LIKE DENIS PLACED YESTERDAY AT 9:27     PLEASE ADVISE.

## 2024-08-17 PROBLEM — R09.02 HYPOXEMIA: Status: ACTIVE | Noted: 2024-01-01

## 2024-08-17 NOTE — ED PROVIDER NOTES
Subjective   History of Present Illness  78-year-old male presents from the extended care facility with reports of increased shortness of breath and hypoxia.  He had reported oxygen saturations in the 70s on their arrival to the facility.  Patient was lethargic and nonverbal upon arrival and is unable to give any further history or review of systems.  The history per the EMS transport service.  Review of Systems    Past Medical History:   Diagnosis Date    ADHD (attention deficit hyperactivity disorder)     Allergic     Arthritis     Colon polyp     Depression     Diabetes mellitus type 2    controlled by oral meds    Fibromyalgia, primary     HL (hearing loss)     Hyperlipidemia     Hypertension        No Known Allergies    Past Surgical History:   Procedure Laterality Date    COLONOSCOPY      ENDOSCOPY N/A 11/15/2023    Procedure: ESOPHAGOGASTRODUODENOSCOPY;  Surgeon: George Viveros MD;  Location: [facility] MAIN OR;  Service: Gastroenterology;  Laterality: N/A;  popssible candida, irrgular z-line, gastritis    EYE SURGERY      Cataracts removed: both eyes    HAND SURGERY  1980-1990- [facility]. Abstracted from [facility].    HEMORROIDECTOMY      [facility]. Abstracted from [facility].    TONSILLECTOMY      VASECTOMY         Family History   Problem Relation Age of Onset    Diabetes Mother         type2    Stroke Mother     Diabetes Father         type2    Colon cancer Neg Hx     Colon polyps Neg Hx     Crohn's disease Neg Hx     Irritable bowel syndrome Neg Hx     Ulcerative colitis Neg Hx        Social History     Socioeconomic History    Marital status:    Tobacco Use    Smoking status: Former     Current packs/day: 0.00     Average packs/day: 0.3 packs/day for 5.0 years (1.3 ttl pk-yrs)     Types: Cigarettes     Start date: 2/10/1966     Quit date: 2/10/1971     Years since quittin.5    Smokeless tobacco: Never   Vaping Use    Vaping status: Never Used   Substance and Sexual Activity     Alcohol use: Not Currently     Alcohol/week: 2.0 standard drinks of alcohol     Types: 1 Glasses of wine, 1 Cans of beer per week    Drug use: No    Sexual activity: Yes     Partners: Female     Birth control/protection: None     Prior to Admission medications    Medication Sig Start Date End Date Taking? Authorizing Provider   acetaminophen (TYLENOL) 325 MG tablet Take 2 tablets by mouth Every 6 (Six) Hours As Needed for Mild Pain.    German Valle MD   amiodarone (PACERONE) 200 MG tablet Take 1 tablet by mouth Daily. 4/13/24   Kendall Haynes MD   atorvastatin (LIPITOR) 20 MG tablet Take 1 tablet by mouth Daily. 1/17/24   Montana Morrow MD   bisacodyl (DULCOLAX) 10 MG suppository Insert 1 suppository into the rectum Daily As Needed for Constipation.    ProviderGerman MD   dicyclomine (BENTYL) 10 MG capsule Take 1 capsule by mouth 4 (Four) Times a Day Before Meals & at Bedtime. 1/17/24   Montana Morrow MD   docusate sodium (COLACE) 100 MG capsule Take 1 capsule by mouth 2 (Two) Times a Day.    ProviderGerman MD   DULoxetine (CYMBALTA) 30 MG capsule Take 3 capsules by mouth Daily. 7/5/24   Montana Morrow MD   empagliflozin (Jardiance) 10 MG tablet tablet Take 1 tablet by mouth Daily. 4/12/24   Kendall Haynes MD   HYDROcodone-acetaminophen (NORCO)  MG per tablet Take 1 tablet by mouth 4 (Four) Times a Day As Needed for Moderate Pain or Severe Pain. 7/5/24   Montana Morrow MD   ipratropium-albuterol (DUO-NEB) 0.5-2.5 mg/3 ml nebulizer Take 3 mL by nebulization Every 4 (Four) Hours As Needed for Wheezing.    German Valle MD   ketoconazole (NIZORAL) 2 % cream Apply 1 Application topically to the appropriate area as directed Daily. 7/5/24   Montana Morrow MD   l-methylfolate-algae-B6-B12 (METANX) 3-90.314-2-35 MG capsule capsule Take 1 capsule by mouth Every 12 (Twelve) Hours. 2/1/24   German Valle MD   magnesium hydroxide (MILK OF  MAGNESIA) 2400 MG/10ML suspension suspension Take 30 mL by mouth Daily As Needed. Administer if no BM x3 days and after prune juice is given and ineffective after 8 hours (if not a renal patient)  If no BM after 1st and 2nd step bowl regimen is given, notify MD/APRN for further instructions/guidance    ProviderGerman MD   metFORMIN (GLUCOPHAGE) 500 MG tablet Take 2 tablets by mouth 2 (Two) Times a Day. 1/17/24   Montana Morrow MD   metoprolol tartrate (LOPRESSOR) 25 MG tablet Take 1 tablet by mouth Every 12 (Twelve) Hours. 4/12/24   Kendall Haynes MD   NON FORMULARY Prune Juice QD PRN    ProviderGerman MD   omeprazole (priLOSEC) 20 MG capsule Take 1 capsule by mouth Daily.    ProviderGerman MD   pregabalin (LYRICA) 75 MG capsule Take 1 capsule by mouth 2 (Two) Times a Day for 90 days. 7/9/24 10/7/24  Atul Garcia PA-C   primidone (MYSOLINE) 50 MG tablet TAKE 3 TABLETS TWICE A DAY 8/13/24   Montana Morrow MD   warfarin (Coumadin) 4 MG tablet 1 tablet daily or as directed by provider Inr 2-3 Check INR qd dc lovenox once INR 2 7/12/24   Montana Morrow MD     /73   Pulse 108   Temp 99 °F (37.2 °C) (Rectal)   Resp 14   Wt 66 kg (145 lb 8.1 oz)   SpO2 99%   BMI 20.29 kg/m²         Objective   Physical Exam  General: Thin, chronically ill-appearing elderly male who is lethargic and nonverbal  Eyes: Pupils midsized and equal round and reactive, sclera nonicteric  HEENT: Mucous membranes somewhat dry, no mucosal swelling  Neck: Supple, no nuchal rigidity, no JVD  Respirations: Worse rhonchi bilaterally, respirations mildly tachypneic  Heart increased rate, regular rhythm, no murmurs rubs or gallops,   Abdomen soft nontender nondistended, no hepatosplenomegaly,   Extremities no clubbing cyanosis or edema, calves are symmetric and nontender  Neuro cranial nerves grossly intact, no focal limb deficits, attempts to open his eyes to verbal stimuli, not following  commands  Psych nonverbal  Skin no rash, brisk cap refill  Procedures           ED Course    Results for orders placed or performed during the hospital encounter of 08/17/24   Respiratory Panel PCR w/COVID-19(SARS-CoV-2) DODIE/JOVITA/THEO/PAD/COR/RAJAT In-House, NP Swab in UTM/VTM, 2 HR TAT - Swab, Nasopharynx    Specimen: Nasopharynx; Swab   Result Value Ref Range    ADENOVIRUS, PCR Not Detected Not Detected    Coronavirus 229E Not Detected Not Detected    Coronavirus HKU1 Not Detected Not Detected    Coronavirus NL63 Not Detected Not Detected    Coronavirus OC43 Not Detected Not Detected    COVID19 Not Detected Not Detected - Ref. Range    Human Metapneumovirus Not Detected Not Detected    Human Rhinovirus/Enterovirus Not Detected Not Detected    Influenza A PCR Not Detected Not Detected    Influenza B PCR Not Detected Not Detected    Parainfluenza Virus 1 Not Detected Not Detected    Parainfluenza Virus 2 Not Detected Not Detected    Parainfluenza Virus 3 Not Detected Not Detected    Parainfluenza Virus 4 Not Detected Not Detected    RSV, PCR Not Detected Not Detected    Bordetella pertussis pcr Not Detected Not Detected    Bordetella parapertussis PCR Not Detected Not Detected    Chlamydophila pneumoniae PCR Not Detected Not Detected    Mycoplasma pneumo by PCR Not Detected Not Detected   Comprehensive Metabolic Panel    Specimen: Blood   Result Value Ref Range    Glucose 154 (H) 65 - 99 mg/dL    BUN 37 (H) 8 - 23 mg/dL    Creatinine 1.41 (H) 0.76 - 1.27 mg/dL    Sodium 142 136 - 145 mmol/L    Potassium 5.1 3.5 - 5.2 mmol/L    Chloride 100 98 - 107 mmol/L    CO2 25.1 22.0 - 29.0 mmol/L    Calcium 9.8 8.6 - 10.5 mg/dL    Total Protein 7.5 6.0 - 8.5 g/dL    Albumin 3.8 3.5 - 5.2 g/dL    ALT (SGPT) 9 1 - 41 U/L    AST (SGOT) 27 1 - 40 U/L    Alkaline Phosphatase 168 (H) 39 - 117 U/L    Total Bilirubin 0.2 0.0 - 1.2 mg/dL    Globulin 3.7 gm/dL    A/G Ratio 1.0 g/dL    BUN/Creatinine Ratio 26.2 (H) 7.0 - 25.0    Anion Gap  16.9 (H) 5.0 - 15.0 mmol/L    eGFR 50.4 (L) >60.0 mL/min/1.73   aPTT    Specimen: Blood   Result Value Ref Range    PTT 64.6 61.0 - 76.5 seconds   Protime-INR    Specimen: Blood   Result Value Ref Range    Protime 39.4 (H) 9.6 - 11.7 Seconds    INR 4.01 (C) 0.93 - 1.10   Single High Sensitivity Troponin T    Specimen: Blood   Result Value Ref Range    HS Troponin T 39 (H) <22 ng/L   Procalcitonin    Specimen: Blood   Result Value Ref Range    Procalcitonin 0.27 (H) 0.00 - 0.25 ng/mL   Magnesium    Specimen: Blood   Result Value Ref Range    Magnesium 2.1 1.6 - 2.4 mg/dL   CBC Auto Differential    Specimen: Blood   Result Value Ref Range    WBC 17.12 (H) 3.40 - 10.80 10*3/mm3    RBC 4.34 4.14 - 5.80 10*6/mm3    Hemoglobin 12.6 (L) 13.0 - 17.7 g/dL    Hematocrit 41.0 37.5 - 51.0 %    MCV 94.5 79.0 - 97.0 fL    MCH 29.0 26.6 - 33.0 pg    MCHC 30.7 (L) 31.5 - 35.7 g/dL    RDW 16.0 (H) 12.3 - 15.4 %    RDW-SD 55.8 (H) 37.0 - 54.0 fl    MPV 9.5 6.0 - 12.0 fL    Platelets 304 140 - 450 10*3/mm3    Neutrophil % 80.1 (H) 42.7 - 76.0 %    Lymphocyte % 11.7 (L) 19.6 - 45.3 %    Monocyte % 7.0 5.0 - 12.0 %    Eosinophil % 0.4 0.3 - 6.2 %    Basophil % 0.3 0.0 - 1.5 %    Immature Grans % 0.5 0.0 - 0.5 %    Neutrophils, Absolute 13.72 (H) 1.70 - 7.00 10*3/mm3    Lymphocytes, Absolute 2.00 0.70 - 3.10 10*3/mm3    Monocytes, Absolute 1.20 (H) 0.10 - 0.90 10*3/mm3    Eosinophils, Absolute 0.06 0.00 - 0.40 10*3/mm3    Basophils, Absolute 0.05 0.00 - 0.20 10*3/mm3    Immature Grans, Absolute 0.09 (H) 0.00 - 0.05 10*3/mm3    nRBC 0.0 0.0 - 0.2 /100 WBC   Blood Gas, Venous -    Specimen: Venous Blood   Result Value Ref Range    Site CentralLine     pH, Venous 7.336 7.320 - 7.430 pH Units    pCO2, Venous 52.0 (H) 42.0 - 51.0 mm Hg    pO2, Venous 69.9 (H) 40.0 - 42.0 mm Hg    HCO3, Venous 27.8 (H) 22.0 - 26.0 mmol/L    Base Excess, Venous 1.0 -2.0 - 2.0 mmol/L    O2 Saturation, Venous 92.3 (H) 45.0 - 75.0 %    CO2 Content 29.4 (H) 22 - 29  mmol/L    Barometric Pressure for Blood Gas      Modality NRB     FIO2 100 %   ECG 12 Lead Dyspnea   Result Value Ref Range    QT Interval 448 ms    QTC Interval 624 ms   Gold Top - SST   Result Value Ref Range    Extra Tube Hold for add-ons.      XR Chest 1 View    Result Date: 8/17/2024  Impression: Left lower lung airspace disease compatible with pneumonia and small effusion. Clinical correlation and follow-up to resolution recommended Electronically Signed: Moisés Clark MD  8/17/2024 1:05 PM EDT  Workstation ID: [id number]     ED Course as of 08/17/24 1332   Sat Aug 17, 2024   1324 I discussed the findings and treatment plan with the wife at the bedside who does state the patient is under hospice care at the facility and she feels like he has had a worsening status since they initiated some morphine therapy a couple of days ago.  She is agreeable plan of admission and IV antibiotics for the current pneumonia.  She does voiced agreement with patient's known DNR/DNI status and comfort measure status. [SH]      ED Course User Index  [SH] Kevin Castellano MD      I reviewed the documentation sent from the [facility] including scope of treatment documentations which does highlight comfort measures only and DNR status                                       Medical Decision Making  Differential diagnosis including pneumonia, congestive failure, COVID, pulmonary embolus,    Patient is no signs of DVT, x-ray is consistent with pneumonia.  He does have leukocytosis.  He was ordered IV antibiotics with Rocephin and Zithromax and vancomycin.  Nonrebreather mask was in place on presentation and he is transition to a high flow nasal cannula for oxygenation.  He is DNR/DNI status will be honored.  Case discussed with Dr. Mena with the hospitalist service who is agreeable plan of admission for IV antibiotics and supportive care.    Problems Addressed:  Hypoxia: complicated acute illness or injury  Pneumonia of  left lower lobe due to infectious organism: complicated acute illness or injury    Amount and/or Complexity of Data Reviewed  Labs: ordered. Decision-making details documented in ED Course.     Details: CBC shows leukocytosis, venous blood gas does show some hypercapnia but normal pH high-sensitivity troponin is marginally elevated which may be related to patient's renal insufficiency, he is chronically anticoagulated on Coumadin, INR is therapeutic, procalcitonin marginally elevated  Radiology: ordered and independent interpretation performed.     Details: My independent interpretation of chest x-ray image left basilar pneumonia    Risk  Prescription drug management.  Decision regarding hospitalization.        Final diagnoses:   Pneumonia of left lower lobe due to infectious organism   Hypoxia       ED Disposition  ED Disposition       ED Disposition   Decision to Admit    Condition   --    Comment   Level of Care: Telemetry [5]   Admitting Physician: CHON HAY [[id number]]   Attending Physician: CHON HAY [[id number]]                 No follow-up provider specified.       Medication List      No changes were made to your prescriptions during this visit.            Kevin Castellano MD  08/17/24 1862

## 2024-08-17 NOTE — PROGRESS NOTES
Pharmacy Antimicrobial Dosing Service    Subjective:  Lux Bray is a 78 y.o.male admitted with acute hypoxemic respiratory failure. Pharmacy has been consulted to dose Vancomycin for possible pneumonia.    MRSA (P)      Assessment/Plan    1. Day #1 Vancomycin: Pulse dosing d/t renal dysfxn. Vancomycin 1250 mg IV x 1. Obtain random levl on 8/18 with am labs and plan to re-dose when random is <20 mcg/mL.    2. Day #1 Ampicillin/Sulbactam: 3gm IV q6h for estCrCl > 30 mL/min.    Will continue to monitor drug levels, renal function, culture and sensitivities, and patient clinical status.       Objective:  Relevant clinical data and objective history reviewed:      66 kg (145 lb 8.1 oz)   Ideal body weight: 75.3 kg (166 lb 0.1 oz)  Body mass index is 20.29 kg/m².        Results from last 7 days   Lab Units 08/17/24  1216   CREATININE mg/dL 1.41*     Estimated Creatinine Clearance: 39 mL/min (A) (by C-G formula based on SCr of 1.41 mg/dL (H)).  No intake/output data recorded.    Results from last 7 days   Lab Units 08/17/24  1216   WBC 10*3/mm3 17.12*     Temperature    08/17/24 1156 08/17/24 1310   Temp: 98.6 °F (37 °C) 99 °F (37.2 °C)     Baseline culture/source/susceptibility:  Microbiology Results (last 10 days)       Procedure Component Value - Date/Time    Respiratory Panel PCR w/COVID-19(SARS-CoV-2) DODIE/JOVITA/THEO/PAD/COR/RAJAT In-House, NP Swab in UTM/VTM, 2 HR TAT - Swab, Nasopharynx [[id number]]  (Normal) Collected: 08/17/24 1216    Lab Status: Final result Specimen: Swab from Nasopharynx Updated: 08/17/24 1315     ADENOVIRUS, PCR Not Detected     Coronavirus 229E Not Detected     Coronavirus HKU1 Not Detected     Coronavirus NL63 Not Detected     Coronavirus OC43 Not Detected     COVID19 Not Detected     Human Metapneumovirus Not Detected     Human Rhinovirus/Enterovirus Not Detected     Influenza A PCR Not Detected     Influenza B PCR Not Detected     Parainfluenza Virus 1 Not Detected     Parainfluenza Virus 2  Not Detected     Parainfluenza Virus 3 Not Detected     Parainfluenza Virus 4 Not Detected     RSV, PCR Not Detected     Bordetella pertussis pcr Not Detected     Bordetella parapertussis PCR Not Detected     Chlamydophila pneumoniae PCR Not Detected     Mycoplasma pneumo by PCR Not Detected    Narrative:      In the setting of a positive respiratory panel with a viral infection PLUS a negative procalcitonin without other underlying concern for bacterial infection, consider observing off antibiotics or discontinuation of antibiotics and continue supportive care. If the respiratory panel is positive for atypical bacterial infection (Bordetella pertussis, Chlamydophila pneumoniae, or Mycoplasma pneumoniae), consider antibiotic de-escalation to target atypical bacterial infection.            Margarita Moody, PharmD  08/17/24 16:05 EDT

## 2024-08-17 NOTE — Clinical Note
Level of Care: Telemetry [5]   Admitting Physician: CHON HAY [[id number]]   Attending Physician: CHON HAY [[id number]]

## 2024-08-17 NOTE — H&P
[facility] Medicine Services  History & Physical    Patient Name: Lux Bray  : 1945  MRN: [id number]  Primary Care Physician:  Montana Morrow MD  Date of admission: 2024  Date and Time of Service: 2024    Subjective      Chief Complaint:  hypoxemia    History of Present Illness:    This 78-year-old male who lives in a nursing facility and is under hospice care.  Today patient was noted to be hypoxemic and patient was also having increasing shortness of breath for which EMS was called and and he was found to have O2 sat in 70s.  Patient is sick-looking lethargic and nonverbal.  Blood pressure stable.  Lab work showed hypoxemia and WBC count is 17 with INR of 4 with WBC count of 17 and chest x-ray showed left lower lobe pneumonia.  Patient is currently being admitted for further management.  Patient's daughter was at the bedside who requested to continue frequent IV morphine as per hospice suggestion.  Of note the patient has been obtunded and has significant dysphagia according to family.  Patient is unable to swallow his pills.    Review of Systems    Unable to obtain  as patient is obtunded    Personal History     Past Medical History:   Diagnosis Date    ADHD (attention deficit hyperactivity disorder)     Allergic     Arthritis     Colon polyp     Depression     Diabetes mellitus type 2    controlled by oral meds    Fibromyalgia, primary     HL (hearing loss)     Hyperlipidemia     Hypertension        Past Surgical History:   Procedure Laterality Date    COLONOSCOPY      ENDOSCOPY N/A 11/15/2023    Procedure: ESOPHAGOGASTRODUODENOSCOPY;  Surgeon: George Viveros MD;  Location: [facility] OR;  Service: Gastroenterology;  Laterality: N/A;  popssible candida, irrgular z-line, gastritis    EYE SURGERY      Cataracts removed: both eyes    HAND SURGERY  1980-1990- [facility]    HEMORROIDECTOMY      [facility]     TONSILLECTOMY  1940's    VASECTOMY         Family History: family history includes Diabetes in his father and mother; Stroke in his mother. Otherwise pertinent FHx was reviewed and not pertinent to current issue.    Social History:  reports that he quit smoking about 53 years ago. His smoking use included cigarettes. He started smoking about 58 years ago. He has a 1.3 pack-year smoking history. He has never used smokeless tobacco. He reports that he does not currently use alcohol after a past usage of about 2.0 standard drinks of alcohol per week. He reports that he does not use drugs.    Home Medications:  Prior to Admission Medications       Prescriptions Last Dose Informant Patient Reported? Taking?    acetaminophen (TYLENOL) 325 MG tablet   Yes No    Take 2 tablets by mouth Every 6 (Six) Hours As Needed for Mild Pain.    amiodarone (PACERONE) 200 MG tablet   No No    Take 1 tablet by mouth Daily.    atorvastatin (LIPITOR) 20 MG tablet   No No    Take 1 tablet by mouth Daily.    bisacodyl (DULCOLAX) 10 MG suppository   Yes No    Insert 1 suppository into the rectum Daily As Needed for Constipation.    dicyclomine (BENTYL) 10 MG capsule   No No    Take 1 capsule by mouth 4 (Four) Times a Day Before Meals & at Bedtime.    docusate sodium (COLACE) 100 MG capsule   Yes No    Take 1 capsule by mouth 2 (Two) Times a Day.    DULoxetine (CYMBALTA) 30 MG capsule   No No    Take 3 capsules by mouth Daily.    empagliflozin (Jardiance) 10 MG tablet tablet   No No    Take 1 tablet by mouth Daily.    HYDROcodone-acetaminophen (NORCO)  MG per tablet   No No    Take 1 tablet by mouth 4 (Four) Times a Day As Needed for Moderate Pain or Severe Pain.    ipratropium-albuterol (DUO-NEB) 0.5-2.5 mg/3 ml nebulizer   Yes No    Take 3 mL by nebulization Every 4 (Four) Hours As Needed for Wheezing.    ketoconazole (NIZORAL) 2 % cream   No No    Apply 1 Application topically to the appropriate area as directed Daily.     l-methylfolate-algae-B6-B12 (METANX) 3-90.314-2-35 MG capsule capsule   Yes No    Take 1 capsule by mouth Every 12 (Twelve) Hours.    magnesium hydroxide (MILK OF MAGNESIA) 2400 MG/10ML suspension suspension   Yes No    Take 30 mL by mouth Daily As Needed. Administer if no BM x3 days and after prune juice is given and ineffective after 8 hours (if not a renal patient)  If no BM after 1st and 2nd step bowl regimen is given, notify MD/APRN for further instructions/guidance    metFORMIN (GLUCOPHAGE) 500 MG tablet   No No    Take 2 tablets by mouth 2 (Two) Times a Day.    metoprolol tartrate (LOPRESSOR) 25 MG tablet   No No    Take 1 tablet by mouth Every 12 (Twelve) Hours.    NON FORMULARY   Yes No    Prune Juice QD PRN    omeprazole (priLOSEC) 20 MG capsule   Yes No    Take 1 capsule by mouth Daily.    pregabalin (LYRICA) 75 MG capsule   No No    Take 1 capsule by mouth 2 (Two) Times a Day for 90 days.    primidone (MYSOLINE) 50 MG tablet   No No    TAKE 3 TABLETS TWICE A DAY    warfarin (Coumadin) 4 MG tablet   No No    1 tablet daily or as directed by provider Inr 2-3 Check INR qd dc lovenox once INR 2              Allergies:  No Known Allergies    Objective      Vitals:   Temp:  [98.6 °F (37 °C)-99 °F (37.2 °C)] 99 °F (37.2 °C)  Heart Rate:  [108-119] 108  Resp:  [14-21] 14  BP: (125-142)/(64-77) 142/73  Body mass index is 20.29 kg/m².  Physical Exam:    Constitutional: Patient appears well-developed and well-nourished and in no acute distress   HEENT:   Head: Normocephalic and atraumatic.   Eyes:  Pupils are equal, round, and reactive to light. EOM are intact. Sclera are anicteric and non-injected.  Mouth and Throat: Patient has moist mucous membranes.      Neck: Neck supple.  No thyromegaly present. No lymphadenopathy present. No  masses.     Cardiovascular: Inspection: No JVD present. Palpation:bilaterally. No leg edema. Auscultation: Regular rate, regular rhythm, S1 normal and S2 normal. reveals no gallop and  no friction rub. No Carotid bruit bilaterally.    Pulmonary/Chest: Inspection: No distress, no use of accessory muscles. Lungs are clear to auscultation bilaterally. No respiratory distress. No wheezes. No rhonchi. No rales.     Abdomen /Gastrointestinal: Inspection: no distension. Palpation: no masses, no organomegaly. Soft. There is no tenderness. Bowel sounds are normal.   Extremities no cyanosis clubbing or edema    Neurological: Patient is alert and oriented to person, place, and time. Cranial nerves II-XII are grossly intact with no focal deficits. Sensori-motor exam is normal. No cerebellar signs.    Skin: Skin is warm. No rash noted. Nails show no clubbing.  No cyanosis or erythema. No bruising.      Diagnostic Data:  Results from last 7 days   Lab Units 08/17/24  1216   WBC 10*3/mm3 17.12*   HEMOGLOBIN g/dL 12.6*   HEMATOCRIT % 41.0   PLATELETS 10*3/mm3 304   GLUCOSE mg/dL 154*   CREATININE mg/dL 1.41*   BUN mg/dL 37*   SODIUM mmol/L 142   POTASSIUM mmol/L 5.1   AST (SGOT) U/L 27   ALT (SGPT) U/L 9   ALK PHOS U/L 168*   BILIRUBIN mg/dL 0.2   ANION GAP mmol/L 16.9*       XR Chest 1 View    Result Date: 8/17/2024  Impression: Left lower lung airspace disease compatible with pneumonia and small effusion. Clinical correlation and follow-up to resolution recommended Electronically Signed: Moisés Clark MD  8/17/2024 1:05 PM EDT  Workstation ID: [id number]     Results for orders placed or performed during the hospital encounter of 04/05/24   Blood Culture - Blood, Wrist, Left    Specimen: Wrist, Left; Blood   Result Value Ref Range    Blood Culture No growth at 5 days        I have personally reviewed the patient's new results.       Assessment & Plan        Active and Resolved Problems    Healthcare associated pneumonia   Acute hypoxemic respiratory failure  Toxic metabolic encephalopathy  Dysphagia  Diabetes mellitus   Hypertension  Hyperlipidemia  DNR code status  On warfarin with INR of 4  Last  hospitalization was for septic shock and pneumonia in April of 2024   History of Afib/flutter on warfarin  Protein calorie malnutrition  Diabetes mellitus  Failure to thrive  BPH  History of dysphasia  Chronic low back pain/lumbar spine stenosis with neurogenic claudication  History of cervical spine stenosis    Suggestion    At this time will admit this patient in hospital  Start broad-spectrum antibiotic in the form of vancomycin and Unasyn  IV fluid  Will get a CT scan of chest  Panculture  Speech to see this patient  At this time I think this patient's prognosis is poor.  Family wants high doses of morphine.        VTE Prophylaxis:  Mechanical VTE prophylaxis orders are signed & held.          The patient desires to be as follows:    CODE STATUS:    Medical Intervention Limits: No intubation (DNI); No cardioversion  Code Status (Patient has no pulse and is not breathing): No CPR (Do Not Attempt to Resuscitate)  Medical Interventions (Patient has pulse or is breathing): Limited Support        Admission Status:      Expected Length of Stay:  3-4 days    PDMP and Medication Dispenses via Sidebar reviewed and consistent with patient reported medications.        Signature:     This document has been electronically signed by Alexia Mena MD on August 17, 2024 13:42 EDT   [facility] Hospitalist Team

## 2024-08-17 NOTE — PLAN OF CARE
Goal Outcome Evaluation:      According to nursing patient is not alert enough for swallow evaluation. ST will f/u tomorrow.

## 2024-08-17 NOTE — DISCHARGE PLACEMENT REQUEST
"Josi Andrews (78 y.o. Male)       Date of Birth   1945    Social Security Number       Address   [address] [place] [address] [place] [facility]    Home Phone   [phone]    MRN   [id number]       [place]   Sikhism    Marital Status                               Admission Date   8/17/24    Admission Type   Emergency    Admitting Provider       Attending Provider   Kevin Castellano MD    Department, Room/Bed   [facility] EMERGENCY DEPARTMENT, 09/09       Discharge Date       Discharge Disposition       Discharge Destination                                 Attending Provider: Kevin Castellano MD    Allergies: No Known Allergies    Isolation: None   Infection: None   Code Status: Prior    Ht: 180.3 cm (71\")   Wt: --    Admission Cmt: None   Principal Problem: None                  Active Insurance as of 8/17/2024       Primary Coverage       Payor Plan Insurance Group Employer/Plan Group    MEDICARE MEDICARE A & B        Payor Plan Address Payor Plan Phone Number Payor Plan Fax Number Effective Dates    [address] [phone]  9/1/2008 - None Entered    [facility] [id number]         Subscriber Name Subscriber Birth Date Member ID       JOSI ANDREWS 1945 [id number]               Secondary Coverage       Payor Plan Insurance Group Employer/Plan Group     FOR LIFE  FOR LIFE  SUP         Payor Plan Address Payor Plan Phone Number Payor Plan Fax Number Effective Dates    [address] [phone]  11/27/2019 - None Entered    [facility] [address]         Subscriber Name Subscriber Birth Date Member ID       JOSI ANDREWS 1945 [id number]                     Emergency Contacts        (Rel.) Home Phone Work Phone Mobile Phone    RANGEL ANDREWS (Spouse) [phone] -- --    SHAWNCAYLA (Daughter) -- -- [phone]              History & Physical    No notes of this type exist for this encounter.       "

## 2024-08-17 NOTE — CASE MANAGEMENT/SOCIAL WORK
Continued Stay Note  MARIO Larsen     Patient Name: Lux Bray  MRN: [id number]  Today's Date: 8/17/2024    Admit Date: 8/17/2024        Discharge Plan       Row Name 08/17/24 1332       Plan    Plan Comments CM received phone call from [place] with [facility] stating that they brought one of their patients into the ER.  added [facility] to [facility] and notified ER CM.                            Maria D Trejo RN

## 2024-08-17 NOTE — ED NOTES
Per RN from [facility], these orders started 8/16    Morphine 5mg 100mg/5mL Q4  Morphine 5mg 100mg/5mL Q1 PRN  Lorazepam 0.5mg 2mg/mL Q4 PRN  Hyoscyamine   Zofran

## 2024-08-17 NOTE — PROGRESS NOTES
Attempted airvo due to oxygen desaturation. Patient on 60L and 100%. Sats remained 87-88%. Placed 100% non rebreather over airvo since patient has his mouth open. Patient sats now above 90%.

## 2024-08-18 PROBLEM — Z51.5 END OF LIFE CARE: Status: ACTIVE | Noted: 2024-01-01

## 2024-08-18 NOTE — NURSING NOTE
Patient family has made decision to go with [facility]. Dr. Haynes is aware of the decision. Currently waiting patient to transfer to [facility] in the system       Talked Dr. Haynes about pain management for this patient. Dr. Haynes agreed with ativan Q2 and morphine Q1 until the full transfer done in the system for patient comfort

## 2024-08-18 NOTE — PROGRESS NOTES
Administered the [facility] [employer] [facility] per request of wife.  Wife and daughter who was present are both in agreement for comfort care.

## 2024-08-18 NOTE — CONSULTS
Group: [facility]         CONSULT NOTE    Patient Identification:  Lux Bray  78 y.o.  male  1945  [id number]            Requesting physician: Attending physician    Reason for Consultation: Hypoxemia      History of Present Illness:  78-year-old male with history of HTN, HLD, NIDDM and chronic pain syndrome who is under hospice care at the nursing facility and was admitted 8/17/2024 with increased shortness of breath, lethargy and hypoxemia.  He was treated for pneumonia April 2024.  Temperature 99.2, pulse 104, blood pressure 121/56, oxygen saturation 96% while on Airvo 60 L/ 89%  Creatinine 1.31, INR 4.7, WBCs 19.8, hemoglobin 12.1, respiratory panel is negative.  CT scan of the chest revealed multifocal opacification with small right pleural effusion    Assessment:  Hypoxemia  Pneumonia due to unspecified pathogen  Altered mental status due to toxic metabolic encephalopathy  Dysphagia  DM  HTN  HLD  Atrial fibrillation  Patient on chronic anticoagulation with warfarin: INR 4 on admission  Chronic back pain  Immobility syndrome, wheelchair-bound      Recommendations:  Antibiotics: Unasyn  Oxygen supplement and titration to maintain saturation 90 to 95%  Bronchodilators    Glucose control  Prevacid  Lyrica      I personally reviewed the radiological studies      Review of Sytems:  Unobtainable due to mental status    Past Medical History:  Past Medical History:   Diagnosis Date    ADHD (attention deficit hyperactivity disorder)     Allergic     Arthritis     Colon polyp     Depression     Diabetes mellitus type 2    controlled by oral meds    Fibromyalgia, primary     HL (hearing loss)     Hyperlipidemia     Hypertension        Past Surgical History:  Past Surgical History:   Procedure Laterality Date    COLONOSCOPY      ENDOSCOPY N/A 11/15/2023    Procedure: ESOPHAGOGASTRODUODENOSCOPY;  Surgeon: George Viveros MD;  [place] [facility] [place];  Service: Gastroenterology;  Laterality: N/A;   popssible candida, irrgular z-line, gastritis    EYE SURGERY  2023    Cataracts removed: both eyes    HAND SURGERY  1980 1980-1990- [facility]. [facility] from [facility].    HEMORROIDECTOMY  1984    [facility]. Abstracted from [facility].    TONSILLECTOMY  1940's    VASECTOMY          Home Meds:  Medications Prior to Admission   Medication Sig Dispense Refill Last Dose    amiodarone (PACERONE) 200 MG tablet Take 1 tablet by mouth Every Night.       busPIRone (BUSPAR) 10 MG tablet Take 1 tablet by mouth 2 (Two) Times a Day.       carbidopa-levodopa (SINEMET)  MG per tablet Take 1 tablet by mouth 3 (Three) Times a Day.       dicyclomine (BENTYL) 10 MG capsule Take 1 capsule by mouth 4 (Four) Times a Day Before Meals & at Bedtime. 540 capsule 1     DULoxetine (CYMBALTA) 30 MG capsule Take 3 capsules by mouth Daily. 270 capsule 3     empagliflozin (Jardiance) 10 MG tablet tablet Take 1 tablet by mouth Daily. 30 tablet 0     glimepiride (AMARYL) 2 MG tablet Take 3 tablets by mouth Every Morning Before Breakfast.       HYDROcodone-acetaminophen (NORCO)  MG per tablet Take 1 tablet by mouth Every 8 (Eight) Hours.       lactulose (CHRONULAC) 10 GM/15ML solution Take 30 mL by mouth Daily As Needed.       metFORMIN (GLUCOPHAGE) 500 MG tablet Take 2 tablets by mouth 2 (Two) Times a Day. 360 tablet 3     Morphine (MS CONTIN) 15 MG 12 hr tablet Take 1 tablet by mouth 2 (Two) Times a Day.       omeprazole (priLOSEC) 20 MG capsule Take 1 capsule by mouth Daily.       ondansetron (ZOFRAN) 4 MG tablet Take 1 tablet by mouth Every 4 (Four) Hours As Needed for Nausea or Vomiting.       PARoxetine (Paxil) 40 MG tablet Take 1 tablet by mouth Every Morning.       polyethylene glycol (MiraLax) 17 GM/SCOOP powder Take 17 g by mouth Daily.       pregabalin (LYRICA) 75 MG capsule Take 1 capsule by mouth 2 (Two) Times a Day for 90 days. 180 capsule 0     primidone (MYSOLINE) 50 MG tablet TAKE 3 TABLETS TWICE A  tablet 1      sennosides-docusate (senna-docusate sodium) 8.6-50 MG per tablet Take 2 tablets by mouth 2 (Two) Times a Day.       warfarin (Coumadin) 4 MG tablet 1 tablet daily or as directed by provider Inr 2-3 Check INR qd dc lovenox once INR 2 30 tablet 3        Allergies:  No Known Allergies    Social History:   Social History     Socioeconomic History    Marital status:    Tobacco Use    Smoking status: Former     Current packs/day: 0.00     Average packs/day: 0.3 packs/day for 5.0 years (1.3 ttl pk-yrs)     Types: Cigarettes     Start date: 2/10/1966     Quit date: 2/10/1971     Years since quittin.5    Smokeless tobacco: Never   Vaping Use    Vaping status: Never Used   Substance and Sexual Activity    Alcohol use: Not Currently     Alcohol/week: 2.0 standard drinks of alcohol     Types: 1 Glasses of wine, 1 Cans of beer per week    Drug use: No    Sexual activity: Yes     Partners: Female     Birth control/protection: None       Family History:  Family History   Problem Relation Age of Onset    Diabetes Mother         type2    Stroke Mother     Diabetes Father         type2    Colon cancer Neg Hx     Colon polyps Neg Hx     Crohn's disease Neg Hx     Irritable bowel syndrome Neg Hx     Ulcerative colitis Neg Hx        Physical Exam:  /56 (BP Location: Right arm, Patient Position: Lying)   Pulse 104   Temp 99.2 °F (37.3 °C) (Oral)   Resp 15   Wt 66 kg (145 lb 8.1 oz)   SpO2 96%   BMI 20.29 kg/m²  Body mass index is 20.29 kg/m². 96% 66 kg (145 lb 8.1 oz)  General Appearance: Patient is nonverbal  HEENT:  Normocephalic, without obvious abnormality, Conjunctiva/corneas clear,.   Nares normal, no drainage     Neck:  Supple, symmetrical, trachea midline. No JVD.  Lungs /Chest wall:   Bilateral basal rhonchi, respirations unlabored, symmetrical wall movement.     Heart:  Regular rate and rhythm, S1 S2 normal  Abdomen: Soft, non-tender, no masses, no organomegaly.    Extremities: No edema, no clubbing or  cyanosis    LABS:  Lab Results   Component Value Date    CALCIUM 9.3 08/17/2024    PHOS 3.2 04/16/2024     Results from last 7 days   Lab Units 08/18/24  0135 08/17/24  2307 08/17/24  1731 08/17/24  1216   MAGNESIUM mg/dL  --   --   --  2.1   SODIUM mmol/L  --  146*  --  142   POTASSIUM mmol/L  --  4.5  --  5.1   CHLORIDE mmol/L  --  103  --  100   CO2 mmol/L  --  22.1  --  25.1   BUN mg/dL  --  33*  --  37*   CREATININE mg/dL  --  1.31*  --  1.41*   GLUCOSE mg/dL  --  148*  --  154*   CALCIUM mg/dL  --  9.3  --  9.8   WBC 10*3/mm3 19.85*  --   --  17.12*   HEMOGLOBIN g/dL 12.1*  --   --  12.6*   PLATELETS 10*3/mm3 283  --   --  304   ALT (SGPT) U/L  --  10  --  9   AST (SGOT) U/L  --  27  --  27   PROBNP pg/mL  --   --  558.0 332.0   PROCALCITONIN ng/mL  --   --   --  0.27*     Lab Results   Component Value Date    CKTOTAL 13 (L) 04/11/2024    TROPONINT 39 (H) 08/17/2024     Results from last 7 days   Lab Units 08/17/24  1216   HSTROP T ng/L 39*         Results from last 7 days   Lab Units 08/17/24  1216   PROCALCITONIN ng/mL 0.27*     Results from last 7 days   Lab Units 08/17/24  1221   MODALITY  NRB     Results from last 7 days   Lab Units 08/17/24  1216   ADENOVIRUS DETECTION BY PCR  Not Detected   CORONAVIRUS 229E  Not Detected   CORONAVIRUS HKU1  Not Detected   CORONAVIRUS NL63  Not Detected   CORONAVIRUS OC43  Not Detected   HUMAN METAPNEUMOVIRUS  Not Detected   HUMAN RHINOVIRUS/ENTEROVIRUS  Not Detected   INFLUENZA B PCR  Not Detected   PARAINFLUENZA 1  Not Detected   PARAINFLUENZA VIRUS 2  Not Detected   PARAINFLUENZA VIRUS 3  Not Detected   PARAINFLUENZA VIRUS 4  Not Detected   BORDETELLA PERTUSSIS PCR  Not Detected   CHLAMYDOPHILA PNEUMONIAE PCR  Not Detected   MYCOPLAMA PNEUMO PCR  Not Detected   INFLUENZA A PCR  Not Detected   RSV, PCR  Not Detected     Results from last 7 days   Lab Units 08/18/24  0020 08/17/24  1216   INR  4.72* 4.01*         Lab Results   Component Value Date    TSH 1.180  04/05/2024     Estimated Creatinine Clearance: 42 mL/min (A) (by C-G formula based on SCr of 1.31 mg/dL (H)).         Imaging:  Imaging Results (Last 24 Hours)       Procedure Component Value Units Date/Time    CT Chest Without Contrast Diagnostic [[id number]] Collected: 08/17/24 1620     Updated: 08/17/24 1632    Narrative:      CT CHEST WO CONTRAST DIAGNOSTIC    Date of Exam: 8/17/2024 3:11 PM EDT    Indication: pneumonia.    Comparison: Chest radiograph 8/17/2024. Chest CT 4/13/2024.    Technique: Axial CT images were obtained of the chest without contrast administration.  Sagittal and coronal reconstructions were performed.  Automated exposure control and iterative reconstruction methods were used.      Findings:  Thyroid and thoracic inlet: Normal.    Lymph nodes: No pathologic appearing lymph nodes by imaging criteria.    Cardiovascular: Normal appearing heart size. Trace pericardial effusion. Aorta and main pulmonary artery diameters are within normal range.. There are coronary artery calcifications.    Esophagus: Small hiatal hernia.    Lung parenchyma: Multifocal consolidative and groundglass opacities in all 5 lobes. Additional more conspicuous consolidative opacities in the left greater than right lower lobes.    Airways: Collapse of left lower lobe bronchus which could be secondary to consolidation/infection in left lower lobe and/or mucous plugging. Trachea and mainstem bronchi are otherwise patent.    Pleura: Small right pleural effusion. No definite left pleural effusion. No pneumothorax.    Chest wall and osseous structures: Degenerative changes of the imaged spine. No acute osseous abnormality.    Included upper abdomen: Nonobstructing right renal calculus.      Impression:      Impression:  Multifocal consolidative and groundglass opacities suspicious for multifocal pneumonia. More conspicuous consolidative opacities in the bilateral lower lobes may represent additional foci of infection and/or  atelectasis.    Small right pleural effusion.    Electronically Signed: Raad Macdonald    8/17/2024 4:30 PM EDT    Workstation ID: [id number]    XR Chest 1 View [[id number]] Collected: 08/17/24 1304     Updated: 08/17/24 1308    Narrative:      XR CHEST 1 VW    Date of Exam: 8/17/2024 1:03 PM EDT    Indication: soa    Comparison: None available.    Findings:  Diffuse airspace disease in the left lower lung region compatible with pneumonia. Blunted left costophrenic angle compatible with an effusion. The right costophrenic angle is sharp. No pneumothorax. The pulmonary vasculature appears within normal limits.   The cardiac and mediastinal silhouette appear unremarkable. No acute osseous abnormality identified.       Impression:      Impression:  Left lower lung airspace disease compatible with pneumonia and small effusion. Clinical correlation and follow-up to resolution recommended    Electronically Signed: Moisés Clark MD    8/17/2024 1:05 PM EDT    Workstation ID: [id number]              Current Meds:   SCHEDULE  ampicillin-sulbactam, 3 g, Intravenous, Q6H  insulin lispro, 2-9 Units, Subcutaneous, 4x Daily AC & at Bedtime  lansoprazole, 30 mg, Oral, QAM AC  morphine sulfate (concentrate), 5 mg, Oral, Q4H  pregabalin, 75 mg, Oral, BID  primidone, 150 mg, Oral, BID  sodium chloride, 10 mL, Intravenous, Q12H      Infusions  sodium chloride, 75 mL/hr, Last Rate: 75 mL/hr (08/18/24 0818)      PRNs    acetaminophen **OR** acetaminophen **OR** acetaminophen    senna-docusate sodium **AND** polyethylene glycol **AND** bisacodyl **AND** bisacodyl    Calcium Replacement - Follow Nurse / BPA Driven Protocol    dextrose    dextrose    glucagon (human recombinant)    HYDROcodone-acetaminophen    hyoscyamine    ipratropium-albuterol    LORazepam    Magnesium Standard Dose Replacement - Follow Nurse / BPA Driven Protocol    Morphine    morphine sulfate (concentrate)    [DISCONTINUED] Morphine **AND** naloxone    ondansetron     ondansetron ODT **OR** [DISCONTINUED] ondansetron    Phosphorus Replacement - Follow Nurse / BPA Driven Protocol    Potassium Replacement - Follow Nurse / BPA Driven Protocol    [COMPLETED] Insert Peripheral IV **AND** sodium chloride    sodium chloride    sodium chloride        Ciro Haque MD  8/18/2024  09:46 EDT      Much of this encounter note is an electronic transcription/translation of spoken language to printed text using Dragon Software.

## 2024-08-18 NOTE — NURSING NOTE
Patient officially transferred under [facility]. Talked to family about pain medication schedule. Support provided.

## 2024-08-18 NOTE — CASE MANAGEMENT/SOCIAL WORK
Continued Stay Note  MARIO Larsen     Patient Name: Lux Bray  MRN: [id number]  Today's Date: 8/18/2024    Admit Date: 8/17/2024      Discharge Plan       Row Name 08/18/24 0914       Plan    Plan Comments CM notified by JUAN RAMON Childress that pt has inpatient hospice consult in place as of this morning. DODIE notified [place] with [facility]. Aubrey with [facility] to see pt today. JUAN RAMON Childress notified.               Melissa Chowdhury RN     Office Phone: [phone]  Office Cell: [phone]

## 2024-08-18 NOTE — PLAN OF CARE
Goal Outcome Evaluation:  Plan of Care Reviewed With: patient        Progress: no change  Outcome Evaluation: Patient admitted with hypoxemia, pneumonia. Patient arouses to pain only, does not speak. On airvo with nonrebreather. IVF running. VSS at this time. Turning patient every 2 hours. IV abx given. Family updated on plan of care

## 2024-08-18 NOTE — DISCHARGE SUMMARY
[facility] [address] [facility] Medicine Services  Discharge Summary    Date of Service: 24  Patient Name: Lux Bray  : 1945  MRN: [id number]    Date of Admission: 2024  Discharge Diagnosis: Healthcare associated pneumonia   Acute hypoxemic respiratory failure  Date of Discharge:  24  Primary Care Physician: Montana Morrow MD      Presenting Problem:   Hypoxemia [R09.02]  Hypoxia [R09.02]  Pneumonia of left lower lobe due to infectious organism [J18.9]    Active and Resolved Hospital Problems:  Active Hospital Problems    Diagnosis POA    **Hypoxemia [R09.02] Yes    High cholesterol [E78.00] Yes    Gastroesophageal reflux disease without esophagitis [K21.9] Yes    Vitamin D deficiency, unspecified  [E55.9] Yes    Iron deficiency anemia due to chronic blood loss [D50.0] Yes    Arthritis [M19.90] Yes    Lumbago with sciatica, right side [M54.41] Yes    Irritable bowel syndrome without diarrhea [K58.9] Yes    Enlarged prostate with lower urinary tract symptoms (LUTS) [N40.1] Yes    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         Hospital Course     HPI: This 78-year-old male who lives in a nursing facility and is under hospice care.  Today patient was noted to be hypoxemic and patient was also having increasing shortness of breath for which EMS was called and and he was found to have O2 sat in 70s.  Patient is sick-looking lethargic and nonverbal.  Blood pressure stable.  Lab work showed hypoxemia and WBC count is 17 with INR of 4 with WBC count of 17 and chest x-ray showed left lower lobe pneumonia.  Patient is currently being admitted for further management.  Patient's daughter was at the bedside who requested to continue frequent IV morphine as per hospice suggestion.  Of note the patient has been obtunded and has significant  dysphagia according to family.  Patient is unable to swallow his pills.    Review of Systems    Unable to obtain  as patient is obtunded    Hospital Course:  Patient was admitted to the medical floor.  Pulmonary, he was started IV antibiotics and oxygen titration.  Decided to make patient hospice and he was admitted to [facility]    Day of Discharge     Vital Signs:  Temp:  [98.6 °F (37 °C)-99.2 °F (37.3 °C)] 99.2 °F (37.3 °C)  Heart Rate:  [103-130] 104  Resp:  [12-21] 15  BP: (120-137)/(56-81) 121/56  Flow (L/min):  [60] 60      Physical Exam      Constitutional: Patient appears well-developed and well-nourished [place] ill.  Dyspnea at rest on high flow oxygen.  HEENT:   Head: Normocephalic and atraumatic.   Eyes:  Pupils are equal, round, and reactive to light. EOM are intact. Sclera are anicteric and non-injected.  Mouth and Throat: Patient has moist mucous membranes.       Neck: Neck supple.  No thyromegaly present. No lymphadenopathy present. No  masses.      Cardiovascular: Inspection: No JVD present. Palpation:bilaterally. No leg edema. Auscultation: Regular rate, regular rhythm, S1 normal and S2 normal. reveals no gallop and no friction rub. No Carotid bruit bilaterally.     Pulmonary/Chest: Inspection: Dyspnea at rest, on high flow oxygen 60 L.     Abdomen /Gastrointestinal: Inspection: no distension. Palpation: no masses, no organomegaly. Soft. There is no tenderness. Bowel sounds are normal.   Extremities no cyanosis clubbing or edema     Neurological: Does not follow commands     Skin: Skin is warm. No rash noted. Nails show no clubbing.  No cyanosis or erythema. No bruising.         Pertinent  and/or Most Recent Results     LAB RESULTS:      Lab 08/18/24  0135 08/18/24  0020 08/17/24  2307 08/17/24  1216   WBC 19.85*  --   --  17.12*   HEMOGLOBIN 12.1*  --   --  12.6*   HEMATOCRIT 39.6  --   --  41.0   PLATELETS 283  --   --  304   NEUTROS ABS 16.67*  --   --  13.72*   IMMATURE GRANS (ABS)  --   --   --   0.09*   LYMPHS ABS  --   --   --  2.00   MONOS ABS  --   --   --  1.20*   EOS ABS  --   --   --  0.06   MCV 96.1  --   --  94.5   SED RATE 96*  --   --   --    CRP  --   --  18.60*  --    PROCALCITONIN  --   --   --  0.27*   PROTIME  --  45.9*  --  39.4*   APTT  --   --   --  64.6         Lab 08/18/24  0135 08/17/24  2307 08/17/24  1216   SODIUM  --  146* 142   POTASSIUM  --  4.5 5.1   CHLORIDE  --  103 100   CO2  --  22.1 25.1   ANION GAP  --  20.9* 16.9*   BUN  --  33* 37*   CREATININE  --  1.31* 1.41*   EGFR  --  55.0* 50.4*   GLUCOSE  --  148* 154*   CALCIUM  --  9.3 9.8   MAGNESIUM  --   --  2.1   HEMOGLOBIN A1C 6.54*  --   --          Lab 08/17/24  2307 08/17/24  1216   TOTAL PROTEIN 6.7 7.5   ALBUMIN 3.4* 3.8   GLOBULIN 3.3 3.7   ALT (SGPT) 10 9   AST (SGOT) 27 27   BILIRUBIN 0.3 0.2   ALK PHOS 177* 168*         Lab 08/18/24  0020 08/17/24  1731 08/17/24  1216   PROBNP  --  558.0 332.0   HSTROP T  --   --  39*   PROTIME 45.9*  --  39.4*   INR 4.72*  --  4.01*                 Lab 08/17/24  1221   FIO2 100     Brief Urine Lab Results  (Last result in the past 365 days)        Color   Clarity   Blood   Leuk Est   Nitrite   Protein   CREAT   Urine HCG        05/06/24 0300 Yellow   Clear   Negative   Negative   Negative   Trace                 Microbiology Results (last 10 days)       Procedure Component Value - Date/Time    MRSA Screen, PCR (Inpatient) - Swab, Nares [[id number]]  (Normal) Collected: 08/17/24 1542    Lab Status: Final result Specimen: Swab from Nares Updated: 08/17/24 1822     MRSA PCR No MRSA Detected    Narrative:      The negative predictive value of this diagnostic test is high and should only be used to consider de-escalating anti-MRSA therapy. A positive result may indicate colonization with MRSA and must be correlated clinically.    Blood Culture - Blood, Arm, Left [[id number]]  (Normal) Collected: 08/17/24 1249    Lab Status: Preliminary result Specimen: Blood from Arm, Left Updated:  08/18/24 1300     Blood Culture No growth at 24 hours    Respiratory Panel PCR w/COVID-19(SARS-CoV-2) DODIE/JOVITA/THEO/PAD/COR/RAJAT In-House, NP Swab in UTM/VTM, 2 HR TAT - Swab, Nasopharynx [[id number]]  (Normal) Collected: 08/17/24 1216    Lab Status: Final result Specimen: Swab from Nasopharynx Updated: 08/17/24 1315     ADENOVIRUS, PCR Not Detected     Coronavirus 229E Not Detected     Coronavirus HKU1 Not Detected     Coronavirus NL63 Not Detected     Coronavirus OC43 Not Detected     COVID19 Not Detected     Human Metapneumovirus Not Detected     Human Rhinovirus/Enterovirus Not Detected     Influenza A PCR Not Detected     Influenza B PCR Not Detected     Parainfluenza Virus 1 Not Detected     Parainfluenza Virus 2 Not Detected     Parainfluenza Virus 3 Not Detected     Parainfluenza Virus 4 Not Detected     RSV, PCR Not Detected     Bordetella pertussis pcr Not Detected     Bordetella parapertussis PCR Not Detected     Chlamydophila pneumoniae PCR Not Detected     Mycoplasma pneumo by PCR Not Detected    Narrative:      In the setting of a positive respiratory panel with a viral infection PLUS a negative procalcitonin without other underlying concern for bacterial infection, consider observing off antibiotics or discontinuation of antibiotics and continue supportive care. If the respiratory panel is positive for atypical bacterial infection (Bordetella pertussis, Chlamydophila pneumoniae, or Mycoplasma pneumoniae), consider antibiotic de-escalation to target atypical bacterial infection.    Blood Culture - Blood, Arm, Right [[id number]]  (Normal) Collected: 08/17/24 1216    Lab Status: Preliminary result Specimen: Blood from Arm, Right Updated: 08/18/24 1230     Blood Culture No growth at 24 hours            CT Chest Without Contrast Diagnostic    Result Date: 8/17/2024  Impression: Impression: Multifocal consolidative and groundglass opacities suspicious for multifocal pneumonia. More conspicuous consolidative  opacities in the bilateral lower lobes may represent additional foci of infection and/or atelectasis. Small right pleural effusion. Electronically Signed: Raad Macdonald  8/17/2024 4:30 PM EDT  Workstation ID: [id number]    XR Chest 1 View    Result Date: 8/17/2024  Impression: Impression: Left lower lung airspace disease compatible with pneumonia and small effusion. Clinical correlation and follow-up to resolution recommended Electronically Signed: Moissé Clark MD  8/17/2024 1:05 PM EDT  Workstation ID: [id number]             Results for orders placed during the hospital encounter of 04/05/24    Adult Transthoracic Echo Complete W/ Cont if Necessary Per Protocol    Interpretation Summary    Left ventricular systolic function is normal. Left ventricular ejection fraction appears to be 56 - 60%.    Left ventricular wall thickness is consistent with mild concentric hypertrophy.    Left ventricular diastolic function was normal.    Estimated right ventricular systolic pressure from tricuspid regurgitation is normal (<35 mmHg).    There is a small (<1cm) circumferential pericardial effusion. There is no evidence of cardiac tamponade.      Labs Pending at Discharge:  Pending Labs       Order Current Status    CANDIDA AURIS PCR - Swab, Axilla Right, Axilla Left and Groin In process    Blood Culture - Blood, Arm, Left Preliminary result    Blood Culture - Blood, Arm, Right Preliminary result            Procedures Performed           Consults:   Consults       Date and Time Order Name Status Description    8/17/2024  1:32 PM Inpatient Pulmonology Consult Completed     8/17/2024  1:09 PM Hospitalist (on-call MD unless specified)                Discharge Details        Discharge Medications        ASK your doctor about these medications        Instructions Start Date   amiodarone 200 MG tablet  Commonly known as: PACERONE  Ask about: Which instructions should I use?   200 mg, Oral, Nightly      busPIRone 10 MG  tablet  Commonly known as: BUSPAR   10 mg, Oral, 2 Times Daily      carbidopa-levodopa  MG per tablet  Commonly known as: SINEMET   1 tablet, Oral, 3 Times Daily      dicyclomine 10 MG capsule  Commonly known as: BENTYL   10 mg, Oral, 4 Times Daily Before Meals & Nightly      DULoxetine 30 MG capsule  Commonly known as: CYMBALTA   90 mg, Oral, Daily      empagliflozin 10 MG tablet tablet  Commonly known as: Jardiance   10 mg, Oral, Daily      glimepiride 2 MG tablet  Commonly known as: AMARYL   3 tablets, Oral, Every Morning Before Breakfast      HYDROcodone-acetaminophen  MG per tablet  Commonly known as: NORCO  Ask about: Which instructions should I use?   1 tablet, Oral, Every 8 Hours      lactulose 10 GM/15ML solution  Commonly known as: CHRONULAC   20 g, Oral, Daily PRN      metFORMIN 500 MG tablet  Commonly known as: GLUCOPHAGE   1,000 mg, Oral, 2 Times Daily      MiraLax 17 GM/SCOOP powder  Generic drug: polyethylene glycol   17 g, Oral, Daily      Morphine 15 MG 12 hr tablet  Commonly known as: MS CONTIN   15 mg, Oral, 2 Times Daily      omeprazole 20 MG capsule  Commonly known as: priLOSEC   20 mg, Oral, Daily      ondansetron 4 MG tablet  Commonly known as: ZOFRAN   4 mg, Oral, Every 4 Hours PRN      Paxil 40 MG tablet  Generic drug: PARoxetine   40 mg, Oral, Every Morning      pregabalin 75 MG capsule  Commonly known as: LYRICA   75 mg, Oral, 2 Times Daily      primidone 50 MG tablet  Commonly known as: MYSOLINE   150 mg, Oral, 2 Times Daily      sennosides-docusate 8.6-50 MG per tablet  Commonly known as: PERICOLACE   2 tablets, Oral, 2 Times Daily      warfarin 4 MG tablet  Commonly known as: Coumadin   1 tablet daily or as directed by provider Inr 2-3 Check INR qd dc lovenox once INR 2               No Known Allergies      Discharge Disposition:   Short Term Hospital ([facility])    Diet:  Hospital:  Diet Order   Procedures    NPO Diet NPO Type: Strict NPO         Discharge  Activity:         CODE STATUS:  Code Status and Medical Interventions: No CPR (Do Not Attempt to Resuscitate); Limited Support; No intubation (DNI), No cardioversion   Ordered at: 08/17/24 1342     Medical Intervention Limits:    No intubation (DNI)    No cardioversion     Code Status (Patient has no pulse and is not breathing):    No CPR (Do Not Attempt to Resuscitate)     Medical Interventions (Patient has pulse or is breathing):    Limited Support         No future appointments.        Time spent on Discharge including face to face service:  >30 minutes    Signature: Electronically signed by Kendall Haynes MD, 08/18/24, 14:38 EDT.  [facility] Hospitalist Team

## 2024-08-18 NOTE — NURSING NOTE
Inpatient hospice consult obtained this morning. [facility] nurse evaluated the patient and called the family. the Patient family current at bedside and try to decide if they should go with [facility] care. Family refused glucose check at this time

## 2024-08-19 LAB — C AURIS DNA SPEC QL NAA+NON-PROBE: NOT DETECTED

## 2024-08-19 NOTE — CASE MANAGEMENT/SOCIAL WORK
Case Management Discharge Note      Final Note: [facility] hospice- [facility], remained in same room       Final Discharge Disposition Code: 51 - hospice medical facility

## 2024-08-19 NOTE — NURSING NOTE
Pt , pronounce by on call provider, family present at bedside,  home newcomer  notified of  death and will come to  body and transport to facility,  family name and number provided for contact, awaiting call for arrival . Christophe called pt is not a candidate for donation, house manger notified , will call facility of death

## 2024-08-19 NOTE — PLAN OF CARE
Problem: Adult Inpatient Plan of Care  Goal: Plan of Care Review  Outcome: Unable to Meet, Plan Revised  Goal: Patient-Specific Goal (Individualized)  Outcome: Unable to Meet, Plan Revised  Goal: Absence of Hospital-Acquired Illness or Injury  Outcome: Unable to Meet, Plan Revised  Intervention: Identify and Manage Fall Risk  Description: Perform standard risk assessment on admission using a validated tool or comprehensive approach appropriate to the patient; reassess fall risk frequently, with change in status or transfer to another level of care.  Communicate fall injury risk to interprofessional healthcare team.  Determine need for increased observation, equipment and environmental modification, such as low bed, signage and supportive, nonskid footwear.  Adjust safety measures to individual developmental age, stage and identified risk factors.  Reinforce the importance of safety and physical activity with patient and family.  Perform regular intentional rounding to assess need for position change, pain assessment and personal needs, including assistance with toileting.  Recent Flowsheet Documentation  Taken 8/18/2024 2000 by Miah Gipson LPN  Safety Promotion/Fall Prevention:   safety round/check completed   room organization consistent   nonskid shoes/slippers when out of bed   muscle strengthening facilitated   mobility aid in reach   lighting adjusted   fall prevention program maintained   clutter free environment maintained   assistive device/personal items within reach   activity supervised  Intervention: Prevent Skin Injury  Description: Perform a screening for skin injury risk, such as pressure or moisture associated skin damage on admission and at regular intervals throughout hospital stay.  Keep all areas of skin (especially folds) clean and dry.  Maintain adequate skin hydration.  Relieve and redistribute pressure and protect bony prominences; implement measures based on patient-specific risk  factors.  Match turning and repositioning schedule to clinical condition.  Encourage weight shift frequently; assist with reposition if unable to complete independently.  Float heels off bed; avoid pressure on the Achilles tendon.  Keep skin free from extended contact with medical devices.  Encourage functional activity and mobility, as early as tolerated.  Use aids (e.g., slide boards, mechanical lift) during transfer.  Recent Flowsheet Documentation  Taken 8/18/2024 2000 by Miah Gipson LPN  Body Position: patient/family refused  Skin Protection: adhesive use limited  Intervention: Prevent and Manage VTE (Venous Thromboembolism) Risk  Description: Assess for VTE (venous thromboembolism) risk.  Encourage and assist with early ambulation.  Initiate and maintain compression or other therapy, as indicated, based on identified risk in accordance with organizational protocol and provider order.  Encourage both active and passive leg exercises while in bed, if unable to ambulate.  Recent Flowsheet Documentation  Taken 8/18/2024 2000 by Miah Gipson LPN  Activity Management: bedrest  Intervention: Prevent Infection  Description: Maintain skin and mucous membrane integrity; promote hand, oral and pulmonary hygiene.  Optimize fluid balance, nutrition, sleep and glycemic control to maximize infection resistance.  Identify potential sources of infection early to prevent or mitigate progression of infection (e.g., wound, lines, devices).  Evaluate ongoing need for invasive devices; remove promptly when no longer indicated.  Recent Flowsheet Documentation  Taken 8/18/2024 2000 by Miah Gipson LPN  Infection Prevention:   visitors restricted/screened   rest/sleep promoted   single patient room provided   personal protective equipment utilized   hand hygiene promoted  Goal: Optimal Comfort and Wellbeing  Outcome: Unable to Meet, Plan Revised  Goal: Readiness for Transition of Care  Outcome: Unable to Meet,  Plan Revised   Goal Outcome Evaluation:pt passed away                                               Problem: Adult Inpatient Plan of Care  Goal: Plan of Care Review  Outcome: Unable to Meet, Plan Revised  Goal: Patient-Specific Goal (Individualized)  Outcome: Unable to Meet, Plan Revised  Goal: Absence of Hospital-Acquired Illness or Injury  Outcome: Unable to Meet, Plan Revised  Goal: Optimal Comfort and Wellbeing  Outcome: Unable to Meet, Plan Revised  Goal: Readiness for Transition of Care  Outcome: Unable to Meet, Plan Revised

## 2024-08-19 NOTE — PLAN OF CARE
Problem: Adult Inpatient Plan of Care  Goal: Absence of Hospital-Acquired Illness or Injury  Intervention: Identify and Manage Fall Risk  Description: Perform standard risk assessment on admission using a validated tool or comprehensive approach appropriate to the patient; reassess fall risk frequently, with change in status or transfer to another level of care.  Communicate fall injury risk to interprofessional healthcare team.  Determine need for increased observation, equipment and environmental modification, such as low bed, signage and supportive, nonskid footwear.  Adjust safety measures to individual developmental age, stage and identified risk factors.  Reinforce the importance of safety and physical activity with patient and family.  Perform regular intentional rounding to assess need for position change, pain assessment and personal needs, including assistance with toileting.  Recent Flowsheet Documentation  Taken 8/18/2024 2000 by Miah Gipson LPN  Safety Promotion/Fall Prevention:   safety round/check completed   room organization consistent   nonskid shoes/slippers when out of bed   muscle strengthening facilitated   mobility aid in reach   lighting adjusted   fall prevention program maintained   clutter free environment maintained   assistive device/personal items within reach   activity supervised  Intervention: Prevent Skin Injury  Description: Perform a screening for skin injury risk, such as pressure or moisture associated skin damage on admission and at regular intervals throughout hospital stay.  Keep all areas of skin (especially folds) clean and dry.  Maintain adequate skin hydration.  Relieve and redistribute pressure and protect bony prominences; implement measures based on patient-specific risk factors.  Match turning and repositioning schedule to clinical condition.  Encourage weight shift frequently; assist with reposition if unable to complete independently.  Float heels off  bed; avoid pressure on the Achilles tendon.  Keep skin free from extended contact with medical devices.  Encourage functional activity and mobility, as early as tolerated.  Use aids (e.g., slide boards, mechanical lift) during transfer.  Recent Flowsheet Documentation  Taken 8/18/2024 2000 by Miah Gipson LPN  Body Position: patient/family refused  Skin Protection: adhesive use limited  Intervention: Prevent and Manage VTE (Venous Thromboembolism) Risk  Description: Assess for VTE (venous thromboembolism) risk.  Encourage and assist with early ambulation.  Initiate and maintain compression or other therapy, as indicated, based on identified risk in accordance with organizational protocol and provider order.  Encourage both active and passive leg exercises while in bed, if unable to ambulate.  Recent Flowsheet Documentation  Taken 8/18/2024 2000 by Miah Gipson LPN  Activity Management: bedrest  Intervention: Prevent Infection  Description: Maintain skin and mucous membrane integrity; promote hand, oral and pulmonary hygiene.  Optimize fluid balance, nutrition, sleep and glycemic control to maximize infection resistance.  Identify potential sources of infection early to prevent or mitigate progression of infection (e.g., wound, lines, devices).  Evaluate ongoing need for invasive devices; remove promptly when no longer indicated.  Recent Flowsheet Documentation  Taken 8/18/2024 2000 by Miah Gipson LPN  Infection Prevention:   visitors restricted/screened   rest/sleep promoted   single patient room provided   personal protective equipment utilized   hand hygiene promoted   Goal Outcome Evaluation:pt has passed away

## 2024-08-19 NOTE — H&P
[facility] Medicine Services  History & Physical    Patient Name: Lux Bray  : 1945  MRN: [id number]  Primary Care Physician:  Montana Morrow MD  Date of admission: 2024  Date and Time of Service: 2024    Subjective      Chief Complaint:  hypoxemia    History of Present Illness:    This 78-year-old male who lives in a nursing facility and is under hospice care.  Today patient was noted to be hypoxemic and patient was also having increasing shortness of breath for which EMS was called and and he was found to have O2 sat in 70s.  Patient is sick-looking lethargic and nonverbal.  Blood pressure stable.  Lab work showed hypoxemia and WBC count is 17 with INR of 4 with WBC count of 17 and chest x-ray showed left lower lobe pneumonia.  Patient is currently being admitted for further management.  Patient's daughter was at the bedside who requested to continue frequent IV morphine as per hospice suggestion.  Of note the patient has been obtunded and has significant dysphagia according to family.  Patient is unable to swallow his pills.      Hospital Course:  Patient was admitted to the medical floor.  Pulmonary, he was started IV antibiotics and oxygen titration.  Decided to make patient hospice and he was admitted to [facility]     Review of Systems    Unable to obtain  as patient is obtunded    Personal History     Past Medical History:   Diagnosis Date    ADHD (attention deficit hyperactivity disorder)     Allergic     Arthritis     Colon polyp     Depression     Diabetes mellitus type 2    controlled by oral meds    Fibromyalgia, primary     HL (hearing loss)     Hyperlipidemia     Hypertension        Past Surgical History:   Procedure Laterality Date    COLONOSCOPY      ENDOSCOPY N/A 11/15/2023    Procedure: ESOPHAGOGASTRODUODENOSCOPY;  Surgeon: George Viveros MD;  Location: [facility] MAIN OR;  Service: Gastroenterology;  Laterality: N/A;  popssible candida, irrgular z-line, gastritis     EYE SURGERY  2023    Cataracts removed: both eyes    HAND SURGERY  1980 1980-1990- [facility]. Abstracted from [facility].    HEMORROIDECTOMY  1984    [facility]. [facility] from [facility].    TONSILLECTOMY  1940's    VASECTOMY         Family History: family history includes Diabetes in his father and mother; Stroke in his mother. Otherwise pertinent FHx was reviewed and not pertinent to current issue.    Social History:  reports that he quit smoking about 53 years ago. His smoking use included cigarettes. He started smoking about 58 years ago. He has a 1.3 pack-year smoking history. He has never used smokeless tobacco. He reports that he does not currently use alcohol after a past usage of about 2.0 standard drinks of alcohol per week. He reports that he does not use drugs.    Home Medications:  Prior to Admission Medications       Prescriptions Last Dose Informant Patient Reported? Taking?    acetaminophen (TYLENOL) 325 MG tablet   Yes No    Take 2 tablets by mouth Every 6 (Six) Hours As Needed for Mild Pain.    amiodarone (PACERONE) 200 MG tablet   No No    Take 1 tablet by mouth Daily.    atorvastatin (LIPITOR) 20 MG tablet   No No    Take 1 tablet by mouth Daily.    bisacodyl (DULCOLAX) 10 MG suppository   Yes No    Insert 1 suppository into the rectum Daily As Needed for Constipation.    dicyclomine (BENTYL) 10 MG capsule   No No    Take 1 capsule by mouth 4 (Four) Times a Day Before Meals & at Bedtime.    docusate sodium (COLACE) 100 MG capsule   Yes No    Take 1 capsule by mouth 2 (Two) Times a Day.    DULoxetine (CYMBALTA) 30 MG capsule   No No    Take 3 capsules by mouth Daily.    empagliflozin (Jardiance) 10 MG tablet tablet   No No    Take 1 tablet by mouth Daily.    HYDROcodone-acetaminophen (NORCO)  MG per tablet   No No    Take 1 tablet by mouth 4 (Four) Times a Day As Needed for Moderate Pain or Severe Pain.    ipratropium-albuterol (DUO-NEB) 0.5-2.5 mg/3 ml nebulizer   Yes No    Take 3 mL by  nebulization Every 4 (Four) Hours As Needed for Wheezing.    ketoconazole (NIZORAL) 2 % cream   No No    Apply 1 Application topically to the appropriate area as directed Daily.    l-methylfolate-algae-B6-B12 (METANX) 3-90.314-2-35 MG capsule capsule   Yes No    Take 1 capsule by mouth Every 12 (Twelve) Hours.    magnesium hydroxide (MILK OF MAGNESIA) 2400 MG/10ML suspension suspension   Yes No    Take 30 mL by mouth Daily As Needed. Administer if no BM x3 days and after prune juice is given and ineffective after 8 hours (if not a renal patient)  If no BM after 1st and 2nd step bowl regimen is given, notify MD/APRN for further instructions/guidance    metFORMIN (GLUCOPHAGE) 500 MG tablet   No No    Take 2 tablets by mouth 2 (Two) Times a Day.    metoprolol tartrate (LOPRESSOR) 25 MG tablet   No No    Take 1 tablet by mouth Every 12 (Twelve) Hours.    NON FORMULARY   Yes No    Prune Juice QD PRN    omeprazole (priLOSEC) 20 MG capsule   Yes No    Take 1 capsule by mouth Daily.    pregabalin (LYRICA) 75 MG capsule   No No    Take 1 capsule by mouth 2 (Two) Times a Day for 90 days.    primidone (MYSOLINE) 50 MG tablet   No No    TAKE 3 TABLETS TWICE A DAY    warfarin (Coumadin) 4 MG tablet   No No    1 tablet daily or as directed by provider Inr 2-3 Check INR qd dc lovenox once INR 2              Allergies:  No Known Allergies    Objective      Vitals:      There is no height or weight on file to calculate BMI.  Physical Exam:    Constitutional: Patient appears well-developed and well-nourished and in no acute distress   HEENT:   Head: Normocephalic and atraumatic.   Eyes:  Pupils are equal, round, and reactive to light. EOM are intact. Sclera are anicteric and non-injected.  Mouth and Throat: Patient has moist mucous membranes.      Neck: Neck supple.  No thyromegaly present. No lymphadenopathy present. No  masses.     Cardiovascular: Inspection: No JVD present. Palpation:bilaterally. No leg edema. Auscultation:  Regular rate, regular rhythm, S1 normal and S2 normal. reveals no gallop and no friction rub. No Carotid bruit bilaterally.    Pulmonary/Chest: Inspection: No distress, no use of accessory muscles. Lungs are clear to auscultation bilaterally. No respiratory distress. No wheezes. No rhonchi. No rales.     Abdomen /Gastrointestinal: Inspection: no distension. Palpation: no masses, no organomegaly. Soft. There is no tenderness. Bowel sounds are normal.   Extremities no cyanosis clubbing or edema    Neurological: Patient is alert and oriented to person, place, and time. Cranial nerves II-XII are grossly intact with no focal deficits. Sensori-motor exam is normal. No cerebellar signs.    Skin: Skin is warm. No rash noted. Nails show no clubbing.  No cyanosis or erythema. No bruising.      Diagnostic Data:  Results from last 7 days   Lab Units 08/18/24  0135 08/17/24  2307   WBC 10*3/mm3 19.85*  --    HEMOGLOBIN g/dL 12.1*  --    HEMATOCRIT % 39.6  --    PLATELETS 10*3/mm3 283  --    GLUCOSE mg/dL  --  148*   CREATININE mg/dL  --  1.31*   BUN mg/dL  --  33*   SODIUM mmol/L  --  146*   POTASSIUM mmol/L  --  4.5   AST (SGOT) U/L  --  27   ALT (SGPT) U/L  --  10   ALK PHOS U/L  --  177*   BILIRUBIN mg/dL  --  0.3   ANION GAP mmol/L  --  20.9*       CT Chest Without Contrast Diagnostic    Result Date: 8/17/2024  Impression: Multifocal consolidative and groundglass opacities suspicious for multifocal pneumonia. More conspicuous consolidative opacities in the bilateral lower lobes may represent additional foci of infection and/or atelectasis. Small right pleural effusion. Electronically Signed: Raad Macdonald  8/17/2024 4:30 PM EDT  Workstation ID: [id number]     Results for orders placed or performed during the hospital encounter of 08/17/24   Blood Culture - Blood, Arm, Left    Specimen: Arm, Left; Blood   Result Value Ref Range    Blood Culture No growth at 2 days        I have personally reviewed the patient's new results.        Assessment & Plan        Active and Resolved Problems    Healthcare associated pneumonia   Acute hypoxemic respiratory failure  Toxic metabolic encephalopathy  Dysphagia  Diabetes mellitus   Hypertension  Hyperlipidemia  DNR code status  On warfarin with INR of 4  Last hospitalization was for septic shock and pneumonia in April of 2024   History of Afib/flutter on warfarin  Protein calorie malnutrition  Diabetes mellitus  Failure to thrive  BPH  History of dysphasia  Chronic low back pain/lumbar spine stenosis with neurogenic claudication  History of cervical spine stenosis    Suggestion    At this time will admit to hospice      VTE Prophylaxis:  Mechanical VTE prophylaxis orders are present.        The patient desires to be as follows:    CODE STATUS:    Medical Intervention Limits: No intubation (DNI); No cardioversion  Code Status (Patient has no pulse and is not breathing): No CPR (Do Not Attempt to Resuscitate)  Medical Interventions (Patient has pulse or is breathing): Limited Support        Admission Status:      Expected Length of Stay:  3-4 days    PDMP and Medication Dispenses via Sidebar reviewed and consistent with patient reported medications.        Signature:     This document has been electronically signed by Kendall Haynes MD on August 19, 2024 15:02 EDT   [facility] Hospitalist Team

## 2024-08-19 NOTE — CASE MANAGEMENT/SOCIAL WORK
Case Management Discharge Note      Final Note: , [facility]          Final Discharge Disposition Code: 41 -  in medical facility

## 2024-08-19 NOTE — DISCHARGE SUMMARY
[facility] Medicine Services  Discharge Summary    Date of Service: 24  Patient Name: Lux Bray  : 1945  MRN: [id number]    Date of Admission: 2024  Discharge Diagnosis: Healthcare associated pneumonia   Acute hypoxemic respiratory failure  Date of Discharge:  24  Primary Care Physician: Montana Morrow MD      Presenting Problem:   End of life care [Z51.5]    Active and Resolved Hospital Problems:  Active Hospital Problems    Diagnosis POA    **End of life care [Z51.5] Not Applicable      Resolved Hospital Problems   No resolved problems to display.         Hospital Course     HPI: This 78-year-old male who lives in a nursing facility and is under hospice care.  Today patient was noted to be hypoxemic and patient was also having increasing shortness of breath for which EMS was called and and he was found to have O2 sat in 70s.  Patient is sick-looking lethargic and nonverbal.  Blood pressure stable.  Lab work showed hypoxemia and WBC count is 17 with INR of 4 with WBC count of 17 and chest x-ray showed left lower lobe pneumonia.  Patient is currently being admitted for further management.  Patient's daughter was at the bedside who requested to continue frequent IV morphine as per hospice suggestion.  Of note the patient has been obtunded and has significant dysphagia according to family.  Patient is unable to swallow his pills.    Review of Systems    Unable to obtain  as patient is obtunded    Hospital Course:  Patient was admitted to the medical floor.  Pulmonary, he was started IV antibiotics and oxygen titration.  Decided to make patient hospice and he was admitted to [facility]    Day of Discharge     Vital Signs:         Physical Exam            Pertinent  and/or Most Recent Results     LAB RESULTS:      Lab 24  0135 24  0020 24  2307 24  1216   WBC 19.85*  --   --  17.12*   HEMOGLOBIN 12.1*  --   --  12.6*   HEMATOCRIT 39.6  --   --  41.0    PLATELETS 283  --   --  304   NEUTROS ABS 16.67*  --   --  13.72*   IMMATURE GRANS (ABS)  --   --   --  0.09*   LYMPHS ABS  --   --   --  2.00   MONOS ABS  --   --   --  1.20*   EOS ABS  --   --   --  0.06   MCV 96.1  --   --  94.5   SED RATE 96*  --   --   --    CRP  --   --  18.60*  --    PROCALCITONIN  --   --   --  0.27*   PROTIME  --  45.9*  --  39.4*   APTT  --   --   --  64.6         Lab 08/18/24  0135 08/17/24  2307 08/17/24  1216   SODIUM  --  146* 142   POTASSIUM  --  4.5 5.1   CHLORIDE  --  103 100   CO2  --  22.1 25.1   ANION GAP  --  20.9* 16.9*   BUN  --  33* 37*   CREATININE  --  1.31* 1.41*   EGFR  --  55.0* 50.4*   GLUCOSE  --  148* 154*   CALCIUM  --  9.3 9.8   MAGNESIUM  --   --  2.1   HEMOGLOBIN A1C 6.54*  --   --          Lab 08/17/24  2307 08/17/24  1216   TOTAL PROTEIN 6.7 7.5   ALBUMIN 3.4* 3.8   GLOBULIN 3.3 3.7   ALT (SGPT) 10 9   AST (SGOT) 27 27   BILIRUBIN 0.3 0.2   ALK PHOS 177* 168*         Lab 08/18/24  0020 08/17/24  1731 08/17/24  1216   PROBNP  --  558.0 332.0   HSTROP T  --   --  39*   PROTIME 45.9*  --  39.4*   INR 4.72*  --  4.01*                 Lab 08/17/24  1221   FIO2 100     Brief Urine Lab Results  (Last result in the past 365 days)        Color   Clarity   Blood   Leuk Est   Nitrite   Protein   CREAT   Urine HCG        05/06/24 0300 Yellow   Clear   Negative   Negative   Negative   Trace                 Microbiology Results (last 10 days)       Procedure Component Value - Date/Time    CANDIDA AURIS PCR - Swab, Axilla Right, Axilla Left and Groin [[id number]]  (Normal) Collected: 08/18/24 0043    Lab Status: Final result Specimen: Swab from Axilla Right, Axilla Left and Groin Updated: 08/19/24 0955     CANDIDA AURIS PCR Not Detected    MRSA Screen, PCR (Inpatient) - Swab, Nares [[id number]]  (Normal) Collected: 08/17/24 1542    Lab Status: Final result Specimen: Swab from Nares Updated: 08/17/24 1822     MRSA PCR No MRSA Detected    Narrative:      The negative predictive  value of this diagnostic test is high and should only be used to consider de-escalating anti-MRSA therapy. A positive result may indicate colonization with MRSA and must be correlated clinically.    Blood Culture - Blood, Arm, Left [[id number]]  (Normal) Collected: 08/17/24 1249    Lab Status: Preliminary result Specimen: Blood from Arm, Left Updated: 08/19/24 1300     Blood Culture No growth at 2 days    Respiratory Panel PCR w/COVID-19(SARS-CoV-2) DODIE/JOVITA/THEO/PAD/COR/RAJAT In-House, NP Swab in UTM/VTM, 2 HR TAT - Swab, Nasopharynx [[id number]]  (Normal) Collected: 08/17/24 1216    Lab Status: Final result Specimen: Swab from Nasopharynx Updated: 08/17/24 1315     ADENOVIRUS, PCR Not Detected     Coronavirus 229E Not Detected     Coronavirus HKU1 Not Detected     Coronavirus NL63 Not Detected     Coronavirus OC43 Not Detected     COVID19 Not Detected     Human Metapneumovirus Not Detected     Human Rhinovirus/Enterovirus Not Detected     Influenza A PCR Not Detected     Influenza B PCR Not Detected     Parainfluenza Virus 1 Not Detected     Parainfluenza Virus 2 Not Detected     Parainfluenza Virus 3 Not Detected     Parainfluenza Virus 4 Not Detected     RSV, PCR Not Detected     Bordetella pertussis pcr Not Detected     Bordetella parapertussis PCR Not Detected     Chlamydophila pneumoniae PCR Not Detected     Mycoplasma pneumo by PCR Not Detected    Narrative:      In the setting of a positive respiratory panel with a viral infection PLUS a negative procalcitonin without other underlying concern for bacterial infection, consider observing off antibiotics or discontinuation of antibiotics and continue supportive care. If the respiratory panel is positive for atypical bacterial infection (Bordetella pertussis, Chlamydophila pneumoniae, or Mycoplasma pneumoniae), consider antibiotic de-escalation to target atypical bacterial infection.    Blood Culture - Blood, Arm, Right [[id number]]  (Normal) Collected: 08/17/24 1216     Lab Status: Preliminary result Specimen: Blood from Arm, Right Updated: 08/19/24 1230     Blood Culture No growth at 2 days            CT Chest Without Contrast Diagnostic    Result Date: 8/17/2024  Impression: Impression: Multifocal consolidative and groundglass opacities suspicious for multifocal pneumonia. More conspicuous consolidative opacities in the bilateral lower lobes may represent additional foci of infection and/or atelectasis. Small right pleural effusion. Electronically Signed: Raad Macdonald  8/17/2024 4:30 PM EDT  Workstation ID: [id number]    XR Chest 1 View    Result Date: 8/17/2024  Impression: Impression: Left lower lung airspace disease compatible with pneumonia and small effusion. Clinical correlation and follow-up to resolution recommended Electronically Signed: Moisés Clark MD  8/17/2024 1:05 PM EDT  Workstation ID: [id number]             Results for orders placed during the hospital encounter of 04/05/24    Adult Transthoracic Echo Complete W/ Cont if Necessary Per Protocol    Interpretation Summary    Left ventricular systolic function is normal. Left ventricular ejection fraction appears to be 56 - 60%.    Left ventricular wall thickness is consistent with mild concentric hypertrophy.    Left ventricular diastolic function was normal.    Estimated right ventricular systolic pressure from tricuspid regurgitation is normal (<35 mmHg).    There is a small (<1cm) circumferential pericardial effusion. There is no evidence of cardiac tamponade.      Labs Pending at Discharge:        Procedures Performed           Consults:   Consults       Date and Time Order Name Status Description    8/17/2024  1:32 PM Inpatient Pulmonology Consult Completed               Discharge Details        Discharge Medications        ASK your doctor about these medications        Instructions Start Date   amiodarone 200 MG tablet  Commonly known as: PACERONE   200 mg, Oral, Nightly      busPIRone 10 MG  tablet  Commonly known as: BUSPAR   10 mg, Oral, 2 Times Daily      carbidopa-levodopa  MG per tablet  Commonly known as: SINEMET   1 tablet, Oral, 3 Times Daily      dicyclomine 10 MG capsule  Commonly known as: BENTYL   10 mg, Oral, 4 Times Daily Before Meals & Nightly      DULoxetine 30 MG capsule  Commonly known as: CYMBALTA   90 mg, Oral, Daily      empagliflozin 10 MG tablet tablet  Commonly known as: Jardiance   10 mg, Oral, Daily      glimepiride 2 MG tablet  Commonly known as: AMARYL   3 tablets, Oral, Every Morning Before Breakfast      HYDROcodone-acetaminophen  MG per tablet  Commonly known as: NORCO   1 tablet, Oral, Every 8 Hours      lactulose 10 GM/15ML solution  Commonly known as: CHRONULAC   20 g, Oral, Daily PRN      metFORMIN 500 MG tablet  Commonly known as: GLUCOPHAGE   1,000 mg, Oral, 2 Times Daily      MiraLax 17 GM/SCOOP powder  Generic drug: polyethylene glycol   17 g, Oral, Daily      Morphine 15 MG 12 hr tablet  Commonly known as: MS CONTIN   15 mg, Oral, 2 Times Daily      omeprazole 20 MG capsule  Commonly known as: priLOSEC   20 mg, Oral, Daily      ondansetron 4 MG tablet  Commonly known as: ZOFRAN   4 mg, Oral, Every 4 Hours PRN      Paxil 40 MG tablet  Generic drug: PARoxetine   40 mg, Oral, Every Morning      pregabalin 75 MG capsule  Commonly known as: LYRICA   75 mg, Oral, 2 Times Daily      primidone 50 MG tablet  Commonly known as: MYSOLINE   150 mg, Oral, 2 Times Daily      sennosides-docusate 8.6-50 MG per tablet  Commonly known as: PERICOLACE   2 tablets, Oral, 2 Times Daily      warfarin 4 MG tablet  Commonly known as: Coumadin   1 tablet daily or as directed by provider Inr 2-3 Check INR qd dc lovenox once INR 2               No Known Allergies      Discharge Disposition:       Diet:  Hospital:  No active diet order        Discharge Activity:         CODE STATUS:  Code Status and Medical Interventions: No CPR (Do Not Attempt to Resuscitate); Limited  Support; No intubation (DNI), No cardioversion   Ordered at: 08/18/24 1521     Medical Intervention Limits:    No intubation (DNI)    No cardioversion     Code Status (Patient has no pulse and is not breathing):    No CPR (Do Not Attempt to Resuscitate)     Medical Interventions (Patient has pulse or is breathing):    Limited Support         No future appointments.        Time spent on Discharge including face to face service:  >30 minutes    Signature: Electronically signed by Kendall Haynes MD, 08/19/24, 15:04 EDT.  [facility] Hospitalist Team

## 2024-08-22 LAB
BACTERIA SPEC AEROBE CULT: NORMAL
BACTERIA SPEC AEROBE CULT: NORMAL
QT INTERVAL: 448 MS
QTC INTERVAL: 624 MS

## 2024-09-02 NOTE — TELEPHONE ENCOUNTER
Ajovy sent to pharmacy, since Tucker is still having headaches with nortriptyline, and there is concern with going up on nortriptyline dose due to presence of other medications.     Please inform that once Ajovy is started, if headaches improve in 1-2 months, then nortriptyline can be decreased to one capsule.   Sent message in my chart

## 2024-12-03 NOTE — PLAN OF CARE
"Goal Outcome Evaluation:  Assessment: Lux Bray presents with ADL impairments affecting function including balance, cognition, coordination, endurance / activity tolerance, pain, postural / trunk control, and strength. Pt is demo minor gains in sitting balance on EOB & feeding skills. His progress is complicated by s/s of depression, dec initiation of self movement requiring v.c. for initiation, extreme fatigue & dec STM. Able to get pt up to recliner & OOB this date with sit<>stand lift, but pt quickly fatigues & has dec posture, so emmanuel lift may be required until LE strength & posture improve. Demonstrated functioning below baseline abilities indicate the need for continued skilled intervention while inpatient. Tolerating session today without incident. Will continue to follow and progress as tolerated.     Plan/Recommendations:   Moderate Intensity Therapy recommended post-acute care. This is recommended as therapy feels the patient would require 3-4 days per week and wouldn't tolerate \"3 hour daily\" rehab intensity. SNF would be the preferred choice. If the patient does not agree to SNF, arrange HH or OP depending on home bound status. If patient is medically complex, consider LTACH.. Pt requires no DME at discharge.     Pt desires Skilled Rehab placement at discharge. Pt cooperative; agreeable to therapeutic recommendations and plan of care.     Modified [place]: N/A = No pre-op stroke/TIA  " Attending Attestation (For Attendings USE Only)...

## (undated) DEVICE — FLEX ADVANTAGE 1500CC: Brand: FLEX ADVANTAGE

## (undated) DEVICE — VIAL FORMLN CAP 10PCT 20ML

## (undated) DEVICE — KT ORCA ORCAPOD DISP STRL

## (undated) DEVICE — SINGLE-USE BIOPSY FORCEPS: Brand: RADIAL JAW 4

## (undated) DEVICE — MSK ENDO PORT O2 POM ELITE CURAPLEX A/

## (undated) DEVICE — GOWN PROC ENDOARMOR GI LVL3 HY/SHLD UNIV

## (undated) DEVICE — BITEBLOCK OMNI BLOC

## (undated) DEVICE — Device

## (undated) DEVICE — ADAPT CLN SCPE ENDO PORPOISE BX/50 DISP